# Patient Record
Sex: FEMALE | URBAN - METROPOLITAN AREA
[De-identification: names, ages, dates, MRNs, and addresses within clinical notes are randomized per-mention and may not be internally consistent; named-entity substitution may affect disease eponyms.]

---

## 2024-01-01 ENCOUNTER — APPOINTMENT (OUTPATIENT)
Dept: RADIOLOGY | Facility: HOSPITAL | Age: 64
DRG: 004 | End: 2024-01-01
Payer: MEDICARE

## 2024-01-01 ENCOUNTER — APPOINTMENT (OUTPATIENT)
Dept: CARDIOLOGY | Facility: HOSPITAL | Age: 64
DRG: 004 | End: 2024-01-01
Payer: MEDICARE

## 2024-01-01 ENCOUNTER — APPOINTMENT (OUTPATIENT)
Dept: GASTROENTEROLOGY | Facility: HOSPITAL | Age: 64
DRG: 004 | End: 2024-01-01
Payer: MEDICARE

## 2024-01-01 ENCOUNTER — APPOINTMENT (OUTPATIENT)
Dept: DIALYSIS | Facility: HOSPITAL | Age: 64
End: 2024-01-01
Payer: MEDICARE

## 2024-01-01 PROCEDURE — 71045 X-RAY EXAM CHEST 1 VIEW: CPT

## 2024-01-01 PROCEDURE — 74018 RADEX ABDOMEN 1 VIEW: CPT

## 2024-01-01 PROCEDURE — 93005 ELECTROCARDIOGRAM TRACING: CPT

## 2024-01-01 PROCEDURE — 2720000007 HC OR 272 NO HCPCS

## 2024-01-01 PROCEDURE — 2780000003 HC OR 278 NO HCPCS: Performed by: STUDENT IN AN ORGANIZED HEALTH CARE EDUCATION/TRAINING PROGRAM

## 2024-01-01 PROCEDURE — 2720000007 HC OR 272 NO HCPCS: Performed by: STUDENT IN AN ORGANIZED HEALTH CARE EDUCATION/TRAINING PROGRAM

## 2024-01-01 PROCEDURE — C1750 CATH, HEMODIALYSIS,LONG-TERM: HCPCS

## 2024-01-01 PROCEDURE — C1769 GUIDE WIRE: HCPCS

## 2024-01-01 PROCEDURE — 43246 EGD PLACE GASTROSTOMY TUBE: CPT | Performed by: STUDENT IN AN ORGANIZED HEALTH CARE EDUCATION/TRAINING PROGRAM

## 2024-01-01 PROCEDURE — C1713 ANCHOR/SCREW BN/BN,TIS/BN: HCPCS | Performed by: STUDENT IN AN ORGANIZED HEALTH CARE EDUCATION/TRAINING PROGRAM

## 2024-01-01 PROCEDURE — 2780000003 HC OR 278 NO HCPCS

## 2024-11-18 ENCOUNTER — HOSPITAL ENCOUNTER (INPATIENT)
Facility: HOSPITAL | Age: 64
End: 2024-11-18
Attending: INTERNAL MEDICINE | Admitting: INTERNAL MEDICINE
Payer: MEDICARE

## 2024-11-18 DIAGNOSIS — R60.1 GENERALIZED EDEMA: ICD-10-CM

## 2024-11-18 DIAGNOSIS — I82.C19: ICD-10-CM

## 2024-11-18 DIAGNOSIS — N17.9 ACUTE RENAL FAILURE, UNSPECIFIED ACUTE RENAL FAILURE TYPE (CMS-HCC): ICD-10-CM

## 2024-11-18 DIAGNOSIS — M79.89 OTHER SPECIFIED SOFT TISSUE DISORDERS: ICD-10-CM

## 2024-11-18 DIAGNOSIS — R13.10 DYSPHAGIA, UNSPECIFIED TYPE: ICD-10-CM

## 2024-11-18 DIAGNOSIS — I46.9 CARDIAC ARREST: ICD-10-CM

## 2024-11-18 DIAGNOSIS — I71.00 DISSECTION OF AORTA: Primary | ICD-10-CM

## 2024-11-18 DIAGNOSIS — R57.9 SHOCK (MULTI): ICD-10-CM

## 2024-11-18 LAB
ALBUMIN SERPL BCP-MCNC: 3.8 G/DL (ref 3.4–5)
ALP SERPL-CCNC: 82 U/L (ref 33–136)
ALT SERPL W P-5'-P-CCNC: 10 U/L (ref 7–45)
ANION GAP BLDV CALCULATED.4IONS-SCNC: 15 MMOL/L (ref 10–25)
ANION GAP SERPL CALC-SCNC: 16 MMOL/L (ref 10–20)
APTT PPP: 29 SECONDS (ref 27–38)
AST SERPL W P-5'-P-CCNC: 15 U/L (ref 9–39)
BASE EXCESS BLDV CALC-SCNC: -4.9 MMOL/L (ref -2–3)
BILIRUB SERPL-MCNC: 0.6 MG/DL (ref 0–1.2)
BODY TEMPERATURE: 37 DEGREES CELSIUS
BUN SERPL-MCNC: 21 MG/DL (ref 6–23)
CA-I BLDV-SCNC: 1.13 MMOL/L (ref 1.1–1.33)
CALCIUM SERPL-MCNC: 8.8 MG/DL (ref 8.6–10.6)
CHLORIDE BLDV-SCNC: 104 MMOL/L (ref 98–107)
CHLORIDE SERPL-SCNC: 105 MMOL/L (ref 98–107)
CO2 SERPL-SCNC: 25 MMOL/L (ref 21–32)
CREAT SERPL-MCNC: 1.49 MG/DL (ref 0.5–1.05)
EGFRCR SERPLBLD CKD-EPI 2021: 39 ML/MIN/1.73M*2
GLUCOSE BLD MANUAL STRIP-MCNC: 312 MG/DL (ref 74–99)
GLUCOSE BLDV-MCNC: 340 MG/DL (ref 74–99)
GLUCOSE SERPL-MCNC: 324 MG/DL (ref 74–99)
HCO3 BLDV-SCNC: 24.1 MMOL/L (ref 22–26)
HCT VFR BLD EST: 36 % (ref 36–46)
HGB BLDV-MCNC: 12 G/DL (ref 12–16)
INHALED O2 CONCENTRATION: 32 %
INR PPP: 1.1 (ref 0.9–1.1)
LACTATE BLDV-SCNC: 3.1 MMOL/L (ref 0.4–2)
MAGNESIUM SERPL-MCNC: 1.85 MG/DL (ref 1.6–2.4)
OXYHGB MFR BLDV: 75.7 % (ref 45–75)
PCO2 BLDV: 63 MM HG (ref 41–51)
PH BLDV: 7.19 PH (ref 7.33–7.43)
PO2 BLDV: 52 MM HG (ref 35–45)
POTASSIUM BLDV-SCNC: 4.1 MMOL/L (ref 3.5–5.3)
POTASSIUM SERPL-SCNC: 4.3 MMOL/L (ref 3.5–5.3)
PROT SERPL-MCNC: 6.7 G/DL (ref 6.4–8.2)
PROTHROMBIN TIME: 11.9 SECONDS (ref 9.8–12.8)
SAO2 % BLDV: 78 % (ref 45–75)
SODIUM BLDV-SCNC: 139 MMOL/L (ref 136–145)
SODIUM SERPL-SCNC: 142 MMOL/L (ref 136–145)

## 2024-11-18 PROCEDURE — 86923 COMPATIBILITY TEST ELECTRIC: CPT

## 2024-11-18 PROCEDURE — 85025 COMPLETE CBC W/AUTO DIFF WBC: CPT

## 2024-11-18 PROCEDURE — 99221 1ST HOSP IP/OBS SF/LOW 40: CPT | Performed by: SURGERY

## 2024-11-18 PROCEDURE — 86901 BLOOD TYPING SEROLOGIC RH(D): CPT

## 2024-11-18 PROCEDURE — 84484 ASSAY OF TROPONIN QUANT: CPT

## 2024-11-18 PROCEDURE — 83735 ASSAY OF MAGNESIUM: CPT

## 2024-11-18 PROCEDURE — 1100000001 HC PRIVATE ROOM DAILY

## 2024-11-18 PROCEDURE — 84132 ASSAY OF SERUM POTASSIUM: CPT

## 2024-11-18 PROCEDURE — 36415 COLL VENOUS BLD VENIPUNCTURE: CPT

## 2024-11-18 PROCEDURE — 83036 HEMOGLOBIN GLYCOSYLATED A1C: CPT

## 2024-11-18 PROCEDURE — 5A1D70Z PERFORMANCE OF URINARY FILTRATION, INTERMITTENT, LESS THAN 6 HOURS PER DAY: ICD-10-PCS | Performed by: INTERNAL MEDICINE

## 2024-11-18 PROCEDURE — 93010 ELECTROCARDIOGRAM REPORT: CPT | Performed by: INTERNAL MEDICINE

## 2024-11-18 PROCEDURE — 84075 ASSAY ALKALINE PHOSPHATASE: CPT

## 2024-11-18 PROCEDURE — 83605 ASSAY OF LACTIC ACID: CPT

## 2024-11-18 PROCEDURE — 82947 ASSAY GLUCOSE BLOOD QUANT: CPT

## 2024-11-18 PROCEDURE — 36620 INSERTION CATHETER ARTERY: CPT | Performed by: STUDENT IN AN ORGANIZED HEALTH CARE EDUCATION/TRAINING PROGRAM

## 2024-11-18 PROCEDURE — 85610 PROTHROMBIN TIME: CPT

## 2024-11-18 RX ORDER — ESMOLOL HYDROCHLORIDE 10 MG/ML
INJECTION, SOLUTION INTRAVENOUS
Status: DISPENSED
Start: 2024-11-18 | End: 2024-11-19

## 2024-11-18 RX ORDER — HEPARIN SODIUM 10000 [USP'U]/100ML
0-4000 INJECTION, SOLUTION INTRAVENOUS CONTINUOUS
Status: DISCONTINUED | OUTPATIENT
Start: 2024-11-19 | End: 2024-11-22

## 2024-11-18 RX ORDER — SODIUM NITROPRUSSIDE IN 0.9% SODIUM CHLORIDE 0.5 MG/ML
INJECTION INTRAVENOUS
Status: DISPENSED
Start: 2024-11-18 | End: 2024-11-19

## 2024-11-18 RX ORDER — DILTIAZEM HCL/D5W 125 MG/125
PLASTIC BAG, INJECTION (ML) INTRAVENOUS
Status: DISCONTINUED
Start: 2024-11-18 | End: 2024-11-19

## 2024-11-18 ASSESSMENT — PAIN - FUNCTIONAL ASSESSMENT: PAIN_FUNCTIONAL_ASSESSMENT: 0-10

## 2024-11-18 ASSESSMENT — PAIN SCALES - GENERAL: PAINLEVEL_OUTOF10: 10 - WORST POSSIBLE PAIN

## 2024-11-18 ASSESSMENT — PAIN DESCRIPTION - DESCRIPTORS: DESCRIPTORS: ACHING

## 2024-11-18 NOTE — Clinical Note
Inner canula of trach with inflated bulb came out of stoma while awaiting to prep site for line placement. Trach collar had been loosened to clear area. Pt coughed and was moving her neck and cannula slipped out. Spo2 noted to be be 90%, this RN immediately reinserted cannula and RT was called. Spo2 recovered to baseline. RT arrived and assessed airway and listened to bilateral breath sounds. Pt is returning tidal volumes and bilateral  breath sounds ausciltated per RT. Site prep continued. Trach collar was re secured and left fastened.

## 2024-11-19 ENCOUNTER — APPOINTMENT (OUTPATIENT)
Dept: CARDIOLOGY | Facility: HOSPITAL | Age: 64
DRG: 004 | End: 2024-11-19
Payer: MEDICARE

## 2024-11-19 LAB
ABO GROUP (TYPE) IN BLOOD: NORMAL
ABO GROUP (TYPE) IN BLOOD: NORMAL
ALBUMIN SERPL BCP-MCNC: 3.5 G/DL (ref 3.4–5)
ANION GAP BLDA CALCULATED.4IONS-SCNC: 12 MMO/L (ref 10–25)
ANION GAP BLDA CALCULATED.4IONS-SCNC: 12 MMO/L (ref 10–25)
ANION GAP BLDA CALCULATED.4IONS-SCNC: 13 MMO/L (ref 10–25)
ANION GAP BLDA CALCULATED.4IONS-SCNC: 13 MMO/L (ref 10–25)
ANION GAP SERPL CALC-SCNC: 16 MMOL/L (ref 10–20)
ANTIBODY SCREEN: NORMAL
AORTIC VALVE PEAK VELOCITY: 0.72 M/S
ATRIAL RATE: 71 BPM
ATRIAL RATE: 76 BPM
ATRIAL RATE: 81 BPM
ATRIAL RATE: 83 BPM
ATRIAL RATE: 91 BPM
AV PEAK GRADIENT: 2 MMHG
AVA (PEAK VEL): 1.5 CM2
BASE EXCESS BLDA CALC-SCNC: -1.2 MMOL/L (ref -2–3)
BASE EXCESS BLDA CALC-SCNC: -3.9 MMOL/L (ref -2–3)
BASE EXCESS BLDA CALC-SCNC: -4.2 MMOL/L (ref -2–3)
BASE EXCESS BLDA CALC-SCNC: -4.4 MMOL/L (ref -2–3)
BASOPHILS # BLD AUTO: 0.05 X10*3/UL (ref 0–0.1)
BASOPHILS NFR BLD AUTO: 0.6 %
BODY TEMPERATURE: 37 DEGREES CELSIUS
BUN SERPL-MCNC: 26 MG/DL (ref 6–23)
CA-I BLDA-SCNC: 1.07 MMOL/L (ref 1.1–1.33)
CA-I BLDA-SCNC: 1.11 MMOL/L (ref 1.1–1.33)
CA-I BLDA-SCNC: 1.12 MMOL/L (ref 1.1–1.33)
CA-I BLDA-SCNC: 1.12 MMOL/L (ref 1.1–1.33)
CALCIUM SERPL-MCNC: 8.3 MG/DL (ref 8.6–10.6)
CARDIAC TROPONIN I PNL SERPL HS: 1068 NG/L (ref 0–34)
CARDIAC TROPONIN I PNL SERPL HS: 2861 NG/L (ref 0–34)
CARDIAC TROPONIN I PNL SERPL HS: 343 NG/L (ref 0–34)
CARDIAC TROPONIN I PNL SERPL HS: 3910 NG/L (ref 0–34)
CARDIAC TROPONIN I PNL SERPL HS: 5681 NG/L (ref 0–34)
CARDIAC TROPONIN I PNL SERPL HS: 5701 NG/L (ref 0–34)
CHLORIDE BLDA-SCNC: 105 MMOL/L (ref 98–107)
CHLORIDE SERPL-SCNC: 105 MMOL/L (ref 98–107)
CHOLEST SERPL-MCNC: 157 MG/DL (ref 0–199)
CHOLESTEROL/HDL RATIO: 3.8
CO2 SERPL-SCNC: 26 MMOL/L (ref 21–32)
CREAT SERPL-MCNC: 1.78 MG/DL (ref 0.5–1.05)
EGFRCR SERPLBLD CKD-EPI 2021: 31 ML/MIN/1.73M*2
EJECTION FRACTION APICAL 4 CHAMBER: 42.2
EJECTION FRACTION: 48 %
EOSINOPHIL # BLD AUTO: 0 X10*3/UL (ref 0–0.7)
EOSINOPHIL NFR BLD AUTO: 0 %
ERYTHROCYTE [DISTWIDTH] IN BLOOD BY AUTOMATED COUNT: 12.8 % (ref 11.5–14.5)
ERYTHROCYTE [DISTWIDTH] IN BLOOD BY AUTOMATED COUNT: 13.1 % (ref 11.5–14.5)
ERYTHROCYTE [DISTWIDTH] IN BLOOD BY AUTOMATED COUNT: 13.2 % (ref 11.5–14.5)
EST. AVERAGE GLUCOSE BLD GHB EST-MCNC: 186 MG/DL
GLUCOSE BLD MANUAL STRIP-MCNC: 136 MG/DL (ref 74–99)
GLUCOSE BLD MANUAL STRIP-MCNC: 147 MG/DL (ref 74–99)
GLUCOSE BLD MANUAL STRIP-MCNC: 176 MG/DL (ref 74–99)
GLUCOSE BLD MANUAL STRIP-MCNC: 215 MG/DL (ref 74–99)
GLUCOSE BLD MANUAL STRIP-MCNC: 250 MG/DL (ref 74–99)
GLUCOSE BLD MANUAL STRIP-MCNC: 352 MG/DL (ref 74–99)
GLUCOSE BLDA-MCNC: 161 MG/DL (ref 74–99)
GLUCOSE BLDA-MCNC: 267 MG/DL (ref 74–99)
GLUCOSE BLDA-MCNC: 283 MG/DL (ref 74–99)
GLUCOSE BLDA-MCNC: 364 MG/DL (ref 74–99)
GLUCOSE SERPL-MCNC: 273 MG/DL (ref 74–99)
HBA1C MFR BLD: 8.1 %
HCO3 BLDA-SCNC: 23.2 MMOL/L (ref 22–26)
HCO3 BLDA-SCNC: 24.6 MMOL/L (ref 22–26)
HCO3 BLDA-SCNC: 24.6 MMOL/L (ref 22–26)
HCO3 BLDA-SCNC: 26.3 MMOL/L (ref 22–26)
HCT VFR BLD AUTO: 32.3 % (ref 36–46)
HCT VFR BLD AUTO: 36 % (ref 36–46)
HCT VFR BLD AUTO: 36 % (ref 36–46)
HCT VFR BLD EST: 34 % (ref 36–46)
HCT VFR BLD EST: 34 % (ref 36–46)
HCT VFR BLD EST: 36 % (ref 36–46)
HCT VFR BLD EST: 36 % (ref 36–46)
HDLC SERPL-MCNC: 41.5 MG/DL
HGB BLD-MCNC: 10.2 G/DL (ref 12–16)
HGB BLD-MCNC: 11.1 G/DL (ref 12–16)
HGB BLD-MCNC: 11.6 G/DL (ref 12–16)
HGB BLDA-MCNC: 11.3 G/DL (ref 12–16)
HGB BLDA-MCNC: 11.4 G/DL (ref 12–16)
HGB BLDA-MCNC: 11.9 G/DL (ref 12–16)
HGB BLDA-MCNC: 12.1 G/DL (ref 12–16)
IMM GRANULOCYTES # BLD AUTO: 0.02 X10*3/UL (ref 0–0.7)
IMM GRANULOCYTES NFR BLD AUTO: 0.2 % (ref 0–0.9)
INHALED O2 CONCENTRATION: 32 %
INHALED O2 CONCENTRATION: 34 %
INHALED O2 CONCENTRATION: 34 %
INHALED O2 CONCENTRATION: 36 %
LACTATE BLDA-SCNC: 1 MMOL/L (ref 0.4–2)
LACTATE BLDA-SCNC: 2.2 MMOL/L (ref 0.4–2)
LACTATE BLDA-SCNC: 2.3 MMOL/L (ref 0.4–2)
LACTATE BLDA-SCNC: 2.7 MMOL/L (ref 0.4–2)
LACTATE SERPL-SCNC: 1.4 MMOL/L (ref 0.4–2)
LACTATE SERPL-SCNC: 2.8 MMOL/L (ref 0.4–2)
LACTATE SERPL-SCNC: 3.6 MMOL/L (ref 0.4–2)
LDLC SERPL CALC-MCNC: 101 MG/DL
LEFT ATRIUM VOLUME AREA LENGTH INDEX BSA: 71.4 ML/M2
LEFT VENTRICLE INTERNAL DIMENSION DIASTOLE: 4.7 CM (ref 3.5–6)
LEFT VENTRICULAR OUTFLOW TRACT DIAMETER: 1.7 CM
LYMPHOCYTES # BLD AUTO: 1.15 X10*3/UL (ref 1.2–4.8)
LYMPHOCYTES NFR BLD AUTO: 13.1 %
MAGNESIUM SERPL-MCNC: 1.76 MG/DL (ref 1.6–2.4)
MCH RBC QN AUTO: 29.5 PG (ref 26–34)
MCH RBC QN AUTO: 29.9 PG (ref 26–34)
MCH RBC QN AUTO: 31 PG (ref 26–34)
MCHC RBC AUTO-ENTMCNC: 30.8 G/DL (ref 32–36)
MCHC RBC AUTO-ENTMCNC: 31.6 G/DL (ref 32–36)
MCHC RBC AUTO-ENTMCNC: 32.2 G/DL (ref 32–36)
MCV RBC AUTO: 92 FL (ref 80–100)
MCV RBC AUTO: 97 FL (ref 80–100)
MCV RBC AUTO: 98 FL (ref 80–100)
MITRAL VALVE E/A RATIO: 3.51
MONOCYTES # BLD AUTO: 0.29 X10*3/UL (ref 0.1–1)
MONOCYTES NFR BLD AUTO: 3.3 %
NEUTROPHILS # BLD AUTO: 7.3 X10*3/UL (ref 1.2–7.7)
NEUTROPHILS NFR BLD AUTO: 82.8 %
NON HDL CHOLESTEROL: 116 MG/DL (ref 0–149)
NRBC BLD-RTO: 0 /100 WBCS (ref 0–0)
OXYHGB MFR BLDA: 85.2 % (ref 94–98)
OXYHGB MFR BLDA: 93.5 % (ref 94–98)
OXYHGB MFR BLDA: 94.4 % (ref 94–98)
OXYHGB MFR BLDA: 97.3 % (ref 94–98)
P AXIS: 63 DEGREES
P AXIS: 68 DEGREES
P AXIS: 69 DEGREES
P AXIS: 70 DEGREES
P AXIS: 81 DEGREES
P OFFSET: 209 MS
P OFFSET: 212 MS
P OFFSET: 215 MS
P OFFSET: 215 MS
P OFFSET: 218 MS
P ONSET: 149 MS
P ONSET: 155 MS
P ONSET: 157 MS
P ONSET: 159 MS
P ONSET: 161 MS
PCO2 BLDA: 53 MM HG (ref 38–42)
PCO2 BLDA: 56 MM HG (ref 38–42)
PCO2 BLDA: 60 MM HG (ref 38–42)
PCO2 BLDA: 63 MM HG (ref 38–42)
PH BLDA: 7.2 PH (ref 7.38–7.42)
PH BLDA: 7.22 PH (ref 7.38–7.42)
PH BLDA: 7.25 PH (ref 7.38–7.42)
PH BLDA: 7.28 PH (ref 7.38–7.42)
PHOSPHATE SERPL-MCNC: 6 MG/DL (ref 2.5–4.9)
PLATELET # BLD AUTO: 247 X10*3/UL (ref 150–450)
PLATELET # BLD AUTO: 276 X10*3/UL (ref 150–450)
PLATELET # BLD AUTO: 308 X10*3/UL (ref 150–450)
PO2 BLDA: 109 MM HG (ref 85–95)
PO2 BLDA: 59 MM HG (ref 85–95)
PO2 BLDA: 80 MM HG (ref 85–95)
PO2 BLDA: 86 MM HG (ref 85–95)
POTASSIUM BLDA-SCNC: 4.9 MMOL/L (ref 3.5–5.3)
POTASSIUM BLDA-SCNC: 5.2 MMOL/L (ref 3.5–5.3)
POTASSIUM BLDA-SCNC: 5.2 MMOL/L (ref 3.5–5.3)
POTASSIUM BLDA-SCNC: 5.3 MMOL/L (ref 3.5–5.3)
POTASSIUM SERPL-SCNC: 5.1 MMOL/L (ref 3.5–5.3)
PR INTERVAL: 116 MS
PR INTERVAL: 118 MS
PR INTERVAL: 118 MS
PR INTERVAL: 120 MS
PR INTERVAL: 136 MS
Q ONSET: 214 MS
Q ONSET: 217 MS
Q ONSET: 220 MS
QRS COUNT: 12 BEATS
QRS COUNT: 13 BEATS
QRS COUNT: 13 BEATS
QRS COUNT: 14 BEATS
QRS COUNT: 15 BEATS
QRS DURATION: 74 MS
QRS DURATION: 80 MS
QRS DURATION: 82 MS
QRS DURATION: 90 MS
QRS DURATION: 90 MS
QT INTERVAL: 400 MS
QT INTERVAL: 426 MS
QT INTERVAL: 432 MS
QT INTERVAL: 440 MS
QT INTERVAL: 462 MS
QTC CALCULATION(BAZETT): 492 MS
QTC CALCULATION(BAZETT): 494 MS
QTC CALCULATION(BAZETT): 495 MS
QTC CALCULATION(BAZETT): 502 MS
QTC CALCULATION(BAZETT): 507 MS
QTC FREDERICIA: 460 MS
QTC FREDERICIA: 470 MS
QTC FREDERICIA: 475 MS
QTC FREDERICIA: 481 MS
QTC FREDERICIA: 488 MS
R AXIS: 17 DEGREES
R AXIS: 18 DEGREES
R AXIS: 26 DEGREES
R AXIS: 34 DEGREES
R AXIS: 35 DEGREES
RBC # BLD AUTO: 3.29 X10*6/UL (ref 4–5.2)
RBC # BLD AUTO: 3.71 X10*6/UL (ref 4–5.2)
RBC # BLD AUTO: 3.93 X10*6/UL (ref 4–5.2)
RH FACTOR (ANTIGEN D): NORMAL
RH FACTOR (ANTIGEN D): NORMAL
RIGHT VENTRICLE FREE WALL PEAK S': 5.93 CM/S
RIGHT VENTRICLE PEAK SYSTOLIC PRESSURE: 61.5 MMHG
SAO2 % BLDA: 87 % (ref 94–100)
SAO2 % BLDA: 97 % (ref 94–100)
SAO2 % BLDA: 97 % (ref 94–100)
SAO2 % BLDA: 99 % (ref 94–100)
SODIUM BLDA-SCNC: 135 MMOL/L (ref 136–145)
SODIUM BLDA-SCNC: 137 MMOL/L (ref 136–145)
SODIUM BLDA-SCNC: 137 MMOL/L (ref 136–145)
SODIUM BLDA-SCNC: 139 MMOL/L (ref 136–145)
SODIUM SERPL-SCNC: 142 MMOL/L (ref 136–145)
T AXIS: -66 DEGREES
T AXIS: 104 DEGREES
T AXIS: 118 DEGREES
T AXIS: 159 DEGREES
T AXIS: 191 DEGREES
T OFFSET: 417 MS
T OFFSET: 427 MS
T OFFSET: 433 MS
T OFFSET: 437 MS
T OFFSET: 451 MS
TRICUSPID ANNULAR PLANE SYSTOLIC EXCURSION: 1.4 CM
TRIGL SERPL-MCNC: 75 MG/DL (ref 0–149)
UFH PPP CHRO-ACNC: 0.2 IU/ML
UFH PPP CHRO-ACNC: 0.2 IU/ML
UFH PPP CHRO-ACNC: 0.3 IU/ML
UFH PPP CHRO-ACNC: 0.6 IU/ML
UFH PPP CHRO-ACNC: 0.9 IU/ML
VENTRICULAR RATE: 71 BPM
VENTRICULAR RATE: 76 BPM
VENTRICULAR RATE: 81 BPM
VENTRICULAR RATE: 83 BPM
VENTRICULAR RATE: 91 BPM
VLDL: 15 MG/DL (ref 0–40)
WBC # BLD AUTO: 10.7 X10*3/UL (ref 4.4–11.3)
WBC # BLD AUTO: 8.8 X10*3/UL (ref 4.4–11.3)
WBC # BLD AUTO: 9.9 X10*3/UL (ref 4.4–11.3)

## 2024-11-19 PROCEDURE — 2500000004 HC RX 250 GENERAL PHARMACY W/ HCPCS (ALT 636 FOR OP/ED)

## 2024-11-19 PROCEDURE — 71045 X-RAY EXAM CHEST 1 VIEW: CPT | Performed by: STUDENT IN AN ORGANIZED HEALTH CARE EDUCATION/TRAINING PROGRAM

## 2024-11-19 PROCEDURE — 83718 ASSAY OF LIPOPROTEIN: CPT

## 2024-11-19 PROCEDURE — 2500000004 HC RX 250 GENERAL PHARMACY W/ HCPCS (ALT 636 FOR OP/ED): Performed by: STUDENT IN AN ORGANIZED HEALTH CARE EDUCATION/TRAINING PROGRAM

## 2024-11-19 PROCEDURE — 83605 ASSAY OF LACTIC ACID: CPT

## 2024-11-19 PROCEDURE — 99232 SBSQ HOSP IP/OBS MODERATE 35: CPT | Performed by: PHYSICIAN ASSISTANT

## 2024-11-19 PROCEDURE — 84484 ASSAY OF TROPONIN QUANT: CPT

## 2024-11-19 PROCEDURE — 85027 COMPLETE CBC AUTOMATED: CPT

## 2024-11-19 PROCEDURE — 37799 UNLISTED PX VASCULAR SURGERY: CPT

## 2024-11-19 PROCEDURE — 82947 ASSAY GLUCOSE BLOOD QUANT: CPT

## 2024-11-19 PROCEDURE — 93010 ELECTROCARDIOGRAM REPORT: CPT | Performed by: INTERNAL MEDICINE

## 2024-11-19 PROCEDURE — 2500000001 HC RX 250 WO HCPCS SELF ADMINISTERED DRUGS (ALT 637 FOR MEDICARE OP)

## 2024-11-19 PROCEDURE — 99291 CRITICAL CARE FIRST HOUR: CPT

## 2024-11-19 PROCEDURE — 84132 ASSAY OF SERUM POTASSIUM: CPT

## 2024-11-19 PROCEDURE — C8929 TTE W OR WO FOL WCON,DOPPLER: HCPCS

## 2024-11-19 PROCEDURE — 1100000001 HC PRIVATE ROOM DAILY

## 2024-11-19 PROCEDURE — 2500000002 HC RX 250 W HCPCS SELF ADMINISTERED DRUGS (ALT 637 FOR MEDICARE OP, ALT 636 FOR OP/ED)

## 2024-11-19 PROCEDURE — 93306 TTE W/DOPPLER COMPLETE: CPT | Performed by: INTERNAL MEDICINE

## 2024-11-19 PROCEDURE — 84132 ASSAY OF SERUM POTASSIUM: CPT | Performed by: INTERNAL MEDICINE

## 2024-11-19 PROCEDURE — 99232 SBSQ HOSP IP/OBS MODERATE 35: CPT | Performed by: SURGERY

## 2024-11-19 PROCEDURE — 85520 HEPARIN ASSAY: CPT

## 2024-11-19 PROCEDURE — 83735 ASSAY OF MAGNESIUM: CPT

## 2024-11-19 PROCEDURE — 84132 ASSAY OF SERUM POTASSIUM: CPT | Performed by: STUDENT IN AN ORGANIZED HEALTH CARE EDUCATION/TRAINING PROGRAM

## 2024-11-19 PROCEDURE — 84484 ASSAY OF TROPONIN QUANT: CPT | Performed by: STUDENT IN AN ORGANIZED HEALTH CARE EDUCATION/TRAINING PROGRAM

## 2024-11-19 RX ORDER — ACETAMINOPHEN 10 MG/ML
15 INJECTION, SOLUTION INTRAVENOUS 3 TIMES DAILY PRN
Status: DISCONTINUED | OUTPATIENT
Start: 2024-11-19 | End: 2024-11-21

## 2024-11-19 RX ORDER — MAGNESIUM SULFATE HEPTAHYDRATE 40 MG/ML
2 INJECTION, SOLUTION INTRAVENOUS ONCE
Status: COMPLETED | OUTPATIENT
Start: 2024-11-19 | End: 2024-11-19

## 2024-11-19 RX ORDER — AMOXICILLIN 250 MG
1 CAPSULE ORAL NIGHTLY
Status: DISCONTINUED | OUTPATIENT
Start: 2024-11-19 | End: 2024-11-28

## 2024-11-19 RX ORDER — HYDROXYZINE HYDROCHLORIDE 10 MG/1
10 TABLET, FILM COATED ORAL EVERY 6 HOURS PRN
Status: DISCONTINUED | OUTPATIENT
Start: 2024-11-19 | End: 2024-12-10

## 2024-11-19 RX ORDER — HYDROMORPHONE HYDROCHLORIDE 0.2 MG/ML
0.2 INJECTION INTRAMUSCULAR; INTRAVENOUS; SUBCUTANEOUS ONCE
Status: COMPLETED | OUTPATIENT
Start: 2024-11-19 | End: 2024-11-19

## 2024-11-19 RX ORDER — INSULIN LISPRO 100 [IU]/ML
0-5 INJECTION, SOLUTION INTRAVENOUS; SUBCUTANEOUS EVERY 4 HOURS
Status: DISCONTINUED | OUTPATIENT
Start: 2024-11-19 | End: 2024-12-08

## 2024-11-19 RX ORDER — HYDROXYZINE HYDROCHLORIDE 25 MG/1
25 TABLET, FILM COATED ORAL EVERY 6 HOURS PRN
Status: DISCONTINUED | OUTPATIENT
Start: 2024-11-19 | End: 2024-11-19

## 2024-11-19 RX ORDER — HYDROMORPHONE HYDROCHLORIDE 0.2 MG/ML
INJECTION INTRAMUSCULAR; INTRAVENOUS; SUBCUTANEOUS
Status: COMPLETED
Start: 2024-11-19 | End: 2024-11-19

## 2024-11-19 RX ORDER — PANTOPRAZOLE SODIUM 40 MG/1
40 TABLET, DELAYED RELEASE ORAL
Status: DISCONTINUED | OUTPATIENT
Start: 2024-11-19 | End: 2024-11-20

## 2024-11-19 RX ORDER — METOPROLOL TARTRATE 25 MG/1
TABLET, FILM COATED ORAL
Status: COMPLETED
Start: 2024-11-19 | End: 2024-11-19

## 2024-11-19 RX ORDER — OXYCODONE HYDROCHLORIDE 5 MG/1
2.5 TABLET ORAL ONCE
Status: DISCONTINUED | OUTPATIENT
Start: 2024-11-19 | End: 2024-11-19

## 2024-11-19 RX ORDER — NAPROXEN SODIUM 220 MG/1
81 TABLET, FILM COATED ORAL DAILY
Status: DISCONTINUED | OUTPATIENT
Start: 2024-11-19 | End: 2024-12-10

## 2024-11-19 RX ORDER — NITROGLYCERIN 0.4 MG/1
TABLET SUBLINGUAL
Status: COMPLETED
Start: 2024-11-19 | End: 2024-11-19

## 2024-11-19 RX ORDER — LISINOPRIL 2.5 MG/1
2.5 TABLET ORAL DAILY
COMMUNITY

## 2024-11-19 RX ORDER — CLOPIDOGREL BISULFATE 75 MG/1
75 TABLET ORAL DAILY
COMMUNITY

## 2024-11-19 RX ORDER — AMLODIPINE BESYLATE 10 MG/1
10 TABLET ORAL DAILY
COMMUNITY

## 2024-11-19 RX ORDER — METOPROLOL TARTRATE 25 MG/1
12.5 TABLET, FILM COATED ORAL EVERY 6 HOURS
Status: DISCONTINUED | OUTPATIENT
Start: 2024-11-19 | End: 2024-11-25

## 2024-11-19 RX ORDER — ATORVASTATIN CALCIUM 80 MG/1
80 TABLET, FILM COATED ORAL DAILY
COMMUNITY

## 2024-11-19 RX ORDER — ESMOLOL HYDROCHLORIDE 10 MG/ML
50-300 INJECTION, SOLUTION INTRAVENOUS CONTINUOUS
Status: DISCONTINUED | OUTPATIENT
Start: 2024-11-19 | End: 2024-11-20

## 2024-11-19 RX ORDER — METOPROLOL TARTRATE 25 MG/1
12.5 TABLET, FILM COATED ORAL 2 TIMES DAILY
Status: DISCONTINUED | OUTPATIENT
Start: 2024-11-19 | End: 2024-11-19

## 2024-11-19 RX ORDER — ATORVASTATIN CALCIUM 80 MG/1
80 TABLET, FILM COATED ORAL NIGHTLY
Status: DISCONTINUED | OUTPATIENT
Start: 2024-11-19 | End: 2024-12-10

## 2024-11-19 RX ORDER — METFORMIN HYDROCHLORIDE 1000 MG/1
1000 TABLET ORAL 2 TIMES DAILY
COMMUNITY

## 2024-11-19 RX ORDER — ASPIRIN 81 MG/1
81 TABLET ORAL DAILY
COMMUNITY

## 2024-11-19 RX ORDER — NITROGLYCERIN 0.4 MG/1
0.4 TABLET SUBLINGUAL ONCE
Status: COMPLETED | OUTPATIENT
Start: 2024-11-19 | End: 2024-11-19

## 2024-11-19 RX ORDER — DEXTROSE 50 % IN WATER (D50W) INTRAVENOUS SYRINGE
25
Status: DISCONTINUED | OUTPATIENT
Start: 2024-11-19 | End: 2024-11-28 | Stop reason: SDUPTHER

## 2024-11-19 RX ORDER — PANTOPRAZOLE SODIUM 40 MG/1
40 TABLET, DELAYED RELEASE ORAL DAILY
COMMUNITY

## 2024-11-19 RX ORDER — HYDROXYZINE HYDROCHLORIDE 25 MG/1
25 TABLET, FILM COATED ORAL ONCE
Status: COMPLETED | OUTPATIENT
Start: 2024-11-19 | End: 2024-11-19

## 2024-11-19 RX ORDER — OXYCODONE HYDROCHLORIDE 5 MG/1
2.5 TABLET ORAL EVERY 4 HOURS PRN
Status: DISCONTINUED | OUTPATIENT
Start: 2024-11-19 | End: 2024-12-10

## 2024-11-19 RX ORDER — ISOSORBIDE MONONITRATE 30 MG/1
30 TABLET, EXTENDED RELEASE ORAL DAILY
COMMUNITY

## 2024-11-19 RX ORDER — NOREPINEPHRINE BITARTRATE/D5W 8 MG/250ML
PLASTIC BAG, INJECTION (ML) INTRAVENOUS
Status: COMPLETED
Start: 2024-11-19 | End: 2024-11-20

## 2024-11-19 RX ORDER — DEXTROSE 50 % IN WATER (D50W) INTRAVENOUS SYRINGE
12.5
Status: DISCONTINUED | OUTPATIENT
Start: 2024-11-19 | End: 2024-11-28 | Stop reason: SDUPTHER

## 2024-11-19 RX ORDER — ACETAMINOPHEN 325 MG/1
650 TABLET ORAL EVERY 6 HOURS PRN
COMMUNITY

## 2024-11-19 RX ORDER — METOPROLOL TARTRATE 50 MG/1
50 TABLET ORAL 2 TIMES DAILY
COMMUNITY

## 2024-11-19 RX ORDER — POLYETHYLENE GLYCOL 3350 17 G/17G
17 POWDER, FOR SOLUTION ORAL DAILY
Status: DISCONTINUED | OUTPATIENT
Start: 2024-11-19 | End: 2024-11-20 | Stop reason: SDUPTHER

## 2024-11-19 RX ADMIN — ASPIRIN 81 MG CHEWABLE TABLET 81 MG: 81 TABLET CHEWABLE at 10:27

## 2024-11-19 RX ADMIN — ATORVASTATIN CALCIUM 80 MG: 80 TABLET, FILM COATED ORAL at 21:00

## 2024-11-19 RX ADMIN — ESMOLOL HYDROCHLORIDE 50 MCG/KG/MIN: 10 INJECTION INTRAVENOUS at 23:54

## 2024-11-19 RX ADMIN — INSULIN LISPRO 2 UNITS: 100 INJECTION, SOLUTION INTRAVENOUS; SUBCUTANEOUS at 13:05

## 2024-11-19 RX ADMIN — HYDROXYZINE HYDROCHLORIDE 25 MG: 25 TABLET, FILM COATED ORAL at 14:03

## 2024-11-19 RX ADMIN — HYDROMORPHONE HYDROCHLORIDE 0.2 MG: 0.2 INJECTION INTRAMUSCULAR; INTRAVENOUS; SUBCUTANEOUS at 13:06

## 2024-11-19 RX ADMIN — ACETAMINOPHEN 600 MG: 1000 INJECTION, SOLUTION INTRAVENOUS at 04:46

## 2024-11-19 RX ADMIN — PANTOPRAZOLE SODIUM 40 MG: 40 TABLET, DELAYED RELEASE ORAL at 06:44

## 2024-11-19 RX ADMIN — ACETAMINOPHEN 600 MG: 1000 INJECTION, SOLUTION INTRAVENOUS at 19:23

## 2024-11-19 RX ADMIN — NITROGLYCERIN 0.4 MG: 0.4 TABLET SUBLINGUAL at 07:48

## 2024-11-19 RX ADMIN — METOPROLOL TARTRATE 12.5 MG: 25 TABLET, FILM COATED ORAL at 05:10

## 2024-11-19 RX ADMIN — HYDROMORPHONE HYDROCHLORIDE 0.2 MG: 0.2 INJECTION INTRAMUSCULAR; INTRAVENOUS; SUBCUTANEOUS at 21:51

## 2024-11-19 RX ADMIN — HYDROXYZINE HYDROCHLORIDE 10 MG: 10 TABLET ORAL at 19:56

## 2024-11-19 RX ADMIN — SODIUM CHLORIDE, POTASSIUM CHLORIDE, SODIUM LACTATE AND CALCIUM CHLORIDE 500 ML: 600; 310; 30; 20 INJECTION, SOLUTION INTRAVENOUS at 00:23

## 2024-11-19 RX ADMIN — SODIUM CHLORIDE, POTASSIUM CHLORIDE, SODIUM LACTATE AND CALCIUM CHLORIDE 500 ML: 600; 310; 30; 20 INJECTION, SOLUTION INTRAVENOUS at 09:10

## 2024-11-19 RX ADMIN — HYDROMORPHONE HYDROCHLORIDE 0.2 MG: 0.2 INJECTION, SOLUTION INTRAMUSCULAR; INTRAVENOUS; SUBCUTANEOUS at 21:51

## 2024-11-19 RX ADMIN — ACETAMINOPHEN 600 MG: 1000 INJECTION, SOLUTION INTRAVENOUS at 07:30

## 2024-11-19 RX ADMIN — HYDROMORPHONE HYDROCHLORIDE 0.2 MG: 0.2 INJECTION, SOLUTION INTRAMUSCULAR; INTRAVENOUS; SUBCUTANEOUS at 13:06

## 2024-11-19 RX ADMIN — SODIUM CHLORIDE, SODIUM LACTATE, POTASSIUM CHLORIDE, AND CALCIUM CHLORIDE 500 ML: 600; 310; 30; 20 INJECTION, SOLUTION INTRAVENOUS at 02:12

## 2024-11-19 RX ADMIN — PERFLUTREN 2 ML OF DILUTION: 6.52 INJECTION, SUSPENSION INTRAVENOUS at 10:07

## 2024-11-19 RX ADMIN — OXYCODONE HYDROCHLORIDE 2.5 MG: 5 TABLET ORAL at 12:47

## 2024-11-19 RX ADMIN — HYDROMORPHONE HYDROCHLORIDE 0.2 MG: 0.2 INJECTION, SOLUTION INTRAMUSCULAR; INTRAVENOUS; SUBCUTANEOUS at 01:21

## 2024-11-19 RX ADMIN — INSULIN LISPRO 2 UNITS: 100 INJECTION, SOLUTION INTRAVENOUS; SUBCUTANEOUS at 10:32

## 2024-11-19 RX ADMIN — HYDROXYZINE HYDROCHLORIDE 25 MG: 25 TABLET ORAL at 08:05

## 2024-11-19 RX ADMIN — MAGNESIUM SULFATE HEPTAHYDRATE 2 G: 40 INJECTION, SOLUTION INTRAVENOUS at 06:38

## 2024-11-19 RX ADMIN — HEPARIN SODIUM 800 UNITS/HR: 10000 INJECTION, SOLUTION INTRAVENOUS at 18:28

## 2024-11-19 RX ADMIN — INSULIN LISPRO 5 UNITS: 100 INJECTION, SOLUTION INTRAVENOUS; SUBCUTANEOUS at 01:03

## 2024-11-19 RX ADMIN — SENNOSIDES AND DOCUSATE SODIUM 1 TABLET: 50; 8.6 TABLET ORAL at 21:00

## 2024-11-19 RX ADMIN — DEXTROSE MONOHYDRATE 0.5 MG/MIN: 50 INJECTION, SOLUTION INTRAVENOUS at 01:29

## 2024-11-19 RX ADMIN — HEPARIN SODIUM 590 UNITS/HR: 10000 INJECTION, SOLUTION INTRAVENOUS at 01:10

## 2024-11-19 RX ADMIN — OXYCODONE HYDROCHLORIDE 2.5 MG: 5 TABLET ORAL at 19:40

## 2024-11-19 RX ADMIN — HEPARIN SODIUM 700 UNITS/HR: 10000 INJECTION, SOLUTION INTRAVENOUS at 11:07

## 2024-11-19 RX ADMIN — INSULIN LISPRO 2 UNITS: 100 INJECTION, SOLUTION INTRAVENOUS; SUBCUTANEOUS at 04:34

## 2024-11-19 ASSESSMENT — PAIN SCALES - GENERAL
PAINLEVEL_OUTOF10: 0 - NO PAIN
PAINLEVEL_OUTOF10: 8
PAINLEVEL_OUTOF10: 10 - WORST POSSIBLE PAIN
PAINLEVEL_OUTOF10: 10 - WORST POSSIBLE PAIN
PAINLEVEL_OUTOF10: 7
PAINLEVEL_OUTOF10: 5 - MODERATE PAIN
PAINLEVEL_OUTOF10: 0 - NO PAIN
PAINLEVEL_OUTOF10: 10 - WORST POSSIBLE PAIN
PAINLEVEL_OUTOF10: 0 - NO PAIN
PAINLEVEL_OUTOF10: 10 - WORST POSSIBLE PAIN
PAINLEVEL_OUTOF10: 0 - NO PAIN
PAINLEVEL_OUTOF10: 0 - NO PAIN
PAINLEVEL_OUTOF10: 5 - MODERATE PAIN
PAINLEVEL_OUTOF10: 0 - NO PAIN
PAINLEVEL_OUTOF10: 0 - NO PAIN
PAINLEVEL_OUTOF10: 10 - WORST POSSIBLE PAIN
PAINLEVEL_OUTOF10: 10 - WORST POSSIBLE PAIN

## 2024-11-19 ASSESSMENT — PAIN - FUNCTIONAL ASSESSMENT
PAIN_FUNCTIONAL_ASSESSMENT: 0-10
PAIN_FUNCTIONAL_ASSESSMENT: CPOT (CRITICAL CARE PAIN OBSERVATION TOOL)
PAIN_FUNCTIONAL_ASSESSMENT: 0-10

## 2024-11-19 ASSESSMENT — ENCOUNTER SYMPTOMS
EYES NEGATIVE: 1
HEMATOLOGIC/LYMPHATIC NEGATIVE: 1
SHORTNESS OF BREATH: 1
ABDOMINAL PAIN: 1
FATIGUE: 1
MUSCULOSKELETAL NEGATIVE: 1
PSYCHIATRIC NEGATIVE: 1
HEADACHES: 1
ENDOCRINE NEGATIVE: 1
ALLERGIC/IMMUNOLOGIC NEGATIVE: 1

## 2024-11-19 ASSESSMENT — PAIN DESCRIPTION - DESCRIPTORS
DESCRIPTORS: ACHING

## 2024-11-19 ASSESSMENT — PAIN DESCRIPTION - LOCATION
LOCATION: CHEST
LOCATION: ABDOMEN

## 2024-11-19 NOTE — PROGRESS NOTES
Pharmacy Medication History Review    Humaira Huang is a 64 y.o. female admitted for Dissection of aorta. Pharmacy reviewed the patient's [id number] medications and allergies for accuracy.    Medications ADDED:  All medications on PTA list  Medications CHANGED:  None  Medications REMOVED:   None     The list below reflects the updated PTA list.   Prior to Admission Medications   Prescriptions Last Dose Informant   acetaminophen (Tylenol) 325 mg tablet  Other   Sig: Take 2 tablets (650 mg) by mouth every 6 hours if needed (pain).   amLODIPine (Norvasc) 10 mg tablet  Other   Sig: Take 1 tablet (10 mg) by mouth once daily.   aspirin 81 mg EC tablet  Other   Sig: Take 1 tablet (81 mg) by mouth once daily.   atorvastatin (Lipitor) 80 mg tablet  Other   Sig: Take 1 tablet (80 mg) by mouth once daily.   clopidogrel (Plavix) 75 mg tablet  Other   Sig: Take 1 tablet (75 mg) by mouth once daily.   isosorbide mononitrate ER (Imdur) 30 mg 24 hr tablet  Other   Sig: Take 1 tablet (30 mg) by mouth once daily. Do not crush or chew.   lisinopril 2.5 mg tablet  Other   Sig: Take 1 tablet (2.5 mg) by mouth once daily.   metFORMIN (Glucophage) 1,000 mg tablet  Other   Sig: Take 1 tablet (1,000 mg) by mouth 2 times a day.   metoprolol tartrate (Lopressor) 50 mg tablet  Other   Sig: Take 1 tablet by mouth 2 times a day.   pantoprazole (ProtoNix) 40 mg EC tablet  Other   Sig: Take 1 tablet (40 mg) by mouth once daily. Do not crush, chew, or split.      Facility-Administered Medications: None        The list below reflects the updated allergy list. Please review each documented allergy for additional clarification and justification.  Allergies  Reviewed by Lane Jj PharmD on 11/19/2024   No Known Allergies (per [facility] transfer document)         Patient was unable to be assessed for M2B at discharge.     Sources:   [facility]  Pharmacy dispense history  Patient interview Unable to provide any details,  "patient was sedated/asleep when trying to complete med rec  Chart Review  Care Everywhere  [facility] ED Patient Transfer Continuity of Care document dated 11/18/24 @10:20PM    Additional Comments:  Patient transferred from [facility] on 11/18/24  No dispense history per [facility] report (patient's address found in [facility] ED transfer documents in chart- [address] [place] [address])  No pharmacy dispense history  No Care Everywhere/chart notes  No family/contacts listed in chart to confirm PTA medication list  PTA med list updated per [facility] transfer document. Please reach out to Med Rec team to request second attempt to confirm PTA med list      Lane Jj, PharmD  Transitions of Care Pharmacist  11/19/24     Secure Chat preferred   If no response call [phone] or Vocera \"Med Rec\"    "

## 2024-11-19 NOTE — PROGRESS NOTES
Pharmacy Admission Order Reconciliation Review    Humaira Huang is a 64 y.o. female admitted for Dissection of aorta. Pharmacy reviewed the patient's unreconciled admission medications.    Prior to admission medications that were reviewed and acted on by the pharmacist include:  tylenol   aspirin  lipitor  protonix  metoprolol tartrate  amlodipine  plavix  lisinopril  isosorbide mononitrate ER  metformin  These medications have been reconciled.     Any other unreconcilied medications have been addressed and will be ordered or held by the patient's medical team. Medications addressed by the pharmacist may be added or changed by the patient's medical team at any time.    Lane Jj, Mauricio  Transitions of Care Pharmacist  [facility]  Please reach out via Secure Chat for questions

## 2024-11-19 NOTE — CARE PLAN
Problem: Pain - Adult  Goal: Verbalizes/displays adequate comfort level or baseline comfort level  Outcome: Progressing     Problem: Safety - Adult  Goal: Free from fall injury  Outcome: Progressing     Problem: Skin  Goal: Participates in plan/prevention/treatment measures  Outcome: Progressing  Goal: Prevent/manage excess moisture  Outcome: Progressing  Goal: Prevent/minimize sheer/friction injuries  Outcome: Progressing

## 2024-11-19 NOTE — H&P
History Of Present Illness  Humaira Huang is a 64 y.o. female presenting with chest, abdominal and abdominal pain.    64 y.o. female with history of pancreatitis, DVT/PE, HLD, HTN, CABGx4 1997, T2DM, GERD, PAD, chronic mesenteric ischemia, tobacco use who presented to [facility] on 11/18/2024 as a transfer from [facility] with chest, back, and abdominal pain.     Pt presented to OSH with excruciating epigastric abdominal pain that radiated to her back and chest. States sx started 2 days ago. She rated it 10/10 and states she's never had sx like this before. Describes sx as burning but occasionally ripping/tearing. Stated sx were different than her chronic mesenteric ischemia. Denies nausea or vomiting. Denies LE pain.   Upon arrival to the CICU, patient is groaning in pain. She states she is still having abdominal pain as well as chest pain. Feels like something is pulling at her chest. Denies shortness of breath. Otherwise denies headaches, nausea, vomiting, LE pain. Feels dehydrated.     ED Course:  Vitals: T 36.6, , RR 16, /95, 100% 4L NC    Labs:  -CBC: 7.5/12.9/40/284  -RFP: 143/4/104/27/18/1.3//234  -HFP: AST 14, ALT 9, Alkphos 97, Tbili 0.4, Protein 7.1  -Troponin: 36  -Lipase: 56  -D dimer: 1172  -Lactate: 1.6    Imaging:  EKG at OSH: Afib RVR, rate 163, no STEMI  EKG at [facility]: NSR, rate 71, TWI aVL, V1-V6, 1mm ORQUIDEA in aVR  CTA CAP:  -A 2.6 cm saccular aneurysm arising from the L lateral ascpect of the distal aortic arch.  -Shaggy mural thrombus in the aortic arch and descending abdominal aorta. Two separate areas of focal dissection in the descending thoracic aorta.  -High grade stenosis in the infrarenal abdominal aorta and at the origin of the L renal artery.  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 High grade stenosis of the L common iliac artery.  -Increased stool in the rectum.     Interventions: Fentanyl, IVF, 20mg diltiazem, diltiazem gtt, dilauded,  "zofran, hydral 20mg       Past Medical History  pancreatitis, DVT, PE, HLD, HTN, CABGx4 1997, T2DM, GERD, PAD    Surgical History  CABGx4 1997     Social History  She has no history on file for tobacco use, alcohol use, and drug use.    Family History  No family history on file.     Allergies  Patient has no allergy information on record.    Meds  Amlodipine 10mg, ASA 81, Atorvastatin 80mg, Plavix 75mg, Isosorbide mononitrate 30mg, Lisinopril 2.5mg daily, Metformin 1g BID, Metop tartrate 50mg bid, Pantoprazole 40mg daily,     Physical Exam  General: NAD, appears comfortable  HEENT: NCAT, no scleral icterus, EOMI  Heart: RRR, no m/r/g  Lungs: CTAB, no rales/ronchi/wheezing  Abdomen: soft, NT, ND, +BS  Ext: no bl LE edema, cool, dopplarable DP/PT pulses in LLE, unable to dopplar RLE DP   Neuro: Aox4, NFND  Psych: mood and affect appropriate    Last Recorded Vitals  Blood pressure 108/67, pulse 73, temperature 35.9 °C (96.6 °F), temperature source Temporal, resp. rate 12, height 1.575 m (5' 2\"), weight 49.2 kg (108 lb 7.5 oz), SpO2 100%.    Relevant Results  Results for orders placed or performed during the hospital encounter of 11/18/24 (from the past 24 hours)   POCT GLUCOSE   Result Value Ref Range    POCT Glucose 312 (H) 74 - 99 mg/dL   CBC and Auto Differential   Result Value Ref Range    WBC 8.8 4.4 - 11.3 x10*3/uL    nRBC 0.0 0.0 - 0.0 /100 WBCs    RBC 3.93 (L) 4.00 - 5.20 x10*6/uL    Hemoglobin 11.6 (L) 12.0 - 16.0 g/dL    Hematocrit 36.0 36.0 - 46.0 %    MCV 92 80 - 100 fL    MCH 29.5 26.0 - 34.0 pg    MCHC 32.2 32.0 - 36.0 g/dL    RDW 12.8 11.5 - 14.5 %    Platelets 308 150 - 450 x10*3/uL    Neutrophils % 82.8 40.0 - 80.0 %    Immature Granulocytes %, Automated 0.2 0.0 - 0.9 %    Lymphocytes % 13.1 13.0 - 44.0 %    Monocytes % 3.3 2.0 - 10.0 %    Eosinophils % 0.0 0.0 - 6.0 %    Basophils % 0.6 0.0 - 2.0 %    Neutrophils Absolute 7.30 1.20 - 7.70 x10*3/uL    Immature Granulocytes Absolute, Automated 0.02 0.00 " - 0.70 x10*3/uL    Lymphocytes Absolute 1.15 (L) 1.20 - 4.80 x10*3/uL    Monocytes Absolute 0.29 0.10 - 1.00 x10*3/uL    Eosinophils Absolute 0.00 0.00 - 0.70 x10*3/uL    Basophils Absolute 0.05 0.00 - 0.10 x10*3/uL   Comprehensive Metabolic Panel   Result Value Ref Range    Glucose 324 (H) 74 - 99 mg/dL    Sodium 142 136 - 145 mmol/L    Potassium 4.3 3.5 - 5.3 mmol/L    Chloride 105 98 - 107 mmol/L    Bicarbonate 25 21 - 32 mmol/L    Anion Gap 16 10 - 20 mmol/L    Urea Nitrogen 21 6 - 23 mg/dL    Creatinine 1.49 (H) 0.50 - 1.05 mg/dL    eGFR 39 (L) >60 mL/min/1.73m*2    Calcium 8.8 8.6 - 10.6 mg/dL    Albumin 3.8 3.4 - 5.0 g/dL    Alkaline Phosphatase 82 33 - 136 U/L    Total Protein 6.7 6.4 - 8.2 g/dL    AST 15 9 - 39 U/L    Bilirubin, Total 0.6 0.0 - 1.2 mg/dL    ALT 10 7 - 45 U/L   Magnesium   Result Value Ref Range    Magnesium 1.85 1.60 - 2.40 mg/dL   BLOOD GAS VENOUS FULL PANEL   Result Value Ref Range    POCT pH, Venous 7.19 (LL) 7.33 - 7.43 pH    POCT pCO2, Venous 63 (H) 41 - 51 mm Hg    POCT pO2, Venous 52 (H) 35 - 45 mm Hg    POCT SO2, Venous 78 (H) 45 - 75 %    POCT Oxy Hemoglobin, Venous 75.7 (H) 45.0 - 75.0 %    POCT Hematocrit Calculated, Venous 36.0 36.0 - 46.0 %    POCT Sodium, Venous 139 136 - 145 mmol/L    POCT Potassium, Venous 4.1 3.5 - 5.3 mmol/L    POCT Chloride, Venous 104 98 - 107 mmol/L    POCT Ionized Calicum, Venous 1.13 1.10 - 1.33 mmol/L    POCT Glucose, Venous 340 (H) 74 - 99 mg/dL    POCT Lactate, Venous 3.1 (H) 0.4 - 2.0 mmol/L    POCT Base Excess, Venous -4.9 (L) -2.0 - 3.0 mmol/L    POCT HCO3 Calculated, Venous 24.1 22.0 - 26.0 mmol/L    POCT Hemoglobin, Venous 12.0 12.0 - 16.0 g/dL    POCT Anion Gap, Venous 15.0 10.0 - 25.0 mmol/L    Patient Temperature 37.0 degrees Celsius    FiO2 32 %   Troponin I, High Sensitivity   Result Value Ref Range    Troponin I, High Sensitivity (CMC) 343 (HH) 0 - 34 ng/L   Type and Screen   Result Value Ref Range    ABO TYPE AB     Rh TYPE POS      ANTIBODY SCREEN NEG    Coagulation Screen   Result Value Ref Range    Protime 11.9 9.8 - 12.8 seconds    INR 1.1 0.9 - 1.1    aPTT 29 27 - 38 seconds   Lactate   Result Value Ref Range    Lactate 3.6 (H) 0.4 - 2.0 mmol/L   POCT GLUCOSE   Result Value Ref Range    POCT Glucose 352 (H) 74 - 99 mg/dL   BLOOD GAS ARTERIAL FULL PANEL   Result Value Ref Range    POCT pH, Arterial 7.25 (LL) 7.38 - 7.42 pH    POCT pCO2, Arterial 53 (H) 38 - 42 mm Hg    POCT pO2, Arterial 86 85 - 95 mm Hg    POCT SO2, Arterial 97 94 - 100 %    POCT Oxy Hemoglobin, Arterial 93.5 (L) 94.0 - 98.0 %    POCT Hematocrit Calculated, Arterial 36.0 36.0 - 46.0 %    POCT Sodium, Arterial 135 (L) 136 - 145 mmol/L    POCT Potassium, Arterial 5.2 3.5 - 5.3 mmol/L    POCT Chloride, Arterial 105 98 - 107 mmol/L    POCT Ionized Calcium, Arterial 1.12 1.10 - 1.33 mmol/L    POCT Glucose, Arterial 364 (H) 74 - 99 mg/dL    POCT Lactate, Arterial 2.3 (H) 0.4 - 2.0 mmol/L    POCT Base Excess, Arterial -4.4 (L) -2.0 - 3.0 mmol/L    POCT HCO3 Calculated, Arterial 23.2 22.0 - 26.0 mmol/L    POCT Hemoglobin, Arterial 11.9 (L) 12.0 - 16.0 g/dL    POCT Anion Gap, Arterial 12 10 - 25 mmo/L    Patient Temperature 37.0 degrees Celsius    FiO2 36 %   Lactate   Result Value Ref Range    Lactate 2.8 (H) 0.4 - 2.0 mmol/L        Assessment/Plan   Assessment & Plan  Dissection of aorta    64 y.o. female with history of pancreatitis, DVT/PE, HLD, HTN, CABGx4 1997, T2DM, GERD, PAD, chronic mesenteric ischemia, tobacco use who presented to [facility]18[facility] as a transfer from [facility] with chest, back, and abdominal pain. 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 Vascular and Cardiac surgery following.     Neuro:  -NTD    Cardiac:  #Aortic dissection  #Distal aortic arch saccular aneurysm   ::CTA CAP 11/18- 2.6 cm  saccular aneurysm of distal aortic arch, mural thrombus in aortic arch and desc abd aorta, 2 areas of focal dissection in desc thoracic aorta.   -Vascular surgery consulted: Impulse control with goal -120, low intensity heparin gtt  -Cardiac surgery consulted: No emergent cardiac surgery at this time  -TTE ordered   -Impulse control with labetolol gtt (HR 60-70; -120)-->HELD iso hypotension  -Start metop 12.5mg q6 for HR>70  -Low intensity heparin gtt   -S/p 1L LR at [facility] iso soft Bps   -Trend lactate (3.6-->2.8-->*)    #NSTEMI  #CAD s/p CABGx4 1997  #PAD  #HTN #DLD  ::EKG- NSR, rate 71, TWI aVL, V1-V6, 1mm ORQUIDEA in aVR  ::Trop- 343<1,068  -Trend troponin  -Hold home amlodipine 10mg, imdur 30mg, lisinopril 2.5mg, metop tartrate 50mg bid iso hypotension   -Continue ASA and atorvastatin (f/up lipid panel)  -Hold pavix 75mg incase of surgical intervention  -Low intensity heparin gtt     Pulm:  -NTD    GI:  #GERD:  -Continue home PPI    Renal:  #TRACY vs TRACY on CKD?  -Cr at OSH 1.3, Cr on admission 1.49  -F/up UA and urine electrolytes  -Avoid hypotension, avoid nephrotoxins and renally dose meds     Endo:   #T2DM  -F/up A1c  -Hold home metformin   -Mild SSI + hypoglycemia protocol      F: s/p 1L LR  E: K>4, Mag>2  N: NPO incase of procedure  G: pantoprazole  A: PIV  DVT: hep gtt  CODE: FULL (confirmed with patient on admission 11/19)  NOK: Daughter Josy [phone]    Kristi Nunes MD  Internal Medicine, PGY3

## 2024-11-19 NOTE — CONSULTS
VASCULAR SURGERY CONSULT NOTE    Assessment/Plan   Assessment/Plan   Humaira Huang is 64 y.o. female with history of pancreatitis, DVT, PE, HLD, HTN, ACS who presented with epigastric abdominal pain, chest pain, and back pain to OSH. Imaging at OSH revealed PAUs, distal aortic arch saccular aneurysm, and aberrant RSCA.      Plan:  Impulse control with goal -120  Low intensity heparin gtt without bolus  Vascular surgery to follow    D/w Fellow Dr. Page        Subjective   HPI:  Humaira Huang is 64 y.o. female with history of pancreatitis, DVT, PE, HLD, HTN, ACS who presented with epigastric abdominal pain, chest pain, and back pain to OSH. She notes persistent epigastric abdominal pain, intermittent chest pain, and intermittent HA. She endorses a hx of BLLE pain on ambulation which goes away with rest. She states she lives at home with her son who is her caretaker.    Vascular History:  Prior EIA stent seen on CT    PMH: Acute pancreatitis, DVT, PE, HLD, ACS, presumable PAD    PSH:     Home Meds:  No current facility-administered medications on file prior to encounter.     No current outpatient medications on file prior to encounter.   From outside records:   Tylenol  Amlodipine  ASA  Atorvastatin  Plavix  Isosorbide mononitrate  Lisinopril  Metformin  Metoprolol  Protonix    Allergies:  Not on File    SH/FH:   Social History     Socioeconomic History    Marital status: Not on file     Spouse name: Not on file    Number of children: Not on file    Years of education: Not on file    Highest education level: Not on file   Occupational History    Not on file   Tobacco Use    Smoking status: Not on file    Smokeless tobacco: Not on file   Substance and Sexual Activity    Alcohol use: Not on file    Drug use: Not on file    Sexual activity: Not on file   Other Topics Concern    Not on file   Social History Narrative    Not on file     Social Drivers of Health     Financial Resource Strain: Not on file   Food  Insecurity: Not on file   Transportation Needs: Not on file   Physical Activity: Not on file   Stress: Not on file   Social Connections: Not on file   Intimate Partner Violence: Not on file   Housing Stability: Not on file     No family history on file.      ROS: 12 system negative except HPI    Objective   Vitals:   /65   Pulse 77   Temp 35.7 °C (96.3 °F)   Resp 19   Wt 49.2 kg (108 lb 7.5 oz)   SpO2 96%       Exam:  Constitutional: In acute distress due to pain  Neuro:  AOx3, grossly intact  ENMT: Dry mucous membranes  Head/neck: atraumatic  CV: no tachycardia  Pulm: non-labored on room air  GI: soft, non-tender to palpation, non-distended  Skin: warm and dry  Musculoskeletal: moving all extremities. RLE surgical scar consistent with prior vein harvesting  Neuro-Vascular: Intact sensation in the BL UE and LE. 5/5 strength in the BL UE and LE. Monophasic LT DP, PT, Fem. Triphasic L femoral. No dp or pt signals in the RLE.     Labs:       Results from last 7 days   Lab Units 11/18/24  2305   SODIUM mmol/L 142   POTASSIUM mmol/L 4.3   CHLORIDE mmol/L 105   CO2 mmol/L 25   BUN mg/dL 21   CREATININE mg/dL 1.49*   GLUCOSE mg/dL 324*   MAGNESIUM mg/dL 1.85      Results from last 7 days   Lab Units 11/18/24  2305   INR  1.1   PROTIME seconds 11.9   APTT seconds 29           Imaging:  Reviewed independently by vascular team

## 2024-11-19 NOTE — PROCEDURES
Arterial Line Insertion    Date/Time: 11/18/2024 11:41 PM    Performed by: Yani Lee MD  Authorized by: Yani Lee MD    Consent:     Consent obtained:  Verbal    Risks, benefits, and alternatives were discussed: yes    Universal protocol:     Procedure explained and questions answered to patient or proxy's satisfaction: yes      Relevant documents present and verified: yes      Test results available: yes      Imaging studies available: yes      Required blood products, implants, devices, and special equipment available: yes      Site/side marked: yes      Immediately prior to procedure, a time out was called: yes      Patient identity confirmed:  Arm band  Indications:     Indications: hemodynamic monitoring    Pre-procedure details:     Skin preparation:  Alcohol  Sedation:     Sedation type:  None  Anesthesia:     Anesthesia method:  None  Procedure details:     Location:  R radial    Rhett's test performed: no      Needle gauge:  18 G    Placement technique:  Seldinger  Post-procedure details:     Post-procedure:  Biopatch applied    Procedure completion:  Tolerated

## 2024-11-19 NOTE — PROGRESS NOTES
"CARDIAC ICU PROGRESS NOTE    Humaira Huang/[id number]    Admit Date: 11/18/2024  Hospital Length of Stay: 1   ICU Length of Stay: 10h     Subjective   This morning patient was writhing in pain similar to her presentation last night. She was yelling and saying that the pain was in her chest, neck, and abdomen. Very difficult to get an appropriate history from patient. She kept saying, \"I am dying\". Objectively her vitals were stable. We got an ABG (showed evidence of chronic acidosis), CXR (patient denied), restarted on labetalol gtt briefly but turned off, gave a dose of sublingual nitroglycerin, and attempted to give her oxycodone 2.5 (but patient also denied this). Patient was saturating well on 5L. Denied any HA, LH, nausea, vomiting. Patient does have an a-line in.    Imaging:  EKG at OSH: Afib RVR, rate 163, no STEMI  [place] [place] [facility]: NSR, rate 71, TWI aVL, V1-V6, 1mm ORQUIDEA in aVR  CTA CAP:  -A 2.6 cm saccular aneurysm arising from the L lateral ascpect of the distal aortic arch.  -Shaggy mural thrombus in the aortic arch and descending abdominal aorta. Two separate areas of focal dissection in the descending thoracic aorta.  -High grade stenosis in the infrarenal abdominal aorta and at the origin of the L renal artery.  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 High grade stenosis of the L common iliac artery.  -Increased stool in the rectum.     Objective    VITALS:   Visit Vitals  /68   Pulse 79   Temp 36.6 °C (97.9 °F)   Resp (!) 29   Ht 1.575 m (5' 2\")   Wt 49.2 kg (108 lb 7.5 oz)   SpO2 95%   BMI 19.84 kg/m²   BSA 1.47 m²       Invasive Hemodynamics:    Most Recent Range Past 24hrs   BP (Art) 87/55 Arterial Line BP 1  Min: 87/55  Max: 120/58   MAP(Art) 66 mmHg Arterial Line MAP 1 (mmHg)   Min: 63 mmHg  Max: 83 mmHg   RA/CVP   No data recorded   PA   No data recorded   PA(mean)   No data recorded   PCWP   No data recorded   CO   No data recorded   CI   No data recorded   Mixed Venous   No data recorded " "  SVR    No data recorded   PVR   No data recorded     Infusions:  heparin, Last Rate: 590 Units/hr (11/19/24 0110)  labetalol, Last Rate: Stopped (11/19/24 0800)        INTAKE/OUTPUT:  I/O last 3 completed shifts:  In: 1028.5 (20.9 mL/kg) [I.V.:28.5 (0.6 mL/kg); IV Piggyback:1000]  Out: - (0 mL/kg)   Weight: 49.2 kg      Wt Readings from Last 5 Encounters:   11/19/24 49.2 kg (108 lb 7.5 oz)       LABS:  CBC:  Recent Labs     11/19/24 0428 11/18/24  2305   WBC 10.7 8.8   HGB 11.1* 11.6*   HCT 36.0 36.0    308   MCV 97 92     CMP:  Recent Labs     11/19/24 0428 11/18/24  2305    142   K 5.1 4.3    105   CO2 26 25   ANIONGAP 16 16   BUN 26* 21   CREATININE 1.78* 1.49*   EGFR 31* 39*   MG 1.76 1.85     Recent Labs     11/19/24  0428 11/18/24  2305   ALBUMIN 3.5 3.8   ALT  --  10   AST  --  15   BILITOT  --  0.6     COAG:   Recent Labs     11/18/24  2305   INR 1.1     ABO:   Recent Labs     11/18/24  2305   ABO AB     HEME/ENDO:  Recent Labs     11/18/24  2305   HGBA1C 8.1*      CARDIAC:   Recent Labs     11/19/24  0742 11/19/24 0428 11/18/24  2305   TROPHS 2,861* 1,068* 343*     Recent Labs     11/19/24  0741 11/19/24 0428 11/19/24  0047   LACTATEART 2.7* 2.2* 2.3*     No results for input(s): \"TACROLIMUS\", \"SIROLIMUS\", \"CYCLOSPORINE\" in the last 14080 hours.    Results from last 7 days   Lab Units 11/19/24 0428 11/18/24  2305   WBC AUTO x10*3/uL 10.7 8.8   HEMOGLOBIN g/dL 11.1* 11.6*   HEMATOCRIT % 36.0 36.0   PLATELETS AUTO x10*3/uL 276 308     Results from last 7 days   Lab Units 11/19/24  0428 11/18/24  2305   SODIUM mmol/L 142 142   POTASSIUM mmol/L 5.1 4.3   CO2 mmol/L 26 25   ANION GAP mmol/L 16 16   BUN mg/dL 26* 21   CREATININE mg/dL 1.78* 1.49*   GLUCOSE mg/dL 273* 324*   EGFR mL/min/1.73m*2 31* 39*   MAGNESIUM mg/dL 1.76 1.85   PHOSPHORUS mg/dL 6.0*  --       Results from last 7 days   Lab Units 11/18/24  2305   ALT U/L 10   AST U/L 15   ALK PHOS U/L 82      Results from last 7 days " "  Lab Units 11/18/24  2305   INR  1.1     Results from last 7 days   Lab Units 11/19/24  0741 11/19/24  0428 11/19/24  0047   POCT PH, ARTERIAL pH 7.22* 7.28* 7.25*   POCT PO2, ARTERIAL mm Hg 59* 80* 86   POCT PCO2, ARTERIAL mm Hg 60* 56* 53*   FIO2 % 32 34 36     Results from last 7 days   Lab Units 11/18/24  2305   POCT PH, VENOUS pH 7.19*   POCT PCO2, VENOUS mm Hg 63*     Results from last 7 days   Lab Units 11/19/24  0048 11/18/24  2311   LACTATE mmol/L 2.8* 3.6*           Results from last 7 days   Lab Units 11/19/24  0742 11/19/24  0428 11/18/24  2305   TROPHSCMC ng/L 2,861* 1,068* 343*       No results found for: \"CKTOTAL\", \"CKMB\", \"CKMBINDEX\", \"TROPONINI\"   Lab Results   Component Value Date    HGBA1C 8.1 (H) 11/18/2024      Lab Results   Component Value Date    CHOL 157 11/19/2024     Lab Results   Component Value Date    HDL 41.5 11/19/2024     Lab Results   Component Value Date    LDLCALC 101 (H) 11/19/2024     Lab Results   Component Value Date    TRIG 75 11/19/2024     No components found for: \"CHOLHDL\"     IMAGING:   ECG 12 lead    Result Date: 11/19/2024  Normal sinus rhythm Possible Anterior infarct , age undetermined ST & T wave abnormality, consider lateral ischemia Abnormal ECG When compared with ECG of 19-NOV-2024 06:31, Borderline criteria for Anterior infarct are now Present T wave inversion now evident in Lateral leads    Electrocardiogram, 12-lead PRN ACS symptoms    Result Date: 11/19/2024  Normal sinus rhythm Nonspecific ST and T wave abnormality Abnormal ECG When compared with ECG of 19-NOV-2024 06:30, No significant change was found    Electrocardiogram, 12-lead PRN ACS symptoms    Result Date: 11/19/2024  Normal sinus rhythm Low voltage QRS Marked ST abnormality, possible inferior subendocardial injury Prolonged QT Abnormal ECG When compared with ECG of 18-NOV-2024 22:57, T wave inversion no longer evident in Inferior leads    Electrocardiogram, 12-lead PRN ACS symptoms    Result Date: " 11/19/2024  Normal sinus rhythm Possible Left atrial enlargement Low voltage QRS Septal infarct , age undetermined ST & T wave abnormality, consider inferior ischemia ST & T wave abnormality, consider anterolateral ischemia Prolonged QT Abnormal ECG No previous ECGs available      PHYSICAL EXAM:  Constitutional: Writhing in diffuse pain, specifically neck, chest, and abdomen  HEENT: No conjunctival icterus, EOMI grossly intact   Cardiovascular: RRR, no murmurs noted  Pulmonary: CTAB, no wheezes/crackles/rhonchi, on 5L O2  GI: Soft, non-tender, non-distended, normoactive bowel sounds  Lower extremities: no b/l LE edema, cool to touch, dopplarable DP/PT pulses in LLE, unable to doppler RLE DP  Neuro: A&O x3, able to move all 4 extremities spontaneously  Psych: Mood tearful with pain       MEDICATIONS:   Scheduled medications  aspirin, 81 mg, oral, Daily  atorvastatin, 80 mg, oral, Nightly  esmolol, , ,   insulin lispro, 0-5 Units, subcutaneous, q4h  [Held by provider] metoprolol tartrate, 12.5 mg, oral, q6h  nitroprusside in 0.9 % NaCl, , ,   oxyCODONE, 2.5 mg, oral, Once  pantoprazole, 40 mg, oral, Daily before breakfast  perflutren lipid microspheres, 0.5-10 mL of dilution, intravenous, Once in imaging  polyethylene glycol, 17 g, oral, Daily  sennosides-docusate sodium, 1 tablet, oral, Nightly        Continuous medications  heparin, 0-4,000 Units/hr, Last Rate: 590 Units/hr (11/19/24 0110)  labetalol, 0-8 mg/min, Last Rate: Stopped (11/19/24 0800)        PRN medications  PRN medications: acetaminophen, dextrose, dextrose, esmolol, glucagon, glucagon, nitroprusside in 0.9 % NaCl, oxyCODONE          Assessment/Plan   64 y.o. female with history of pancreatitis, DVT/PE, HLD, HTN, CABGx4 1997, T2DM, GERD, PAD, chronic mesenteric ischemia, tobacco use who presented to [facility] on [facility]/18/[facility] as a transfer from [facility] with chest, back, and abdominal pain. 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 Vascular and Cardiac surgery following.     Updates from overnight team:  - CTS saw patient in AM, not a candidate for open surgical repair  - Trending troponin - morning Trop of 2861 <--- 1068 <---343.2  - Trending lactic - blood was 3.6 to 2.8 and on ABG went from 2.3 --> 2.2 --> 2.7  - touching base with vascular surgery this am, will consult with them for endovascular options  - Low dose po opioids given intolerance of BP with dilaudid but in the setting of profound pain  - Tylenol prn  - Talking and updating family, thinking of updated GOC conversation  - AM was in a lot of pain - CXR (denied), ABG (chronic acidosis), sublingual nitroglycerin given - no improv of pain, labetalol gtt was started briefly --> became hypotensive - giving an additional 500cc LR.    Neuro:  -NTD     Cardiac:  #Aortic dissection  #Distal aortic arch saccular aneurysm   ::CTA CAP 11/18- 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   -Vascular surgery consulted: Impulse control with goal -120, low intensity heparin gtt   - To see patient again, appreciate recommendations  -Cardiac surgery consulted: No emergent cardiac surgery at this time, said same thing 11/19 AM  -TTE ordered, pending  -Impulse control with labetolol gtt (HR 60-70; -120)-->HELD iso hypotension  -Stopped metop 12.5mg q6 for HR>70  -Low intensity heparin gtt   -S/p 1L LR at [facility] iso soft Bps   -Trend lactate (3.6-->2.8-->*), repeat ordered     #NSTEMI  #CAD s/p CABGx4 1997  #PAD  #HTN #DLD  ::EKG- NSR, rate 71, TWI aVL, V1-V6, 1mm ORQUIDEA in aVR  ::Trop- 343<1,068 <2861  ::Lipid HDL 41.5, , TG 75, Chol 157  -Trending troponin  -Holding home amlodipine 10mg, imdur 30mg, lisinopril 2.5mg, metop tartrate 50mg bid iso  hypotension   -Continue ASA and atorvastatin  -Holding pavix 75mg incase of surgical intervention  -Low intensity heparin gtt      Pulm:  -NTD     GI:  #GERD:  -Continue home PPI     Renal:  #TRACY vs TRACY on CKD?  -Cr at OSH 1.3, Cr on admission 1.49, up titrated to 1.78 11/19 am labs  -F/up UA and urine electrolytes, pending as patient has not urinated  -Avoid hypotension, avoid nephrotoxins and renally dose meds      Endo:   #T2DM  -F/up A1c  -Hold home metformin   -Mild SSI + hypoglycemia protocol       F: s/p 1L LR  E: K>4, Mag>2  N: NPO incase of procedure  G: pantoprazole  A: PIV  DVT: hep gtt  CODE: FULL (confirmed with patient on admission 11/19)  NOK: Daughter Josy [phone]       Preoperative Risk Assessment:  Patient requires  risk surgery for open aortic repair     Patient was evaluated at the bedside.  Patient has prior history of pancreatitis, DVT/PE, HLD, HTN, CABGx4 1997, T2DM, GERD, PAD, chronic mesenteric ischemia, tobacco use   Most recent Echo:   Transthoracic Echo (TTE) Complete    Result Date: 11/19/2024   [facility], [address] [place] [address]                Tel [phone] and Fax [phone] TRANSTHORACIC ECHOCARDIOGRAM REPORT  Patient Name:       MARGARET Cowan Physician:    [id number] Thalia Velásquez MD Study Date:         11/19/2024          Ordering Provider:    [id number] DAVID BROCK MRN/PID:            [id number]            Fellow: Accession#:         [id number]        Nurse: Date of Birth/Age:  1960 / 64 years Sonographer:          Markel Ledezma                                                               [facility] Gender assigned at  F                   Additional Staff: Birth: Height:             157.48 cm           Admit Date: Weight:             48.99 kg            Admission Status:     Inpatient -                                                                Routine BSA / BMI:          1.47 m2 / 19.75     Encounter#:           [id number]                     kg/m2 Blood Pressure:     95/52 mmHg          Department Location:  [facility] Study Type:    TRANSTHORACIC ECHO (TTE) COMPLETE Diagnosis/ICD: Dissection of unspecified site of aorta-I71.00 Indication:    aortic aneurysm CPT Code:      Echo Complete w Full [id number] Patient History: Pertinent History: DVT/PE, HLD, HTN, CABG x4 1997, T2DM. Study Detail: The following Echo studies were performed: 2D, Doppler, M-Mode and               color flow. Technically challenging study due to patient lying in               supine position, body habitus, the patient's lack of cooperation,               poor acoustic windows, prominent lung artifact and laying on right               side. Definity used as a contrast agent for endocardial border               definition. Total contrast used for this procedure was 2 mL via IV               push. Unable to obtain suprasternal notch view. The patient was               sedated.  Critical Event Critical Event: Test was completed as per department protocol. Critical Finding: Mitral regurgitation. Time Test was Completed: 10:11:00 AM Notified: Dr. Velásquez. Attending notification time: 10:15:00 AM  PHYSICIAN INTERPRETATION: Left Ventricle: Left ventricular ejection fraction is mildly decreased, by visual estimate at 45-50%. There is mild concentric left ventricular hypertrophy. Left venticular wall motion is abnormal. The left ventricular cavity size is normal. There is normal septal and mildly increased posterior left ventricular wall thickness. Left ventricular diastolic filling cannot be determined, due to severe mitral regurgitation. LV Wall Scoring: The basal and mid anterolateral wall, basal inferoseptal segment, and basal inferior segment are hypokinetic. Left Atrium: The left atrium is severely dilated. Right Ventricle: The right  ventricle was not well visualized. There is reduced right ventricular systolic function. Right Atrium: The right atrium is normal in size. Aortic Valve: The aortic valve is trileaflet. There is no evidence of aortic valve regurgitation. The peak instantaneous gradient of the aortic valve is 2 mmHg. Mitral Valve: The mitral valve is mildly thickened. There is mild mitral annular calcification. There is moderate to severe mitral valve regurgitation which is posteriorly directed. The mitral regurgitant orifice area is 31 mm2. The mitral regurgitant volume is 40.13 ml. 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 There is at least systolic blunting of the PV flow if not flow reversal. Mild doming of the AML but no mitral stenosis. Tricuspid Valve: The tricuspid valve is structurally normal. There is moderate tricuspid regurgitation. The Doppler estimated RVSP is moderately elevated at 61.5 mmHg. Pulmonic Valve: The pulmonic valve is structurally normal. There is physiologic pulmonic valve regurgitation. Pericardium: Trivial to small pericardial effusion. Aorta: The aortic root is normal. The aortic root appears normal in size and measures 3.00 cm. Systemic Veins: The inferior vena cava appears dilated, with IVC inspiratory collapse less than 50%. In comparison to the previous echocardiogram(s): There are no prior studies on this patient for comparison purposes. No prior echocardiogram available for comparison.  CONCLUSIONS:  1. Basal and mid anterolateral wall, basal inferoseptal segment, and basal inferior segment are abnormal.  2. Left ventricular ejection fraction is mildly decreased, by visual estimate at 45-50%.  3. Abnormal left venticular wall motion.  4. Left ventricular diastolic filling cannot be determined, due to severe  mitral regurgitation.  5. There is reduced right ventricular systolic function.  6. The left atrium is severely dilated.  7. 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 There is at least systolic blunting of the PV flow if not flow reversal. Mild doming of the AML but no mitral stenosis.  8. Moderate to severe mitral valve regurgitation.  9. Moderate tricuspid regurgitation visualized. 10. Moderately elevated right ventricular systolic pressure. 11. Normal aortic root. 12. Primary service notified of findings at the time of reporting. 13. No prior echocardiogram available for comparison. QUANTITATIVE DATA SUMMARY:  2D MEASUREMENTS:          Normal Ranges: Ao Root d:       3.00 cm  (2.0-3.7cm) IVSd:            0.90 cm  (0.6-1.1cm) LVPWd:           1.00 cm  (0.6-1.1cm) LVIDd:           4.70 cm  (3.9-5.9cm) LVIDs:           3.60 cm LV Mass Index:   104 g/m2 LVEDV Index:     58 ml/m2 LV % FS          23.4 %  LA VOLUME:                    Normal Ranges: LA Vol A4C:        115.8 ml   (22+/-6mL/m2) LA Vol A2C:        94.9 ml LA Vol BP:         105.1 ml LA Vol Index A4C:  78.7ml/m2 LA Vol Index A2C:  64.5 ml/m2 LA Vol Index BP:   71.4 ml/m2 LA Area A4C:       28.9 cm2 LA Area A2C:       26.1 cm2 LA Major Axis A4C: 6.1 cm LA Major Axis A2C: 6.1 cm LA Volume Index:   71.5 ml/m2  RA VOLUME BY A/L METHOD:          Normal Ranges: RA Area A4C:             13.2 cm2  AORTA MEASUREMENTS:         Normal Ranges: Asc Ao, d:          3.30 cm (2.1-3.4cm)  LV SYSTOLIC FUNCTION BY 2D PLANIMETRY (MOD):                      Normal Ranges: EF-A4C View:    42 % (>=55%) EF-A2C View:    44 % EF-Biplane:     42 % EF-Visual:      48 % LV EF Reported: 48 %  LV DIASTOLIC FUNCTION:            Normal Ranges: MV Peak E:             1.26 m/s   (0.7-1.2 m/s) MV Peak A:              0.36 m/s   (0.42-0.7 m/s) E/A Ratio:             3.51       (1.0-2.2) MV A Dur:              85.00 msec MV DT:                 195 msec   (150-240 msec)  MITRAL VALVE:          Normal Ranges: MV mean P.0 mmHg (<2mmHg) MV DT:        195 msec (150-240msec)  MITRAL INSUFFICIENCY:             Normal Ranges: PISA Radius:          0.7 cm MR VTI:               128.33 cm MR Vmax:              416.00 cm/s MR Alias Neto:         38.5 cm/s MR Volume:            40.13 ml MR Flow Rt:           130.09 ml/s MR EROA:              31 mm2  AORTIC VALVE:           Normal Ranges: AoV Vmax:      0.72 m/s (<=1.7m/s) AoV Peak P.1 mmHg (<20mmHg) LVOT Max Neto:  0.48 m/s (<=1.1m/s) LVOT VTI:      11.60 cm LVOT Diameter: 1.70 cm  (1.8-2.4cm) AoV Area,Vmax: 1.50 cm2 (2.5-4.5cm2)  RIGHT VENTRICLE: RV Basal 4.40 cm TAPSE:   13.9 mm RV s'    0.06 m/s  TRICUSPID VALVE/RVSP:          Normal Ranges: Peak TR Velocity:     3.41 m/s RV Syst Pressure:     62 mmHg  (< 30mmHg) IVC Diam:             2.70 cm  PULMONIC VALVE:          Normal Ranges: PV Max Neto:     0.6 m/s  (0.6-0.9m/s) PV Max P.2 mmHg  [id number] Thalia Velásquez MD Electronically signed on 2024 at 10:42:59 AM  Wall Scoring  ** Final **       RCRI score of 2 which has a 30-day risk of death, MI, or cardiac arrest of 10.1.   METS:     Patient is at High risk for cardiac events for this High risk surgery.       ARISCAT score for postoperative pulmonary complication 51 points, which indicates high risk (42.1% risk of in-hospital post-op pulmonary complications including respiratory failure, infection, pleural effusion, atelectasis, pneumothorax, bronchospasm, and aspiration pneumonitis).    Would recommend bronchopulmonary hygiene, early use of Incentive spirometer and PRN Duoneb post-op.      *Risk assessment and recommendations not final until signed by attending*4      Esvin Wilson MD  [facility]/ Internal Medicine PGY-2

## 2024-11-19 NOTE — PROGRESS NOTES
"  VASCULAR SURGERY PROGRESS NOTE  Assessment/Plan   Humaira Huang is 64 y.o. female with past medical history significant for DVT, PE, CABG X4 (1997), pancreatitis, hypertension, who presented to [facility] as a transfer from [facility] found to have an aneurysm of the distal aortic arch.     Patient seen at bedside this morning, with moderate pain, however alert and conversational with eating, with pressures controlled.  At this time, we will continue with impulse control and heparin drip.    Plan:  Impulse control with SBP between 100-120.  Continue heparin drip  Patient to be discussed at Wednesday Aortic Conference with CTS  Additional recommendations to follow after this meeting.    D/w attending, Dr. Stephon Porter MD  PGY-1 General Surgery  Vascular surgery [id number]    Bright Negrete MD  Vascular Surgery, PGY4  Team Pager: [phone]    Subjective   Patient seen at bedside this a.m.  Patient conversational, however noting moderate abdominal pain on exam.  Otherwise patient denies previous pain radiating to back, appears in no apparent distress.    Objective   Vitals:  Heart Rate:  [65-85]   Temp:  [35.7 °C (96.3 °F)-36.7 °C (98.1 °F)]   Resp:  [12-29]   BP: ()/(58-70)   Height:  [157.5 cm (5' 2\")]   Weight:  [49.2 kg (108 lb 7.5 oz)]   SpO2:  [94 %-100 %]     Exam:  Constitutional: No acute distress, resting, complaining of moderate pain.  Neuro:  AOx3, grossly intact  ENMT: moist mucous membranes  CV: no tachycardia  Pulm: non-labored on 2L nasal cannula  GI: soft, non-tender, non-distended  Skin: warm and dry  Musculoskeletal: moving all extremities  Extremities: Neuromuscular intact bilateral lower extremities, bilateral upper extremities.    Labs:  Results from last 7 days   Lab Units 11/19/24  1013 11/19/24  0428 11/18/24  2305   WBC AUTO x10*3/uL 9.9 10.7 8.8   HEMOGLOBIN g/dL 10.2* 11.1* 11.6*   PLATELETS AUTO x10*3/uL 247 276 308      Results from last 7 days   Lab Units " 11/19/24  0428 11/18/24  2305   SODIUM mmol/L 142 142   POTASSIUM mmol/L 5.1 4.3   CHLORIDE mmol/L 105 105   CO2 mmol/L 26 25   BUN mg/dL 26* 21   CREATININE mg/dL 1.78* 1.49*   GLUCOSE mg/dL 273* 324*   MAGNESIUM mg/dL 1.76 1.85   PHOSPHORUS mg/dL 6.0*  --       Results from last 7 days   Lab Units 11/18/24  2305   INR  1.1   PROTIME seconds 11.9   APTT seconds 29      Results from last 7 days   Lab Units 11/19/24  1013 11/19/24  0618   ANTI XA UNFRACTIONATED IU/mL 0.2 0.3

## 2024-11-19 NOTE — CONSULTS
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 female, with a PMH of pancreatitis, DVT, PE, HLD, HTN, and s/p CABG x4 in 1997, who presented to [facility] on 11/18/2024 as a transfer from [facility]. She initially presented with chest pain, back pain, and abdominal pain. 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     Cardiac/Pertinent Imaging  Images from [facility] in PACS    Subjective   No past medical history on file.  No past surgical history on file.     No family history on file.    Patient has no allergy information on record.    Prior to Admission medications    Not on File       Review of Systems  Review of Systems   Constitutional:  Positive for fatigue.   HENT: Negative.     Eyes: Negative.    Respiratory:  Positive for shortness of breath.    Cardiovascular:  Positive for chest pain.   Gastrointestinal:  Positive for abdominal pain.   Endocrine: Negative.    Genitourinary: Negative.    Musculoskeletal: Negative.    Allergic/Immunologic: Negative.    Neurological:  Positive for headaches.   Hematological: Negative.    Psychiatric/Behavioral: Negative.             Objective   /70   Pulse 77   Temp 35.9 °C (96.6 °F) (Temporal)   Resp 13   Ht 1.575 m (5' 2\")   Wt 49.2 kg (108 lb 7.5 oz)   SpO2 100%   BMI 19.84 kg/m²   0-10 (Numeric) Pain Score: 10 - Worst possible pain   Vitals:    11/19/24 0000   Weight: 49.2 kg (108 lb 7.5 oz)          Intake/Output Summary (Last 24 hours) at 11/19/2024 0223  Last data filed at 11/19/2024 0212  Gross per 24 hour   Intake 1000 ml   Output --   Net 1000 ml       Physical Exam  Physical Exam  Constitutional:       General: She is not in acute distress.     Appearance: Normal appearance.   HENT:      Head: Normocephalic and atraumatic.      " Mouth/Throat:      Mouth: Mucous membranes are moist.      Pharynx: Oropharynx is clear.   Eyes:      Extraocular Movements: Extraocular movements intact.      Pupils: Pupils are equal, round, and reactive to light.   Cardiovascular:      Rate and Rhythm: Normal rate and regular rhythm.      Pulses: Normal pulses.      Heart sounds: Normal heart sounds.   Pulmonary:      Effort: Pulmonary effort is normal.      Breath sounds: Normal breath sounds.   Abdominal:      General: Abdomen is flat. There is no distension.      Palpations: Abdomen is soft.      Tenderness: There is abdominal tenderness.   Musculoskeletal:         General: Normal range of motion.      Cervical back: Normal range of motion.   Skin:     General: Skin is warm and dry.      Capillary Refill: Capillary refill takes less than 2 seconds.   Neurological:      General: No focal deficit present.      Mental Status: She is alert and oriented to person, place, and time. Mental status is at baseline.   Psychiatric:         Mood and Affect: Mood normal.         Behavior: Behavior normal.         Medications  Scheduled medications  esmolol, , ,   insulin lispro, 0-5 Units, subcutaneous, q4h  lactated Ringer's, 500 mL, intravenous, Once  nitroprusside in 0.9 % NaCl, , ,     Continuous medications  heparin, 0-4,000 Units/hr, Last Rate: 590 Units/hr (11/19/24 0110)  labetalol, 0-8 mg/min, Last Rate: Stopped (11/19/24 0143)    PRN medications  PRN medications: dextrose, dextrose, esmolol, glucagon, glucagon, nitroprusside in 0.9 % NaCl    Labs  Results for orders placed or performed during the hospital encounter of 11/18/24 (from the past 24 hours)   POCT GLUCOSE   Result Value Ref Range    POCT Glucose 312 (H) 74 - 99 mg/dL   CBC and Auto Differential   Result Value Ref Range    WBC 8.8 4.4 - 11.3 x10*3/uL    nRBC 0.0 0.0 - 0.0 /100 WBCs    RBC 3.93 (L) 4.00 - 5.20 x10*6/uL    Hemoglobin 11.6 (L) 12.0 - 16.0 g/dL    Hematocrit 36.0 36.0 - 46.0 %    MCV 92 80 -  100 fL    MCH 29.5 26.0 - 34.0 pg    MCHC 32.2 32.0 - 36.0 g/dL    RDW 12.8 11.5 - 14.5 %    Platelets 308 150 - 450 x10*3/uL    Neutrophils % 82.8 40.0 - 80.0 %    Immature Granulocytes %, Automated 0.2 0.0 - 0.9 %    Lymphocytes % 13.1 13.0 - 44.0 %    Monocytes % 3.3 2.0 - 10.0 %    Eosinophils % 0.0 0.0 - 6.0 %    Basophils % 0.6 0.0 - 2.0 %    Neutrophils Absolute 7.30 1.20 - 7.70 x10*3/uL    Immature Granulocytes Absolute, Automated 0.02 0.00 - 0.70 x10*3/uL    Lymphocytes Absolute 1.15 (L) 1.20 - 4.80 x10*3/uL    Monocytes Absolute 0.29 0.10 - 1.00 x10*3/uL    Eosinophils Absolute 0.00 0.00 - 0.70 x10*3/uL    Basophils Absolute 0.05 0.00 - 0.10 x10*3/uL   Comprehensive Metabolic Panel   Result Value Ref Range    Glucose 324 (H) 74 - 99 mg/dL    Sodium 142 136 - 145 mmol/L    Potassium 4.3 3.5 - 5.3 mmol/L    Chloride 105 98 - 107 mmol/L    Bicarbonate 25 21 - 32 mmol/L    Anion Gap 16 10 - 20 mmol/L    Urea Nitrogen 21 6 - 23 mg/dL    Creatinine 1.49 (H) 0.50 - 1.05 mg/dL    eGFR 39 (L) >60 mL/min/1.73m*2    Calcium 8.8 8.6 - 10.6 mg/dL    Albumin 3.8 3.4 - 5.0 g/dL    Alkaline Phosphatase 82 33 - 136 U/L    Total Protein 6.7 6.4 - 8.2 g/dL    AST 15 9 - 39 U/L    Bilirubin, Total 0.6 0.0 - 1.2 mg/dL    ALT 10 7 - 45 U/L   Magnesium   Result Value Ref Range    Magnesium 1.85 1.60 - 2.40 mg/dL   BLOOD GAS VENOUS FULL PANEL   Result Value Ref Range    POCT pH, Venous 7.19 (LL) 7.33 - 7.43 pH    POCT pCO2, Venous 63 (H) 41 - 51 mm Hg    POCT pO2, Venous 52 (H) 35 - 45 mm Hg    POCT SO2, Venous 78 (H) 45 - 75 %    POCT Oxy Hemoglobin, Venous 75.7 (H) 45.0 - 75.0 %    POCT Hematocrit Calculated, Venous 36.0 36.0 - 46.0 %    POCT Sodium, Venous 139 136 - 145 mmol/L    POCT Potassium, Venous 4.1 3.5 - 5.3 mmol/L    POCT Chloride, Venous 104 98 - 107 mmol/L    POCT Ionized Calicum, Venous 1.13 1.10 - 1.33 mmol/L    POCT Glucose, Venous 340 (H) 74 - 99 mg/dL    POCT Lactate, Venous 3.1 (H) 0.4 - 2.0 mmol/L    POCT  Base Excess, Venous -4.9 (L) -2.0 - 3.0 mmol/L    POCT HCO3 Calculated, Venous 24.1 22.0 - 26.0 mmol/L    POCT Hemoglobin, Venous 12.0 12.0 - 16.0 g/dL    POCT Anion Gap, Venous 15.0 10.0 - 25.0 mmol/L    Patient Temperature 37.0 degrees Celsius    FiO2 32 %   Troponin I, High Sensitivity   Result Value Ref Range    Troponin I, High Sensitivity (CMC) 343 (HH) 0 - 34 ng/L   Type and Screen   Result Value Ref Range    ABO TYPE AB     Rh TYPE POS     ANTIBODY SCREEN NEG    Coagulation Screen   Result Value Ref Range    Protime 11.9 9.8 - 12.8 seconds    INR 1.1 0.9 - 1.1    aPTT 29 27 - 38 seconds   Lactate   Result Value Ref Range    Lactate 3.6 (H) 0.4 - 2.0 mmol/L   POCT GLUCOSE   Result Value Ref Range    POCT Glucose 352 (H) 74 - 99 mg/dL   BLOOD GAS ARTERIAL FULL PANEL   Result Value Ref Range    POCT pH, Arterial 7.25 (LL) 7.38 - 7.42 pH    POCT pCO2, Arterial 53 (H) 38 - 42 mm Hg    POCT pO2, Arterial 86 85 - 95 mm Hg    POCT SO2, Arterial 97 94 - 100 %    POCT Oxy Hemoglobin, Arterial 93.5 (L) 94.0 - 98.0 %    POCT Hematocrit Calculated, Arterial 36.0 36.0 - 46.0 %    POCT Sodium, Arterial 135 (L) 136 - 145 mmol/L    POCT Potassium, Arterial 5.2 3.5 - 5.3 mmol/L    POCT Chloride, Arterial 105 98 - 107 mmol/L    POCT Ionized Calcium, Arterial 1.12 1.10 - 1.33 mmol/L    POCT Glucose, Arterial 364 (H) 74 - 99 mg/dL    POCT Lactate, Arterial 2.3 (H) 0.4 - 2.0 mmol/L    POCT Base Excess, Arterial -4.4 (L) -2.0 - 3.0 mmol/L    POCT HCO3 Calculated, Arterial 23.2 22.0 - 26.0 mmol/L    POCT Hemoglobin, Arterial 11.9 (L) 12.0 - 16.0 g/dL    POCT Anion Gap, Arterial 12 10 - 25 mmo/L    Patient Temperature 37.0 degrees Celsius    FiO2 36 %   Lactate   Result Value Ref Range    Lactate 2.8 (H) 0.4 - 2.0 mmol/L               ASSESSMENT & PLAN  Humaira Huang is a 64 y.o. female, with a PMH of pancreatitis, DVT, PE, HLD, HTN, and s/p CABG x4 in 1997, who presented to [facility] on 11/18/2024 as a transfer  from [facility]. She initially presented with chest pain, back pain, and abdominal pain. 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       RECOMMENDATIONS/PLAN  Dr. Thapa aware of patient, currently reviewing case and available imaging.   Emergent cardiac surgery not indicated at this time.   No OR date established, will need further preoperative testing.   - Please obtain and upload OSH imaging to [employer]/PACS if not already done  - Please order formal echocardiogram, if not already done  - Medical optimization per primary team      Preop risk stratification studies/labs ordered in EMR by our team  --- US Carotids/Vein Mapping/ LE US ESTRELLA  --- PFTs (spirometry and room air ABG)  --- 2 view CXR  --- MRSA, UA/Culture, LFTs, HgbA1c, TSH/T4, Lipid panel, CBC, RFP, Coag  --- CT chest without contrast   --- Will need evaluation for dental carries/infection with Panorex/CT facial bones +/- dental consult    When/if goes to surgery:  - Continue ASA, high-intensity statin, and BB (or document contraindications for use) for preop CABG patients   - No ACEi/ARBs in the pre-op period (at least 48 hours)  - Hold any SGLT2 inhibitors (Farxiga, Jardiance, etc.) for at least 3 days prior to cardiac surgery to prevent euglycemic DKA  - Hold any daily GLP-1 agonist drugs on the day of surgery. Hold any weekly GLP-1 drugs for 7 days prior to surgery. (Dulaglutide, Exenatide, Liraglutide, Lixisenatide, & Semaglutide)  - No antiplatelets other than ASA, no anticoagulants other than Heparin  - NPO after midnight, blood on hold/T&S, and preop scrubs only to be ordered once OR date is established    Will continue to follow along.  Thank you for the consultation.   Patient educated and all questions answered.  Please page the cardiac surgery consult pager [phone] with any questions or changes in patient condition.    *TO BE FORMALLY STAFFED WITH ATTENDING IN THE MORNING*    Hubert Mccallum  MD  [facility]  Cardiac Surgery Consult Pager [phone]     11/19/2024  2:23 AM

## 2024-11-19 NOTE — PROGRESS NOTES
"CARDIAC SURGERY CONSULT PROGRESS NOTE    SUBJECTIVE  Humaira Huang is a 64 y.o. female, with a PMH of pancreatitis, DVT, PE, HLD, HTN, and s/p CABG x4 in 1997, who presented to [facility] on 11/18/2024 as a transfer from [facility]. She initially presented with chest pain, back pain, and abdominal pain. 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      Cardiac/Pertinent Imaging  Images from [facility] in PACS       Objective   /68   Pulse 77   Temp 36.1 °C (97 °F)   Resp 24   Ht 1.575 m (5' 2\")   Wt 49.2 kg (108 lb 7.5 oz)   SpO2 96%   BMI 19.84 kg/m²   0-10 (Numeric) Pain Score: 10 - Worst possible pain   Vitals:    11/19/24 0000   Weight: 49.2 kg (108 lb 7.5 oz)          Intake/Output Summary (Last 24 hours) at 11/19/2024 1444  Last data filed at 11/19/2024 1400  Gross per 24 hour   Intake 1658.06 ml   Output --   Net 1658.06 ml       Physical Exam  Physical Exam  Constitutional:       General: She is not in acute distress.     Appearance: She is not toxic-appearing.      Comments: Frail elderly female laying in bed   HENT:      Head: Normocephalic.      Mouth/Throat:      Mouth: Mucous membranes are moist.   Cardiovascular:      Rate and Rhythm: Normal rate and regular rhythm.   Pulmonary:      Effort: Pulmonary effort is normal.      Breath sounds: Normal breath sounds.      Comments: On supplemental oxygen  Musculoskeletal:         General: Normal range of motion.      Cervical back: Neck supple.      Right lower leg: No edema.      Left lower leg: No edema.   Skin:     General: Skin is warm and dry.   Neurological:      General: No focal deficit present.      Mental Status: She is alert and oriented to person, place, and time.   Psychiatric:         Mood and Affect: Mood normal.         Behavior: Behavior normal.         Medications  Scheduled medications  aspirin, 81 mg, oral, Daily  atorvastatin, 80 mg, oral, " Nightly  insulin lispro, 0-5 Units, subcutaneous, q4h  [Held by provider] metoprolol tartrate, 12.5 mg, oral, q6h  oxyCODONE, 2.5 mg, oral, Once  pantoprazole, 40 mg, oral, Daily before breakfast  polyethylene glycol, 17 g, oral, Daily  sennosides-docusate sodium, 1 tablet, oral, Nightly    Continuous medications  heparin, 0-4,000 Units/hr, Last Rate: 700 Units/hr (11/19/24 1400)  labetalol, 0-8 mg/min, Last Rate: Stopped (11/19/24 1416)    PRN medications  PRN medications: acetaminophen, dextrose, dextrose, glucagon, glucagon, hydrOXYzine HCL, oxyCODONE    Labs  Results for orders placed or performed during the hospital encounter of 11/18/24 (from the past 24 hours)   POCT GLUCOSE   Result Value Ref Range    POCT Glucose 312 (H) 74 - 99 mg/dL   CBC and Auto Differential   Result Value Ref Range    WBC 8.8 4.4 - 11.3 x10*3/uL    nRBC 0.0 0.0 - 0.0 /100 WBCs    RBC 3.93 (L) 4.00 - 5.20 x10*6/uL    Hemoglobin 11.6 (L) 12.0 - 16.0 g/dL    Hematocrit 36.0 36.0 - 46.0 %    MCV 92 80 - 100 fL    MCH 29.5 26.0 - 34.0 pg    MCHC 32.2 32.0 - 36.0 g/dL    RDW 12.8 11.5 - 14.5 %    Platelets 308 150 - 450 x10*3/uL    Neutrophils % 82.8 40.0 - 80.0 %    Immature Granulocytes %, Automated 0.2 0.0 - 0.9 %    Lymphocytes % 13.1 13.0 - 44.0 %    Monocytes % 3.3 2.0 - 10.0 %    Eosinophils % 0.0 0.0 - 6.0 %    Basophils % 0.6 0.0 - 2.0 %    Neutrophils Absolute 7.30 1.20 - 7.70 x10*3/uL    Immature Granulocytes Absolute, Automated 0.02 0.00 - 0.70 x10*3/uL    Lymphocytes Absolute 1.15 (L) 1.20 - 4.80 x10*3/uL    Monocytes Absolute 0.29 0.10 - 1.00 x10*3/uL    Eosinophils Absolute 0.00 0.00 - 0.70 x10*3/uL    Basophils Absolute 0.05 0.00 - 0.10 x10*3/uL   Comprehensive Metabolic Panel   Result Value Ref Range    Glucose 324 (H) 74 - 99 mg/dL    Sodium 142 136 - 145 mmol/L    Potassium 4.3 3.5 - 5.3 mmol/L    Chloride 105 98 - 107 mmol/L    Bicarbonate 25 21 - 32 mmol/L    Anion Gap 16 10 - 20 mmol/L    Urea Nitrogen 21 6 - 23 mg/dL     Creatinine 1.49 (H) 0.50 - 1.05 mg/dL    eGFR 39 (L) >60 mL/min/1.73m*2    Calcium 8.8 8.6 - 10.6 mg/dL    Albumin 3.8 3.4 - 5.0 g/dL    Alkaline Phosphatase 82 33 - 136 U/L    Total Protein 6.7 6.4 - 8.2 g/dL    AST 15 9 - 39 U/L    Bilirubin, Total 0.6 0.0 - 1.2 mg/dL    ALT 10 7 - 45 U/L   Magnesium   Result Value Ref Range    Magnesium 1.85 1.60 - 2.40 mg/dL   BLOOD GAS VENOUS FULL PANEL   Result Value Ref Range    POCT pH, Venous 7.19 (LL) 7.33 - 7.43 pH    POCT pCO2, Venous 63 (H) 41 - 51 mm Hg    POCT pO2, Venous 52 (H) 35 - 45 mm Hg    POCT SO2, Venous 78 (H) 45 - 75 %    POCT Oxy Hemoglobin, Venous 75.7 (H) 45.0 - 75.0 %    POCT Hematocrit Calculated, Venous 36.0 36.0 - 46.0 %    POCT Sodium, Venous 139 136 - 145 mmol/L    POCT Potassium, Venous 4.1 3.5 - 5.3 mmol/L    POCT Chloride, Venous 104 98 - 107 mmol/L    POCT Ionized Calicum, Venous 1.13 1.10 - 1.33 mmol/L    POCT Glucose, Venous 340 (H) 74 - 99 mg/dL    POCT Lactate, Venous 3.1 (H) 0.4 - 2.0 mmol/L    POCT Base Excess, Venous -4.9 (L) -2.0 - 3.0 mmol/L    POCT HCO3 Calculated, Venous 24.1 22.0 - 26.0 mmol/L    POCT Hemoglobin, Venous 12.0 12.0 - 16.0 g/dL    POCT Anion Gap, Venous 15.0 10.0 - 25.0 mmol/L    Patient Temperature 37.0 degrees Celsius    FiO2 32 %   Troponin I, High Sensitivity   Result Value Ref Range    Troponin I, High Sensitivity (CMC) 343 (HH) 0 - 34 ng/L   Type and Screen   Result Value Ref Range    ABO TYPE AB     Rh TYPE POS     ANTIBODY SCREEN NEG    Coagulation Screen   Result Value Ref Range    Protime 11.9 9.8 - 12.8 seconds    INR 1.1 0.9 - 1.1    aPTT 29 27 - 38 seconds   Hemoglobin A1c   Result Value Ref Range    Hemoglobin A1C 8.1 (H) See comment %    Estimated Average Glucose 186 Not Established mg/dL   Prepare RBC: 2 Units   Result Value Ref Range    PRODUCT CODE I0420P01     Unit Number [id number]     Unit ABO AB     Unit RH POS     XM INTEP COMP     Dispense Status XM     Blood Expiration Date 12/3/2024  11:59:00 PM EST     PRODUCT BLOOD TYPE 8400     UNIT VOLUME 350     PRODUCT CODE H6610D10     Unit Number [id number]     Unit ABO AB     Unit RH POS     XM INTEP COMP     Dispense Status XM     Blood Expiration Date 12/3/2024 11:59:00 PM EST     PRODUCT BLOOD TYPE 8400     UNIT VOLUME 350    Lactate   Result Value Ref Range    Lactate 3.6 (H) 0.4 - 2.0 mmol/L   POCT GLUCOSE   Result Value Ref Range    POCT Glucose 352 (H) 74 - 99 mg/dL   BLOOD GAS ARTERIAL FULL PANEL   Result Value Ref Range    POCT pH, Arterial 7.25 (LL) 7.38 - 7.42 pH    POCT pCO2, Arterial 53 (H) 38 - 42 mm Hg    POCT pO2, Arterial 86 85 - 95 mm Hg    POCT SO2, Arterial 97 94 - 100 %    POCT Oxy Hemoglobin, Arterial 93.5 (L) 94.0 - 98.0 %    POCT Hematocrit Calculated, Arterial 36.0 36.0 - 46.0 %    POCT Sodium, Arterial 135 (L) 136 - 145 mmol/L    POCT Potassium, Arterial 5.2 3.5 - 5.3 mmol/L    POCT Chloride, Arterial 105 98 - 107 mmol/L    POCT Ionized Calcium, Arterial 1.12 1.10 - 1.33 mmol/L    POCT Glucose, Arterial 364 (H) 74 - 99 mg/dL    POCT Lactate, Arterial 2.3 (H) 0.4 - 2.0 mmol/L    POCT Base Excess, Arterial -4.4 (L) -2.0 - 3.0 mmol/L    POCT HCO3 Calculated, Arterial 23.2 22.0 - 26.0 mmol/L    POCT Hemoglobin, Arterial 11.9 (L) 12.0 - 16.0 g/dL    POCT Anion Gap, Arterial 12 10 - 25 mmo/L    Patient Temperature 37.0 degrees Celsius    FiO2 36 %   Lactate   Result Value Ref Range    Lactate 2.8 (H) 0.4 - 2.0 mmol/L   BLOOD GAS ARTERIAL FULL PANEL   Result Value Ref Range    POCT pH, Arterial 7.28 (L) 7.38 - 7.42 pH    POCT pCO2, Arterial 56 (H) 38 - 42 mm Hg    POCT pO2, Arterial 80 (L) 85 - 95 mm Hg    POCT SO2, Arterial 97 94 - 100 %    POCT Oxy Hemoglobin, Arterial 94.4 94.0 - 98.0 %    POCT Hematocrit Calculated, Arterial 34.0 (L) 36.0 - 46.0 %    POCT Sodium, Arterial 139 136 - 145 mmol/L    POCT Potassium, Arterial 5.2 3.5 - 5.3 mmol/L    POCT Chloride, Arterial 105 98 - 107 mmol/L    POCT Ionized Calcium, Arterial 1.12  1.10 - 1.33 mmol/L    POCT Glucose, Arterial 283 (H) 74 - 99 mg/dL    POCT Lactate, Arterial 2.2 (H) 0.4 - 2.0 mmol/L    POCT Base Excess, Arterial -1.2 -2.0 - 3.0 mmol/L    POCT HCO3 Calculated, Arterial 26.3 (H) 22.0 - 26.0 mmol/L    POCT Hemoglobin, Arterial 11.4 (L) 12.0 - 16.0 g/dL    POCT Anion Gap, Arterial 13 10 - 25 mmo/L    Patient Temperature 37.0 degrees Celsius    FiO2 34 %   Troponin I, High Sensitivity   Result Value Ref Range    Troponin I, High Sensitivity (CMC) 1,068 (HH) 0 - 34 ng/L   CBC   Result Value Ref Range    WBC 10.7 4.4 - 11.3 x10*3/uL    nRBC 0.0 0.0 - 0.0 /100 WBCs    RBC 3.71 (L) 4.00 - 5.20 x10*6/uL    Hemoglobin 11.1 (L) 12.0 - 16.0 g/dL    Hematocrit 36.0 36.0 - 46.0 %    MCV 97 80 - 100 fL    MCH 29.9 26.0 - 34.0 pg    MCHC 30.8 (L) 32.0 - 36.0 g/dL    RDW 13.1 11.5 - 14.5 %    Platelets 276 150 - 450 x10*3/uL   Renal Function Panel   Result Value Ref Range    Glucose 273 (H) 74 - 99 mg/dL    Sodium 142 136 - 145 mmol/L    Potassium 5.1 3.5 - 5.3 mmol/L    Chloride 105 98 - 107 mmol/L    Bicarbonate 26 21 - 32 mmol/L    Anion Gap 16 10 - 20 mmol/L    Urea Nitrogen 26 (H) 6 - 23 mg/dL    Creatinine 1.78 (H) 0.50 - 1.05 mg/dL    eGFR 31 (L) >60 mL/min/1.73m*2    Calcium 8.3 (L) 8.6 - 10.6 mg/dL    Phosphorus 6.0 (H) 2.5 - 4.9 mg/dL    Albumin 3.5 3.4 - 5.0 g/dL   Magnesium   Result Value Ref Range    Magnesium 1.76 1.60 - 2.40 mg/dL   Lipid panel   Result Value Ref Range    Cholesterol 157 0 - 199 mg/dL    HDL-Cholesterol 41.5 mg/dL    Cholesterol/HDL Ratio 3.8     LDL Calculated 101 (H) <=99 mg/dL    VLDL 15 0 - 40 mg/dL    Triglycerides 75 0 - 149 mg/dL    Non HDL Cholesterol 116 0 - 149 mg/dL   POCT GLUCOSE   Result Value Ref Range    POCT Glucose 250 (H) 74 - 99 mg/dL   Heparin Assay, UFH   Result Value Ref Range    Heparin Unfractionated 0.3 See Comment Below for Therapeutic Ranges IU/mL   ECG 12 lead   Result Value Ref Range    Ventricular Rate 91 BPM    Atrial Rate 91 BPM     WI Interval 136 ms    QRS Duration 74 ms    QT Interval 400 ms    QTC Calculation(Bazett) 492 ms    P Axis 63 degrees    R Axis 34 degrees    T Axis -66 degrees    QRS Count 15 beats    Q Onset 217 ms    P Onset 149 ms    P Offset 209 ms    T Offset 417 ms    QTC Fredericia 460 ms   BLOOD GAS ARTERIAL FULL PANEL   Result Value Ref Range    POCT pH, Arterial 7.22 (LL) 7.38 - 7.42 pH    POCT pCO2, Arterial 60 (H) 38 - 42 mm Hg    POCT pO2, Arterial 59 (L) 85 - 95 mm Hg    POCT SO2, Arterial 87 (L) 94 - 100 %    POCT Oxy Hemoglobin, Arterial 85.2 (L) 94.0 - 98.0 %    POCT Hematocrit Calculated, Arterial 36.0 36.0 - 46.0 %    POCT Sodium, Arterial 137 136 - 145 mmol/L    POCT Potassium, Arterial 5.3 3.5 - 5.3 mmol/L    POCT Chloride, Arterial 105 98 - 107 mmol/L    POCT Ionized Calcium, Arterial 1.11 1.10 - 1.33 mmol/L    POCT Glucose, Arterial 267 (H) 74 - 99 mg/dL    POCT Lactate, Arterial 2.7 (H) 0.4 - 2.0 mmol/L    POCT Base Excess, Arterial -3.9 (L) -2.0 - 3.0 mmol/L    POCT HCO3 Calculated, Arterial 24.6 22.0 - 26.0 mmol/L    POCT Hemoglobin, Arterial 12.1 12.0 - 16.0 g/dL    POCT Anion Gap, Arterial 13 10 - 25 mmo/L    Patient Temperature 37.0 degrees Celsius    FiO2 32 %   Troponin I, High Sensitivity   Result Value Ref Range    Troponin I, High Sensitivity (CMC) 2,861 (HH) 0 - 34 ng/L   Electrocardiogram, 12-lead PRN ACS symptoms   Result Value Ref Range    Ventricular Rate 81 BPM    Atrial Rate 81 BPM    WI Interval 118 ms    QRS Duration 80 ms    QT Interval 426 ms    QTC Calculation(Bazett) 494 ms    P Axis 68 degrees    R Axis 18 degrees    T Axis 104 degrees    QRS Count 13 beats    Q Onset 214 ms    P Onset 155 ms    P Offset 212 ms    T Offset 427 ms    QTC Fredericia 470 ms   VERIFY ABO/Rh Group Test   Result Value Ref Range    ABO TYPE AB     Rh TYPE POS    Electrocardiogram, 12-lead PRN ACS symptoms   Result Value Ref Range    Ventricular Rate 76 BPM    Atrial Rate 76 BPM    WI Interval 120 ms    QRS  Duration 82 ms    QT Interval 440 ms    QTC Calculation(Bazett) 495 ms    P Axis 81 degrees    R Axis 26 degrees    T Axis 191 degrees    QRS Count 13 beats    Q Onset 217 ms    P Onset 157 ms    P Offset 215 ms    T Offset 437 ms    QTC Fredericia 475 ms   Electrocardiogram, 12-lead PRN ACS symptoms   Result Value Ref Range    Ventricular Rate 71 BPM    Atrial Rate 71 BPM    UT Interval 118 ms    QRS Duration 90 ms    QT Interval 462 ms    QTC Calculation(Bazett) 502 ms    P Axis 69 degrees    R Axis 17 degrees    T Axis 159 degrees    QRS Count 12 beats    Q Onset 220 ms    P Onset 161 ms    P Offset 218 ms    T Offset 451 ms    QTC Fredericia 488 ms   Troponin I, High Sensitivity   Result Value Ref Range    Troponin I, High Sensitivity (CMC) 3,910 (HH) 0 - 34 ng/L   Lactate   Result Value Ref Range    Lactate 1.4 0.4 - 2.0 mmol/L   Heparin Assay, UFH   Result Value Ref Range    Heparin Unfractionated 0.2 See Comment Below for Therapeutic Ranges IU/mL   CBC   Result Value Ref Range    WBC 9.9 4.4 - 11.3 x10*3/uL    nRBC 0.0 0.0 - 0.0 /100 WBCs    RBC 3.29 (L) 4.00 - 5.20 x10*6/uL    Hemoglobin 10.2 (L) 12.0 - 16.0 g/dL    Hematocrit 32.3 (L) 36.0 - 46.0 %    MCV 98 80 - 100 fL    MCH 31.0 26.0 - 34.0 pg    MCHC 31.6 (L) 32.0 - 36.0 g/dL    RDW 13.2 11.5 - 14.5 %    Platelets 247 150 - 450 x10*3/uL   POCT GLUCOSE   Result Value Ref Range    POCT Glucose 215 (H) 74 - 99 mg/dL   Transthoracic Echo (TTE) Complete   Result Value Ref Range    AV pk ritika 0.72 m/s    LVOT diam 1.70 cm    MV E/A ratio 3.51     Tricuspid annular plane systolic excursion 1.4 cm    LA vol index A/L 71.4 ml/m2    LV EF 48 %    RV free wall pk S' 5.93 cm/s    LVIDd 4.70 cm    RVSP 61.5 mmHg    Aortic Valve Area by Continuity of Peak Velocity 1.50 cm2    AV pk grad 2 mmHg    LV A4C EF 42.2    POCT GLUCOSE   Result Value Ref Range    POCT Glucose 176 (H) 74 - 99 mg/dL   Heparin Assay, UFH   Result Value Ref Range    Heparin Unfractionated 0.6 See  Comment Below for Therapeutic Ranges IU/mL   Troponin I, High Sensitivity   Result Value Ref Range    Troponin I, High Sensitivity (CMC) 5,701 (HH) 0 - 34 ng/L               ASSESSMENT & PLAN  Humaira Huang is a 64 y.o. female, with a PMH of pancreatitis, DVT, PE, HLD, HTN, and s/p CABG x4 in 1997, who presented to [facility] on 11/18/2024 as a transfer from [facility]. She initially presented with chest pain, back pain, and abdominal pain. 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     RECOMMENDATIONS/PLAN  Dr. Thapa aware of patient, currently reviewing case and available imaging.   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   - Formal TTE done 11/19 showed mod to likely severe MR 3-4+ and an estimated EF 45-50%  - Medical optimization per primary team  - Agree with vascular surgery for aortic conference discussion Wednesday. Further recs to follow.     If surgery is pursued - patient would likely need preop testing including LHC, panorex, PFTs, US carotids etc.     Will continue to follow along.  Thank you for the consultation.   Patient educated and all questions answered.  Please page the cardiac surgery consult pager [phone] with any questions or changes in patient condition.    Sheri Polanco PA-C  Cardiac Surgery Consult [facility]  [facility]  Cardiac Surgery Consult Pager [phone]     11/19/2024  2:44 PM

## 2024-11-20 LAB
ALBUMIN SERPL BCP-MCNC: 2.9 G/DL (ref 3.4–5)
ALBUMIN SERPL BCP-MCNC: 2.9 G/DL (ref 3.4–5)
ANION GAP BLDA CALCULATED.4IONS-SCNC: 14 MMO/L (ref 10–25)
ANION GAP BLDA CALCULATED.4IONS-SCNC: 18 MMO/L (ref 10–25)
ANION GAP BLDV CALCULATED.4IONS-SCNC: 16 MMOL/L (ref 10–25)
ANION GAP SERPL CALC-SCNC: 19 MMOL/L (ref 10–20)
ANION GAP SERPL CALC-SCNC: 24 MMOL/L (ref 10–20)
BASE EXCESS BLDA CALC-SCNC: -12.1 MMOL/L (ref -2–3)
BASE EXCESS BLDA CALC-SCNC: -4.6 MMOL/L (ref -2–3)
BASE EXCESS BLDV CALC-SCNC: -2.7 MMOL/L (ref -2–3)
BASOPHILS # BLD AUTO: 0.02 X10*3/UL (ref 0–0.1)
BASOPHILS NFR BLD AUTO: 0.2 %
BODY TEMPERATURE: 37 DEGREES CELSIUS
BUN SERPL-MCNC: 47 MG/DL (ref 6–23)
BUN SERPL-MCNC: 54 MG/DL (ref 6–23)
CA-I BLDA-SCNC: 1.03 MMOL/L (ref 1.1–1.33)
CA-I BLDA-SCNC: 1.07 MMOL/L (ref 1.1–1.33)
CA-I BLDV-SCNC: 0.98 MMOL/L (ref 1.1–1.33)
CALCIUM SERPL-MCNC: 7.3 MG/DL (ref 8.6–10.6)
CALCIUM SERPL-MCNC: 7.6 MG/DL (ref 8.6–10.6)
CARDIAC TROPONIN I PNL SERPL HS: 4993 NG/L (ref 0–34)
CHLORIDE BLDA-SCNC: 106 MMOL/L (ref 98–107)
CHLORIDE BLDA-SCNC: 106 MMOL/L (ref 98–107)
CHLORIDE BLDV-SCNC: 103 MMOL/L (ref 98–107)
CHLORIDE SERPL-SCNC: 101 MMOL/L (ref 98–107)
CHLORIDE SERPL-SCNC: 104 MMOL/L (ref 98–107)
CO2 SERPL-SCNC: 23 MMOL/L (ref 21–32)
CO2 SERPL-SCNC: 24 MMOL/L (ref 21–32)
CREAT SERPL-MCNC: 3.6 MG/DL (ref 0.5–1.05)
CREAT SERPL-MCNC: 4.13 MG/DL (ref 0.5–1.05)
EGFRCR SERPLBLD CKD-EPI 2021: 11 ML/MIN/1.73M*2
EGFRCR SERPLBLD CKD-EPI 2021: 13 ML/MIN/1.73M*2
EOSINOPHIL # BLD AUTO: 0 X10*3/UL (ref 0–0.7)
EOSINOPHIL NFR BLD AUTO: 0 %
ERYTHROCYTE [DISTWIDTH] IN BLOOD BY AUTOMATED COUNT: 13.4 % (ref 11.5–14.5)
GLUCOSE BLD MANUAL STRIP-MCNC: 129 MG/DL (ref 74–99)
GLUCOSE BLD MANUAL STRIP-MCNC: 155 MG/DL (ref 74–99)
GLUCOSE BLD MANUAL STRIP-MCNC: 159 MG/DL (ref 74–99)
GLUCOSE BLD MANUAL STRIP-MCNC: 170 MG/DL (ref 74–99)
GLUCOSE BLD MANUAL STRIP-MCNC: 187 MG/DL (ref 74–99)
GLUCOSE BLD MANUAL STRIP-MCNC: 213 MG/DL (ref 74–99)
GLUCOSE BLD MANUAL STRIP-MCNC: 244 MG/DL (ref 74–99)
GLUCOSE BLDA-MCNC: 175 MG/DL (ref 74–99)
GLUCOSE BLDA-MCNC: 200 MG/DL (ref 74–99)
GLUCOSE BLDV-MCNC: 242 MG/DL (ref 74–99)
GLUCOSE SERPL-MCNC: 138 MG/DL (ref 74–99)
GLUCOSE SERPL-MCNC: 225 MG/DL (ref 74–99)
HCO3 BLDA-SCNC: 19 MMOL/L (ref 22–26)
HCO3 BLDA-SCNC: 22.1 MMOL/L (ref 22–26)
HCO3 BLDV-SCNC: 23.2 MMOL/L (ref 22–26)
HCT VFR BLD AUTO: 28.4 % (ref 36–46)
HCT VFR BLD EST: 29 % (ref 36–46)
HCT VFR BLD EST: 29 % (ref 36–46)
HCT VFR BLD EST: 32 % (ref 36–46)
HGB BLD-MCNC: 9.4 G/DL (ref 12–16)
HGB BLDA-MCNC: 10.5 G/DL (ref 12–16)
HGB BLDA-MCNC: 9.6 G/DL (ref 12–16)
HGB BLDV-MCNC: 9.8 G/DL (ref 12–16)
IMM GRANULOCYTES # BLD AUTO: 0.11 X10*3/UL (ref 0–0.7)
IMM GRANULOCYTES NFR BLD AUTO: 0.8 % (ref 0–0.9)
INHALED O2 CONCENTRATION: 100 %
INHALED O2 CONCENTRATION: 21 %
INHALED O2 CONCENTRATION: 60 %
LACTATE BLDA-SCNC: 4.7 MMOL/L (ref 0.4–2)
LACTATE BLDA-SCNC: 6.3 MMOL/L (ref 0.4–2)
LACTATE BLDV-SCNC: 2.3 MMOL/L (ref 0.4–2)
LACTATE SERPL-SCNC: 1.7 MMOL/L (ref 0.4–2)
LACTATE SERPL-SCNC: 2.2 MMOL/L (ref 0.4–2)
LYMPHOCYTES # BLD AUTO: 0.84 X10*3/UL (ref 1.2–4.8)
LYMPHOCYTES NFR BLD AUTO: 6.4 %
MAGNESIUM SERPL-MCNC: 2.27 MG/DL (ref 1.6–2.4)
MCH RBC QN AUTO: 30.4 PG (ref 26–34)
MCHC RBC AUTO-ENTMCNC: 33.1 G/DL (ref 32–36)
MCV RBC AUTO: 92 FL (ref 80–100)
MONOCYTES # BLD AUTO: 0.96 X10*3/UL (ref 0.1–1)
MONOCYTES NFR BLD AUTO: 7.3 %
MRSA DNA SPEC QL NAA+PROBE: NOT DETECTED
NEUTROPHILS # BLD AUTO: 11.23 X10*3/UL (ref 1.2–7.7)
NEUTROPHILS NFR BLD AUTO: 85.3 %
NRBC BLD-RTO: 0 /100 WBCS (ref 0–0)
OXYHGB MFR BLDA: 98 % (ref 94–98)
OXYHGB MFR BLDA: 98.2 % (ref 94–98)
OXYHGB MFR BLDV: 62.4 % (ref 45–75)
PCO2 BLDA: 47 MM HG (ref 38–42)
PCO2 BLDA: 72 MM HG (ref 38–42)
PCO2 BLDV: 44 MM HG (ref 41–51)
PH BLDA: 7.03 PH (ref 7.38–7.42)
PH BLDA: 7.28 PH (ref 7.38–7.42)
PH BLDV: 7.33 PH (ref 7.33–7.43)
PHOSPHATE SERPL-MCNC: 3.8 MG/DL (ref 2.5–4.9)
PHOSPHATE SERPL-MCNC: 6.4 MG/DL (ref 2.5–4.9)
PLATELET # BLD AUTO: 234 X10*3/UL (ref 150–450)
PO2 BLDA: 195 MM HG (ref 85–95)
PO2 BLDA: 225 MM HG (ref 85–95)
PO2 BLDV: 37 MM HG (ref 35–45)
POTASSIUM BLDA-SCNC: 5.5 MMOL/L (ref 3.5–5.3)
POTASSIUM BLDA-SCNC: 5.7 MMOL/L (ref 3.5–5.3)
POTASSIUM BLDV-SCNC: 6.1 MMOL/L (ref 3.5–5.3)
POTASSIUM SERPL-SCNC: 4 MMOL/L (ref 3.5–5.3)
POTASSIUM SERPL-SCNC: 5.2 MMOL/L (ref 3.5–5.3)
RBC # BLD AUTO: 3.09 X10*6/UL (ref 4–5.2)
SAO2 % BLDA: 100 % (ref 94–100)
SAO2 % BLDA: 100 % (ref 94–100)
SAO2 % BLDV: 63 % (ref 45–75)
SODIUM BLDA-SCNC: 137 MMOL/L (ref 136–145)
SODIUM BLDA-SCNC: 137 MMOL/L (ref 136–145)
SODIUM BLDV-SCNC: 136 MMOL/L (ref 136–145)
SODIUM SERPL-SCNC: 143 MMOL/L (ref 136–145)
SODIUM SERPL-SCNC: 143 MMOL/L (ref 136–145)
UFH PPP CHRO-ACNC: 0.3 IU/ML
UFH PPP CHRO-ACNC: 0.5 IU/ML
WBC # BLD AUTO: 13.2 X10*3/UL (ref 4.4–11.3)

## 2024-11-20 PROCEDURE — 31500 INSERT EMERGENCY AIRWAY: CPT | Mod: GC | Performed by: STUDENT IN AN ORGANIZED HEALTH CARE EDUCATION/TRAINING PROGRAM

## 2024-11-20 PROCEDURE — 2500000002 HC RX 250 W HCPCS SELF ADMINISTERED DRUGS (ALT 637 FOR MEDICARE OP, ALT 636 FOR OP/ED): Performed by: INTERNAL MEDICINE

## 2024-11-20 PROCEDURE — 94002 VENT MGMT INPAT INIT DAY: CPT

## 2024-11-20 PROCEDURE — 31500 INSERT EMERGENCY AIRWAY: CPT | Performed by: STUDENT IN AN ORGANIZED HEALTH CARE EDUCATION/TRAINING PROGRAM

## 2024-11-20 PROCEDURE — 93010 ELECTROCARDIOGRAM REPORT: CPT | Performed by: INTERNAL MEDICINE

## 2024-11-20 PROCEDURE — 2500000004 HC RX 250 GENERAL PHARMACY W/ HCPCS (ALT 636 FOR OP/ED)

## 2024-11-20 PROCEDURE — 82947 ASSAY GLUCOSE BLOOD QUANT: CPT

## 2024-11-20 PROCEDURE — 71045 X-RAY EXAM CHEST 1 VIEW: CPT

## 2024-11-20 PROCEDURE — 87040 BLOOD CULTURE FOR BACTERIA: CPT

## 2024-11-20 PROCEDURE — 0BH17EZ INSERTION OF ENDOTRACHEAL AIRWAY INTO TRACHEA, VIA NATURAL OR ARTIFICIAL OPENING: ICD-10-PCS | Performed by: INTERNAL MEDICINE

## 2024-11-20 PROCEDURE — 36415 COLL VENOUS BLD VENIPUNCTURE: CPT

## 2024-11-20 PROCEDURE — 84132 ASSAY OF SERUM POTASSIUM: CPT

## 2024-11-20 PROCEDURE — 37799 UNLISTED PX VASCULAR SURGERY: CPT

## 2024-11-20 PROCEDURE — 94762 N-INVAS EAR/PLS OXIMTRY CONT: CPT

## 2024-11-20 PROCEDURE — 83735 ASSAY OF MAGNESIUM: CPT

## 2024-11-20 PROCEDURE — 99291 CRITICAL CARE FIRST HOUR: CPT | Performed by: INTERNAL MEDICINE

## 2024-11-20 PROCEDURE — 2500000001 HC RX 250 WO HCPCS SELF ADMINISTERED DRUGS (ALT 637 FOR MEDICARE OP)

## 2024-11-20 PROCEDURE — 85025 COMPLETE CBC W/AUTO DIFF WBC: CPT

## 2024-11-20 PROCEDURE — 84484 ASSAY OF TROPONIN QUANT: CPT

## 2024-11-20 PROCEDURE — 94681 O2 UPTK CO2 OUTP % O2 XTRC: CPT

## 2024-11-20 PROCEDURE — 2500000005 HC RX 250 GENERAL PHARMACY W/O HCPCS: Performed by: STUDENT IN AN ORGANIZED HEALTH CARE EDUCATION/TRAINING PROGRAM

## 2024-11-20 PROCEDURE — 94640 AIRWAY INHALATION TREATMENT: CPT

## 2024-11-20 PROCEDURE — 2500000004 HC RX 250 GENERAL PHARMACY W/ HCPCS (ALT 636 FOR OP/ED): Performed by: STUDENT IN AN ORGANIZED HEALTH CARE EDUCATION/TRAINING PROGRAM

## 2024-11-20 PROCEDURE — 5A12012 PERFORMANCE OF CARDIAC OUTPUT, SINGLE, MANUAL: ICD-10-PCS | Performed by: INTERNAL MEDICINE

## 2024-11-20 PROCEDURE — 37799 UNLISTED PX VASCULAR SURGERY: CPT | Performed by: INTERNAL MEDICINE

## 2024-11-20 PROCEDURE — 2500000002 HC RX 250 W HCPCS SELF ADMINISTERED DRUGS (ALT 637 FOR MEDICARE OP, ALT 636 FOR OP/ED)

## 2024-11-20 PROCEDURE — 2500000005 HC RX 250 GENERAL PHARMACY W/O HCPCS

## 2024-11-20 PROCEDURE — 99232 SBSQ HOSP IP/OBS MODERATE 35: CPT | Performed by: PHYSICIAN ASSISTANT

## 2024-11-20 PROCEDURE — 2500000004 HC RX 250 GENERAL PHARMACY W/ HCPCS (ALT 636 FOR OP/ED): Mod: JZ

## 2024-11-20 PROCEDURE — 74018 RADEX ABDOMEN 1 VIEW: CPT

## 2024-11-20 PROCEDURE — 1100000001 HC PRIVATE ROOM DAILY

## 2024-11-20 PROCEDURE — 83605 ASSAY OF LACTIC ACID: CPT | Performed by: INTERNAL MEDICINE

## 2024-11-20 PROCEDURE — 87641 MR-STAPH DNA AMP PROBE: CPT

## 2024-11-20 PROCEDURE — 85520 HEPARIN ASSAY: CPT

## 2024-11-20 PROCEDURE — 31500 INSERT EMERGENCY AIRWAY: CPT

## 2024-11-20 PROCEDURE — 84132 ASSAY OF SERUM POTASSIUM: CPT | Performed by: INTERNAL MEDICINE

## 2024-11-20 PROCEDURE — 5A1955Z RESPIRATORY VENTILATION, GREATER THAN 96 CONSECUTIVE HOURS: ICD-10-PCS | Performed by: INTERNAL MEDICINE

## 2024-11-20 PROCEDURE — 36620 INSERTION CATHETER ARTERY: CPT | Mod: GC | Performed by: STUDENT IN AN ORGANIZED HEALTH CARE EDUCATION/TRAINING PROGRAM

## 2024-11-20 PROCEDURE — 92950 HEART/LUNG RESUSCITATION CPR: CPT

## 2024-11-20 RX ORDER — NOREPINEPHRINE BITARTRATE/D5W 8 MG/250ML
0-.2 PLASTIC BAG, INJECTION (ML) INTRAVENOUS CONTINUOUS
Status: DISCONTINUED | OUTPATIENT
Start: 2024-11-20 | End: 2024-11-25

## 2024-11-20 RX ORDER — PANTOPRAZOLE SODIUM 40 MG/1
40 TABLET, DELAYED RELEASE ORAL
Status: DISCONTINUED | OUTPATIENT
Start: 2024-11-21 | End: 2024-11-22

## 2024-11-20 RX ORDER — HEPARIN SODIUM 1000 [USP'U]/ML
INJECTION, SOLUTION INTRAVENOUS; SUBCUTANEOUS
Status: DISPENSED
Start: 2024-11-20 | End: 2024-11-20

## 2024-11-20 RX ORDER — IPRATROPIUM BROMIDE AND ALBUTEROL SULFATE 2.5; .5 MG/3ML; MG/3ML
3 SOLUTION RESPIRATORY (INHALATION) EVERY 6 HOURS PRN
Status: DISCONTINUED | OUTPATIENT
Start: 2024-11-20 | End: 2024-12-18 | Stop reason: HOSPADM

## 2024-11-20 RX ORDER — MIDAZOLAM HYDROCHLORIDE 1 MG/ML
INJECTION, SOLUTION INTRAVENOUS
Status: COMPLETED
Start: 2024-11-20 | End: 2024-11-20

## 2024-11-20 RX ORDER — MIDAZOLAM HYDROCHLORIDE 1 MG/ML
0-20 INJECTION, SOLUTION INTRAVENOUS CONTINUOUS
Status: DISCONTINUED | OUTPATIENT
Start: 2024-11-20 | End: 2024-11-22

## 2024-11-20 RX ORDER — PANTOPRAZOLE SODIUM 40 MG/10ML
40 INJECTION, POWDER, LYOPHILIZED, FOR SOLUTION INTRAVENOUS DAILY
Status: CANCELLED | OUTPATIENT
Start: 2024-11-20

## 2024-11-20 RX ORDER — PANTOPRAZOLE SODIUM 40 MG/10ML
40 INJECTION, POWDER, LYOPHILIZED, FOR SOLUTION INTRAVENOUS
Status: DISCONTINUED | OUTPATIENT
Start: 2024-11-21 | End: 2024-11-22

## 2024-11-20 RX ORDER — EPINEPHRINE 0.1 MG/ML
INJECTION INTRACARDIAC; INTRAVENOUS CODE/TRAUMA/SEDATION MEDICATION
Status: COMPLETED | OUTPATIENT
Start: 2024-11-20 | End: 2024-11-20

## 2024-11-20 RX ORDER — FENTANYL CITRATE-0.9 % NACL/PF 10 MCG/ML
PLASTIC BAG, INJECTION (ML) INTRAVENOUS
Status: DISPENSED
Start: 2024-11-20 | End: 2024-11-20

## 2024-11-20 RX ORDER — VANCOMYCIN HYDROCHLORIDE 1 G/20ML
INJECTION, POWDER, LYOPHILIZED, FOR SOLUTION INTRAVENOUS DAILY PRN
Status: DISCONTINUED | OUTPATIENT
Start: 2024-11-20 | End: 2024-11-22

## 2024-11-20 RX ORDER — POLYETHYLENE GLYCOL 3350 17 G/17G
17 POWDER, FOR SOLUTION ORAL DAILY
Status: DISCONTINUED | OUTPATIENT
Start: 2024-11-20 | End: 2024-11-25

## 2024-11-20 RX ORDER — ESMOLOL HYDROCHLORIDE 10 MG/ML
50-300 INJECTION, SOLUTION INTRAVENOUS CONTINUOUS
Status: DISCONTINUED | OUTPATIENT
Start: 2024-11-20 | End: 2024-11-25

## 2024-11-20 RX ORDER — FENTANYL CITRATE-0.9 % NACL/PF 10 MCG/ML
25-200 PLASTIC BAG, INJECTION (ML) INTRAVENOUS CONTINUOUS
Status: DISCONTINUED | OUTPATIENT
Start: 2024-11-20 | End: 2024-11-26

## 2024-11-20 RX ORDER — VANCOMYCIN HYDROCHLORIDE
1250 ONCE
Status: COMPLETED | OUTPATIENT
Start: 2024-11-20 | End: 2024-11-20

## 2024-11-20 RX ADMIN — ASPIRIN 81 MG CHEWABLE TABLET 81 MG: 81 TABLET CHEWABLE at 13:05

## 2024-11-20 RX ADMIN — POLYETHYLENE GLYCOL 3350 17 G: 17 POWDER, FOR SOLUTION ORAL at 13:05

## 2024-11-20 RX ADMIN — INSULIN LISPRO 2 UNITS: 100 INJECTION, SOLUTION INTRAVENOUS; SUBCUTANEOUS at 06:40

## 2024-11-20 RX ADMIN — INSULIN LISPRO 2 UNITS: 100 INJECTION, SOLUTION INTRAVENOUS; SUBCUTANEOUS at 08:21

## 2024-11-20 RX ADMIN — SENNOSIDES AND DOCUSATE SODIUM 1 TABLET: 50; 8.6 TABLET ORAL at 20:32

## 2024-11-20 RX ADMIN — INSULIN LISPRO 1 UNITS: 100 INJECTION, SOLUTION INTRAVENOUS; SUBCUTANEOUS at 20:33

## 2024-11-20 RX ADMIN — ESMOLOL HYDROCHLORIDE 50 MCG/KG/MIN: 10 INJECTION, SOLUTION INTRAVENOUS at 17:14

## 2024-11-20 RX ADMIN — Medication 0.1 MCG/KG/MIN: at 01:15

## 2024-11-20 RX ADMIN — Medication 0.2 MCG/KG/MIN: at 02:30

## 2024-11-20 RX ADMIN — Medication 25 MCG/HR: at 01:15

## 2024-11-20 RX ADMIN — IPRATROPIUM BROMIDE AND ALBUTEROL SULFATE 3 ML: .5; 3 SOLUTION RESPIRATORY (INHALATION) at 19:05

## 2024-11-20 RX ADMIN — Medication 50 MCG/HR: at 13:46

## 2024-11-20 RX ADMIN — Medication 40 PERCENT: at 08:20

## 2024-11-20 RX ADMIN — PIPERACILLIN SODIUM AND TAZOBACTAM SODIUM 2.25 G: 2; .25 INJECTION, SOLUTION INTRAVENOUS at 23:42

## 2024-11-20 RX ADMIN — PIPERACILLIN SODIUM AND TAZOBACTAM SODIUM 2.25 G: 2; .25 INJECTION, SOLUTION INTRAVENOUS at 16:23

## 2024-11-20 RX ADMIN — Medication 40 PERCENT: at 15:21

## 2024-11-20 RX ADMIN — INSULIN LISPRO 1 UNITS: 100 INJECTION, SOLUTION INTRAVENOUS; SUBCUTANEOUS at 12:50

## 2024-11-20 RX ADMIN — IPRATROPIUM BROMIDE AND ALBUTEROL SULFATE 3 ML: .5; 3 SOLUTION RESPIRATORY (INHALATION) at 12:11

## 2024-11-20 RX ADMIN — PIPERACILLIN SODIUM AND TAZOBACTAM SODIUM 2.25 G: 2; .25 INJECTION, SOLUTION INTRAVENOUS at 08:19

## 2024-11-20 RX ADMIN — ATORVASTATIN CALCIUM 80 MG: 80 TABLET, FILM COATED ORAL at 20:32

## 2024-11-20 RX ADMIN — EPINEPHRINE 1 MG: 0.1 INJECTION INTRAVENOUS at 00:57

## 2024-11-20 RX ADMIN — MIDAZOLAM IN SODIUM CHLORIDE 1 MG/HR: 1 INJECTION INTRAVENOUS at 01:15

## 2024-11-20 RX ADMIN — Medication 1250 MG: at 10:32

## 2024-11-20 RX ADMIN — EPINEPHRINE 1 MG: 0.1 INJECTION INTRAVENOUS at 01:00

## 2024-11-20 RX ADMIN — MIDAZOLAM HYDROCHLORIDE 1 MG/HR: 1 INJECTION, SOLUTION INTRAVENOUS at 01:15

## 2024-11-20 RX ADMIN — ESMOLOL HYDROCHLORIDE 250 MCG/KG/MIN: 10 INJECTION, SOLUTION INTRAVENOUS at 22:15

## 2024-11-20 SDOH — ECONOMIC STABILITY: HOUSING INSECURITY: IN THE LAST 12 MONTHS, WAS THERE A TIME WHEN YOU WERE NOT ABLE TO PAY THE MORTGAGE OR RENT ON TIME?: NO

## 2024-11-20 SDOH — ECONOMIC STABILITY: FOOD INSECURITY: WITHIN THE PAST 12 MONTHS, THE FOOD YOU BOUGHT JUST DIDN'T LAST AND YOU DIDN'T HAVE MONEY TO GET MORE.: NEVER TRUE

## 2024-11-20 SDOH — ECONOMIC STABILITY: FOOD INSECURITY: HOW HARD IS IT FOR YOU TO PAY FOR THE VERY BASICS LIKE FOOD, HOUSING, MEDICAL CARE, AND HEATING?: NOT VERY HARD

## 2024-11-20 SDOH — ECONOMIC STABILITY: HOUSING INSECURITY: AT ANY TIME IN THE PAST 12 MONTHS, WERE YOU HOMELESS OR LIVING IN A SHELTER (INCLUDING NOW)?: NO

## 2024-11-20 SDOH — ECONOMIC STABILITY: INCOME INSECURITY: IN THE PAST 12 MONTHS HAS THE ELECTRIC, GAS, OIL, OR WATER COMPANY THREATENED TO SHUT OFF SERVICES IN YOUR HOME?: NO

## 2024-11-20 SDOH — ECONOMIC STABILITY: HOUSING INSECURITY: IN THE PAST 12 MONTHS, HOW MANY TIMES HAVE YOU MOVED WHERE YOU WERE LIVING?: 0

## 2024-11-20 SDOH — ECONOMIC STABILITY: FOOD INSECURITY: WITHIN THE PAST 12 MONTHS, YOU WORRIED THAT YOUR FOOD WOULD RUN OUT BEFORE YOU GOT THE MONEY TO BUY MORE.: NEVER TRUE

## 2024-11-20 SDOH — ECONOMIC STABILITY: TRANSPORTATION INSECURITY: IN THE PAST 12 MONTHS, HAS LACK OF TRANSPORTATION KEPT YOU FROM MEDICAL APPOINTMENTS OR FROM GETTING MEDICATIONS?: NO

## 2024-11-20 ASSESSMENT — COGNITIVE AND FUNCTIONAL STATUS - GENERAL
WALKING IN HOSPITAL ROOM: TOTAL
PERSONAL GROOMING: TOTAL
DRESSING REGULAR UPPER BODY CLOTHING: TOTAL
EATING MEALS: TOTAL
CLIMB 3 TO 5 STEPS WITH RAILING: TOTAL
MOBILITY SCORE: 6
MOVING FROM LYING ON BACK TO SITTING ON SIDE OF FLAT BED WITH BEDRAILS: TOTAL
TOILETING: TOTAL
TURNING FROM BACK TO SIDE WHILE IN FLAT BAD: TOTAL
DAILY ACTIVITIY SCORE: 6
DRESSING REGULAR LOWER BODY CLOTHING: TOTAL
HELP NEEDED FOR BATHING: TOTAL
MOVING TO AND FROM BED TO CHAIR: TOTAL
STANDING UP FROM CHAIR USING ARMS: TOTAL

## 2024-11-20 ASSESSMENT — PAIN - FUNCTIONAL ASSESSMENT
PAIN_FUNCTIONAL_ASSESSMENT: CPOT (CRITICAL CARE PAIN OBSERVATION TOOL)
PAIN_FUNCTIONAL_ASSESSMENT: CPOT (CRITICAL CARE PAIN OBSERVATION TOOL)
PAIN_FUNCTIONAL_ASSESSMENT: 0-10
PAIN_FUNCTIONAL_ASSESSMENT: CPOT (CRITICAL CARE PAIN OBSERVATION TOOL)

## 2024-11-20 ASSESSMENT — PAIN SCALES - GENERAL
PAINLEVEL_OUTOF10: 0 - NO PAIN
PAINLEVEL_OUTOF10: 0 - NO PAIN

## 2024-11-20 ASSESSMENT — ACTIVITIES OF DAILY LIVING (ADL): LACK_OF_TRANSPORTATION: NO

## 2024-11-20 NOTE — PROGRESS NOTES
Critical Care Daily Progress      Subjective   Patient is a 64 y.o. female admitted on 11/18/2024 10:54 PM to the CICU for Aortic Dissection.         Objective     PEA Arrest Overnight (see significant note for full details):  Hypercapnic early in night -> not tolerating BiPAP  Worsening pain  Esmolol gtt held  PEA arrest, 3 rounds, epi x2  Intubated, levo  EKG w new RBBB  ABG with pH 7.03  Vanc/zosyn/cultures  Ongoing GOC      Sedated this morning, family at bedside      Vent Settings/Oxygen Drips   Vent Mode: Volume control/assist control  FiO2 (%):  [60 %-80 %] 60 %  S RR:  [25] 25  S VT:  [350 mL-400 mL] 400 mL  PEEP/CPAP (cm H2O):  [5 cm H20-8 cm H20] 8 cm H20  MAP (cm H2O):  [15-16] 16  esmolol,  mcg/kg/min, Last Rate: Stopped (11/20/24 0053)  fentaNYL,  mcg/hr, Last Rate: 50 mcg/hr (11/20/24 0229)  heparin, 0-4,000 Units/hr, Last Rate: 700 Units/hr (11/20/24 0006)  midazolam, 0-20 mg/hr, Last Rate: 1 mg/hr (11/20/24 0115)  norepinephrine, 0-0.2 mcg/kg/min, Last Rate: 0.05 mcg/kg/min (11/20/24 0643)         Vitals: I/O:   Vitals:    11/20/24 0700   BP: 130/74   Pulse: 91   Resp: 25   Temp:    SpO2: 100%        24hr Min/Max:  Temp  Min: 36 °C (96.8 °F)  Max: 36.3 °C (97.3 °F)  Pulse  Min: 49  Max: 136  BP  Min: 77/48  Max: 183/116  Resp  Min: 12  Max: 61  SpO2  Min: 62 %  Max: 100 %   Intake/Output Summary (Last 24 hours) at 11/20/2024 0820  Last data filed at 11/19/2024 1800  Gross per 24 hour   Intake 598.07 ml   Output --   Net 598.07 ml        Net IO Since Admission: 1,686.06 mL [11/20/24 0820]      Scheduled Medications:  PRN Medications:    aspirin, 81 mg, oral, Daily  atorvastatin, 80 mg, oral, Nightly  esmolol, 500 mcg/kg, intravenous, Once  heparin, , ,   insulin lispro, 0-5 Units, subcutaneous, q4h  lactated Ringer's, 1,000 mL, intravenous, Once  [Held by provider] metoprolol tartrate, 12.5 mg, oral, q6h  pantoprazole, 40 mg, oral, Daily before breakfast  piperacillin-tazobactam, 2.25 g,  intravenous, q6h  polyethylene glycol, 17 g, oral, Daily  sennosides-docusate sodium, 1 tablet, oral, Nightly  vancomycin, 1,250 mg, intravenous, Once     PRN medications: acetaminophen, dextrose, dextrose, glucagon, glucagon, heparin, hydrOXYzine HCL, midazolam, oxyCODONE, oxygen, vancomycin       Physical Exam:  Physical Exam  Constitutional:       Appearance: She is toxic-appearing.      Comments: Intubated, sedated, not following commands while sedated   Eyes:      General: No scleral icterus.  Cardiovascular:      Rate and Rhythm: Normal rate and regular rhythm.   Pulmonary:      Effort: No respiratory distress.      Breath sounds: Normal breath sounds. No wheezing or rhonchi.      Comments: ET tube in place; mild wheeze b/l  Abdominal:      General: There is no distension.      Palpations: Abdomen is soft.      Tenderness: There is no abdominal tenderness. There is no guarding.   Musculoskeletal:         General: No swelling or tenderness.   Skin:     General: Skin is warm and dry.      Findings: No bruising or rash.   Neurological:      Comments: sedated                LABS:    CBC RFP   Lab Results   Component Value Date    WBC 13.2 (H) 11/20/2024    WBC 9.9 11/19/2024    WBC 10.7 11/19/2024    HGB 9.4 (L) 11/20/2024    HGB 10.2 (L) 11/19/2024    HGB 11.1 (L) 11/19/2024    HCT 28.4 (L) 11/20/2024    MCV 92 11/20/2024     11/20/2024     11/19/2024     11/19/2024    NEUTROABS 11.23 (H) 11/20/2024    Lab Results   Component Value Date     11/20/2024    K 5.2 11/20/2024     11/20/2024    CO2 23 11/20/2024    BUN 47 (H) 11/20/2024    CREATININE 3.60 (H) 11/20/2024    CREATININE 1.78 (H) 11/19/2024     Lab Results   Component Value Date    MG 2.27 11/20/2024    PHOS 6.4 (H) 11/20/2024    CALCIUM 7.6 (L) 11/20/2024         Hepatic Function ABG/VBG   Lab Results   Component Value Date    ALT 10 11/18/2024    AST 15 11/18/2024    ALKPHOS 82 11/18/2024     No results found for:  "\"TBILIHCVFS\", \"BILIDIR\"   Lab Results   Component Value Date    PROTIME 11.9 11/18/2024    APTT 29 11/18/2024    INR 1.1 11/18/2024      Lab Results   Component Value Date    LACTATE 2.2 (H) 11/20/2024          No results found for the last 90 days.         IMAGING:    New CXR post intubation - hazy opacification of LLL        Assessment/Plan    64 y.o. female with history of pancreatitis, DVT/PE, HLD, HTN, CABGx4 1997, T2DM, GERD, PAD, chronic mesenteric ischemia, tobacco use who presented to [facility] on 11/18/2024 as a transfer from [facility] with chest, back, and abdominal pain. 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 Vascular and Cardiac surgery following. CICU course c/b PEA arrest 11/20, now intubated and sedated     11/20 Updates:  -PEA arrest likely hypoxia driven given worsening hypercapnia early in night and inability to tolerate BiPAP; low suspicion for PE or cardiac etiology given patient has been on heparin and troponin still downtrending  -CTS planning to discuss patient at conference today, likely not a surgical candidate  -Wean pressors  -IV PPI    Neuro:  #Sedated  -versed/fentanyl  -not following commands     Cardiac:  #PEA arrest  #Undifferentiated Shock  ::Ddx: hypoxia, electrolytes appear wnl, low suspicion for aortic dissection as cause given stable Hgb, low suspicion for PE b/c patient has been on heparin gtt  ::Trop peak 5700 -> 4900  ::Lactate 6.3 -> 2.3  ::pH 7.03 -> 7.28 s/p amp bicarb  -Holding off on CT-PE due to severe TRACY following arrest  -c/w levo, MAP > 65, wean as able    #Aortic dissection  #Distal aortic arch saccular aneurysm   ::CTA CAP 11/18- 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   -Vascular surgery consulted: " Impulse control with goal -120, low intensity heparin gtt              - Discussing at conference today  -Cardiac surgery consulted: No emergent cardiac surgery at this time, said same thing 11/19 AM       #NSTEMI  #CAD s/p CABGx4 1997  #PAD  #HTN #DLD  ::EKG- NSR, rate 71, TWI aVL, V1-V6, 1mm ORQUIDEA in aVR  ::Trop peak 5700  ::Lipid HDL 41.5, , TG 75, Chol 157  -Holding home amlodipine 10mg, imdur 30mg, lisinopril 2.5mg, metop tartrate 50mg bid iso hypotension   -Continue ASA and atorvastatin  -Holding pavix 75mg incase of surgical intervention  -Low intensity heparin gtt      Pulm:  #Intubated  #c/f developing PNA  ::CXR with opacification of LLL  -PNA potential cause of hypoxia  -started vanc/zosyn  -blood cultures x2  -sputum culture     GI:  #GERD:  -Continue home PPI     Renal:  #TRACY on CKD likely 2/2 arrest  -Cr at OSH 1.3  -Cr 3.6 post arrest  -monitor for UO - 55ml in 12 hours since arrest     Endo:   #T2DM  -Hold home metformin   -Mild SSI + hypoglycemia protocol      Infectious  See pulm for PNA concerns      F: s/p 1L LR  E: K>4, Mag>2  N: NPO  G: pantoprazole  A: PIV, OG  DVT: hep gtt  CODE: FULL (confirmed with patient on admission 11/19)  NOK: Anais Ferris [phone]                Stephen Clayton MD  PGY-2

## 2024-11-20 NOTE — SIGNIFICANT EVENT
Vascular Surgery Plan of Care Update    Ms. Huang was discussed at weekly Aortic Conference today (11/20/2024).    Given the shagginess of her aorta, nearly occluded distal aorta, and poor femoral access, she is a poor candidate for endovascular repair. At baseline, she would be a poor open candidate as per cardiac surgery given her frail state. Compounded upon this is the fact that she coded this morning (11/20/2024) - the etiology thought to be respiratory and not related to her distal aortic arch aneurysm.    Consequently, we will not be offering any intervention at this time.    This was communicated to the primary CICU team. They were on board with this decision.    This author will contact the patient's child, Seth Richter ([phone]).    Patient seen and discussed with Dr. Szymanski as well as Aortic Conference team.    Vascular surgery will sign off at this time.    Bright Negrete MD  Vascular Surgery, PGY4  Team Pager: [phone]

## 2024-11-20 NOTE — SIGNIFICANT EVENT
Vascular Surgery Note    Attempted to call patient's child, Seth Richter ([phone]) twice to update about mother's plan.    No answer twice.    Will try and catch them at bedside.    Bright Negrete MD  Vascular Surgery, PGY4  Team Pager: [phone]

## 2024-11-20 NOTE — PROGRESS NOTES
11/20/24 1442   Discharge Planning   Living Arrangements Family members;Children   Support Systems Children;Family members   Assistance Needed TBD   Type of Residence Private residence   Do you have animals or pets at home? No   Who is requesting discharge planning? Provider   Home or Post Acute Services   (TBD)   Does the patient need discharge transport arranged?   (TBD)   Financial Resource Strain   How hard is it for you to pay for the very basics like food, housing, medical care, and heating? Not very   Housing Stability   In the last 12 months, was there a time when you were not able to pay the mortgage or rent on time? N   In the past 12 months, how many times have you moved where you were living? 0   At any time in the past 12 months, were you homeless or living in a shelter (including now)? N   Transportation Needs   In the past 12 months, has lack of transportation kept you from medical appointments or from getting medications? no   In the past 12 months, has lack of transportation kept you from meetings, work, or from getting things needed for daily living? No     - ICU TREATMENT PLAN: Patient was transferred to [facility] from [facility] with chest, back, and abdominal pain. 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  - Payer: No insurance on file. An email was sent to [facility].   -Support System: Son   - Planned Disposition: Pending medical outcome and rehab recommendations.  - Additional Information: SDOH and Social Work Discharge Planning assessments were completed with patient's son Seth. There were no SDOH issues identified.  - Barriers to discharge: None at this time. SW will continue to follow.

## 2024-11-20 NOTE — PROCEDURES
Intubation    Date/Time: 11/20/2024 1:23 AM    Performed by: Ramesh Artis DO  Authorized by: Ramesh Artis DO    Consent:     Consent obtained:  Emergent situation  Pre-procedure details:     Indications: cardio/pulmonary arrest      Patient status:  Unresponsive    Pharmacologic strategy: none      Induction agents:  None    Paralytics:  None  Procedure details:     Preoxygenation: BVM during arrest and post-rosc.    Resuscitation: Patient acheived ROSC within 2 minutes of intubation.      Number of attempts:  1  Successful intubation attempt details:     Intubation method:  Oral    Intubation technique: video assisted      Laryngoscope blade:  Hypercurved    Bougie used: no      Grade view: I      Tube size (mm):  7.0    Tube type:  Cuffed    Tube visualized through cords: yes    Placement assessment:     ETT at teeth/gumline (cm):  22    Tube secured with:  ETT viramontes    Placement verification: chest rise and colorimetric ETCO2    Post-procedure details:     Procedure completion:  Tolerated

## 2024-11-20 NOTE — PROGRESS NOTES
"CARDIAC SURGERY CONSULT PROGRESS NOTE    SUBJECTIVE  Humaira Huang is a 64 y.o. female, with a PMH of pancreatitis, DVT, PE, HLD, HTN, and s/p CABG x4 in 1997, who presented to [facility] on 11/18/2024 as a transfer from [facility]. She initially presented with chest pain, back pain, and abdominal pain. 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      Cardiac/Pertinent Imaging  Images from [facility] in PACS       Objective   /69   Pulse 93   Temp 36.8 °C (98.2 °F) (Temporal)   Resp 25   Ht 1.575 m (5' 2\")   Wt 51.6 kg (113 lb 11.2 oz)   SpO2 100%   BMI 20.80 kg/m²   0-10 (Numeric) Pain Score: 0 - No pain  Critical-Care Pain Observation Score:  [0]    Vitals:    11/20/24 0600   Weight: 51.6 kg (113 lb 11.2 oz)          Intake/Output Summary (Last 24 hours) at 11/20/2024 1334  Last data filed at 11/20/2024 1301  Gross per 24 hour   Intake 373.71 ml   Output 57 ml   Net 316.71 ml       Physical Exam  Physical Exam  Constitutional:       Appearance: She is ill-appearing.      Comments: Frail elderly female laying in bed, intubated and sedated   HENT:      Head: Normocephalic.      Mouth/Throat:      Comments: ETT in place  Cardiovascular:      Rate and Rhythm: Normal rate and regular rhythm.   Pulmonary:      Comments: Mechanical breath sounds  Musculoskeletal:      Cervical back: Neck supple.      Right lower leg: No edema.      Left lower leg: No edema.   Skin:     General: Skin is warm and dry.   Neurological:      Comments: Intubated and sedated         Medications  Scheduled medications  aspirin, 81 mg, oral, Daily  atorvastatin, 80 mg, oral, Nightly  esmolol, 500 mcg/kg, intravenous, Once  insulin lispro, 0-5 Units, subcutaneous, q4h  lactated Ringer's, 1,000 mL, intravenous, Once  [Held by provider] metoprolol tartrate, 12.5 mg, oral, q6h  [START ON 11/21/2024] pantoprazole, 40 mg, oral, Daily before breakfast   Or  [START " ON 11/21/2024] pantoprazole, 40 mg, intravenous, Daily before breakfast  piperacillin-tazobactam, 2.25 g, intravenous, q8h  polyethylene glycol, 17 g, oral, Daily  sennosides-docusate sodium, 1 tablet, oral, Nightly    Continuous medications  esmolol,  mcg/kg/min, Last Rate: Stopped (11/20/24 0053)  fentaNYL,  mcg/hr, Last Rate: 50 mcg/hr (11/20/24 1300)  heparin, 0-4,000 Units/hr, Last Rate: 600 Units/hr (11/20/24 1200)  midazolam, 0-20 mg/hr, Last Rate: 1 mg/hr (11/20/24 1300)  norepinephrine, 0-0.2 mcg/kg/min, Last Rate: 0.01 mcg/kg/min (11/20/24 1318)    PRN medications  PRN medications: acetaminophen, dextrose, dextrose, glucagon, glucagon, hydrOXYzine HCL, ipratropium-albuteroL, midazolam, oxyCODONE, oxygen, vancomycin    Labs  Results for orders placed or performed during the hospital encounter of 11/18/24 (from the past 24 hours)   Heparin Assay, UFH   Result Value Ref Range    Heparin Unfractionated 0.6 See Comment Below for Therapeutic Ranges IU/mL   Troponin I, High Sensitivity   Result Value Ref Range    Troponin I, High Sensitivity (CMC) 5,701 (HH) 0 - 34 ng/L   POCT GLUCOSE   Result Value Ref Range    POCT Glucose 147 (H) 74 - 99 mg/dL   ECG 12 lead   Result Value Ref Range    Ventricular Rate 83 BPM    Atrial Rate 83 BPM    NY Interval 116 ms    QRS Duration 90 ms    QT Interval 432 ms    QTC Calculation(Bazett) 507 ms    P Axis 70 degrees    R Axis 35 degrees    T Axis 118 degrees    QRS Count 14 beats    Q Onset 217 ms    P Onset 159 ms    P Offset 215 ms    T Offset 433 ms    QTC Fredericia 481 ms   Troponin I, High Sensitivity   Result Value Ref Range    Troponin I, High Sensitivity (CMC) 5,681 (HH) 0 - 34 ng/L   Heparin Assay, UFH   Result Value Ref Range    Heparin Unfractionated 0.2 See Comment Below for Therapeutic Ranges IU/mL   POCT GLUCOSE   Result Value Ref Range    POCT Glucose 136 (H) 74 - 99 mg/dL   Blood Gas Arterial Full Panel   Result Value Ref Range    POCT pH, Arterial  7.20 (LL) 7.38 - 7.42 pH    POCT pCO2, Arterial 63 (H) 38 - 42 mm Hg    POCT pO2, Arterial 109 (H) 85 - 95 mm Hg    POCT SO2, Arterial 99 94 - 100 %    POCT Oxy Hemoglobin, Arterial 97.3 94.0 - 98.0 %    POCT Hematocrit Calculated, Arterial 34.0 (L) 36.0 - 46.0 %    POCT Sodium, Arterial 137 136 - 145 mmol/L    POCT Potassium, Arterial 4.9 3.5 - 5.3 mmol/L    POCT Chloride, Arterial 105 98 - 107 mmol/L    POCT Ionized Calcium, Arterial 1.07 (L) 1.10 - 1.33 mmol/L    POCT Glucose, Arterial 161 (H) 74 - 99 mg/dL    POCT Lactate, Arterial 1.0 0.4 - 2.0 mmol/L    POCT Base Excess, Arterial -4.2 (L) -2.0 - 3.0 mmol/L    POCT HCO3 Calculated, Arterial 24.6 22.0 - 26.0 mmol/L    POCT Hemoglobin, Arterial 11.3 (L) 12.0 - 16.0 g/dL    POCT Anion Gap, Arterial 12 10 - 25 mmo/L    Patient Temperature 37.0 degrees Celsius    FiO2 34 %   Heparin Assay, UFH   Result Value Ref Range    Heparin Unfractionated 0.9 See Comment Below for Therapeutic Ranges IU/mL   BLOOD GAS ARTERIAL FULL PANEL   Result Value Ref Range    POCT pH, Arterial 7.03 (LL) 7.38 - 7.42 pH    POCT pCO2, Arterial 72 (HH) 38 - 42 mm Hg    POCT pO2, Arterial 225 (H) 85 - 95 mm Hg    POCT SO2, Arterial 100 94 - 100 %    POCT Oxy Hemoglobin, Arterial 98.2 (H) 94.0 - 98.0 %    POCT Hematocrit Calculated, Arterial 32.0 (L) 36.0 - 46.0 %    POCT Sodium, Arterial 137 136 - 145 mmol/L    POCT Potassium, Arterial 5.7 (H) 3.5 - 5.3 mmol/L    POCT Chloride, Arterial 106 98 - 107 mmol/L    POCT Ionized Calcium, Arterial 1.07 (L) 1.10 - 1.33 mmol/L    POCT Glucose, Arterial 200 (H) 74 - 99 mg/dL    POCT Lactate, Arterial 6.3 (HH) 0.4 - 2.0 mmol/L    POCT Base Excess, Arterial -12.1 (L) -2.0 - 3.0 mmol/L    POCT HCO3 Calculated, Arterial 19.0 (L) 22.0 - 26.0 mmol/L    POCT Hemoglobin, Arterial 10.5 (L) 12.0 - 16.0 g/dL    POCT Anion Gap, Arterial 18 10 - 25 mmo/L    Patient Temperature 37.0 degrees Celsius    FiO2 21 %   Blood Gas Arterial Full Panel   Result Value Ref Range     POCT pH, Arterial 7.28 (L) 7.38 - 7.42 pH    POCT pCO2, Arterial 47 (H) 38 - 42 mm Hg    POCT pO2, Arterial 195 (H) 85 - 95 mm Hg    POCT SO2, Arterial 100 94 - 100 %    POCT Oxy Hemoglobin, Arterial 98.0 94.0 - 98.0 %    POCT Hematocrit Calculated, Arterial 29.0 (L) 36.0 - 46.0 %    POCT Sodium, Arterial 137 136 - 145 mmol/L    POCT Potassium, Arterial 5.5 (H) 3.5 - 5.3 mmol/L    POCT Chloride, Arterial 106 98 - 107 mmol/L    POCT Ionized Calcium, Arterial 1.03 (L) 1.10 - 1.33 mmol/L    POCT Glucose, Arterial 175 (H) 74 - 99 mg/dL    POCT Lactate, Arterial 4.7 (HH) 0.4 - 2.0 mmol/L    POCT Base Excess, Arterial -4.6 (L) -2.0 - 3.0 mmol/L    POCT HCO3 Calculated, Arterial 22.1 22.0 - 26.0 mmol/L    POCT Hemoglobin, Arterial 9.6 (L) 12.0 - 16.0 g/dL    POCT Anion Gap, Arterial 14 10 - 25 mmo/L    Patient Temperature 37.0 degrees Celsius    FiO2 100 %   Blood Gas Venous Full Panel   Result Value Ref Range    POCT pH, Venous 7.33 7.33 - 7.43 pH    POCT pCO2, Venous 44 41 - 51 mm Hg    POCT pO2, Venous 37 35 - 45 mm Hg    POCT SO2, Venous 63 45 - 75 %    POCT Oxy Hemoglobin, Venous 62.4 45.0 - 75.0 %    POCT Hematocrit Calculated, Venous 29.0 (L) 36.0 - 46.0 %    POCT Sodium, Venous 136 136 - 145 mmol/L    POCT Potassium, Venous 6.1 (HH) 3.5 - 5.3 mmol/L    POCT Chloride, Venous 103 98 - 107 mmol/L    POCT Ionized Calicum, Venous 0.98 (L) 1.10 - 1.33 mmol/L    POCT Glucose, Venous 242 (H) 74 - 99 mg/dL    POCT Lactate, Venous 2.3 (H) 0.4 - 2.0 mmol/L    POCT Base Excess, Venous -2.7 (L) -2.0 - 3.0 mmol/L    POCT HCO3 Calculated, Venous 23.2 22.0 - 26.0 mmol/L    POCT Hemoglobin, Venous 9.8 (L) 12.0 - 16.0 g/dL    POCT Anion Gap, Venous 16.0 10.0 - 25.0 mmol/L    Patient Temperature 37.0 degrees Celsius    FiO2 60 %   Troponin I, High Sensitivity   Result Value Ref Range    Troponin I, High Sensitivity (CMC) 4,993 (HH) 0 - 34 ng/L   Lactate   Result Value Ref Range    Lactate 2.2 (H) 0.4 - 2.0 mmol/L   Renal function  panel   Result Value Ref Range    Glucose 225 (H) 74 - 99 mg/dL    Sodium 143 136 - 145 mmol/L    Potassium 5.2 3.5 - 5.3 mmol/L    Chloride 101 98 - 107 mmol/L    Bicarbonate 23 21 - 32 mmol/L    Anion Gap 24 (H) 10 - 20 mmol/L    Urea Nitrogen 47 (H) 6 - 23 mg/dL    Creatinine 3.60 (H) 0.50 - 1.05 mg/dL    eGFR 13 (L) >60 mL/min/1.73m*2    Calcium 7.6 (L) 8.6 - 10.6 mg/dL    Phosphorus 6.4 (H) 2.5 - 4.9 mg/dL    Albumin 2.9 (L) 3.4 - 5.0 g/dL   Magnesium   Result Value Ref Range    Magnesium 2.27 1.60 - 2.40 mg/dL   CBC and Auto Differential   Result Value Ref Range    WBC 13.2 (H) 4.4 - 11.3 x10*3/uL    nRBC 0.0 0.0 - 0.0 /100 WBCs    RBC 3.09 (L) 4.00 - 5.20 x10*6/uL    Hemoglobin 9.4 (L) 12.0 - 16.0 g/dL    Hematocrit 28.4 (L) 36.0 - 46.0 %    MCV 92 80 - 100 fL    MCH 30.4 26.0 - 34.0 pg    MCHC 33.1 32.0 - 36.0 g/dL    RDW 13.4 11.5 - 14.5 %    Platelets 234 150 - 450 x10*3/uL    Neutrophils % 85.3 40.0 - 80.0 %    Immature Granulocytes %, Automated 0.8 0.0 - 0.9 %    Lymphocytes % 6.4 13.0 - 44.0 %    Monocytes % 7.3 2.0 - 10.0 %    Eosinophils % 0.0 0.0 - 6.0 %    Basophils % 0.2 0.0 - 2.0 %    Neutrophils Absolute 11.23 (H) 1.20 - 7.70 x10*3/uL    Immature Granulocytes Absolute, Automated 0.11 0.00 - 0.70 x10*3/uL    Lymphocytes Absolute 0.84 (L) 1.20 - 4.80 x10*3/uL    Monocytes Absolute 0.96 0.10 - 1.00 x10*3/uL    Eosinophils Absolute 0.00 0.00 - 0.70 x10*3/uL    Basophils Absolute 0.02 0.00 - 0.10 x10*3/uL   POCT GLUCOSE   Result Value Ref Range    POCT Glucose 213 (H) 74 - 99 mg/dL   MRSA Surveillance for Vancomycin De-escalation, PCR    Specimen: Anterior Nares; Swab   Result Value Ref Range    MRSA PCR Not Detected Not Detected   POCT GLUCOSE   Result Value Ref Range    POCT Glucose 244 (H) 74 - 99 mg/dL   Blood Culture    Specimen: Peripheral Venipuncture; Blood culture   Result Value Ref Range    Blood Culture Loaded on Instrument - Culture in progress    Blood Culture    Specimen: Peripheral  Venipuncture; Blood culture   Result Value Ref Range    Blood Culture Loaded on Instrument - Culture in progress    Lactate   Result Value Ref Range    Lactate 1.7 0.4 - 2.0 mmol/L   POCT GLUCOSE   Result Value Ref Range    POCT Glucose 187 (H) 74 - 99 mg/dL   POCT GLUCOSE   Result Value Ref Range    POCT Glucose 170 (H) 74 - 99 mg/dL   Heparin Assay, UFH   Result Value Ref Range    Heparin Unfractionated 0.3 See Comment Below for Therapeutic Ranges IU/mL               ASSESSMENT & PLAN  Humaira Huang is a 64 y.o. female, with a PMH of pancreatitis, DVT, PE, HLD, HTN, and s/p CABG x4 in 1997, who presented to [facility] on 11/18/2024 as a transfer from [facility]. She initially presented with chest pain, back pain, and abdominal pain. 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     Overnight 11/19-11/20 patient had become bradycardiac and hypotensive - PEA arrest and achieved ROSC. Now intubated and sedated.     RECOMMENDATIONS/PLAN  Dr. Thapa met with patient, currently reviewing case and available imaging.   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   - Formal TTE done 11/19 showed mod to likely severe MR 3-4+ and an estimated EF 45-50%  - Currently patient is not a candidate for surgery given recent arrest.   - Had planned for aortic conference discussion today along with vascular surgery.       Will continue to follow along.  Thank you for the consultation.   Patient educated and all questions answered.  Please page the cardiac surgery consult pager [phone] with any questions or changes in patient condition.    Sheri Polanco PA-C  Cardiac Surgery Consult [facility]  [facility]  Cardiac Surgery Consult Pager [phone]     11/20/2024  1:34 PM

## 2024-11-20 NOTE — SIGNIFICANT EVENT
"Significant Event:   Called to bedside as patient was more agitated and screaming in pain. Of note, patient has been intermittently agitated all day. Patient noted to be hypotensive and bradycardic. Unable to get a good reading from the arterial line so cuff pressure was checked which confirmed hypotension. Esmolol gtt was already held by the time I arrived. 1L LR started. Patient soon became unresponsive with loss of pulse at 00:55. Rhythm PEA. Underwent 3 rounds of CPR for and received epi x2. ROSC achieved at 1:01 AM. Anesthesia at bedside- patient intubated at 1:04 AM. Patient started on levo at 1:02 AM. 1 amp of bicarb given d/t pH 7.03 on ABG. Multiple attempts made to establish IV/IO access during the code. We were able to get PIVs after the code and will get central line access as well as a new a-line. Stat labs obtained. EKG showing sinus tachycardia with new RBBB. Family called and updated.     Patient's son arrived to the ICU. Goals of care discussion initiated with the patient's son as well as daughter and  (via phone). Daughter felt the patient should be DNR as the patient \"wouldn't want to live like this.\" Patient's  disagreed and felt patient should be FULL CODE for now and wanted \"everything done to keep her alive\" including chest compressions if her heart were to stop beating. Explained the patient's current critical status but family would still like the patient to remain FULL CODE at this time. They will discuss this further with the remaining family members.      Kristi Nunes MD   Internal Medicine, PGY3  "

## 2024-11-20 NOTE — HOSPITAL COURSE
"Humaira Huang is a 64 y.o. female with a PMHx of pancreatitis, DVT/PE, HLD, HTN, CABGx4 1997, T2DM, GERD, PAD, chronic mesenteric ischemia, tobacco use who presented to [facility] on 11/18/2024 as a transfer from [facility] with chest, back, and abdominal pain. Pt presented to OSH with excruciating epigastric abdominal pain that radiated to her back and chest. States sx started 2 days ago. She rated it 10/10 and states she's never had sx like this before. Describes sx as burning but occasionally ripping/tearing. Stated sx were different than her chronic mesenteric ischemia. 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 Patient was given dilaudid 0.2 but became hypotenive on this and labetalol gtt for impulse control. On 11/19 morning patient was writhing in pain similar to her presentation last night. She was yelling and saying that the pain was in her chest, neck, and abdomen. Very difficult to get an appropriate history from patient. She kept saying, \"I am dying\". Vitals were stable and ABG showed chronic acidosis. Trialed on low dose labetalol gtt and was given pain meds with no improvement. On evening of 11/19, patient had worsening hypercapnia and hypoxia with inability to tolerate BiPAP. PEA arrest s/p 3 rounds of CPR. ROSC achieved and pt intubated on levophed. Vascular surgery determined that patient is not a surgical candidate for aortic dissection and due to poor anatomy not a candidate for endovascular therapy. Episode of aflutter 11/20, converted to sinus with amio gtt. Heparin contiued but needed to be stopped due to bloody OG tube output. TF eventually started once OG output subsided. Following arrest, renal function worsened and pt was oliguric with ~400 ml urine per day with Cr > 4. Nephro started SLED 11/27. Patient had some " neuro function (opening eyes and squeezing hand) and was doing well on SBTs. Attempted to extubate 11/28 and pt did very poorly on BiPAP. Was lethargic and unable to support respiratory drive. Hypoxia caused elevated lactate to 10 and pt was re intubated after 3 hours. Lactate returned to normal with re intubation. [facility] meeting with family on 11/30 discussed potential options going forward which included tracheostomy vs comfort care. Family decided to go with tracheostomy and PEG on evening of 12/5. Both tracheostomy and PEG were placed on 12/6.  She developed hypotension during dialysis, and was started on pressors. On 12/7, due to concern for bactermia and sepsis, she was started on empiric antibiotics and pressors due to hypotension.  Her dialysis line was removed as a potential source of infection. Pressor requirements downtrended down to Levo 0.06 on 12/8. She was transfused with several units of blood and later found to have an acute hemolytic transfusion reaction with antibodies against Zarate B antigen.  Blood cultures that were collected ended up being negative. On 12/9, was started on folate and thiamine for anemia, and was weaned completely off pressors. On 12/10, a temporary line was placed and she returned to dialysis. Due to ileus noted on abdominal imaging, she was started on senna 17.2 mg BID. She was initially placed on pressure support on 12/9 which she poorly tolerated due to anxiety. However, from 12/10-12/15, she tolerated her pressure support trials well, and she was eventually weaned to trach mask on 12/16. Empiric antibiotics stopped on 12/11 after a 5 day course due to an improving pressor requirements. Had abdominal pain on 12/11, g-tube assessed by ACS with normal function. KUB showing no acute changes. On 12/12, TDC line placed. However, insurance denied LTAC. Her Plavix was restarted on 12/14 as all procedures were done. On 12/17, was changed to cuffless trach and PEG tube replaced. After  48 hrs on trach mask, she was deemed appropriate for transfer to the floor.

## 2024-11-20 NOTE — CONSULTS
Vancomycin Dosing by Pharmacy- INITIAL    Humaira Huang is a 64 y.o. year old female who Pharmacy has been consulted for vancomycin dosing for pneumonia. Based on the patient's indication and renal status this patient will be dosed based on a goal trough/random level of 15-20.     Renal function is currently declining.    Visit Vitals  BP 84/57   Pulse 72   Temp 36.3 °C (97.3 °F) (Temporal)   Resp 25        Lab Results   Component Value Date    CREATININE 1.78 (H) 2024    CREATININE 1.49 (H) 2024        Patient weight is as follows:   Vitals:    24 0600   Weight: 51.6 kg (113 lb 11.2 oz)       Cultures:  No results found for the encounter in last 14 days.        I/O last 3 completed shifts:  In: 1686.1 (32.7 mL/kg) [I.V.:186.1 (3.6 mL/kg); IV Piggyback:1500]  Out: - (0 mL/kg)   Weight: 51.6 kg   I/O during current shift:  No intake/output data recorded.    Temp (24hrs), Av.3 °C (97.3 °F), Min:36 °C (96.8 °F), Max:36.6 °C (97.9 °F)         Assessment/Plan     Patient will be given a loading dose of 1250 mg.  Will not initiate vancomycin maintenance. Will dose by levels at this time due to patient's increasing serum creatinine.   Follow-up level will be ordered on 24 at AM labs, unless clinically indicated sooner.  Will continue to monitor renal function daily while on vancomycin and order serum creatinine at least every 48 hours if not already ordered.  Follow for continued vancomycin needs, clinical response, and signs/symptoms of toxicity.       Yin Dhillon, PharmD

## 2024-11-20 NOTE — SIGNIFICANT EVENT
Came at bedside, found patient complaining of pain without nasal cannula, appear to desat and become hypotensive. pt refusing nasal cannula due to  increased agitation, then became unresponsive, BLS AND ACLS ACTIVATED with resident at bedside, SEE CODE BLUE documentation.

## 2024-11-20 NOTE — PROGRESS NOTES
Communication Note    Patient Name: Humaira Huang  MRN: [id number]  Today's Date: 11/20/2024   Room: 14/14A    Discipline: Occupational Therapy      Missed Visit: Yes  Missed Visit Reason:  (0833: Patient is not medically appropriate for OT services at this time. Defer OT eval.)      11/20/24 at 3:41 PM   Arabella Whaley OT   Rehab Office: [phone]

## 2024-11-20 NOTE — CARE PLAN
Problem: Pain - Adult  Goal: Verbalizes/displays adequate comfort level or baseline comfort level  Outcome: Progressing     Problem: Safety - Adult  Goal: Free from fall injury  Outcome: Progressing     Problem: Skin  Goal: Participates in plan/prevention/treatment measures  Outcome: Progressing  Goal: Prevent/manage excess moisture  Outcome: Progressing  Goal: Prevent/minimize sheer/friction injuries  Outcome: Progressing     Problem: Safety - Medical Restraint  Goal: Remains free of injury from restraints (Restraint for Interference with Medical Device)  Outcome: Progressing  Goal: Free from restraint(s) (Restraint for Interference with Medical Device)  Outcome: Progressing

## 2024-11-20 NOTE — PROCEDURES
Central Line    Date/Time: 11/20/2024 5:23 AM    Performed by: Jennifer Marie MD  Authorized by: Thaddeus Chandler DO    Consent:     Consent obtained:  Written    Consent given by: Son.    Risks, benefits, and alternatives were discussed: yes      Risks discussed:  Arterial puncture, bleeding, nerve damage, pneumothorax, infection and incorrect placement  Universal protocol:     Procedure explained and questions answered to patient or proxy's satisfaction: yes      Relevant documents present and verified: yes      Test results available: yes      Imaging studies available: yes      Required blood products, implants, devices, and special equipment available: yes      Site/side marked: yes      Immediately prior to procedure, a time out was called: yes      Patient identity confirmed:  Hospital-assigned identification number  Pre-procedure details:     Indication(s): central venous access, hemodynamic monitoring and insufficient peripheral access      Hand hygiene: Hand hygiene performed prior to insertion      Sterile barrier technique: All elements of maximal sterile technique followed      Skin preparation:  Chlorhexidine    Skin preparation agent: Skin preparation agent completely dried prior to procedure    Sedation:     Sedation type:  Moderate sedation  Anesthesia:     Anesthesia method:  Local infiltration  Procedure details:     Location:  R internal jugular    Patient position:  Supine    Procedural supplies:  Triple lumen    Catheter size:  8.5 Fr    Catheter length:  16    Landmarks identified: yes      Ultrasound guidance: yes      Ultrasound guidance timing: real time      Sterile ultrasound techniques: Sterile gel and sterile probe covers were used      Number of attempts:  1    Successful placement: yes    Post-procedure details:     Post-procedure:  Dressing applied and line sutured    Assessment:  Blood return through all ports and free fluid flow    Procedure completion:  Tolerated     Complications:  None  Comments:      CXR to confirm placement pending

## 2024-11-20 NOTE — CARE PLAN
T  Problem: Skin  Goal: Participates in plan/prevention/treatment measures  Outcome: Progressing  Flowsheets (Taken 11/20/2024 0355)  Participates in plan/prevention/treatment measures: Elevate heels  Goal: Prevent/manage excess moisture  Outcome: Progressing  Flowsheets (Taken 11/20/2024 0355)  Prevent/manage excess moisture:   Moisturize dry skin   Cleanse incontinence/protect with barrier cream  Goal: Prevent/minimize sheer/friction injuries  Outcome: Progressing     Problem: Safety - Medical Restraint  Goal: Remains free of injury from restraints (Restraint for Interference with Medical Device)  11/20/2024 0355 by Jeremy Velazquez RN  Outcome: Progressing  11/20/2024 0354 by Jeremy Velazquez RN  Flowsheets (Taken 11/20/2024 0354)  Remains free of injury from restraints (restraint for interference with medical device): Every 2 hours: Monitor safety, psychosocial status, comfort, nutrition and hydration  Goal: Free from restraint(s) (Restraint for Interference with Medical Device)  Outcome: Progressing  Flowsheets (Taken 11/20/2024 0355)  Free from restraint(s) (restraint for interference with medical device): Every 24 hours: Continued use of restraint requires Licensed Independent Practitioner to perform face to face examination and written order

## 2024-11-21 ENCOUNTER — APPOINTMENT (OUTPATIENT)
Dept: CARDIOLOGY | Facility: HOSPITAL | Age: 64
End: 2024-11-21

## 2024-11-21 LAB
ABO GROUP (TYPE) IN BLOOD: NORMAL
ALBUMIN SERPL BCP-MCNC: 2.7 G/DL (ref 3.4–5)
ANION GAP BLDMV CALCULATED.4IONS-SCNC: 10 MMO/L (ref 10–25)
ANION GAP SERPL CALC-SCNC: 17 MMOL/L (ref 10–20)
ANTIBODY SCREEN: NORMAL
ATRIAL RATE: 123 BPM
ATRIAL RATE: 294 BPM
ATRIAL RATE: 73 BPM
BASE EXCESS BLDMV CALC-SCNC: 0.4 MMOL/L (ref -2–3)
BASOPHILS # BLD AUTO: 0.02 X10*3/UL (ref 0–0.1)
BASOPHILS # BLD AUTO: 0.02 X10*3/UL (ref 0–0.1)
BASOPHILS NFR BLD AUTO: 0.1 %
BASOPHILS NFR BLD AUTO: 0.1 %
BODY TEMPERATURE: 37 DEGREES CELSIUS
BUN SERPL-MCNC: 56 MG/DL (ref 6–23)
CA-I BLDMV-SCNC: 1.01 MMOL/L (ref 1.1–1.33)
CALCIUM SERPL-MCNC: 7.1 MG/DL (ref 8.6–10.6)
CHLORIDE BLD-SCNC: 107 MMOL/L (ref 98–107)
CHLORIDE SERPL-SCNC: 104 MMOL/L (ref 98–107)
CO2 SERPL-SCNC: 25 MMOL/L (ref 21–32)
CREAT SERPL-MCNC: 4.53 MG/DL (ref 0.5–1.05)
EGFRCR SERPLBLD CKD-EPI 2021: 10 ML/MIN/1.73M*2
EOSINOPHIL # BLD AUTO: 0 X10*3/UL (ref 0–0.7)
EOSINOPHIL # BLD AUTO: 0.01 X10*3/UL (ref 0–0.7)
EOSINOPHIL NFR BLD AUTO: 0 %
EOSINOPHIL NFR BLD AUTO: 0.1 %
ERYTHROCYTE [DISTWIDTH] IN BLOOD BY AUTOMATED COUNT: 13.5 % (ref 11.5–14.5)
ERYTHROCYTE [DISTWIDTH] IN BLOOD BY AUTOMATED COUNT: 14 % (ref 11.5–14.5)
GLUCOSE BLD MANUAL STRIP-MCNC: 132 MG/DL (ref 74–99)
GLUCOSE BLD MANUAL STRIP-MCNC: 184 MG/DL (ref 74–99)
GLUCOSE BLD MANUAL STRIP-MCNC: 189 MG/DL (ref 74–99)
GLUCOSE BLD MANUAL STRIP-MCNC: 198 MG/DL (ref 74–99)
GLUCOSE BLD MANUAL STRIP-MCNC: 219 MG/DL (ref 74–99)
GLUCOSE BLD-MCNC: 149 MG/DL (ref 74–99)
GLUCOSE SERPL-MCNC: 136 MG/DL (ref 74–99)
HCO3 BLDMV-SCNC: 24.6 MMOL/L (ref 22–26)
HCT VFR BLD AUTO: 23.3 % (ref 36–46)
HCT VFR BLD AUTO: 24 % (ref 36–46)
HCT VFR BLD EST: 26 % (ref 36–46)
HGB BLD-MCNC: 7.5 G/DL (ref 12–16)
HGB BLD-MCNC: 8 G/DL (ref 12–16)
HGB BLDMV-MCNC: 8.6 G/DL (ref 12–16)
IMM GRANULOCYTES # BLD AUTO: 0.07 X10*3/UL (ref 0–0.7)
IMM GRANULOCYTES # BLD AUTO: 0.1 X10*3/UL (ref 0–0.7)
IMM GRANULOCYTES NFR BLD AUTO: 0.5 % (ref 0–0.9)
IMM GRANULOCYTES NFR BLD AUTO: 0.6 % (ref 0–0.9)
INHALED O2 CONCENTRATION: 30 %
LACTATE BLDMV-SCNC: 1.2 MMOL/L (ref 0.4–2)
LYMPHOCYTES # BLD AUTO: 1.2 X10*3/UL (ref 1.2–4.8)
LYMPHOCYTES # BLD AUTO: 1.55 X10*3/UL (ref 1.2–4.8)
LYMPHOCYTES NFR BLD AUTO: 8.1 %
LYMPHOCYTES NFR BLD AUTO: 9.8 %
MAGNESIUM SERPL-MCNC: 2.13 MG/DL (ref 1.6–2.4)
MCH RBC QN AUTO: 30 PG (ref 26–34)
MCH RBC QN AUTO: 30.5 PG (ref 26–34)
MCHC RBC AUTO-ENTMCNC: 32.2 G/DL (ref 32–36)
MCHC RBC AUTO-ENTMCNC: 33.3 G/DL (ref 32–36)
MCV RBC AUTO: 92 FL (ref 80–100)
MCV RBC AUTO: 93 FL (ref 80–100)
MONOCYTES # BLD AUTO: 1.18 X10*3/UL (ref 0.1–1)
MONOCYTES # BLD AUTO: 1.21 X10*3/UL (ref 0.1–1)
MONOCYTES NFR BLD AUTO: 7.6 %
MONOCYTES NFR BLD AUTO: 8 %
NEUTROPHILS # BLD AUTO: 12.32 X10*3/UL (ref 1.2–7.7)
NEUTROPHILS # BLD AUTO: 12.94 X10*3/UL (ref 1.2–7.7)
NEUTROPHILS NFR BLD AUTO: 81.8 %
NEUTROPHILS NFR BLD AUTO: 83.3 %
NRBC BLD-RTO: 0.1 /100 WBCS (ref 0–0)
NRBC BLD-RTO: 0.2 /100 WBCS (ref 0–0)
OXYHGB MFR BLDMV: 65.4 % (ref 45–75)
P AXIS: 80 DEGREES
P AXIS: 80 DEGREES
P OFFSET: 187 MS
P OFFSET: 195 MS
P ONSET: 132 MS
P ONSET: 132 MS
PCO2 BLDMV: 37 MM HG (ref 41–51)
PH BLDMV: 7.43 PH (ref 7.33–7.43)
PHOSPHATE SERPL-MCNC: 3.9 MG/DL (ref 2.5–4.9)
PLATELET # BLD AUTO: 190 X10*3/UL (ref 150–450)
PLATELET # BLD AUTO: 202 X10*3/UL (ref 150–450)
PO2 BLDMV: 42 MM HG (ref 35–45)
POTASSIUM BLDMV-SCNC: 4.1 MMOL/L (ref 3.5–5.3)
POTASSIUM SERPL-SCNC: 4 MMOL/L (ref 3.5–5.3)
PR INTERVAL: 120 MS
Q ONSET: 192 MS
Q ONSET: 218 MS
Q ONSET: 222 MS
QRS COUNT: 21 BEATS
QRS COUNT: 24 BEATS
QRS COUNT: 25 BEATS
QRS DURATION: 132 MS
QRS DURATION: 80 MS
QRS DURATION: 80 MS
QT INTERVAL: 240 MS
QT INTERVAL: 352 MS
QT INTERVAL: 372 MS
QTC CALCULATION(BAZETT): 375 MS
QTC CALCULATION(BAZETT): 532 MS
QTC CALCULATION(BAZETT): 537 MS
QTC FREDERICIA: 323 MS
QTC FREDERICIA: 466 MS
QTC FREDERICIA: 472 MS
R AXIS: -75 DEGREES
R AXIS: 37 DEGREES
R AXIS: 49 DEGREES
RBC # BLD AUTO: 2.5 X10*6/UL (ref 4–5.2)
RBC # BLD AUTO: 2.62 X10*6/UL (ref 4–5.2)
RBC MORPH BLD: NORMAL
RH FACTOR (ANTIGEN D): NORMAL
SAO2 % BLDMV: 67 % (ref 45–75)
SODIUM BLDMV-SCNC: 137 MMOL/L (ref 136–145)
SODIUM SERPL-SCNC: 142 MMOL/L (ref 136–145)
T AXIS: 152 DEGREES
T AXIS: 233 DEGREES
T AXIS: 56 DEGREES
T OFFSET: 338 MS
T OFFSET: 378 MS
T OFFSET: 398 MS
UFH PPP CHRO-ACNC: 0.4 IU/ML
VANCOMYCIN SERPL-MCNC: 19.8 UG/ML (ref 5–20)
VENTRICULAR RATE: 123 BPM
VENTRICULAR RATE: 140 BPM
VENTRICULAR RATE: 147 BPM
WBC # BLD AUTO: 14.8 X10*3/UL (ref 4.4–11.3)
WBC # BLD AUTO: 15.8 X10*3/UL (ref 4.4–11.3)

## 2024-11-21 PROCEDURE — 99291 CRITICAL CARE FIRST HOUR: CPT | Performed by: INTERNAL MEDICINE

## 2024-11-21 PROCEDURE — 93005 ELECTROCARDIOGRAM TRACING: CPT

## 2024-11-21 PROCEDURE — 2500000004 HC RX 250 GENERAL PHARMACY W/ HCPCS (ALT 636 FOR OP/ED)

## 2024-11-21 PROCEDURE — 93010 ELECTROCARDIOGRAM REPORT: CPT | Performed by: INTERNAL MEDICINE

## 2024-11-21 PROCEDURE — 86901 BLOOD TYPING SEROLOGIC RH(D): CPT

## 2024-11-21 PROCEDURE — 83735 ASSAY OF MAGNESIUM: CPT

## 2024-11-21 PROCEDURE — 84132 ASSAY OF SERUM POTASSIUM: CPT | Performed by: INTERNAL MEDICINE

## 2024-11-21 PROCEDURE — 99232 SBSQ HOSP IP/OBS MODERATE 35: CPT | Performed by: PHYSICIAN ASSISTANT

## 2024-11-21 PROCEDURE — 2500000001 HC RX 250 WO HCPCS SELF ADMINISTERED DRUGS (ALT 637 FOR MEDICARE OP)

## 2024-11-21 PROCEDURE — 37799 UNLISTED PX VASCULAR SURGERY: CPT

## 2024-11-21 PROCEDURE — 85025 COMPLETE CBC W/AUTO DIFF WBC: CPT

## 2024-11-21 PROCEDURE — 94003 VENT MGMT INPAT SUBQ DAY: CPT

## 2024-11-21 PROCEDURE — 2500000004 HC RX 250 GENERAL PHARMACY W/ HCPCS (ALT 636 FOR OP/ED): Mod: JZ

## 2024-11-21 PROCEDURE — 37799 UNLISTED PX VASCULAR SURGERY: CPT | Performed by: INTERNAL MEDICINE

## 2024-11-21 PROCEDURE — 80202 ASSAY OF VANCOMYCIN: CPT

## 2024-11-21 PROCEDURE — 84132 ASSAY OF SERUM POTASSIUM: CPT

## 2024-11-21 PROCEDURE — 2500000004 HC RX 250 GENERAL PHARMACY W/ HCPCS (ALT 636 FOR OP/ED): Performed by: STUDENT IN AN ORGANIZED HEALTH CARE EDUCATION/TRAINING PROGRAM

## 2024-11-21 PROCEDURE — 2500000002 HC RX 250 W HCPCS SELF ADMINISTERED DRUGS (ALT 637 FOR MEDICARE OP, ALT 636 FOR OP/ED)

## 2024-11-21 PROCEDURE — 85520 HEPARIN ASSAY: CPT

## 2024-11-21 PROCEDURE — 82947 ASSAY GLUCOSE BLOOD QUANT: CPT

## 2024-11-21 PROCEDURE — 1100000001 HC PRIVATE ROOM DAILY

## 2024-11-21 RX ORDER — ACETAMINOPHEN 325 MG/1
650 TABLET ORAL EVERY 4 HOURS PRN
Status: DISCONTINUED | OUTPATIENT
Start: 2024-11-21 | End: 2024-12-09

## 2024-11-21 RX ORDER — VANCOMYCIN HYDROCHLORIDE 750 MG/150ML
750 INJECTION, SOLUTION INTRAVENOUS ONCE
Status: COMPLETED | OUTPATIENT
Start: 2024-11-21 | End: 2024-11-21

## 2024-11-21 RX ORDER — ACETAMINOPHEN 650 MG/1
650 SUPPOSITORY RECTAL EVERY 4 HOURS PRN
Status: DISCONTINUED | OUTPATIENT
Start: 2024-11-21 | End: 2024-12-09

## 2024-11-21 RX ORDER — ACETAMINOPHEN 160 MG/5ML
650 SOLUTION ORAL EVERY 4 HOURS PRN
Status: DISCONTINUED | OUTPATIENT
Start: 2024-11-21 | End: 2024-12-09

## 2024-11-21 RX ADMIN — AMIODARONE HYDROCHLORIDE 1 MG/MIN: 1.8 INJECTION, SOLUTION INTRAVENOUS at 06:11

## 2024-11-21 RX ADMIN — ASPIRIN 81 MG CHEWABLE TABLET 81 MG: 81 TABLET CHEWABLE at 08:22

## 2024-11-21 RX ADMIN — ATORVASTATIN CALCIUM 80 MG: 80 TABLET, FILM COATED ORAL at 20:02

## 2024-11-21 RX ADMIN — SENNOSIDES AND DOCUSATE SODIUM 1 TABLET: 50; 8.6 TABLET ORAL at 20:03

## 2024-11-21 RX ADMIN — ESMOLOL HYDROCHLORIDE 250 MCG/KG/MIN: 10 INJECTION, SOLUTION INTRAVENOUS at 00:55

## 2024-11-21 RX ADMIN — ESMOLOL HYDROCHLORIDE 250 MCG/KG/MIN: 10 INJECTION, SOLUTION INTRAVENOUS at 04:08

## 2024-11-21 RX ADMIN — PIPERACILLIN SODIUM AND TAZOBACTAM SODIUM 2.25 G: 2; .25 INJECTION, SOLUTION INTRAVENOUS at 09:41

## 2024-11-21 RX ADMIN — VANCOMYCIN HYDROCHLORIDE 750 MG: 750 INJECTION, SOLUTION INTRAVENOUS at 08:21

## 2024-11-21 RX ADMIN — INSULIN LISPRO 1 UNITS: 100 INJECTION, SOLUTION INTRAVENOUS; SUBCUTANEOUS at 00:47

## 2024-11-21 RX ADMIN — AMIODARONE HYDROCHLORIDE 1 MG/MIN: 1.8 INJECTION, SOLUTION INTRAVENOUS at 08:36

## 2024-11-21 RX ADMIN — PANTOPRAZOLE SODIUM 40 MG: 40 INJECTION, POWDER, FOR SOLUTION INTRAVENOUS at 08:46

## 2024-11-21 RX ADMIN — AMIODARONE HYDROCHLORIDE 0.5 MG/MIN: 1.8 INJECTION, SOLUTION INTRAVENOUS at 16:28

## 2024-11-21 RX ADMIN — AMIODARONE HYDROCHLORIDE 0.5 MG/MIN: 1.8 INJECTION, SOLUTION INTRAVENOUS at 13:22

## 2024-11-21 RX ADMIN — PIPERACILLIN SODIUM AND TAZOBACTAM SODIUM 2.25 G: 2; .25 INJECTION, SOLUTION INTRAVENOUS at 16:18

## 2024-11-21 RX ADMIN — ESMOLOL HYDROCHLORIDE 50 MCG/KG/MIN: 10 INJECTION, SOLUTION INTRAVENOUS at 08:22

## 2024-11-21 RX ADMIN — POLYETHYLENE GLYCOL 3350 17 G: 17 POWDER, FOR SOLUTION ORAL at 08:22

## 2024-11-21 RX ADMIN — AMIODARONE HYDROCHLORIDE 150 MG: 1.5 INJECTION, SOLUTION INTRAVENOUS at 06:25

## 2024-11-21 ASSESSMENT — PAIN - FUNCTIONAL ASSESSMENT
PAIN_FUNCTIONAL_ASSESSMENT: CPOT (CRITICAL CARE PAIN OBSERVATION TOOL)

## 2024-11-21 NOTE — CARE PLAN
Problem: Safety - Adult  Goal: Free from fall injury  Outcome: Progressing     Problem: Skin  Goal: Participates in plan/prevention/treatment measures  Outcome: Progressing  Flowsheets (Taken 11/21/2024 1457)  Participates in plan/prevention/treatment measures: Elevate heels  Goal: Prevent/manage excess moisture  Outcome: Progressing  Flowsheets (Taken 11/21/2024 1457)  Prevent/manage excess moisture:   Monitor for/manage infection if present   Cleanse incontinence/protect with barrier cream  Goal: Prevent/minimize sheer/friction injuries  Flowsheets (Taken 11/21/2024 1457)  Prevent/minimize sheer/friction injuries:   Turn/reposition every 2 hours/use positioning/transfer devices   Utilize specialty bed per algorithm   HOB 30 degrees or less     Problem: Safety - Medical Restraint  Goal: Remains free of injury from restraints (Restraint for Interference with Medical Device)  Outcome: Progressing  Flowsheets (Taken 11/20/2024 0354 by Jeremy Velazquez RN)  Remains free of injury from restraints (restraint for interference with medical device): Every 2 hours: Monitor safety, psychosocial status, comfort, nutrition and hydration  Goal: Free from restraint(s) (Restraint for Interference with Medical Device)  Outcome: Progressing  Flowsheets (Taken 11/21/2024 1457)  Free from restraint(s) (restraint for interference with medical device):   Every 24 hours: Continued use of restraint requires Licensed Independent Practitioner to perform face to face examination and written order   Identify and implement measures to help patient regain control   ONCE/SHIFT or MINIMUM Every 12 hours: Assess and document the continuing need for restraints

## 2024-11-21 NOTE — PROGRESS NOTES
Communication Note    Patient Name: Humaira Huang  MRN: [id number]  Today's Date: 11/21/2024   Room: 14/14-A    Discipline: Physical Therapy      Missed Visit: Yes  Missed Visit Reason:  (PT evaluation reviewed, pending further medical workup and stability prior to evaluation per team.  Will monitor and follow up as appropriate.)      11/21/24 at 9:03 AM   Cecil Powell, PT   Rehab Office: [phone]

## 2024-11-21 NOTE — PROCEDURES
Arterial Line Insertion    Date/Time: 11/20/2024 9:45 PM    Performed by: Vito Salgado MD  Authorized by: Vito Salgado MD    Consent:     Consent obtained:  Written    Consent given by:  Healthcare agent    Risks, benefits, and alternatives were discussed: yes    Universal protocol:     Procedure explained and questions answered to patient or proxy's satisfaction: yes      Relevant documents present and verified: yes      Test results available: yes      Imaging studies available: yes      Required blood products, implants, devices, and special equipment available: yes      Site/side marked: yes      Immediately prior to procedure, a time out was called: yes      Patient identity confirmed:  Provided demographic data and arm band  Indications:     Indications: hemodynamic monitoring    Pre-procedure details:     Skin preparation:  Chlorhexidine    Preparation: Patient was prepped and draped in sterile fashion    Sedation:     Sedation type:  Deep  Anesthesia:     Anesthesia method:  Local infiltration    Local anesthetic:  Lidocaine 2% w/o epi  Procedure details:     Location:  L radial    Placement technique:  Ultrasound guided    Number of attempts:  3  Post-procedure details:     Procedure completion:  Procedure terminated electively by provider  Comments:      Unsuccessful attempt. Dressing applied after hemostasis by manual pressure.

## 2024-11-21 NOTE — SIGNIFICANT EVENT
Attempt made to place R radial arterial line, artery is likely occluded/clotted given heavy calcification seen on US and no blood flow despite needle tip being in true lumen.

## 2024-11-21 NOTE — SIGNIFICANT EVENT
11/21/24 0600   Provider Notification   Reason for Communication Abnormal vitals   Provider Name Dr. Pradeep LARIOS   Provider Role Resident   Method of Communication Face to face   Details of Communication Patient tachycardic on the monitor HR in the 140's.  Team immediately came to the bedside. Patient's native heart converted back to sinus tachy low 100's. Patient immediately went back into aflutter 'S, hypotensive with 70's systolic, but MAP still remaining above 65.   Response At bedside   Notification Time 0600     After patient went back into A flutter, the decided to pause the esmolol and start amiodarone. Amiodarone bolus given and gtt started as ordered.   0613 Dr. Hall is at bedside attempting to place right radial arterial line. Patient's rhythm is back in Sinus Tachycardia 104bpm, currently normotensive with blood pressure cuff cycling every 1 minute at this time. Esmolol is still paused as ordered by Dr. Hall.     0625 Sedation restarted. Dr. Hall was unable to place arterial line at this time.

## 2024-11-21 NOTE — PROGRESS NOTES
Critical Care Daily Progress      Subjective   Patient is a 64 y.o. female admitted on 11/18/2024 10:54 PM to the CICU for Aortic Dissection.     Overnight:  Esmolol started o/n and pt doing well until she went into aflutter    BP dropped with aflutter and amio gtt started    Altagracia not working and unable to place new on due to calcification    AM:  Open eyes spontaneously but not following commands    Objective     Vent Settings/Oxygen Drips   Vent Mode: Volume control/assist control  FiO2 (%):  [30 %-40 %] 30 %  S RR:  [25] 25  S VT:  [400 mL] 400 mL  PEEP/CPAP (cm H2O):  [5 cm H20] 5 cm H20  MAP (cm H2O):  [11-14] 14  amiodarone, 1 mg/min, Last Rate: 1 mg/min (11/21/24 0611)   Followed by  amiodarone, 0.5 mg/min  esmolol,  mcg/kg/min, Last Rate: Stopped (11/21/24 0600)  fentaNYL,  mcg/hr, Last Rate: 25 mcg/hr (11/21/24 0625)  heparin, 0-4,000 Units/hr, Last Rate: 600 Units/hr (11/21/24 0600)  midazolam, 0-20 mg/hr, Last Rate: 1 mg/hr (11/21/24 0625)  norepinephrine, 0-0.2 mcg/kg/min, Last Rate: Stopped (11/20/24 1339)         Vitals: I/O:   Vitals:    11/21/24 0630   BP: 92/60   Pulse: 102   Resp: 25   Temp:    SpO2: 100%    Cuff: SBP 90s - 110s    24hr Min/Max:  Temp  Min: 35.8 °C (96.4 °F)  Max: 36.8 °C (98.2 °F)  Pulse  Min: 87  Max: 136  BP  Min: 91/56  Max: 161/81  Resp  Min: 0  Max: 25  SpO2  Min: 96 %  Max: 100 %   Intake/Output Summary (Last 24 hours) at 11/21/2024 0758  Last data filed at 11/21/2024 0600  Gross per 24 hour   Intake 1232.78 ml   Output 274 ml   Net 958.78 ml    UO 230ml    Net IO Since Admission: 2,803.06 mL [11/21/24 0758]      Scheduled Medications:  PRN Medications:    aspirin, 81 mg, oral, Daily  atorvastatin, 80 mg, oral, Nightly  esmolol, 500 mcg/kg, intravenous, Once  insulin lispro, 0-5 Units, subcutaneous, q4h  lactated Ringer's, 1,000 mL, intravenous, Once  [Held by provider] metoprolol tartrate, 12.5 mg, oral, q6h  pantoprazole, 40 mg, oral, Daily before breakfast    Or  pantoprazole, 40 mg, intravenous, Daily before breakfast  piperacillin-tazobactam, 2.25 g, intravenous, q8h  polyethylene glycol, 17 g, oral, Daily  sennosides-docusate sodium, 1 tablet, oral, Nightly  vancomycin, 750 mg, intravenous, Once     PRN medications: acetaminophen, dextrose, dextrose, glucagon, glucagon, hydrOXYzine HCL, ipratropium-albuteroL, midazolam, oxyCODONE, oxygen, vancomycin       Physical Exam:  Physical Exam  Constitutional:       Appearance: She is toxic-appearing.      Comments: Intubated, sedated, not following commands but opening eyes   Eyes:      General: No scleral icterus.  Cardiovascular:      Rate and Rhythm: Normal rate and regular rhythm.   Pulmonary:      Effort: No respiratory distress.      Breath sounds: Normal breath sounds. No wheezing or rhonchi.      Comments: ET tube in place; mild wheeze b/l  Abdominal:      General: There is no distension.      Palpations: Abdomen is soft.      Tenderness: There is no abdominal tenderness. There is no guarding.   Musculoskeletal:         General: No swelling or tenderness.   Skin:     General: Skin is warm and dry.      Findings: No bruising or rash.   Neurological:      Comments: sedated                LABS:    CBC RFP   Lab Results   Component Value Date    WBC 14.8 (H) 11/21/2024    WBC 13.2 (H) 11/20/2024    WBC 9.9 11/19/2024    HGB 8.0 (L) 11/21/2024    HGB 9.4 (L) 11/20/2024    HGB 10.2 (L) 11/19/2024    HCT 24.0 (L) 11/21/2024    MCV 92 11/21/2024     11/21/2024     11/20/2024     11/19/2024    NEUTROABS 12.32 (H) 11/21/2024    Lab Results   Component Value Date     11/21/2024    K 4.0 11/21/2024     11/21/2024    CO2 25 11/21/2024    BUN 56 (H) 11/21/2024    CREATININE 4.53 (H) 11/21/2024    CREATININE 4.13 (H) 11/20/2024     Lab Results   Component Value Date    MG 2.13 11/21/2024    PHOS 3.9 11/21/2024    CALCIUM 7.1 (L) 11/21/2024         Hepatic Function ABG/VBG   Lab Results   Component  "Value Date    ALT 10 11/18/2024    AST 15 11/18/2024    ALKPHOS 82 11/18/2024     No results found for: \"TBILIHCVFS\", \"BILIDIR\"   Lab Results   Component Value Date    PROTIME 11.9 11/18/2024    APTT 29 11/18/2024    INR 1.1 11/18/2024      Lab Results   Component Value Date    LACTATE 1.7 11/20/2024          No results found for the last 90 days.         IMAGING:    CXR overnight with continued left sided opacity, pleural effusion vs consolidation        Assessment/Plan    64 y.o. female with history of pancreatitis, DVT/PE, HLD, HTN, CABGx4 1997, T2DM, GERD, PAD, chronic mesenteric ischemia, tobacco use who presented to [facility] on 11/18/2024 as a transfer from [facility] with chest, back, and abdominal pain. 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 Vascular and Cardiac surgery following. CICU course c/b PEA arrest 11/20, now intubated and sedated     11/21 Updates:  - c/w amio gtt for Afib  - restarting esmolol gtt to control HR for impulse control  - vascular surgery not offering any operations, recommend palliative measures  - GOC meeting with family today to discuss poor prognosis    Neuro:  #Sedated  -versed/fentanyl  -not following commands     Cardiac:  #PEA arrest  ::Ddx: hypoxia, electrolytes appear wnl, low suspicion for aortic dissection as cause given stable Hgb, low suspicion for PE b/c patient has been on heparin gtt  ::Trop peak 5700 -> 4900  ::Lactate 6.3 -> 2.3  ::pH 7.03 -> 7.28 s/p amp bicarb  -levo off    #Aflutter  ::Now NSR  ::episode evening 11/20 causing hypotension  -amio gtt    #Aortic dissection  #Distal aortic arch saccular aneurysm   ::CTA CAP 11/18- 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   -Vascular surgery discussed " case at aortic conference and there are no plans for surgery, palliative measures recommended  -Resuming impulse control this AM       #NSTEMI  #CAD s/p CABGx4 1997  #PAD  #HTN #DLD  ::EKG- NSR, rate 71, TWI aVL, V1-V6, 1mm ORQUIDEA in aVR  ::Trop peak 5700  ::Lipid HDL 41.5, , TG 75, Chol 157  -Holding home amlodipine 10mg, imdur 30mg, lisinopril 2.5mg, metop tartrate 50mg bid iso hypotension   -Continue ASA and atorvastatin  -Holding pavix 75mg  -Low intensity heparin gtt      Pulm:  #Intubated  #c/f developing PNA  ::CXR with opacification of LLL  -PNA potential cause of hypoxia  -vanc/zosyn  -blood cultures x2  -sputum culture     GI:  #GERD:  -Continue home PPI     Renal:  #TRACY on CKD likely 2/2 arrest  -Cr at OSH 1.3  -Cr 3.6 post arrest  -minimal UO since arrest     Endo:   #T2DM  -Hold home metformin   -Mild SSI + hypoglycemia protocol      Infectious  See pulm section for PNA concerns      F: s/p 1L LR  E: K>4, Mag>2  N: NPO  G: pantoprazole  A: PIV, OG  DVT: hep gtt  CODE: FULL (confirmed with patient on admission 11/19)  NOK: Daughter Josy [phone]      Stephen Clayton MD  PGY-2

## 2024-11-21 NOTE — PROGRESS NOTES
"Vancomycin Dosing by Pharmacy    Humaira uHang is a 64 y.o. year old female who Pharmacy has been consulted for vancomycin dosing for pneumonia. Based on the patient's indication and renal status this patient is being dosed based on a goal trough/random level of 15-20.     Renal function is currently declining.      Current vancomycin dose: 1250 mg x1 dose given yesterday    Most recent trough level: 19.8 mcg/mL    Visit Vitals  BP 92/60   Pulse 102   Temp 35.8 °C (96.4 °F) (Temporal)   Resp 25   Ht 1.575 m (5' 2\")   Wt 51.6 kg (113 lb 11.2 oz)   SpO2 100%   BMI 20.80 kg/m²   BSA 1.5 m²        Lab Results   Component Value Date    CREATININE 4.53 (H) 11/21/2024    CREATININE 4.13 (H) 11/20/2024    CREATININE 3.60 (H) 11/20/2024    CREATININE 1.78 (H) 11/19/2024        Lab Results   Component Value Date    VANCORANDOM 19.8 11/21/2024        I/O last 3 completed shifts:  In: 1033.3 (20 mL/kg) [I.V.:533.3 (10.3 mL/kg); IV Piggyback:500]  Out: 99 (1.9 mL/kg) [Urine:99 (0.1 mL/kg/hr)]  Weight: 51.6 kg       Blood Culture   Date/Time Value Ref Range Status   11/20/2024 09:40 AM Loaded on Instrument - Culture in progress  Preliminary   11/20/2024 09:40 AM Loaded on Instrument - Culture in progress  Preliminary          Assessment/Plan    Will continue to dose based off of levels, 750 mg x1 dose today        The next level will be obtained on 11/22/24 at 1st AM labs. May be obtained sooner if clinically indicated.   Will continue to monitor renal function daily while on vancomycin and order serum creatinine at least every 48 hours if not already ordered.  Follow for continued vancomycin needs, clinical response, and signs/symptoms of toxicity.     [place] Mcpherson, PharmD              "

## 2024-11-21 NOTE — PROGRESS NOTES
"CARDIAC SURGERY CONSULT PROGRESS NOTE    SUBJECTIVE  Humaira Huang is a 64 y.o. female, with a PMH of pancreatitis, DVT, PE, HLD, HTN, and s/p CABG x4 in 1997, who presented to [facility] on 11/18/2024 as a transfer from [facility]. She initially presented with chest pain, back pain, and abdominal pain. 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      Cardiac/Pertinent Imaging  Images from [facility] in PACS       Objective   /57   Pulse 99   Temp 37.4 °C (99.3 °F)   Resp 24   Ht 1.575 m (5' 2\")   Wt 51.6 kg (113 lb 11.2 oz)   SpO2 100%   BMI 20.80 kg/m²   Critical-Care Pain Observation Score:  [0]    Vitals:    11/20/24 1419   Weight: 51.6 kg (113 lb 11.2 oz)          Intake/Output Summary (Last 24 hours) at 11/21/2024 0940  Last data filed at 11/21/2024 0800  Gross per 24 hour   Intake 1211.56 ml   Output 299 ml   Net 912.56 ml         Medications  Scheduled medications  aspirin, 81 mg, oral, Daily  atorvastatin, 80 mg, oral, Nightly  esmolol, 500 mcg/kg, intravenous, Once  insulin lispro, 0-5 Units, subcutaneous, q4h  lactated Ringer's, 1,000 mL, intravenous, Once  [Held by provider] metoprolol tartrate, 12.5 mg, oral, q6h  pantoprazole, 40 mg, oral, Daily before breakfast   Or  pantoprazole, 40 mg, intravenous, Daily before breakfast  piperacillin-tazobactam, 2.25 g, intravenous, q8h  polyethylene glycol, 17 g, oral, Daily  sennosides-docusate sodium, 1 tablet, oral, Nightly    Continuous medications  amiodarone, 1 mg/min, Last Rate: 1 mg/min (11/21/24 0836)   Followed by  amiodarone, 0.5 mg/min  esmolol,  mcg/kg/min, Last Rate: 150 mcg/kg/min (11/21/24 0852)  fentaNYL,  mcg/hr, Last Rate: 25 mcg/hr (11/21/24 0625)  heparin, 0-4,000 Units/hr, Last Rate: 600 Units/hr (11/21/24 0600)  midazolam, 0-20 mg/hr, Last Rate: Stopped (11/21/24 0830)  norepinephrine, 0-0.2 mcg/kg/min, Last Rate: Stopped (11/20/24 " 1339)    PRN medications  PRN medications: acetaminophen, dextrose, dextrose, glucagon, glucagon, hydrOXYzine HCL, ipratropium-albuteroL, midazolam, oxyCODONE, oxygen, vancomycin    Labs  Results for orders placed or performed during the hospital encounter of 11/18/24 (from the past 24 hours)   POCT GLUCOSE   Result Value Ref Range    POCT Glucose 187 (H) 74 - 99 mg/dL   POCT GLUCOSE   Result Value Ref Range    POCT Glucose 170 (H) 74 - 99 mg/dL   Heparin Assay, UFH   Result Value Ref Range    Heparin Unfractionated 0.3 See Comment Below for Therapeutic Ranges IU/mL   POCT GLUCOSE   Result Value Ref Range    POCT Glucose 129 (H) 74 - 99 mg/dL   Renal function panel   Result Value Ref Range    Glucose 138 (H) 74 - 99 mg/dL    Sodium 143 136 - 145 mmol/L    Potassium 4.0 3.5 - 5.3 mmol/L    Chloride 104 98 - 107 mmol/L    Bicarbonate 24 21 - 32 mmol/L    Anion Gap 19 10 - 20 mmol/L    Urea Nitrogen 54 (H) 6 - 23 mg/dL    Creatinine 4.13 (H) 0.50 - 1.05 mg/dL    eGFR 11 (L) >60 mL/min/1.73m*2    Calcium 7.3 (L) 8.6 - 10.6 mg/dL    Phosphorus 3.8 2.5 - 4.9 mg/dL    Albumin 2.9 (L) 3.4 - 5.0 g/dL   Heparin Assay, UFH   Result Value Ref Range    Heparin Unfractionated 0.5 See Comment Below for Therapeutic Ranges IU/mL   POCT GLUCOSE   Result Value Ref Range    POCT Glucose 159 (H) 74 - 99 mg/dL   POCT GLUCOSE   Result Value Ref Range    POCT Glucose 155 (H) 74 - 99 mg/dL   CBC and Auto Differential   Result Value Ref Range    WBC 14.8 (H) 4.4 - 11.3 x10*3/uL    nRBC 0.1 (H) 0.0 - 0.0 /100 WBCs    RBC 2.62 (L) 4.00 - 5.20 x10*6/uL    Hemoglobin 8.0 (L) 12.0 - 16.0 g/dL    Hematocrit 24.0 (L) 36.0 - 46.0 %    MCV 92 80 - 100 fL    MCH 30.5 26.0 - 34.0 pg    MCHC 33.3 32.0 - 36.0 g/dL    RDW 13.5 11.5 - 14.5 %    Platelets 202 150 - 450 x10*3/uL    Neutrophils % 83.3 40.0 - 80.0 %    Immature Granulocytes %, Automated 0.5 0.0 - 0.9 %    Lymphocytes % 8.1 13.0 - 44.0 %    Monocytes % 8.0 2.0 - 10.0 %    Eosinophils % 0.0 0.0  - 6.0 %    Basophils % 0.1 0.0 - 2.0 %    Neutrophils Absolute 12.32 (H) 1.20 - 7.70 x10*3/uL    Immature Granulocytes Absolute, Automated 0.07 0.00 - 0.70 x10*3/uL    Lymphocytes Absolute 1.20 1.20 - 4.80 x10*3/uL    Monocytes Absolute 1.18 (H) 0.10 - 1.00 x10*3/uL    Eosinophils Absolute 0.00 0.00 - 0.70 x10*3/uL    Basophils Absolute 0.02 0.00 - 0.10 x10*3/uL   Renal function panel   Result Value Ref Range    Glucose 136 (H) 74 - 99 mg/dL    Sodium 142 136 - 145 mmol/L    Potassium 4.0 3.5 - 5.3 mmol/L    Chloride 104 98 - 107 mmol/L    Bicarbonate 25 21 - 32 mmol/L    Anion Gap 17 10 - 20 mmol/L    Urea Nitrogen 56 (H) 6 - 23 mg/dL    Creatinine 4.53 (H) 0.50 - 1.05 mg/dL    eGFR 10 (L) >60 mL/min/1.73m*2    Calcium 7.1 (L) 8.6 - 10.6 mg/dL    Phosphorus 3.9 2.5 - 4.9 mg/dL    Albumin 2.7 (L) 3.4 - 5.0 g/dL   Magnesium   Result Value Ref Range    Magnesium 2.13 1.60 - 2.40 mg/dL   Vancomycin   Result Value Ref Range    Vancomycin 19.8 5.0 - 20.0 ug/mL   Morphology   Result Value Ref Range    RBC Morphology No significant RBC morphology present    BLOOD GAS MIXED VENOUS FULL PANEL   Result Value Ref Range    POCT pH, Mixed 7.43 7.33 - 7.43 pH    POCT pCO2, Mixed 37 (L) 41 - 51 mm Hg    POCT pO2, Mixed 42 35 - 45 mm Hg    POCT SO2, Mixed 67 45 - 75 %    POCT Oxy Hemoglobin, Mixed 65.4 45.0 - 75.0 %    POCT Hematocrit Calculated, Mixed 26.0 (L) 36.0 - 46.0 %    POCT Sodium, Mixed 137 136 - 145 mmol/L    POCT Potassium, Mixed 4.1 3.5 - 5.3 mmol/L    POCT Chloride, Mixed 107 98 - 107 mmol/L    POCT Ionized Calcium, Mixed 1.01 (L) 1.10 - 1.33 mmol/L    POCT Glucose, Mixed 149 (H) 74 - 99 mg/dL    POCT Lactate, Mixed 1.2 0.4 - 2.0 mmol/L    POCT Base Excess, Mixed 0.4 -2.0 - 3.0 mmol/L    POCT HCO3 Calculated, Mixed 24.6 22.0 - 26.0 mmol/L    POCT Hemoglobin, Mixed 8.6 (L) 12.0 - 16.0 g/dL    POCT Anion Gap, Mixed 10 10 - 25 mmo/L    Patient Temperature 37.0 degrees Celsius    FiO2 30 %   Heparin Assay, UFH   Result  Value Ref Range    Heparin Unfractionated 0.4 See Comment Below for Therapeutic Ranges IU/mL   POCT GLUCOSE   Result Value Ref Range    POCT Glucose 132 (H) 74 - 99 mg/dL   Electrocardiogram, 12-lead PRN ACS symptoms   Result Value Ref Range    Ventricular Rate 147 BPM    Atrial Rate 294 BPM    QRS Duration 80 ms    QT Interval 240 ms    QTC Calculation(Bazett) 375 ms    P Axis 80 degrees    R Axis 49 degrees    T Axis 233 degrees    QRS Count 25 beats    Q Onset 218 ms    P Onset 132 ms    P Offset 195 ms    T Offset 338 ms    QTC Fredericia 323 ms   Electrocardiogram, 12-lead PRN ACS symptoms   Result Value Ref Range    Ventricular Rate 140 BPM    Atrial Rate 73 BPM    QRS Duration 80 ms    QT Interval 352 ms    QTC Calculation(Bazett) 537 ms    R Axis 37 degrees    T Axis 152 degrees    QRS Count 24 beats    Q Onset 222 ms    T Offset 398 ms    QTC Fredericia 466 ms   POCT GLUCOSE   Result Value Ref Range    POCT Glucose 189 (H) 74 - 99 mg/dL               ASSESSMENT & PLAN  Humaira Huang is a 64 y.o. female, with a PMH of pancreatitis, DVT, PE, HLD, HTN, and s/p CABG x4 in 1997, who presented to [facility] on 11/18/2024 as a transfer from [facility]. She initially presented with chest pain, back pain, and abdominal pain. 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     Overnight 11/19-11/20 patient had become bradycardiac and hypotensive - PEA arrest and achieved ROSC. Now intubated and sedated.     RECOMMENDATIONS/PLAN  Dr. Thapa met with patient and reviewed case and available imaging.   - [facility]A images in P[facility] from [facility]: 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   - Formal TTE done 11/19 showed mod to likely severe MR 3-4+ and an estimated EF 45-50%    Given the shagginess of her aorta, nearly occluded distal aorta, and  poor femoral access, she is a poor candidate for endovascular repair. She is a poor open surgical candidate per cardiac surgery given her frail state. The patient also coded on 11/20 the etiology thought to be respiratory and not related to her distal aortic arch aneurysm. For these reasons, we will not be offering any intervention at this time.      Will sign off.   Please page the cardiac surgery consult pager [phone] with any questions or changes in patient condition.    Sheri Polanco PA-C  Cardiac Surgery Consult [facility]  [facility]  Cardiac Surgery Consult Pager [phone]     11/21/2024  9:40 AM

## 2024-11-22 LAB
ALBUMIN SERPL BCP-MCNC: 2.7 G/DL (ref 3.4–5)
ANION GAP SERPL CALC-SCNC: 19 MMOL/L (ref 10–20)
BASOPHILS # BLD AUTO: 0.03 X10*3/UL (ref 0–0.1)
BASOPHILS NFR BLD AUTO: 0.2 %
BLOOD EXPIRATION DATE: NORMAL
BLOOD EXPIRATION DATE: NORMAL
BUN SERPL-MCNC: 64 MG/DL (ref 6–23)
CALCIUM SERPL-MCNC: 7 MG/DL (ref 8.6–10.6)
CHLORIDE SERPL-SCNC: 101 MMOL/L (ref 98–107)
CO2 SERPL-SCNC: 24 MMOL/L (ref 21–32)
CREAT SERPL-MCNC: 4.67 MG/DL (ref 0.5–1.05)
DISPENSE STATUS: NORMAL
DISPENSE STATUS: NORMAL
EGFRCR SERPLBLD CKD-EPI 2021: 10 ML/MIN/1.73M*2
EOSINOPHIL # BLD AUTO: 0.1 X10*3/UL (ref 0–0.7)
EOSINOPHIL NFR BLD AUTO: 0.6 %
ERYTHROCYTE [DISTWIDTH] IN BLOOD BY AUTOMATED COUNT: 13.8 % (ref 11.5–14.5)
ERYTHROCYTE [DISTWIDTH] IN BLOOD BY AUTOMATED COUNT: 14.1 % (ref 11.5–14.5)
GLUCOSE BLD MANUAL STRIP-MCNC: 117 MG/DL (ref 74–99)
GLUCOSE BLD MANUAL STRIP-MCNC: 122 MG/DL (ref 74–99)
GLUCOSE BLD MANUAL STRIP-MCNC: 152 MG/DL (ref 74–99)
GLUCOSE BLD MANUAL STRIP-MCNC: 166 MG/DL (ref 74–99)
GLUCOSE BLD MANUAL STRIP-MCNC: 171 MG/DL (ref 74–99)
GLUCOSE BLD MANUAL STRIP-MCNC: 91 MG/DL (ref 74–99)
GLUCOSE BLD MANUAL STRIP-MCNC: 93 MG/DL (ref 74–99)
GLUCOSE SERPL-MCNC: 162 MG/DL (ref 74–99)
HCT VFR BLD AUTO: 21.8 % (ref 36–46)
HCT VFR BLD AUTO: 23 % (ref 36–46)
HGB BLD-MCNC: 7.3 G/DL (ref 12–16)
HGB BLD-MCNC: 7.5 G/DL (ref 12–16)
IMM GRANULOCYTES # BLD AUTO: 0.11 X10*3/UL (ref 0–0.7)
IMM GRANULOCYTES NFR BLD AUTO: 0.7 % (ref 0–0.9)
LYMPHOCYTES # BLD AUTO: 1.91 X10*3/UL (ref 1.2–4.8)
LYMPHOCYTES NFR BLD AUTO: 12.4 %
MAGNESIUM SERPL-MCNC: 2.19 MG/DL (ref 1.6–2.4)
MCH RBC QN AUTO: 29.4 PG (ref 26–34)
MCH RBC QN AUTO: 30.4 PG (ref 26–34)
MCHC RBC AUTO-ENTMCNC: 32.6 G/DL (ref 32–36)
MCHC RBC AUTO-ENTMCNC: 33.5 G/DL (ref 32–36)
MCV RBC AUTO: 88 FL (ref 80–100)
MCV RBC AUTO: 93 FL (ref 80–100)
MONOCYTES # BLD AUTO: 1.18 X10*3/UL (ref 0.1–1)
MONOCYTES NFR BLD AUTO: 7.7 %
NEUTROPHILS # BLD AUTO: 12.07 X10*3/UL (ref 1.2–7.7)
NEUTROPHILS NFR BLD AUTO: 78.4 %
NRBC BLD-RTO: 0.1 /100 WBCS (ref 0–0)
NRBC BLD-RTO: 0.1 /100 WBCS (ref 0–0)
PHOSPHATE SERPL-MCNC: 4.7 MG/DL (ref 2.5–4.9)
PLATELET # BLD AUTO: 185 X10*3/UL (ref 150–450)
PLATELET # BLD AUTO: 189 X10*3/UL (ref 150–450)
POTASSIUM SERPL-SCNC: 3.8 MMOL/L (ref 3.5–5.3)
PRODUCT BLOOD TYPE: 8400
PRODUCT BLOOD TYPE: 8400
PRODUCT CODE: NORMAL
PRODUCT CODE: NORMAL
RBC # BLD AUTO: 2.47 X10*6/UL (ref 4–5.2)
RBC # BLD AUTO: 2.48 X10*6/UL (ref 4–5.2)
SODIUM SERPL-SCNC: 140 MMOL/L (ref 136–145)
UFH PPP CHRO-ACNC: 0.4 IU/ML
UNIT ABO: NORMAL
UNIT ABO: NORMAL
UNIT NUMBER: NORMAL
UNIT NUMBER: NORMAL
UNIT RH: NORMAL
UNIT RH: NORMAL
UNIT VOLUME: 350
UNIT VOLUME: 350
VANCOMYCIN SERPL-MCNC: 18.2 UG/ML (ref 5–20)
VANCOMYCIN SERPL-MCNC: 24.5 UG/ML (ref 5–20)
WBC # BLD AUTO: 15.4 X10*3/UL (ref 4.4–11.3)
WBC # BLD AUTO: 15.6 X10*3/UL (ref 4.4–11.3)
XM INTEP: NORMAL
XM INTEP: NORMAL

## 2024-11-22 PROCEDURE — 2500000002 HC RX 250 W HCPCS SELF ADMINISTERED DRUGS (ALT 637 FOR MEDICARE OP, ALT 636 FOR OP/ED)

## 2024-11-22 PROCEDURE — 99291 CRITICAL CARE FIRST HOUR: CPT

## 2024-11-22 PROCEDURE — 94003 VENT MGMT INPAT SUBQ DAY: CPT

## 2024-11-22 PROCEDURE — 2500000004 HC RX 250 GENERAL PHARMACY W/ HCPCS (ALT 636 FOR OP/ED)

## 2024-11-22 PROCEDURE — 82947 ASSAY GLUCOSE BLOOD QUANT: CPT

## 2024-11-22 PROCEDURE — 2500000001 HC RX 250 WO HCPCS SELF ADMINISTERED DRUGS (ALT 637 FOR MEDICARE OP)

## 2024-11-22 PROCEDURE — 80202 ASSAY OF VANCOMYCIN: CPT

## 2024-11-22 PROCEDURE — 37799 UNLISTED PX VASCULAR SURGERY: CPT

## 2024-11-22 PROCEDURE — 85025 COMPLETE CBC W/AUTO DIFF WBC: CPT

## 2024-11-22 PROCEDURE — 84100 ASSAY OF PHOSPHORUS: CPT

## 2024-11-22 PROCEDURE — 85027 COMPLETE CBC AUTOMATED: CPT

## 2024-11-22 PROCEDURE — 83735 ASSAY OF MAGNESIUM: CPT

## 2024-11-22 PROCEDURE — 85520 HEPARIN ASSAY: CPT

## 2024-11-22 PROCEDURE — 1100000001 HC PRIVATE ROOM DAILY

## 2024-11-22 PROCEDURE — 2500000004 HC RX 250 GENERAL PHARMACY W/ HCPCS (ALT 636 FOR OP/ED): Performed by: STUDENT IN AN ORGANIZED HEALTH CARE EDUCATION/TRAINING PROGRAM

## 2024-11-22 PROCEDURE — 80202 ASSAY OF VANCOMYCIN: CPT | Performed by: INTERNAL MEDICINE

## 2024-11-22 RX ORDER — PANTOPRAZOLE SODIUM 40 MG/10ML
40 INJECTION, POWDER, LYOPHILIZED, FOR SOLUTION INTRAVENOUS 2 TIMES DAILY
Status: DISCONTINUED | OUTPATIENT
Start: 2024-11-22 | End: 2024-12-14

## 2024-11-22 RX ORDER — VANCOMYCIN HYDROCHLORIDE 750 MG/150ML
750 INJECTION, SOLUTION INTRAVENOUS ONCE
Status: DISCONTINUED | OUTPATIENT
Start: 2024-11-22 | End: 2024-11-22

## 2024-11-22 RX ADMIN — INSULIN LISPRO 1 UNITS: 100 INJECTION, SOLUTION INTRAVENOUS; SUBCUTANEOUS at 04:49

## 2024-11-22 RX ADMIN — PANTOPRAZOLE SODIUM 40 MG: 40 INJECTION, POWDER, FOR SOLUTION INTRAVENOUS at 20:49

## 2024-11-22 RX ADMIN — PIPERACILLIN SODIUM AND TAZOBACTAM SODIUM 2.25 G: 2; .25 INJECTION, SOLUTION INTRAVENOUS at 08:20

## 2024-11-22 RX ADMIN — SENNOSIDES AND DOCUSATE SODIUM 1 TABLET: 50; 8.6 TABLET ORAL at 20:49

## 2024-11-22 RX ADMIN — ATORVASTATIN CALCIUM 80 MG: 80 TABLET, FILM COATED ORAL at 20:49

## 2024-11-22 RX ADMIN — ASPIRIN 81 MG CHEWABLE TABLET 81 MG: 81 TABLET CHEWABLE at 08:20

## 2024-11-22 RX ADMIN — Medication 25 MCG/HR: at 03:25

## 2024-11-22 RX ADMIN — AMIODARONE HYDROCHLORIDE 0.5 MG/MIN: 1.8 INJECTION, SOLUTION INTRAVENOUS at 04:06

## 2024-11-22 RX ADMIN — POLYETHYLENE GLYCOL 3350 17 G: 17 POWDER, FOR SOLUTION ORAL at 08:20

## 2024-11-22 RX ADMIN — HEPARIN SODIUM 600 UNITS/HR: 10000 INJECTION, SOLUTION INTRAVENOUS at 03:25

## 2024-11-22 RX ADMIN — PANTOPRAZOLE SODIUM 40 MG: 40 INJECTION, POWDER, FOR SOLUTION INTRAVENOUS at 06:12

## 2024-11-22 RX ADMIN — PIPERACILLIN SODIUM AND TAZOBACTAM SODIUM 2.25 G: 2; .25 INJECTION, SOLUTION INTRAVENOUS at 16:36

## 2024-11-22 RX ADMIN — PIPERACILLIN SODIUM AND TAZOBACTAM SODIUM 2.25 G: 2; .25 INJECTION, SOLUTION INTRAVENOUS at 00:09

## 2024-11-22 RX ADMIN — AMIODARONE HYDROCHLORIDE 0.5 MG/MIN: 1.8 INJECTION, SOLUTION INTRAVENOUS at 16:36

## 2024-11-22 RX ADMIN — INSULIN LISPRO 1 UNITS: 100 INJECTION, SOLUTION INTRAVENOUS; SUBCUTANEOUS at 13:01

## 2024-11-22 ASSESSMENT — COGNITIVE AND FUNCTIONAL STATUS - GENERAL
EATING MEALS: TOTAL
DAILY ACTIVITIY SCORE: 6
PERSONAL GROOMING: TOTAL
TOILETING: TOTAL
TURNING FROM BACK TO SIDE WHILE IN FLAT BAD: TOTAL
CLIMB 3 TO 5 STEPS WITH RAILING: TOTAL
STANDING UP FROM CHAIR USING ARMS: TOTAL
HELP NEEDED FOR BATHING: TOTAL
MOVING FROM LYING ON BACK TO SITTING ON SIDE OF FLAT BED WITH BEDRAILS: TOTAL
MOVING TO AND FROM BED TO CHAIR: TOTAL
DRESSING REGULAR UPPER BODY CLOTHING: TOTAL
WALKING IN HOSPITAL ROOM: TOTAL
MOBILITY SCORE: 6
DRESSING REGULAR LOWER BODY CLOTHING: TOTAL

## 2024-11-22 ASSESSMENT — PAIN - FUNCTIONAL ASSESSMENT
PAIN_FUNCTIONAL_ASSESSMENT: CPOT (CRITICAL CARE PAIN OBSERVATION TOOL)

## 2024-11-22 NOTE — PROGRESS NOTES
Vancomycin Dosing by Pharmacy- Cessation of Therapy    Consult to pharmacy for vancomycin dosing has been discontinued by the prescriber, pharmacy will sign off at this time.    Please call pharmacy if there are further questions or re-enter a consult if vancomycin is resumed.     Miranda Gonzalez, [facility]

## 2024-11-22 NOTE — CARE PLAN
The patient's goals for the shift include to remain free from injury and falls     The clinical goals for the shift include pt being free of injury and falls

## 2024-11-22 NOTE — PROGRESS NOTES
"Vancomycin Dosing by Pharmacy    Humaira Huang is a 64 y.o. year old female who Pharmacy has been consulted for vancomycin dosing for pneumonia. Based on the patient's indication and renal status this patient is being dosed based on a goal trough/random level of 15-20.     Renal function is currently declining.      Current vancomycin dose: 750 mg x 1 dose yesterday    Most recent random level: 24.5 mcg/mL (19 hrs post dose)    Visit Vitals  /68   Pulse 79   Temp 36.6 °C (97.9 °F)   Resp 19   Ht 1.575 m (5' 2\")   Wt 53.6 kg (118 lb 2.7 oz)   SpO2 98%   BMI 21.61 kg/m²   BSA 1.53 m²        Lab Results   Component Value Date    CREATININE 4.67 (H) 11/22/2024    CREATININE 4.53 (H) 11/21/2024    CREATININE 4.13 (H) 11/20/2024    CREATININE 3.60 (H) 11/20/2024        Lab Results   Component Value Date    VANCORANDOM 24.5 (H) 11/22/2024    VANCORANDOM 19.8 11/21/2024        I/O last 3 completed shifts:  In: 2197.6 (42.6 mL/kg) [I.V.:1887.6 (36.6 mL/kg); NG/GT:60; IV Piggyback:250]  Out: 441 (8.6 mL/kg) [Urine:391 (0.2 mL/kg/hr); Emesis/NG output:50]  Weight: 51.6 kg       Blood Culture   Date/Time Value Ref Range Status   11/20/2024 09:40 AM No growth at 1 day  Preliminary   11/20/2024 09:40 AM No growth at 1 day  Preliminary          Assessment/Plan    Will continue to dose based off of levels.  give another 750 mg x1 dose today at 1000      The next level will be obtained on 11/23/24 at 1000. May be obtained sooner if clinically indicated.   Will continue to monitor renal function daily while on vancomycin and order serum creatinine at least every 48 hours if not already ordered.  Follow for continued vancomycin needs, clinical response, and signs/symptoms of toxicity.     Montana Mcpherson, PharmD              "

## 2024-11-22 NOTE — PROGRESS NOTES
Critical Care Daily Progress      Subjective   Patient is a 64 y.o. female admitted on 11/18/2024 10:54 PM to the CICU for Aortic Dissection.     NAEO    Objective     Vent Settings/Oxygen Drips   Vent Mode: Volume control/assist control  FiO2 (%):  [30 %] 30 %  S RR:  [20] 20  S VT:  [400 mL] 400 mL  PEEP/CPAP (cm H2O):  [5 cm H20] 5 cm H20  MAP (cm H2O):  [11-12] 11  amiodarone, 0.5 mg/min, Last Rate: 0.5 mg/min (11/22/24 0406)  esmolol,  mcg/kg/min, Last Rate: Stopped (11/21/24 1145)  fentaNYL,  mcg/hr, Last Rate: 25 mcg/hr (11/22/24 0325)  heparin, 0-4,000 Units/hr, Last Rate: 600 Units/hr (11/22/24 0325)  midazolam, 0-20 mg/hr, Last Rate: Stopped (11/21/24 0830)  norepinephrine, 0-0.2 mcg/kg/min, Last Rate: Stopped (11/20/24 1339)         Vitals: I/O:   Vitals:    11/22/24 0600   BP: 126/68   Pulse: 79   Resp: 19   Temp: 36.6 °C (97.9 °F)   SpO2: 98%    Cuff: SBP 90s - 110s    24hr Min/Max:  Temp  Min: 36.6 °C (97.9 °F)  Max: 37.9 °C (100.2 °F)  Pulse  Min: 77  Max: 99  BP  Min: 84/56  Max: 126/68  Resp  Min: 18  Max: 25  SpO2  Min: 94 %  Max: 100 %   Intake/Output Summary (Last 24 hours) at 11/22/2024 0710  Last data filed at 11/22/2024 0600  Gross per 24 hour   Intake 1253.91 ml   Output 442 ml   Net 811.91 ml     ml yesterday    Net IO Since Admission: 3,673.21 mL [11/22/24 0710]      Scheduled Medications:  PRN Medications:    aspirin, 81 mg, oral, Daily  atorvastatin, 80 mg, oral, Nightly  esmolol, 500 mcg/kg, intravenous, Once  insulin lispro, 0-5 Units, subcutaneous, q4h  lactated Ringer's, 1,000 mL, intravenous, Once  [Held by provider] metoprolol tartrate, 12.5 mg, oral, q6h  pantoprazole, 40 mg, oral, Daily before breakfast   Or  pantoprazole, 40 mg, intravenous, Daily before breakfast  piperacillin-tazobactam, 2.25 g, intravenous, q8h  polyethylene glycol, 17 g, oral, Daily  sennosides-docusate sodium, 1 tablet, oral, Nightly  vancomycin, 750 mg, intravenous, Once     PRN  "medications: acetaminophen **OR** acetaminophen **OR** acetaminophen, dextrose, dextrose, glucagon, glucagon, hydrOXYzine HCL, ipratropium-albuteroL, midazolam, oxyCODONE, oxygen, vancomycin       Physical Exam:  Physical Exam  Constitutional:       Appearance: She is toxic-appearing.      Comments: Intubated, sedated, not following commands but opening eyes   Eyes:      General: No scleral icterus.  Cardiovascular:      Rate and Rhythm: Normal rate and regular rhythm.   Pulmonary:      Effort: No respiratory distress.      Breath sounds: Normal breath sounds. No wheezing or rhonchi.      Comments: ET tube in place; mild wheeze b/l  Abdominal:      General: There is no distension.      Palpations: Abdomen is soft.      Tenderness: There is no abdominal tenderness. There is no guarding.   Musculoskeletal:         General: No swelling or tenderness.   Skin:     General: Skin is warm and dry.      Findings: No bruising or rash.   Neurological:      Comments: sedated                LABS:    CBC RFP   Lab Results   Component Value Date    WBC 15.4 (H) 11/22/2024    WBC 15.8 (H) 11/21/2024    WBC 14.8 (H) 11/21/2024    HGB 7.5 (L) 11/22/2024    HGB 7.5 (L) 11/21/2024    HGB 8.0 (L) 11/21/2024    HCT 23.0 (L) 11/22/2024    MCV 93 11/22/2024     11/22/2024     11/21/2024     11/21/2024    NEUTROABS 12.07 (H) 11/22/2024       Blood cultures negative Lab Results   Component Value Date     11/22/2024    K 3.8 11/22/2024     11/22/2024    CO2 24 11/22/2024    BUN 64 (H) 11/22/2024    CREATININE 4.67 (H) 11/22/2024    CREATININE 4.53 (H) 11/21/2024     Lab Results   Component Value Date    MG 2.19 11/22/2024    PHOS 4.7 11/22/2024    CALCIUM 7.0 (L) 11/22/2024         Hepatic Function ABG/VBG   Lab Results   Component Value Date    ALT 10 11/18/2024    AST 15 11/18/2024    ALKPHOS 82 11/18/2024     No results found for: \"TBILIHCVFS\", \"BILIDIR\"   Lab Results   Component Value Date    PROTIME 11.9 " 11/18/2024    APTT 29 11/18/2024    INR 1.1 11/18/2024      Lab Results   Component Value Date    LACTATE 1.7 11/20/2024          No results found for the last 90 days.         IMAGING:    CXR overnight with continued left sided opacity, pleural effusion vs consolidation        Assessment/Plan    64 y.o. female with history of pancreatitis, DVT/PE, HLD, HTN, CABGx4 1997, T2DM, GERD, PAD, chronic mesenteric ischemia, tobacco use who presented to [facility] on 11/18/2024 as a transfer from [facility] with chest, back, and abdominal pain. 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 Vascular and Cardiac surgery following. CICU course c/b PEA arrest 11/20, now intubated and sedated     11/22 Updates:  - Code status changed to DNR, ongoing GOC  - Bloody output from OG tube  - IV PPI BID, CBC BID, active T&S  - Holding off on GI consult until GOC with family today  - Holding heparin gtt with possible GI bleed  - Stopping vanc    Neuro:  #Sedated  -fentanyl  -not following commands; opening eyes to voice     Cardiac:  #PEA arrest  ::Ddx: hypoxia, electrolytes appear wnl, low suspicion for aortic dissection as cause given stable Hgb, low suspicion for PE b/c patient has been on heparin gtt  ::Trop peak 5700 -> 4900  ::Lactate 6.3 -> 2.3  ::pH 7.03 -> 7.28 s/p amp bicarb  -levo off    #Aflutter  ::Now NSR  ::episode evening 11/20 causing hypotension  -amio gtt  -Holding heparin    #Aortic dissection  #Distal aortic arch saccular aneurysm   ::CTA CAP 11/18- 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   -Vascular surgery discussed case at aortic conference and there are no plans for surgery, palliative measures recommended  -Holding impulse control  -Holding heparin         #NSTEMI  #CAD s/p CABGx4 1997  #PAD  #HTN #DLD  ::EKG- NSR, rate 71, TWI aVL, V1-V6, 1mm ORQUIDEA in aVR  ::Trop peak 5700  ::Lipid HDL 41.5, , TG 75, Chol 157  -Holding home amlodipine 10mg, imdur 30mg, lisinopril 2.5mg, metop tartrate 50mg bid iso hypotension   -Continue ASA and atorvastatin  -Holding pavix 75mg     Pulm:  #Intubated  #c/f developing PNA  ::CXR with opacification of LLL  -PNA potential cause of hypoxia  -zosyn       GI:  #c/f UGIB  -PPI BID  -Holding AC  -BID CBC  -Active T&S, consented     Renal:  #TRACY on CKD likely 2/2 arrest  -Cr at OSH 1.3  -worsening Cr  -minimal UO since arrest     Endo:   #T2DM  -Hold home metformin   -Mild SSI + hypoglycemia protocol      Infectious  See pulm section for PNA concerns      F: s/p 1L LR  E: K>4, Mag>2  N: NPO  G: pantoprazole  A: PIV, OG  DVT: HELD  CODE: DNR (confirmed with family 11/21)  NOK: Daughter Josy [phone]      Stephen Clayton MD  PGY-2

## 2024-11-23 ENCOUNTER — APPOINTMENT (OUTPATIENT)
Dept: RADIOLOGY | Facility: HOSPITAL | Age: 64
End: 2024-11-23

## 2024-11-23 LAB
ABO GROUP (TYPE) IN BLOOD: NORMAL
ALBUMIN SERPL BCP-MCNC: 2.8 G/DL (ref 3.4–5)
ALP SERPL-CCNC: 95 U/L (ref 33–136)
ALT SERPL W P-5'-P-CCNC: 67 U/L (ref 7–45)
ANION GAP SERPL CALC-SCNC: 20 MMOL/L (ref 10–20)
ANTIBODY SCREEN: NORMAL
AST SERPL W P-5'-P-CCNC: 45 U/L (ref 9–39)
BASOPHILS # BLD AUTO: 0.02 X10*3/UL (ref 0–0.1)
BASOPHILS NFR BLD AUTO: 0.1 %
BILIRUB DIRECT SERPL-MCNC: 0.2 MG/DL (ref 0–0.3)
BILIRUB SERPL-MCNC: 0.7 MG/DL (ref 0–1.2)
BUN SERPL-MCNC: 65 MG/DL (ref 6–23)
CALCIUM SERPL-MCNC: 7.2 MG/DL (ref 8.6–10.6)
CHLORIDE SERPL-SCNC: 102 MMOL/L (ref 98–107)
CO2 SERPL-SCNC: 23 MMOL/L (ref 21–32)
CREAT SERPL-MCNC: 4.63 MG/DL (ref 0.5–1.05)
EGFRCR SERPLBLD CKD-EPI 2021: 10 ML/MIN/1.73M*2
EOSINOPHIL # BLD AUTO: 0.16 X10*3/UL (ref 0–0.7)
EOSINOPHIL NFR BLD AUTO: 1.2 %
ERYTHROCYTE [DISTWIDTH] IN BLOOD BY AUTOMATED COUNT: 13.7 % (ref 11.5–14.5)
ERYTHROCYTE [DISTWIDTH] IN BLOOD BY AUTOMATED COUNT: 13.7 % (ref 11.5–14.5)
GLUCOSE BLD MANUAL STRIP-MCNC: 108 MG/DL (ref 74–99)
GLUCOSE BLD MANUAL STRIP-MCNC: 117 MG/DL (ref 74–99)
GLUCOSE BLD MANUAL STRIP-MCNC: 122 MG/DL (ref 74–99)
GLUCOSE BLD MANUAL STRIP-MCNC: 129 MG/DL (ref 74–99)
GLUCOSE BLD MANUAL STRIP-MCNC: 130 MG/DL (ref 74–99)
GLUCOSE SERPL-MCNC: 110 MG/DL (ref 74–99)
HCT VFR BLD AUTO: 21.9 % (ref 36–46)
HCT VFR BLD AUTO: 22 % (ref 36–46)
HGB BLD-MCNC: 7.3 G/DL (ref 12–16)
HGB BLD-MCNC: 7.5 G/DL (ref 12–16)
IMM GRANULOCYTES # BLD AUTO: 0.08 X10*3/UL (ref 0–0.7)
IMM GRANULOCYTES NFR BLD AUTO: 0.6 % (ref 0–0.9)
LYMPHOCYTES # BLD AUTO: 1.17 X10*3/UL (ref 1.2–4.8)
LYMPHOCYTES NFR BLD AUTO: 8.5 %
MAGNESIUM SERPL-MCNC: 2.17 MG/DL (ref 1.6–2.4)
MCH RBC QN AUTO: 29.8 PG (ref 26–34)
MCH RBC QN AUTO: 30 PG (ref 26–34)
MCHC RBC AUTO-ENTMCNC: 33.3 G/DL (ref 32–36)
MCHC RBC AUTO-ENTMCNC: 34.1 G/DL (ref 32–36)
MCV RBC AUTO: 88 FL (ref 80–100)
MCV RBC AUTO: 89 FL (ref 80–100)
MONOCYTES # BLD AUTO: 1.04 X10*3/UL (ref 0.1–1)
MONOCYTES NFR BLD AUTO: 7.5 %
NEUTROPHILS # BLD AUTO: 11.33 X10*3/UL (ref 1.2–7.7)
NEUTROPHILS NFR BLD AUTO: 82.1 %
NRBC BLD-RTO: 0.2 /100 WBCS (ref 0–0)
NRBC BLD-RTO: 0.4 /100 WBCS (ref 0–0)
PHOSPHATE SERPL-MCNC: 4.7 MG/DL (ref 2.5–4.9)
PLATELET # BLD AUTO: 190 X10*3/UL (ref 150–450)
PLATELET # BLD AUTO: 210 X10*3/UL (ref 150–450)
POTASSIUM SERPL-SCNC: 3.7 MMOL/L (ref 3.5–5.3)
PROT SERPL-MCNC: 5.4 G/DL (ref 6.4–8.2)
RBC # BLD AUTO: 2.45 X10*6/UL (ref 4–5.2)
RBC # BLD AUTO: 2.5 X10*6/UL (ref 4–5.2)
RH FACTOR (ANTIGEN D): NORMAL
SODIUM SERPL-SCNC: 141 MMOL/L (ref 136–145)
WBC # BLD AUTO: 13 X10*3/UL (ref 4.4–11.3)
WBC # BLD AUTO: 13.8 X10*3/UL (ref 4.4–11.3)

## 2024-11-23 PROCEDURE — 85025 COMPLETE CBC W/AUTO DIFF WBC: CPT

## 2024-11-23 PROCEDURE — 2500000004 HC RX 250 GENERAL PHARMACY W/ HCPCS (ALT 636 FOR OP/ED): Performed by: STUDENT IN AN ORGANIZED HEALTH CARE EDUCATION/TRAINING PROGRAM

## 2024-11-23 PROCEDURE — 37799 UNLISTED PX VASCULAR SURGERY: CPT

## 2024-11-23 PROCEDURE — 2500000001 HC RX 250 WO HCPCS SELF ADMINISTERED DRUGS (ALT 637 FOR MEDICARE OP)

## 2024-11-23 PROCEDURE — 86901 BLOOD TYPING SEROLOGIC RH(D): CPT

## 2024-11-23 PROCEDURE — 2500000005 HC RX 250 GENERAL PHARMACY W/O HCPCS: Performed by: INTERNAL MEDICINE

## 2024-11-23 PROCEDURE — 2500000004 HC RX 250 GENERAL PHARMACY W/ HCPCS (ALT 636 FOR OP/ED)

## 2024-11-23 PROCEDURE — 83735 ASSAY OF MAGNESIUM: CPT

## 2024-11-23 PROCEDURE — 82947 ASSAY GLUCOSE BLOOD QUANT: CPT

## 2024-11-23 PROCEDURE — 84100 ASSAY OF PHOSPHORUS: CPT

## 2024-11-23 PROCEDURE — 1100000001 HC PRIVATE ROOM DAILY

## 2024-11-23 PROCEDURE — 85027 COMPLETE CBC AUTOMATED: CPT

## 2024-11-23 PROCEDURE — 74018 RADEX ABDOMEN 1 VIEW: CPT

## 2024-11-23 PROCEDURE — 99291 CRITICAL CARE FIRST HOUR: CPT

## 2024-11-23 PROCEDURE — 94003 VENT MGMT INPAT SUBQ DAY: CPT

## 2024-11-23 PROCEDURE — 74018 RADEX ABDOMEN 1 VIEW: CPT | Performed by: RADIOLOGY

## 2024-11-23 PROCEDURE — 2500000005 HC RX 250 GENERAL PHARMACY W/O HCPCS

## 2024-11-23 PROCEDURE — 86850 RBC ANTIBODY SCREEN: CPT

## 2024-11-23 PROCEDURE — 80048 BASIC METABOLIC PNL TOTAL CA: CPT

## 2024-11-23 PROCEDURE — 82248 BILIRUBIN DIRECT: CPT

## 2024-11-23 RX ORDER — POTASSIUM CHLORIDE 14.9 MG/ML
20 INJECTION INTRAVENOUS ONCE
Status: COMPLETED | OUTPATIENT
Start: 2024-11-23 | End: 2024-11-23

## 2024-11-23 RX ADMIN — Medication 40 PERCENT: at 13:00

## 2024-11-23 RX ADMIN — ATORVASTATIN CALCIUM 80 MG: 80 TABLET, FILM COATED ORAL at 21:11

## 2024-11-23 RX ADMIN — Medication 30 PERCENT: at 15:45

## 2024-11-23 RX ADMIN — PIPERACILLIN SODIUM AND TAZOBACTAM SODIUM 2.25 G: 2; .25 INJECTION, SOLUTION INTRAVENOUS at 15:40

## 2024-11-23 RX ADMIN — Medication 30 PERCENT: at 20:00

## 2024-11-23 RX ADMIN — ASPIRIN 81 MG CHEWABLE TABLET 81 MG: 81 TABLET CHEWABLE at 08:17

## 2024-11-23 RX ADMIN — PANTOPRAZOLE SODIUM 40 MG: 40 INJECTION, POWDER, FOR SOLUTION INTRAVENOUS at 21:11

## 2024-11-23 RX ADMIN — AMIODARONE HYDROCHLORIDE 0.5 MG/MIN: 1.8 INJECTION, SOLUTION INTRAVENOUS at 06:49

## 2024-11-23 RX ADMIN — Medication 25 MCG/HR: at 10:24

## 2024-11-23 RX ADMIN — POTASSIUM CHLORIDE 20 MEQ: 14.9 INJECTION, SOLUTION INTRAVENOUS at 08:17

## 2024-11-23 RX ADMIN — SODIUM CHLORIDE, POTASSIUM CHLORIDE, SODIUM LACTATE AND CALCIUM CHLORIDE 500 ML: 600; 310; 30; 20 INJECTION, SOLUTION INTRAVENOUS at 12:55

## 2024-11-23 RX ADMIN — PIPERACILLIN SODIUM AND TAZOBACTAM SODIUM 2.25 G: 2; .25 INJECTION, SOLUTION INTRAVENOUS at 00:22

## 2024-11-23 RX ADMIN — Medication 30 PERCENT: at 12:37

## 2024-11-23 RX ADMIN — PANTOPRAZOLE SODIUM 40 MG: 40 INJECTION, POWDER, FOR SOLUTION INTRAVENOUS at 08:17

## 2024-11-23 RX ADMIN — AMIODARONE HYDROCHLORIDE 0.5 MG/MIN: 1.8 INJECTION, SOLUTION INTRAVENOUS at 17:28

## 2024-11-23 RX ADMIN — SENNOSIDES AND DOCUSATE SODIUM 1 TABLET: 50; 8.6 TABLET ORAL at 21:11

## 2024-11-23 RX ADMIN — PIPERACILLIN SODIUM AND TAZOBACTAM SODIUM 2.25 G: 2; .25 INJECTION, SOLUTION INTRAVENOUS at 08:17

## 2024-11-23 ASSESSMENT — PAIN - FUNCTIONAL ASSESSMENT
PAIN_FUNCTIONAL_ASSESSMENT: CPOT (CRITICAL CARE PAIN OBSERVATION TOOL)

## 2024-11-23 ASSESSMENT — PAIN SCALES - GENERAL: PAINLEVEL_OUTOF10: 0 - NO PAIN

## 2024-11-23 NOTE — CARE PLAN
The patient's goals for the shift include      The clinical goals for the shift include Pt. will remain HDs    Over the shift, the patient did not make progress toward the following goals. Barriers to progression include dissection. Recommendations to address these barriers include continue current plan of care.

## 2024-11-23 NOTE — PROGRESS NOTES
Critical Care Daily Progress      Subjective   Patient is a 64 y.o. female admitted on 11/18/2024 10:54 PM to the CICU for Aortic Dissection.     NAEO    Objective     Vent Settings/Oxygen Drips   Vent Mode: Volume control/assist control  FiO2 (%):  [30 %-60 %] 30 %  S RR:  [20] 20  S VT:  [400 mL] 400 mL  PEEP/CPAP (cm H2O):  [5 cm H20] 5 cm H20  RI SUP:  [8 cm H20] 8 cm H20  MAP (cm H2O):  [8.1-14] 13  amiodarone, 0.5 mg/min, Last Rate: 0.5 mg/min (11/23/24 0649)  esmolol,  mcg/kg/min, Last Rate: Stopped (11/21/24 1145)  fentaNYL,  mcg/hr, Last Rate: 25 mcg/hr (11/22/24 1900)  norepinephrine, 0-0.2 mcg/kg/min, Last Rate: Stopped (11/20/24 1339)         Vitals: I/O:   Vitals:    11/23/24 0600   BP: 121/70   Pulse: 92   Resp: 20   Temp: 36.6 °C (97.9 °F)   SpO2: 98%    Cuff: SBP 90s - 110s    24hr Min/Max:  Temp  Min: 35.4 °C (95.7 °F)  Max: 36.9 °C (98.4 °F)  Pulse  Min: 71  Max: 92  BP  Min: 100/57  Max: 139/80  Resp  Min: 14  Max: 20  SpO2  Min: 89 %  Max: 100 %   Intake/Output Summary (Last 24 hours) at 11/23/2024 0808  Last data filed at 11/23/2024 0649  Gross per 24 hour   Intake 636.64 ml   Output 530 ml   Net 106.64 ml     ml yesterday    Net IO Since Admission: 3,885.19 mL [11/23/24 0808]      Scheduled Medications:  PRN Medications:    aspirin, 81 mg, oral, Daily  atorvastatin, 80 mg, oral, Nightly  esmolol, 500 mcg/kg, intravenous, Once  insulin lispro, 0-5 Units, subcutaneous, q4h  lactated Ringer's, 1,000 mL, intravenous, Once  [Held by provider] metoprolol tartrate, 12.5 mg, oral, q6h  pantoprazole, 40 mg, intravenous, BID  piperacillin-tazobactam, 2.25 g, intravenous, q8h  polyethylene glycol, 17 g, oral, Daily  potassium chloride, 20 mEq, intravenous, Once  sennosides-docusate sodium, 1 tablet, oral, Nightly     PRN medications: acetaminophen **OR** acetaminophen **OR** acetaminophen, dextrose, dextrose, glucagon, glucagon, hydrOXYzine HCL, ipratropium-albuteroL, oxyCODONE, oxygen        Physical Exam:  Physical Exam  Constitutional:       Appearance: She is toxic-appearing.      Comments: Intubated, sedated, following commands - opening eyes to voice, squeezing hands b/l  frail   Eyes:      General: No scleral icterus.  Cardiovascular:      Rate and Rhythm: Normal rate and regular rhythm.   Pulmonary:      Effort: No respiratory distress.      Breath sounds: Normal breath sounds. No wheezing or rhonchi.      Comments: ET tube in place; mild wheeze b/l  Abdominal:      General: There is no distension.      Palpations: Abdomen is soft.      Tenderness: There is no abdominal tenderness. There is no guarding.   Musculoskeletal:         General: No swelling or tenderness.   Skin:     General: Skin is warm and dry.      Findings: No bruising or rash.   Neurological:      Comments: sedated              LABS:    CBC RFP   Lab Results   Component Value Date    WBC 13.8 (H) 11/23/2024    WBC 15.6 (H) 11/22/2024    WBC 15.4 (H) 11/22/2024    HGB 7.5 (L) 11/23/2024    HGB 7.3 (L) 11/22/2024    HGB 7.5 (L) 11/22/2024    HCT 22.0 (L) 11/23/2024    MCV 88 11/23/2024     11/23/2024     11/22/2024     11/22/2024    NEUTROABS 11.33 (H) 11/23/2024       Blood cultures negative Lab Results   Component Value Date     11/23/2024    K 3.7 11/23/2024     11/23/2024    CO2 23 11/23/2024    BUN 65 (H) 11/23/2024    CREATININE 4.63 (H) 11/23/2024    CREATININE 4.67 (H) 11/22/2024     Lab Results   Component Value Date    MG 2.17 11/23/2024    PHOS 4.7 11/23/2024    CALCIUM 7.2 (L) 11/23/2024         Hepatic Function ABG/VBG   Lab Results   Component Value Date    ALT 67 (H) 11/23/2024    AST 45 (H) 11/23/2024    ALKPHOS 95 11/23/2024     Lab Results   Component Value Date    BILIDIR 0.2 11/23/2024      Lab Results   Component Value Date    PROTIME 11.9 11/18/2024    APTT 29 11/18/2024    INR 1.1 11/18/2024      Lab Results   Component Value Date    LACTATE 1.7 11/20/2024          No  results found for the last 90 days.         IMAGING:    No new imaging        Assessment/Plan    64 y.o. female with history of pancreatitis, DVT/PE, HLD, HTN, CABGx4 1997, T2DM, GERD, PAD, chronic mesenteric ischemia, tobacco use who presented to [facility] on 11/18/2024 as a transfer from [facility] with chest, back, and abdominal pain. 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 Vascular and Cardiac surgery following. CICU course c/b PEA arrest 11/20, now intubated and sedated, following commands.     11/23 Updates:  - Bloody output from OG tube  - IV PPI BID, CBC BID, active T&S  - Holding heparin gtt with possible GI bleed  - SBT today -> potential extubation later  - resume impulse control today  - KUB for ileus concerns  - scheduled miralax/senna  - 500 ml bolus    Neuro:  #Sedated  -fentanyl, held for SB  -following commands     Cardiac:  #PEA arrest  ::Ddx: hypoxia, electrolytes appear wnl, low suspicion for aortic dissection as cause given stable Hgb, low suspicion for PE b/c patient has been on heparin gtt  ::Trop peak 5700 -> 4900  ::Lactate 6.3 -> 2.3  ::pH 7.03 -> 7.28 s/p amp bicarb  -levo off    #Aflutter  ::Now NSR  ::episode evening 11/20 causing hypotension  -amio gtt  -Holding heparin    #Aortic dissection  #Distal aortic arch saccular aneurysm   ::CTA CAP 11/18- 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   -Vascular surgery discussed case at aortic conference and there are no plans for surgery, palliative measures recommended  -resume impulse control  -Holding heparin        #NSTEMI  #CAD s/p CABGx4 1997  #PAD  #HTN #DLD  ::EKG- NSR, rate 71, TWI aVL, V1-V6, 1mm ORQUIDEA in aVR  ::Trop peak 5700  ::Lipid HDL 41.5, , TG 75, Chol  157  -Holding home amlodipine 10mg, imdur 30mg, lisinopril 2.5mg, metop tartrate 50mg bid iso hypotension   -Continue ASA and atorvastatin  -Holding pavix 75mg     Pulm:  #Intubated  #c/f developing PNA  ::CXR with opacification of LLL  -PNA potential cause of hypoxia  -zosyn (Day 4)       GI:  #c/f UGIB  -PPI BID  -Holding AC  -BID CBC  -Active T&S, consented  -500 ml bolus     Renal:  #TRACY on CKD likely 2/2 arrest  -Cr at OSH 1.3  -worsening Cr  -minimal UO since arrest     Endo:   #T2DM  -Hold home metformin   -Mild SSI + hypoglycemia protocol      Infectious  See pulm section for PNA concerns      F: 500 ml  E: K>4, Mag>2  N: NPO  G: pantoprazole  A: PIV, OG  DVT: HELD  CODE: DNR/okay to intubate (confirmed with family 11/21)  NOK: Daughter Josy [phone]      Stephen Clayton MD  PGY-2

## 2024-11-24 ENCOUNTER — APPOINTMENT (OUTPATIENT)
Dept: RADIOLOGY | Facility: HOSPITAL | Age: 64
End: 2024-11-24

## 2024-11-24 VITALS
OXYGEN SATURATION: 100 % | BODY MASS INDEX: 22.35 KG/M2 | TEMPERATURE: 97.9 F | HEART RATE: 74 BPM | RESPIRATION RATE: 18 BRPM | HEIGHT: 62 IN | WEIGHT: 121.47 LBS | DIASTOLIC BLOOD PRESSURE: 68 MMHG | SYSTOLIC BLOOD PRESSURE: 128 MMHG

## 2024-11-24 LAB
ALBUMIN SERPL BCP-MCNC: 2.7 G/DL (ref 3.4–5)
ANION GAP BLDA CALCULATED.4IONS-SCNC: 21 MMO/L (ref 10–25)
ANION GAP BLDV CALCULATED.4IONS-SCNC: 18 MMOL/L (ref 10–25)
ANION GAP SERPL CALC-SCNC: 25 MMOL/L (ref 10–20)
APPEARANCE UR: ABNORMAL
BACTERIA BLD CULT: NORMAL
BACTERIA BLD CULT: NORMAL
BASE EXCESS BLDA CALC-SCNC: -9.9 MMOL/L (ref -2–3)
BASE EXCESS BLDV CALC-SCNC: -7.3 MMOL/L (ref -2–3)
BASOPHILS # BLD AUTO: 0.03 X10*3/UL (ref 0–0.1)
BASOPHILS NFR BLD AUTO: 0.2 %
BILIRUB UR STRIP.AUTO-MCNC: NEGATIVE MG/DL
BODY TEMPERATURE: 37 DEGREES CELSIUS
BODY TEMPERATURE: 37 DEGREES CELSIUS
BUN SERPL-MCNC: 75 MG/DL (ref 6–23)
CA-I BLDA-SCNC: 1.03 MMOL/L (ref 1.1–1.33)
CA-I BLDV-SCNC: 1 MMOL/L (ref 1.1–1.33)
CALCIUM SERPL-MCNC: 7.3 MG/DL (ref 8.6–10.6)
CHLORIDE BLDA-SCNC: 103 MMOL/L (ref 98–107)
CHLORIDE BLDV-SCNC: 104 MMOL/L (ref 98–107)
CHLORIDE SERPL-SCNC: 103 MMOL/L (ref 98–107)
CHLORIDE UR-SCNC: <15 MMOL/L
CHLORIDE/CREATININE (MMOL/G) IN URINE: NORMAL
CO2 SERPL-SCNC: 20 MMOL/L (ref 21–32)
COLOR UR: ABNORMAL
CREAT SERPL-MCNC: 4.84 MG/DL (ref 0.5–1.05)
CREAT UR-MCNC: 114.2 MG/DL (ref 20–320)
EGFRCR SERPLBLD CKD-EPI 2021: 9 ML/MIN/1.73M*2
EOSINOPHIL # BLD AUTO: 0.09 X10*3/UL (ref 0–0.7)
EOSINOPHIL NFR BLD AUTO: 0.7 %
ERYTHROCYTE [DISTWIDTH] IN BLOOD BY AUTOMATED COUNT: 13.5 % (ref 11.5–14.5)
ERYTHROCYTE [DISTWIDTH] IN BLOOD BY AUTOMATED COUNT: 14 % (ref 11.5–14.5)
GLUCOSE BLD MANUAL STRIP-MCNC: 116 MG/DL (ref 74–99)
GLUCOSE BLD MANUAL STRIP-MCNC: 120 MG/DL (ref 74–99)
GLUCOSE BLD MANUAL STRIP-MCNC: 129 MG/DL (ref 74–99)
GLUCOSE BLD MANUAL STRIP-MCNC: 130 MG/DL (ref 74–99)
GLUCOSE BLD MANUAL STRIP-MCNC: 142 MG/DL (ref 74–99)
GLUCOSE BLD MANUAL STRIP-MCNC: 159 MG/DL (ref 74–99)
GLUCOSE BLD MANUAL STRIP-MCNC: 93 MG/DL (ref 74–99)
GLUCOSE BLDA-MCNC: 127 MG/DL (ref 74–99)
GLUCOSE BLDV-MCNC: 131 MG/DL (ref 74–99)
GLUCOSE SERPL-MCNC: 125 MG/DL (ref 74–99)
GLUCOSE UR STRIP.AUTO-MCNC: NORMAL MG/DL
HCO3 BLDA-SCNC: 16 MMOL/L (ref 22–26)
HCO3 BLDV-SCNC: 19.3 MMOL/L (ref 22–26)
HCT VFR BLD AUTO: 23.4 % (ref 36–46)
HCT VFR BLD AUTO: 24.4 % (ref 36–46)
HCT VFR BLD EST: 24 % (ref 36–46)
HCT VFR BLD EST: 25 % (ref 36–46)
HGB BLD-MCNC: 7.8 G/DL (ref 12–16)
HGB BLD-MCNC: 8 G/DL (ref 12–16)
HGB BLDA-MCNC: 8.3 G/DL (ref 12–16)
HGB BLDV-MCNC: 8.1 G/DL (ref 12–16)
IMM GRANULOCYTES # BLD AUTO: 0.07 X10*3/UL (ref 0–0.7)
IMM GRANULOCYTES NFR BLD AUTO: 0.6 % (ref 0–0.9)
INHALED O2 CONCENTRATION: 40 %
KETONES UR STRIP.AUTO-MCNC: ABNORMAL MG/DL
LACTATE BLDA-SCNC: 4.1 MMOL/L (ref 0.4–2)
LACTATE BLDV-SCNC: 2 MMOL/L (ref 0.4–2)
LACTATE BLDV-SCNC: 2.8 MMOL/L (ref 0.4–2)
LEUKOCYTE ESTERASE UR QL STRIP.AUTO: ABNORMAL
LYMPHOCYTES # BLD AUTO: 1.7 X10*3/UL (ref 1.2–4.8)
LYMPHOCYTES NFR BLD AUTO: 13.5 %
MAGNESIUM SERPL-MCNC: 2.46 MG/DL (ref 1.6–2.4)
MCH RBC QN AUTO: 30.2 PG (ref 26–34)
MCH RBC QN AUTO: 30.5 PG (ref 26–34)
MCHC RBC AUTO-ENTMCNC: 32 G/DL (ref 32–36)
MCHC RBC AUTO-ENTMCNC: 34.2 G/DL (ref 32–36)
MCV RBC AUTO: 88 FL (ref 80–100)
MCV RBC AUTO: 95 FL (ref 80–100)
MONOCYTES # BLD AUTO: 0.96 X10*3/UL (ref 0.1–1)
MONOCYTES NFR BLD AUTO: 7.6 %
MUCOUS THREADS #/AREA URNS AUTO: ABNORMAL /LPF
NEUTROPHILS # BLD AUTO: 9.74 X10*3/UL (ref 1.2–7.7)
NEUTROPHILS NFR BLD AUTO: 77.4 %
NITRITE UR QL STRIP.AUTO: NEGATIVE
NRBC BLD-RTO: 0.6 /100 WBCS (ref 0–0)
NRBC BLD-RTO: 0.7 /100 WBCS (ref 0–0)
OXYHGB MFR BLDA: 91.6 % (ref 94–98)
OXYHGB MFR BLDV: 56 % (ref 45–75)
PCO2 BLDA: 34 MM HG (ref 38–42)
PCO2 BLDV: 43 MM HG (ref 41–51)
PH BLDA: 7.28 PH (ref 7.38–7.42)
PH BLDV: 7.26 PH (ref 7.33–7.43)
PH UR STRIP.AUTO: 6 [PH]
PHOSPHATE SERPL-MCNC: 6.1 MG/DL (ref 2.5–4.9)
PLATELET # BLD AUTO: 235 X10*3/UL (ref 150–450)
PLATELET # BLD AUTO: 242 X10*3/UL (ref 150–450)
PO2 BLDA: 71 MM HG (ref 85–95)
PO2 BLDV: 41 MM HG (ref 35–45)
POTASSIUM BLDA-SCNC: 4.8 MMOL/L (ref 3.5–5.3)
POTASSIUM BLDV-SCNC: 4.2 MMOL/L (ref 3.5–5.3)
POTASSIUM SERPL-SCNC: 4.5 MMOL/L (ref 3.5–5.3)
POTASSIUM UR-SCNC: 50 MMOL/L
POTASSIUM/CREAT UR-RTO: 44 MMOL/G CREAT
PROT UR STRIP.AUTO-MCNC: ABNORMAL MG/DL
RBC # BLD AUTO: 2.56 X10*6/UL (ref 4–5.2)
RBC # BLD AUTO: 2.65 X10*6/UL (ref 4–5.2)
RBC # UR STRIP.AUTO: ABNORMAL /UL
RBC #/AREA URNS AUTO: >20 /HPF
SAO2 % BLDA: 93 % (ref 94–100)
SAO2 % BLDV: 57 % (ref 45–75)
SODIUM BLDA-SCNC: 135 MMOL/L (ref 136–145)
SODIUM BLDV-SCNC: 137 MMOL/L (ref 136–145)
SODIUM SERPL-SCNC: 143 MMOL/L (ref 136–145)
SODIUM UR-SCNC: 72 MMOL/L
SODIUM/CREAT UR-RTO: 63 MMOL/G CREAT
SP GR UR STRIP.AUTO: 1.03
SQUAMOUS #/AREA URNS AUTO: ABNORMAL /HPF
URATE CRY #/AREA UR COMP ASSIST: ABNORMAL /HPF
UROBILINOGEN UR STRIP.AUTO-MCNC: NORMAL MG/DL
WBC # BLD AUTO: 12.6 X10*3/UL (ref 4.4–11.3)
WBC # BLD AUTO: 13.9 X10*3/UL (ref 4.4–11.3)
WBC #/AREA URNS AUTO: >50 /HPF

## 2024-11-24 PROCEDURE — 37799 UNLISTED PX VASCULAR SURGERY: CPT

## 2024-11-24 PROCEDURE — 82947 ASSAY GLUCOSE BLOOD QUANT: CPT

## 2024-11-24 PROCEDURE — 85025 COMPLETE CBC W/AUTO DIFF WBC: CPT

## 2024-11-24 PROCEDURE — 1100000001 HC PRIVATE ROOM DAILY

## 2024-11-24 PROCEDURE — 2500000004 HC RX 250 GENERAL PHARMACY W/ HCPCS (ALT 636 FOR OP/ED)

## 2024-11-24 PROCEDURE — 71045 X-RAY EXAM CHEST 1 VIEW: CPT

## 2024-11-24 PROCEDURE — 85027 COMPLETE CBC AUTOMATED: CPT

## 2024-11-24 PROCEDURE — 2500000004 HC RX 250 GENERAL PHARMACY W/ HCPCS (ALT 636 FOR OP/ED): Performed by: STUDENT IN AN ORGANIZED HEALTH CARE EDUCATION/TRAINING PROGRAM

## 2024-11-24 PROCEDURE — 71045 X-RAY EXAM CHEST 1 VIEW: CPT | Performed by: STUDENT IN AN ORGANIZED HEALTH CARE EDUCATION/TRAINING PROGRAM

## 2024-11-24 PROCEDURE — 83735 ASSAY OF MAGNESIUM: CPT

## 2024-11-24 PROCEDURE — 94003 VENT MGMT INPAT SUBQ DAY: CPT

## 2024-11-24 PROCEDURE — 81001 URINALYSIS AUTO W/SCOPE: CPT

## 2024-11-24 PROCEDURE — 74018 RADEX ABDOMEN 1 VIEW: CPT | Performed by: STUDENT IN AN ORGANIZED HEALTH CARE EDUCATION/TRAINING PROGRAM

## 2024-11-24 PROCEDURE — 2500000005 HC RX 250 GENERAL PHARMACY W/O HCPCS

## 2024-11-24 PROCEDURE — 2500000001 HC RX 250 WO HCPCS SELF ADMINISTERED DRUGS (ALT 637 FOR MEDICARE OP)

## 2024-11-24 PROCEDURE — 82436 ASSAY OF URINE CHLORIDE: CPT

## 2024-11-24 PROCEDURE — 84132 ASSAY OF SERUM POTASSIUM: CPT

## 2024-11-24 PROCEDURE — 2500000002 HC RX 250 W HCPCS SELF ADMINISTERED DRUGS (ALT 637 FOR MEDICARE OP, ALT 636 FOR OP/ED)

## 2024-11-24 PROCEDURE — 36600 WITHDRAWAL OF ARTERIAL BLOOD: CPT

## 2024-11-24 PROCEDURE — 2500000005 HC RX 250 GENERAL PHARMACY W/O HCPCS: Performed by: INTERNAL MEDICINE

## 2024-11-24 PROCEDURE — 83605 ASSAY OF LACTIC ACID: CPT

## 2024-11-24 PROCEDURE — 84100 ASSAY OF PHOSPHORUS: CPT

## 2024-11-24 PROCEDURE — 99291 CRITICAL CARE FIRST HOUR: CPT

## 2024-11-24 RX ADMIN — Medication 40 PERCENT: at 15:30

## 2024-11-24 RX ADMIN — PIPERACILLIN SODIUM AND TAZOBACTAM SODIUM 2.25 G: 2; .25 INJECTION, SOLUTION INTRAVENOUS at 08:09

## 2024-11-24 RX ADMIN — PANTOPRAZOLE SODIUM 40 MG: 40 INJECTION, POWDER, FOR SOLUTION INTRAVENOUS at 21:06

## 2024-11-24 RX ADMIN — INSULIN LISPRO 1 UNITS: 100 INJECTION, SOLUTION INTRAVENOUS; SUBCUTANEOUS at 21:00

## 2024-11-24 RX ADMIN — PANTOPRAZOLE SODIUM 40 MG: 40 INJECTION, POWDER, FOR SOLUTION INTRAVENOUS at 08:09

## 2024-11-24 RX ADMIN — ASPIRIN 81 MG CHEWABLE TABLET 81 MG: 81 TABLET CHEWABLE at 08:09

## 2024-11-24 RX ADMIN — PIPERACILLIN SODIUM AND TAZOBACTAM SODIUM 2.25 G: 2; .25 INJECTION, SOLUTION INTRAVENOUS at 00:28

## 2024-11-24 RX ADMIN — AMIODARONE HYDROCHLORIDE 0.5 MG/MIN: 1.8 INJECTION, SOLUTION INTRAVENOUS at 04:10

## 2024-11-24 RX ADMIN — Medication 50 PERCENT: at 20:00

## 2024-11-24 RX ADMIN — POLYETHYLENE GLYCOL 3350 17 G: 17 POWDER, FOR SOLUTION ORAL at 08:09

## 2024-11-24 RX ADMIN — ACETAMINOPHEN 650 MG: 160 SOLUTION ORAL at 16:54

## 2024-11-24 RX ADMIN — Medication 50 MCG/HR: at 04:10

## 2024-11-24 RX ADMIN — Medication 40 PERCENT: at 08:40

## 2024-11-24 RX ADMIN — AMIODARONE HYDROCHLORIDE 0.5 MG/MIN: 1.8 INJECTION, SOLUTION INTRAVENOUS at 18:26

## 2024-11-24 RX ADMIN — SENNOSIDES AND DOCUSATE SODIUM 1 TABLET: 50; 8.6 TABLET ORAL at 21:06

## 2024-11-24 RX ADMIN — SODIUM CHLORIDE, POTASSIUM CHLORIDE, SODIUM LACTATE AND CALCIUM CHLORIDE 500 ML: 600; 310; 30; 20 INJECTION, SOLUTION INTRAVENOUS at 09:10

## 2024-11-24 RX ADMIN — PIPERACILLIN SODIUM AND TAZOBACTAM SODIUM 2.25 G: 2; .25 INJECTION, SOLUTION INTRAVENOUS at 15:19

## 2024-11-24 RX ADMIN — ATORVASTATIN CALCIUM 80 MG: 80 TABLET, FILM COATED ORAL at 21:06

## 2024-11-24 RX ADMIN — Medication 75 MCG/HR: at 18:26

## 2024-11-24 ASSESSMENT — PAIN - FUNCTIONAL ASSESSMENT
PAIN_FUNCTIONAL_ASSESSMENT: CPOT (CRITICAL CARE PAIN OBSERVATION TOOL)

## 2024-11-24 ASSESSMENT — COGNITIVE AND FUNCTIONAL STATUS - GENERAL
TOILETING: TOTAL
MOVING TO AND FROM BED TO CHAIR: TOTAL
MOBILITY SCORE: 6
DRESSING REGULAR UPPER BODY CLOTHING: TOTAL
HELP NEEDED FOR BATHING: TOTAL
PERSONAL GROOMING: TOTAL
DAILY ACTIVITIY SCORE: 6
STANDING UP FROM CHAIR USING ARMS: TOTAL
MOVING FROM LYING ON BACK TO SITTING ON SIDE OF FLAT BED WITH BEDRAILS: TOTAL
TURNING FROM BACK TO SIDE WHILE IN FLAT BAD: TOTAL
DRESSING REGULAR LOWER BODY CLOTHING: TOTAL
WALKING IN HOSPITAL ROOM: TOTAL
EATING MEALS: TOTAL
CLIMB 3 TO 5 STEPS WITH RAILING: TOTAL

## 2024-11-24 ASSESSMENT — PAIN SCALES - GENERAL: PAINLEVEL_OUTOF10: 0 - NO PAIN

## 2024-11-24 NOTE — CONSULTS
Reason For Consult  TRACY     History Of Present Illness  64 y.o. female with history of pancreatitis, DVT/PE, HLD, HTN, CABGx4 1997, T2DM, GERD, PAD, chronic mesenteric ischemia, tobacco use who presented to [facility] on 11/18/2024 as a transfer from [facility] with chest, back, and abdominal pain. 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 Vascular and Cardiac surgery following. CICU course c/b PEA arrest 11/20, now intubated and sedated. Nephrology following for TRACY.    Baseline creatinine ~1.5. Has been oliguric but without significant fluid overload signs or worsening vent settings. Has arriola in place. No current HD access. Is on antibiotics for suspected infection.     Past Medical History  She has no past medical history on file.    Surgical History  She has no past surgical history on file.     Social History  She has no history on file for tobacco use, alcohol use, and drug use.    Family History  No family history on file.     Allergies  Patient has no known allergies.    Physical Exam  General appearance: No distress. Vent. sedation  Eyes: non-icteric  HEENT: Atraumatic  Skin: no apparent rash  Heart: NSR, S1, S2 normal, no murmur or gallop  Lungs: Symmetrical expansion,CTA bilat no wheezing/crackles  Abdomen: soft, nt/nd  Extremities: no edema bilat  Arriola: Minimal tea colored urine        I&O 24HR    Intake/Output Summary (Last 24 hours) at 11/24/2024 1524  Last data filed at 11/24/2024 1500  Gross per 24 hour   Intake 1411.53 ml   Output 527 ml   Net 884.53 ml       Vitals 24HR  Heart Rate:  [72-94]   Temp:  [35.9 °C (96.6 °F)-37.2 °C (99 °F)]   Resp:  [16-32]   BP: ()/(54-78)   Weight:  [55.1 kg (121 lb 7.6 oz)]   SpO2:  [87 %-100 %]       Relevant Results  Lab Results   Component Value Date    GLUCOSE 125 (H) 11/24/2024    CALCIUM 7.3 (L)  11/24/2024     11/24/2024    K 4.5 11/24/2024    CO2 20 (L) 11/24/2024     11/24/2024    BUN 75 (H) 11/24/2024    CREATININE 4.84 (H) 11/24/2024     Assessment/Plan   64 y.o. female with history of pancreatitis, DVT/PE, HLD, HTN, CABGx4 1997, T2DM, GERD, PAD, chronic mesenteric ischemia, tobacco use who presented to [facility] on 11/18/2024 as a transfer from [facility] with chest, back, and abdominal pain. 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 Vascular and Cardiac surgery following. CICU course c/b PEA arrest 11/20, now intubated and sedated. Nephrology following for TRACY from baseline 1.5     #Oliguric TRACY Baseline creatinine 1.5  Hemodynamically mediated  Marked hypotension/shock requiring vasopressors.  UOP x past 24 is 400cc  No IV contrast, NSAIDs, or other known nephrotoxic meds. Vancomycin level accetpable.  UA 1+ protein, 3+ blood, 1+ ketones, 75 LE, >50 WBC, >20 RBC  Urine sodium 72, Fena 2.1%, suspected ATN  Urine chloride <15    Recommendations:  -No indications for RRT at present. Would continue with conservative measures.  -Got consent from family while at bedside in case develops indication for RRT  -suspect fluid overload would be primary indication if vent settings worsen and oliguria / anuria occurs  -Avoid contrast if possible  -Will Follow    -Discussed with Dr. Crowell. Dr. Matthews and rounding team to follow tomorrow.    Trev Arellano MD PGY-4 Nephrology Fellow.

## 2024-11-24 NOTE — PROGRESS NOTES
Critical Care Daily Progress      Subjective   Patient is a 64 y.o. female admitted on 11/18/2024 10:54 PM to the CICU for Aortic Dissection.     NAEO.    Objective     Vent Settings/Oxygen Drips   Vent Mode: Volume control/assist control  FiO2 (%):  [30 %] 30 %  S RR:  [18-20] 18  S VT:  [400 mL] 400 mL  PEEP/CPAP (cm H2O):  [8 cm H20] 8 cm H20  GA SUP:  [10 cm H20] 10 cm H20  MAP (cm H2O):  [13-15] 15  amiodarone, 0.5 mg/min, Last Rate: 0.5 mg/min (11/24/24 1300)  esmolol,  mcg/kg/min, Last Rate: Stopped (11/21/24 1145)  fentaNYL,  mcg/hr, Last Rate: 75 mcg/hr (11/24/24 1300)  norepinephrine, 0-0.2 mcg/kg/min, Last Rate: Stopped (11/20/24 1339)         Vitals: I/O:   Vitals:    11/24/24 1300   BP: 107/66   Pulse: 86   Resp: 20   Temp: 36 °C (96.8 °F)   SpO2:     Cuff: SBP 90s - 110s    24hr Min/Max:  Temp  Min: 35.9 °C (96.6 °F)  Max: 37.2 °C (99 °F)  Pulse  Min: 72  Max: 94  BP  Min: 87/54  Max: 135/78  Resp  Min: 16  Max: 32  SpO2  Min: 87 %  Max: 100 %   Intake/Output Summary (Last 24 hours) at 11/24/2024 1429  Last data filed at 11/24/2024 1300  Gross per 24 hour   Intake 1433.2 ml   Output 487 ml   Net 946.2 ml     ml yesterday    Net IO Since Admission: 5,684.75 mL [11/24/24 1429]      Scheduled Medications:  PRN Medications:    aspirin, 81 mg, oral, Daily  atorvastatin, 80 mg, oral, Nightly  esmolol, 500 mcg/kg, intravenous, Once  insulin lispro, 0-5 Units, subcutaneous, q4h  lactated Ringer's, 1,000 mL, intravenous, Once  [Held by provider] metoprolol tartrate, 12.5 mg, oral, q6h  oxygen, , inhalation, Continuous - Inhalation  pantoprazole, 40 mg, intravenous, BID  piperacillin-tazobactam, 2.25 g, intravenous, q8h  polyethylene glycol, 17 g, oral, Daily  sennosides-docusate sodium, 1 tablet, oral, Nightly     PRN medications: acetaminophen **OR** acetaminophen **OR** acetaminophen, dextrose, dextrose, glucagon, glucagon, hydrOXYzine HCL, ipratropium-albuteroL, oxyCODONE, oxygen        Physical Exam:  Physical Exam  Constitutional:       Appearance: She is toxic-appearing.      Comments: Intubated, sedated, following commands - opening eyes to voice, squeezing hands b/l  frail   Eyes:      General: No scleral icterus.  Cardiovascular:      Rate and Rhythm: Normal rate and regular rhythm.   Pulmonary:      Effort: No respiratory distress.      Breath sounds: Normal breath sounds. No wheezing or rhonchi.      Comments: ET tube in place; mild wheeze b/l  Abdominal:      General: There is no distension.      Palpations: Abdomen is soft.      Tenderness: There is no abdominal tenderness. There is no guarding.   Musculoskeletal:         General: No swelling or tenderness.   Skin:     General: Skin is warm and dry.      Findings: No bruising or rash.   Neurological:      Comments: sedated                LABS:    CBC RFP   Lab Results   Component Value Date    WBC 12.6 (H) 11/24/2024    WBC 13.0 (H) 11/23/2024    WBC 13.8 (H) 11/23/2024    HGB 8.0 (L) 11/24/2024    HGB 7.3 (L) 11/23/2024    HGB 7.5 (L) 11/23/2024    HCT 23.4 (L) 11/24/2024    MCV 88 11/24/2024     11/24/2024     11/23/2024     11/23/2024    NEUTROABS 9.74 (H) 11/24/2024       Blood cultures negative Lab Results   Component Value Date     11/24/2024    K 4.5 11/24/2024     11/24/2024    CO2 20 (L) 11/24/2024    BUN 75 (H) 11/24/2024    CREATININE 4.84 (H) 11/24/2024    CREATININE 4.63 (H) 11/23/2024     Lab Results   Component Value Date    MG 2.46 (H) 11/24/2024    PHOS 6.1 (H) 11/24/2024    CALCIUM 7.3 (L) 11/24/2024         Hepatic Function ABG/VBG   Lab Results   Component Value Date    ALT 67 (H) 11/23/2024    AST 45 (H) 11/23/2024    ALKPHOS 95 11/23/2024     Lab Results   Component Value Date    BILIDIR 0.2 11/23/2024      Lab Results   Component Value Date    PROTIME 11.9 11/18/2024    APTT 29 11/18/2024    INR 1.1 11/18/2024      Lab Results   Component Value Date    LACTATE 1.7 11/20/2024           No results found for the last 90 days.         IMAGING:    No new imaging        Assessment/Plan    64 y.o. female with history of pancreatitis, DVT/PE, HLD, HTN, CABGx4 1997, T2DM, GERD, PAD, chronic mesenteric ischemia, tobacco use who presented to [facility] on 11/18/2024 as a transfer from [facility] with chest, back, and abdominal pain. 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 Vascular and Cardiac surgery following. CICU course c/b PEA arrest 11/20, now intubated and sedated, following commands.     11/24 Updates:  - Patient failed SBT. Hypoxic on ABG  - Worsening TRACY and acidosis, renal consulted  - No more bloody output from OG tube, Hb stable  - Holding heparin gtt with possible GI bleed    Neuro:  #Sedated  -fentanyl, held for SB  -following commands     Cardiac:  #PEA arrest  ::Ddx: hypoxia, electrolytes appear wnl, low suspicion for aortic dissection as cause given stable Hgb, low suspicion for PE b/c patient has been on heparin gtt  ::Trop peak 5700 -> 4900  ::Lactate 6.3 -> 2.3 -> 4.1  -pressors off    #Aflutter  ::Now NSR  ::episode evening 11/20 causing hypotension  -amio gtt switched to PO  -Holding heparin    #Aortic dissection  #Distal aortic arch saccular aneurysm   ::CTA CAP 11/18- 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   -Vascular surgery discussed case at aortic conference and there are no plans for surgery, palliative measures recommended  -resume impulse control  -Holding heparin     #NSTEMI  #CAD s/p CABGx4 1997  #PAD  #HTN #DLD  ::EKG- NSR, rate 71, TWI aVL, V1-V6, 1mm ORQUIDEA in aVR  ::Trop peak 5700  ::Lipid HDL 41.5, , TG 75, Chol 157  -Holding home amlodipine 10mg, imdur 30mg, lisinopril 2.5mg, metop tartrate  50mg bid iso hypotension   -Continue ASA and atorvastatin  -Holding pavix 75mg     Pulm:  #Intubated  #c/f developing PNA  ::CXR with opacification of LLL  -PNA potential cause of hypoxia  -wbc 12.6 (down)  -zosyn (Day 5)     GI:  #c/f UGIB  -PPI BID  -Holding AC  -BID CBC  -Active T&S, consented  -boluses PRN     Renal:  #TRACY on CKD likely 2/2 arrest  -Cr at OSH 1.3  -worsening Cr  -minimal UO since arrest  -- Worsening TRACY and acidosis, renal consulted     Endo:   #T2DM  -Hold home metformin   -Mild SSI + hypoglycemia protocol      Infectious  See pulm section for PNA concerns      F: 500 ml  E: K>4, Mag>2  N: NPO  G: pantoprazole  A: PIV, OG  DVT: HELD  CODE: DNR/okay to intubate (confirmed with family 11/21)  NOK: Daughter Josy [phone]      Macho Good MD  Internal Medicine PGY-1

## 2024-11-25 LAB
ALBUMIN SERPL BCP-MCNC: 2.6 G/DL (ref 3.4–5)
ALBUMIN SERPL BCP-MCNC: 2.8 G/DL (ref 3.4–5)
ANION GAP SERPL CALC-SCNC: 23 MMOL/L (ref 10–20)
ANION GAP SERPL CALC-SCNC: 24 MMOL/L (ref 10–20)
APPEARANCE UR: ABNORMAL
ATRIAL RATE: 71 BPM
ATRIAL RATE: 76 BPM
BASOPHILS # BLD AUTO: 0.03 X10*3/UL (ref 0–0.1)
BASOPHILS NFR BLD AUTO: 0.2 %
BILIRUB UR STRIP.AUTO-MCNC: NEGATIVE MG/DL
BUN SERPL-MCNC: 84 MG/DL (ref 6–23)
BUN SERPL-MCNC: 87 MG/DL (ref 6–23)
CALCIUM SERPL-MCNC: 7.1 MG/DL (ref 8.6–10.6)
CALCIUM SERPL-MCNC: 7.3 MG/DL (ref 8.6–10.6)
CHLORIDE SERPL-SCNC: 100 MMOL/L (ref 98–107)
CHLORIDE SERPL-SCNC: 100 MMOL/L (ref 98–107)
CHLORIDE UR-SCNC: <15 MMOL/L
CHLORIDE/CREATININE (MMOL/G) IN URINE: NORMAL
CO2 SERPL-SCNC: 22 MMOL/L (ref 21–32)
CO2 SERPL-SCNC: 22 MMOL/L (ref 21–32)
COLOR UR: ABNORMAL
CREAT SERPL-MCNC: 5.62 MG/DL (ref 0.5–1.05)
CREAT SERPL-MCNC: 6.28 MG/DL (ref 0.5–1.05)
CREAT UR-MCNC: 116.8 MG/DL (ref 20–320)
EGFRCR SERPLBLD CKD-EPI 2021: 7 ML/MIN/1.73M*2
EGFRCR SERPLBLD CKD-EPI 2021: 8 ML/MIN/1.73M*2
EOSINOPHIL # BLD AUTO: 0.11 X10*3/UL (ref 0–0.7)
EOSINOPHIL NFR BLD AUTO: 0.8 %
ERYTHROCYTE [DISTWIDTH] IN BLOOD BY AUTOMATED COUNT: 13.9 % (ref 11.5–14.5)
ERYTHROCYTE [DISTWIDTH] IN BLOOD BY AUTOMATED COUNT: 14.1 % (ref 11.5–14.5)
GLUCOSE BLD MANUAL STRIP-MCNC: 125 MG/DL (ref 74–99)
GLUCOSE BLD MANUAL STRIP-MCNC: 126 MG/DL (ref 74–99)
GLUCOSE BLD MANUAL STRIP-MCNC: 135 MG/DL (ref 74–99)
GLUCOSE BLD MANUAL STRIP-MCNC: 145 MG/DL (ref 74–99)
GLUCOSE BLD MANUAL STRIP-MCNC: 155 MG/DL (ref 74–99)
GLUCOSE SERPL-MCNC: 135 MG/DL (ref 74–99)
GLUCOSE SERPL-MCNC: 138 MG/DL (ref 74–99)
GLUCOSE UR STRIP.AUTO-MCNC: NORMAL MG/DL
HCT VFR BLD AUTO: 22.8 % (ref 36–46)
HCT VFR BLD AUTO: 23.1 % (ref 36–46)
HGB BLD-MCNC: 7.4 G/DL (ref 12–16)
HGB BLD-MCNC: 7.4 G/DL (ref 12–16)
IMM GRANULOCYTES # BLD AUTO: 0.22 X10*3/UL (ref 0–0.7)
IMM GRANULOCYTES NFR BLD AUTO: 1.7 % (ref 0–0.9)
KETONES UR STRIP.AUTO-MCNC: ABNORMAL MG/DL
LEUKOCYTE ESTERASE UR QL STRIP.AUTO: ABNORMAL
LYMPHOCYTES # BLD AUTO: 1.56 X10*3/UL (ref 1.2–4.8)
LYMPHOCYTES NFR BLD AUTO: 11.7 %
MAGNESIUM SERPL-MCNC: 2.23 MG/DL (ref 1.6–2.4)
MAGNESIUM SERPL-MCNC: 2.44 MG/DL (ref 1.6–2.4)
MCH RBC QN AUTO: 30.1 PG (ref 26–34)
MCH RBC QN AUTO: 30.3 PG (ref 26–34)
MCHC RBC AUTO-ENTMCNC: 32 G/DL (ref 32–36)
MCHC RBC AUTO-ENTMCNC: 32.5 G/DL (ref 32–36)
MCV RBC AUTO: 93 FL (ref 80–100)
MCV RBC AUTO: 94 FL (ref 80–100)
MICROALBUMIN UR-MCNC: 426.2 MG/L
MICROALBUMIN/CREAT UR: 364.9 UG/MG CREAT
MONOCYTES # BLD AUTO: 1.12 X10*3/UL (ref 0.1–1)
MONOCYTES NFR BLD AUTO: 8.4 %
NEUTROPHILS # BLD AUTO: 10.27 X10*3/UL (ref 1.2–7.7)
NEUTROPHILS NFR BLD AUTO: 77.2 %
NITRITE UR QL STRIP.AUTO: NEGATIVE
NRBC BLD-RTO: 0.9 /100 WBCS (ref 0–0)
NRBC BLD-RTO: 0.9 /100 WBCS (ref 0–0)
OSMOLALITY UR: 344 MOSM/KG (ref 200–1200)
P AXIS: 69 DEGREES
P AXIS: 81 DEGREES
P OFFSET: 215 MS
P OFFSET: 218 MS
P ONSET: 157 MS
P ONSET: 161 MS
PH UR STRIP.AUTO: 6 [PH]
PHOSPHATE SERPL-MCNC: 7.2 MG/DL (ref 2.5–4.9)
PHOSPHATE SERPL-MCNC: 7.3 MG/DL (ref 2.5–4.9)
PLATELET # BLD AUTO: 251 X10*3/UL (ref 150–450)
PLATELET # BLD AUTO: 254 X10*3/UL (ref 150–450)
POTASSIUM SERPL-SCNC: 4.3 MMOL/L (ref 3.5–5.3)
POTASSIUM SERPL-SCNC: 4.3 MMOL/L (ref 3.5–5.3)
POTASSIUM UR-SCNC: 76 MMOL/L
POTASSIUM/CREAT UR-RTO: 65 MMOL/G CREAT
PR INTERVAL: 118 MS
PR INTERVAL: 120 MS
PROT UR STRIP.AUTO-MCNC: ABNORMAL MG/DL
PROT UR-ACNC: 176 MG/DL (ref 5–24)
PROT/CREAT UR: 1.51 MG/MG CREAT (ref 0–0.17)
Q ONSET: 217 MS
Q ONSET: 220 MS
QRS COUNT: 12 BEATS
QRS COUNT: 13 BEATS
QRS DURATION: 82 MS
QRS DURATION: 90 MS
QT INTERVAL: 440 MS
QT INTERVAL: 462 MS
QTC CALCULATION(BAZETT): 495 MS
QTC CALCULATION(BAZETT): 502 MS
QTC FREDERICIA: 475 MS
QTC FREDERICIA: 488 MS
R AXIS: 17 DEGREES
R AXIS: 26 DEGREES
RBC # BLD AUTO: 2.44 X10*6/UL (ref 4–5.2)
RBC # BLD AUTO: 2.46 X10*6/UL (ref 4–5.2)
RBC # UR STRIP.AUTO: ABNORMAL /UL
RBC #/AREA URNS AUTO: >20 /HPF
SODIUM SERPL-SCNC: 141 MMOL/L (ref 136–145)
SODIUM SERPL-SCNC: 142 MMOL/L (ref 136–145)
SODIUM UR-SCNC: 40 MMOL/L
SODIUM/CREAT UR-RTO: 34 MMOL/G CREAT
SP GR UR STRIP.AUTO: 1.03
SQUAMOUS #/AREA URNS AUTO: ABNORMAL /HPF
T AXIS: 159 DEGREES
T AXIS: 191 DEGREES
T OFFSET: 437 MS
T OFFSET: 451 MS
UROBILINOGEN UR STRIP.AUTO-MCNC: NORMAL MG/DL
VENTRICULAR RATE: 71 BPM
VENTRICULAR RATE: 76 BPM
WBC # BLD AUTO: 12.4 X10*3/UL (ref 4.4–11.3)
WBC # BLD AUTO: 13.3 X10*3/UL (ref 4.4–11.3)
WBC #/AREA URNS AUTO: ABNORMAL /HPF
YEAST BUDDING #/AREA UR COMP ASSIST: PRESENT /HPF

## 2024-11-25 PROCEDURE — 84300 ASSAY OF URINE SODIUM: CPT

## 2024-11-25 PROCEDURE — 85025 COMPLETE CBC W/AUTO DIFF WBC: CPT

## 2024-11-25 PROCEDURE — 80069 RENAL FUNCTION PANEL: CPT

## 2024-11-25 PROCEDURE — 2500000004 HC RX 250 GENERAL PHARMACY W/ HCPCS (ALT 636 FOR OP/ED)

## 2024-11-25 PROCEDURE — 2500000001 HC RX 250 WO HCPCS SELF ADMINISTERED DRUGS (ALT 637 FOR MEDICARE OP)

## 2024-11-25 PROCEDURE — 2500000005 HC RX 250 GENERAL PHARMACY W/O HCPCS

## 2024-11-25 PROCEDURE — 84100 ASSAY OF PHOSPHORUS: CPT

## 2024-11-25 PROCEDURE — 82947 ASSAY GLUCOSE BLOOD QUANT: CPT

## 2024-11-25 PROCEDURE — 83735 ASSAY OF MAGNESIUM: CPT

## 2024-11-25 PROCEDURE — 94003 VENT MGMT INPAT SUBQ DAY: CPT

## 2024-11-25 PROCEDURE — 99291 CRITICAL CARE FIRST HOUR: CPT | Performed by: INTERNAL MEDICINE

## 2024-11-25 PROCEDURE — 2500000002 HC RX 250 W HCPCS SELF ADMINISTERED DRUGS (ALT 637 FOR MEDICARE OP, ALT 636 FOR OP/ED)

## 2024-11-25 PROCEDURE — 1100000001 HC PRIVATE ROOM DAILY

## 2024-11-25 PROCEDURE — 3E0G76Z INTRODUCTION OF NUTRITIONAL SUBSTANCE INTO UPPER GI, VIA NATURAL OR ARTIFICIAL OPENING: ICD-10-PCS | Performed by: INTERNAL MEDICINE

## 2024-11-25 PROCEDURE — 85027 COMPLETE CBC AUTOMATED: CPT

## 2024-11-25 PROCEDURE — 37799 UNLISTED PX VASCULAR SURGERY: CPT

## 2024-11-25 PROCEDURE — 99233 SBSQ HOSP IP/OBS HIGH 50: CPT | Performed by: INTERNAL MEDICINE

## 2024-11-25 PROCEDURE — 2500000004 HC RX 250 GENERAL PHARMACY W/ HCPCS (ALT 636 FOR OP/ED): Performed by: STUDENT IN AN ORGANIZED HEALTH CARE EDUCATION/TRAINING PROGRAM

## 2024-11-25 PROCEDURE — 2500000005 HC RX 250 GENERAL PHARMACY W/O HCPCS: Performed by: INTERNAL MEDICINE

## 2024-11-25 PROCEDURE — 81001 URINALYSIS AUTO W/SCOPE: CPT

## 2024-11-25 PROCEDURE — 71045 X-RAY EXAM CHEST 1 VIEW: CPT | Performed by: RADIOLOGY

## 2024-11-25 PROCEDURE — 87086 URINE CULTURE/COLONY COUNT: CPT

## 2024-11-25 PROCEDURE — 82043 UR ALBUMIN QUANTITATIVE: CPT

## 2024-11-25 PROCEDURE — 84156 ASSAY OF PROTEIN URINE: CPT

## 2024-11-25 PROCEDURE — 83935 ASSAY OF URINE OSMOLALITY: CPT

## 2024-11-25 RX ORDER — BUMETANIDE 0.25 MG/ML
4 INJECTION, SOLUTION INTRAMUSCULAR; INTRAVENOUS ONCE
Status: COMPLETED | OUTPATIENT
Start: 2024-11-25 | End: 2024-11-25

## 2024-11-25 RX ORDER — POLYETHYLENE GLYCOL 3350 17 G/17G
17 POWDER, FOR SOLUTION ORAL 2 TIMES DAILY
Status: DISCONTINUED | OUTPATIENT
Start: 2024-11-25 | End: 2024-11-28

## 2024-11-25 RX ORDER — LANOLIN ALCOHOL/MO/W.PET/CERES
100 CREAM (GRAM) TOPICAL DAILY
Status: DISCONTINUED | OUTPATIENT
Start: 2024-11-25 | End: 2024-12-10

## 2024-11-25 RX ORDER — FUROSEMIDE 10 MG/ML
80 INJECTION INTRAMUSCULAR; INTRAVENOUS ONCE
Status: COMPLETED | OUTPATIENT
Start: 2024-11-25 | End: 2024-11-25

## 2024-11-25 RX ADMIN — PANTOPRAZOLE SODIUM 40 MG: 40 INJECTION, POWDER, FOR SOLUTION INTRAVENOUS at 20:06

## 2024-11-25 RX ADMIN — Medication 50 PERCENT: at 11:00

## 2024-11-25 RX ADMIN — Medication 50 PERCENT: at 15:46

## 2024-11-25 RX ADMIN — SENNOSIDES AND DOCUSATE SODIUM 1 TABLET: 50; 8.6 TABLET ORAL at 20:06

## 2024-11-25 RX ADMIN — THIAMINE HCL TAB 100 MG 100 MG: 100 TAB at 14:15

## 2024-11-25 RX ADMIN — Medication 75 MCG/HR: at 04:57

## 2024-11-25 RX ADMIN — ACETAMINOPHEN 650 MG: 160 SOLUTION ORAL at 23:15

## 2024-11-25 RX ADMIN — PIPERACILLIN SODIUM AND TAZOBACTAM SODIUM 2.25 G: 2; .25 INJECTION, SOLUTION INTRAVENOUS at 00:09

## 2024-11-25 RX ADMIN — PIPERACILLIN SODIUM AND TAZOBACTAM SODIUM 2.25 G: 2; .25 INJECTION, SOLUTION INTRAVENOUS at 16:46

## 2024-11-25 RX ADMIN — PANTOPRAZOLE SODIUM 40 MG: 40 INJECTION, POWDER, FOR SOLUTION INTRAVENOUS at 08:04

## 2024-11-25 RX ADMIN — INSULIN LISPRO 1 UNITS: 100 INJECTION, SOLUTION INTRAVENOUS; SUBCUTANEOUS at 08:04

## 2024-11-25 RX ADMIN — AMIODARONE HYDROCHLORIDE 0.5 MG/MIN: 1.8 INJECTION, SOLUTION INTRAVENOUS at 06:36

## 2024-11-25 RX ADMIN — ATORVASTATIN CALCIUM 80 MG: 80 TABLET, FILM COATED ORAL at 20:06

## 2024-11-25 RX ADMIN — AMIODARONE HYDROCHLORIDE 0.5 MG/MIN: 1.8 INJECTION, SOLUTION INTRAVENOUS at 16:14

## 2024-11-25 RX ADMIN — Medication 50 PERCENT: at 20:07

## 2024-11-25 RX ADMIN — PIPERACILLIN SODIUM AND TAZOBACTAM SODIUM 2.25 G: 2; .25 INJECTION, SOLUTION INTRAVENOUS at 23:19

## 2024-11-25 RX ADMIN — OXYCODONE HYDROCHLORIDE 2.5 MG: 5 TABLET ORAL at 23:16

## 2024-11-25 RX ADMIN — Medication 75 MCG/HR: at 14:26

## 2024-11-25 RX ADMIN — FUROSEMIDE 80 MG: 10 INJECTION, SOLUTION INTRAVENOUS at 17:21

## 2024-11-25 RX ADMIN — BUMETANIDE 4 MG: 0.25 INJECTION INTRAMUSCULAR; INTRAVENOUS at 21:45

## 2024-11-25 RX ADMIN — ASPIRIN 81 MG CHEWABLE TABLET 81 MG: 81 TABLET CHEWABLE at 08:04

## 2024-11-25 RX ADMIN — PIPERACILLIN SODIUM AND TAZOBACTAM SODIUM 2.25 G: 2; .25 INJECTION, SOLUTION INTRAVENOUS at 08:04

## 2024-11-25 RX ADMIN — POLYETHYLENE GLYCOL 3350 17 G: 17 POWDER, FOR SOLUTION ORAL at 08:04

## 2024-11-25 RX ADMIN — Medication 50 PERCENT: at 07:10

## 2024-11-25 RX ADMIN — BUMETANIDE 1 MG/HR: 0.25 INJECTION INTRAMUSCULAR; INTRAVENOUS at 22:49

## 2024-11-25 RX ADMIN — POLYETHYLENE GLYCOL 3350 17 G: 17 POWDER, FOR SOLUTION ORAL at 20:07

## 2024-11-25 ASSESSMENT — COGNITIVE AND FUNCTIONAL STATUS - GENERAL
MOVING TO AND FROM BED TO CHAIR: TOTAL
DRESSING REGULAR UPPER BODY CLOTHING: TOTAL
DRESSING REGULAR LOWER BODY CLOTHING: TOTAL
HELP NEEDED FOR BATHING: TOTAL
WALKING IN HOSPITAL ROOM: TOTAL
TOILETING: TOTAL
STANDING UP FROM CHAIR USING ARMS: TOTAL
CLIMB 3 TO 5 STEPS WITH RAILING: TOTAL
PERSONAL GROOMING: TOTAL
EATING MEALS: TOTAL
MOBILITY SCORE: 7
TURNING FROM BACK TO SIDE WHILE IN FLAT BAD: TOTAL
DAILY ACTIVITIY SCORE: 6
MOVING FROM LYING ON BACK TO SITTING ON SIDE OF FLAT BED WITH BEDRAILS: A LOT

## 2024-11-25 ASSESSMENT — PAIN - FUNCTIONAL ASSESSMENT

## 2024-11-25 ASSESSMENT — PAIN SCALES - GENERAL
PAINLEVEL_OUTOF10: 0 - NO PAIN
PAINLEVEL_OUTOF10: 0 - NO PAIN

## 2024-11-25 NOTE — CONSULTS
"Nutrition Initial Assessment:   Nutrition Assessment    Reason for Assessment: Dietitian discretion, Diet education (intubated/ NPO x 7 days)    Patient is a 64 y.o. female presenting to [facility] on 11/18/2024 as a transfer from [facility] with chest, back, and abdominal pain. 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 Vascular and Cardiac surgery following. CICU course c/b PEA arrest 11/20. Pt remains intubated- failed SBT. Is following commands. Has been on/off pressors. GOC conversation ongoing. Pt has worsening TRACY + acidosis- no indication for RRT at this time per nephrology. Had bloody output from OGT- has been NPO.       Nutrition History:  Food and Nutrient History: Unable to obtain nutrition information as pt is intubated. Pt has not been approrpiriate for feeding in the last week d/t pressors and or OGT output. Discussed with resident this morning. Okay to start trickle feeds. Pt with elevated phos- not receiving RRT. She is a refeeding risk d/t prolonged NPO status.      Anthropometrics:  Height: 157.5 cm (5' 2\")   Weight: 55.1 kg (121 lb 7.6 oz)   BMI (Calculated): 22.21  IBW/kg (Dietitian Calculated): 50 kg  Percent of IBW: 110 %       Weight History:   Date/Time Weight   11/25/24 0600 55.1 kg (121 lb 7.6 oz)   11/25/24 0220 55.1 kg (121 lb 7.6 oz)   11/24/24 0400 55.1 kg (121 lb 7.6 oz)   11/23/24 0423 53.9 kg (118 lb 13.3 oz)   11/22/24 0600 53.6 kg (118 lb 2.7 oz)   11/20/24 1419 51.6 kg (113 lb 11.2 oz)   11/20/24 0600 51.6 kg (113 lb 11.2 oz)   11/19/24 1455 49.2 kg (108 lb 7.5 oz)   11/19/24 0000 49.2 kg (108 lb 7.5 oz)   11/18/24 2316 49.2 kg (108 lb 7.5 oz)     Weight Change %:  Weight History / % Weight Change: weight has been trending upwards over admission- fluid related    Nutrition Focused Physical Exam Findings:  Subcutaneous Fat Loss: " "  Orbital Fat Pads: Mild-Moderate (slight dark circles and slight hollowing)  Buccal Fat Pads: Mild-Moderate (flat cheeks, minimal bounce)  Muscle Wasting:  Temporalis: Mild-Moderate (slight depression)  Pectoralis (Clavicular Region): Mild-Moderate (some protrusion of clavicle)  Deltoid/Trapezius: Well nourished (rounded appearance at arm, shoulder, neck)  Edema:  Edema: none  Physical Findings:  Skin: Negative    Nutrition Significant Labs:  CBC Trend:   Results from last 7 days   Lab Units 11/25/24  0354 11/24/24  1800 11/24/24  0408 11/23/24  1820   WBC AUTO x10*3/uL 13.3* 13.9* 12.6* 13.0*   RBC AUTO x10*6/uL 2.44* 2.56* 2.65* 2.45*   HEMOGLOBIN g/dL 7.4* 7.8* 8.0* 7.3*   HEMATOCRIT % 22.8* 24.4* 23.4* 21.9*   MCV fL 93 95 88 89   PLATELETS AUTO x10*3/uL 251 242 235 210    , BMP Trend:   Results from last 7 days   Lab Units 11/25/24  0354 11/24/24  0408 11/23/24  0139 11/22/24  0324   GLUCOSE mg/dL 138* 125* 110* 162*   CALCIUM mg/dL 7.1* 7.3* 7.2* 7.0*   SODIUM mmol/L 142 143 141 140   POTASSIUM mmol/L 4.3 4.5 3.7 3.8   CO2 mmol/L 22 20* 23 24   CHLORIDE mmol/L 100 103 102 101   BUN mg/dL 84* 75* 65* 64*   CREATININE mg/dL 5.62* 4.84* 4.63* 4.67*    , Renal Lab Trend:   Results from last 7 days   Lab Units 11/25/24  0354 11/24/24  0408 11/23/24  0139 11/22/24  0324   POTASSIUM mmol/L 4.3 4.5 3.7 3.8   PHOSPHORUS mg/dL 7.2* 6.1* 4.7 4.7   SODIUM mmol/L 142 143 141 140   MAGNESIUM mg/dL 2.44* 2.46* 2.17 2.19   EGFR mL/min/1.73m*2 8* 9* 10* 10*   BUN mg/dL 84* 75* 65* 64*   CREATININE mg/dL 5.62* 4.84* 4.63* 4.67*    , Vit D: No results found for: \"VITD25\" , Vit B12: No results found for: \"BADLACPT54\"     Nutrition Specific Medications:  Scheduled medications  aspirin, 81 mg, oral, Daily  atorvastatin, 80 mg, oral, Nightly  insulin lispro, 0-5 Units, subcutaneous, q4h  lactated Ringer's, 1,000 mL, intravenous, Once  oxygen, , inhalation, Continuous - Inhalation  pantoprazole, 40 mg, intravenous, " BID  piperacillin-tazobactam, 2.25 g, intravenous, q8h  polyethylene glycol, 17 g, oral, BID  sennosides-docusate sodium, 1 tablet, oral, Nightly  thiamine, 100 mg, oral, Daily      Continuous medications  amiodarone, 0.5 mg/min, Last Rate: 0.5 mg/min (11/25/24 1614)  fentaNYL,  mcg/hr, Last Rate: 75 mcg/hr (11/25/24 1426)      PRN medications  PRN medications: acetaminophen **OR** acetaminophen **OR** acetaminophen, dextrose, dextrose, glucagon, glucagon, hydrOXYzine HCL, ipratropium-albuteroL, oxyCODONE, oxygen      I/O:   Last BM Date: 11/25/24; Stool Appearance: Loose, Soft (11/25/24 1000)    Dietary Orders (From admission, onward)       Start     Ordered    11/25/24 1247  Enteral feeding with NPO Nepro; OG (orogastic tube); 10; 150; Water; Every 4 hours  Diet effective now        Question Answer Comment   Tube feeding formula: Nepro    Feeding route: OG (orogastic tube)    Tube feeding continuous rate (mL/hr): 10    Tube feeding flush (mL): 150    Flush type: Water    Flush frequency: Every 4 hours        11/25/24 1249                     Estimated Needs:   Total Energy Estimated Needs (kCal): 1051 kCal  Method for Estimating Needs: PSU REE  Total Protein Estimated Needs (g): 59 g  Method for Estimating Needs: 1.2g/kg + x admit wt  Total Fluid Estimated Needs (mL):  (per MD/team)  Method for Estimating Needs: per MD/team        Nutrition Diagnosis   Malnutrition Diagnosis  Patient has Malnutrition Diagnosis: Yes  Diagnosis Status: New  Malnutrition Diagnosis: Severe malnutrition related to acute disease or injury  As Evidenced by: < 50% estimated energy needs for >/= 5 days, mild- moderate subcutaneous fat loss + muscle wasting            Nutrition Interventions/Recommendations         Nutrition Prescription:  Individualized Nutrition Prescription Provided for : enteral nutrition        Nutrition Interventions:   Interventions: Enteral intake, Vitamin supplement therapy  100mg thiamine x 5-7 days    Monitor RFP+Mg Q12-24H  Replete PRN + throughout nutrition support advancement   Start Nepro @ 10ml/hr   Increase [id number] until goal of 20ml/hr is met   Add 1 pkt Prostat AWC BID   FWF per MD/team     Nepro @ 20ml/hr + 2pkt Prostat AWC = 1064kcal, 73gm protein, and 350ml free water     Collaboration and Referral of Nutrition Care: Collaboration by nutrition professional with other providers    Nutrition Education:    N/A    Nutrition Monitoring and Evaluation   Food/Nutrient Related History Monitoring  Monitoring and Evaluation Plan: Enteral and parenteral nutrition intake  Enteral and Parenteral Nutrition Intake: Enteral nutrition intake  Criteria: Tolerate TF @ goal    Body Composition/Growth/Weight History  Monitoring and Evaluation Plan: Weight, Weight change  Criteria: minimize loss    Biochemical Data, Medical Tests and Procedures  Monitoring and Evaluation Plan: Electrolyte/renal panel, Glucose/endocrine profile  Electrolyte and Renal Panel: Phosphorus, Potassium, Magnesium  Criteria: lytes WNL  Glucose/Endocrine Profile: Glucose, casual  Criteria: 140-180mg/dL              Time Spent (min): 60 minutes

## 2024-11-25 NOTE — PROGRESS NOTES
Critical Care Daily Progress      Subjective   Patient is a 64 y.o. female admitted on 11/18/2024 10:54 PM to the CICU for Aortic Dissection.     NAEO.    Following commands this AM    Objective     Vent Settings/Oxygen Drips   Vent Mode: Volume control/assist control  FiO2 (%):  [50 %] 50 %  S RR:  [18-20] 18  S VT:  [400 mL] 400 mL  PEEP/CPAP (cm H2O):  [8 cm H20] 8 cm H20  MAP (cm H2O):  [12-15] 12  amiodarone, 0.5 mg/min, Last Rate: 0.5 mg/min (11/25/24 0636)  esmolol,  mcg/kg/min, Last Rate: Stopped (11/21/24 1145)  fentaNYL,  mcg/hr, Last Rate: 75 mcg/hr (11/25/24 0542)  norepinephrine, 0-0.2 mcg/kg/min, Last Rate: Stopped (11/20/24 1339)         Vitals: I/O:   Vitals:    11/25/24 0710   BP:    Pulse:    Resp: 18   Temp:    SpO2: 100%    Cuff: SBP 90s - 110s    24hr Min/Max:  Temp  Min: 35.9 °C (96.6 °F)  Max: 37 °C (98.6 °F)  Pulse  Min: 60  Max: 91  BP  Min: 84/57  Max: 128/68  Resp  Min: 15  Max: 21  SpO2  Min: 90 %  Max: 100 %   Intake/Output Summary (Last 24 hours) at 11/25/2024 0840  Last data filed at 11/25/2024 0542  Gross per 24 hour   Intake 1268.46 ml   Output 225 ml   Net 1043.46 ml    Worsening UO w 245 ml 11/24    Net IO Since Admission: 6,028.4 mL [11/25/24 0840]      Scheduled Medications:  PRN Medications:    aspirin, 81 mg, oral, Daily  atorvastatin, 80 mg, oral, Nightly  esmolol, 500 mcg/kg, intravenous, Once  insulin lispro, 0-5 Units, subcutaneous, q4h  lactated Ringer's, 1,000 mL, intravenous, Once  [Held by provider] metoprolol tartrate, 12.5 mg, oral, q6h  oxygen, , inhalation, Continuous - Inhalation  pantoprazole, 40 mg, intravenous, BID  piperacillin-tazobactam, 2.25 g, intravenous, q8h  polyethylene glycol, 17 g, oral, Daily  sennosides-docusate sodium, 1 tablet, oral, Nightly     PRN medications: acetaminophen **OR** acetaminophen **OR** acetaminophen, dextrose, dextrose, glucagon, glucagon, hydrOXYzine HCL, ipratropium-albuteroL, oxyCODONE, oxygen       Physical  Exam:  Physical Exam  Constitutional:       Appearance: She is toxic-appearing.      Comments: Intubated, sedated, following commands - opening eyes to voice, squeezing hands b/l  frail   Eyes:      General: No scleral icterus.  Cardiovascular:      Rate and Rhythm: Normal rate and regular rhythm.   Pulmonary:      Effort: No respiratory distress.      Breath sounds: Normal breath sounds. No wheezing or rhonchi.      Comments: ET tube in place; mild wheeze b/l  Abdominal:      General: There is no distension.      Palpations: Abdomen is soft.      Tenderness: There is no abdominal tenderness. There is no guarding.   Musculoskeletal:         General: No swelling or tenderness.   Skin:     General: Skin is warm and dry.      Findings: No bruising or rash.   Neurological:      Comments: sedated                LABS:    CBC RFP   Lab Results   Component Value Date    WBC 13.3 (H) 11/25/2024    WBC 13.9 (H) 11/24/2024    WBC 12.6 (H) 11/24/2024    HGB 7.4 (L) 11/25/2024    HGB 7.8 (L) 11/24/2024    HGB 8.0 (L) 11/24/2024    HCT 22.8 (L) 11/25/2024    MCV 93 11/25/2024     11/25/2024     11/24/2024     11/24/2024    NEUTROABS 10.27 (H) 11/25/2024       Blood cultures negative Lab Results   Component Value Date     11/25/2024    K 4.3 11/25/2024     11/25/2024    CO2 22 11/25/2024    BUN 84 (H) 11/25/2024    CREATININE 5.62 (H) 11/25/2024    CREATININE 4.84 (H) 11/24/2024     Lab Results   Component Value Date    MG 2.44 (H) 11/25/2024    PHOS 7.2 (H) 11/25/2024    CALCIUM 7.1 (L) 11/25/2024         Hepatic Function ABG/VBG   Lab Results   Component Value Date    ALT 67 (H) 11/23/2024    AST 45 (H) 11/23/2024    ALKPHOS 95 11/23/2024     Lab Results   Component Value Date    BILIDIR 0.2 11/23/2024      Lab Results   Component Value Date    PROTIME 11.9 11/18/2024    APTT 29 11/18/2024    INR 1.1 11/18/2024      Lab Results   Component Value Date    LACTATE 1.7 11/20/2024          No results  found for the last 90 days.         IMAGING:    CXR ordered this AM      Assessment/Plan    64 y.o. female with history of pancreatitis, DVT/PE, HLD, HTN, CABGx4 1997, T2DM, GERD, PAD, chronic mesenteric ischemia, tobacco use who presented to [facility] on 11/18/2024 as a transfer from [facility] with chest, back, and abdominal pain. 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 Vascular and Cardiac surgery following. CICU course c/b PEA arrest 11/20, now intubated and sedated, following commands.     11/25 Updates:  - Patient failed SBT again -> continue GOC if trach would be considered in future  - Worsening TRACY and acidosis, renal consulted, holding off on HD for now but family consented  - No more bloody output from OG tube, Hb stable -> will consider starting TF today  - F/up nutrition recs for feeds  - Holding heparin gtt with possible GI bleed  - Persistently elevated lactate, trend with VBG (no A line)    Neuro:  #Sedated  -fentanyl, held for SB  -following commands     Cardiac:  #PEA arrest  ::likely hypoxia 2/2 aspiration vs PNA  ::Trop peak 5700 -> 4900  ::Lactate 6.3 -> 2.3 -> 4.1  -pressors off    #Aflutter  ::Now NSR  ::episode evening 11/20 causing hypotension  -amio gtt switched to PO  -Holding heparin    #Aortic dissection  #Distal aortic arch saccular aneurysm   ::CTA CAP 11/18- 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   -Vascular surgery discussed case at aortic conference and there are no plans for surgery, palliative measures recommended  -resume impulse control  -Holding heparin     #NSTEMI  #CAD s/p CABGx4 1997  #PAD  #HTN #DLD  ::EKG- NSR, rate 71, TWI aVL, V1-V6, 1mm ORQUIDEA in aVR  ::Trop peak 5700  ::Lipid HDL 41.5, , TG 75, Chol  157  -Holding home amlodipine 10mg, imdur 30mg, lisinopril 2.5mg, metop tartrate 50mg bid iso hypotension   -Continue ASA and atorvastatin  -Holding pavix 75mg     Pulm:  #Intubated  #c/f developing PNA  ::CXR with opacification of LLL  -PNA potential cause of hypoxia  -wbc 12.6 (down)  -zosyn (Day 6)     GI:  #c/f UGIB  -PPI BID  -Holding AC  -BID CBC  -Active T&S, consented  -boluses PRN     Renal:  #TRACY on CKD likely 2/2 arrest  -Cr at OSH 1.3  -worsening Cr  -minimal UO since arrest  -- Worsening TRACY and acidosis, renal consulted     Endo:   #T2DM  -Hold home metformin   -Mild SSI + hypoglycemia protocol      Infectious  See pulm section for PNA concerns      F: 500 ml  E: K>4, Mag>2  N: NPO  G: pantoprazole  A: PIV, OG  DVT: HELD  CODE: DNR/okay to intubate (confirmed with family 11/21)  NOK: Daughter Josy [phone]      Stephen Clayton MD  Internal Medicine PGY-2

## 2024-11-25 NOTE — PROGRESS NOTES
Humaira Huang   64 [id number]    @WT@  N/Room: [id number]/14/14-A    Subjective: No clinical event over night. Follows command.    Objective:   UOP 245ml in last 24 hours.    Meds:   aspirin, 81 mg, Daily  atorvastatin, 80 mg, Nightly  insulin lispro, 0-5 Units, q4h  lactated Ringer's, 1,000 mL, Once  oxygen, , Continuous - Inhalation  pantoprazole, 40 mg, BID  piperacillin-tazobactam, 2.25 g, q8h  polyethylene glycol, 17 g, BID  sennosides-docusate sodium, 1 tablet, Nightly  thiamine, 100 mg, Daily      amiodarone, Last Rate: 0.5 mg/min (11/25/24 1200)  fentaNYL, Last Rate: 75 mcg/hr (11/25/24 1426)      acetaminophen, 650 mg, q4h PRN   Or  acetaminophen, 650 mg, q4h PRN   Or  acetaminophen, 650 mg, q4h PRN  dextrose, 12.5 g, q15 min PRN  dextrose, 25 g, q15 min PRN  glucagon, 1 mg, q15 min PRN  glucagon, 1 mg, q15 min PRN  hydrOXYzine HCL, 10 mg, q6h PRN  ipratropium-albuteroL, 3 mL, q6h PRN  oxyCODONE, 2.5 mg, q4h PRN  oxygen, , Continuous PRN - O2/gases        Vitals:    11/25/24 1200   BP: 114/61   Pulse: 64   Resp: 18   Temp: 36.8 °C (98.2 °F)   SpO2: 100%          Intake/Output Summary (Last 24 hours) at 11/25/2024 1525  Last data filed at 11/25/2024 1200  Gross per 24 hour   Intake 747.58 ml   Output 180 ml   Net 567.58 ml       General appearance: no distress  Eyes: non-icteric  Skin: no apparent rash  Heart: [id number]  Lungs: CTA bilat no wheezing/crackles  Abdomen: soft, nt/nd  Extremities: 1+ edema bilat  Orozco  Neuro: No FND,no asterixis      Blood Labs:  Results for orders placed or performed during the hospital encounter of 11/18/24 (from the past 24 hours)   CBC   Result Value Ref Range    WBC 13.9 (H) 4.4 - 11.3 x10*3/uL    nRBC 0.7 (H) 0.0 - 0.0 /100 WBCs    RBC 2.56 (L) 4.00 - 5.20 x10*6/uL    Hemoglobin 7.8 (L) 12.0 - 16.0 g/dL    Hematocrit 24.4 (L) 36.0 - 46.0 %    MCV 95 80 - 100 fL    MCH 30.5 26.0 - 34.0 pg    MCHC 32.0 32.0 - 36.0 g/dL    RDW 14.0 11.5 - 14.5 %    Platelets 242 150 - 450  x10*3/uL   Blood Gas Lactic Acid, Venous   Result Value Ref Range    POCT Lactate, Venous 2.0 0.4 - 2.0 mmol/L   POCT GLUCOSE   Result Value Ref Range    POCT Glucose 159 (H) 74 - 99 mg/dL   POCT GLUCOSE   Result Value Ref Range    POCT Glucose 120 (H) 74 - 99 mg/dL   CBC and Auto Differential   Result Value Ref Range    WBC 13.3 (H) 4.4 - 11.3 x10*3/uL    nRBC 0.9 (H) 0.0 - 0.0 /100 WBCs    RBC 2.44 (L) 4.00 - 5.20 x10*6/uL    Hemoglobin 7.4 (L) 12.0 - 16.0 g/dL    Hematocrit 22.8 (L) 36.0 - 46.0 %    MCV 93 80 - 100 fL    MCH 30.3 26.0 - 34.0 pg    MCHC 32.5 32.0 - 36.0 g/dL    RDW 13.9 11.5 - 14.5 %    Platelets 251 150 - 450 x10*3/uL    Neutrophils % 77.2 40.0 - 80.0 %    Immature Granulocytes %, Automated 1.7 (H) 0.0 - 0.9 %    Lymphocytes % 11.7 13.0 - 44.0 %    Monocytes % 8.4 2.0 - 10.0 %    Eosinophils % 0.8 0.0 - 6.0 %    Basophils % 0.2 0.0 - 2.0 %    Neutrophils Absolute 10.27 (H) 1.20 - 7.70 x10*3/uL    Immature Granulocytes Absolute, Automated 0.22 0.00 - 0.70 x10*3/uL    Lymphocytes Absolute 1.56 1.20 - 4.80 x10*3/uL    Monocytes Absolute 1.12 (H) 0.10 - 1.00 x10*3/uL    Eosinophils Absolute 0.11 0.00 - 0.70 x10*3/uL    Basophils Absolute 0.03 0.00 - 0.10 x10*3/uL   Renal function panel   Result Value Ref Range    Glucose 138 (H) 74 - 99 mg/dL    Sodium 142 136 - 145 mmol/L    Potassium 4.3 3.5 - 5.3 mmol/L    Chloride 100 98 - 107 mmol/L    Bicarbonate 22 21 - 32 mmol/L    Anion Gap 24 (H) 10 - 20 mmol/L    Urea Nitrogen 84 (H) 6 - 23 mg/dL    Creatinine 5.62 (H) 0.50 - 1.05 mg/dL    eGFR 8 (L) >60 mL/min/1.73m*2    Calcium 7.1 (L) 8.6 - 10.6 mg/dL    Phosphorus 7.2 (H) 2.5 - 4.9 mg/dL    Albumin 2.8 (L) 3.4 - 5.0 g/dL   Magnesium   Result Value Ref Range    Magnesium 2.44 (H) 1.60 - 2.40 mg/dL   POCT GLUCOSE   Result Value Ref Range    POCT Glucose 155 (H) 74 - 99 mg/dL   POCT GLUCOSE   Result Value Ref Range    POCT Glucose 125 (H) 74 - 99 mg/dL              ASSESSMENT:  Humaira Huang is a  64 y.o.   Year old , with PMHx of  pancreatitis, DVT/PE, HLD, HTN, CABGx4 1997, T2DM, GERD, PAD, chronic mesenteric ischemia, tobacco use who presented to [facility] on 11/18/2024 as a transfer from [facility] with chest, back, and abdominal pain. 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 Vascular and Cardiac surgery following. CICU course c/b PEA arrest 11/20, now intubated. Nephrology following for TRACY.     #Oliguric TRACY Baseline creatinine 1.5   - in past 24 hours.  -UA 1+ protein, 3+ blood, 1+ ketones, 75 LE, >50 WBC, >20 RBC  Urine sodium 72, Fena 2.1%, suspected ATN  Urine chloride <15  -Acidbase are acceptable.  -Hemodynamics-not on any pressor support  -Etio :TRACY likely in setting recent hemodynamic insult with PEA.    RECOMMENDATIONS:  -No RRT indication at present.  -Recommend US KUB.  -Would recommend diuretic trial as per primary.  -Follow daily BMP trend.  -Strict I/Os.  -No contrast or nephrotoxic drugs if possible.  -Would follow.      Martha James MD  Nephrology Fellow   Daytime / Weekend Renal Pager [phone]  After 7 pm Emergencies [phone] Pager [phone]

## 2024-11-26 ENCOUNTER — APPOINTMENT (OUTPATIENT)
Dept: RADIOLOGY | Facility: HOSPITAL | Age: 64
End: 2024-11-26
Payer: MEDICARE

## 2024-11-26 LAB
ABO GROUP (TYPE) IN BLOOD: NORMAL
ALBUMIN SERPL BCP-MCNC: 2.5 G/DL (ref 3.4–5)
ANION GAP BLDV CALCULATED.4IONS-SCNC: 17 MMOL/L (ref 10–25)
ANION GAP SERPL CALC-SCNC: 21 MMOL/L (ref 10–20)
ANTIBODY SCREEN: NORMAL
BACTERIA UR CULT: NO GROWTH
BASE EXCESS BLDV CALC-SCNC: -5.8 MMOL/L (ref -2–3)
BASOPHILS # BLD AUTO: 0.04 X10*3/UL (ref 0–0.1)
BASOPHILS NFR BLD AUTO: 0.3 %
BODY TEMPERATURE: 37 DEGREES CELSIUS
BUN SERPL-MCNC: 91 MG/DL (ref 6–23)
CA-I BLDV-SCNC: 0.99 MMOL/L (ref 1.1–1.33)
CALCIUM SERPL-MCNC: 7.2 MG/DL (ref 8.6–10.6)
CHLORIDE BLDV-SCNC: 100 MMOL/L (ref 98–107)
CHLORIDE SERPL-SCNC: 101 MMOL/L (ref 98–107)
CO2 SERPL-SCNC: 21 MMOL/L (ref 21–32)
CREAT SERPL-MCNC: 6.75 MG/DL (ref 0.5–1.05)
EGFRCR SERPLBLD CKD-EPI 2021: 6 ML/MIN/1.73M*2
EOSINOPHIL # BLD AUTO: 0.2 X10*3/UL (ref 0–0.7)
EOSINOPHIL NFR BLD AUTO: 1.5 %
ERYTHROCYTE [DISTWIDTH] IN BLOOD BY AUTOMATED COUNT: 13.8 % (ref 11.5–14.5)
ERYTHROCYTE [DISTWIDTH] IN BLOOD BY AUTOMATED COUNT: 14.1 % (ref 11.5–14.5)
GLUCOSE BLD MANUAL STRIP-MCNC: 145 MG/DL (ref 74–99)
GLUCOSE BLD MANUAL STRIP-MCNC: 170 MG/DL (ref 74–99)
GLUCOSE BLD MANUAL STRIP-MCNC: 188 MG/DL (ref 74–99)
GLUCOSE BLD MANUAL STRIP-MCNC: 194 MG/DL (ref 74–99)
GLUCOSE BLD MANUAL STRIP-MCNC: 201 MG/DL (ref 74–99)
GLUCOSE BLDV-MCNC: 152 MG/DL (ref 74–99)
GLUCOSE SERPL-MCNC: 149 MG/DL (ref 74–99)
HCO3 BLDV-SCNC: 20.7 MMOL/L (ref 22–26)
HCT VFR BLD AUTO: 20.9 % (ref 36–46)
HCT VFR BLD AUTO: 21.9 % (ref 36–46)
HCT VFR BLD EST: 22 % (ref 36–46)
HGB BLD-MCNC: 7 G/DL (ref 12–16)
HGB BLD-MCNC: 7 G/DL (ref 12–16)
HGB BLDV-MCNC: 7.4 G/DL (ref 12–16)
HOLD SPECIMEN: NORMAL
IMM GRANULOCYTES # BLD AUTO: 0.25 X10*3/UL (ref 0–0.7)
IMM GRANULOCYTES NFR BLD AUTO: 1.9 % (ref 0–0.9)
INHALED O2 CONCENTRATION: 50 %
LACTATE BLDV-SCNC: 0.5 MMOL/L (ref 0.4–2)
LYMPHOCYTES # BLD AUTO: 1.61 X10*3/UL (ref 1.2–4.8)
LYMPHOCYTES NFR BLD AUTO: 12.2 %
MAGNESIUM SERPL-MCNC: 2.33 MG/DL (ref 1.6–2.4)
MCH RBC QN AUTO: 29.5 PG (ref 26–34)
MCH RBC QN AUTO: 30.3 PG (ref 26–34)
MCHC RBC AUTO-ENTMCNC: 32 G/DL (ref 32–36)
MCHC RBC AUTO-ENTMCNC: 33.5 G/DL (ref 32–36)
MCV RBC AUTO: 91 FL (ref 80–100)
MCV RBC AUTO: 92 FL (ref 80–100)
MONOCYTES # BLD AUTO: 1.2 X10*3/UL (ref 0.1–1)
MONOCYTES NFR BLD AUTO: 9.1 %
NEUTROPHILS # BLD AUTO: 9.92 X10*3/UL (ref 1.2–7.7)
NEUTROPHILS NFR BLD AUTO: 75 %
NRBC BLD-RTO: 0.7 /100 WBCS (ref 0–0)
NRBC BLD-RTO: 1 /100 WBCS (ref 0–0)
OXYHGB MFR BLDV: 62.9 % (ref 45–75)
PCO2 BLDV: 45 MM HG (ref 41–51)
PH BLDV: 7.27 PH (ref 7.33–7.43)
PHOSPHATE SERPL-MCNC: 7.7 MG/DL (ref 2.5–4.9)
PLATELET # BLD AUTO: 246 X10*3/UL (ref 150–450)
PLATELET # BLD AUTO: 270 X10*3/UL (ref 150–450)
PO2 BLDV: 42 MM HG (ref 35–45)
POTASSIUM BLDV-SCNC: 4 MMOL/L (ref 3.5–5.3)
POTASSIUM SERPL-SCNC: 4.1 MMOL/L (ref 3.5–5.3)
RBC # BLD AUTO: 2.31 X10*6/UL (ref 4–5.2)
RBC # BLD AUTO: 2.37 X10*6/UL (ref 4–5.2)
RH FACTOR (ANTIGEN D): NORMAL
SAO2 % BLDV: 64 % (ref 45–75)
SODIUM BLDV-SCNC: 134 MMOL/L (ref 136–145)
SODIUM SERPL-SCNC: 139 MMOL/L (ref 136–145)
WBC # BLD AUTO: 12.6 X10*3/UL (ref 4.4–11.3)
WBC # BLD AUTO: 13.2 X10*3/UL (ref 4.4–11.3)

## 2024-11-26 PROCEDURE — 37799 UNLISTED PX VASCULAR SURGERY: CPT

## 2024-11-26 PROCEDURE — 84132 ASSAY OF SERUM POTASSIUM: CPT

## 2024-11-26 PROCEDURE — 99291 CRITICAL CARE FIRST HOUR: CPT

## 2024-11-26 PROCEDURE — 85025 COMPLETE CBC W/AUTO DIFF WBC: CPT

## 2024-11-26 PROCEDURE — 2500000005 HC RX 250 GENERAL PHARMACY W/O HCPCS

## 2024-11-26 PROCEDURE — 97162 PT EVAL MOD COMPLEX 30 MIN: CPT | Mod: GP

## 2024-11-26 PROCEDURE — 2500000004 HC RX 250 GENERAL PHARMACY W/ HCPCS (ALT 636 FOR OP/ED)

## 2024-11-26 PROCEDURE — 2500000004 HC RX 250 GENERAL PHARMACY W/ HCPCS (ALT 636 FOR OP/ED): Performed by: STUDENT IN AN ORGANIZED HEALTH CARE EDUCATION/TRAINING PROGRAM

## 2024-11-26 PROCEDURE — 99233 SBSQ HOSP IP/OBS HIGH 50: CPT | Performed by: INTERNAL MEDICINE

## 2024-11-26 PROCEDURE — 94003 VENT MGMT INPAT SUBQ DAY: CPT

## 2024-11-26 PROCEDURE — 2500000002 HC RX 250 W HCPCS SELF ADMINISTERED DRUGS (ALT 637 FOR MEDICARE OP, ALT 636 FOR OP/ED)

## 2024-11-26 PROCEDURE — 97530 THERAPEUTIC ACTIVITIES: CPT | Mod: GO

## 2024-11-26 PROCEDURE — 85027 COMPLETE CBC AUTOMATED: CPT

## 2024-11-26 PROCEDURE — 2500000001 HC RX 250 WO HCPCS SELF ADMINISTERED DRUGS (ALT 637 FOR MEDICARE OP)

## 2024-11-26 PROCEDURE — 86850 RBC ANTIBODY SCREEN: CPT

## 2024-11-26 PROCEDURE — 86923 COMPATIBILITY TEST ELECTRIC: CPT

## 2024-11-26 PROCEDURE — 2500000005 HC RX 250 GENERAL PHARMACY W/O HCPCS: Performed by: INTERNAL MEDICINE

## 2024-11-26 PROCEDURE — 1100000001 HC PRIVATE ROOM DAILY

## 2024-11-26 PROCEDURE — 83735 ASSAY OF MAGNESIUM: CPT

## 2024-11-26 PROCEDURE — 76770 US EXAM ABDO BACK WALL COMP: CPT | Performed by: STUDENT IN AN ORGANIZED HEALTH CARE EDUCATION/TRAINING PROGRAM

## 2024-11-26 PROCEDURE — 97166 OT EVAL MOD COMPLEX 45 MIN: CPT | Mod: GO

## 2024-11-26 PROCEDURE — 82947 ASSAY GLUCOSE BLOOD QUANT: CPT

## 2024-11-26 PROCEDURE — 97530 THERAPEUTIC ACTIVITIES: CPT | Mod: GP

## 2024-11-26 PROCEDURE — 86900 BLOOD TYPING SEROLOGIC ABO: CPT

## 2024-11-26 PROCEDURE — 76770 US EXAM ABDO BACK WALL COMP: CPT

## 2024-11-26 RX ORDER — FENTANYL CITRATE-0.9 % NACL/PF 10 MCG/ML
25-200 PLASTIC BAG, INJECTION (ML) INTRAVENOUS CONTINUOUS
Status: DISPENSED | OUTPATIENT
Start: 2024-11-26 | End: 2024-11-27

## 2024-11-26 RX ORDER — AMIODARONE HYDROCHLORIDE 200 MG/1
400 TABLET ORAL 2 TIMES DAILY
Status: DISCONTINUED | OUTPATIENT
Start: 2024-11-26 | End: 2024-11-28

## 2024-11-26 RX ORDER — DEXMEDETOMIDINE HYDROCHLORIDE 4 UG/ML
0-1.5 INJECTION, SOLUTION INTRAVENOUS CONTINUOUS
Status: DISCONTINUED | OUTPATIENT
Start: 2024-11-26 | End: 2024-12-01

## 2024-11-26 RX ORDER — DEXMEDETOMIDINE HYDROCHLORIDE 4 UG/ML
.1-1.5 INJECTION, SOLUTION INTRAVENOUS CONTINUOUS
Status: DISCONTINUED | OUTPATIENT
Start: 2024-11-26 | End: 2024-11-26

## 2024-11-26 RX ADMIN — Medication 25 MCG/HR: at 18:00

## 2024-11-26 RX ADMIN — Medication 40 PERCENT: at 08:31

## 2024-11-26 RX ADMIN — Medication 40 PERCENT: at 09:13

## 2024-11-26 RX ADMIN — Medication 40 PERCENT: at 21:21

## 2024-11-26 RX ADMIN — Medication 40 PERCENT: at 14:10

## 2024-11-26 RX ADMIN — POLYETHYLENE GLYCOL 3350 17 G: 17 POWDER, FOR SOLUTION ORAL at 20:10

## 2024-11-26 RX ADMIN — INSULIN LISPRO 1 UNITS: 100 INJECTION, SOLUTION INTRAVENOUS; SUBCUTANEOUS at 16:33

## 2024-11-26 RX ADMIN — AMIODARONE HYDROCHLORIDE 400 MG: 200 TABLET ORAL at 20:10

## 2024-11-26 RX ADMIN — Medication 100 MCG/HR: at 01:00

## 2024-11-26 RX ADMIN — OXYCODONE HYDROCHLORIDE 2.5 MG: 5 TABLET ORAL at 08:58

## 2024-11-26 RX ADMIN — AMIODARONE HYDROCHLORIDE 0.5 MG/MIN: 1.8 INJECTION, SOLUTION INTRAVENOUS at 04:11

## 2024-11-26 RX ADMIN — PANTOPRAZOLE SODIUM 40 MG: 40 INJECTION, POWDER, FOR SOLUTION INTRAVENOUS at 20:10

## 2024-11-26 RX ADMIN — Medication 75 MCG/HR: at 19:23

## 2024-11-26 RX ADMIN — PANTOPRAZOLE SODIUM 40 MG: 40 INJECTION, POWDER, FOR SOLUTION INTRAVENOUS at 08:47

## 2024-11-26 RX ADMIN — SENNOSIDES AND DOCUSATE SODIUM 1 TABLET: 50; 8.6 TABLET ORAL at 20:10

## 2024-11-26 RX ADMIN — INSULIN LISPRO 1 UNITS: 100 INJECTION, SOLUTION INTRAVENOUS; SUBCUTANEOUS at 09:07

## 2024-11-26 RX ADMIN — AMIODARONE HYDROCHLORIDE 400 MG: 200 TABLET ORAL at 08:47

## 2024-11-26 RX ADMIN — PIPERACILLIN SODIUM AND TAZOBACTAM SODIUM 2.25 G: 2; .25 INJECTION, SOLUTION INTRAVENOUS at 16:23

## 2024-11-26 RX ADMIN — SODIUM CHLORIDE 500 ML: 9 INJECTION, SOLUTION INTRAVENOUS at 15:11

## 2024-11-26 RX ADMIN — INSULIN LISPRO 1 UNITS: 100 INJECTION, SOLUTION INTRAVENOUS; SUBCUTANEOUS at 13:05

## 2024-11-26 RX ADMIN — THIAMINE HCL TAB 100 MG 100 MG: 100 TAB at 08:47

## 2024-11-26 RX ADMIN — INSULIN LISPRO 2 UNITS: 100 INJECTION, SOLUTION INTRAVENOUS; SUBCUTANEOUS at 20:51

## 2024-11-26 RX ADMIN — ATORVASTATIN CALCIUM 80 MG: 80 TABLET, FILM COATED ORAL at 20:10

## 2024-11-26 RX ADMIN — PIPERACILLIN SODIUM AND TAZOBACTAM SODIUM 2.25 G: 2; .25 INJECTION, SOLUTION INTRAVENOUS at 08:47

## 2024-11-26 RX ADMIN — ASPIRIN 81 MG CHEWABLE TABLET 81 MG: 81 TABLET CHEWABLE at 08:47

## 2024-11-26 ASSESSMENT — COGNITIVE AND FUNCTIONAL STATUS - GENERAL
EATING MEALS: TOTAL
PERSONAL GROOMING: A LOT
DAILY ACTIVITIY SCORE: 9
TURNING FROM BACK TO SIDE WHILE IN FLAT BAD: TOTAL
MOBILITY SCORE: 6
HELP NEEDED FOR BATHING: A LOT
CLIMB 3 TO 5 STEPS WITH RAILING: TOTAL
TOILETING: TOTAL
MOVING TO AND FROM BED TO CHAIR: TOTAL
WALKING IN HOSPITAL ROOM: TOTAL
STANDING UP FROM CHAIR USING ARMS: TOTAL
DRESSING REGULAR UPPER BODY CLOTHING: A LOT
DRESSING REGULAR LOWER BODY CLOTHING: TOTAL
MOVING FROM LYING ON BACK TO SITTING ON SIDE OF FLAT BED WITH BEDRAILS: TOTAL

## 2024-11-26 ASSESSMENT — PAIN - FUNCTIONAL ASSESSMENT
PAIN_FUNCTIONAL_ASSESSMENT: CPOT (CRITICAL CARE PAIN OBSERVATION TOOL)
PAIN_FUNCTIONAL_ASSESSMENT: 0-10

## 2024-11-26 ASSESSMENT — PAIN SCALES - GENERAL
PAINLEVEL_OUTOF10: 0 - NO PAIN
PAINLEVEL_OUTOF10: 0 - NO PAIN

## 2024-11-26 ASSESSMENT — ACTIVITIES OF DAILY LIVING (ADL): BATHING_ASSISTANCE: MAXIMAL

## 2024-11-26 NOTE — PROGRESS NOTES
Humaira Huang   64 y.o.    @@  N/Room: [id number]/14/14-A    Subjective: No clinical event over night. No recovery from renal standpoint.    Objective:   UOP 160ml in last 24 hour after diuretic trial with bumex 4mg and lasix 80mg.  Cr trending up  Meds:   amiodarone, 400 mg, BID  aspirin, 81 mg, Daily  atorvastatin, 80 mg, Nightly  insulin lispro, 0-5 Units, q4h  lactated Ringer's, 1,000 mL, Once  oxygen, , Continuous - Inhalation  pantoprazole, 40 mg, BID  piperacillin-tazobactam, 2.25 g, q8h  polyethylene glycol, 17 g, BID  sennosides-docusate sodium, 1 tablet, Nightly  thiamine, 100 mg, Daily      bumetanide, Last Rate: Stopped (11/26/24 0200)  fentaNYL, Last Rate: Stopped (11/26/24 0900)      acetaminophen, 650 mg, q4h PRN   Or  acetaminophen, 650 mg, q4h PRN   Or  acetaminophen, 650 mg, q4h PRN  dextrose, 12.5 g, q15 min PRN  dextrose, 25 g, q15 min PRN  glucagon, 1 mg, q15 min PRN  glucagon, 1 mg, q15 min PRN  hydrOXYzine HCL, 10 mg, q6h PRN  ipratropium-albuteroL, 3 mL, q6h PRN  oxyCODONE, 2.5 mg, q4h PRN  oxygen, , Continuous PRN - O2/gases        Vitals:    11/26/24 1200   BP: 106/58   Pulse: 67   Resp: 18   Temp: 36.7 °C (98.1 °F)   SpO2: 100%          Intake/Output Summary (Last 24 hours) at 11/26/2024 1303  Last data filed at 11/26/2024 1200  Gross per 24 hour   Intake 1192.79 ml   Output 125 ml   Net 1067.79 ml       General appearance: no distress  Eyes: non-icteric  Skin: no apparent rash  Heart: [id number]  Lungs: CTA bilat no wheezing/crackles  Abdomen: soft, nt/nd  Extremities: 1+ edema bilat  Orozco  Neuro: No FND,no asterixis      Blood Labs:  Results for orders placed or performed during the hospital encounter of 11/18/24 (from the past 24 hours)   Osmolality, Urine   Result Value Ref Range    Osmolality, Urine Random 344 200 - 1,200 mOsm/kg   Albumin-Creatinine Ratio, Urine Random   Result Value Ref Range    Albumin, Urine Random 426.2 Not established mg/L    Creatinine, Urine Random 116.8  20.0 - 320.0 mg/dL    Albumin/Creatinine Ratio 364.9 (H) <30.0 ug/mg Creat   Protein, Urine Random   Result Value Ref Range    Total Protein, Urine Random 176 (H) 5 - 24 mg/dL    Creatinine, Urine Random 116.8 20.0 - 320.0 mg/dL    T. Protein/Creatinine Ratio 1.51 (H) 0.00 - 0.17 mg/mg Creat   Electrolyte Panel, Urine   Result Value Ref Range    Sodium, Urine Random 40 mmol/L    Sodium/Creatinine Ratio 34 Not established. mmol/g Creat    Potassium, Urine Random 76 mmol/L    Potassium/Creatinine Ratio 65 Not established mmol/g Creat    Chloride, Urine Random <15 mmol/L    Chloride/Creatinine Ratio      Creatinine, Urine Random 116.8 20.0 - 320.0 mg/dL   POCT GLUCOSE   Result Value Ref Range    POCT Glucose 126 (H) 74 - 99 mg/dL   CBC   Result Value Ref Range    WBC 12.4 (H) 4.4 - 11.3 x10*3/uL    nRBC 0.9 (H) 0.0 - 0.0 /100 WBCs    RBC 2.46 (L) 4.00 - 5.20 x10*6/uL    Hemoglobin 7.4 (L) 12.0 - 16.0 g/dL    Hematocrit 23.1 (L) 36.0 - 46.0 %    MCV 94 80 - 100 fL    MCH 30.1 26.0 - 34.0 pg    MCHC 32.0 32.0 - 36.0 g/dL    RDW 14.1 11.5 - 14.5 %    Platelets 254 150 - 450 x10*3/uL   Magnesium   Result Value Ref Range    Magnesium 2.23 1.60 - 2.40 mg/dL   Renal Function Panel   Result Value Ref Range    Glucose 135 (H) 74 - 99 mg/dL    Sodium 141 136 - 145 mmol/L    Potassium 4.3 3.5 - 5.3 mmol/L    Chloride 100 98 - 107 mmol/L    Bicarbonate 22 21 - 32 mmol/L    Anion Gap 23 (H) 10 - 20 mmol/L    Urea Nitrogen 87 (H) 6 - 23 mg/dL    Creatinine 6.28 (H) 0.50 - 1.05 mg/dL    eGFR 7 (L) >60 mL/min/1.73m*2    Calcium 7.3 (L) 8.6 - 10.6 mg/dL    Phosphorus 7.3 (H) 2.5 - 4.9 mg/dL    Albumin 2.6 (L) 3.4 - 5.0 g/dL   POCT GLUCOSE   Result Value Ref Range    POCT Glucose 145 (H) 74 - 99 mg/dL   POCT GLUCOSE   Result Value Ref Range    POCT Glucose 135 (H) 74 - 99 mg/dL   POCT GLUCOSE   Result Value Ref Range    POCT Glucose 145 (H) 74 - 99 mg/dL   CBC and Auto Differential   Result Value Ref Range    WBC 13.2 (H) 4.4 - 11.3  x10*3/uL    nRBC 1.0 (H) 0.0 - 0.0 /100 WBCs    RBC 2.31 (L) 4.00 - 5.20 x10*6/uL    Hemoglobin 7.0 (L) 12.0 - 16.0 g/dL    Hematocrit 20.9 (L) 36.0 - 46.0 %    MCV 91 80 - 100 fL    MCH 30.3 26.0 - 34.0 pg    MCHC 33.5 32.0 - 36.0 g/dL    RDW 13.8 11.5 - 14.5 %    Platelets 246 150 - 450 x10*3/uL    Neutrophils % 75.0 40.0 - 80.0 %    Immature Granulocytes %, Automated 1.9 (H) 0.0 - 0.9 %    Lymphocytes % 12.2 13.0 - 44.0 %    Monocytes % 9.1 2.0 - 10.0 %    Eosinophils % 1.5 0.0 - 6.0 %    Basophils % 0.3 0.0 - 2.0 %    Neutrophils Absolute 9.92 (H) 1.20 - 7.70 x10*3/uL    Immature Granulocytes Absolute, Automated 0.25 0.00 - 0.70 x10*3/uL    Lymphocytes Absolute 1.61 1.20 - 4.80 x10*3/uL    Monocytes Absolute 1.20 (H) 0.10 - 1.00 x10*3/uL    Eosinophils Absolute 0.20 0.00 - 0.70 x10*3/uL    Basophils Absolute 0.04 0.00 - 0.10 x10*3/uL   Renal function panel   Result Value Ref Range    Glucose 149 (H) 74 - 99 mg/dL    Sodium 139 136 - 145 mmol/L    Potassium 4.1 3.5 - 5.3 mmol/L    Chloride 101 98 - 107 mmol/L    Bicarbonate 21 21 - 32 mmol/L    Anion Gap 21 (H) 10 - 20 mmol/L    Urea Nitrogen 91 (HH) 6 - 23 mg/dL    Creatinine 6.75 (H) 0.50 - 1.05 mg/dL    eGFR 6 (L) >60 mL/min/1.73m*2    Calcium 7.2 (L) 8.6 - 10.6 mg/dL    Phosphorus 7.7 (H) 2.5 - 4.9 mg/dL    Albumin 2.5 (L) 3.4 - 5.0 g/dL   Magnesium   Result Value Ref Range    Magnesium 2.33 1.60 - 2.40 mg/dL   Type and screen   Result Value Ref Range    ABO TYPE AB     Rh TYPE POS     ANTIBODY SCREEN NEG    Blood Gas Venous Full Panel   Result Value Ref Range    POCT pH, Venous 7.27 (L) 7.33 - 7.43 pH    POCT pCO2, Venous 45 41 - 51 mm Hg    POCT pO2, Venous 42 35 - 45 mm Hg    POCT SO2, Venous 64 45 - 75 %    POCT Oxy Hemoglobin, Venous 62.9 45.0 - 75.0 %    POCT Hematocrit Calculated, Venous 22.0 (L) 36.0 - 46.0 %    POCT Sodium, Venous 134 (L) 136 - 145 mmol/L    POCT Potassium, Venous 4.0 3.5 - 5.3 mmol/L    POCT Chloride, Venous 100 98 - 107 mmol/L     POCT Ionized Calicum, Venous 0.99 (L) 1.10 - 1.33 mmol/L    POCT Glucose, Venous 152 (H) 74 - 99 mg/dL    POCT Lactate, Venous 0.5 0.4 - 2.0 mmol/L    POCT Base Excess, Venous -5.8 (L) -2.0 - 3.0 mmol/L    POCT HCO3 Calculated, Venous 20.7 (L) 22.0 - 26.0 mmol/L    POCT Hemoglobin, Venous 7.4 (L) 12.0 - 16.0 g/dL    POCT Anion Gap, Venous 17.0 10.0 - 25.0 mmol/L    Patient Temperature 37.0 degrees Celsius    FiO2 50 %   POCT GLUCOSE   Result Value Ref Range    POCT Glucose 188 (H) 74 - 99 mg/dL   POCT GLUCOSE   Result Value Ref Range    POCT Glucose 170 (H) 74 - 99 mg/dL              ASSESSMENT:  Humaira Huang is a  64 y.o.  Year old , with PMHx of  pancreatitis, DVT/PE, HLD, HTN, CABGx4 1997, T2DM, GERD, PAD, chronic mesenteric ischemia, tobacco use who presented to [facility] on 11/18/[facility]24 as a transfer from [facility] with chest, back, and abdominal pain. 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 Vascular and Cardiac surgery following. CICU course c/b PEA arrest 11/20, now intubated. Nephrology following for TRACY.     #Oliguric TRACY Baseline creatinine 1.5   -UOP 160ml in past 24 hours  after diuretic trial.  -UA 1+ protein, 3+ blood, 1+ ketones, 75 LE, >50 WBC, >20 RBC  Urine sodium 72, Fena 2.1%, suspected ATN  Urine chloride <15  -Acidbase are acceptable.  -Hemodynamics-not on any pressor support  -Etio :TRACY likely in setting recent hemodynamic insult with PEA.    RECOMMENDATIONS:  -No RRT indication at present.Will eval daily for RRT needs.  -Conservative management for now.  -Follow daily BMP trend.  -Strict I/Os.  -No contrast or nephrotoxic drugs if possible.  -Would follow.      Martha James MD  Nephrology Fellow   Daytime / Weekend Renal Pager [phone]  After 7 pm Emergencies [phone] Pager [phone]

## 2024-11-26 NOTE — PROGRESS NOTES
Physical Therapy    Physical Therapy Evaluation & Treatment    Patient Name: Humaira Huang  MRN: [id number]  Department: [facility]  Room: 14/14-A  Today's Date: 11/26/2024   Time Calculation  Start Time: 1034  Stop Time: 1101  Time Calculation (min): 27 min    Assessment/Plan   PT Assessment  PT Assessment Results: Decreased range of motion, Decreased strength, Decreased endurance, Impaired balance, Decreased mobility, Decreased cognition  Rehab Prognosis: Good  Barriers to Discharge: medical acuity  End of Session Communication: Bedside nurse  End of Session Patient Position: Bed, 3 rail up, Alarm off, not on at start of session (B soft wrist restraints donned)   IP OR SWING BED PT PLAN  Inpatient or Swing Bed: Inpatient  PT Plan  Treatment/Interventions: Bed mobility, Transfer training, Gait training, Balance training, Strengthening, Range of motion, Endurance training, Therapeutic exercise, Therapeutic activity, Positioning, Postural re-education  PT Plan: Ongoing PT  PT Frequency: 3 times per week  PT Discharge Recommendations: Moderate intensity level of continued care  PT Recommended Transfer Status: Assist x2  PT - OK to Discharge: Yes    Subjective     General Visit Information:  General  Reason for Referral: Presented from OSH 11/18 with chest, back and abdominal pain. 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 Course c/b PEA arrest 11/20 requiring 3 rounds of CPR and intubation. Unable to extubate d/t multiple failed SBT trials, c/f developing PNA  Past Medical History Relevant to Rehab: history of pancreatitis, DVT/PE, HLD, HTN, CABGx4 1997, T2DM, GERD, PAD, chronic mesenteric ischemia, tobacco use  Family/Caregiver Present: No  Co-Treatment: OT  Co-Treatment Reason: pt intubated with intent to mobilize to EOB  Prior to Session Communication: Bedside nurse  Patient Position Received: Bed, 3 rail up,  Alarm off, not on at start of session (B soft wrist restraints donned)  General Comment: Pt cooperative with therapy. Frequently point at abdomen but intermittent ability to answer questions 2/2 level of lethargy. Pt not oriented except to name and year. CAM-ICU (+). Unable to obtain PLOF/home set-up from pt at this time.    Home Living:  Home Living  Home Living Comments: Unable to obtain 2/2 pt level of lethargy and confusion    Prior Level of Function:  Prior Function Per Pt/Caregiver Report  Prior Function Comments: Unable to obtain 2/2 pt level of lethargy and confusion    Precautions:  Precautions  Hearing/Visual Limitations: Pt able to track in B directions to end range  Medical Precautions: Cardiac precautions, Fall precautions, Oxygen therapy device and L/min  Precautions Comment: Impulse control: SBP<120    Lines/Tubes:   Telemetry   ET tube (VC-AC, 40% FiO2, 18 RR, 8 PEEP)   OG tube   Fentanyl had been paused   Orozco     Objective     Pain:  Pain Assessment  Pain Assessment: 0-10  0-10 (Numeric) Pain Score:  (Pt nodded head yes to being in pain, pointed at abdomen. Unable to rate. Pt frequently pointing at abdomen throughout session. RN notified.)    Cognition:  Cognition  Overall Cognitive Status: Impaired  Arousal/Alertness: Inconsistent responses to stimuli (delayed at times related to level of lethargy for pt)  Orientation Level:  (oriented to name and year. Not oriented to month or place. Reorientation provided multiple times throughout.)  Following Commands:  (pt followed ~90% one step commands wtih frequent repetition and tactile cues)    General Assessments:   Activity Tolerance  Early Mobility/Exercise Safety Screen: Proceed with mobilization - No exclusion criteria met    Sensation  Light Touch:  (unable to assess with 100% accuracy 2/2 impaired cognition, but pt reported she can feel light touch in B LEs)    Strength  Strength Comments: B ankle DF/PF 3/5, B LE EXT 3-/5.  Strength  Strength  Comments: B ankle DF/PF 3/5, B LE EXT 3-/5.    Static Sitting Balance  Static Sitting-Balance Support: No upper extremity supported, Feet supported  Static Sitting-Level of Assistance: Dependent (x1 person for posterior support)  Dynamic Sitting Balance  Dynamic Sitting-Balance Support: No upper extremity supported, Feet supported  Dynamic Sitting-Level of Assistance: Dependent (x1 person)    Static Standing Balance  Static Standing-Balance Support:  (N/A)  Dynamic Standing Balance  Dynamic Standing-Balance Support:  (N/A)    Functional Assessments:  Bed Mobility  Bed Mobility: Yes  Bed Mobility 1  Bed Mobility 1: Supine to sitting, Sitting to supine  Level of Assistance 1: Dependent (x2 person; more assistance provided as pt reporting abominal pain; during transfer from supine->sit - pt retropulsive with B UEs pushing onto bed. When asked about if pt was afraid she nodded yes.)  Bed Mobility 2  Bed Mobility  2: Forward lean  Level of Assistance 2: Moderate assistance (x2 person with B HHA from chair position with pt able to actively assist in pulling self forward)    Extremity/Trunk Assessments:  RLE   RLE :  (WFL, however pt having pain with end range of ankle DF and B knee EXT)  LLE   LLE :  (WFL, however pt having pain with end range of ankle DF and B knee EXT)    Treatments:  Therapeutic Exercise  Therapeutic Exercise Performed: Yes  Therapeutic Exercise Activity 1: Therapist assisted with AAROM/PROM of B ankle DF/PF and B knee EXT/FLEX x 6 reps.    Therapeutic Activity  Therapeutic Activity Performed: Yes  Therapeutic Activity 1: Pt sat in chair position in bed at partial chair posiiton for 5 minutes and full chair position for 5 minutes. Hemodynamic monitoring during progression with tactile cues given to assist in stimulating increased arousal.  Therapeutic Activity 2: Pt sat EOB total of 3 minutes with depAx1 for posterior trunk support. Purpose for increased upright tolerance, lung/airway clearance, and  pressure relief.    Outcome Measures:  [facility] Basic Mobility  Turning from your back to your side while in a flat bed without using bedrails: Total  Moving from lying on your back to sitting on the side of a flat bed without using bedrails: Total  Moving to and from bed to chair (including a wheelchair): Total  Standing up from a chair using your arms (e.g. wheelchair or bedside chair): Total  To walk in hospital room: Total  Climbing 3-5 steps with railing: Total  Basic Mobility - Total Score: 6    Confusion Assessment Method-ICU (CAM-ICU)  Feature 1: Acute Onset or Fluctuating Course: Positive  Feature 2: Inattention: Positive  Feature 3: Altered Level of Consciousness: Positive  Feature 4: Disorganized Thinking: Positive  Overall CAM-ICU: Positive    FSS-ICU  Ambulation: Unable to attempt due to weakness  Rolling: Total assistance (performs 25% or requires another person)  Sitting: Total assistance (performs 25% or requires another person)  Transfer Sit-to-Stand: Unable to perform  Transfer Supine-to-Sit: Total assistance (performs 25% or requires another person)  Total Score: 3    Early Mobility/Exercise Safety Screen: Proceed with mobilization - No exclusion criteria met  ICU Mobility Scale: Sitting over edge of bed [3]  E = Exercise and Early Mobility  Early Mobility/Exercise Safety Screen: Proceed with mobilization - No exclusion criteria met  ICU Mobility Scale: Sitting over edge of bed    Encounter Problems       Encounter Problems (Active)       Balance       Pt will be able to sit statically/dynamically EOB >12 minutes with CGA with UE support as needed for improved postural control (Progressing)       Start:  11/26/24    Expected End:  12/10/24               Mobility          PT Transfers       Transfer from bed to chair with modAx2 (Progressing)       Start:  11/26/24    Expected End:  12/10/24            Patient to transfer to and from sit to supine with modAx1 (Progressing)       Start:  11/26/24     Expected End:  12/10/24            Patient will transfer sit to and from stand with modAx2 (Progressing)       Start:  11/26/24    Expected End:  12/10/24               Pain - Adult            Education Documentation  Mobility Training, taught by Litzy Pérez PT at 11/26/2024  1:26 PM.  Learner: Patient  Readiness: Acceptance  Method: Explanation  Response: Needs Reinforcement  Comment: mobility precautions    Education Comments  No comments found.    Signed by Litzy Pérez DPT

## 2024-11-26 NOTE — PROGRESS NOTES
Critical Care Daily Progress      Subjective   Patient is a 64 y.o. female admitted on 11/18/2024 10:54 PM to the CICU for Aortic Dissection.     NAEO.    Following commands this AM    Objective     Vent Settings/Oxygen Drips   Vent Mode: Volume control/assist control  FiO2 (%):  [50 %] 50 %  S RR:  [18] 18  S VT:  [400 mL] 400 mL  PEEP/CPAP (cm H2O):  [8 cm H20] 8 cm H20  MAP (cm H2O):  [12-14] 13  amiodarone, 0.5 mg/min, Last Rate: 0.5 mg/min (11/26/24 0411)  bumetanide, 1 mg/hr, Last Rate: Stopped (11/26/24 0200)  fentaNYL,  mcg/hr, Last Rate: 100 mcg/hr (11/26/24 0100)         Vitals: I/O:   Vitals:    11/26/24 0600   BP: 116/72   Pulse: 60   Resp: 18   Temp: 36.1 °C (97 °F)   SpO2: 100%    Cuff: SBP 90s - 110s    24hr Min/Max:  Temp  Min: 36.1 °C (97 °F)  Max: 36.9 °C (98.4 °F)  Pulse  Min: 59  Max: 89  BP  Min: 95/54  Max: 162/99  Resp  Min: 13  Max: 19  SpO2  Min: 54 %  Max: 100 %   Intake/Output Summary (Last 24 hours) at 11/26/2024 0708  Last data filed at 11/26/2024 0600  Gross per 24 hour   Intake 1145.06 ml   Output 160 ml   Net 985.06 ml    Worsening UO w 160 ml 11/25    Net IO Since Admission: 7,013.46 mL [11/26/24 0708]      Scheduled Medications:  PRN Medications:    aspirin, 81 mg, oral, Daily  atorvastatin, 80 mg, oral, Nightly  insulin lispro, 0-5 Units, subcutaneous, q4h  lactated Ringer's, 1,000 mL, intravenous, Once  oxygen, , inhalation, Continuous - Inhalation  pantoprazole, 40 mg, intravenous, BID  piperacillin-tazobactam, 2.25 g, intravenous, q8h  polyethylene glycol, 17 g, oral, BID  sennosides-docusate sodium, 1 tablet, oral, Nightly  thiamine, 100 mg, oral, Daily     PRN medications: acetaminophen **OR** acetaminophen **OR** acetaminophen, dextrose, dextrose, glucagon, glucagon, hydrOXYzine HCL, ipratropium-albuteroL, oxyCODONE, oxygen       Physical Exam:  Physical Exam  Constitutional:       Appearance: She is toxic-appearing.      Comments: Intubated, sedated, following commands  - opening eyes to voice, squeezing hands b/l  frail   Eyes:      General: No scleral icterus.  Cardiovascular:      Rate and Rhythm: Normal rate and regular rhythm.   Pulmonary:      Effort: No respiratory distress.      Breath sounds: Normal breath sounds. No wheezing or rhonchi.      Comments: ET tube in place; mild wheeze b/l  Abdominal:      General: There is no distension.      Palpations: Abdomen is soft.      Tenderness: There is no abdominal tenderness. There is no guarding.   Musculoskeletal:         General: No swelling or tenderness.   Skin:     General: Skin is warm and dry.      Findings: No bruising or rash.   Neurological:      Comments: sedated                LABS:    CBC RFP   Lab Results   Component Value Date    WBC 13.2 (H) 11/26/2024    WBC 12.4 (H) 11/25/2024    WBC 13.3 (H) 11/25/2024    HGB 7.0 (L) 11/26/2024    HGB 7.4 (L) 11/25/2024    HGB 7.4 (L) 11/25/2024    HCT 20.9 (L) 11/26/2024    MCV 91 11/26/2024     11/26/2024     11/25/2024     11/25/2024    NEUTROABS 9.92 (H) 11/26/2024       Blood cultures negative Lab Results   Component Value Date     11/26/2024    K 4.1 11/26/2024     11/26/2024    CO2 21 11/26/2024    BUN 91 (HH) 11/26/2024    CREATININE 6.75 (H) 11/26/2024    CREATININE 6.28 (H) 11/25/2024     Lab Results   Component Value Date    MG 2.33 11/26/2024    PHOS 7.7 (H) 11/26/2024    CALCIUM 7.2 (L) 11/26/2024         Hepatic Function ABG/VBG   Lab Results   Component Value Date    ALT 67 (H) 11/23/2024    AST 45 (H) 11/23/2024    ALKPHOS 95 11/23/2024     Lab Results   Component Value Date    BILIDIR 0.2 11/23/2024      Lab Results   Component Value Date    PROTIME 11.9 11/18/2024    APTT 29 11/18/2024    INR 1.1 11/18/2024      Lab Results   Component Value Date    LACTATE 1.7 11/20/2024          No results found for the last 90 days.         IMAGING:    CXR 11/26 with interstitial edema    Renal US pending      Assessment/Plan    64 y.o.  female with history of pancreatitis, DVT/PE, HLD, HTN, CABGx4 1997, T2DM, GERD, PAD, chronic mesenteric ischemia, tobacco use who presented to [facility] on 11/18/2024 as a transfer from [facility] with chest, back, and abdominal pain. 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 Vascular and Cardiac surgery following. CICU course c/b PEA arrest 11/20, now intubated and sedated, following commands.     11/26 Updates:  -fentanyl off early today  -worsening renal function -> nephro following  - ml s/p lasix 80 mg and Bumex 4 (CVP 26, repeat 2 and 6; first read likely error)  -Continues to fail SBT, likely needs improvement of pulm edema and more time off sedation  -TF at trickle  -BID CBC for downtrending Hgb  -Last day of zosyn today (for PNA)  -IV amio transitioned to PO  -Holding heparin    Neuro:  #Sedated  -fentanyl, held for SBTs  -following commands     Cardiac:  #PEA arrest  ::likely hypoxia 2/2 aspiration vs PNA  ::Trop peak 5700 -> 4900  -pressors off    #Aflutter  ::Now NSR  ::episode evening 11/20 causing hypotension  -amio  -Holding heparin    #Aortic dissection  #Distal aortic arch saccular aneurysm   ::CTA CAP 11/18- 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   -Vascular surgery discussed case at aortic conference and there are no plans for surgery, palliative measures recommended  -Holding heparin     #NSTEMI  #CAD s/p CABGx4 1997  #PAD  #HTN #DLD  ::EKG- NSR, rate 71, TWI aVL, V1-V6, 1mm ORQUIDEA in aVR  ::Trop peak 5700  ::Lipid HDL 41.5, , TG 75, Chol 157  -Holding home amlodipine 10mg, imdur 30mg, lisinopril 2.5mg, metop tartrate 50mg bid iso hypotension   -Continue ASA and atorvastatin  -Holding pavix 75mg     Pulm:  #Intubated  #c/f  developing PNA  ::CXR with opacification of LLL  -PNA potential cause of hypoxia  -wbc 12.6 (down)  -zosyn (Day 7)     GI:  #c/f UGIB  -PPI BID  -Holding AC  -BID CBC  -Active T&S, consented  -boluses PRN     Renal:  #TRACY on CKD likely 2/2 arrest  #Oliguria  -Cr at OSH 1.3  -worsening Cr and UO  -Worsening TRACY and acidosis, renal consulted     Endo:   #T2DM  -Hold home metformin   -Mild SSI + hypoglycemia protocol      Infectious  See pulm section for PNA concerns      F: 500 ml  E: K>4, Mag>2  N: NPO  G: pantoprazole  A: PIV, OG  DVT: HELD  CODE: DNR/okay to intubate (confirmed with family 11/21)  NOK: Daughter Josy [phone]      Stephen Clayton MD  Internal Medicine PGY-2

## 2024-11-26 NOTE — PROGRESS NOTES
Occupational Therapy    Evaluation and Treatment    Patient Name: Humaira Huang  MRN: [id number]  Today's Date: 11/26/2024  Room: 14/14A  Time Calculation  Start Time: 1034  Stop Time: 1102  Time Calculation (min): 28 min    Assessment  IP OT Assessment  OT Assessment: Pt is a 64 year old female who demonstrates decreased strength, balance, activity tolerance, and cognition, which impedes occupational performance.  Prognosis: Good  Barriers to Discharge:  (CLOF)  Evaluation/Treatment Tolerance: Patient tolerated treatment well  Medical Staff Made Aware: Yes  End of Session Communication: Bedside nurse  End of Session Patient Position: Bed, 3 rail up, Alarm off, not on at start of session (restraints secure)    Plan:  Inpatient Plan  Treatment Interventions: ADL retraining, Functional transfer training, UE strengthening/ROM, Endurance training, Cognitive reorientation, Patient/family training, Equipment evaluation/education, Fine motor coordination activities, Neuromuscular reeducation, Compensatory technique education  OT Frequency: 3 times per week  OT Discharge Recommendations: Moderate intensity level of continued care  Equipment Recommended upon Discharge:  (TBD)  OT Recommended Transfer Status: Assist of 2  OT - OK to Discharge: Yes  OT Assessment  OT Assessment Results: Decreased ADL status, Decreased cognition, Decreased safe judgment during ADL, Decreased endurance, Decreased fine motor control, Decreased functional mobility, Decreased gross motor control, Decreased IADLs, Decreased trunk control for functional activities  Prognosis: Good  Barriers to Discharge:  (CLOF)  Evaluation/Treatment Tolerance: Patient tolerated treatment well  Medical Staff Made Aware: Yes    Subjective   Current Problem:  1. Dissection of aorta  Insert arterial line    Arterial Line Insertion    Transthoracic Echo (TTE) Complete    Transthoracic Echo (TTE) Complete    Intubation    Intubation    Central Line    Central Line     Insert arterial line    Arterial Line Insertion      2. Cardiac arrest  Central Line    Central Line      3. Shock (Multi)  Central Line    Central Line        General:  Reason for Referral: Presented from OSH 11/18 with chest, back and abdominal pain. 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 Course c/b PEA arrest 11/20 requiring 3 rounds of CPR and intubation. Unable to extubate d/t multiple failed SBT trials, c/f developing PNA  Past Medical History Relevant to Rehab: history of pancreatitis, DVT/PE, HLD, HTN, CABGx4 1997, T2DM, GERD, PAD, chronic mesenteric ischemia, tobacco use  Co-Treatment: PT  Co-Treatment Reason: pt intubated with intent to mobilize  Prior to Session Communication: Bedside nurse  Patient Position Received: Bed, 3 rail up, Alarm off, not on at start of session (bilat wrist restraints)  Family/Caregiver Present: No  General Comment: Pt supine in bed on arrival, agreeable to participate. Off all sedation. ETT VC AC 40% FiO2, PEEP 8, RR 18.     Precautions:  Medical Precautions: Fall precautions, Cardiac precautions  Precautions Comment: Impulse control: SBP<120    Vital Signs:   11/26/24 1034 11/26/24 1100 11/26/24 1102   Vital Signs   Vitals Session Pre OT  --  Post OT   Heart Rate 65 80 76   Resp 18 18 18   SpO2 100 % 100 % 100 %   /66 123/68 122/69   MAP (mmHg) 80 84 81   Vital Signs Comment  --   --  RR 18 for entirety of session.       Pain:  Pain Assessment  Pain Assessment:  (frequent touching abdomen with reports of pain but inconsistent. RN aware and SBP </=120s throughout.)    Lines/Tubes/Drains:  CVC 11/20/24 Triple lumen Non-tunneled Right Internal jugular (Active)   Number of days: 6       ETT  7.5 mm (Active)   Number of days: 6       Urethral Catheter 16 Fr. (Active)   Number of days: 1       NG/OG/Feeding Tube OG - [place] sump Left mouth (Active)   Number of days:  "6       Objective   Cognition:  Overall Cognitive Status: Impaired  Arousal/Alertness: Inconsistent responses to stimuli  Orientation Level:  (With choices, oriented to name and year. Otherwise disoriented.)  Following Commands:  (simple 1 step commands with ~90% accuracy with repetition)  Processing Speed: Delayed     Confusion Assessment Method (CAM)  Acute Onset and Fluctuating Course (1A): Yes  Acute Onset and Fluctuating Course (1B): Yes  Inattention (2): Yes  Disorganized Thinking (3): Yes  Rate Patient's Level of Consciousness (4): Lethargic (Drowsy, easily aroused), Yes  Delirium Present: Yes     Home Living:  Home Living Comments: Pt unable to provide home set up details and support person unavailable.     Prior Function:  Prior Function Comments: Pt unable to provide PLOF information and support person unavailable. Nodded head \"yes\" to being ambulatory with a walker at baseline but requires confirmation.     ADL:  Eating Assistance: Total  Eating Deficit: Feeding tube  Grooming Assistance: Maximal  Bathing Assistance: Maximal  UE Dressing Assistance: Maximal  LE Dressing Assistance: Total  Toileting Assistance with Device: Total    Activity Tolerance:  Endurance: Tolerates 10 - 20 min exercise with multiple rests  Early Mobility/Exercise Safety Screen: Proceed with mobilization - No exclusion criteria met     Bed Mobility/Transfers: Bed Mobility  Bed Mobility: Yes  Bed Mobility 1  Bed Mobility 1: Supine to sitting, Sitting to supine  Level of Assistance 1: Dependent, +2  Bed Mobility Comments 1: HOB elevated, draw sheet     Vision: Vision - Basic Assessment  Current Vision:  (unknown)      Sensation:  Light Touch: No apparent deficits    Strength:  Strength Comments: BUE >/=3+/5    Hand Function:  Hand Function  Gross Grasp: Functional    Extremities:   RUE   RUE : Within Functional Limits, LUE   LUE: Within Functional Limits,        Treatment Completed on Evaluation    Therapy/Activity:     Therapeutic " Activity  Therapeutic Activity Performed: Yes  Therapeutic Activity 1: Pt placed in bed in chair position with use of bed controls in order to assess activity tolerance and hemodynamic stability. Tolerated assessment in chair position for 10 minutes (5 minutes in partial chair position, 5 minutes in full chair position)   Therapeutic Activity 2: Pt tolerated anterior lean with BUE HHA and mod A x2 and ~30 seconds long sitting with mod A x2  Therapeutic Activity 3: Pt tolerated 2 minutes seated EOB with dep A with posterior lean. Increased tension noted at EOB, able to partially correct with cues.    Outcome Measures: [facility] Daily Activity  Putting on and taking off regular lower body clothing: Total  Bathing (including washing, rinsing, drying): A lot  Putting on and taking off regular upper body clothing: A lot  Toileting, which includes using toilet, bedpan or urinal: Total  Taking care of personal grooming such as brushing teeth: A lot  Eating Meals: Total  Daily Activity - Total Score: 9        ICU Mobility Screen  Early Mobility/Exercise Safety Screen: Proceed with mobilization - No exclusion criteria met  ICU Mobility Scale: Sitting over edge of bed,          Education Documentation  Body Mechanics, taught by Michelle Marshall OT at 11/26/2024  1:15 PM.  Learner: Patient  Readiness: Acceptance  Method: Explanation, Demonstration  Response: Needs Reinforcement  Comment: OT goals/POC    Precautions, taught by Michelle Marshall OT at 11/26/2024  1:15 PM.  Learner: Patient  Readiness: Acceptance  Method: Explanation, Demonstration  Response: Needs Reinforcement  Comment: OT goals/POC    ADL Training, taught by Michelle Marshall OT at 11/26/2024  1:15 PM.  Learner: Patient  Readiness: Acceptance  Method: Explanation, Demonstration  Response: Needs Reinforcement  Comment: OT goals/POC    Education Comments  No comments found.        Goals:   Encounter Problems       Encounter Problems (Active)        ADLs       Patient with complete upper body dressing with minimal assist  level of assistance donning and doffing all UE clothes       Start:  11/26/24    Expected End:  12/10/24            Patient with complete lower body dressing with moderate assist level of assistance donning and doffing all LE clothes  with PRN adaptive equipment       Start:  11/26/24    Expected End:  12/10/24            Patient will complete daily grooming tasks with minimal assist  level of assistance and PRN adaptive equipment.       Start:  11/26/24    Expected End:  12/10/24               BALANCE       Pt will tolerate sitting EOB >10 min with mod A during functional tasks and ADLs without LOB and while maintaining trunk and head in upright, midline position.        Start:  11/26/24    Expected End:  12/10/24               COGNITION/SAFETY       Pt will be alert and oriented x 3 and follow >90% simple 2-step commands with stimulation.         Start:  11/26/24    Expected End:  12/10/24               TRANSFERS       Patient will perform bed mobility moderate assist level of assistance in order to improve safety and independence with mobility       Start:  11/26/24    Expected End:  12/10/24            Patient will complete sit to stand transfer with moderate assist level of assistance and least restrictive device in order to improve safety and prepare for out of bed mobility.       Start:  11/26/24    Expected End:  12/10/24                  11/26/24 at 1:16 PM   Michelle Marshall, OT   Rehab Office: [phone]

## 2024-11-27 ENCOUNTER — APPOINTMENT (OUTPATIENT)
Dept: RADIOLOGY | Facility: HOSPITAL | Age: 64
End: 2024-11-27
Payer: MEDICARE

## 2024-11-27 LAB
ALBUMIN SERPL BCP-MCNC: 2.5 G/DL (ref 3.4–5)
ANION GAP BLDV CALCULATED.4IONS-SCNC: 16 MMOL/L (ref 10–25)
ANION GAP BLDV CALCULATED.4IONS-SCNC: 19 MMOL/L (ref 10–25)
ANION GAP SERPL CALC-SCNC: 22 MMOL/L (ref 10–20)
BASE EXCESS BLDV CALC-SCNC: -4.5 MMOL/L (ref -2–3)
BASE EXCESS BLDV CALC-SCNC: -7.3 MMOL/L (ref -2–3)
BASOPHILS # BLD AUTO: 0.02 X10*3/UL (ref 0–0.1)
BASOPHILS NFR BLD AUTO: 0.2 %
BODY TEMPERATURE: 37 DEGREES CELSIUS
BODY TEMPERATURE: 37 DEGREES CELSIUS
BUN SERPL-MCNC: 96 MG/DL (ref 6–23)
CA-I BLDV-SCNC: 0.98 MMOL/L (ref 1.1–1.33)
CA-I BLDV-SCNC: 0.98 MMOL/L (ref 1.1–1.33)
CALCIUM SERPL-MCNC: 7.1 MG/DL (ref 8.6–10.6)
CHLORIDE BLDV-SCNC: 102 MMOL/L (ref 98–107)
CHLORIDE BLDV-SCNC: 103 MMOL/L (ref 98–107)
CHLORIDE SERPL-SCNC: 100 MMOL/L (ref 98–107)
CO2 SERPL-SCNC: 22 MMOL/L (ref 21–32)
CREAT SERPL-MCNC: 8.13 MG/DL (ref 0.5–1.05)
EGFRCR SERPLBLD CKD-EPI 2021: 5 ML/MIN/1.73M*2
EOSINOPHIL # BLD AUTO: 0.11 X10*3/UL (ref 0–0.7)
EOSINOPHIL NFR BLD AUTO: 1.1 %
ERYTHROCYTE [DISTWIDTH] IN BLOOD BY AUTOMATED COUNT: 13.9 % (ref 11.5–14.5)
GLUCOSE BLD MANUAL STRIP-MCNC: 116 MG/DL (ref 74–99)
GLUCOSE BLD MANUAL STRIP-MCNC: 127 MG/DL (ref 74–99)
GLUCOSE BLD MANUAL STRIP-MCNC: 143 MG/DL (ref 74–99)
GLUCOSE BLD MANUAL STRIP-MCNC: 151 MG/DL (ref 74–99)
GLUCOSE BLD MANUAL STRIP-MCNC: 165 MG/DL (ref 74–99)
GLUCOSE BLD MANUAL STRIP-MCNC: 185 MG/DL (ref 74–99)
GLUCOSE BLDV-MCNC: 133 MG/DL (ref 74–99)
GLUCOSE BLDV-MCNC: 183 MG/DL (ref 74–99)
GLUCOSE SERPL-MCNC: 164 MG/DL (ref 74–99)
HCO3 BLDV-SCNC: 19.3 MMOL/L (ref 22–26)
HCO3 BLDV-SCNC: 21.1 MMOL/L (ref 22–26)
HCT VFR BLD AUTO: 26.8 % (ref 36–46)
HCT VFR BLD EST: 20 % (ref 36–46)
HCT VFR BLD EST: ABNORMAL %
HGB BLD-MCNC: 9.1 G/DL (ref 12–16)
HGB BLDV-MCNC: 6.8 G/DL (ref 12–16)
HGB BLDV-MCNC: <3 G/DL (ref 12–16)
IMM GRANULOCYTES # BLD AUTO: 0.1 X10*3/UL (ref 0–0.7)
IMM GRANULOCYTES NFR BLD AUTO: 1 % (ref 0–0.9)
INHALED O2 CONCENTRATION: 100 %
INHALED O2 CONCENTRATION: 21 %
LACTATE BLDV-SCNC: 0.5 MMOL/L (ref 0.4–2)
LACTATE BLDV-SCNC: 0.8 MMOL/L (ref 0.4–2)
LYMPHOCYTES # BLD AUTO: 0.98 X10*3/UL (ref 1.2–4.8)
LYMPHOCYTES NFR BLD AUTO: 10.1 %
MAGNESIUM SERPL-MCNC: 2.41 MG/DL (ref 1.6–2.4)
MCH RBC QN AUTO: 29.7 PG (ref 26–34)
MCHC RBC AUTO-ENTMCNC: 34 G/DL (ref 32–36)
MCV RBC AUTO: 88 FL (ref 80–100)
MONOCYTES # BLD AUTO: 0.77 X10*3/UL (ref 0.1–1)
MONOCYTES NFR BLD AUTO: 7.9 %
NEUTROPHILS # BLD AUTO: 7.76 X10*3/UL (ref 1.2–7.7)
NEUTROPHILS NFR BLD AUTO: 79.7 %
NRBC BLD-RTO: 1.3 /100 WBCS (ref 0–0)
OXYHGB MFR BLDV: 62.8 % (ref 45–75)
OXYHGB MFR BLDV: ABNORMAL %
PCO2 BLDV: 40 MM HG (ref 41–51)
PCO2 BLDV: 44 MM HG (ref 41–51)
PH BLDV: 7.25 PH (ref 7.33–7.43)
PH BLDV: 7.33 PH (ref 7.33–7.43)
PHOSPHATE SERPL-MCNC: 7 MG/DL (ref 2.5–4.9)
PLATELET # BLD AUTO: 229 X10*3/UL (ref 150–450)
PO2 BLDV: 37 MM HG (ref 35–45)
PO2 BLDV: 39 MM HG (ref 35–45)
POTASSIUM BLDV-SCNC: 3.8 MMOL/L (ref 3.5–5.3)
POTASSIUM BLDV-SCNC: 3.9 MMOL/L (ref 3.5–5.3)
POTASSIUM SERPL-SCNC: 4 MMOL/L (ref 3.5–5.3)
RBC # BLD AUTO: 3.06 X10*6/UL (ref 4–5.2)
SAO2 % BLDV: 64 % (ref 45–75)
SAO2 % BLDV: ABNORMAL %
SODIUM BLDV-SCNC: 136 MMOL/L (ref 136–145)
SODIUM BLDV-SCNC: 136 MMOL/L (ref 136–145)
SODIUM SERPL-SCNC: 140 MMOL/L (ref 136–145)
WBC # BLD AUTO: 9.7 X10*3/UL (ref 4.4–11.3)

## 2024-11-27 PROCEDURE — 2500000002 HC RX 250 W HCPCS SELF ADMINISTERED DRUGS (ALT 637 FOR MEDICARE OP, ALT 636 FOR OP/ED)

## 2024-11-27 PROCEDURE — 83735 ASSAY OF MAGNESIUM: CPT

## 2024-11-27 PROCEDURE — 1100000001 HC PRIVATE ROOM DAILY

## 2024-11-27 PROCEDURE — 2500000001 HC RX 250 WO HCPCS SELF ADMINISTERED DRUGS (ALT 637 FOR MEDICARE OP)

## 2024-11-27 PROCEDURE — 99291 CRITICAL CARE FIRST HOUR: CPT

## 2024-11-27 PROCEDURE — 84132 ASSAY OF SERUM POTASSIUM: CPT | Performed by: INTERNAL MEDICINE

## 2024-11-27 PROCEDURE — 37799 UNLISTED PX VASCULAR SURGERY: CPT

## 2024-11-27 PROCEDURE — 2500000004 HC RX 250 GENERAL PHARMACY W/ HCPCS (ALT 636 FOR OP/ED)

## 2024-11-27 PROCEDURE — 37799 UNLISTED PX VASCULAR SURGERY: CPT | Performed by: INTERNAL MEDICINE

## 2024-11-27 PROCEDURE — 36556 INSERT NON-TUNNEL CV CATH: CPT

## 2024-11-27 PROCEDURE — 84132 ASSAY OF SERUM POTASSIUM: CPT

## 2024-11-27 PROCEDURE — 93010 ELECTROCARDIOGRAM REPORT: CPT | Performed by: INTERNAL MEDICINE

## 2024-11-27 PROCEDURE — 2500000005 HC RX 250 GENERAL PHARMACY W/O HCPCS

## 2024-11-27 PROCEDURE — 8010000001 HC DIALYSIS - HEMODIALYSIS PER DAY

## 2024-11-27 PROCEDURE — 71045 X-RAY EXAM CHEST 1 VIEW: CPT

## 2024-11-27 PROCEDURE — 99233 SBSQ HOSP IP/OBS HIGH 50: CPT | Performed by: INTERNAL MEDICINE

## 2024-11-27 PROCEDURE — 82947 ASSAY GLUCOSE BLOOD QUANT: CPT

## 2024-11-27 PROCEDURE — 94003 VENT MGMT INPAT SUBQ DAY: CPT

## 2024-11-27 PROCEDURE — 85025 COMPLETE CBC W/AUTO DIFF WBC: CPT

## 2024-11-27 RX ORDER — FENTANYL CITRATE 50 UG/ML
25 INJECTION, SOLUTION INTRAMUSCULAR; INTRAVENOUS ONCE
Status: COMPLETED | OUTPATIENT
Start: 2024-11-27 | End: 2024-11-27

## 2024-11-27 RX ORDER — FENTANYL CITRATE-0.9 % NACL/PF 10 MCG/ML
25-200 PLASTIC BAG, INJECTION (ML) INTRAVENOUS CONTINUOUS
Status: DISPENSED | OUTPATIENT
Start: 2024-11-27 | End: 2024-11-28

## 2024-11-27 RX ORDER — HEPARIN SODIUM 5000 [USP'U]/ML
5000 INJECTION, SOLUTION INTRAVENOUS; SUBCUTANEOUS EVERY 8 HOURS
Status: DISCONTINUED | OUTPATIENT
Start: 2024-11-27 | End: 2024-12-05

## 2024-11-27 RX ORDER — MIDAZOLAM HYDROCHLORIDE 1 MG/ML
INJECTION INTRAMUSCULAR; INTRAVENOUS
Status: COMPLETED
Start: 2024-11-27 | End: 2024-11-27

## 2024-11-27 RX ORDER — MIDAZOLAM HYDROCHLORIDE 1 MG/ML
2 INJECTION INTRAMUSCULAR; INTRAVENOUS ONCE
Status: COMPLETED | OUTPATIENT
Start: 2024-11-28 | End: 2024-11-27

## 2024-11-27 RX ADMIN — ATORVASTATIN CALCIUM 80 MG: 80 TABLET, FILM COATED ORAL at 21:08

## 2024-11-27 RX ADMIN — INSULIN LISPRO 1 UNITS: 100 INJECTION, SOLUTION INTRAVENOUS; SUBCUTANEOUS at 00:19

## 2024-11-27 RX ADMIN — Medication 25 MCG/HR: at 14:29

## 2024-11-27 RX ADMIN — PANTOPRAZOLE SODIUM 40 MG: 40 INJECTION, POWDER, FOR SOLUTION INTRAVENOUS at 08:24

## 2024-11-27 RX ADMIN — AMIODARONE HYDROCHLORIDE 400 MG: 200 TABLET ORAL at 08:24

## 2024-11-27 RX ADMIN — SODIUM CHLORIDE 500 ML: 9 INJECTION, SOLUTION INTRAVENOUS at 11:25

## 2024-11-27 RX ADMIN — Medication 40 PERCENT: at 08:00

## 2024-11-27 RX ADMIN — ASPIRIN 81 MG CHEWABLE TABLET 81 MG: 81 TABLET CHEWABLE at 08:24

## 2024-11-27 RX ADMIN — INSULIN LISPRO 1 UNITS: 100 INJECTION, SOLUTION INTRAVENOUS; SUBCUTANEOUS at 08:58

## 2024-11-27 RX ADMIN — MIDAZOLAM HYDROCHLORIDE 2 MG: 1 INJECTION, SOLUTION INTRAMUSCULAR; INTRAVENOUS at 23:44

## 2024-11-27 RX ADMIN — FENTANYL CITRATE 25 MCG: 50 INJECTION INTRAMUSCULAR; INTRAVENOUS at 10:50

## 2024-11-27 RX ADMIN — THIAMINE HCL TAB 100 MG 100 MG: 100 TAB at 08:24

## 2024-11-27 RX ADMIN — OXYCODONE HYDROCHLORIDE 2.5 MG: 5 TABLET ORAL at 08:25

## 2024-11-27 RX ADMIN — INSULIN LISPRO 1 UNITS: 100 INJECTION, SOLUTION INTRAVENOUS; SUBCUTANEOUS at 05:11

## 2024-11-27 RX ADMIN — AMIODARONE HYDROCHLORIDE 400 MG: 200 TABLET ORAL at 21:08

## 2024-11-27 RX ADMIN — PANTOPRAZOLE SODIUM 40 MG: 40 INJECTION, POWDER, FOR SOLUTION INTRAVENOUS at 21:08

## 2024-11-27 RX ADMIN — MIDAZOLAM HYDROCHLORIDE 2 MG: 1 INJECTION INTRAMUSCULAR; INTRAVENOUS at 23:44

## 2024-11-27 RX ADMIN — Medication 40 PERCENT: at 21:07

## 2024-11-27 RX ADMIN — HEPARIN SODIUM 5000 UNITS: 5000 INJECTION, SOLUTION INTRAVENOUS; SUBCUTANEOUS at 16:53

## 2024-11-27 RX ADMIN — HEPARIN SODIUM 5000 UNITS: 5000 INJECTION, SOLUTION INTRAVENOUS; SUBCUTANEOUS at 08:24

## 2024-11-27 RX ADMIN — DEXMEDETOMIDINE HYDROCHLORIDE 0.2 MCG/KG/HR: 400 INJECTION INTRAVENOUS at 23:36

## 2024-11-27 ASSESSMENT — PAIN - FUNCTIONAL ASSESSMENT

## 2024-11-27 ASSESSMENT — PAIN SCALES - GENERAL
PAINLEVEL_OUTOF10: 0 - NO PAIN
PAINLEVEL_OUTOF10: 0 - NO PAIN

## 2024-11-27 NOTE — PROCEDURES
Central Line    Date/Time: 11/27/2024 3:29 PM    Performed by: Vito Salgado MD  Authorized by: Yousif Strange MD    Consent:     Consent obtained:  Written    Risks, benefits, and alternatives were discussed: yes    Universal protocol:     Relevant documents present and verified: yes      Test results available: yes      Imaging studies available: yes      Required blood products, implants, devices, and special equipment available: yes      Site/side marked: yes      Immediately prior to procedure, a time out was called: yes      Patient identity confirmed:  Provided demographic data and hospital-assigned identification number  Pre-procedure details:     Indication(s) comment:  Acute dialysis access    Hand hygiene: Hand hygiene performed prior to insertion      Sterile barrier technique: All elements of maximal sterile technique followed      Skin preparation:  Chlorhexidine    Skin preparation agent: Skin preparation agent completely dried prior to procedure    Sedation:     Sedation type:  Moderate sedation  Anesthesia:     Anesthesia method:  Local infiltration    Local anesthetic:  Lidocaine 1% w/o epi  Procedure details:     Location:  L internal jugular    Catheter size:  13 Fr    Catheter length:  20    Landmarks identified: yes      Ultrasound guidance: yes      Ultrasound guidance timing: real time      Sterile ultrasound techniques: Sterile gel and sterile probe covers were used      Number of attempts:  1    Successful placement: yes    Post-procedure details:     Post-procedure:  Dressing applied and line sutured    Assessment:  Blood return through all ports and free fluid flow    Procedure completion:  Tolerated well, no immediate complications  Comments:      Trialysis line placed. Blood gas confirmed venous after initial micropuncture. CXR ordered to confirm.

## 2024-11-27 NOTE — PROGRESS NOTES
Social Work Progress Note  - ICU TREATMENT PLAN: Patient was transferred to [facility] from [facility] with chest, back, and abdominal pain. CICU course c/b PEA arrest 11/20, now intubated, following commands. Worsening renal function, nephro is following.  - Payer: No insurance on file. An email was sent to [facility].   -Support System: Son   - Planned Disposition: Pending medical outcome. Rehab recommends Mod intensity.  - Barriers to discharge: None at this time. SW will continue to follow.  MIGUEL ROCK

## 2024-11-27 NOTE — CARE PLAN
Problem: Pain - Adult  Goal: Verbalizes/displays adequate comfort level or baseline comfort level  Outcome: Progressing     Problem: Safety - Adult  Goal: Free from fall injury  Outcome: Progressing     Problem: Discharge Planning  Goal: Discharge to home or other facility with appropriate resources  Outcome: Progressing     Problem: Chronic Conditions and Co-morbidities  Goal: Patient's chronic conditions and co-morbidity symptoms are monitored and maintained or improved  Outcome: Progressing     Problem: Skin  Goal: Participates in plan/prevention/treatment measures  Outcome: Progressing  Goal: Prevent/manage excess moisture  Outcome: Progressing  Goal: Prevent/minimize sheer/friction injuries  Outcome: Progressing  Flowsheets (Taken 11/26/2024 2121)  Prevent/minimize sheer/friction injuries:   Complete micro-shifts as needed if patient unable. Adjust patient position to relieve pressure points, not a full turn   HOB 30 degrees or less   Increase activity/out of bed for meals   Use pull sheet   Turn/reposition every 2 hours/use positioning/transfer devices   Utilize specialty bed per algorithm     Problem: Safety - Medical Restraint  Goal: Remains free of injury from restraints (Restraint for Interference with Medical Device)  Outcome: Progressing  Goal: Free from restraint(s) (Restraint for Interference with Medical Device)  Outcome: Progressing

## 2024-11-27 NOTE — NURSING NOTE
Pre-Procedure Checklist     Emergent Line Insertion No   Type of Line to be Placed Hemodialysis   Consent Obtained Yes   Emergency Medication Necessary No   Patient Identified with 2 Independent Identifiers Yes   Review of Allergies, Anticoagulation, Relevant Labs, ECG/Telemetry Yes   Risks/Benefits/Alternatives Discussed with Patient/POA/Legal Representative Yes   Stop Sign on Door Yes   Time Out Performed Yes   Catheter Exchange No   Positioning and Preparation Checklist     All People, Including Patient, in the Room with Cap and Mask Yes   Fluoroscopy Used to Identify Vessel and Guide Insertion; Sterile Cover Used No   Ultrasound Used to Identify Vessel and Guide Insertion; Sterile Cover Used Yes   Full Barrier Precautions Followed (Mask, Cap, Gown, Gloves) Yes   Hands Washed Yes   Monitors Attached with Sound Alarms On Yes   Full Body Sterile Drape (Head-to-Toe) Used to Cover Patient Yes   [place] Position (For IJ and Subclavian) Yes   CHG Skin Prep Used and Allowed to Air Dry Prior to Skin Procedure Yes   Procedure Checklist     Blood Aspirated From All Lumens, All Ports Subsequently Flushed Yes   Catheter Caps Placed On All Lumens; Lumens Clamped Yes   Maintain Guidewire Control Throughout, Ensuring Guidewire Removal Yes   Maintain Sterile Field Throughout Insertion Yes   Catheter Secured Yes   Confirmatory Test of Venous Placement Blood gas   Post-Procedure Checklist     Date and Time Written on Dressing Yes   Sharp and Wire Count and Safe Disposal of all Sharps/Wires Yes   Sterile Dressing Applied Per Protocol Yes   X-ray Ordered or ECG Image Yes

## 2024-11-27 NOTE — CONSULTS
Wound Care Consult     Visit Date: 11/27/2024      Patient Name: Humaira Huang         MRN: [id number]           YOB: 1960     Reason for Consult: Left buttock wound       Wound Assessment:  Wound 11/27/24 Buttock (Active)   Wound Image   11/27/24 1716   Wound Length (cm) 2 cm 11/27/24 1716   Wound Width (cm) 2 cm 11/27/24 1716   Wound Surface Area (cm^2) 4 cm^2 11/27/24 1716   Wound Depth (cm) 0.2 cm 11/27/24 1716   Wound Volume (cm^3) 0.8 cm^3 11/27/24 1716   State of Healing Early/partial granulation 11/27/24 1716   Margins Well-defined edges 11/27/24 0533   Drainage Description Serosanguineous 11/27/24 1716   Drainage Amount Scant 11/27/24 1716   Dressing Other (Comment);Open to air 11/27/24 1716   Dressing Changed New 11/27/24 1716   Dressing Status Clean 11/27/24 1716     Wound location: Left gluteal crease        Undermining: no  Tracking: no  Wound type: MASD c/b IAD  Wound bed: moist red tissue with fibrinous tissue at base   Periwound skin: blanchable erythema     Recommendations: TWICE DAILY and as needed with clean-ups      Keep clean and dry.       Apply Triad wound dressing cream to damaged tissue       TO APPLY TRIAD: Triad wound dressing cream can be applied directly from the tube or by using a gloved finger. Gently spread Triad evenly over the area of application to the thickness of a dime.     Reapply Triad wound dressing cream with each clean up and as needed. In the perineal area, reapply Triad after each episode of incontinence. With higher exudate levels requiring more frequent re-applications. ( order number [id number]).      When soiled, wash top layer of soiled Triad wound dressing cream off with warm wipes as needed pat dry, then reapply Triad.        **EVERY THREE DAYS WASH DOWN TO SKIN.             TO REMOVE TRIAD: Use pH-balanced wound cleanser to soften Triad. Gently wipe to remove without scrubbing.             Then reapply if needed.     Apply a sacral Mepilex border foam  to sacrum for protection.  Check under every shift and as needed.  Change as needed and as soiled.      While in bed patient should only be on one EHOB air mattress overlay, a fitted sheet, and one EHOB repositioning sheet with appropriate white chux. Please do not use brief while patient is resting in bed. Turn and reposition patient at least every 2 hours.  Elevate heels off the bed surface at all times.       Wound Team Plan:  Primary provider please review the wound care consult note and pending wound care order. If you agree with orders please file in EMR.       While inpatient, Secure chat with questions or reconsult wound care if condition worsens or changes. For urgent communications please message the group through [employer] messaging at: [facility] Wound Care Team, Thank you.     Izabella Tam RN, CWON  11/27/2024  5:35 PM

## 2024-11-27 NOTE — PROGRESS NOTES
Humaira Huang   64 y.o.    @@  N/Room: 03503552/14/14-A admitted to CICU for Aortic dissection.    Subjective: No clinical event over night. Off sedation, remain intubated.     Objective:   Oliguric, no renal recovery.  She was obtunded when seen during round  Cr trending up on conservative management.     Meds:   amiodarone, 400 mg, BID  aspirin, 81 mg, Daily  atorvastatin, 80 mg, Nightly  heparin, 5,000 Units, q8h  insulin lispro, 0-5 Units, q4h  lactated Ringer's, 1,000 mL, Once  oxygen, , Continuous - Inhalation  pantoprazole, 40 mg, BID  polyethylene glycol, 17 g, BID  sennosides-docusate sodium, 1 tablet, Nightly  sodium chloride, 500 mL, Once  thiamine, 100 mg, Daily      dexmedeTOMIDine, Last Rate: 0.1 mcg/kg/hr (11/27/24 0845)      acetaminophen, 650 mg, q4h PRN   Or  acetaminophen, 650 mg, q4h PRN   Or  acetaminophen, 650 mg, q4h PRN  dextrose, 12.5 g, q15 min PRN  dextrose, 25 g, q15 min PRN  glucagon, 1 mg, q15 min PRN  glucagon, 1 mg, q15 min PRN  hydrOXYzine HCL, 10 mg, q6h PRN  ipratropium-albuteroL, 3 mL, q6h PRN  oxyCODONE, 2.5 mg, q4h PRN  oxygen, , Continuous PRN - O2/gases        Vitals:    11/27/24 1100   BP: 125/68   Pulse: 68   Resp: 18   Temp: 37 °C (98.6 °F)   SpO2: 96%          Intake/Output Summary (Last 24 hours) at 11/27/2024 1226  Last data filed at 11/27/2024 1100  Gross per 24 hour   Intake 1237.53 ml   Output 80 ml   Net 1157.53 ml       General appearance: no distress  Eyes: non-icteric  Skin: no apparent rash  Heart: [id number]  Lungs: CTA bilat no wheezing/crackles  Abdomen: soft, nt/nd  Extremities: 1+ edema bilat  [place]rozco  Neuro: No FND,no asterixis      Blood Labs:  Results for orders placed or performed during the hospital encounter of 11/18/24 (from the past 24 hours)   POCT GLUCOSE   Result Value Ref Range    POCT Glucose 194 (H) 74 - 99 mg/dL   CBC   Result Value Ref Range    WBC 12.6 (H) 4.4 - 11.3 x10*3/uL    nRBC 0.7 (H) 0.0 - 0.0 /100 WBCs    RBC 2.37 (L) 4.00 - 5.20  x10*6/uL    Hemoglobin 7.0 (L) 12.0 - 16.0 g/dL    Hematocrit 21.9 (L) 36.0 - 46.0 %    MCV 92 80 - 100 fL    MCH 29.5 26.0 - 34.0 pg    MCHC 32.0 32.0 - 36.0 g/dL    RDW 14.1 11.5 - 14.5 %    Platelets 270 150 - 450 x10*3/uL   POCT GLUCOSE   Result Value Ref Range    POCT Glucose 201 (H) 74 - 99 mg/dL   POCT GLUCOSE   Result Value Ref Range    POCT Glucose 185 (H) 74 - 99 mg/dL   POCT GLUCOSE   Result Value Ref Range    POCT Glucose 165 (H) 74 - 99 mg/dL   CBC and Auto Differential   Result Value Ref Range    WBC 9.7 4.4 - 11.3 x10*3/uL    nRBC 1.3 (H) 0.0 - 0.0 /100 WBCs    RBC 3.06 (L) 4.00 - 5.20 x10*6/uL    Hemoglobin 9.1 (L) 12.0 - 16.0 g/dL    Hematocrit 26.8 (L) 36.0 - 46.0 %    MCV 88 80 - 100 fL    MCH 29.7 26.0 - 34.0 pg    MCHC 34.0 32.0 - 36.0 g/dL    RDW 13.9 11.5 - 14.5 %    Platelets 229 150 - 450 x10*3/uL    Neutrophils % 79.7 40.0 - 80.0 %    Immature Granulocytes %, Automated 1.0 (H) 0.0 - 0.9 %    Lymphocytes % 10.1 13.0 - 44.0 %    Monocytes % 7.9 2.0 - 10.0 %    Eosinophils % 1.1 0.0 - 6.0 %    Basophils % 0.2 0.0 - 2.0 %    Neutrophils Absolute 7.76 (H) 1.20 - 7.70 x10*3/uL    Immature Granulocytes Absolute, Automated 0.10 0.00 - 0.70 x10*3/uL    Lymphocytes Absolute 0.98 (L) 1.20 - 4.80 x10*3/uL    Monocytes Absolute 0.77 0.10 - 1.00 x10*3/uL    Eosinophils Absolute 0.11 0.00 - 0.70 x10*3/uL    Basophils Absolute 0.02 0.00 - 0.10 x10*3/uL   Renal function panel   Result Value Ref Range    Glucose 164 (H) 74 - 99 mg/dL    Sodium 140 136 - 145 mmol/L    Potassium 4.0 3.5 - 5.3 mmol/L    Chloride 100 98 - 107 mmol/L    Bicarbonate 22 21 - 32 mmol/L    Anion Gap 22 (H) 10 - 20 mmol/L    Urea Nitrogen 96 (HH) 6 - 23 mg/dL    Creatinine 8.13 (H) 0.50 - 1.05 mg/dL    eGFR 5 (L) >60 mL/min/1.73m*2    Calcium 7.1 (L) 8.6 - 10.6 mg/dL    Phosphorus 7.0 (H) 2.5 - 4.9 mg/dL    Albumin 2.5 (L) 3.4 - 5.0 g/dL   Magnesium   Result Value Ref Range    Magnesium 2.41 (H) 1.60 - 2.40 mg/dL   Blood Gas Venous  Full Panel   Result Value Ref Range    POCT pH, Venous 7.33 7.33 - 7.43 pH    POCT pCO2, Venous 40 (L) 41 - 51 mm Hg    POCT pO2, Venous 39 35 - 45 mm Hg    POCT SO2, Venous      POCT Oxy Hemoglobin, Venous      POCT Hematocrit Calculated, Venous      POCT Sodium, Venous 136 136 - 145 mmol/L    POCT Potassium, Venous 3.9 3.5 - 5.3 mmol/L    POCT Chloride, Venous 103 98 - 107 mmol/L    POCT Ionized Calicum, Venous 0.98 (L) 1.10 - 1.33 mmol/L    POCT Glucose, Venous 183 (H) 74 - 99 mg/dL    POCT Lactate, Venous 0.8 0.4 - 2.0 mmol/L    POCT Base Excess, Venous -4.5 (L) -2.0 - 3.0 mmol/L    POCT HCO3 Calculated, Venous 21.1 (L) 22.0 - 26.0 mmol/L    POCT Hemoglobin, Venous <3.0 (LL) 12.0 - 16.0 g/dL    POCT Anion Gap, Venous 16.0 10.0 - 25.0 mmol/L    Patient Temperature 37.0 degrees Celsius    FiO2 21 %   POCT GLUCOSE   Result Value Ref Range    POCT Glucose 151 (H) 74 - 99 mg/dL   POCT GLUCOSE   Result Value Ref Range    POCT Glucose 143 (H) 74 - 99 mg/dL              ASSESSMENT:  Humaira Huang is a  64 y.o.  Year old , with PMHx of  pancreatitis, DVT/PE, HLD, HTN, CABGx4 1997, T2DM, GERD, PAD, chronic mesenteric ischemia, tobacco use who presented to [facility] on [facility]18[facility] as a transfer from [facility] with chest, back, and abdominal pain. 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 Vascular and Cardiac surgery following. CICU course c/b PEA arrest 11/20, now intubated. Nephrology following for TRACY.     #Oliguric TRACY Baseline creatinine 1.5   -UOP 100ml in past 24 hours off diuretics.  -UA 1+ protein, 3+ blood, 1+ ketones, 75 LE, >50 WBC, >20 RBC  Urine sodium 72, Fena 2.1%, suspected ATN  Urine chloride <15  -Acidbase are acceptable.  -Hemodynamics-not on any pressor support  -Etio :TRACY likely in setting recent hemodynamic insult with  PEA.    RECOMMENDATIONS:  -Recommend SLED as per submitted orders.  -Will eval daily for any RRT needs.  -Follow daily BMP trend.  -Strict I/Os.  -No contrast or nephrotoxic drugs if possible.  -Would follow.      Martha James MD  Nephrology Fellow   Daytime / Weekend Renal Pager [phone]  After 7 pm Emergencies [phone] Pager [phone]

## 2024-11-27 NOTE — PROGRESS NOTES
Critical Care Daily Progress      Subjective   Patient is a 64 y.o. female admitted on 11/18/2024 10:54 PM to the CICU for Aortic Dissection.     NAEO.    Following commands this AM. Sedation off    Objective     Vent Settings/Oxygen Drips   Vent Mode: Volume control/assist control  FiO2 (%):  [40 %] 40 %  S RR:  [18] 18  S VT:  [400 mL] 400 mL  PEEP/CPAP (cm H2O):  [5 cm H20-8 cm H20] 8 cm H20  WI SUP:  [10 cm H20] 10 cm H20  MAP (cm H2O):  [11-13] 13  dexmedeTOMIDine, 0-1.5 mcg/kg/hr, Last Rate: Stopped (11/27/24 0725)         Vitals: I/O:   Vitals:    11/27/24 0715   BP: 91/53   Pulse: 59   Resp: 18   Temp: 37 °C (98.6 °F)   SpO2: 95%    Cuff: SBP 90s - 110s    24hr Min/Max:  Temp  Min: 36.2 °C (97.2 °F)  Max: 37.3 °C (99.1 °F)  Pulse  Min: 59  Max: 106  BP  Min: 91/53  Max: 138/121  Resp  Min: 17  Max: 26  SpO2  Min: 71 %  Max: 100 %   Intake/Output Summary (Last 24 hours) at 11/27/2024 0741  Last data filed at 11/27/2024 0700  Gross per 24 hour   Intake 1778.56 ml   Output 94 ml   Net 1684.56 ml    Worsening UO w 100 ml 11/26    Net IO Since Admission: 8,702.02 mL [11/27/24 0741]      Scheduled Medications:  PRN Medications:    amiodarone, 400 mg, oral, BID  aspirin, 81 mg, oral, Daily  atorvastatin, 80 mg, oral, Nightly  insulin lispro, 0-5 Units, subcutaneous, q4h  lactated Ringer's, 1,000 mL, intravenous, Once  oxygen, , inhalation, Continuous - Inhalation  pantoprazole, 40 mg, intravenous, BID  polyethylene glycol, 17 g, oral, BID  sennosides-docusate sodium, 1 tablet, oral, Nightly  thiamine, 100 mg, oral, Daily     PRN medications: acetaminophen **OR** acetaminophen **OR** acetaminophen, dextrose, dextrose, glucagon, glucagon, hydrOXYzine HCL, ipratropium-albuteroL, oxyCODONE, oxygen       Physical Exam:  Physical Exam  Constitutional:       Appearance: She is toxic-appearing.      Comments: Intubated, following commands - opening eyes to voice, squeezing hands b/l  frail   Eyes:      General: No scleral  icterus.  Cardiovascular:      Rate and Rhythm: Normal rate and regular rhythm.      Comments: Chest tender to palpation  Pulmonary:      Effort: No respiratory distress.      Breath sounds: Normal breath sounds. No wheezing or rhonchi.      Comments: ET tube in place; mild wheeze b/l  Abdominal:      General: There is no distension.      Palpations: Abdomen is soft.      Tenderness: There is no abdominal tenderness. There is no guarding.   Musculoskeletal:         General: No swelling or tenderness.   Skin:     General: Skin is warm and dry.      Findings: No bruising or rash.                LABS:    CBC RFP   Lab Results   Component Value Date    WBC 9.7 11/27/2024    WBC 12.6 (H) 11/26/2024    WBC 13.2 (H) 11/26/2024    HGB 9.1 (L) 11/27/2024    HGB 7.0 (L) 11/26/2024    HGB 7.0 (L) 11/26/2024    HCT 26.8 (L) 11/27/2024    MCV 88 11/27/2024     11/27/2024     11/26/2024     11/26/2024    NEUTROABS 7.76 (H) 11/27/2024       Blood cultures negative Lab Results   Component Value Date     11/27/2024    K 4.0 11/27/2024     11/27/2024    CO2 22 11/27/2024    BUN 96 (HH) 11/27/2024    CREATININE 8.13 (H) 11/27/2024    CREATININE 6.75 (H) 11/26/2024     Lab Results   Component Value Date    MG 2.41 (H) 11/27/2024    PHOS 7.0 (H) 11/27/2024    CALCIUM 7.1 (L) 11/27/2024         Hepatic Function ABG/VBG   Lab Results   Component Value Date    ALT 67 (H) 11/23/2024    AST 45 (H) 11/23/2024    ALKPHOS 95 11/23/2024     Lab Results   Component Value Date    BILIDIR 0.2 11/23/2024      Lab Results   Component Value Date    PROTIME 11.9 11/18/2024    APTT 29 11/18/2024    INR 1.1 11/18/2024      Lab Results   Component Value Date    LACTATE 1.7 11/20/2024          No results found for the last 90 days.         IMAGING:    Renal US complete, read pending      Assessment/Plan    64 y.o. female with history of pancreatitis, DVT/PE, HLD, HTN, CABGx4 1997, T2DM, GERD, PAD, chronic mesenteric  ischemia, tobacco use who presented to [facility] on 11/18/2024 as a transfer from [facility] with chest, back, and abdominal pain. 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 Vascular and Cardiac surgery following. CICU course c/b PEA arrest 11/20, now intubated and sedated, following commands.     11/27 Updates:  -fentanyl off at 6am  -worsening renal function -> nephro following  -SBT today  -TF at 20 ml/hr  -Holding therapeutic heparin due to bloody OG output, on DVT ppx    Neuro:  #Sedated  -fentanyl, held for SBTs  -precedex for SBT  -following commands     Cardiac:  #PEA arrest  ::likely hypoxia 2/2 aspiration vs PNA  ::Trop peak 5700 -> 4900  -pressors off    #Aflutter  ::Now NSR  ::episode evening 11/20 causing hypotension  -amio  -Holding heparin    #Aortic dissection  #Distal aortic arch saccular aneurysm   ::CTA CAP 11/18- 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   -Vascular surgery discussed case at aortic conference and there are no plans for surgery, palliative measures recommended  -Holding heparin     #NSTEMI  #CAD s/p CABGx4 1997  #PAD  #HTN #DLD  ::EKG- NSR, rate 71, TWI aVL, V1-V6, 1mm ORQUIDEA in aVR  ::Trop peak 5700  ::Lipid HDL 41.5, , TG 75, Chol 157  -Holding home amlodipine 10mg, imdur 30mg, lisinopril 2.5mg, metop tartrate 50mg bid iso hypotension   -Continue ASA and atorvastatin  -Holding plavix 75mg     Pulm:  #Intubated  #c/f developing PNA  ::CXR with opacification of LLL  ::s/p Zosyn 11/20 - 11/26     GI:  #c/f UGIB  -PPI BID  -Holding therapeutic AC  -BID CBC  -Active T&S, consented  -boluses PRN     Renal:  #TRACY on CKD likely 2/2 arrest  #Oliguria  -Cr at OSH 1.3  -worsening Cr and UO -> nephro following     Endo:    #T2DM  -Hold home metformin   -Mild SSI + hypoglycemia protocol      Infectious  See pulm section for PNA concerns      F: 500 ml  E: K>4, Mag>2  N: NPO  G: pantoprazole  A: PIV, OG  DVT: subcutaneous heparin, SCDs  CODE: DNR/okay to intubate (confirmed with family 11/21)  NOK: Daughter Josy [phone]      Stephen Clayton MD  Internal Medicine PGY-2

## 2024-11-28 ENCOUNTER — APPOINTMENT (OUTPATIENT)
Dept: RADIOLOGY | Facility: HOSPITAL | Age: 64
End: 2024-11-28
Payer: MEDICARE

## 2024-11-28 LAB
ALBUMIN SERPL BCP-MCNC: 2.7 G/DL (ref 3.4–5)
ALBUMIN SERPL BCP-MCNC: 2.8 G/DL (ref 3.4–5)
ALP SERPL-CCNC: 384 U/L (ref 33–136)
ALT SERPL W P-5'-P-CCNC: 79 U/L (ref 7–45)
ANION GAP BLDA CALCULATED.4IONS-SCNC: 22 MMO/L (ref 10–25)
ANION GAP BLDA CALCULATED.4IONS-SCNC: 23 MMO/L (ref 10–25)
ANION GAP BLDA CALCULATED.4IONS-SCNC: 25 MMO/L (ref 10–25)
ANION GAP BLDMV CALCULATED.4IONS-SCNC: 25 MMO/L (ref 10–25)
ANION GAP BLDV CALCULATED.4IONS-SCNC: 11 MMOL/L (ref 10–25)
ANION GAP BLDV CALCULATED.4IONS-SCNC: 21 MMOL/L (ref 10–25)
ANION GAP BLDV CALCULATED.4IONS-SCNC: 22 MMOL/L (ref 10–25)
ANION GAP SERPL CALC-SCNC: 13 MMOL/L (ref 10–20)
ANION GAP SERPL CALC-SCNC: 29 MMOL/L (ref 10–20)
APTT PPP: 47 SECONDS (ref 27–38)
AST SERPL W P-5'-P-CCNC: 137 U/L (ref 9–39)
BASE EXCESS BLDA CALC-SCNC: -12 MMOL/L (ref -2–3)
BASE EXCESS BLDA CALC-SCNC: -12.3 MMOL/L (ref -2–3)
BASE EXCESS BLDA CALC-SCNC: -13.8 MMOL/L (ref -2–3)
BASE EXCESS BLDA CALC-SCNC: -9.5 MMOL/L (ref -2–3)
BASE EXCESS BLDMV CALC-SCNC: -10.5 MMOL/L (ref -2–3)
BASE EXCESS BLDV CALC-SCNC: -8.9 MMOL/L (ref -2–3)
BASE EXCESS BLDV CALC-SCNC: -9.9 MMOL/L (ref -2–3)
BASE EXCESS BLDV CALC-SCNC: 0.6 MMOL/L (ref -2–3)
BASOPHILS # BLD AUTO: 0.01 X10*3/UL (ref 0–0.1)
BASOPHILS # BLD AUTO: 0.02 X10*3/UL (ref 0–0.1)
BASOPHILS NFR BLD AUTO: 0.1 %
BASOPHILS NFR BLD AUTO: 0.2 %
BILIRUB SERPL-MCNC: 1.6 MG/DL (ref 0–1.2)
BODY TEMPERATURE: 37 DEGREES CELSIUS
BUN SERPL-MCNC: 16 MG/DL (ref 6–23)
BUN SERPL-MCNC: 33 MG/DL (ref 6–23)
CA-I BLDA-SCNC: 0.9 MMOL/L (ref 1.1–1.33)
CA-I BLDA-SCNC: 0.95 MMOL/L (ref 1.1–1.33)
CA-I BLDA-SCNC: 0.97 MMOL/L (ref 1.1–1.33)
CA-I BLDMV-SCNC: 0.98 MMOL/L (ref 1.1–1.33)
CA-I BLDV-SCNC: 1.05 MMOL/L (ref 1.1–1.33)
CA-I BLDV-SCNC: 1.09 MMOL/L (ref 1.1–1.33)
CA-I BLDV-SCNC: 1.11 MMOL/L (ref 1.1–1.33)
CALCIUM SERPL-MCNC: 7.6 MG/DL (ref 8.6–10.6)
CALCIUM SERPL-MCNC: 7.8 MG/DL (ref 8.6–10.6)
CHLORIDE BLD-SCNC: 94 MMOL/L (ref 98–107)
CHLORIDE BLDA-SCNC: 103 MMOL/L (ref 98–107)
CHLORIDE BLDA-SCNC: 96 MMOL/L (ref 98–107)
CHLORIDE BLDA-SCNC: 97 MMOL/L (ref 98–107)
CHLORIDE BLDV-SCNC: 96 MMOL/L (ref 98–107)
CHLORIDE BLDV-SCNC: 96 MMOL/L (ref 98–107)
CHLORIDE BLDV-SCNC: 99 MMOL/L (ref 98–107)
CHLORIDE SERPL-SCNC: 95 MMOL/L (ref 98–107)
CHLORIDE SERPL-SCNC: 99 MMOL/L (ref 98–107)
CO2 SERPL-SCNC: 14 MMOL/L (ref 21–32)
CO2 SERPL-SCNC: 26 MMOL/L (ref 21–32)
CREAT SERPL-MCNC: 1.79 MG/DL (ref 0.5–1.05)
CREAT SERPL-MCNC: 2.84 MG/DL (ref 0.5–1.05)
EGFRCR SERPLBLD CKD-EPI 2021: 18 ML/MIN/1.73M*2
EGFRCR SERPLBLD CKD-EPI 2021: 31 ML/MIN/1.73M*2
EOSINOPHIL # BLD AUTO: 0.01 X10*3/UL (ref 0–0.7)
EOSINOPHIL # BLD AUTO: 0.04 X10*3/UL (ref 0–0.7)
EOSINOPHIL NFR BLD AUTO: 0.1 %
EOSINOPHIL NFR BLD AUTO: 0.3 %
ERYTHROCYTE [DISTWIDTH] IN BLOOD BY AUTOMATED COUNT: 14.2 % (ref 11.5–14.5)
ERYTHROCYTE [DISTWIDTH] IN BLOOD BY AUTOMATED COUNT: 14.5 % (ref 11.5–14.5)
ERYTHROCYTE [DISTWIDTH] IN BLOOD BY AUTOMATED COUNT: 14.6 % (ref 11.5–14.5)
GLUCOSE BLD MANUAL STRIP-MCNC: 103 MG/DL (ref 74–99)
GLUCOSE BLD MANUAL STRIP-MCNC: 133 MG/DL (ref 74–99)
GLUCOSE BLD MANUAL STRIP-MCNC: 36 MG/DL (ref 74–99)
GLUCOSE BLD MANUAL STRIP-MCNC: 37 MG/DL (ref 74–99)
GLUCOSE BLD MANUAL STRIP-MCNC: 81 MG/DL (ref 74–99)
GLUCOSE BLD MANUAL STRIP-MCNC: 88 MG/DL (ref 74–99)
GLUCOSE BLD MANUAL STRIP-MCNC: 92 MG/DL (ref 74–99)
GLUCOSE BLD MANUAL STRIP-MCNC: 99 MG/DL (ref 74–99)
GLUCOSE BLD MANUAL STRIP-MCNC: <10 MG/DL (ref 74–99)
GLUCOSE BLD-MCNC: 125 MG/DL (ref 74–99)
GLUCOSE BLDA-MCNC: 114 MG/DL (ref 74–99)
GLUCOSE BLDA-MCNC: 119 MG/DL (ref 74–99)
GLUCOSE BLDA-MCNC: 126 MG/DL (ref 74–99)
GLUCOSE BLDV-MCNC: 101 MG/DL (ref 74–99)
GLUCOSE BLDV-MCNC: 31 MG/DL (ref 74–99)
GLUCOSE BLDV-MCNC: 36 MG/DL (ref 74–99)
GLUCOSE SERPL-MCNC: 118 MG/DL (ref 74–99)
GLUCOSE SERPL-MCNC: 134 MG/DL (ref 74–99)
HCO3 BLDA-SCNC: 11.3 MMOL/L (ref 22–26)
HCO3 BLDA-SCNC: 12.8 MMOL/L (ref 22–26)
HCO3 BLDA-SCNC: 12.9 MMOL/L (ref 22–26)
HCO3 BLDA-SCNC: 14.4 MMOL/L (ref 22–26)
HCO3 BLDMV-SCNC: 15.7 MMOL/L (ref 22–26)
HCO3 BLDV-SCNC: 18 MMOL/L (ref 22–26)
HCO3 BLDV-SCNC: 18.5 MMOL/L (ref 22–26)
HCO3 BLDV-SCNC: 26 MMOL/L (ref 22–26)
HCT VFR BLD AUTO: 20.4 % (ref 36–46)
HCT VFR BLD AUTO: 21.8 % (ref 36–46)
HCT VFR BLD AUTO: 22.4 % (ref 36–46)
HCT VFR BLD EST: 21 % (ref 36–46)
HCT VFR BLD EST: 21 % (ref 36–46)
HCT VFR BLD EST: 22 % (ref 36–46)
HCT VFR BLD EST: 23 % (ref 36–46)
HCT VFR BLD EST: 25 % (ref 36–46)
HCT VFR BLD EST: 26 % (ref 36–46)
HCT VFR BLD EST: 29 % (ref 36–46)
HGB BLD-MCNC: 7 G/DL (ref 12–16)
HGB BLD-MCNC: 7 G/DL (ref 12–16)
HGB BLD-MCNC: 7.2 G/DL (ref 12–16)
HGB BLDA-MCNC: 6.9 G/DL (ref 12–16)
HGB BLDA-MCNC: 7.1 G/DL (ref 12–16)
HGB BLDA-MCNC: 8.7 G/DL (ref 12–16)
HGB BLDMV-MCNC: 9.7 G/DL (ref 12–16)
HGB BLDV-MCNC: 7.3 G/DL (ref 12–16)
HGB BLDV-MCNC: 7.5 G/DL (ref 12–16)
HGB BLDV-MCNC: 8.3 G/DL (ref 12–16)
IMM GRANULOCYTES # BLD AUTO: 0.12 X10*3/UL (ref 0–0.7)
IMM GRANULOCYTES # BLD AUTO: 0.28 X10*3/UL (ref 0–0.7)
IMM GRANULOCYTES NFR BLD AUTO: 1 % (ref 0–0.9)
IMM GRANULOCYTES NFR BLD AUTO: 2.2 % (ref 0–0.9)
INHALED O2 CONCENTRATION: 28 %
INHALED O2 CONCENTRATION: 28 %
INHALED O2 CONCENTRATION: 40 %
INR PPP: 1.4 (ref 0.9–1.1)
LACTATE BLDA-SCNC: 10.4 MMOL/L (ref 0.4–2)
LACTATE BLDA-SCNC: 10.8 MMOL/L (ref 0.4–2)
LACTATE BLDA-SCNC: 11 MMOL/L (ref 0.4–2)
LACTATE BLDA-SCNC: 8.6 MMOL/L (ref 0.4–2)
LACTATE BLDMV-SCNC: 11.7 MMOL/L (ref 0.4–2)
LACTATE BLDV-SCNC: 0.5 MMOL/L (ref 0.4–2)
LACTATE BLDV-SCNC: 8.4 MMOL/L (ref 0.4–2)
LACTATE BLDV-SCNC: 8.9 MMOL/L (ref 0.4–2)
LACTATE SERPL-SCNC: 13.2 MMOL/L (ref 0.4–2)
LACTATE SERPL-SCNC: 9.7 MMOL/L (ref 0.4–2)
LYMPHOCYTES # BLD AUTO: 0.49 X10*3/UL (ref 1.2–4.8)
LYMPHOCYTES # BLD AUTO: 0.87 X10*3/UL (ref 1.2–4.8)
LYMPHOCYTES NFR BLD AUTO: 4.1 %
LYMPHOCYTES NFR BLD AUTO: 6.8 %
MAGNESIUM SERPL-MCNC: 1.89 MG/DL (ref 1.6–2.4)
MAGNESIUM SERPL-MCNC: 2.03 MG/DL (ref 1.6–2.4)
MCH RBC QN AUTO: 29.9 PG (ref 26–34)
MCH RBC QN AUTO: 30.4 PG (ref 26–34)
MCH RBC QN AUTO: 30.4 PG (ref 26–34)
MCHC RBC AUTO-ENTMCNC: 32.1 G/DL (ref 32–36)
MCHC RBC AUTO-ENTMCNC: 32.1 G/DL (ref 32–36)
MCHC RBC AUTO-ENTMCNC: 34.3 G/DL (ref 32–36)
MCV RBC AUTO: 89 FL (ref 80–100)
MCV RBC AUTO: 93 FL (ref 80–100)
MCV RBC AUTO: 95 FL (ref 80–100)
MONOCYTES # BLD AUTO: 0.66 X10*3/UL (ref 0.1–1)
MONOCYTES # BLD AUTO: 0.84 X10*3/UL (ref 0.1–1)
MONOCYTES NFR BLD AUTO: 5.1 %
MONOCYTES NFR BLD AUTO: 7 %
NEUTROPHILS # BLD AUTO: 10.56 X10*3/UL (ref 1.2–7.7)
NEUTROPHILS # BLD AUTO: 11.05 X10*3/UL (ref 1.2–7.7)
NEUTROPHILS NFR BLD AUTO: 85.7 %
NEUTROPHILS NFR BLD AUTO: 87.4 %
NRBC BLD-RTO: 0.3 /100 WBCS (ref 0–0)
NRBC BLD-RTO: 2.3 /100 WBCS (ref 0–0)
NRBC BLD-RTO: 2.3 /100 WBCS (ref 0–0)
OXYHGB MFR BLDA: 96.7 % (ref 94–98)
OXYHGB MFR BLDA: 96.8 % (ref 94–98)
OXYHGB MFR BLDA: 96.9 % (ref 94–98)
OXYHGB MFR BLDA: 97.1 % (ref 94–98)
OXYHGB MFR BLDMV: 40.8 % (ref 45–75)
OXYHGB MFR BLDV: 29.2 % (ref 45–75)
OXYHGB MFR BLDV: 36.2 % (ref 45–75)
OXYHGB MFR BLDV: 48.7 % (ref 45–75)
PCO2 BLDA: 23 MM HG (ref 38–42)
PCO2 BLDA: 25 MM HG (ref 38–42)
PCO2 BLDA: 26 MM HG (ref 38–42)
PCO2 BLDA: 26 MM HG (ref 38–42)
PCO2 BLDMV: 35 MM HG (ref 41–51)
PCO2 BLDV: 43 MM HG (ref 41–51)
PCO2 BLDV: 45 MM HG (ref 41–51)
PCO2 BLDV: 53 MM HG (ref 41–51)
PH BLDA: 7.3 PH (ref 7.38–7.42)
PH BLDA: 7.3 PH (ref 7.38–7.42)
PH BLDA: 7.32 PH (ref 7.38–7.42)
PH BLDA: 7.35 PH (ref 7.38–7.42)
PH BLDMV: 7.26 PH (ref 7.33–7.43)
PH BLDV: 7.15 PH (ref 7.33–7.43)
PH BLDV: 7.23 PH (ref 7.33–7.43)
PH BLDV: 7.37 PH (ref 7.33–7.43)
PHOSPHATE SERPL-MCNC: 2.4 MG/DL (ref 2.5–4.9)
PHOSPHATE SERPL-MCNC: 5.2 MG/DL (ref 2.5–4.9)
PLATELET # BLD AUTO: 294 X10*3/UL (ref 150–450)
PLATELET # BLD AUTO: 320 X10*3/UL (ref 150–450)
PLATELET # BLD AUTO: 352 X10*3/UL (ref 150–450)
PO2 BLDA: 118 MM HG (ref 85–95)
PO2 BLDA: 121 MM HG (ref 85–95)
PO2 BLDA: 122 MM HG (ref 85–95)
PO2 BLDA: 98 MM HG (ref 85–95)
PO2 BLDMV: 31 MM HG (ref 35–45)
PO2 BLDV: 27 MM HG (ref 35–45)
PO2 BLDV: 29 MM HG (ref 35–45)
PO2 BLDV: 32 MM HG (ref 35–45)
POTASSIUM BLDA-SCNC: 3.9 MMOL/L (ref 3.5–5.3)
POTASSIUM BLDA-SCNC: 4 MMOL/L (ref 3.5–5.3)
POTASSIUM BLDA-SCNC: 4.2 MMOL/L (ref 3.5–5.3)
POTASSIUM BLDMV-SCNC: 4.3 MMOL/L (ref 3.5–5.3)
POTASSIUM BLDV-SCNC: 4.1 MMOL/L (ref 3.5–5.3)
POTASSIUM BLDV-SCNC: 4.1 MMOL/L (ref 3.5–5.3)
POTASSIUM BLDV-SCNC: 5.2 MMOL/L (ref 3.5–5.3)
POTASSIUM SERPL-SCNC: 3.3 MMOL/L (ref 3.5–5.3)
POTASSIUM SERPL-SCNC: 4.1 MMOL/L (ref 3.5–5.3)
PROT SERPL-MCNC: 5.5 G/DL (ref 6.4–8.2)
PROTHROMBIN TIME: 15.8 SECONDS (ref 9.8–12.8)
RBC # BLD AUTO: 2.3 X10*6/UL (ref 4–5.2)
RBC # BLD AUTO: 2.34 X10*6/UL (ref 4–5.2)
RBC # BLD AUTO: 2.37 X10*6/UL (ref 4–5.2)
SAO2 % BLDA: 100 % (ref 94–100)
SAO2 % BLDA: 100 % (ref 94–100)
SAO2 % BLDA: 99 % (ref 94–100)
SAO2 % BLDA: 99 % (ref 94–100)
SAO2 % BLDMV: 41 % (ref 45–75)
SAO2 % BLDV: 29 % (ref 45–75)
SAO2 % BLDV: 37 % (ref 45–75)
SAO2 % BLDV: 50 % (ref 45–75)
SODIUM BLDA-SCNC: 129 MMOL/L (ref 136–145)
SODIUM BLDA-SCNC: 130 MMOL/L (ref 136–145)
SODIUM BLDA-SCNC: 132 MMOL/L (ref 136–145)
SODIUM BLDMV-SCNC: 130 MMOL/L (ref 136–145)
SODIUM BLDV-SCNC: 130 MMOL/L (ref 136–145)
SODIUM BLDV-SCNC: 132 MMOL/L (ref 136–145)
SODIUM BLDV-SCNC: 132 MMOL/L (ref 136–145)
SODIUM SERPL-SCNC: 134 MMOL/L (ref 136–145)
SODIUM SERPL-SCNC: 135 MMOL/L (ref 136–145)
WBC # BLD AUTO: 11.5 X10*3/UL (ref 4.4–11.3)
WBC # BLD AUTO: 12.1 X10*3/UL (ref 4.4–11.3)
WBC # BLD AUTO: 12.9 X10*3/UL (ref 4.4–11.3)

## 2024-11-28 PROCEDURE — 2500000004 HC RX 250 GENERAL PHARMACY W/ HCPCS (ALT 636 FOR OP/ED)

## 2024-11-28 PROCEDURE — 84132 ASSAY OF SERUM POTASSIUM: CPT | Performed by: STUDENT IN AN ORGANIZED HEALTH CARE EDUCATION/TRAINING PROGRAM

## 2024-11-28 PROCEDURE — 84132 ASSAY OF SERUM POTASSIUM: CPT

## 2024-11-28 PROCEDURE — 74018 RADEX ABDOMEN 1 VIEW: CPT

## 2024-11-28 PROCEDURE — 85027 COMPLETE CBC AUTOMATED: CPT

## 2024-11-28 PROCEDURE — 2500000005 HC RX 250 GENERAL PHARMACY W/O HCPCS

## 2024-11-28 PROCEDURE — 2500000002 HC RX 250 W HCPCS SELF ADMINISTERED DRUGS (ALT 637 FOR MEDICARE OP, ALT 636 FOR OP/ED)

## 2024-11-28 PROCEDURE — 84100 ASSAY OF PHOSPHORUS: CPT

## 2024-11-28 PROCEDURE — 82947 ASSAY GLUCOSE BLOOD QUANT: CPT

## 2024-11-28 PROCEDURE — 83735 ASSAY OF MAGNESIUM: CPT

## 2024-11-28 PROCEDURE — 83735 ASSAY OF MAGNESIUM: CPT | Performed by: STUDENT IN AN ORGANIZED HEALTH CARE EDUCATION/TRAINING PROGRAM

## 2024-11-28 PROCEDURE — 2500000005 HC RX 250 GENERAL PHARMACY W/O HCPCS: Performed by: INTERNAL MEDICINE

## 2024-11-28 PROCEDURE — 83605 ASSAY OF LACTIC ACID: CPT

## 2024-11-28 PROCEDURE — 1100000001 HC PRIVATE ROOM DAILY

## 2024-11-28 PROCEDURE — 94003 VENT MGMT INPAT SUBQ DAY: CPT

## 2024-11-28 PROCEDURE — 8010000001 HC DIALYSIS - HEMODIALYSIS PER DAY

## 2024-11-28 PROCEDURE — 2500000001 HC RX 250 WO HCPCS SELF ADMINISTERED DRUGS (ALT 637 FOR MEDICARE OP)

## 2024-11-28 PROCEDURE — 85025 COMPLETE CBC W/AUTO DIFF WBC: CPT

## 2024-11-28 PROCEDURE — 36415 COLL VENOUS BLD VENIPUNCTURE: CPT

## 2024-11-28 PROCEDURE — 76937 US GUIDE VASCULAR ACCESS: CPT

## 2024-11-28 PROCEDURE — 80069 RENAL FUNCTION PANEL: CPT

## 2024-11-28 PROCEDURE — 94660 CPAP INITIATION&MGMT: CPT

## 2024-11-28 PROCEDURE — 82805 BLOOD GASES W/O2 SATURATION: CPT

## 2024-11-28 PROCEDURE — 85610 PROTHROMBIN TIME: CPT | Performed by: STUDENT IN AN ORGANIZED HEALTH CARE EDUCATION/TRAINING PROGRAM

## 2024-11-28 PROCEDURE — 37799 UNLISTED PX VASCULAR SURGERY: CPT

## 2024-11-28 PROCEDURE — 71045 X-RAY EXAM CHEST 1 VIEW: CPT

## 2024-11-28 PROCEDURE — 99233 SBSQ HOSP IP/OBS HIGH 50: CPT | Performed by: INTERNAL MEDICINE

## 2024-11-28 PROCEDURE — 87040 BLOOD CULTURE FOR BACTERIA: CPT

## 2024-11-28 PROCEDURE — 99291 CRITICAL CARE FIRST HOUR: CPT

## 2024-11-28 PROCEDURE — 31500 INSERT EMERGENCY AIRWAY: CPT | Mod: GC

## 2024-11-28 RX ORDER — NOREPINEPHRINE BITARTRATE/D5W 8 MG/250ML
.01-1 PLASTIC BAG, INJECTION (ML) INTRAVENOUS CONTINUOUS
Status: DISCONTINUED | OUTPATIENT
Start: 2024-11-28 | End: 2024-12-05

## 2024-11-28 RX ORDER — POLYETHYLENE GLYCOL 3350 17 G/17G
17 POWDER, FOR SOLUTION ORAL DAILY PRN
Status: DISCONTINUED | OUTPATIENT
Start: 2024-11-28 | End: 2024-12-18 | Stop reason: HOSPADM

## 2024-11-28 RX ORDER — FENTANYL CITRATE-0.9 % NACL/PF 10 MCG/ML
25-200 PLASTIC BAG, INJECTION (ML) INTRAVENOUS CONTINUOUS
Status: DISCONTINUED | OUTPATIENT
Start: 2024-11-28 | End: 2024-12-09

## 2024-11-28 RX ORDER — METOPROLOL TARTRATE 25 MG/1
6.25 TABLET, FILM COATED ORAL ONCE
Status: COMPLETED | OUTPATIENT
Start: 2024-11-28 | End: 2024-11-28

## 2024-11-28 RX ORDER — MIDAZOLAM HYDROCHLORIDE 1 MG/ML
.5-2 INJECTION, SOLUTION INTRAVENOUS CONTINUOUS
Status: DISCONTINUED | OUTPATIENT
Start: 2024-11-28 | End: 2024-12-07

## 2024-11-28 RX ORDER — NOREPINEPHRINE BITARTRATE/D5W 8 MG/250ML
PLASTIC BAG, INJECTION (ML) INTRAVENOUS
Status: COMPLETED
Start: 2024-11-28 | End: 2024-11-28

## 2024-11-28 RX ORDER — NOREPINEPHRINE BITARTRATE/D5W 8 MG/250ML
.01-1 PLASTIC BAG, INJECTION (ML) INTRAVENOUS CONTINUOUS
Status: DISCONTINUED | OUTPATIENT
Start: 2024-11-28 | End: 2024-11-28

## 2024-11-28 RX ORDER — VANCOMYCIN HYDROCHLORIDE 1 G/20ML
INJECTION, POWDER, LYOPHILIZED, FOR SOLUTION INTRAVENOUS DAILY PRN
Status: DISCONTINUED | OUTPATIENT
Start: 2024-11-28 | End: 2024-12-02

## 2024-11-28 RX ORDER — SODIUM BICARBONATE 1 MEQ/ML
50 SYRINGE (ML) INTRAVENOUS ONCE
Status: COMPLETED | OUTPATIENT
Start: 2024-11-28 | End: 2024-11-28

## 2024-11-28 RX ORDER — AMOXICILLIN 250 MG
2 CAPSULE ORAL 2 TIMES DAILY
Status: DISCONTINUED | OUTPATIENT
Start: 2024-11-28 | End: 2024-11-28

## 2024-11-28 RX ORDER — POTASSIUM CHLORIDE 1.5 G/1.58G
60 POWDER, FOR SOLUTION ORAL ONCE
Status: COMPLETED | OUTPATIENT
Start: 2024-11-28 | End: 2024-11-28

## 2024-11-28 RX ORDER — CALCIUM GLUCONATE 20 MG/ML
2 INJECTION, SOLUTION INTRAVENOUS ONCE
Status: COMPLETED | OUTPATIENT
Start: 2024-11-28 | End: 2024-11-29

## 2024-11-28 RX ORDER — METOPROLOL TARTRATE 25 MG/1
12.5 TABLET, FILM COATED ORAL EVERY 6 HOURS
Status: DISCONTINUED | OUTPATIENT
Start: 2024-11-28 | End: 2024-11-29

## 2024-11-28 RX ORDER — DEXTROSE 50 % IN WATER (D50W) INTRAVENOUS SYRINGE
12.5
Status: DISCONTINUED | OUTPATIENT
Start: 2024-11-28 | End: 2024-12-08

## 2024-11-28 RX ORDER — HEPARIN SODIUM 1000 [USP'U]/ML
INJECTION, SOLUTION INTRAVENOUS; SUBCUTANEOUS
Status: COMPLETED
Start: 2024-11-28 | End: 2024-11-28

## 2024-11-28 RX ORDER — SODIUM BICARBONATE 1 MEQ/ML
SYRINGE (ML) INTRAVENOUS
Status: COMPLETED
Start: 2024-11-28 | End: 2024-11-28

## 2024-11-28 RX ORDER — VANCOMYCIN HYDROCHLORIDE 750 MG/150ML
750 INJECTION, SOLUTION INTRAVENOUS ONCE
Status: COMPLETED | OUTPATIENT
Start: 2024-11-28 | End: 2024-11-29

## 2024-11-28 RX ORDER — MIDAZOLAM HYDROCHLORIDE 1 MG/ML
0-2 INJECTION, SOLUTION INTRAVENOUS CONTINUOUS
Status: DISPENSED | OUTPATIENT
Start: 2024-11-28 | End: 2024-11-28

## 2024-11-28 RX ORDER — AMOXICILLIN 250 MG
1 CAPSULE ORAL NIGHTLY PRN
Status: DISCONTINUED | OUTPATIENT
Start: 2024-11-28 | End: 2024-12-10

## 2024-11-28 RX ORDER — DEXTROSE 50 % IN WATER (D50W) INTRAVENOUS SYRINGE
25
Status: DISCONTINUED | OUTPATIENT
Start: 2024-11-28 | End: 2024-12-08

## 2024-11-28 RX ADMIN — VANCOMYCIN HYDROCHLORIDE 750 MG: 750 INJECTION, SOLUTION INTRAVENOUS at 23:56

## 2024-11-28 RX ADMIN — HEPARIN SODIUM 5000 UNITS: 5000 INJECTION, SOLUTION INTRAVENOUS; SUBCUTANEOUS at 09:03

## 2024-11-28 RX ADMIN — HEPARIN SODIUM 5000 UNITS: 5000 INJECTION, SOLUTION INTRAVENOUS; SUBCUTANEOUS at 17:53

## 2024-11-28 RX ADMIN — MIDAZOLAM IN SODIUM CHLORIDE 2 MG/HR: 1 INJECTION INTRAVENOUS at 00:40

## 2024-11-28 RX ADMIN — PANTOPRAZOLE SODIUM 40 MG: 40 INJECTION, POWDER, FOR SOLUTION INTRAVENOUS at 09:03

## 2024-11-28 RX ADMIN — Medication 50 MEQ: at 23:28

## 2024-11-28 RX ADMIN — Medication 40 PERCENT: at 15:15

## 2024-11-28 RX ADMIN — Medication 40 PERCENT: at 20:00

## 2024-11-28 RX ADMIN — Medication 75 MCG/HR: at 02:37

## 2024-11-28 RX ADMIN — HEPARIN SODIUM 10000 UNITS: 1000 INJECTION INTRAVENOUS; SUBCUTANEOUS at 11:27

## 2024-11-28 RX ADMIN — NOREPINEPHRINE BITARTRATE 8000 MCG: 8 INJECTION, SOLUTION INTRAVENOUS at 16:57

## 2024-11-28 RX ADMIN — SODIUM BICARBONATE 50 MEQ: 84 INJECTION INTRAVENOUS at 23:28

## 2024-11-28 RX ADMIN — METOPROLOL TARTRATE 12.5 MG: 25 TABLET, FILM COATED ORAL at 10:09

## 2024-11-28 RX ADMIN — MIDAZOLAM IN SODIUM CHLORIDE 1 MG/HR: 1 INJECTION INTRAVENOUS at 16:51

## 2024-11-28 RX ADMIN — PIPERACILLIN SODIUM AND TAZOBACTAM SODIUM 2.25 G: 2; .25 INJECTION, SOLUTION INTRAVENOUS at 22:43

## 2024-11-28 RX ADMIN — DEXTROSE MONOHYDRATE 25 G: 25 INJECTION, SOLUTION INTRAVENOUS at 17:58

## 2024-11-28 RX ADMIN — Medication 40 PERCENT: at 20:30

## 2024-11-28 RX ADMIN — Medication 40 PERCENT: at 08:25

## 2024-11-28 RX ADMIN — POTASSIUM CHLORIDE 60 MEQ: 1.5 POWDER, FOR SOLUTION ORAL at 02:37

## 2024-11-28 RX ADMIN — ASPIRIN 81 MG CHEWABLE TABLET 81 MG: 81 TABLET CHEWABLE at 09:03

## 2024-11-28 RX ADMIN — POLYETHYLENE GLYCOL 3350 17 G: 17 POWDER, FOR SOLUTION ORAL at 09:03

## 2024-11-28 RX ADMIN — OXYCODONE HYDROCHLORIDE 2.5 MG: 5 TABLET ORAL at 09:03

## 2024-11-28 RX ADMIN — HEPARIN SODIUM 5000 UNITS: 5000 INJECTION, SOLUTION INTRAVENOUS; SUBCUTANEOUS at 00:39

## 2024-11-28 RX ADMIN — AMIODARONE HYDROCHLORIDE 400 MG: 200 TABLET ORAL at 09:03

## 2024-11-28 RX ADMIN — METOPROLOL TARTRATE 6.25 MG: 25 TABLET, FILM COATED ORAL at 02:35

## 2024-11-28 RX ADMIN — DEXMEDETOMIDINE HYDROCHLORIDE 0.2 MCG/KG/HR: 400 INJECTION INTRAVENOUS at 12:11

## 2024-11-28 RX ADMIN — Medication 8000 MCG: at 16:57

## 2024-11-28 RX ADMIN — Medication 25 MCG/HR: at 16:51

## 2024-11-28 RX ADMIN — SODIUM BICARBONATE 50 MEQ: 84 INJECTION INTRAVENOUS at 16:42

## 2024-11-28 RX ADMIN — THIAMINE HCL TAB 100 MG 100 MG: 100 TAB at 09:03

## 2024-11-28 RX ADMIN — Medication 0.14 MCG/KG/MIN: at 22:45

## 2024-11-28 RX ADMIN — CALCIUM GLUCONATE 2 G: 20 INJECTION, SOLUTION INTRAVENOUS at 23:45

## 2024-11-28 ASSESSMENT — PAIN - FUNCTIONAL ASSESSMENT
PAIN_FUNCTIONAL_ASSESSMENT: CPOT (CRITICAL CARE PAIN OBSERVATION TOOL)
PAIN_FUNCTIONAL_ASSESSMENT: CPOT (CRITICAL CARE PAIN OBSERVATION TOOL)

## 2024-11-28 NOTE — PROGRESS NOTES
Critical Care Daily Progress      Subjective   Patient is a 64 y.o. female admitted on 11/18/2024 10:54 PM to the CICU for Aortic Dissection.     Went back to AF w rates ~100-110 s/p metop 6.25 mg    Objective     Vent Settings/Oxygen Drips   Vent Mode: Volume control/assist control  FiO2 (%):  [40 %-100 %] 40 %  S RR:  [8-18] 18  S VT:  [400 mL] 400 mL  PEEP/CPAP (cm H2O):  [5 cm H20-8 cm H20] 8 cm H20  WI SUP:  [10 cm H20] 10 cm H20  MAP (cm H2O):  [12-15] 15  dexmedeTOMIDine, 0-1.5 mcg/kg/hr, Last Rate: Stopped (11/28/24 0024)         Vitals: I/O:   Vitals:    11/28/24 0700   BP: 111/81   Pulse: 108   Resp: 18   Temp:    SpO2: 98%    Cuff: SBP 90s - 110s    24hr Min/Max:  Temp  Min: 35.8 °C (96.4 °F)  Max: 37 °C (98.6 °F)  Pulse  Min: 57  Max: 122  BP  Min: 79/50  Max: 134/79  Resp  Min: 13  Max: 19  SpO2  Min: 91 %  Max: 100 %   Intake/Output Summary (Last 24 hours) at 11/28/2024 0758  Last data filed at 11/28/2024 0614  Gross per 24 hour   Intake 1425.91 ml   Output 68 ml   Net 1357.91 ml    Worsening UO w 68 ml 11/27  SLED o/n  Net IO Since Admission: 10,059.93 mL [11/28/24 0758]      Scheduled Medications:  PRN Medications:    amiodarone, 400 mg, oral, BID  aspirin, 81 mg, oral, Daily  atorvastatin, 80 mg, oral, Nightly  heparin, 5,000 Units, subcutaneous, q8h  insulin lispro, 0-5 Units, subcutaneous, q4h  lactated Ringer's, 1,000 mL, intravenous, Once  oxygen, , inhalation, Continuous - Inhalation  pantoprazole, 40 mg, intravenous, BID  polyethylene glycol, 17 g, oral, BID  sennosides-docusate sodium, 1 tablet, oral, Nightly  thiamine, 100 mg, oral, Daily     PRN medications: acetaminophen **OR** acetaminophen **OR** acetaminophen, dextrose, dextrose, glucagon, glucagon, hydrOXYzine HCL, ipratropium-albuteroL, oxyCODONE, oxygen       Physical Exam:  Physical Exam  Constitutional:       Appearance: She is toxic-appearing.      Comments: Intubated, following commands - opening eyes to voice, squeezing hands  b/l  frail   Eyes:      General: No scleral icterus.  Cardiovascular:      Rate and Rhythm: Normal rate and regular rhythm.      Comments: Chest tender to palpation  Pulmonary:      Effort: No respiratory distress.      Breath sounds: Normal breath sounds. No wheezing or rhonchi.      Comments: ET tube in place; mild wheeze b/l  Abdominal:      General: There is no distension.      Palpations: Abdomen is soft.      Tenderness: There is no abdominal tenderness. There is no guarding.   Musculoskeletal:         General: No swelling or tenderness.   Skin:     General: Skin is warm and dry.      Findings: No bruising or rash.                LABS:    CBC RFP   Lab Results   Component Value Date    WBC 12.1 (H) 11/28/2024    WBC 9.7 11/27/2024    WBC 12.6 (H) 11/26/2024    HGB 7.0 (L) 11/28/2024    HGB 9.1 (L) 11/27/2024    HGB 7.0 (L) 11/26/2024    HCT 21.8 (L) 11/28/2024    MCV 93 11/28/2024     11/28/2024     11/27/2024     11/26/2024    NEUTROABS 10.56 (H) 11/28/2024       Blood cultures negative Lab Results   Component Value Date     (L) 11/28/2024    K 3.3 (L) 11/28/2024    CL 99 11/28/2024    CO2 26 11/28/2024    BUN 33 (H) 11/28/2024    CREATININE 2.84 (H) 11/28/2024    CREATININE 8.13 (H) 11/27/2024     Lab Results   Component Value Date    MG 1.89 11/28/2024    PHOS 2.4 (L) 11/28/2024    CALCIUM 7.6 (L) 11/28/2024         Hepatic Function ABG/VBG   Lab Results   Component Value Date    ALT 67 (H) 11/23/2024    AST 45 (H) 11/23/2024    ALKPHOS 95 11/23/2024     Lab Results   Component Value Date    BILIDIR 0.2 11/23/2024      Lab Results   Component Value Date    PROTIME 11.9 11/18/2024    APTT 29 11/18/2024    INR 1.1 11/18/2024      Lab Results   Component Value Date    LACTATE 1.7 11/20/2024          No results found for the last 90 days.         IMAGING:    CXR 11/27  IMPRESSION:  1.  Left internal jugular approach central venous catheter tip  projecting over the expected location of  superior cavoatrial junction  2. Prominence and coarsening of interstitial lung markings  bilaterally, which correlate with pulmonary emphysema on CT dated  11/18/2020 with superimposed mild pulmonary edema. .  3. Medical devices as explained above. Of note, esophagus temperature  probe is seen looped in the region of pharynx, positioning  recommended.    US Renal 11/26  IMPRESSION:  1. Increased renal cortical echogenicity and lobular appearance of  the kidneys bilaterally, likely medical renal disease, with  relatively atrophic left kidney. No hydronephrosis.  2. Partially visualized bilateral pleural effusions and trace  abdominal ascites.      Assessment/Plan    64 y.o. female with history of pancreatitis, DVT/PE, HLD, HTN, CABGx4 1997, T2DM, GERD, PAD, chronic mesenteric ischemia, tobacco use who presented to [facility] on 11/18/2024 as a transfer from [facility] with chest, back, and abdominal pain. 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 Vascular and Cardiac surgery following. CICU course c/b PEA arrest 11/20, now intubated and sedated, following commands.     11/28 Updates:  -SBT  -SLED early this AM  -sustained AF o/n  -Trial heparin and monitor for Hgb drop/melena    Neuro:  #Sedated  -fentanyl, held for SBTs  -precedex for SBT  -following commands     Cardiac:  #Aflutter/AFib  ::now more sustained AF, rates ~110  -amio 400 BID  -Holding heparin    #PEA arrest  ::likely hypoxia 2/2 aspiration vs PNA  ::Trop peak 5700 -> 4900  -pressors off    #Aortic dissection  #Mural Thrombus  #Distal aortic arch saccular aneurysm   ::CTA CAP 11/18- 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   -Vascular surgery discussed case at aortic conference and there are  no plans for surgery, palliative measures recommended  -Holding heparin     #NSTEMI  #CAD s/p CABGx4 1997  #PAD  #HTN #DLD  ::EKG- NSR, rate 71, TWI aVL, V1-V6, 1mm ORQUIDEA in aVR  ::Trop peak 5700  ::Lipid HDL 41.5, , TG 75, Chol 157  -Holding home amlodipine 10mg, imdur 30mg, lisinopril 2.5mg, metop tartrate 50mg bid iso hypotension   -Continue ASA and atorvastatin  -Holding plavix 75mg    #HFrEF (45-50%, 11/19/24)     Pulm:  #Intubated  #c/f developing PNA  ::CXR with opacification of LLL  ::s/p Zosyn 11/20 - 11/26  -Daily SBT     GI:  #c/f UGIB, resolved  -PPI BID  -Holding therapeutic AC  -BID CBC  -Active T&S, consented  -monitor for melena    #constipation  -Miralax BID  -Senna BID     Renal:  #TRACY on CKD likely 2/2 arrest  #Oliguria  -Cr at OSH 1.3  -worsening Cr and UO -> nephro following     Endo:   #T2DM  -Hold home metformin   -Mild SSI + hypoglycemia protocol     #Tube Feeds  ::at goal, 20 ml/hr  -f/up nutrition    Infectious  See pulm section for PNA concerns      F: 500 ml  E: K>4, Mag>2  N: NPO  G: pantoprazole  A: PIV, OG  DVT: subcutaneous heparin, SCDs  CODE: DNR/okay to intubate (confirmed with family 11/21)  NOK: Daughter Josy [phone]      Stephen Clayton MD  Internal Medicine PGY-2

## 2024-11-28 NOTE — CARE PLAN
Problem: Skin  Goal: Participates in plan/prevention/treatment measures  Outcome: Progressing  Flowsheets (Taken 11/28/2024 0144)  Participates in plan/prevention/treatment measures: Elevate heels  Goal: Prevent/manage excess moisture  Outcome: Progressing  Flowsheets (Taken 11/28/2024 0144)  Prevent/manage excess moisture:   Cleanse incontinence/protect with barrier cream   Monitor for/manage infection if present   Follow provider orders for dressing changes  Goal: Prevent/minimize sheer/friction injuries  Outcome: Progressing  Flowsheets (Taken 11/28/2024 0144)  Prevent/minimize sheer/friction injuries:   Complete micro-shifts as needed if patient unable. Adjust patient position to relieve pressure points, not a full turn   HOB 30 degrees or less   Turn/reposition every 2 hours/use positioning/transfer devices   The patient's goals for the shift include      The clinical goals for the shift include pt will finish SLEDD without hemodynamic changes    Over the shift, the patient did not make progress toward the following goals. Barriers to progression include agitation.

## 2024-11-28 NOTE — PROGRESS NOTES
Humaira Huang   64 y.oNga    @@  N/Room: 67763903/14/14-A admitted to CICU for Aortic dissection.    Subjective:Seen while on SLED . Tolerating well so far.No clinical improvement.    Objective:   Oliguric, no renal recovery.  Obtunded    Meds:   amiodarone, 400 mg, BID  aspirin, 81 mg, Daily  atorvastatin, 80 mg, Nightly  heparin, 5,000 Units, q8h  insulin lispro, 0-5 Units, q4h  lactated Ringer's, 1,000 mL, Once  oxygen, , Continuous - Inhalation  pantoprazole, 40 mg, BID  thiamine, 100 mg, Daily      dexmedeTOMIDine, Last Rate: Stopped (11/28/24 0024)      acetaminophen, 650 mg, q4h PRN   Or  acetaminophen, 650 mg, q4h PRN   Or  acetaminophen, 650 mg, q4h PRN  dextrose, 12.5 g, q15 min PRN  dextrose, 25 g, q15 min PRN  glucagon, 1 mg, q15 min PRN  glucagon, 1 mg, q15 min PRN  hydrOXYzine HCL, 10 mg, q6h PRN  ipratropium-albuteroL, 3 mL, q6h PRN  oxyCODONE, 2.5 mg, q4h PRN  oxygen, , Continuous PRN - O2/gases  polyethylene glycol, 17 g, Daily PRN  sennosides-docusate sodium, 1 tablet, Nightly PRN        Vitals:    11/28/24 0800   BP:    Pulse:    Resp:    Temp: 35.6 °C (96.1 °F)   SpO2:           Intake/Output Summary (Last 24 hours) at 11/28/2024 0926  Last data filed at 11/28/2024 0614  Gross per 24 hour   Intake 1244.97 ml   Output 58 ml   Net 1186.97 ml       General appearance: no distress  Eyes: non-icteric  Skin: no apparent rash  Heart: [id number]  Lungs: CTA bilat no wheezing/crackles  Abdomen: soft, nt/nd  Extremities: 1+ edema bilat  Orozco  Neuro: obtunded,off sedation      Blood Labs:  Results for orders placed or performed during the hospital encounter of 11/18/24 (from the past 24 hours)   POCT GLUCOSE   Result Value Ref Range    POCT Glucose 143 (H) 74 - 99 mg/dL   Blood Gas Venous Full Panel   Result Value Ref Range    POCT pH, Venous 7.25 (LL) 7.33 - 7.43 pH    POCT pCO2, Venous 44 41 - 51 mm Hg    POCT pO2, Venous 37 35 - 45 mm Hg    POCT SO2, Venous 64 45 - 75 %    POCT Oxy Hemoglobin, Venous  62.8 45.0 - 75.0 %    POCT Hematocrit Calculated, Venous 20.0 (L) 36.0 - 46.0 %    POCT Sodium, Venous 136 136 - 145 mmol/L    POCT Potassium, Venous 3.8 3.5 - 5.3 mmol/L    POCT Chloride, Venous 102 98 - 107 mmol/L    POCT Ionized Calicum, Venous 0.98 (L) 1.10 - 1.33 mmol/L    POCT Glucose, Venous 133 (H) 74 - 99 mg/dL    POCT Lactate, Venous 0.5 0.4 - 2.0 mmol/L    POCT Base Excess, Venous -7.3 (L) -2.0 - 3.0 mmol/L    POCT HCO3 Calculated, Venous 19.3 (L) 22.0 - 26.0 mmol/L    POCT Hemoglobin, Venous 6.8 (L) 12.0 - 16.0 g/dL    POCT Anion Gap, Venous 19.0 10.0 - 25.0 mmol/L    Patient Temperature 37.0 degrees Celsius    FiO2 100 %   POCT GLUCOSE   Result Value Ref Range    POCT Glucose 116 (H) 74 - 99 mg/dL   POCT GLUCOSE   Result Value Ref Range    POCT Glucose 127 (H) 74 - 99 mg/dL   POCT GLUCOSE   Result Value Ref Range    POCT Glucose 133 (H) 74 - 99 mg/dL   CBC and Auto Differential   Result Value Ref Range    WBC 12.1 (H) 4.4 - 11.3 x10*3/uL    nRBC 0.3 (H) 0.0 - 0.0 /100 WBCs    RBC 2.34 (L) 4.00 - 5.20 x10*6/uL    Hemoglobin 7.0 (L) 12.0 - 16.0 g/dL    Hematocrit 21.8 (L) 36.0 - 46.0 %    MCV 93 80 - 100 fL    MCH 29.9 26.0 - 34.0 pg    MCHC 32.1 32.0 - 36.0 g/dL    RDW 14.5 11.5 - 14.5 %    Platelets 294 150 - 450 x10*3/uL    Neutrophils % 87.4 40.0 - 80.0 %    Immature Granulocytes %, Automated 1.0 (H) 0.0 - 0.9 %    Lymphocytes % 4.1 13.0 - 44.0 %    Monocytes % 7.0 2.0 - 10.0 %    Eosinophils % 0.3 0.0 - 6.0 %    Basophils % 0.2 0.0 - 2.0 %    Neutrophils Absolute 10.56 (H) 1.20 - 7.70 x10*3/uL    Immature Granulocytes Absolute, Automated 0.12 0.00 - 0.70 x10*3/uL    Lymphocytes Absolute 0.49 (L) 1.20 - 4.80 x10*3/uL    Monocytes Absolute 0.84 0.10 - 1.00 x10*3/uL    Eosinophils Absolute 0.04 0.00 - 0.70 x10*3/uL    Basophils Absolute 0.02 0.00 - 0.10 x10*3/uL   Renal function panel   Result Value Ref Range    Glucose 118 (H) 74 - 99 mg/dL    Sodium 135 (L) 136 - 145 mmol/L    Potassium 3.3 (L) 3.5 -  5.3 mmol/L    Chloride 99 98 - 107 mmol/L    Bicarbonate 26 21 - 32 mmol/L    Anion Gap 13 10 - 20 mmol/L    Urea Nitrogen 33 (H) 6 - 23 mg/dL    Creatinine 2.84 (H) 0.50 - 1.05 mg/dL    eGFR 18 (L) >60 mL/min/1.73m*2    Calcium 7.6 (L) 8.6 - 10.6 mg/dL    Phosphorus 2.4 (L) 2.5 - 4.9 mg/dL    Albumin 2.8 (L) 3.4 - 5.0 g/dL   Magnesium   Result Value Ref Range    Magnesium 1.89 1.60 - 2.40 mg/dL   POCT GLUCOSE   Result Value Ref Range    POCT Glucose 103 (H) 74 - 99 mg/dL   Blood Gas Venous Full Panel   Result Value Ref Range    POCT pH, Venous 7.37 7.33 - 7.43 pH    POCT pCO2, Venous 45 41 - 51 mm Hg    POCT pO2, Venous 32 (L) 35 - 45 mm Hg    POCT SO2, Venous 50 45 - 75 %    POCT Oxy Hemoglobin, Venous 48.7 45.0 - 75.0 %    POCT Hematocrit Calculated, Venous 22.0 (L) 36.0 - 46.0 %    POCT Sodium, Venous 132 (L) 136 - 145 mmol/L    POCT Potassium, Venous 4.1 3.5 - 5.3 mmol/L    POCT Chloride, Venous 99 98 - 107 mmol/L    POCT Ionized Calicum, Venous 1.11 1.10 - 1.33 mmol/L    POCT Glucose, Venous 101 (H) 74 - 99 mg/dL    POCT Lactate, Venous 0.5 0.4 - 2.0 mmol/L    POCT Base Excess, Venous 0.6 -2.0 - 3.0 mmol/L    POCT HCO3 Calculated, Venous 26.0 22.0 - 26.0 mmol/L    POCT Hemoglobin, Venous 7.3 (L) 12.0 - 16.0 g/dL    POCT Anion Gap, Venous 11.0 10.0 - 25.0 mmol/L    Patient Temperature 37.0 degrees Celsius    FiO2 40 %   POCT GLUCOSE   Result Value Ref Range    POCT Glucose 88 74 - 99 mg/dL      US KUB 11/26  IMPRESSION:  1. Increased renal cortical echogenicity and lobular appearance of the kidneys bilaterally, likely medical renal disease, with relatively atrophic left kidney. No hydronephrosis.  2. Partially visualized bilateral pleural effusions and trace  abdominal ascites.        ASSESSMENT:  Humaira Huang is a  64 y.o.  Year old , with PMHx of  pancreatitis, DVT/PE, HLD, HTN, CABGx4 1997, T2DM, GERD, PAD, chronic mesenteric ischemia, tobacco use who presented to [facility] on 11/18/2024  as a transfer from [facility] with chest, back, and abdominal pain. 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 Vascular and Cardiac surgery following. CICU course c/b PEA arrest 11/20, now intubated. Nephrology following for TRACY.     #Oliguric TRACY Baseline creatinine 1.5   -UOP 100ml in past 24 hours off diuretics.  -UA 1+ protein, 3+ blood, 1+ ketones, 75 LE, >50 WBC, >20 RBC  Urine sodium 72, Fena 2.1%, suspected ATN  Urine chloride <15  -Acidbase are acceptable.  -Hemodynamics-not on any pressor support  -Etio :TRACY likely in setting recent hemodynamic insult with PEA.    RECOMMENDATIONS:  -No HD/SLED today.  -Will eval daily for any RRT needs.  -Follow daily BMP trend.  -Strict I/Os.  -No contrast or nephrotoxic drugs if possible.  -Would follow.      Martha James MD  Nephrology Fellow   Daytime / Weekend Renal Pager [phone]  After 7 pm Emergencies [phone] Pager [phone]

## 2024-11-28 NOTE — PROCEDURES
Airway  Date/Time: 11/28/2024 5:08 PM  Urgency: emergent    Airway not difficult    Staffing  Performed: resident   Authorized by: Chelsey Steele MD    Performed by: Chelsey Steele MD  Patient location during procedure: ICU    Consent for Airway (if performed for an anesthetic, see related documentation for consents)  Patient identity confirmed: arm band and hospital-assigned identification number  Consent: The procedure was performed in an emergent situation.      Indications and Patient Condition  Indications for airway management: airway protection and respiratory failure  Spontaneous Ventilation: absent  Sedation level: deep  Preoxygenated: yes  Patient position: sniffing  Mask difficulty assessment: 1 - vent by mask  Planned trial extubation    Final Airway Details  Final airway type: endotracheal airway      Successful airway: ETT  Cuffed: yes   Successful intubation technique: video laryngoscopy  Facilitating devices/methods: intubating stylet  Endotracheal tube insertion site: oral  Blade: Damien  Blade size: #3  ETT size (mm): 7.0  Cormack-Lehane Classification: grade I - full view of glottis  Placement verified by: chest auscultation and capnometry   Measured from: lips  ETT to lips (cm): 21  Number of attempts at approach: 1    Additional Comments  Used 10mg of etomidate and 90 mg of Rocuronium to induce

## 2024-11-29 ENCOUNTER — APPOINTMENT (OUTPATIENT)
Dept: RADIOLOGY | Facility: HOSPITAL | Age: 64
End: 2024-11-29
Payer: MEDICARE

## 2024-11-29 LAB
ABO GROUP (TYPE) IN BLOOD: NORMAL
ALBUMIN SERPL BCP-MCNC: 2.4 G/DL (ref 3.4–5)
ALBUMIN SERPL BCP-MCNC: 2.6 G/DL (ref 3.4–5)
ALP SERPL-CCNC: 331 U/L (ref 33–136)
ALT SERPL W P-5'-P-CCNC: 92 U/L (ref 7–45)
ANION GAP BLDA CALCULATED.4IONS-SCNC: 11 MMO/L (ref 10–25)
ANION GAP BLDA CALCULATED.4IONS-SCNC: 12 MMO/L (ref 10–25)
ANION GAP BLDA CALCULATED.4IONS-SCNC: 13 MMO/L (ref 10–25)
ANION GAP BLDA CALCULATED.4IONS-SCNC: 13 MMO/L (ref 10–25)
ANION GAP BLDA CALCULATED.4IONS-SCNC: 22 MMO/L (ref 10–25)
ANION GAP BLDA CALCULATED.4IONS-SCNC: 23 MMO/L (ref 10–25)
ANION GAP BLDA CALCULATED.4IONS-SCNC: 26 MMO/L (ref 10–25)
ANION GAP SERPL CALC-SCNC: 20 MMOL/L (ref 10–20)
ANION GAP SERPL CALC-SCNC: 26 MMOL/L (ref 10–20)
ANTIBODY SCREEN: NORMAL
APTT PPP: 61 SECONDS (ref 27–38)
AST SERPL W P-5'-P-CCNC: 171 U/L (ref 9–39)
BASE EXCESS BLDA CALC-SCNC: -0.2 MMOL/L (ref -2–3)
BASE EXCESS BLDA CALC-SCNC: -0.7 MMOL/L (ref -2–3)
BASE EXCESS BLDA CALC-SCNC: -6.8 MMOL/L (ref -2–3)
BASE EXCESS BLDA CALC-SCNC: -9.4 MMOL/L (ref -2–3)
BASE EXCESS BLDA CALC-SCNC: -9.8 MMOL/L (ref -2–3)
BASE EXCESS BLDA CALC-SCNC: 0.3 MMOL/L (ref -2–3)
BASE EXCESS BLDA CALC-SCNC: 2.4 MMOL/L (ref -2–3)
BASOPHILS # BLD AUTO: 0.02 X10*3/UL (ref 0–0.1)
BASOPHILS NFR BLD AUTO: 0.2 %
BILIRUB DIRECT SERPL-MCNC: 0.6 MG/DL (ref 0–0.3)
BILIRUB SERPL-MCNC: 1.3 MG/DL (ref 0–1.2)
BLOOD EXPIRATION DATE: NORMAL
BODY TEMPERATURE: 37 DEGREES CELSIUS
BUN SERPL-MCNC: 23 MG/DL (ref 6–23)
BUN SERPL-MCNC: 9 MG/DL (ref 6–23)
CA-I BLDA-SCNC: 1.01 MMOL/L (ref 1.1–1.33)
CA-I BLDA-SCNC: 1.06 MMOL/L (ref 1.1–1.33)
CA-I BLDA-SCNC: 1.07 MMOL/L (ref 1.1–1.33)
CA-I BLDA-SCNC: 1.08 MMOL/L (ref 1.1–1.33)
CA-I BLDA-SCNC: 1.08 MMOL/L (ref 1.1–1.33)
CA-I BLDA-SCNC: 1.12 MMOL/L (ref 1.1–1.33)
CA-I BLDA-SCNC: 1.13 MMOL/L (ref 1.1–1.33)
CALCIUM SERPL-MCNC: 7.2 MG/DL (ref 8.6–10.6)
CALCIUM SERPL-MCNC: 8.5 MG/DL (ref 8.6–10.6)
CHLORIDE BLDA-SCNC: 95 MMOL/L (ref 98–107)
CHLORIDE BLDA-SCNC: 96 MMOL/L (ref 98–107)
CHLORIDE BLDA-SCNC: 97 MMOL/L (ref 98–107)
CHLORIDE BLDA-SCNC: 98 MMOL/L (ref 98–107)
CHLORIDE BLDA-SCNC: 99 MMOL/L (ref 98–107)
CHLORIDE SERPL-SCNC: 93 MMOL/L (ref 98–107)
CHLORIDE SERPL-SCNC: 95 MMOL/L (ref 98–107)
CO2 SERPL-SCNC: 18 MMOL/L (ref 21–32)
CO2 SERPL-SCNC: 22 MMOL/L (ref 21–32)
CREAT SERPL-MCNC: 1.25 MG/DL (ref 0.5–1.05)
CREAT SERPL-MCNC: 2.29 MG/DL (ref 0.5–1.05)
DISPENSE STATUS: NORMAL
EGFRCR SERPLBLD CKD-EPI 2021: 23 ML/MIN/1.73M*2
EGFRCR SERPLBLD CKD-EPI 2021: 48 ML/MIN/1.73M*2
EOSINOPHIL # BLD AUTO: 0.01 X10*3/UL (ref 0–0.7)
EOSINOPHIL NFR BLD AUTO: 0.1 %
ERYTHROCYTE [DISTWIDTH] IN BLOOD BY AUTOMATED COUNT: 13.7 % (ref 11.5–14.5)
ERYTHROCYTE [DISTWIDTH] IN BLOOD BY AUTOMATED COUNT: 14.8 % (ref 11.5–14.5)
GLUCOSE BLD MANUAL STRIP-MCNC: 100 MG/DL (ref 74–99)
GLUCOSE BLD MANUAL STRIP-MCNC: 102 MG/DL (ref 74–99)
GLUCOSE BLD MANUAL STRIP-MCNC: 105 MG/DL (ref 74–99)
GLUCOSE BLD MANUAL STRIP-MCNC: 129 MG/DL (ref 74–99)
GLUCOSE BLD MANUAL STRIP-MCNC: 52 MG/DL (ref 74–99)
GLUCOSE BLD MANUAL STRIP-MCNC: 75 MG/DL (ref 74–99)
GLUCOSE BLD MANUAL STRIP-MCNC: 89 MG/DL (ref 74–99)
GLUCOSE BLD MANUAL STRIP-MCNC: 99 MG/DL (ref 74–99)
GLUCOSE BLDA-MCNC: 120 MG/DL (ref 74–99)
GLUCOSE BLDA-MCNC: 135 MG/DL (ref 74–99)
GLUCOSE BLDA-MCNC: 137 MG/DL (ref 74–99)
GLUCOSE BLDA-MCNC: 141 MG/DL (ref 74–99)
GLUCOSE BLDA-MCNC: 145 MG/DL (ref 74–99)
GLUCOSE BLDA-MCNC: 148 MG/DL (ref 74–99)
GLUCOSE BLDA-MCNC: 88 MG/DL (ref 74–99)
GLUCOSE SERPL-MCNC: 123 MG/DL (ref 74–99)
GLUCOSE SERPL-MCNC: 149 MG/DL (ref 74–99)
HCO3 BLDA-SCNC: 14.5 MMOL/L (ref 22–26)
HCO3 BLDA-SCNC: 14.5 MMOL/L (ref 22–26)
HCO3 BLDA-SCNC: 16.2 MMOL/L (ref 22–26)
HCO3 BLDA-SCNC: 21.4 MMOL/L (ref 22–26)
HCO3 BLDA-SCNC: 21.5 MMOL/L (ref 22–26)
HCO3 BLDA-SCNC: 22.6 MMOL/L (ref 22–26)
HCO3 BLDA-SCNC: 24.8 MMOL/L (ref 22–26)
HCT VFR BLD AUTO: 21 % (ref 36–46)
HCT VFR BLD AUTO: 21.8 % (ref 36–46)
HCT VFR BLD EST: 20 % (ref 36–46)
HCT VFR BLD EST: 23 % (ref 36–46)
HCT VFR BLD EST: 23 % (ref 36–46)
HCT VFR BLD EST: 24 % (ref 36–46)
HCT VFR BLD EST: 25 % (ref 36–46)
HCT VFR BLD EST: 25 % (ref 36–46)
HCT VFR BLD EST: 34 % (ref 36–46)
HGB BLD-MCNC: 6.6 G/DL (ref 12–16)
HGB BLD-MCNC: 7.8 G/DL (ref 12–16)
HGB BLDA-MCNC: 11.3 G/DL (ref 12–16)
HGB BLDA-MCNC: 6.8 G/DL (ref 12–16)
HGB BLDA-MCNC: 7.7 G/DL (ref 12–16)
HGB BLDA-MCNC: 7.8 G/DL (ref 12–16)
HGB BLDA-MCNC: 8 G/DL (ref 12–16)
HGB BLDA-MCNC: 8.2 G/DL (ref 12–16)
HGB BLDA-MCNC: 8.4 G/DL (ref 12–16)
IMM GRANULOCYTES # BLD AUTO: 0.17 X10*3/UL (ref 0–0.7)
IMM GRANULOCYTES NFR BLD AUTO: 1.5 % (ref 0–0.9)
INHALED O2 CONCENTRATION: 30 %
INHALED O2 CONCENTRATION: 40 %
INHALED O2 CONCENTRATION: 40 %
INR PPP: 1.6 (ref 0.9–1.1)
LACTATE BLDA-SCNC: 0.6 MMOL/L (ref 0.4–2)
LACTATE BLDA-SCNC: 1.2 MMOL/L (ref 0.4–2)
LACTATE BLDA-SCNC: 1.2 MMOL/L (ref 0.4–2)
LACTATE BLDA-SCNC: 10 MMOL/L (ref 0.4–2)
LACTATE BLDA-SCNC: 11 MMOL/L (ref 0.4–2)
LACTATE BLDA-SCNC: 2.6 MMOL/L (ref 0.4–2)
LACTATE BLDA-SCNC: 7.4 MMOL/L (ref 0.4–2)
LACTATE SERPL-SCNC: 1.1 MMOL/L (ref 0.4–2)
LACTATE SERPL-SCNC: 7.9 MMOL/L (ref 0.4–2)
LYMPHOCYTES # BLD AUTO: 1.53 X10*3/UL (ref 1.2–4.8)
LYMPHOCYTES NFR BLD AUTO: 13.1 %
MAGNESIUM SERPL-MCNC: 1.78 MG/DL (ref 1.6–2.4)
MAGNESIUM SERPL-MCNC: 1.96 MG/DL (ref 1.6–2.4)
MCH RBC QN AUTO: 29.7 PG (ref 26–34)
MCH RBC QN AUTO: 29.7 PG (ref 26–34)
MCHC RBC AUTO-ENTMCNC: 31.4 G/DL (ref 32–36)
MCHC RBC AUTO-ENTMCNC: 35.8 G/DL (ref 32–36)
MCV RBC AUTO: 83 FL (ref 80–100)
MCV RBC AUTO: 95 FL (ref 80–100)
MONOCYTES # BLD AUTO: 1.06 X10*3/UL (ref 0.1–1)
MONOCYTES NFR BLD AUTO: 9.1 %
NEUTROPHILS # BLD AUTO: 8.92 X10*3/UL (ref 1.2–7.7)
NEUTROPHILS NFR BLD AUTO: 76 %
NRBC BLD-RTO: 1.9 /100 WBCS (ref 0–0)
NRBC BLD-RTO: 2.6 /100 WBCS (ref 0–0)
OXYHGB MFR BLDA: 96.8 % (ref 94–98)
OXYHGB MFR BLDA: 96.8 % (ref 94–98)
OXYHGB MFR BLDA: 97 % (ref 94–98)
OXYHGB MFR BLDA: 97.1 % (ref 94–98)
OXYHGB MFR BLDA: 97.2 % (ref 94–98)
OXYHGB MFR BLDA: 97.6 % (ref 94–98)
OXYHGB MFR BLDA: 97.7 % (ref 94–98)
PCO2 BLDA: 24 MM HG (ref 38–42)
PCO2 BLDA: 24 MM HG (ref 38–42)
PCO2 BLDA: 25 MM HG (ref 38–42)
PCO2 BLDA: 27 MM HG (ref 38–42)
PCO2 BLDA: 29 MM HG (ref 38–42)
PH BLDA: 7.37 PH (ref 7.38–7.42)
PH BLDA: 7.39 PH (ref 7.38–7.42)
PH BLDA: 7.42 PH (ref 7.38–7.42)
PH BLDA: 7.53 PH (ref 7.38–7.42)
PH BLDA: 7.54 PH (ref 7.38–7.42)
PH BLDA: 7.54 PH (ref 7.38–7.42)
PH BLDA: 7.56 PH (ref 7.38–7.42)
PHOSPHATE SERPL-MCNC: 2 MG/DL (ref 2.5–4.9)
PHOSPHATE SERPL-MCNC: 5.1 MG/DL (ref 2.5–4.9)
PLATELET # BLD AUTO: 302 X10*3/UL (ref 150–450)
PLATELET # BLD AUTO: 308 X10*3/UL (ref 150–450)
PO2 BLDA: 109 MM HG (ref 85–95)
PO2 BLDA: 110 MM HG (ref 85–95)
PO2 BLDA: 115 MM HG (ref 85–95)
PO2 BLDA: 143 MM HG (ref 85–95)
PO2 BLDA: 217 MM HG (ref 85–95)
PO2 BLDA: 99 MM HG (ref 85–95)
PO2 BLDA: 99 MM HG (ref 85–95)
POTASSIUM BLDA-SCNC: 3.1 MMOL/L (ref 3.5–5.3)
POTASSIUM BLDA-SCNC: 3.5 MMOL/L (ref 3.5–5.3)
POTASSIUM BLDA-SCNC: 3.5 MMOL/L (ref 3.5–5.3)
POTASSIUM BLDA-SCNC: 3.9 MMOL/L (ref 3.5–5.3)
POTASSIUM BLDA-SCNC: 4 MMOL/L (ref 3.5–5.3)
POTASSIUM BLDA-SCNC: 4.1 MMOL/L (ref 3.5–5.3)
POTASSIUM BLDA-SCNC: 4.5 MMOL/L (ref 3.5–5.3)
POTASSIUM SERPL-SCNC: 3.4 MMOL/L (ref 3.5–5.3)
POTASSIUM SERPL-SCNC: 4.1 MMOL/L (ref 3.5–5.3)
PRODUCT BLOOD TYPE: 8400
PRODUCT CODE: NORMAL
PROT SERPL-MCNC: 4.9 G/DL (ref 6.4–8.2)
PROTHROMBIN TIME: 17.9 SECONDS (ref 9.8–12.8)
RBC # BLD AUTO: 2.22 X10*6/UL (ref 4–5.2)
RBC # BLD AUTO: 2.63 X10*6/UL (ref 4–5.2)
RH FACTOR (ANTIGEN D): NORMAL
SAO2 % BLDA: 100 % (ref 94–100)
SAO2 % BLDA: 100 % (ref 94–100)
SAO2 % BLDA: 99 % (ref 94–100)
SODIUM BLDA-SCNC: 128 MMOL/L (ref 136–145)
SODIUM BLDA-SCNC: 128 MMOL/L (ref 136–145)
SODIUM BLDA-SCNC: 129 MMOL/L (ref 136–145)
SODIUM BLDA-SCNC: 130 MMOL/L (ref 136–145)
SODIUM BLDA-SCNC: 130 MMOL/L (ref 136–145)
SODIUM BLDA-SCNC: 131 MMOL/L (ref 136–145)
SODIUM BLDA-SCNC: 132 MMOL/L (ref 136–145)
SODIUM SERPL-SCNC: 133 MMOL/L (ref 136–145)
SODIUM SERPL-SCNC: 134 MMOL/L (ref 136–145)
UNIT ABO: NORMAL
UNIT NUMBER: NORMAL
UNIT RH: NORMAL
UNIT VOLUME: 350
WBC # BLD AUTO: 11.7 X10*3/UL (ref 4.4–11.3)
WBC # BLD AUTO: 12.4 X10*3/UL (ref 4.4–11.3)
XM INTEP: NORMAL

## 2024-11-29 PROCEDURE — 80202 ASSAY OF VANCOMYCIN: CPT

## 2024-11-29 PROCEDURE — 1100000001 HC PRIVATE ROOM DAILY

## 2024-11-29 PROCEDURE — 2500000004 HC RX 250 GENERAL PHARMACY W/ HCPCS (ALT 636 FOR OP/ED)

## 2024-11-29 PROCEDURE — 2500000005 HC RX 250 GENERAL PHARMACY W/O HCPCS

## 2024-11-29 PROCEDURE — 36430 TRANSFUSION BLD/BLD COMPNT: CPT

## 2024-11-29 PROCEDURE — 71045 X-RAY EXAM CHEST 1 VIEW: CPT | Performed by: RADIOLOGY

## 2024-11-29 PROCEDURE — 83735 ASSAY OF MAGNESIUM: CPT

## 2024-11-29 PROCEDURE — 84132 ASSAY OF SERUM POTASSIUM: CPT

## 2024-11-29 PROCEDURE — 86900 BLOOD TYPING SEROLOGIC ABO: CPT

## 2024-11-29 PROCEDURE — 2500000001 HC RX 250 WO HCPCS SELF ADMINISTERED DRUGS (ALT 637 FOR MEDICARE OP)

## 2024-11-29 PROCEDURE — 94003 VENT MGMT INPAT SUBQ DAY: CPT

## 2024-11-29 PROCEDURE — 85025 COMPLETE CBC W/AUTO DIFF WBC: CPT

## 2024-11-29 PROCEDURE — 82248 BILIRUBIN DIRECT: CPT

## 2024-11-29 PROCEDURE — 99291 CRITICAL CARE FIRST HOUR: CPT

## 2024-11-29 PROCEDURE — 99233 SBSQ HOSP IP/OBS HIGH 50: CPT | Performed by: INTERNAL MEDICINE

## 2024-11-29 PROCEDURE — P9016 RBC LEUKOCYTES REDUCED: HCPCS

## 2024-11-29 PROCEDURE — 83605 ASSAY OF LACTIC ACID: CPT

## 2024-11-29 PROCEDURE — 37799 UNLISTED PX VASCULAR SURGERY: CPT

## 2024-11-29 PROCEDURE — 85027 COMPLETE CBC AUTOMATED: CPT

## 2024-11-29 PROCEDURE — 71045 X-RAY EXAM CHEST 1 VIEW: CPT

## 2024-11-29 PROCEDURE — 84100 ASSAY OF PHOSPHORUS: CPT

## 2024-11-29 PROCEDURE — 8010000001 HC DIALYSIS - HEMODIALYSIS PER DAY

## 2024-11-29 PROCEDURE — 82947 ASSAY GLUCOSE BLOOD QUANT: CPT

## 2024-11-29 PROCEDURE — 85610 PROTHROMBIN TIME: CPT

## 2024-11-29 RX ORDER — POTASSIUM CHLORIDE 14.9 MG/ML
20 INJECTION INTRAVENOUS
Status: COMPLETED | OUTPATIENT
Start: 2024-11-29 | End: 2024-11-30

## 2024-11-29 RX ORDER — CALCIUM GLUCONATE 20 MG/ML
2 INJECTION, SOLUTION INTRAVENOUS ONCE
Status: COMPLETED | OUTPATIENT
Start: 2024-11-29 | End: 2024-11-29

## 2024-11-29 RX ORDER — MAGNESIUM SULFATE HEPTAHYDRATE 40 MG/ML
2 INJECTION, SOLUTION INTRAVENOUS ONCE
Status: COMPLETED | OUTPATIENT
Start: 2024-11-29 | End: 2024-11-29

## 2024-11-29 RX ORDER — POTASSIUM CHLORIDE 29.8 MG/ML
40 INJECTION INTRAVENOUS ONCE
Status: DISCONTINUED | OUTPATIENT
Start: 2024-11-29 | End: 2024-12-05

## 2024-11-29 RX ADMIN — POTASSIUM PHOSPHATE, MONOBASIC POTASSIUM PHOSPHATE, DIBASIC 21 MMOL: 224; 236 INJECTION, SOLUTION, CONCENTRATE INTRAVENOUS at 22:39

## 2024-11-29 RX ADMIN — HEPARIN SODIUM 5000 UNITS: 5000 INJECTION, SOLUTION INTRAVENOUS; SUBCUTANEOUS at 17:02

## 2024-11-29 RX ADMIN — POTASSIUM CHLORIDE 20 MEQ: 14.9 INJECTION, SOLUTION INTRAVENOUS at 20:46

## 2024-11-29 RX ADMIN — CALCIUM GLUCONATE 2 G: 20 INJECTION, SOLUTION INTRAVENOUS at 02:00

## 2024-11-29 RX ADMIN — Medication 0.02 MCG/KG/MIN: at 17:18

## 2024-11-29 RX ADMIN — Medication 30 PERCENT: at 21:02

## 2024-11-29 RX ADMIN — PANTOPRAZOLE SODIUM 40 MG: 40 INJECTION, POWDER, FOR SOLUTION INTRAVENOUS at 08:40

## 2024-11-29 RX ADMIN — Medication 30 PERCENT: at 08:41

## 2024-11-29 RX ADMIN — PIPERACILLIN SODIUM AND TAZOBACTAM SODIUM 2.25 G: 2; .25 INJECTION, SOLUTION INTRAVENOUS at 23:28

## 2024-11-29 RX ADMIN — PANTOPRAZOLE SODIUM 40 MG: 40 INJECTION, POWDER, FOR SOLUTION INTRAVENOUS at 20:46

## 2024-11-29 RX ADMIN — PANTOPRAZOLE SODIUM 40 MG: 40 INJECTION, POWDER, FOR SOLUTION INTRAVENOUS at 01:46

## 2024-11-29 RX ADMIN — PIPERACILLIN SODIUM AND TAZOBACTAM SODIUM 2.25 G: 2; .25 INJECTION, SOLUTION INTRAVENOUS at 04:47

## 2024-11-29 RX ADMIN — ATORVASTATIN CALCIUM 80 MG: 80 TABLET, FILM COATED ORAL at 20:46

## 2024-11-29 RX ADMIN — Medication 25 MCG/HR: at 15:47

## 2024-11-29 RX ADMIN — CALCIUM GLUCONATE 2 G: 20 INJECTION, SOLUTION INTRAVENOUS at 04:47

## 2024-11-29 RX ADMIN — PIPERACILLIN SODIUM AND TAZOBACTAM SODIUM 2.25 G: 2; .25 INJECTION, SOLUTION INTRAVENOUS at 17:01

## 2024-11-29 RX ADMIN — ASPIRIN 81 MG CHEWABLE TABLET 81 MG: 81 TABLET CHEWABLE at 08:40

## 2024-11-29 RX ADMIN — PIPERACILLIN SODIUM AND TAZOBACTAM SODIUM 2.25 G: 2; .25 INJECTION, SOLUTION INTRAVENOUS at 11:25

## 2024-11-29 RX ADMIN — Medication 0.06 MCG/KG/MIN: at 05:15

## 2024-11-29 RX ADMIN — ATORVASTATIN CALCIUM 80 MG: 80 TABLET, FILM COATED ORAL at 01:46

## 2024-11-29 RX ADMIN — POTASSIUM CHLORIDE 20 MEQ: 14.9 INJECTION, SOLUTION INTRAVENOUS at 22:49

## 2024-11-29 RX ADMIN — THIAMINE HCL TAB 100 MG 100 MG: 100 TAB at 08:40

## 2024-11-29 RX ADMIN — DEXTROSE MONOHYDRATE 12.5 G: 25 INJECTION, SOLUTION INTRAVENOUS at 15:42

## 2024-11-29 RX ADMIN — MAGNESIUM SULFATE HEPTAHYDRATE 2 G: 40 INJECTION, SOLUTION INTRAVENOUS at 20:46

## 2024-11-29 ASSESSMENT — PAIN - FUNCTIONAL ASSESSMENT

## 2024-11-29 NOTE — CARE PLAN
Problem: Pain - Adult  Goal: Verbalizes/displays adequate comfort level or baseline comfort level  Outcome: Progressing     Problem: Safety - Adult  Goal: Free from fall injury  Outcome: Progressing     Problem: Discharge Planning  Goal: Discharge to home or other facility with appropriate resources  Outcome: Progressing     Problem: Chronic Conditions and Co-morbidities  Goal: Patient's chronic conditions and co-morbidity symptoms are monitored and maintained or improved  Outcome: Progressing     Problem: Skin  Goal: Participates in plan/prevention/treatment measures  11/29/2024 1232 by Claudia Acosta RN  Outcome: Progressing  Flowsheets (Taken 11/29/2024 1232)  Participates in plan/prevention/treatment measures: Elevate heels  11/29/2024 1231 by Claudia Acosta RN  Flowsheets (Taken 11/29/2024 1231)  Participates in plan/prevention/treatment measures: Elevate heels  Goal: Prevent/manage excess moisture  11/29/2024 1232 by Claudia Acosta RN  Outcome: Progressing  Flowsheets (Taken 11/29/2024 1232)  Prevent/manage excess moisture: Cleanse incontinence/protect with barrier cream  11/29/2024 1231 by Claudia Acosta RN  Flowsheets (Taken 11/29/2024 1231)  Prevent/manage excess moisture: Cleanse incontinence/protect with barrier cream  Goal: Prevent/minimize sheer/friction injuries  11/29/2024 1232 by Claudia Acosta RN  Outcome: Progressing  Flowsheets (Taken 11/29/2024 1232)  Prevent/minimize sheer/friction injuries: Use pull sheet  11/29/2024 1231 by Claudia Acosta RN  Flowsheets (Taken 11/29/2024 1231)  Prevent/minimize sheer/friction injuries: Use pull sheet     Problem: Safety - Medical Restraint  Goal: Remains free of injury from restraints (Restraint for Interference with Medical Device)  11/29/2024 1232 by Claudia Acosta RN  Outcome: Progressing  Flowsheets (Taken 11/29/2024 1232)  Remains free of injury from restraints (restraint for interference with medical device): Every 2 hours:  Monitor safety, psychosocial status, comfort, nutrition and hydration  11/29/2024 1231 by Claudia Acosta RN  Flowsheets (Taken 11/29/2024 1231)  Remains free of injury from restraints (restraint for interference with medical device): Every 2 hours: Monitor safety, psychosocial status, comfort, nutrition and hydration  Goal: Free from restraint(s) (Restraint for Interference with Medical Device)  11/29/2024 1232 by Claudia Acosta RN  Outcome: Progressing  Flowsheets (Taken 11/29/2024 1232)  Free from restraint(s) (restraint for interference with medical device): Identify and implement measures to help patient regain control  11/29/2024 1231 by Claudia Acosta RN  Flowsheets (Taken 11/29/2024 1231)  Free from restraint(s) (restraint for interference with medical device): Every 24 hours: Continued use of restraint requires Licensed Independent Practitioner to perform face to face examination and written order

## 2024-11-29 NOTE — CARE PLAN
We were contacted regarding RRT  Patient deteriorated requiring intubation and presser support for new shock  This was associated with Lactic acidemia.   Metabolic parameters are acceptable otherwise  No gladys overload  Still anuric    Last RRT treatment SLED was finished 11/28 morning.  Last blood pH 7.30    Currently intubated on MV and on Levophed.     ** Recommendations:  - Proceed with SLED for 6 hours as per order for Metabolic acidosis.        Discussed with the attending nephrologist.

## 2024-11-29 NOTE — CARE PLAN
Problem: Skin  Goal: Participates in plan/prevention/treatment measures  Outcome: Progressing  Flowsheets (Taken 11/29/2024 0056)  Participates in plan/prevention/treatment measures: Elevate heels  Goal: Prevent/manage excess moisture  Outcome: Progressing  Flowsheets (Taken 11/29/2024 0056)  Prevent/manage excess moisture:   Cleanse incontinence/protect with barrier cream   Follow provider orders for dressing changes   Monitor for/manage infection if present  Goal: Prevent/minimize sheer/friction injuries  Outcome: Progressing  Flowsheets (Taken 11/29/2024 0056)  Prevent/minimize sheer/friction injuries:   Complete micro-shifts as needed if patient unable. Adjust patient position to relieve pressure points, not a full turn   HOB 30 degrees or less   Turn/reposition every 2 hours/use positioning/transfer devices   The patient's goals for the shift include      The clinical goals for the shift include decrease vasopressor support

## 2024-11-29 NOTE — PROGRESS NOTES
Vancomycin Dosing by Pharmacy- INITIAL    Humaira Huang is a 64 y.o. year old female who Pharmacy has been consulted for vancomycin dosing for Septic Shock. Based on the patient's indication and renal status this patient will be dosed based on a goal trough/random level of 15-20.     Renal function is currently improving but still poor. Patient received SLEDD last night () but as of right now, per nephrology no plans for HD/SLEDD tonight. We will dose by level at this time and monitors patients renal plan daily.    Visit Vitals  BP 75/51   Pulse 55   Temp 36 °C (96.8 °F)   Resp 25        Lab Results   Component Value Date    CREATININE 2.84 (H) 2024    CREATININE 8.13 (H) 2024    CREATININE 6.75 (H) 2024    CREATININE 6.28 (H) 2024        Patient weight is as follows:   Vitals:    24 0000   Weight: 56.2 kg (123 lb 14.4 oz)       Cultures:  No results found for the encounter in last 14 days.        I/O last 3 completed shifts:  In: 1429.1 (25.4 mL/kg) [I.V.:101.6 (1.8 mL/kg); NG/GT:790; IV Piggyback:537.5]  Out: 1068 (19 mL/kg) [Urine:68 (0 mL/kg/hr); Other:1000]  Weight: 56.2 kg   I/O during current shift:  No intake/output data recorded.    Temp (24hrs), Av °C (96.8 °F), Min:35.6 °C (96.1 °F), Max:36.5 °C (97.7 °F)         Assessment/Plan     Patient will not be given a loading dose.  Will initiate vancomycin maintenance, a one time dose of 750 mg.  Follow-up level will be ordered on 24 at PM, 24H after the dose is given unless clinically indicated sooner.  Will continue to monitor renal function daily while on vancomycin and order serum creatinine at least every 48 hours if not already ordered.  Follow for continued vancomycin needs, clinical response, and signs/symptoms of toxicity.       Ely Jean-Baptiste, PharmD

## 2024-11-29 NOTE — PROGRESS NOTES
Critical Care Daily Progress      Subjective   Patient is a 64 y.o. female admitted on 11/18/2024 10:54 PM to the CICU for Aortic Dissection.     Extubated yesterday to BiPAP  Patient was unable to tolerate BiPAP with worsening respiratory drive  Re-intubated emergently and started on pressors  Worsening lactate overnight initially thought to be gut ischemia vs worsening aortic dissection  Now downtrending close to normal so lactic acidosis likely hypoxia driven  SLED restarted o/n aroun 4 am  Now sedated with versed/fentanyl with levo 0.02  A line placed overnight    Objective     Vent Settings/Oxygen Drips   Vent Mode: Volume control/assist control  FiO2 (%):  [30 %-40 %] 30 %  S RR:  [22] 22  S VT:  [400 mL] 400 mL  PEEP/CPAP (cm H2O):  [5 cm H20-8 cm H20] 5 cm H20  CT SUP:  [5 cm H20-8 cm H20] 8 cm H20  MAP (cm H2O):  [8-12] 11  dexmedeTOMIDine, 0-1.5 mcg/kg/hr, Last Rate: 0.2 mcg/kg/hr (11/28/24 1303)  fentaNYL,  mcg/hr, Last Rate: 25 mcg/hr (11/29/24 0600)  midazolam, 0.5-20 mg/hr, Last Rate: 1 mg/hr (11/29/24 0600)  norepinephrine, 0.01-1 mcg/kg/min, Last Rate: 0.02 mcg/kg/min (11/29/24 0647)         Vitals: I/O:   Vitals:    11/29/24 0802   BP:    Pulse:    Resp:    Temp: 35.4 °C (95.7 °F)   SpO2:     Sinus Shmuel 56 wo fluctuations  MAPs 70-80 (110s/50s)    24hr Min/Max:  Temp  Min: 35.4 °C (95.7 °F)  Max: 36.3 °C (97.3 °F)  Pulse  Min: 52  Max: 124  BP  Min: 74/58  Max: 112/55  Resp  Min: 18  Max: 28  SpO2  Min: 91 %  Max: 100 %   Intake/Output Summary (Last 24 hours) at 11/29/2024 0803  Last data filed at 11/29/2024 0600  Gross per 24 hour   Intake 893.11 ml   Output 1100 ml   Net -206.89 ml    No UO yesterday  SLED started at 4 am  Net IO Since Admission: 9,853.04 mL [11/29/24 0803]      Scheduled Medications:  PRN Medications:    aspirin, 81 mg, oral, Daily  atorvastatin, 80 mg, oral, Nightly  heparin, 5,000 Units, subcutaneous, q8h  insulin lispro, 0-5 Units, subcutaneous, q4h  lactated Ringer's,  1,000 mL, intravenous, Once  metoprolol tartrate, 12.5 mg, oral, q6h  oxygen, , inhalation, Continuous - Inhalation  pantoprazole, 40 mg, intravenous, BID  piperacillin-tazobactam, 2.25 g, intravenous, q6h  potassium chloride, 40 mEq, intravenous, Once  thiamine, 100 mg, oral, Daily     PRN medications: acetaminophen **OR** acetaminophen **OR** acetaminophen, dextrose, dextrose, glucagon, glucagon, hydrOXYzine HCL, ipratropium-albuteroL, oxyCODONE, oxygen, polyethylene glycol, sennosides-docusate sodium, vancomycin       Physical Exam:  Physical Exam  Constitutional:       Appearance: She is toxic-appearing.      Comments: Intubated, sedated; opens eyes to voice   Eyes:      General: No scleral icterus.  Cardiovascular:      Rate and Rhythm: Normal rate and regular rhythm.      Comments: Chest tender to palpation  Pulmonary:      Effort: No respiratory distress.      Breath sounds: Normal breath sounds. No wheezing or rhonchi.      Comments: ET tube in place; mild wheeze b/l  Abdominal:      General: There is no distension.      Palpations: Abdomen is soft.      Tenderness: There is no abdominal tenderness. There is no guarding.   Musculoskeletal:         General: No swelling or tenderness.   Skin:     General: Skin is warm and dry.      Findings: No bruising or rash.                LABS:    CBC RFP   Lab Results   Component Value Date    WBC 11.7 (H) 11/29/2024    WBC 12.9 (H) 11/28/2024    WBC 11.5 (H) 11/28/2024    HGB 6.6 (L) 11/29/2024    HGB 7.0 (L) 11/28/2024    HGB 7.2 (L) 11/28/2024    HCT 21.0 (L) 11/29/2024    MCV 95 11/29/2024     11/29/2024     11/28/2024     11/28/2024    NEUTROABS 8.92 (H) 11/29/2024     1 unit pRBC   Blood cultures pending Lab Results   Component Value Date     (L) 11/29/2024    K 4.1 11/29/2024    CL 93 (L) 11/29/2024    CO2 18 (L) 11/29/2024    BUN 23 11/29/2024    CREATININE 2.29 (H) 11/29/2024    CREATININE 1.79 (H) 11/28/2024     Lab Results    Component Value Date    MG 1.96 11/29/2024    PHOS 5.1 (H) 11/29/2024    CALCIUM 8.5 (L) 11/29/2024         Hepatic Function ABG/VBG   Lab Results   Component Value Date    ALT 92 (H) 11/29/2024     (H) 11/29/2024    ALKPHOS 331 (H) 11/29/2024     Lab Results   Component Value Date    BILIDIR 0.6 (H) 11/29/2024      Lab Results   Component Value Date    PROTIME 17.9 (H) 11/29/2024    APTT 61 (H) 11/29/2024    INR 1.6 (H) 11/29/2024      Lab Results   Component Value Date    LACTATE 7.9 (HH) 11/29/2024     ABG 7.53/27/110 L 2.6     No results found for the last 90 days.         IMAGING:    CXR 11/28  IMPRESSION:  1. Interval retraction of endotracheal tube with satisfactory  position.  2. Previously questioned concave density of the right lateral chest  wall is no longer evident and was external to the patient.  3. Similar findings suggestive of mild pulmonary edema.    CXR 11/29 pending      Assessment/Plan    64 y.o. female with history of pancreatitis, DVT/PE, HLD, HTN, CABGx4 1997, T2DM, GERD, PAD, chronic mesenteric ischemia, tobacco use who presented to [facility] on [facility]18[facility] as a transfer from [facility] with chest, back, and abdominal pain. 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 Vascular and Cardiac surgery following. CICU course c/b PEA arrest 11/20, now intubated and sedated, following commands.     11/29 Updates:  -Re intubated, sedated, on levo 0.02  -blood cultures drawn  -improving lactate  -Nephro following for SLED    Neuro:  #Sedated  -fentanyl, versed  -opening eyes     Cardiac:  #Aflutter/Afib  ::now sinus sandy  -amio stopped  -started metop 12.5 q6h for rate control  -Holding heparin    #PEA arrest 11/20    #Aortic dissection  #Mural Thrombus  #Distal aortic arch saccular aneurysm   ::CTA CAP 11/18- 2.6 cm saccular aneurysm of  distal aortic arch, mural thrombus in aortic arch and desc abd aorta, 2 areas of focal dissection in desc thoracic aorta.   -Vascular surgery discussed case at aortic conference and there are no plans for surgery, palliative measures recommended  -Holding heparin     #NSTEMI  #CAD s/p CABGx4 1997  #PAD  #HTN #DLD  ::EKG- NSR, rate 71, TWI aVL, V1-V6, 1mm ORQUDIEA in aVR  ::Trop peak 5700  ::Lipid HDL 41.5, , TG 75, Chol 157  -Holding home amlodipine 10mg, imdur 30mg, lisinopril 2.5mg, metop tartrate 50mg bid iso hypotension   -Continue ASA and atorvastatin  -Holding plavix 75mg    #HFrEF (45-50%, 11/19/24)     Pulm:  #Intubated  #PNA  ::CXR with opacification of LLL  ::s/p Zosyn 11/20 - 11/26  -Daily SBT     GI:  #c/f UGIB, resolved  -PPI BID  -Holding therapeutic AC  -BID CBC  -Active T&S, consented  -monitor for melena    #Elevated LFTs  ::likely 2/2 hypoxia and hypotension  -trend HFP     Renal:  #TRACY on CKD likely 2/2 arrest  #Oliguria  -SLED nightly per nephro     Endo:   #T2DM  -Hold home metformin   -Mild SSI + hypoglycemia protocol     #Tube Feeds  ::at goal, 20 ml/hr  -f/up nutrition    Infectious  #sepsis concerns  -on empiric vanc/zosyn due to rising lactate  -f/up blood cultures      F: 500 ml  E: K>4, Mag>2  N: NPO  G: pantoprazole  A: PIV, OG  DVT: subcutaneous heparin, SCDs  CODE: DNR/okay to intubate (confirmed with family 11/21)  NOK: Daughter Josy [phone]      Stephen Clayton MD  Internal Medicine PGY-2

## 2024-11-29 NOTE — PROGRESS NOTES
Humaira Huang   64 y.o.    @@  N/Room: [id number]/14/14-A admitted to CICU for Aortic dissection.    Subjective:Seen while on SLED . No clinical improvement. On levo 0.02.    Objective:   Oliguric, no renal recovery.  Obtunded    Meds:   aspirin, 81 mg, Daily  atorvastatin, 80 mg, Nightly  heparin, 5,000 Units, q8h  insulin lispro, 0-5 Units, q4h  lactated Ringer's, 1,000 mL, Once  oxygen, , Continuous - Inhalation  pantoprazole, 40 mg, BID  piperacillin-tazobactam, 2.25 g, q6h  potassium chloride, 40 mEq, Once  thiamine, 100 mg, Daily      dexmedeTOMIDine, Last Rate: 0.2 mcg/kg/hr (11/28/24 1303)  fentaNYL, Last Rate: 25 mcg/hr (11/29/24 0800)  midazolam, Last Rate: 1 mg/hr (11/29/24 0800)  norepinephrine, Last Rate: Stopped (11/29/24 0830)      acetaminophen, 650 mg, q4h PRN   Or  acetaminophen, 650 mg, q4h PRN   Or  acetaminophen, 650 mg, q4h PRN  dextrose, 12.5 g, q15 min PRN  dextrose, 25 g, q15 min PRN  glucagon, 1 mg, q15 min PRN  glucagon, 1 mg, q15 min PRN  hydrOXYzine HCL, 10 mg, q6h PRN  ipratropium-albuteroL, 3 mL, q6h PRN  oxyCODONE, 2.5 mg, q4h PRN  oxygen, , Continuous PRN - O2/gases  polyethylene glycol, 17 g, Daily PRN  sennosides-docusate sodium, 1 tablet, Nightly PRN  vancomycin, , Daily PRN        Vitals:    11/29/24 0900   BP:    Pulse: 58   Resp: 22   Temp:    SpO2: 96%          Intake/Output Summary (Last 24 hours) at 11/29/2024 1147  Last data filed at 11/29/2024 0800  Gross per 24 hour   Intake 1435.56 ml   Output 100 ml   Net 1335.56 ml       General appearance: no distress  Eyes: non-icteric  Skin: no apparent rash  Heart: [id number]  Lungs: CTA bilat no wheezing/crackles  Abdomen: soft, nt/nd  Extremities: 1+ edema bilat  Orozco  Neuro: obtunded,off sedation      Blood Labs:  Results for orders placed or performed during the hospital encounter of 11/18/24 (from the past 24 hours)   Blood Gas Venous Full Panel   Result Value Ref Range    POCT pH, Venous 7.15 (LL) 7.33 - 7.43 pH    POCT  pCO2, Venous 53 (H) 41 - 51 mm Hg    POCT pO2, Venous 27 (L) 35 - 45 mm Hg    POCT SO2, Venous 29 (L) 45 - 75 %    POCT Oxy Hemoglobin, Venous 29.2 (L) 45.0 - 75.0 %    POCT Hematocrit Calculated, Venous 25.0 (L) 36.0 - 46.0 %    POCT Sodium, Venous 130 (L) 136 - 145 mmol/L    POCT Potassium, Venous 5.2 3.5 - 5.3 mmol/L    POCT Chloride, Venous 96 (L) 98 - 107 mmol/L    POCT Ionized Calicum, Venous 1.09 (L) 1.10 - 1.33 mmol/L    POCT Glucose, Venous 31 (LL) 74 - 99 mg/dL    POCT Lactate, Venous 8.4 (HH) 0.4 - 2.0 mmol/L    POCT Base Excess, Venous -9.9 (L) -2.0 - 3.0 mmol/L    POCT HCO3 Calculated, Venous 18.5 (L) 22.0 - 26.0 mmol/L    POCT Hemoglobin, Venous 8.3 (L) 12.0 - 16.0 g/dL    POCT Anion Gap, Venous 21.0 10.0 - 25.0 mmol/L    Patient Temperature 37.0 degrees Celsius    FiO2 40 %   Blood Gas Venous Full Panel   Result Value Ref Range    POCT pH, Venous 7.23 (LL) 7.33 - 7.43 pH    POCT pCO2, Venous 43 41 - 51 mm Hg    POCT pO2, Venous 29 (L) 35 - 45 mm Hg    POCT SO2, Venous 37 (L) 45 - 75 %    POCT Oxy Hemoglobin, Venous 36.2 (L) 45.0 - 75.0 %    POCT Hematocrit Calculated, Venous 23.0 (L) 36.0 - 46.0 %    POCT Sodium, Venous 132 (L) 136 - 145 mmol/L    POCT Potassium, Venous 4.1 3.5 - 5.3 mmol/L    POCT Chloride, Venous 96 (L) 98 - 107 mmol/L    POCT Ionized Calicum, Venous 1.05 (L) 1.10 - 1.33 mmol/L    POCT Glucose, Venous 36 (LL) 74 - 99 mg/dL    POCT Lactate, Venous 8.9 (HH) 0.4 - 2.0 mmol/L    POCT Base Excess, Venous -8.9 (L) -2.0 - 3.0 mmol/L    POCT HCO3 Calculated, Venous 18.0 (L) 22.0 - 26.0 mmol/L    POCT Hemoglobin, Venous 7.5 (L) 12.0 - 16.0 g/dL    POCT Anion Gap, Venous 22.0 10.0 - 25.0 mmol/L    Patient Temperature 37.0 degrees Celsius    FiO2 28 %   Blood Gas Arterial   Result Value Ref Range    POCT pH, Arterial 7.35 (L) 7.38 - 7.42 pH    POCT pCO2, Arterial 26 (L) 38 - 42 mm Hg    POCT pO2, Arterial 98 (H) 85 - 95 mm Hg    POCT SO2, Arterial 99 94 - 100 %    POCT Oxy Hemoglobin, Arterial  96.7 94.0 - 98.0 %    POCT Base Excess, Arterial -9.5 (L) -2.0 - 3.0 mmol/L    POCT HCO3 Calculated, Arterial 14.4 (L) 22.0 - 26.0 mmol/L    Patient Temperature 37.0 degrees Celsius    FiO2 28 %   BLOOD GAS LACTIC ACID, ARTERIAL   Result Value Ref Range    POCT Lactate, Arterial 8.6 (HH) 0.4 - 2.0 mmol/L   POCT GLUCOSE   Result Value Ref Range    POCT Glucose 36 (L) 74 - 99 mg/dL   POCT GLUCOSE   Result Value Ref Range    POCT Glucose 37 (L) 74 - 99 mg/dL   POCT GLUCOSE   Result Value Ref Range    POCT Glucose <10 (L) 74 - 99 mg/dL   CBC   Result Value Ref Range    WBC 11.5 (H) 4.4 - 11.3 x10*3/uL    nRBC 2.3 (H) 0.0 - 0.0 /100 WBCs    RBC 2.37 (L) 4.00 - 5.20 x10*6/uL    Hemoglobin 7.2 (L) 12.0 - 16.0 g/dL    Hematocrit 22.4 (L) 36.0 - 46.0 %    MCV 95 80 - 100 fL    MCH 30.4 26.0 - 34.0 pg    MCHC 32.1 32.0 - 36.0 g/dL    RDW 14.6 (H) 11.5 - 14.5 %    Platelets 320 150 - 450 x10*3/uL   Lactate   Result Value Ref Range    Lactate 9.7 (HH) 0.4 - 2.0 mmol/L   POCT GLUCOSE   Result Value Ref Range    POCT Glucose 99 74 - 99 mg/dL   Blood Gas Arterial Full Panel   Result Value Ref Range    POCT pH, Arterial 7.32 (L) 7.38 - 7.42 pH    POCT pCO2, Arterial 25 (L) 38 - 42 mm Hg    POCT pO2, Arterial 122 (H) 85 - 95 mm Hg    POCT SO2, Arterial 100 94 - 100 %    POCT Oxy Hemoglobin, Arterial 96.9 94.0 - 98.0 %    POCT Hematocrit Calculated, Arterial 21.0 (L) 36.0 - 46.0 %    POCT Sodium, Arterial 130 (L) 136 - 145 mmol/L    POCT Potassium, Arterial 4.0 3.5 - 5.3 mmol/L    POCT Chloride, Arterial 96 (L) 98 - 107 mmol/L    POCT Ionized Calcium, Arterial 0.95 (L) 1.10 - 1.33 mmol/L    POCT Glucose, Arterial 126 (H) 74 - 99 mg/dL    POCT Lactate, Arterial 10.8 (HH) 0.4 - 2.0 mmol/L    POCT Base Excess, Arterial -12.0 (L) -2.0 - 3.0 mmol/L    POCT HCO3 Calculated, Arterial 12.9 (L) 22.0 - 26.0 mmol/L    POCT Hemoglobin, Arterial 7.1 (L) 12.0 - 16.0 g/dL    POCT Anion Gap, Arterial 25 10 - 25 mmo/L    Patient Temperature 37.0  degrees Celsius    FiO2 40 %   Lactate   Result Value Ref Range    Lactate 13.2 (HH) 0.4 - 2.0 mmol/L   Comprehensive metabolic panel   Result Value Ref Range    Glucose 134 (H) 74 - 99 mg/dL    Sodium 134 (L) 136 - 145 mmol/L    Potassium 4.1 3.5 - 5.3 mmol/L    Chloride 95 (L) 98 - 107 mmol/L    Bicarbonate 14 (L) 21 - 32 mmol/L    Anion Gap 29 (H) 10 - 20 mmol/L    Urea Nitrogen 16 6 - 23 mg/dL    Creatinine 1.79 (H) 0.50 - 1.05 mg/dL    eGFR 31 (L) >60 mL/min/1.73m*2    Calcium 7.8 (L) 8.6 - 10.6 mg/dL    Albumin 2.7 (L) 3.4 - 5.0 g/dL    Alkaline Phosphatase 384 (H) 33 - 136 U/L    Total Protein 5.5 (L) 6.4 - 8.2 g/dL     (H) 9 - 39 U/L    Bilirubin, Total 1.6 (H) 0.0 - 1.2 mg/dL    ALT 79 (H) 7 - 45 U/L   Magnesium   Result Value Ref Range    Magnesium 2.03 1.60 - 2.40 mg/dL   Coagulation Screen   Result Value Ref Range    Protime 15.8 (H) 9.8 - 12.8 seconds    INR 1.4 (H) 0.9 - 1.1    aPTT 47 (H) 27 - 38 seconds   Phosphorus   Result Value Ref Range    Phosphorus 5.2 (H) 2.5 - 4.9 mg/dL   POCT GLUCOSE   Result Value Ref Range    POCT Glucose 92 74 - 99 mg/dL   CBC and Auto Differential   Result Value Ref Range    WBC 12.9 (H) 4.4 - 11.3 x10*3/uL    nRBC 2.3 (H) 0.0 - 0.0 /100 WBCs    RBC 2.30 (L) 4.00 - 5.20 x10*6/uL    Hemoglobin 7.0 (L) 12.0 - 16.0 g/dL    Hematocrit 20.4 (L) 36.0 - 46.0 %    MCV 89 80 - 100 fL    MCH 30.4 26.0 - 34.0 pg    MCHC 34.3 32.0 - 36.0 g/dL    RDW 14.2 11.5 - 14.5 %    Platelets 352 150 - 450 x10*3/uL    Neutrophils % 85.7 40.0 - 80.0 %    Immature Granulocytes %, Automated 2.2 (H) 0.0 - 0.9 %    Lymphocytes % 6.8 13.0 - 44.0 %    Monocytes % 5.1 2.0 - 10.0 %    Eosinophils % 0.1 0.0 - 6.0 %    Basophils % 0.1 0.0 - 2.0 %    Neutrophils Absolute 11.05 (H) 1.20 - 7.70 x10*3/uL    Immature Granulocytes Absolute, Automated 0.28 0.00 - 0.70 x10*3/uL    Lymphocytes Absolute 0.87 (L) 1.20 - 4.80 x10*3/uL    Monocytes Absolute 0.66 0.10 - 1.00 x10*3/uL    Eosinophils Absolute 0.01  0.00 - 0.70 x10*3/uL    Basophils Absolute 0.01 0.00 - 0.10 x10*3/uL   Blood Gas Arterial Full Panel   Result Value Ref Range    POCT pH, Arterial 7.30 (L) 7.38 - 7.42 pH    POCT pCO2, Arterial 23 (L) 38 - 42 mm Hg    POCT pO2, Arterial 121 (H) 85 - 95 mm Hg    POCT SO2, Arterial 100 94 - 100 %    POCT Oxy Hemoglobin, Arterial 96.8 94.0 - 98.0 %    POCT Hematocrit Calculated, Arterial 21.0 (L) 36.0 - 46.0 %    POCT Sodium, Arterial 132 (L) 136 - 145 mmol/L    POCT Potassium, Arterial 3.9 3.5 - 5.3 mmol/L    POCT Chloride, Arterial 103 98 - 107 mmol/L    POCT Ionized Calcium, Arterial 0.90 (L) 1.10 - 1.33 mmol/L    POCT Glucose, Arterial 114 (H) 74 - 99 mg/dL    POCT Lactate, Arterial 10.4 (HH) 0.4 - 2.0 mmol/L    POCT Base Excess, Arterial -13.8 (L) -2.0 - 3.0 mmol/L    POCT HCO3 Calculated, Arterial 11.3 (L) 22.0 - 26.0 mmol/L    POCT Hemoglobin, Arterial 6.9 (L) 12.0 - 16.0 g/dL    POCT Anion Gap, Arterial 22 10 - 25 mmo/L    Patient Temperature 37.0 degrees Celsius    FiO2 40 %   Blood Gas Mixed Venous Full Panel   Result Value Ref Range    POCT pH, Mixed 7.26 (L) 7.33 - 7.43 pH    POCT pCO2, Mixed 35 (L) 41 - 51 mm Hg    POCT pO2, Mixed 31 (L) 35 - 45 mm Hg    POCT SO2, Mixed 41 (L) 45 - 75 %    POCT Oxy Hemoglobin, Mixed 40.8 (L) 45.0 - 75.0 %    POCT Hematocrit Calculated, Mixed 29.0 (L) 36.0 - 46.0 %    POCT Sodium, Mixed 130 (L) 136 - 145 mmol/L    POCT Potassium, Mixed 4.3 3.5 - 5.3 mmol/L    POCT Chloride, Mixed 94 (L) 98 - 107 mmol/L    POCT Ionized Calcium, Mixed 0.98 (L) 1.10 - 1.33 mmol/L    POCT Glucose, Mixed 125 (H) 74 - 99 mg/dL    POCT Lactate, Mixed 11.7 (HH) 0.4 - 2.0 mmol/L    POCT Base Excess, Mixed -10.5 (L) -2.0 - 3.0 mmol/L    POCT HCO3 Calculated, Mixed 15.7 (L) 22.0 - 26.0 mmol/L    POCT Hemoglobin, Mixed 9.7 (L) 12.0 - 16.0 g/dL    POCT Anion Gap, Mixed 25 10 - 25 mmo/L    Patient Temperature 37.0 degrees Celsius    FiO2 40 %   Blood Culture    Specimen: Peripheral Venipuncture; Blood  culture   Result Value Ref Range    Blood Culture Loaded on Instrument - Culture in progress    Blood Gas Arterial Full Panel   Result Value Ref Range    POCT pH, Arterial 7.30 (L) 7.38 - 7.42 pH    POCT pCO2, Arterial 26 (L) 38 - 42 mm Hg    POCT pO2, Arterial 118 (H) 85 - 95 mm Hg    POCT SO2, Arterial 99 94 - 100 %    POCT Oxy Hemoglobin, Arterial 97.1 94.0 - 98.0 %    POCT Hematocrit Calculated, Arterial 26.0 (L) 36.0 - 46.0 %    POCT Sodium, Arterial 129 (L) 136 - 145 mmol/L    POCT Potassium, Arterial 4.2 3.5 - 5.3 mmol/L    POCT Chloride, Arterial 97 (L) 98 - 107 mmol/L    POCT Ionized Calcium, Arterial 0.97 (L) 1.10 - 1.33 mmol/L    POCT Glucose, Arterial 119 (H) 74 - 99 mg/dL    POCT Lactate, Arterial 11.0 (HH) 0.4 - 2.0 mmol/L    POCT Base Excess, Arterial -12.3 (L) -2.0 - 3.0 mmol/L    POCT HCO3 Calculated, Arterial 12.8 (L) 22.0 - 26.0 mmol/L    POCT Hemoglobin, Arterial 8.7 (L) 12.0 - 16.0 g/dL    POCT Anion Gap, Arterial 23 10 - 25 mmo/L    Patient Temperature 37.0 degrees Celsius    FiO2 40 %   POCT GLUCOSE   Result Value Ref Range    POCT Glucose 100 (H) 74 - 99 mg/dL   Blood Gas Arterial Full Panel   Result Value Ref Range    POCT pH, Arterial 7.37 (L) 7.38 - 7.42 pH    POCT pCO2, Arterial 25 (L) 38 - 42 mm Hg    POCT pO2, Arterial 143 (H) 85 - 95 mm Hg    POCT SO2, Arterial 99 94 - 100 %    POCT Oxy Hemoglobin, Arterial 97.1 94.0 - 98.0 %    POCT Hematocrit Calculated, Arterial 20.0 (L) 36.0 - 46.0 %    POCT Sodium, Arterial 132 (L) 136 - 145 mmol/L    POCT Potassium, Arterial 4.0 3.5 - 5.3 mmol/L    POCT Chloride, Arterial 96 (L) 98 - 107 mmol/L    POCT Ionized Calcium, Arterial 1.12 1.10 - 1.33 mmol/L    POCT Glucose, Arterial 145 (H) 74 - 99 mg/dL    POCT Lactate, Arterial 11.0 (HH) 0.4 - 2.0 mmol/L    POCT Base Excess, Arterial -9.8 (L) -2.0 - 3.0 mmol/L    POCT HCO3 Calculated, Arterial 14.5 (L) 22.0 - 26.0 mmol/L    POCT Hemoglobin, Arterial 6.8 (L) 12.0 - 16.0 g/dL    POCT Anion Gap,  Arterial 26 (H) 10 - 25 mmo/L    Patient Temperature 37.0 degrees Celsius    FiO2 40 %   Blood Gas Arterial Full Panel   Result Value Ref Range    POCT pH, Arterial 7.39 7.38 - 7.42 pH    POCT pCO2, Arterial 24 (L) 38 - 42 mm Hg    POCT pO2, Arterial 217 (H) 85 - 95 mm Hg    POCT SO2, Arterial 100 94 - 100 %    POCT Oxy Hemoglobin, Arterial 97.7 94.0 - 98.0 %    POCT Hematocrit Calculated, Arterial 23.0 (L) 36.0 - 46.0 %    POCT Sodium, Arterial 131 (L) 136 - 145 mmol/L    POCT Potassium, Arterial 3.9 3.5 - 5.3 mmol/L    POCT Chloride, Arterial 97 (L) 98 - 107 mmol/L    POCT Ionized Calcium, Arterial 1.01 (L) 1.10 - 1.33 mmol/L    POCT Glucose, Arterial 148 (H) 74 - 99 mg/dL    POCT Lactate, Arterial 10.0 (HH) 0.4 - 2.0 mmol/L    POCT Base Excess, Arterial -9.4 (L) -2.0 - 3.0 mmol/L    POCT HCO3 Calculated, Arterial 14.5 (L) 22.0 - 26.0 mmol/L    POCT Hemoglobin, Arterial 7.7 (L) 12.0 - 16.0 g/dL    POCT Anion Gap, Arterial 23 10 - 25 mmo/L    Patient Temperature 37.0 degrees Celsius    FiO2 40 %   POCT GLUCOSE   Result Value Ref Range    POCT Glucose 129 (H) 74 - 99 mg/dL   Blood Gas Arterial Full Panel   Result Value Ref Range    POCT pH, Arterial 7.42 7.38 - 7.42 pH    POCT pCO2, Arterial 25 (L) 38 - 42 mm Hg    POCT pO2, Arterial 99 (H) 85 - 95 mm Hg    POCT SO2, Arterial 99 94 - 100 %    POCT Oxy Hemoglobin, Arterial 97.0 94.0 - 98.0 %    POCT Hematocrit Calculated, Arterial 34.0 (L) 36.0 - 46.0 %    POCT Sodium, Arterial 129 (L) 136 - 145 mmol/L    POCT Potassium, Arterial 4.1 3.5 - 5.3 mmol/L    POCT Chloride, Arterial 95 (L) 98 - 107 mmol/L    POCT Ionized Calcium, Arterial 1.06 (L) 1.10 - 1.33 mmol/L    POCT Glucose, Arterial 141 (H) 74 - 99 mg/dL    POCT Lactate, Arterial 7.4 (HH) 0.4 - 2.0 mmol/L    POCT Base Excess, Arterial -6.8 (L) -2.0 - 3.0 mmol/L    POCT HCO3 Calculated, Arterial 16.2 (L) 22.0 - 26.0 mmol/L    POCT Hemoglobin, Arterial 11.3 (L) 12.0 - 16.0 g/dL    POCT Anion Gap, Arterial 22 10 - 25  mmo/L    Patient Temperature 37.0 degrees Celsius    FiO2 30 %   CBC and Auto Differential   Result Value Ref Range    WBC 11.7 (H) 4.4 - 11.3 x10*3/uL    nRBC 2.6 (H) 0.0 - 0.0 /100 WBCs    RBC 2.22 (L) 4.00 - 5.20 x10*6/uL    Hemoglobin 6.6 (L) 12.0 - 16.0 g/dL    Hematocrit 21.0 (L) 36.0 - 46.0 %    MCV 95 80 - 100 fL    MCH 29.7 26.0 - 34.0 pg    MCHC 31.4 (L) 32.0 - 36.0 g/dL    RDW 14.8 (H) 11.5 - 14.5 %    Platelets 302 150 - 450 x10*3/uL    Neutrophils % 76.0 40.0 - 80.0 %    Immature Granulocytes %, Automated 1.5 (H) 0.0 - 0.9 %    Lymphocytes % 13.1 13.0 - 44.0 %    Monocytes % 9.1 2.0 - 10.0 %    Eosinophils % 0.1 0.0 - 6.0 %    Basophils % 0.2 0.0 - 2.0 %    Neutrophils Absolute 8.92 (H) 1.20 - 7.70 x10*3/uL    Immature Granulocytes Absolute, Automated 0.17 0.00 - 0.70 x10*3/uL    Lymphocytes Absolute 1.53 1.20 - 4.80 x10*3/uL    Monocytes Absolute 1.06 (H) 0.10 - 1.00 x10*3/uL    Eosinophils Absolute 0.01 0.00 - 0.70 x10*3/uL    Basophils Absolute 0.02 0.00 - 0.10 x10*3/uL   Magnesium   Result Value Ref Range    Magnesium 1.96 1.60 - 2.40 mg/dL   Type and screen   Result Value Ref Range    ABO TYPE AB     Rh TYPE POS     ANTIBODY SCREEN NEG    Hepatic function panel   Result Value Ref Range    Albumin 2.6 (L) 3.4 - 5.0 g/dL    Bilirubin, Total 1.3 (H) 0.0 - 1.2 mg/dL    Bilirubin, Direct 0.6 (H) 0.0 - 0.3 mg/dL    Alkaline Phosphatase 331 (H) 33 - 136 U/L    ALT 92 (H) 7 - 45 U/L     (H) 9 - 39 U/L    Total Protein 4.9 (L) 6.4 - 8.2 g/dL   Coagulation Screen   Result Value Ref Range    Protime 17.9 (H) 9.8 - 12.8 seconds    INR 1.6 (H) 0.9 - 1.1    aPTT 61 (H) 27 - 38 seconds   Lactate   Result Value Ref Range    Lactate 7.9 (HH) 0.4 - 2.0 mmol/L   Basic Metabolic Panel   Result Value Ref Range    Glucose 149 (H) 74 - 99 mg/dL    Sodium 133 (L) 136 - 145 mmol/L    Potassium 4.1 3.5 - 5.3 mmol/L    Chloride 93 (L) 98 - 107 mmol/L    Bicarbonate 18 (L) 21 - 32 mmol/L    Anion Gap 26 (H) 10 - 20  mmol/L    Urea Nitrogen 23 6 - 23 mg/dL    Creatinine 2.29 (H) 0.50 - 1.05 mg/dL    eGFR 23 (L) >60 mL/min/1.73m*2    Calcium 8.5 (L) 8.6 - 10.6 mg/dL   Phosphorus   Result Value Ref Range    Phosphorus 5.1 (H) 2.5 - 4.9 mg/dL   Prepare RBC: 1 Units   Result Value Ref Range    PRODUCT CODE [id number]     Unit Number [id number]     Unit ABO AB     Unit RH POS     XM INTEP COMP     Dispense Status IS     Blood Expiration Date 12/3/2024 11:59:00 PM EST     PRODUCT BLOOD TYPE 8400     UNIT VOLUME 350    Blood Gas Arterial Full Panel   Result Value Ref Range    POCT pH, Arterial 7.53 (H) 7.38 - 7.42 pH    POCT pCO2, Arterial 27 (L) 38 - 42 mm Hg    POCT pO2, Arterial 110 (H) 85 - 95 mm Hg    POCT SO2, Arterial 99 94 - 100 %    POCT Oxy Hemoglobin, Arterial 96.8 94.0 - 98.0 %    POCT Hematocrit Calculated, Arterial 24.0 (L) 36.0 - 46.0 %    POCT Sodium, Arterial 130 (L) 136 - 145 mmol/L    POCT Potassium, Arterial 3.5 3.5 - 5.3 mmol/L    POCT Chloride, Arterial 99 98 - 107 mmol/L    POCT Ionized Calcium, Arterial 1.13 1.10 - 1.33 mmol/L    POCT Glucose, Arterial 120 (H) 74 - 99 mg/dL    POCT Lactate, Arterial 2.6 (H) 0.4 - 2.0 mmol/L    POCT Base Excess, Arterial 0.3 -2.0 - 3.0 mmol/L    POCT HCO3 Calculated, Arterial 22.6 22.0 - 26.0 mmol/L    POCT Hemoglobin, Arterial 8.0 (L) 12.0 - 16.0 g/dL    POCT Anion Gap, Arterial 12 10 - 25 mmo/L    Patient Temperature 37.0 degrees Celsius    FiO2 30 %   POCT GLUCOSE   Result Value Ref Range    POCT Glucose 89 74 - 99 mg/dL   Lactate   Result Value Ref Range    Lactate 1.1 0.4 - 2.0 mmol/L   CBC   Result Value Ref Range    WBC 12.4 (H) 4.4 - 11.3 x10*3/uL    nRBC 1.9 (H) 0.0 - 0.0 /100 WBCs    RBC 2.63 (L) 4.00 - 5.20 x10*6/uL    Hemoglobin 7.8 (L) 12.0 - 16.0 g/dL    Hematocrit 21.8 (L) 36.0 - 46.0 %    MCV 83 80 - 100 fL    MCH 29.7 26.0 - 34.0 pg    MCHC 35.8 32.0 - 36.0 g/dL    RDW 13.7 11.5 - 14.5 %    Platelets 308 150 - 450 x10*3/uL   Blood Gas Arterial Full Panel    Result Value Ref Range    POCT pH, Arterial 7.54 (H) 7.38 - 7.42 pH    POCT pCO2, Arterial 29 (L) 38 - 42 mm Hg    POCT pO2, Arterial 115 (H) 85 - 95 mm Hg    POCT SO2, Arterial 99 94 - 100 %    POCT Oxy Hemoglobin, Arterial 96.8 94.0 - 98.0 %    POCT Hematocrit Calculated, Arterial 25.0 (L) 36.0 - 46.0 %    POCT Sodium, Arterial 130 (L) 136 - 145 mmol/L    POCT Potassium, Arterial 3.1 (L) 3.5 - 5.3 mmol/L    POCT Chloride, Arterial 97 (L) 98 - 107 mmol/L    POCT Ionized Calcium, Arterial 1.08 (L) 1.10 - 1.33 mmol/L    POCT Glucose, Arterial 88 74 - 99 mg/dL    POCT Lactate, Arterial 0.6 0.4 - 2.0 mmol/L    POCT Base Excess, Arterial 2.4 -2.0 - 3.0 mmol/L    POCT HCO3 Calculated, Arterial 24.8 22.0 - 26.0 mmol/L    POCT Hemoglobin, Arterial 8.4 (L) 12.0 - 16.0 g/dL    POCT Anion Gap, Arterial 11 10 - 25 mmo/L    Patient Temperature 37.0 degrees Celsius    FiO2 30 %   POCT GLUCOSE   Result Value Ref Range    POCT Glucose 75 74 - 99 mg/dL      US KUB 11/26  IMPRESSION:  1. Increased renal cortical echogenicity and lobular appearance of the kidneys bilaterally, likely medical renal disease, with relatively atrophic left kidney. No hydronephrosis.  2. Partially visualized bilateral pleural effusions and trace  abdominal ascites.        ASSESSMENT:  Humaira Huang is a  64 y.o.  Year old , with PMHx of  pancreatitis, DVT/PE, HLD, HTN, CABGx4 1997, T2DM, GERD, PAD, chronic mesenteric ischemia, tobacco use who presented to [facility] on 11/18/2024 as a transfer from [facility] with chest, back, and abdominal pain. 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 Vascular and Cardiac surgery following. CICU course c/b PEA arrest 11/20, now intubated. Nephrology following for TRACY.     #Oliguric TRACY Baseline creatinine 1.5   -UOP 100ml in past 24 hours off diuretics.  -UA 1+  protein, 3+ blood, 1+ ketones, 75 LE, >50 WBC, >20 RBC  Urine sodium 72, Fena 2.1%, suspected ATN  Urine chloride <15  -Acidbase are acceptable.  -Hemodynamics-not on any pressor support  -Etio :TRACY likely in setting recent hemodynamic insult with PEA.  -Had 2 sessions of SLED 11/27 and 11/29    RECOMMENDATIONS:  -No HD/SLED today unless indicated.  -Will eval daily for any RRT needs.  -Follow daily BMP trend.  -Strict I/Os.  -No contrast or nephrotoxic drugs if possible.  -Would follow.      Martha James MD  Nephrology Fellow   Daytime / Weekend Renal Pager [phone]  After 7 pm Emergencies [phone] Pager [phone]

## 2024-11-29 NOTE — PROCEDURES
Arterial Line Insertion    Date/Time: 11/28/2024 8:25 PM    Performed by: Jesus Johnson MD  Authorized by: Jesus Johnson MD    Consent:     Consent obtained:  Emergent situation and verbal    Consent given by:  Guardian    Risks, benefits, and alternatives were discussed: yes    Universal protocol:     Procedure explained and questions answered to patient or proxy's satisfaction: yes      Patient identity confirmed:  Arm band  Indications:     Indications: hemodynamic monitoring    Pre-procedure details:     Skin preparation:  Chlorhexidine  Anesthesia:     Anesthesia method:  Local infiltration    Local anesthetic:  Lidocaine 1% w/o epi  Procedure details:     Number of attempts:  1  Post-procedure details:     Post-procedure:  Sterile dressing applied    Procedure completion:  Tolerated      Site: Left brachial artery - ultrasound guided.

## 2024-11-30 LAB
ALBUMIN SERPL BCP-MCNC: 2.4 G/DL (ref 3.4–5)
ALP SERPL-CCNC: 296 U/L (ref 33–136)
ALT SERPL W P-5'-P-CCNC: 88 U/L (ref 7–45)
ANION GAP BLDA CALCULATED.4IONS-SCNC: 15 MMO/L (ref 10–25)
ANION GAP SERPL CALC-SCNC: 21 MMOL/L (ref 10–20)
AST SERPL W P-5'-P-CCNC: 147 U/L (ref 9–39)
BASE EXCESS BLDA CALC-SCNC: -2.2 MMOL/L (ref -2–3)
BASOPHILS # BLD AUTO: 0.01 X10*3/UL (ref 0–0.1)
BASOPHILS NFR BLD AUTO: 0.1 %
BILIRUB DIRECT SERPL-MCNC: 0.5 MG/DL (ref 0–0.3)
BILIRUB SERPL-MCNC: 1.2 MG/DL (ref 0–1.2)
BODY TEMPERATURE: 37 DEGREES CELSIUS
BUN SERPL-MCNC: 16 MG/DL (ref 6–23)
CA-I BLDA-SCNC: 1.07 MMOL/L (ref 1.1–1.33)
CALCIUM SERPL-MCNC: 7.1 MG/DL (ref 8.6–10.6)
CHLORIDE BLDA-SCNC: 97 MMOL/L (ref 98–107)
CHLORIDE SERPL-SCNC: 94 MMOL/L (ref 98–107)
CO2 SERPL-SCNC: 21 MMOL/L (ref 21–32)
CREAT SERPL-MCNC: 1.98 MG/DL (ref 0.5–1.05)
EGFRCR SERPLBLD CKD-EPI 2021: 27 ML/MIN/1.73M*2
EOSINOPHIL # BLD AUTO: 0.08 X10*3/UL (ref 0–0.7)
EOSINOPHIL NFR BLD AUTO: 0.7 %
ERYTHROCYTE [DISTWIDTH] IN BLOOD BY AUTOMATED COUNT: 16.1 % (ref 11.5–14.5)
GLUCOSE BLD MANUAL STRIP-MCNC: 150 MG/DL (ref 74–99)
GLUCOSE BLD MANUAL STRIP-MCNC: 158 MG/DL (ref 74–99)
GLUCOSE BLD MANUAL STRIP-MCNC: 160 MG/DL (ref 74–99)
GLUCOSE BLD MANUAL STRIP-MCNC: 162 MG/DL (ref 74–99)
GLUCOSE BLD MANUAL STRIP-MCNC: 179 MG/DL (ref 74–99)
GLUCOSE BLD MANUAL STRIP-MCNC: 187 MG/DL (ref 74–99)
GLUCOSE BLDA-MCNC: 161 MG/DL (ref 74–99)
GLUCOSE SERPL-MCNC: 193 MG/DL (ref 74–99)
HCO3 BLDA-SCNC: 20.6 MMOL/L (ref 22–26)
HCT VFR BLD AUTO: 20.9 % (ref 36–46)
HCT VFR BLD EST: 24 % (ref 36–46)
HGB BLD-MCNC: 7.1 G/DL (ref 12–16)
HGB BLDA-MCNC: 8.1 G/DL (ref 12–16)
IMM GRANULOCYTES # BLD AUTO: 0.08 X10*3/UL (ref 0–0.7)
IMM GRANULOCYTES NFR BLD AUTO: 0.7 % (ref 0–0.9)
INHALED O2 CONCENTRATION: 30 %
LACTATE BLDA-SCNC: 1.3 MMOL/L (ref 0.4–2)
LYMPHOCYTES # BLD AUTO: 1.5 X10*3/UL (ref 1.2–4.8)
LYMPHOCYTES NFR BLD AUTO: 13.7 %
MAGNESIUM SERPL-MCNC: 2.47 MG/DL (ref 1.6–2.4)
MCH RBC QN AUTO: 30 PG (ref 26–34)
MCHC RBC AUTO-ENTMCNC: 34 G/DL (ref 32–36)
MCV RBC AUTO: 88 FL (ref 80–100)
MONOCYTES # BLD AUTO: 1.13 X10*3/UL (ref 0.1–1)
MONOCYTES NFR BLD AUTO: 10.3 %
NEUTROPHILS # BLD AUTO: 8.14 X10*3/UL (ref 1.2–7.7)
NEUTROPHILS NFR BLD AUTO: 74.5 %
NRBC BLD-RTO: 2 /100 WBCS (ref 0–0)
OXYHGB MFR BLDA: 96.9 % (ref 94–98)
PCO2 BLDA: 27 MM HG (ref 38–42)
PH BLDA: 7.49 PH (ref 7.38–7.42)
PHOSPHATE SERPL-MCNC: 4 MG/DL (ref 2.5–4.9)
PLATELET # BLD AUTO: 284 X10*3/UL (ref 150–450)
PO2 BLDA: 97 MM HG (ref 85–95)
POTASSIUM BLDA-SCNC: 4.8 MMOL/L (ref 3.5–5.3)
POTASSIUM SERPL-SCNC: 4.5 MMOL/L (ref 3.5–5.3)
PROT SERPL-MCNC: 4.3 G/DL (ref 6.4–8.2)
RBC # BLD AUTO: 2.37 X10*6/UL (ref 4–5.2)
SAO2 % BLDA: 99 % (ref 94–100)
SODIUM BLDA-SCNC: 128 MMOL/L (ref 136–145)
SODIUM SERPL-SCNC: 131 MMOL/L (ref 136–145)
VANCOMYCIN SERPL-MCNC: 10.2 UG/ML (ref 5–20)
WBC # BLD AUTO: 10.9 X10*3/UL (ref 4.4–11.3)

## 2024-11-30 PROCEDURE — 2500000004 HC RX 250 GENERAL PHARMACY W/ HCPCS (ALT 636 FOR OP/ED)

## 2024-11-30 PROCEDURE — 85025 COMPLETE CBC W/AUTO DIFF WBC: CPT

## 2024-11-30 PROCEDURE — 99291 CRITICAL CARE FIRST HOUR: CPT

## 2024-11-30 PROCEDURE — 84520 ASSAY OF UREA NITROGEN: CPT

## 2024-11-30 PROCEDURE — 82947 ASSAY GLUCOSE BLOOD QUANT: CPT

## 2024-11-30 PROCEDURE — 84075 ASSAY ALKALINE PHOSPHATASE: CPT

## 2024-11-30 PROCEDURE — 2500000002 HC RX 250 W HCPCS SELF ADMINISTERED DRUGS (ALT 637 FOR MEDICARE OP, ALT 636 FOR OP/ED)

## 2024-11-30 PROCEDURE — 2500000005 HC RX 250 GENERAL PHARMACY W/O HCPCS

## 2024-11-30 PROCEDURE — 94003 VENT MGMT INPAT SUBQ DAY: CPT

## 2024-11-30 PROCEDURE — 2500000001 HC RX 250 WO HCPCS SELF ADMINISTERED DRUGS (ALT 637 FOR MEDICARE OP)

## 2024-11-30 PROCEDURE — 37799 UNLISTED PX VASCULAR SURGERY: CPT

## 2024-11-30 PROCEDURE — 84100 ASSAY OF PHOSPHORUS: CPT

## 2024-11-30 PROCEDURE — 84132 ASSAY OF SERUM POTASSIUM: CPT

## 2024-11-30 PROCEDURE — 83735 ASSAY OF MAGNESIUM: CPT

## 2024-11-30 PROCEDURE — 99233 SBSQ HOSP IP/OBS HIGH 50: CPT

## 2024-11-30 PROCEDURE — 1100000001 HC PRIVATE ROOM DAILY

## 2024-11-30 RX ORDER — VANCOMYCIN HYDROCHLORIDE 750 MG/150ML
750 INJECTION, SOLUTION INTRAVENOUS ONCE
Status: COMPLETED | OUTPATIENT
Start: 2024-11-30 | End: 2024-11-30

## 2024-11-30 RX ADMIN — PIPERACILLIN SODIUM AND TAZOBACTAM SODIUM 2.25 G: 2; .25 INJECTION, SOLUTION INTRAVENOUS at 09:12

## 2024-11-30 RX ADMIN — ATORVASTATIN CALCIUM 80 MG: 80 TABLET, FILM COATED ORAL at 21:00

## 2024-11-30 RX ADMIN — Medication 30 PERCENT: at 20:00

## 2024-11-30 RX ADMIN — PIPERACILLIN SODIUM AND TAZOBACTAM SODIUM 2.25 G: 2; .25 INJECTION, SOLUTION INTRAVENOUS at 04:45

## 2024-11-30 RX ADMIN — HEPARIN SODIUM 5000 UNITS: 5000 INJECTION, SOLUTION INTRAVENOUS; SUBCUTANEOUS at 01:03

## 2024-11-30 RX ADMIN — PANTOPRAZOLE SODIUM 40 MG: 40 INJECTION, POWDER, FOR SOLUTION INTRAVENOUS at 21:00

## 2024-11-30 RX ADMIN — INSULIN LISPRO 1 UNITS: 100 INJECTION, SOLUTION INTRAVENOUS; SUBCUTANEOUS at 09:08

## 2024-11-30 RX ADMIN — THIAMINE HCL TAB 100 MG 100 MG: 100 TAB at 08:47

## 2024-11-30 RX ADMIN — Medication 30 PERCENT: at 09:11

## 2024-11-30 RX ADMIN — VANCOMYCIN HYDROCHLORIDE 750 MG: 750 INJECTION, SOLUTION INTRAVENOUS at 03:35

## 2024-11-30 RX ADMIN — HEPARIN SODIUM 5000 UNITS: 5000 INJECTION, SOLUTION INTRAVENOUS; SUBCUTANEOUS at 08:47

## 2024-11-30 RX ADMIN — INSULIN LISPRO 1 UNITS: 100 INJECTION, SOLUTION INTRAVENOUS; SUBCUTANEOUS at 12:00

## 2024-11-30 RX ADMIN — ASPIRIN 81 MG CHEWABLE TABLET 81 MG: 81 TABLET CHEWABLE at 08:47

## 2024-11-30 RX ADMIN — INSULIN LISPRO 1 UNITS: 100 INJECTION, SOLUTION INTRAVENOUS; SUBCUTANEOUS at 15:19

## 2024-11-30 RX ADMIN — HEPARIN SODIUM 5000 UNITS: 5000 INJECTION, SOLUTION INTRAVENOUS; SUBCUTANEOUS at 15:21

## 2024-11-30 RX ADMIN — PANTOPRAZOLE SODIUM 40 MG: 40 INJECTION, POWDER, FOR SOLUTION INTRAVENOUS at 08:47

## 2024-11-30 ASSESSMENT — PAIN - FUNCTIONAL ASSESSMENT

## 2024-11-30 ASSESSMENT — ACTIVITIES OF DAILY LIVING (ADL)
WALKS IN HOME: INDEPENDENT
HEARING - LEFT EAR: FUNCTIONAL
BATHING: INDEPENDENT
JUDGMENT_ADEQUATE_SAFELY_COMPLETE_DAILY_ACTIVITIES: YES
HEARING - RIGHT EAR: FUNCTIONAL
GROOMING: INDEPENDENT
FEEDING YOURSELF: INDEPENDENT
PATIENT'S MEMORY ADEQUATE TO SAFELY COMPLETE DAILY ACTIVITIES?: YES
ADEQUATE_TO_COMPLETE_ADL: YES
TOILETING: INDEPENDENT

## 2024-11-30 ASSESSMENT — PAIN SCALES - GENERAL: PAINLEVEL_OUTOF10: 0 - NO PAIN

## 2024-11-30 NOTE — CARE PLAN
Problem: Safety - Adult  Goal: Free from fall injury  Outcome: Progressing     Problem: Skin  Goal: Participates in plan/prevention/treatment measures  Outcome: Progressing  Flowsheets (Taken 11/29/2024 1232 by Claudia Acosta RN)  Participates in plan/prevention/treatment measures: Elevate heels  Goal: Prevent/manage excess moisture  Outcome: Progressing  Flowsheets (Taken 11/29/2024 1232 by Claudia Acosta RN)  Prevent/manage excess moisture: Cleanse incontinence/protect with barrier cream  Goal: Prevent/minimize sheer/friction injuries  Outcome: Progressing  Flowsheets (Taken 11/29/2024 1232 by Claudia Acosta RN)  Prevent/minimize sheer/friction injuries: Use pull sheet     Problem: Safety - Medical Restraint  Goal: Remains free of injury from restraints (Restraint for Interference with Medical Device)  Outcome: Progressing  Flowsheets (Taken 11/29/2024 1232 by Claudia Acosta RN)  Remains free of injury from restraints (restraint for interference with medical device): Every 2 hours: Monitor safety, psychosocial status, comfort, nutrition and hydration  Goal: Free from restraint(s) (Restraint for Interference with Medical Device)  Outcome: Progressing  Flowsheets (Taken 11/29/2024 1232 by Claudia Acosta RN)  Free from restraint(s) (restraint for interference with medical device): Identify and implement measures to help patient regain control

## 2024-11-30 NOTE — PROGRESS NOTES
SUBJECTIVE:   Nia Schneider is a 44 year old female who presents to clinic today for the following   health issues:      ENT Symptoms             Symptoms: cc Present Absent Comment   Fever/Chills   x    Fatigue  x     Muscle Aches   x    Eye Irritation   x    Sneezing   x    Nasal Lars/Drg   x    Sinus Pressure/Pain   x    Loss of smell   x    Dental pain   x    Sore Throat x x  Sore throat worsening. Difficult to swallow. Strep negative 4/9/19   Swollen Glands   x    Ear Pain/Fullness  x  Right ear pain   Cough  x  Tickling cough   Wheeze   x    Chest Pain   x    Shortness of breath   x    Rash   x    Other   x      Symptom duration:  9-10 days   Symptom severity:  severe   Treatments tried:  Tylenol #3, Nyquil   Contacts:       Has not felt well since 4/6/19. Has felt really ill and is not getting better. Throat feels like it is going crazy. Was seen on 4/9/19 and strep was negative. Has needed antibiotics in the past. Cough that is non-productive. Not able to sleep at night, lungs feel like they are tickling at night. Ears started to hurt this AM, right is worse than left. Hurts every time that swollows. Sore throat seems worse than before. Has been drinking hot water.     Additional history: as documented    Reviewed  and updated as needed this visit by clinical staff  Tobacco  Allergies  Meds  Med Hx  Surg Hx  Fam Hx  Soc Hx        Reviewed and updated as needed this visit by Provider         Current Outpatient Medications   Medication Sig Dispense Refill     ALPRAZolam (XANAX) 0.5 MG tablet Take 0.5 mg by mouth daily as needed for anxiety       amoxicillin (AMOXIL) 500 MG capsule Take 1 capsule (500 mg) by mouth 2 times daily 30 capsule 0     diazepam (VALIUM) 5 MG tablet Place 5mg vaginally at bedtime as needed for interstitial cystitis 30 tablet 0     escitalopram (LEXAPRO) 10 MG tablet TAKE 1 TABLET BY MOUTH ONCE DAILY 90 tablet 1     methylfolate (DEPLIN) 15 MG TABS tablet Take 1  Critical Care Daily Progress      Subjective   Patient is a 64 y.o. female admitted on 11/18/2024 10:54 PM to the CICU for Aortic Dissection.     NAEO    Objective     Vent Settings/Oxygen Drips   Vent Mode: Volume control/assist control  FiO2 (%):  [30 %] 30 %  S RR:  [18-22] 18  S VT:  [400 mL] 400 mL  PEEP/CPAP (cm H2O):  [5 cm H20] 5 cm H20  MAP (cm H2O):  [11-13] 11  dexmedeTOMIDine, 0-1.5 mcg/kg/hr, Last Rate: 0.2 mcg/kg/hr (11/28/24 1303)  fentaNYL,  mcg/hr, Last Rate: 25 mcg/hr (11/30/24 0600)  midazolam, 0.5-20 mg/hr, Last Rate: Stopped (11/29/24 1930)  norepinephrine, 0.01-1 mcg/kg/min, Last Rate: Stopped (11/30/24 0338)         Vitals: I/O:   Vitals:    11/30/24 0700   BP:    Pulse: 63   Resp: 18   Temp:    SpO2: 96%    Sinus Shmuel 56 wo fluctuations  MAPs 70-80 (110s/50s) off levo    24hr Min/Max:  Temp  Min: 35.3 °C (95.5 °F)  Max: 36.3 °C (97.3 °F)  Pulse  Min: 56  Max: 68  Resp  Min: 18  Max: 22  SpO2  Min: 92 %  Max: 99 %   Intake/Output Summary (Last 24 hours) at 11/30/2024 0911  Last data filed at 11/30/2024 0600  Gross per 24 hour   Intake 494.98 ml   Output 100 ml   Net 394.98 ml    No UO yesterday    Net IO Since Admission: 10,795.03 mL [11/30/24 0911]      Scheduled Medications:  PRN Medications:    aspirin, 81 mg, oral, Daily  atorvastatin, 80 mg, oral, Nightly  heparin, 5,000 Units, subcutaneous, q8h  insulin lispro, 0-5 Units, subcutaneous, q4h  lactated Ringer's, 1,000 mL, intravenous, Once  oxygen, , inhalation, Continuous - Inhalation  pantoprazole, 40 mg, intravenous, BID  piperacillin-tazobactam, 2.25 g, intravenous, q6h  potassium chloride, 40 mEq, intravenous, Once  thiamine, 100 mg, oral, Daily     PRN medications: acetaminophen **OR** acetaminophen **OR** acetaminophen, dextrose, dextrose, glucagon, glucagon, hydrOXYzine HCL, ipratropium-albuteroL, oxyCODONE, oxygen, polyethylene glycol, sennosides-docusate sodium, vancomycin       Physical Exam:  Physical  Exam  Constitutional:       Appearance: She is toxic-appearing.      Comments: Intubated, sedated on fentanyl; opens eyes to voice and squeezes hand   Eyes:      General: No scleral icterus.  Cardiovascular:      Rate and Rhythm: Normal rate and regular rhythm.      Comments: Chest tender to palpation  Pulmonary:      Effort: No respiratory distress.      Breath sounds: Normal breath sounds. No wheezing or rhonchi.      Comments: ET tube in place; mild wheeze b/l  Abdominal:      General: There is no distension.      Palpations: Abdomen is soft.      Tenderness: There is no abdominal tenderness. There is no guarding.   Musculoskeletal:         General: No swelling or tenderness.   Skin:     General: Skin is warm and dry.      Findings: No bruising or rash.              LABS:    CBC RFP   Lab Results   Component Value Date    WBC 10.9 11/30/2024    WBC 12.4 (H) 11/29/2024    WBC 11.7 (H) 11/29/2024    HGB 7.1 (L) 11/30/2024    HGB 7.8 (L) 11/29/2024    HGB 6.6 (L) 11/29/2024    HCT 20.9 (L) 11/30/2024    MCV 88 11/30/2024     11/30/2024     11/29/2024     11/29/2024    NEUTROABS 8.14 (H) 11/30/2024     1 unit pRBC   Blood cultures pending Lab Results   Component Value Date     (L) 11/30/2024    K 4.5 11/30/2024    CL 94 (L) 11/30/2024    CO2 21 11/30/2024    BUN 16 11/30/2024    CREATININE 1.98 (H) 11/30/2024    CREATININE 1.25 (H) 11/29/2024     Lab Results   Component Value Date    MG 2.47 (H) 11/30/2024    PHOS 4.0 11/30/2024    CALCIUM 7.1 (L) 11/30/2024         Hepatic Function ABG/VBG   Lab Results   Component Value Date    ALT 88 (H) 11/30/2024     (H) 11/30/2024    ALKPHOS 296 (H) 11/30/2024     Lab Results   Component Value Date    BILIDIR 0.5 (H) 11/30/2024      Lab Results   Component Value Date    PROTIME 17.9 (H) 11/29/2024    APTT 61 (H) 11/29/2024    INR 1.6 (H) 11/29/2024      Lab Results   Component Value Date    LACTATE 1.1 11/29/2024     ABG 7.49/27/97 L 1.2     No  tablet (15 mg) by mouth daily 90 tablet 3     OXcarbazepine (TRILEPTAL) 300 MG tablet 300mg in the morning and 600 mg in the evening        ziprasidone (GEODON) 20 MG capsule Take 20 mg by mouth daily       No Known Allergies    ROS:  Review of Systems   Constitutional: Positive for fatigue. Negative for chills, diaphoresis and fever.   HENT: Positive for ear pain (right) and sore throat. Negative for ear discharge, hearing loss, rhinorrhea and sinus pressure.    Eyes: Negative for discharge and itching.   Respiratory: Positive for cough. Negative for shortness of breath and wheezing.    Gastrointestinal: Negative for constipation, diarrhea and nausea.   Skin: Negative for rash.   Neurological: Negative for dizziness, light-headedness and headaches.         OBJECTIVE:     /88 (BP Location: Right arm, Patient Position: Sitting, Cuff Size: Adult Regular)   Pulse 75   Temp 97.4  F (36.3  C) (Tympanic)   Resp 12   Wt 52.2 kg (115 lb)   SpO2 98%   BMI 18.42 kg/m    Body mass index is 18.42 kg/m .  Physical Exam   Constitutional: She appears well-developed.   HENT:   Right Ear: External ear normal. Tympanic membrane is not erythematous and not bulging. A middle ear effusion is present.   Left Ear: External ear normal. Tympanic membrane is bulging. Tympanic membrane is not erythematous.  No middle ear effusion.   Nose: No mucosal edema or rhinorrhea.   Mouth/Throat: Posterior oropharyngeal erythema present.   Cardiovascular: Normal rate, regular rhythm and normal heart sounds.   Pulmonary/Chest: Effort normal and breath sounds normal.   Abdominal: Soft. Bowel sounds are normal.   Neurological: She is alert.   Skin: Skin is warm and dry.   Psychiatric: She has a normal mood and affect.       ASSESSMENT/PLAN:   1. Pharyngitis, unspecified etiology  Reviewed treatment plan and roll of antibiotics  Discussed importance of hydration and role of antipyretics and lozenges for comfort  Instructed to call or return for    --Symptoms not improved   --Worsening fever or throat pain  --Unable to swallow or difficulty breathing  --New or unexplained symptoms   - amoxicillin (AMOXIL) 500 MG capsule; Take 1 capsule (500 mg) by mouth 2 times daily  Dispense: 30 capsule; Refill: 0        SHOSHANA Rodriguez [facility]         results found for the last 90 days.         IMAGING:    CXR 11/28  IMPRESSION:  1. Interval retraction of endotracheal tube with satisfactory  position.  2. Previously questioned concave density of the right lateral chest  wall is no longer evident and was external to the patient.  3. Similar findings suggestive of mild pulmonary edema.          Assessment/Plan    64 y.o. female with history of pancreatitis, DVT/PE, HLD, HTN, CABGx4 1997, T2DM, GERD, PAD, chronic mesenteric ischemia, tobacco use who presented to [facility] on 11/18/2024 as a transfer from [facility] with chest, back, and abdominal pain. 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 Vascular and Cardiac surgery following. CICU course c/b PEA arrest 11/20, now intubated and sedated, following commands.     11/30 Updates:  -Re intubated, sedated, off pressors  -family coming today for GOC meeting  -cultures negative, deescalate abx today  -Nephro following for SLED    Neuro:  #Sedated  -fentanyl, versed  -opening eyes, following commands     Cardiac:  #Aflutter/Afib  ::now sinus sandy  -amio stopped  -started metop 12.5 q6h for rate control  -Holding heparin    #PEA arrest 11/20    #Aortic dissection  #Mural Thrombus  #Distal aortic arch saccular aneurysm   ::CTA CAP 11/18- 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   -Vascular surgery discussed case at aortic conference and there are no plans for surgery, palliative measures recommended  -Holding heparin     #NSTEMI  #CAD s/p CABGx4 1997  #PAD  #HTN #DLD  ::EKG- NSR, rate 71, TWI aVL, V1-V6, 1mm ORQUIDEA in aVR  ::Trop peak 5700  ::Lipid HDL 41.5, , TG 75, Chol 157  -Holding home amlodipine 10mg, imdur 30mg, lisinopril 2.5mg, metop tartrate 50mg  bid iso hypotension   -Continue ASA and atorvastatin  -Holding plavix 75mg    #HFrEF (45-50%, 11/19/24)     Pulm:  #Intubated  #PNA  ::CXR with opacification of LLL  ::s/p Zosyn 11/20 - 11/26  -Daily SBT     GI:  #c/f UGIB, resolved  -PPI BID  -Holding therapeutic AC  -BID CBC  -Active T&S, consented  -monitor for melena    #Elevated LFTs  ::likely 2/2 hypoxia and hypotension  -trend HFP     Renal:  #TRACY on CKD likely 2/2 arrest  #Oliguria  -SLED nightly per nephro     Endo:   #T2DM  -Hold home metformin   -Mild SSI + hypoglycemia protocol     #Tube Feeds  ::at goal, 20 ml/hr  -f/up nutrition    Infectious  #sepsis concerns, low concern at this time  -deescalate abx  -f/up blood cultures      F: 500 ml  E: K>4, Mag>2  N: NPO  G: pantoprazole  A: PIV, OG, Aroldo  DVT: subcutaneous heparin, SCDs  CODE: DNR/okay to intubate (confirmed with family 11/21)  NOK: Daughter Josy [phone]      Stephen Clayton MD  Internal Medicine PGY-2

## 2024-11-30 NOTE — CARE PLAN
Problem: Pain - Adult  Goal: Verbalizes/displays adequate comfort level or baseline comfort level  Outcome: Progressing     Problem: Safety - Adult  Goal: Free from fall injury  Outcome: Progressing     Problem: Discharge Planning  Goal: Discharge to home or other facility with appropriate resources  Outcome: Progressing     Problem: Chronic Conditions and Co-morbidities  Goal: Patient's chronic conditions and co-morbidity symptoms are monitored and maintained or improved  Outcome: Progressing     Problem: Skin  Goal: Participates in plan/prevention/treatment measures  Outcome: Progressing  Flowsheets (Taken 11/30/2024 0945)  Participates in plan/prevention/treatment measures: Elevate heels  Goal: Prevent/manage excess moisture  Outcome: Progressing  Flowsheets (Taken 11/30/2024 0945)  Prevent/manage excess moisture: Cleanse incontinence/protect with barrier cream  Goal: Prevent/minimize sheer/friction injuries  Outcome: Progressing  Flowsheets (Taken 11/30/2024 0945)  Prevent/minimize sheer/friction injuries: Turn/reposition every 2 hours/use positioning/transfer devices     Problem: Safety - Medical Restraint  Goal: Remains free of injury from restraints (Restraint for Interference with Medical Device)  Outcome: Progressing  Flowsheets (Taken 11/30/2024 0945)  Remains free of injury from restraints (restraint for interference with medical device): Every 2 hours: Monitor safety, psychosocial status, comfort, nutrition and hydration  Goal: Free from restraint(s) (Restraint for Interference with Medical Device)  Outcome: Progressing  Flowsheets (Taken 11/30/2024 0945)  Free from restraint(s) (restraint for interference with medical device): ONCE/SHIFT or MINIMUM Every 12 hours: Assess and document the continuing need for restraints

## 2024-11-30 NOTE — PROGRESS NOTES
Humaira Huang   64 [id number]    @@  N/Room: 16973088/14/14-A admitted to CICU for Aortic dissection.    Subjective: off pressors, intubated at 30% Fio2, bladder scan had 14 ml in AM    Meds:   aspirin, 81 mg, Daily  atorvastatin, 80 mg, Nightly  heparin, 5,000 Units, q8h  insulin lispro, 0-5 Units, q4h  lactated Ringer's, 1,000 mL, Once  oxygen, , Continuous - Inhalation  pantoprazole, 40 mg, BID  potassium chloride, 40 mEq, Once  thiamine, 100 mg, Daily      dexmedeTOMIDine, Last Rate: 0.2 mcg/kg/hr (11/28/24 1303)  fentaNYL, Last Rate: 25 mcg/hr (11/30/24 1200)  midazolam, Last Rate: 1 mg/hr (11/30/24 1200)  norepinephrine, Last Rate: Stopped (11/30/24 0338)      acetaminophen, 650 mg, q4h PRN   Or  acetaminophen, 650 mg, q4h PRN   Or  acetaminophen, 650 mg, q4h PRN  dextrose, 12.5 g, q15 min PRN  dextrose, 25 g, q15 min PRN  glucagon, 1 mg, q15 min PRN  glucagon, 1 mg, q15 min PRN  hydrOXYzine HCL, 10 mg, q6h PRN  ipratropium-albuteroL, 3 mL, q6h PRN  oxyCODONE, 2.5 mg, q4h PRN  oxygen, , Continuous PRN - O2/gases  polyethylene glycol, 17 g, Daily PRN  sennosides-docusate sodium, 1 tablet, Nightly PRN  vancomycin, , Daily PRN      OBJECTIVE:    Vitals:    11/30/24 1300   BP:    Pulse: 59   Resp: 18   Temp:    SpO2: 99%          Intake/Output Summary (Last 24 hours) at 11/30/2024 1357  Last data filed at 11/30/2024 1200  Gross per 24 hour   Intake 607.49 ml   Output 100 ml   Net 507.49 ml       General appearance: intubated  Eyes: non-icteric  Skin: no apparent rash  Heart: [id number]  Lungs: CTA bilat no wheezing/crackles  Abdomen: soft, nt/nd  Extremities: trace edema      Blood Labs:  Results for orders placed or performed during the hospital encounter of 11/18/24 (from the past 24 hours)   POCT GLUCOSE   Result Value Ref Range    POCT Glucose 52 (L) 74 - 99 mg/dL   POCT GLUCOSE   Result Value Ref Range    POCT Glucose 102 (H) 74 - 99 mg/dL   Blood Gas Arterial Full Panel   Result Value Ref Range    POCT pH,  Arterial 7.54 (H) 7.38 - 7.42 pH    POCT pCO2, Arterial 25 (L) 38 - 42 mm Hg    POCT pO2, Arterial 99 (H) 85 - 95 mm Hg    POCT SO2, Arterial 99 94 - 100 %    POCT Oxy Hemoglobin, Arterial 97.2 94.0 - 98.0 %    POCT Hematocrit Calculated, Arterial 23.0 (L) 36.0 - 46.0 %    POCT Sodium, Arterial 128 (L) 136 - 145 mmol/L    POCT Potassium, Arterial 3.5 3.5 - 5.3 mmol/L    POCT Chloride, Arterial 97 (L) 98 - 107 mmol/L    POCT Ionized Calcium, Arterial 1.08 (L) 1.10 - 1.33 mmol/L    POCT Glucose, Arterial 137 (H) 74 - 99 mg/dL    POCT Lactate, Arterial 1.2 0.4 - 2.0 mmol/L    POCT Base Excess, Arterial -0.7 -2.0 - 3.0 mmol/L    POCT HCO3 Calculated, Arterial 21.4 (L) 22.0 - 26.0 mmol/L    POCT Hemoglobin, Arterial 7.8 (L) 12.0 - 16.0 g/dL    POCT Anion Gap, Arterial 13 10 - 25 mmo/L    Patient Temperature 37.0 degrees Celsius    FiO2 30 %   Renal function panel   Result Value Ref Range    Glucose 123 (H) 74 - 99 mg/dL    Sodium 134 (L) 136 - 145 mmol/L    Potassium 3.4 (L) 3.5 - 5.3 mmol/L    Chloride 95 (L) 98 - 107 mmol/L    Bicarbonate 22 21 - 32 mmol/L    Anion Gap 20 10 - 20 mmol/L    Urea Nitrogen 9 6 - 23 mg/dL    Creatinine 1.25 (H) 0.50 - 1.05 mg/dL    eGFR 48 (L) >60 mL/min/1.73m*2    Calcium 7.2 (L) 8.6 - 10.6 mg/dL    Phosphorus 2.0 (L) 2.5 - 4.9 mg/dL    Albumin 2.4 (L) 3.4 - 5.0 g/dL   Magnesium   Result Value Ref Range    Magnesium 1.78 1.60 - 2.40 mg/dL   POCT GLUCOSE   Result Value Ref Range    POCT Glucose 105 (H) 74 - 99 mg/dL   Vancomycin   Result Value Ref Range    Vancomycin 10.2 5.0 - 20.0 ug/mL   Blood Gas Arterial Full Panel   Result Value Ref Range    POCT pH, Arterial 7.56 (H) 7.38 - 7.42 pH    POCT pCO2, Arterial 24 (L) 38 - 42 mm Hg    POCT pO2, Arterial 109 (H) 85 - 95 mm Hg    POCT SO2, Arterial 100 94 - 100 %    POCT Oxy Hemoglobin, Arterial 97.6 94.0 - 98.0 %    POCT Hematocrit Calculated, Arterial 25.0 (L) 36.0 - 46.0 %    POCT Sodium, Arterial 128 (L) 136 - 145 mmol/L    POCT  Potassium, Arterial 4.5 3.5 - 5.3 mmol/L    POCT Chloride, Arterial 98 98 - 107 mmol/L    POCT Ionized Calcium, Arterial 1.07 (L) 1.10 - 1.33 mmol/L    POCT Glucose, Arterial 135 (H) 74 - 99 mg/dL    POCT Lactate, Arterial 1.2 0.4 - 2.0 mmol/L    POCT Base Excess, Arterial -0.2 -2.0 - 3.0 mmol/L    POCT HCO3 Calculated, Arterial 21.5 (L) 22.0 - 26.0 mmol/L    POCT Hemoglobin, Arterial 8.2 (L) 12.0 - 16.0 g/dL    POCT Anion Gap, Arterial 13 10 - 25 mmo/L    Patient Temperature 37.0 degrees Celsius    FiO2 30 %   POCT GLUCOSE   Result Value Ref Range    POCT Glucose 99 74 - 99 mg/dL   Blood Gas Arterial Full Panel   Result Value Ref Range    POCT pH, Arterial 7.49 (H) 7.38 - 7.42 pH    POCT pCO2, Arterial 27 (L) 38 - 42 mm Hg    POCT pO2, Arterial 97 (H) 85 - 95 mm Hg    POCT SO2, Arterial 99 94 - 100 %    POCT Oxy Hemoglobin, Arterial 96.9 94.0 - 98.0 %    POCT Hematocrit Calculated, Arterial 24.0 (L) 36.0 - 46.0 %    POCT Sodium, Arterial 128 (L) 136 - 145 mmol/L    POCT Potassium, Arterial 4.8 3.5 - 5.3 mmol/L    POCT Chloride, Arterial 97 (L) 98 - 107 mmol/L    POCT Ionized Calcium, Arterial 1.07 (L) 1.10 - 1.33 mmol/L    POCT Glucose, Arterial 161 (H) 74 - 99 mg/dL    POCT Lactate, Arterial 1.3 0.4 - 2.0 mmol/L    POCT Base Excess, Arterial -2.2 (L) -2.0 - 3.0 mmol/L    POCT HCO3 Calculated, Arterial 20.6 (L) 22.0 - 26.0 mmol/L    POCT Hemoglobin, Arterial 8.1 (L) 12.0 - 16.0 g/dL    POCT Anion Gap, Arterial 15 10 - 25 mmo/L    Patient Temperature 37.0 degrees Celsius    FiO2 30 %   POCT GLUCOSE   Result Value Ref Range    POCT Glucose 150 (H) 74 - 99 mg/dL   CBC and Auto Differential   Result Value Ref Range    WBC 10.9 4.4 - 11.3 x10*3/uL    nRBC 2.0 (H) 0.0 - 0.0 /100 WBCs    RBC 2.37 (L) 4.00 - 5.20 x10*6/uL    Hemoglobin 7.1 (L) 12.0 - 16.0 g/dL    Hematocrit 20.9 (L) 36.0 - 46.0 %    MCV 88 80 - 100 fL    MCH 30.0 26.0 - 34.0 pg    MCHC 34.0 32.0 - 36.0 g/dL    RDW 16.1 (H) 11.5 - 14.5 %    Platelets 284  150 - 450 x10*3/uL    Neutrophils % 74.5 40.0 - 80.0 %    Immature Granulocytes %, Automated 0.7 0.0 - 0.9 %    Lymphocytes % 13.7 13.0 - 44.0 %    Monocytes % 10.3 2.0 - 10.0 %    Eosinophils % 0.7 0.0 - 6.0 %    Basophils % 0.1 0.0 - 2.0 %    Neutrophils Absolute 8.14 (H) 1.20 - 7.70 x10*3/uL    Immature Granulocytes Absolute, Automated 0.08 0.00 - 0.70 x10*3/uL    Lymphocytes Absolute 1.50 1.20 - 4.80 x10*3/uL    Monocytes Absolute 1.13 (H) 0.10 - 1.00 x10*3/uL    Eosinophils Absolute 0.08 0.00 - 0.70 x10*3/uL    Basophils Absolute 0.01 0.00 - 0.10 x10*3/uL   Magnesium   Result Value Ref Range    Magnesium 2.47 (H) 1.60 - 2.40 mg/dL   Hepatic function panel   Result Value Ref Range    Albumin 2.4 (L) 3.4 - 5.0 g/dL    Bilirubin, Total 1.2 0.0 - 1.2 mg/dL    Bilirubin, Direct 0.5 (H) 0.0 - 0.3 mg/dL    Alkaline Phosphatase 296 (H) 33 - 136 U/L    ALT 88 (H) 7 - 45 U/L     (H) 9 - 39 U/L    Total Protein 4.3 (L) 6.4 - 8.2 g/dL   Phosphorus   Result Value Ref Range    Phosphorus 4.0 2.5 - 4.9 mg/dL   Basic Metabolic Panel   Result Value Ref Range    Glucose 193 (H) 74 - 99 mg/dL    Sodium 131 (L) 136 - 145 mmol/L    Potassium 4.5 3.5 - 5.3 mmol/L    Chloride 94 (L) 98 - 107 mmol/L    Bicarbonate 21 21 - 32 mmol/L    Anion Gap 21 (H) 10 - 20 mmol/L    Urea Nitrogen 16 6 - 23 mg/dL    Creatinine 1.98 (H) 0.50 - 1.05 mg/dL    eGFR 27 (L) >60 mL/min/1.73m*2    Calcium 7.1 (L) 8.6 - 10.6 mg/dL   POCT GLUCOSE   Result Value Ref Range    POCT Glucose 187 (H) 74 - 99 mg/dL   POCT GLUCOSE   Result Value Ref Range    POCT Glucose 179 (H) 74 - 99 mg/dL      US KUB 11/26  IMPRESSION:  1. Increased renal cortical echogenicity and lobular appearance of the kidneys bilaterally, likely medical renal disease, with relatively atrophic left kidney. No hydronephrosis.  2. Partially visualized bilateral pleural effusions and trace  abdominal ascites.        ASSESSMENT:  Humaira Huang is a  64 y.o.  Year old , with PMHx of   pancreatitis, DVT/PE, HLD, HTN, CABGx4 1997, T2DM, GERD, PAD, chronic mesenteric ischemia, tobacco use who presented to [facility] on 11/18/2024 as a transfer from [facility] with chest, back, and abdominal pain. 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 Vascular and Cardiac surgery following. CICU course c/b PEA arrest 11/20, now intubated. Nephrology following for TRACY.     #anuric TRACY-D Baseline creatinine 1.5   -Acidbase are acceptable  -Hemodynamics-not on any pressor support  -Etio :TRACY likely in setting recent hemodynamic insult with PEA.  -Had 2 sessions of SLED 11/27 and 11/29    RECOMMENDATIONS:  - will hold off on dialysis today and re-eval tomorrow  - bladder scan BID please  - Strict I/Os.  - No contrast or nephrotoxic drugs if possible.  - will follow      Elise Graham MD  Nephrology Fellow   Daytime / Weekend Renal Pager [phone]  After 7 pm Emergencies [phone] Pager [phone]

## 2024-11-30 NOTE — PROGRESS NOTES
Vancomycin Dosing by Pharmacy- FOLLOW UP    Humaira Huang is a 64 y.o. year old female who Pharmacy has been consulted for vancomycin dosing for osteomyelitis/septic arthritis. Based on the patient's indication and renal status this patient is being dosed based on a goal trough/random level of 15-20.     Renal function is currently poor.    Current vancomycin dose: by level    Most recent trough level: 10.2 mcg/mL    Visit Vitals  BP (!) 117/49   Pulse 63   Temp 36.3 °C (97.3 °F)   Resp 18        Lab Results   Component Value Date    CREATININE 1.25 (H) 2024    CREATININE 2.29 (H) 2024    CREATININE 1.79 (H) 2024    CREATININE 2.84 (H) 2024        Patient weight is as follows:   Vitals:    24 0559   Weight: 60.6 kg (133 lb 9.6 oz)       Cultures:  No results found for the encounter in last 14 days.       I/O last 3 completed shifts:  In: 1572.4 (25.9 mL/kg) [I.V.:292.8 (4.8 mL/kg); Blood:379.6; NG/GT:250; IV Piggyback:650]  Out: 1100 (18.2 mL/kg) [Emesis/NG output:100; Other:1000]  Weight: 60.6 kg   I/O during current shift:  I/O this shift:  In: 357.7 [I.V.:47.7; NG/GT:60; IV Piggyback:250]  Out: 0     Temp (24hrs), Av.9 °C (96.7 °F), Min:35.3 °C (95.5 °F), Max:36.3 °C (97.3 °F)      Assessment/Plan    Trough not within range. Will re-dose with 750 mg.  The next level tomorrow morning with AM labs.  Will continue to monitor renal function daily while on vancomycin and order serum creatinine at least every 48 hours if not already ordered.  Follow for continued vancomycin needs, clinical response, and signs/symptoms of toxicity.       Stephen Carrillo, PharmD

## 2024-12-01 ENCOUNTER — APPOINTMENT (OUTPATIENT)
Dept: RADIOLOGY | Facility: HOSPITAL | Age: 64
End: 2024-12-01
Payer: MEDICARE

## 2024-12-01 VITALS
HEIGHT: 62 IN | SYSTOLIC BLOOD PRESSURE: 117 MMHG | OXYGEN SATURATION: 97 % | TEMPERATURE: 96.3 F | WEIGHT: 131.5 LBS | RESPIRATION RATE: 16 BRPM | DIASTOLIC BLOOD PRESSURE: 49 MMHG | BODY MASS INDEX: 24.2 KG/M2 | HEART RATE: 59 BPM

## 2024-12-01 LAB
ALBUMIN SERPL BCP-MCNC: 2.4 G/DL (ref 3.4–5)
ALP SERPL-CCNC: 318 U/L (ref 33–136)
ALT SERPL W P-5'-P-CCNC: 77 U/L (ref 7–45)
ANION GAP BLDA CALCULATED.4IONS-SCNC: 9 MMO/L (ref 10–25)
ANION GAP SERPL CALC-SCNC: 16 MMOL/L (ref 10–20)
AST SERPL W P-5'-P-CCNC: 113 U/L (ref 9–39)
BACTERIA BLD CULT: NORMAL
BASE EXCESS BLDA CALC-SCNC: 2 MMOL/L (ref -2–3)
BASOPHILS # BLD AUTO: 0.02 X10*3/UL (ref 0–0.1)
BASOPHILS NFR BLD AUTO: 0.2 %
BILIRUB DIRECT SERPL-MCNC: 0.2 MG/DL (ref 0–0.3)
BILIRUB SERPL-MCNC: 1 MG/DL (ref 0–1.2)
BODY TEMPERATURE: 37 DEGREES CELSIUS
BUN SERPL-MCNC: 30 MG/DL (ref 6–23)
CA-I BLDA-SCNC: 1.01 MMOL/L (ref 1.1–1.33)
CALCIUM SERPL-MCNC: 6.9 MG/DL (ref 8.6–10.6)
CHLORIDE BLDA-SCNC: 97 MMOL/L (ref 98–107)
CHLORIDE SERPL-SCNC: 94 MMOL/L (ref 98–107)
CO2 SERPL-SCNC: 23 MMOL/L (ref 21–32)
CREAT SERPL-MCNC: 3.23 MG/DL (ref 0.5–1.05)
EGFRCR SERPLBLD CKD-EPI 2021: 15 ML/MIN/1.73M*2
EOSINOPHIL # BLD AUTO: 0.15 X10*3/UL (ref 0–0.7)
EOSINOPHIL NFR BLD AUTO: 1.3 %
ERYTHROCYTE [DISTWIDTH] IN BLOOD BY AUTOMATED COUNT: 16 % (ref 11.5–14.5)
GLUCOSE BLD MANUAL STRIP-MCNC: 193 MG/DL (ref 74–99)
GLUCOSE BLD MANUAL STRIP-MCNC: 207 MG/DL (ref 74–99)
GLUCOSE BLD MANUAL STRIP-MCNC: 220 MG/DL (ref 74–99)
GLUCOSE BLD MANUAL STRIP-MCNC: 224 MG/DL (ref 74–99)
GLUCOSE BLD MANUAL STRIP-MCNC: 239 MG/DL (ref 74–99)
GLUCOSE BLDA-MCNC: 185 MG/DL (ref 74–99)
GLUCOSE SERPL-MCNC: 166 MG/DL (ref 74–99)
HCO3 BLDA-SCNC: 25.3 MMOL/L (ref 22–26)
HCT VFR BLD AUTO: 21.4 % (ref 36–46)
HCT VFR BLD EST: 11 % (ref 36–46)
HGB BLD-MCNC: 7.4 G/DL (ref 12–16)
HGB BLDA-MCNC: 3.7 G/DL (ref 12–16)
IMM GRANULOCYTES # BLD AUTO: 0.09 X10*3/UL (ref 0–0.7)
IMM GRANULOCYTES NFR BLD AUTO: 0.8 % (ref 0–0.9)
INHALED O2 CONCENTRATION: 30 %
LACTATE BLDA-SCNC: 0.9 MMOL/L (ref 0.4–2)
LYMPHOCYTES # BLD AUTO: 1.53 X10*3/UL (ref 1.2–4.8)
LYMPHOCYTES NFR BLD AUTO: 13.5 %
MAGNESIUM SERPL-MCNC: 2.46 MG/DL (ref 1.6–2.4)
MCH RBC QN AUTO: 30.5 PG (ref 26–34)
MCHC RBC AUTO-ENTMCNC: 34.6 G/DL (ref 32–36)
MCV RBC AUTO: 88 FL (ref 80–100)
MONOCYTES # BLD AUTO: 0.93 X10*3/UL (ref 0.1–1)
MONOCYTES NFR BLD AUTO: 8.2 %
NEUTROPHILS # BLD AUTO: 8.58 X10*3/UL (ref 1.2–7.7)
NEUTROPHILS NFR BLD AUTO: 76 %
NRBC BLD-RTO: 1.4 /100 WBCS (ref 0–0)
OXYHGB MFR BLDA: 97.9 % (ref 94–98)
PCO2 BLDA: 31 MM HG (ref 38–42)
PH BLDA: 7.52 PH (ref 7.38–7.42)
PHOSPHATE SERPL-MCNC: 3.3 MG/DL (ref 2.5–4.9)
PLATELET # BLD AUTO: 315 X10*3/UL (ref 150–450)
PO2 BLDA: 103 MM HG (ref 85–95)
POTASSIUM BLDA-SCNC: 4.2 MMOL/L (ref 3.5–5.3)
POTASSIUM SERPL-SCNC: 4.2 MMOL/L (ref 3.5–5.3)
PROT SERPL-MCNC: 4.6 G/DL (ref 6.4–8.2)
RBC # BLD AUTO: 2.43 X10*6/UL (ref 4–5.2)
SAO2 % BLDA: 100 % (ref 94–100)
SODIUM BLDA-SCNC: 127 MMOL/L (ref 136–145)
SODIUM SERPL-SCNC: 129 MMOL/L (ref 136–145)
VANCOMYCIN SERPL-MCNC: 17 UG/ML (ref 5–20)
WBC # BLD AUTO: 11.3 X10*3/UL (ref 4.4–11.3)

## 2024-12-01 PROCEDURE — 80202 ASSAY OF VANCOMYCIN: CPT | Performed by: INTERNAL MEDICINE

## 2024-12-01 PROCEDURE — 71045 X-RAY EXAM CHEST 1 VIEW: CPT

## 2024-12-01 PROCEDURE — 99291 CRITICAL CARE FIRST HOUR: CPT | Performed by: INTERNAL MEDICINE

## 2024-12-01 PROCEDURE — 2500000001 HC RX 250 WO HCPCS SELF ADMINISTERED DRUGS (ALT 637 FOR MEDICARE OP)

## 2024-12-01 PROCEDURE — 84132 ASSAY OF SERUM POTASSIUM: CPT

## 2024-12-01 PROCEDURE — 83735 ASSAY OF MAGNESIUM: CPT

## 2024-12-01 PROCEDURE — 85025 COMPLETE CBC W/AUTO DIFF WBC: CPT

## 2024-12-01 PROCEDURE — 84075 ASSAY ALKALINE PHOSPHATASE: CPT

## 2024-12-01 PROCEDURE — 2500000004 HC RX 250 GENERAL PHARMACY W/ HCPCS (ALT 636 FOR OP/ED)

## 2024-12-01 PROCEDURE — 37799 UNLISTED PX VASCULAR SURGERY: CPT | Performed by: INTERNAL MEDICINE

## 2024-12-01 PROCEDURE — 8010000001 HC DIALYSIS - HEMODIALYSIS PER DAY

## 2024-12-01 PROCEDURE — 84100 ASSAY OF PHOSPHORUS: CPT

## 2024-12-01 PROCEDURE — 2500000002 HC RX 250 W HCPCS SELF ADMINISTERED DRUGS (ALT 637 FOR MEDICARE OP, ALT 636 FOR OP/ED)

## 2024-12-01 PROCEDURE — 94003 VENT MGMT INPAT SUBQ DAY: CPT

## 2024-12-01 PROCEDURE — 82947 ASSAY GLUCOSE BLOOD QUANT: CPT

## 2024-12-01 PROCEDURE — 2500000005 HC RX 250 GENERAL PHARMACY W/O HCPCS

## 2024-12-01 PROCEDURE — 37799 UNLISTED PX VASCULAR SURGERY: CPT

## 2024-12-01 PROCEDURE — 99233 SBSQ HOSP IP/OBS HIGH 50: CPT

## 2024-12-01 PROCEDURE — 1100000001 HC PRIVATE ROOM DAILY

## 2024-12-01 RX ORDER — VANCOMYCIN HYDROCHLORIDE 500 MG/100ML
500 INJECTION, SOLUTION INTRAVENOUS ONCE
Status: COMPLETED | OUTPATIENT
Start: 2024-12-01 | End: 2024-12-01

## 2024-12-01 RX ADMIN — HEPARIN SODIUM 5000 UNITS: 5000 INJECTION, SOLUTION INTRAVENOUS; SUBCUTANEOUS at 00:11

## 2024-12-01 RX ADMIN — INSULIN LISPRO 2 UNITS: 100 INJECTION, SOLUTION INTRAVENOUS; SUBCUTANEOUS at 11:30

## 2024-12-01 RX ADMIN — HEPARIN SODIUM 5000 UNITS: 5000 INJECTION, SOLUTION INTRAVENOUS; SUBCUTANEOUS at 08:31

## 2024-12-01 RX ADMIN — Medication 30 PERCENT: at 08:55

## 2024-12-01 RX ADMIN — Medication 25 MCG/HR: at 04:19

## 2024-12-01 RX ADMIN — Medication 30 PERCENT: at 20:01

## 2024-12-01 RX ADMIN — PANTOPRAZOLE SODIUM 40 MG: 40 INJECTION, POWDER, FOR SOLUTION INTRAVENOUS at 08:29

## 2024-12-01 RX ADMIN — ASPIRIN 81 MG CHEWABLE TABLET 81 MG: 81 TABLET CHEWABLE at 08:29

## 2024-12-01 RX ADMIN — CALCIUM CHLORIDE 1 G: 100 INJECTION INTRAVENOUS; INTRAVENTRICULAR at 10:06

## 2024-12-01 RX ADMIN — PANTOPRAZOLE SODIUM 40 MG: 40 INJECTION, POWDER, FOR SOLUTION INTRAVENOUS at 20:04

## 2024-12-01 RX ADMIN — INSULIN LISPRO 2 UNITS: 100 INJECTION, SOLUTION INTRAVENOUS; SUBCUTANEOUS at 20:04

## 2024-12-01 RX ADMIN — VANCOMYCIN HYDROCHLORIDE 500 MG: 500 INJECTION, SOLUTION INTRAVENOUS at 13:20

## 2024-12-01 RX ADMIN — THIAMINE HCL TAB 100 MG 100 MG: 100 TAB at 08:29

## 2024-12-01 RX ADMIN — ATORVASTATIN CALCIUM 80 MG: 80 TABLET, FILM COATED ORAL at 20:04

## 2024-12-01 RX ADMIN — INSULIN LISPRO 2 UNITS: 100 INJECTION, SOLUTION INTRAVENOUS; SUBCUTANEOUS at 08:39

## 2024-12-01 RX ADMIN — MIDAZOLAM IN SODIUM CHLORIDE 1 MG/HR: 1 INJECTION INTRAVENOUS at 19:11

## 2024-12-01 RX ADMIN — INSULIN LISPRO 2 UNITS: 100 INJECTION, SOLUTION INTRAVENOUS; SUBCUTANEOUS at 16:31

## 2024-12-01 RX ADMIN — HEPARIN SODIUM 5000 UNITS: 5000 INJECTION, SOLUTION INTRAVENOUS; SUBCUTANEOUS at 17:11

## 2024-12-01 ASSESSMENT — PAIN - FUNCTIONAL ASSESSMENT

## 2024-12-01 ASSESSMENT — PAIN SCALES - GENERAL
PAINLEVEL_OUTOF10: 0 - NO PAIN

## 2024-12-01 NOTE — CARE PLAN
Problem: Pain - Adult  Goal: Verbalizes/displays adequate comfort level or baseline comfort level  Outcome: Progressing  Flowsheets (Taken 12/1/2024 1728)  Verbalizes/displays adequate comfort level or baseline comfort level:   Encourage patient to monitor pain and request assistance   Assess pain using appropriate pain scale   Administer analgesics based on type and severity of pain and evaluate response   Implement non-pharmacological measures as appropriate and evaluate response   Consider cultural and social influences on pain and pain management   Notify Licensed Independent Practitioner if interventions unsuccessful or patient reports new pain     Problem: Safety - Adult  Goal: Free from fall injury  Outcome: Progressing     Problem: Discharge Planning  Goal: Discharge to home or other facility with appropriate resources  Outcome: Progressing  Flowsheets (Taken 12/1/2024 1728)  Discharge to home or other facility with appropriate resources: Identify barriers to discharge with patient and caregiver     Problem: Chronic Conditions and Co-morbidities  Goal: Patient's chronic conditions and co-morbidity symptoms are monitored and maintained or improved  Outcome: Progressing  Flowsheets (Taken 12/1/2024 1728)  Care Plan - Patient's Chronic Conditions and Co-Morbidity Symptoms are Monitored and Maintained or Improved:   Monitor and assess patient's chronic conditions and comorbid symptoms for stability, deterioration, or improvement   Collaborate with multidisciplinary team to address chronic and comorbid conditions and prevent exacerbation or deterioration   Update acute care plan with appropriate goals if chronic or comorbid symptoms are exacerbated and prevent overall improvement and discharge     Problem: Skin  Goal: Participates in plan/prevention/treatment measures  Outcome: Progressing  Flowsheets (Taken 12/1/2024 1728)  Participates in plan/prevention/treatment measures:   Discuss with provider PT/OT  consult   Elevate heels  Goal: Prevent/manage excess moisture  Outcome: Progressing  Flowsheets (Taken 12/1/2024 1728)  Prevent/manage excess moisture:   Cleanse incontinence/protect with barrier cream   Monitor for/manage infection if present   Follow provider orders for dressing changes   Moisturize dry skin  Goal: Prevent/minimize sheer/friction injuries  Outcome: Progressing     Problem: Safety - Medical Restraint  Goal: Remains free of injury from restraints (Restraint for Interference with Medical Device)  Outcome: Progressing  Flowsheets (Taken 12/1/2024 1728)  Remains free of injury from restraints (restraint for interference with medical device):   Determine that other, less restrictive measures have been tried or would not be effective before applying the restraint   Evaluate the patient's condition at the time of restraint application   Inform patient/family regarding the reason for restraint   Every 2 hours: Monitor safety, psychosocial status, comfort, nutrition and hydration  Goal: Free from restraint(s) (Restraint for Interference with Medical Device)  Outcome: Progressing

## 2024-12-01 NOTE — PROGRESS NOTES
Humaira Huang   64 joseoNga    @@  N/Room: 98250570/14/14-A admitted to CICU for Aortic dissection.    Subjective: off pressors, intubated at 30% Fio2, bladder scan had 147 ml in AM    Meds:   aspirin, 81 mg, Daily  atorvastatin, 80 mg, Nightly  heparin, 5,000 Units, q8h  insulin lispro, 0-5 Units, q4h  lactated Ringer's, 1,000 mL, Once  oxygen, , Continuous - Inhalation  pantoprazole, 40 mg, BID  potassium chloride, 40 mEq, Once  thiamine, 100 mg, Daily      fentaNYL, Last Rate: 25 mcg/hr (12/01/24 1300)  midazolam, Last Rate: 1 mg/hr (12/01/24 1300)  norepinephrine, Last Rate: Stopped (11/30/24 0338)      acetaminophen, 650 mg, q4h PRN   Or  acetaminophen, 650 mg, q4h PRN   Or  acetaminophen, 650 mg, q4h PRN  dextrose, 12.5 g, q15 min PRN  dextrose, 25 g, q15 min PRN  glucagon, 1 mg, q15 min PRN  glucagon, 1 mg, q15 min PRN  hydrOXYzine HCL, 10 mg, q6h PRN  ipratropium-albuteroL, 3 mL, q6h PRN  oxyCODONE, 2.5 mg, q4h PRN  oxygen, , Continuous PRN - O2/gases  polyethylene glycol, 17 g, Daily PRN  sennosides-docusate sodium, 1 tablet, Nightly PRN  vancomycin, , Daily PRN      OBJECTIVE:    Vitals:    12/01/24 1300   BP:    Pulse: 61   Resp: 14   Temp:    SpO2: 97%          Intake/Output Summary (Last 24 hours) at 12/1/2024 1417  Last data filed at 12/1/2024 1352  Gross per 24 hour   Intake 1327.5 ml   Output 0 ml   Net 1327.5 ml       General appearance: intubated  Eyes: non-icteric  Skin: no apparent rash  Heart: [id number]  Lungs: CTA bilat no wheezing/crackles  Abdomen: soft, nt/nd  Extremities: trace edema      Blood Labs:  Results for orders placed or performed during the hospital encounter of 11/18/24 (from the past 24 hours)   POCT GLUCOSE   Result Value Ref Range    POCT Glucose 160 (H) 74 - 99 mg/dL   POCT GLUCOSE   Result Value Ref Range    POCT Glucose 158 (H) 74 - 99 mg/dL   POCT GLUCOSE   Result Value Ref Range    POCT Glucose 162 (H) 74 - 99 mg/dL   CBC and Auto Differential   Result Value Ref Range     WBC 11.3 4.4 - 11.3 x10*3/uL    nRBC 1.4 (H) 0.0 - 0.0 /100 WBCs    RBC 2.43 (L) 4.00 - 5.20 x10*6/uL    Hemoglobin 7.4 (L) 12.0 - 16.0 g/dL    Hematocrit 21.4 (L) 36.0 - 46.0 %    MCV 88 80 - 100 fL    MCH 30.5 26.0 - 34.0 pg    MCHC 34.6 32.0 - 36.0 g/dL    RDW 16.0 (H) 11.5 - 14.5 %    Platelets 315 150 - 450 x10*3/uL    Neutrophils % 76.0 40.0 - 80.0 %    Immature Granulocytes %, Automated 0.8 0.0 - 0.9 %    Lymphocytes % 13.5 13.0 - 44.0 %    Monocytes % 8.2 2.0 - 10.0 %    Eosinophils % 1.3 0.0 - 6.0 %    Basophils % 0.2 0.0 - 2.0 %    Neutrophils Absolute 8.58 (H) 1.20 - 7.70 x10*3/uL    Immature Granulocytes Absolute, Automated 0.09 0.00 - 0.70 x10*3/uL    Lymphocytes Absolute 1.53 1.20 - 4.80 x10*3/uL    Monocytes Absolute 0.93 0.10 - 1.00 x10*3/uL    Eosinophils Absolute 0.15 0.00 - 0.70 x10*3/uL    Basophils Absolute 0.02 0.00 - 0.10 x10*3/uL   Magnesium   Result Value Ref Range    Magnesium 2.46 (H) 1.60 - 2.40 mg/dL   Hepatic function panel   Result Value Ref Range    Albumin 2.4 (L) 3.4 - 5.0 g/dL    Bilirubin, Total 1.0 0.0 - 1.2 mg/dL    Bilirubin, Direct 0.2 0.0 - 0.3 mg/dL    Alkaline Phosphatase 318 (H) 33 - 136 U/L    ALT 77 (H) 7 - 45 U/L     (H) 9 - 39 U/L    Total Protein 4.6 (L) 6.4 - 8.2 g/dL   Phosphorus   Result Value Ref Range    Phosphorus 3.3 2.5 - 4.9 mg/dL   Basic Metabolic Panel   Result Value Ref Range    Glucose 166 (H) 74 - 99 mg/dL    Sodium 129 (L) 136 - 145 mmol/L    Potassium 4.2 3.5 - 5.3 mmol/L    Chloride 94 (L) 98 - 107 mmol/L    Bicarbonate 23 21 - 32 mmol/L    Anion Gap 16 10 - 20 mmol/L    Urea Nitrogen 30 (H) 6 - 23 mg/dL    Creatinine 3.23 (H) 0.50 - 1.05 mg/dL    eGFR 15 (L) >60 mL/min/1.73m*2    Calcium 6.9 (L) 8.6 - 10.6 mg/dL   Blood Gas Arterial Full Panel   Result Value Ref Range    POCT pH, Arterial 7.52 (H) 7.38 - 7.42 pH    POCT pCO2, Arterial 31 (L) 38 - 42 mm Hg    POCT pO2, Arterial 103 (H) 85 - 95 mm Hg    POCT SO2, Arterial 100 94 - 100 %     POCT Oxy Hemoglobin, Arterial 97.9 94.0 - 98.0 %    POCT Hematocrit Calculated, Arterial 11.0 (L) 36.0 - 46.0 %    POCT Sodium, Arterial 127 (L) 136 - 145 mmol/L    POCT Potassium, Arterial 4.2 3.5 - 5.3 mmol/L    POCT Chloride, Arterial 97 (L) 98 - 107 mmol/L    POCT Ionized Calcium, Arterial 1.01 (L) 1.10 - 1.33 mmol/L    POCT Glucose, Arterial 185 (H) 74 - 99 mg/dL    POCT Lactate, Arterial 0.9 0.4 - 2.0 mmol/L    POCT Base Excess, Arterial 2.0 -2.0 - 3.0 mmol/L    POCT HCO3 Calculated, Arterial 25.3 22.0 - 26.0 mmol/L    POCT Hemoglobin, Arterial 3.7 (LL) 12.0 - 16.0 g/dL    POCT Anion Gap, Arterial 9 (L) 10 - 25 mmo/L    Patient Temperature 37.0 degrees Celsius    FiO2 30 %   POCT GLUCOSE   Result Value Ref Range    POCT Glucose 193 (H) 74 - 99 mg/dL   POCT GLUCOSE   Result Value Ref Range    POCT Glucose 207 (H) 74 - 99 mg/dL   Vancomycin   Result Value Ref Range    Vancomycin 17.0 5.0 - 20.0 ug/mL   POCT GLUCOSE   Result Value Ref Range    POCT Glucose 239 (H) 74 - 99 mg/dL      US KUB 11/26  IMPRESSION:  1. Increased renal cortical echogenicity and lobular appearance of the kidneys bilaterally, likely medical renal disease, with relatively atrophic left kidney. No hydronephrosis.  2. Partially visualized bilateral pleural effusions and trace  abdominal ascites.        ASSESSMENT:  Humaira Huang is a  64 y.o.  Year old , with PMHx of  pancreatitis, DVT/PE, HLD, HTN, CABGx4 1997, T2DM, GERD, PAD, chronic mesenteric ischemia, tobacco use who presented to [facility] on [facility]/18/20[facility] as a transfer from [facility] with chest, back, and abdominal pain. 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 Vascular and Cardiac surgery following. CICU course c/b PEA arrest 11/20, now intubated. Nephrology following for TRACY.     #anuric TRACY-D Baseline creatinine 1.5   -Acidbase  are acceptable  -Hemodynamics-not on any pressor support  -Etio :TRACY likely in setting recent hemodynamic insult with PEA.  -Had 2 sessions of SLED 11/27 and 11/29    RECOMMENDATIONS:  - SLED overnight, orders placed.   - informed about probable plan to comfort care by tomorrow afternoon.   - bladder scan BID please  - Strict I/Os.  - No contrast or nephrotoxic drugs if possible.  - will follow      Elise Graham MD  Nephrology Fellow   Daytime / Weekend Renal Pager [phone]  After 7 pm Emergencies [phone] Pager [phone]

## 2024-12-01 NOTE — CARE PLAN
Problem: Skin  Goal: Participates in plan/prevention/treatment measures  Outcome: Progressing  Flowsheets (Taken 12/1/2024 0221)  Participates in plan/prevention/treatment measures: Elevate heels  Goal: Prevent/manage excess moisture  Outcome: Progressing  Flowsheets (Taken 12/1/2024 0221)  Prevent/manage excess moisture:   Moisturize dry skin   Monitor for/manage infection if present   Cleanse incontinence/protect with barrier cream  Goal: Prevent/minimize sheer/friction injuries  Outcome: Progressing  Flowsheets (Taken 12/1/2024 0221)  Prevent/minimize sheer/friction injuries:   Increase activity/out of bed for meals   Use pull sheet   Turn/reposition every 2 hours/use positioning/transfer devices     Problem: Safety - Medical Restraint  Goal: Remains free of injury from restraints (Restraint for Interference with Medical Device)  Outcome: Progressing  Flowsheets (Taken 12/1/2024 0221)  Remains free of injury from restraints (restraint for interference with medical device):   Every 2 hours: Monitor safety, psychosocial status, comfort, nutrition and hydration   Evaluate the patient's condition at the time of restraint application  Goal: Free from restraint(s) (Restraint for Interference with Medical Device)  Outcome: Progressing  Flowsheets (Taken 12/1/2024 0221)  Free from restraint(s) (restraint for interference with medical device): ONCE/SHIFT or MINIMUM Every 12 hours: Assess and document the continuing need for restraints

## 2024-12-01 NOTE — PROGRESS NOTES
Critical Care Daily Progress      Subjective   Patient is a 64 y.o. female admitted on 11/18/2024 10:54 PM to the CICU for Aortic Dissection.     NAEO    TF at 20    Objective     Vent Settings/Oxygen Drips   Vent Mode: Volume control/assist control  FiO2 (%):  [30 %] 30 %  S RR:  [14-18] 14  S VT:  [400 mL] 400 mL  PEEP/CPAP (cm H2O):  [5 cm H20] 5 cm H20  MAP (cm H2O):  [9-11] 9  dexmedeTOMIDine, 0-1.5 mcg/kg/hr, Last Rate: 0.2 mcg/kg/hr (11/28/24 1303)  fentaNYL,  mcg/hr, Last Rate: 25 mcg/hr (12/01/24 0700)  midazolam, 0.5-20 mg/hr, Last Rate: 1 mg/hr (12/01/24 0700)  norepinephrine, 0.01-1 mcg/kg/min, Last Rate: Stopped (11/30/24 0338)         Vitals: I/O:   Vitals:    12/01/24 0700   BP:    Pulse: 67   Resp: 14   Temp:    SpO2: 95%    Sinus Shmuel in 60s  MAPs 70-80 (110s/50s) off levo    24hr Min/Max:  Temp  Min: 36.1 °C (97 °F)  Max: 36.3 °C (97.3 °F)  Pulse  Min: 59  Max: 68  Resp  Min: 14  Max: 18  SpO2  Min: 94 %  Max: 100 %   Intake/Output Summary (Last 24 hours) at 12/1/2024 0737  Last data filed at 12/1/2024 0700  Gross per 24 hour   Intake 1070.52 ml   Output 0 ml   Net 1070.52 ml    No UO yesterday    Net IO Since Admission: 11,918.05 mL [12/01/24 0737]      Scheduled Medications:  PRN Medications:    aspirin, 81 mg, oral, Daily  atorvastatin, 80 mg, oral, Nightly  heparin, 5,000 Units, subcutaneous, q8h  insulin lispro, 0-5 Units, subcutaneous, q4h  lactated Ringer's, 1,000 mL, intravenous, Once  oxygen, , inhalation, Continuous - Inhalation  pantoprazole, 40 mg, intravenous, BID  potassium chloride, 40 mEq, intravenous, Once  thiamine, 100 mg, oral, Daily     PRN medications: acetaminophen **OR** acetaminophen **OR** acetaminophen, dextrose, dextrose, glucagon, glucagon, hydrOXYzine HCL, ipratropium-albuteroL, oxyCODONE, oxygen, polyethylene glycol, sennosides-docusate sodium, vancomycin       Physical Exam:  Physical Exam  Constitutional:       Appearance: She is toxic-appearing.       Comments: Intubated, sedated on fentanyl; opens eyes to voice and squeezes hand   Eyes:      General: No scleral icterus.  Cardiovascular:      Rate and Rhythm: Normal rate and regular rhythm.      Comments: Chest tender to palpation  Pulmonary:      Effort: No respiratory distress.      Breath sounds: Normal breath sounds. No wheezing or rhonchi.      Comments: ET tube in place  Abdominal:      General: There is no distension.      Palpations: Abdomen is soft.      Tenderness: There is no abdominal tenderness. There is no guarding.   Musculoskeletal:         General: No swelling or tenderness.   Skin:     General: Skin is warm and dry.      Findings: No bruising or rash.                LABS:    CBC RFP   Lab Results   Component Value Date    WBC 11.3 12/01/2024    WBC 10.9 11/30/2024    WBC 12.4 (H) 11/29/2024    HGB 7.4 (L) 12/01/2024    HGB 7.1 (L) 11/30/2024    HGB 7.8 (L) 11/29/2024    HCT 21.4 (L) 12/01/2024    MCV 88 12/01/2024     12/01/2024     11/30/2024     11/29/2024    NEUTROABS 8.58 (H) 12/01/2024     1 unit pRBC   Blood cultures pending Lab Results   Component Value Date     (L) 12/01/2024    K 4.2 12/01/2024    CL 94 (L) 12/01/2024    CO2 23 12/01/2024    BUN 30 (H) 12/01/2024    CREATININE 3.23 (H) 12/01/2024    CREATININE 1.98 (H) 11/30/2024     Lab Results   Component Value Date    MG 2.46 (H) 12/01/2024    PHOS 3.3 12/01/2024    CALCIUM 6.9 (L) 12/01/2024         Hepatic Function ABG/VBG   Lab Results   Component Value Date    ALT 77 (H) 12/01/2024     (H) 12/01/2024    ALKPHOS 318 (H) 12/01/2024     Lab Results   Component Value Date    BILIDIR 0.2 12/01/2024      Lab Results   Component Value Date    PROTIME 17.9 (H) 11/29/2024    APTT 61 (H) 11/29/2024    INR 1.6 (H) 11/29/2024      Lab Results   Component Value Date    LACTATE 1.1 11/29/2024     ABG 7.49/27/97 L 1.2     No results found for the last 90 days.         IMAGING:    CXR 11/28  IMPRESSION:  1.  Interval retraction of endotracheal tube with satisfactory  position.  2. Previously questioned concave density of the right lateral chest  wall is no longer evident and was external to the patient.  3. Similar findings suggestive of mild pulmonary edema.          Assessment/Plan    64 y.o. female with history of pancreatitis, DVT/PE, HLD, HTN, CABGx4 1997, T2DM, GERD, PAD, chronic mesenteric ischemia, tobacco use who presented to [facility] on 11/18/2024 as a transfer from [facility] with chest, back, and abdominal pain. 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 Vascular and Cardiac surgery following. CICU course c/b PEA arrest 11/20, now intubated and sedated, following commands.     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   -Vascular surgery discussed case at aortic conference and there are no plans for surgery, palliative measures recommended  -Holding heparin     #NSTEMI  #CAD s/p CABGx4 1997  #PAD  #HTN #DLD  ::EKG- NSR, rate 71, TWI aVL, V1-V6, 1mm ORQUIDEA in aVR  ::Trop peak 5700  ::Lipid HDL 41.5, , TG 75, Chol 157  -Holding home amlodipine 10mg, imdur 30mg, lisinopril 2.5mg, metop tartrate 50mg bid iso hypotension   -Continue ASA and  atorvastatin  -Holding plavix 75mg    #HFrEF (45-50%, 11/19/24)     Pulm:  #Intubated  #PNA  ::CXR with opacification of LLL  ::s/p Zosyn 11/20 - 11/26 for PNA  -Daily SBT  -Daily CXR  -family considering trach vs comfort care     GI:  #c/f UGIB, resolved  -PPI BID  -Holding therapeutic AC  -BID CBC  -Active T&S, consented  -monitor for melena  -tube feeds started    #Elevated LFTs, downtrending  ::likely 2/2 hypoxia and hypotension  -trend HFP     Renal:  #TRACY on CKD likely 2/2 arrest  #Anuric  -SLED per nephro  -Bladder scans qshift     Endo:   #T2DM  -Hold home metformin   -Mild SSI + hypoglycemia protocol     #Tube Feeds  ::at goal, 20 ml/hr  -f/up nutrition  -monitor for refeeding    Infectious  #sepsis concerns, low concern at this time  -deescalate abx  -f/up blood cultures      F: 500 ml  E: K>4, Mag>2  N: TF  G: pantoprazole  A: PIV, OG, Aroldo  DVT: subcutaneous heparin, SCDs  CODE: DNR/okay to intubate (confirmed with family 11/21)  NOK: Daughter Josy [phone]      Stephen Clayton MD  Internal Medicine PGY-2

## 2024-12-01 NOTE — PROGRESS NOTES
Vancomycin Dosing by Pharmacy- FOLLOW UP    Humaira Huang is a 64 y.o. year old female who Pharmacy has been consulted for vancomycin dosing for other septic shock . Based on the patient's indication and renal status this patient is being dosed based on a goal trough/random level of 15-20.     Renal function is currently declining, scr increased 1.98-->3.23 on .  Continue to follow nephro note for updated renal plan.     Current vancomycin dose: 750 mg given x1 on  am.    Most recent random level: 17 mcg/mL    Visit Vitals  BP (!) 117/49   Pulse 60   Temp 35.7 °C (96.3 °F) (Temporal)   Resp 14        Lab Results   Component Value Date    CREATININE 3.23 (H) 2024    CREATININE 1.98 (H) 2024    CREATININE 1.25 (H) 2024    CREATININE 2.29 (H) 2024        Patient weight is as follows:   Vitals:    24 0000   Weight: 60.9 kg (134 lb 3.2 oz)       Cultures:  No results found for the encounter in last 14 days.       I/O last 3 completed shifts:  In: 1482.2 (24.3 mL/kg) [I.V.:132.2 (2.2 mL/kg); NG/GT:1000; IV Piggyback:350]  Out: 100 (1.6 mL/kg) [Emesis/NG output:100]  Weight: 60.9 kg   I/O during current shift:  I/O this shift:  In: 167 [I.V.:7; NG/GT:110; IV Piggyback:50]  Out: -     Temp (24hrs), Av.1 °C (97 °F), Min:35.7 °C (96.3 °F), Max:36.3 °C (97.3 °F)      Assessment/Plan    Within goal random/trough level.  Re-dose with 500mg x1 on  pm.   The next level will NEED to be ORDERED on  for a 24h level once the 500mg has been given, dose by level for now.  Continue to follow nephro note for updated renal plan. May be obtained sooner if clinically indicated.   Will continue to monitor renal function daily while on vancomycin and order serum creatinine at least every 48 hours if not already ordered.  Follow for continued vancomycin needs, clinical response, and signs/symptoms of toxicity.       Albina Castillo, PharmD

## 2024-12-01 NOTE — SIGNIFICANT EVENT
12/01/24 1430   Pre-Procedure Checklist   Emergent Line Insertion No   Type of Line to be Placed Introducer  (W/PA CATH)   Consent Obtained Yes   Emergency Medication Necessary No   Patient Identified with 2 Independent Identifiers Yes   Review of Allergies, Anticoagulation, Relevant Labs, ECG/Telemetry Yes   Risks/Benefits/Alternatives Discussed with Patient/POA/Legal Representative Yes   Stop Sign on Door Yes   Time Out Performed Yes   Catheter Exchange No   Positioning and Preparation Checklist   All People, Including Patient, in the Room with Cap and Mask Yes   Fluoroscopy Used to Identify Vessel and Guide Insertion; Sterile Cover Used No   Ultrasound Used to Identify Vessel and Guide Insertion; Sterile Cover Used Yes   Full Barrier Precautions Followed (Mask, Cap, Gown, Gloves) Yes   Hands Washed Yes   Monitors Attached with Sound Alarms On Yes   Full Body Sterile Drape (Head-to-Toe) Used to Cover Patient Yes   [place] Position (For IJ and Subclavian) No   CHG Skin Prep Used and Allowed to Air Dry Prior to Skin Procedure Yes   Procedure Checklist   Blood Aspirated From All Lumens, All Ports Subsequently Flushed Yes   Catheter Caps Placed On All Lumens; Lumens Clamped Yes   Maintain Guidewire Control Throughout, Ensuring Guidewire Removal Yes   Maintain Sterile Field Throughout Insertion Yes   Catheter Secured Yes   Confirmatory Test of Venous Placement Non-pulsatile blood   Post-Procedure Checklist   Date and Time Written on Dressing Yes   Sharp and Wire Count and Safe Disposal of all Sharps/Wires Yes   Sterile Dressing Applied Per Protocol Yes   X-ray Ordered or ECG Image Yes

## 2024-12-02 LAB
ABO GROUP (TYPE) IN BLOOD: NORMAL
ALBUMIN SERPL BCP-MCNC: 2.3 G/DL (ref 3.4–5)
ALBUMIN SERPL BCP-MCNC: 2.6 G/DL (ref 3.4–5)
ALP SERPL-CCNC: 384 U/L (ref 33–136)
ALT SERPL W P-5'-P-CCNC: 65 U/L (ref 7–45)
ANION GAP SERPL CALC-SCNC: 12 MMOL/L (ref 10–20)
ANION GAP SERPL CALC-SCNC: 13 MMOL/L (ref 10–20)
ANTIBODY SCREEN: NORMAL
AST SERPL W P-5'-P-CCNC: 82 U/L (ref 9–39)
ATRIAL RATE: 127 BPM
ATRIAL RATE: 75 BPM
BASOPHILS # BLD AUTO: 0.02 X10*3/UL (ref 0–0.1)
BASOPHILS NFR BLD AUTO: 0.2 %
BILIRUB DIRECT SERPL-MCNC: 0.3 MG/DL (ref 0–0.3)
BILIRUB SERPL-MCNC: 0.8 MG/DL (ref 0–1.2)
BUN SERPL-MCNC: 13 MG/DL (ref 6–23)
BUN SERPL-MCNC: 8 MG/DL (ref 6–23)
CALCIUM SERPL-MCNC: 7.2 MG/DL (ref 8.6–10.6)
CALCIUM SERPL-MCNC: 7.4 MG/DL (ref 8.6–10.6)
CHLORIDE SERPL-SCNC: 93 MMOL/L (ref 98–107)
CHLORIDE SERPL-SCNC: 94 MMOL/L (ref 98–107)
CO2 SERPL-SCNC: 28 MMOL/L (ref 21–32)
CO2 SERPL-SCNC: 28 MMOL/L (ref 21–32)
CREAT SERPL-MCNC: 0.94 MG/DL (ref 0.5–1.05)
CREAT SERPL-MCNC: 2.25 MG/DL (ref 0.5–1.05)
EGFRCR SERPLBLD CKD-EPI 2021: 24 ML/MIN/1.73M*2
EGFRCR SERPLBLD CKD-EPI 2021: 67 ML/MIN/1.73M*2
EOSINOPHIL # BLD AUTO: 0.18 X10*3/UL (ref 0–0.7)
EOSINOPHIL NFR BLD AUTO: 1.5 %
ERYTHROCYTE [DISTWIDTH] IN BLOOD BY AUTOMATED COUNT: 16.2 % (ref 11.5–14.5)
GLUCOSE BLD MANUAL STRIP-MCNC: 110 MG/DL (ref 74–99)
GLUCOSE BLD MANUAL STRIP-MCNC: 140 MG/DL (ref 74–99)
GLUCOSE BLD MANUAL STRIP-MCNC: 148 MG/DL (ref 74–99)
GLUCOSE BLD MANUAL STRIP-MCNC: 161 MG/DL (ref 74–99)
GLUCOSE BLD MANUAL STRIP-MCNC: 162 MG/DL (ref 74–99)
GLUCOSE BLD MANUAL STRIP-MCNC: 162 MG/DL (ref 74–99)
GLUCOSE BLD MANUAL STRIP-MCNC: 167 MG/DL (ref 74–99)
GLUCOSE SERPL-MCNC: 103 MG/DL (ref 74–99)
GLUCOSE SERPL-MCNC: 169 MG/DL (ref 74–99)
HCT VFR BLD AUTO: 24.1 % (ref 36–46)
HGB BLD-MCNC: 7.9 G/DL (ref 12–16)
IMM GRANULOCYTES # BLD AUTO: 0.09 X10*3/UL (ref 0–0.7)
IMM GRANULOCYTES NFR BLD AUTO: 0.7 % (ref 0–0.9)
LYMPHOCYTES # BLD AUTO: 1.08 X10*3/UL (ref 1.2–4.8)
LYMPHOCYTES NFR BLD AUTO: 8.8 %
MAGNESIUM SERPL-MCNC: 1.89 MG/DL (ref 1.6–2.4)
MCH RBC QN AUTO: 29.7 PG (ref 26–34)
MCHC RBC AUTO-ENTMCNC: 32.8 G/DL (ref 32–36)
MCV RBC AUTO: 91 FL (ref 80–100)
MONOCYTES # BLD AUTO: 0.65 X10*3/UL (ref 0.1–1)
MONOCYTES NFR BLD AUTO: 5.3 %
NEUTROPHILS # BLD AUTO: 10.24 X10*3/UL (ref 1.2–7.7)
NEUTROPHILS NFR BLD AUTO: 83.5 %
NRBC BLD-RTO: 0.7 /100 WBCS (ref 0–0)
P AXIS: 81 DEGREES
P OFFSET: 211 MS
P ONSET: 154 MS
PHOSPHATE SERPL-MCNC: 1.7 MG/DL (ref 2.5–4.9)
PHOSPHATE SERPL-MCNC: 2.6 MG/DL (ref 2.5–4.9)
PLATELET # BLD AUTO: 295 X10*3/UL (ref 150–450)
POTASSIUM SERPL-SCNC: 3.4 MMOL/L (ref 3.5–5.3)
POTASSIUM SERPL-SCNC: 3.7 MMOL/L (ref 3.5–5.3)
PR INTERVAL: 118 MS
PROT SERPL-MCNC: 5.3 G/DL (ref 6.4–8.2)
Q ONSET: 213 MS
Q ONSET: 221 MS
QRS COUNT: 13 BEATS
QRS COUNT: 16 BEATS
QRS DURATION: 114 MS
QRS DURATION: 94 MS
QT INTERVAL: 350 MS
QT INTERVAL: 432 MS
QTC CALCULATION(BAZETT): 453 MS
QTC CALCULATION(BAZETT): 482 MS
QTC FREDERICIA: 416 MS
QTC FREDERICIA: 465 MS
R AXIS: 7 DEGREES
R AXIS: 85 DEGREES
RBC # BLD AUTO: 2.66 X10*6/UL (ref 4–5.2)
RH FACTOR (ANTIGEN D): NORMAL
SODIUM SERPL-SCNC: 130 MMOL/L (ref 136–145)
SODIUM SERPL-SCNC: 131 MMOL/L (ref 136–145)
T AXIS: -34 DEGREES
T AXIS: 268 DEGREES
T OFFSET: 396 MS
T OFFSET: 429 MS
VANCOMYCIN SERPL-MCNC: 14 UG/ML (ref 5–20)
VENTRICULAR RATE: 101 BPM
VENTRICULAR RATE: 75 BPM
WBC # BLD AUTO: 12.3 X10*3/UL (ref 4.4–11.3)

## 2024-12-02 PROCEDURE — 99232 SBSQ HOSP IP/OBS MODERATE 35: CPT

## 2024-12-02 PROCEDURE — 83735 ASSAY OF MAGNESIUM: CPT

## 2024-12-02 PROCEDURE — 71045 X-RAY EXAM CHEST 1 VIEW: CPT | Performed by: RADIOLOGY

## 2024-12-02 PROCEDURE — 80069 RENAL FUNCTION PANEL: CPT | Mod: CCI

## 2024-12-02 PROCEDURE — 2500000004 HC RX 250 GENERAL PHARMACY W/ HCPCS (ALT 636 FOR OP/ED)

## 2024-12-02 PROCEDURE — 80202 ASSAY OF VANCOMYCIN: CPT

## 2024-12-02 PROCEDURE — 82248 BILIRUBIN DIRECT: CPT

## 2024-12-02 PROCEDURE — 82947 ASSAY GLUCOSE BLOOD QUANT: CPT

## 2024-12-02 PROCEDURE — 85025 COMPLETE CBC W/AUTO DIFF WBC: CPT

## 2024-12-02 PROCEDURE — 93010 ELECTROCARDIOGRAM REPORT: CPT | Performed by: INTERNAL MEDICINE

## 2024-12-02 PROCEDURE — 2500000001 HC RX 250 WO HCPCS SELF ADMINISTERED DRUGS (ALT 637 FOR MEDICARE OP)

## 2024-12-02 PROCEDURE — 37799 UNLISTED PX VASCULAR SURGERY: CPT

## 2024-12-02 PROCEDURE — 99291 CRITICAL CARE FIRST HOUR: CPT

## 2024-12-02 PROCEDURE — 84100 ASSAY OF PHOSPHORUS: CPT

## 2024-12-02 PROCEDURE — 94003 VENT MGMT INPAT SUBQ DAY: CPT

## 2024-12-02 PROCEDURE — 2500000002 HC RX 250 W HCPCS SELF ADMINISTERED DRUGS (ALT 637 FOR MEDICARE OP, ALT 636 FOR OP/ED)

## 2024-12-02 PROCEDURE — 86901 BLOOD TYPING SEROLOGIC RH(D): CPT

## 2024-12-02 PROCEDURE — 1100000001 HC PRIVATE ROOM DAILY

## 2024-12-02 PROCEDURE — 2500000005 HC RX 250 GENERAL PHARMACY W/O HCPCS

## 2024-12-02 RX ORDER — HEPARIN SODIUM 1000 [USP'U]/ML
INJECTION, SOLUTION INTRAVENOUS; SUBCUTANEOUS
Status: DISPENSED
Start: 2024-12-02 | End: 2024-12-02

## 2024-12-02 RX ORDER — FENTANYL CITRATE 50 UG/ML
25 INJECTION, SOLUTION INTRAMUSCULAR; INTRAVENOUS ONCE
Status: DISCONTINUED | OUTPATIENT
Start: 2024-12-02 | End: 2024-12-02

## 2024-12-02 RX ORDER — FENTANYL CITRATE 50 UG/ML
25 INJECTION, SOLUTION INTRAMUSCULAR; INTRAVENOUS EVERY 4 HOURS PRN
Status: DISCONTINUED | OUTPATIENT
Start: 2024-12-02 | End: 2024-12-18 | Stop reason: HOSPADM

## 2024-12-02 RX ADMIN — Medication 25 MCG/HR: at 07:11

## 2024-12-02 RX ADMIN — PANTOPRAZOLE SODIUM 40 MG: 40 INJECTION, POWDER, FOR SOLUTION INTRAVENOUS at 08:52

## 2024-12-02 RX ADMIN — INSULIN LISPRO 1 UNITS: 100 INJECTION, SOLUTION INTRAVENOUS; SUBCUTANEOUS at 00:14

## 2024-12-02 RX ADMIN — HEPARIN SODIUM 5000 UNITS: 5000 INJECTION, SOLUTION INTRAVENOUS; SUBCUTANEOUS at 00:01

## 2024-12-02 RX ADMIN — HEPARIN SODIUM 5000 UNITS: 5000 INJECTION, SOLUTION INTRAVENOUS; SUBCUTANEOUS at 16:22

## 2024-12-02 RX ADMIN — INSULIN LISPRO 1 UNITS: 100 INJECTION, SOLUTION INTRAVENOUS; SUBCUTANEOUS at 16:22

## 2024-12-02 RX ADMIN — HEPARIN SODIUM 5000 UNITS: 5000 INJECTION, SOLUTION INTRAVENOUS; SUBCUTANEOUS at 23:48

## 2024-12-02 RX ADMIN — PANTOPRAZOLE SODIUM 40 MG: 40 INJECTION, POWDER, FOR SOLUTION INTRAVENOUS at 20:30

## 2024-12-02 RX ADMIN — HEPARIN SODIUM 5000 UNITS: 5000 INJECTION, SOLUTION INTRAVENOUS; SUBCUTANEOUS at 08:52

## 2024-12-02 RX ADMIN — ATORVASTATIN CALCIUM 80 MG: 80 TABLET, FILM COATED ORAL at 20:30

## 2024-12-02 RX ADMIN — Medication 30 PERCENT: at 08:12

## 2024-12-02 RX ADMIN — ASPIRIN 81 MG CHEWABLE TABLET 81 MG: 81 TABLET CHEWABLE at 08:52

## 2024-12-02 RX ADMIN — THIAMINE HCL TAB 100 MG 100 MG: 100 TAB at 08:52

## 2024-12-02 RX ADMIN — INSULIN LISPRO 1 UNITS: 100 INJECTION, SOLUTION INTRAVENOUS; SUBCUTANEOUS at 20:30

## 2024-12-02 RX ADMIN — Medication 30 PERCENT: at 20:43

## 2024-12-02 RX ADMIN — INSULIN LISPRO 1 UNITS: 100 INJECTION, SOLUTION INTRAVENOUS; SUBCUTANEOUS at 23:49

## 2024-12-02 ASSESSMENT — PAIN - FUNCTIONAL ASSESSMENT
PAIN_FUNCTIONAL_ASSESSMENT: CPOT (CRITICAL CARE PAIN OBSERVATION TOOL)
PAIN_FUNCTIONAL_ASSESSMENT: 0-10
PAIN_FUNCTIONAL_ASSESSMENT: CPOT (CRITICAL CARE PAIN OBSERVATION TOOL)

## 2024-12-02 ASSESSMENT — PAIN SCALES - GENERAL
PAINLEVEL_OUTOF10: 0 - NO PAIN
PAINLEVEL_OUTOF10: 0 - NO PAIN

## 2024-12-02 NOTE — CARE PLAN
Problem: Skin  Goal: Participates in plan/prevention/treatment measures  Outcome: Progressing  Flowsheets (Taken 12/2/2024 0120)  Participates in plan/prevention/treatment measures: Elevate heels  Goal: Prevent/manage excess moisture  Outcome: Progressing  Flowsheets (Taken 12/2/2024 0120)  Prevent/manage excess moisture:   Cleanse incontinence/protect with barrier cream   Monitor for/manage infection if present   Follow provider orders for dressing changes   Moisturize dry skin  Goal: Prevent/minimize sheer/friction injuries  Outcome: Progressing  Flowsheets (Taken 12/2/2024 0120)  Prevent/minimize sheer/friction injuries:   Increase activity/out of bed for meals   Use pull sheet   Turn/reposition every 2 hours/use positioning/transfer devices     Problem: Safety - Medical Restraint  Goal: Remains free of injury from restraints (Restraint for Interference with Medical Device)  Outcome: Progressing  Flowsheets (Taken 12/2/2024 0120)  Remains free of injury from restraints (restraint for interference with medical device):   Determine that other, less restrictive measures have been tried or would not be effective before applying the restraint   Evaluate the patient's condition at the time of restraint application   Inform patient/family regarding the reason for restraint   Every 2 hours: Monitor safety, psychosocial status, comfort, nutrition and hydration  Goal: Free from restraint(s) (Restraint for Interference with Medical Device)  Outcome: Progressing  Flowsheets (Taken 12/2/2024 0120)  Free from restraint(s) (restraint for interference with medical device):   ONCE/SHIFT or MINIMUM Every 12 hours: Assess and document the continuing need for restraints   Every 24 hours: Continued use of restraint requires Licensed Independent Practitioner to perform face to face examination and written order   Identify and implement measures to help patient regain control

## 2024-12-02 NOTE — PROGRESS NOTES
Humaira Huang   64 joseoNga    @@  N/Room: 66796596/14/14-A admitted to CICU for Aortic dissection.    Subjective: off pressors, intubated at 30% Fio2    Meds:   aspirin, 81 mg, Daily  atorvastatin, 80 mg, Nightly  heparin, 5,000 Units, q8h  heparin, ,   insulin lispro, 0-5 Units, q4h  lactated Ringer's, 1,000 mL, Once  oxygen, , Continuous - Inhalation  pantoprazole, 40 mg, BID  potassium chloride, 40 mEq, Once  thiamine, 100 mg, Daily      fentaNYL, Last Rate: 50 mcg/hr (12/02/24 1300)  midazolam, Last Rate: 1 mg/hr (12/02/24 1300)  norepinephrine, Last Rate: Stopped (12/01/24 2345)      acetaminophen, 650 mg, q4h PRN   Or  acetaminophen, 650 mg, q4h PRN   Or  acetaminophen, 650 mg, q4h PRN  dextrose, 12.5 g, q15 min PRN  dextrose, 25 g, q15 min PRN  fentaNYL, 25 mcg, q4h PRN  glucagon, 1 mg, q15 min PRN  glucagon, 1 mg, q15 min PRN  heparin, ,   hydrOXYzine HCL, 10 mg, q6h PRN  ipratropium-albuteroL, 3 mL, q6h PRN  oxyCODONE, 2.5 mg, q4h PRN  oxygen, , Continuous PRN - O2/gases  polyethylene glycol, 17 g, Daily PRN  sennosides-docusate sodium, 1 tablet, Nightly PRN      OBJECTIVE:    Vitals:    12/02/24 1628   BP:    Pulse:    Resp:    Temp: 36.3 °C (97.3 °F)   SpO2:           Intake/Output Summary (Last 24 hours) at 12/2/2024 1659  Last data filed at 12/2/2024 1300  Gross per 24 hour   Intake 1054.32 ml   Output 1500 ml   Net -445.68 ml       General appearance: intubated  Eyes: non-icteric  Skin: no apparent rash  Heart: [id number]  Lungs: CTA bilat no wheezing/crackles  Abdomen: soft, nt/nd  Extremities: trace edema      Blood Labs:  Results for orders placed or performed during the hospital encounter of 11/18/24 (from the past 24 hours)   POCT GLUCOSE   Result Value Ref Range    POCT Glucose 220 (H) 74 - 99 mg/dL   POCT GLUCOSE   Result Value Ref Range    POCT Glucose 161 (H) 74 - 99 mg/dL   CBC and Auto Differential   Result Value Ref Range    WBC 12.3 (H) 4.4 - 11.3 x10*3/uL    nRBC 0.7 (H) 0.0 - 0.0 /100  WBCs    RBC 2.66 (L) 4.00 - 5.20 x10*6/uL    Hemoglobin 7.9 (L) 12.0 - 16.0 g/dL    Hematocrit 24.1 (L) 36.0 - 46.0 %    MCV 91 80 - 100 fL    MCH 29.7 26.0 - 34.0 pg    MCHC 32.8 32.0 - 36.0 g/dL    RDW 16.2 (H) 11.5 - 14.5 %    Platelets 295 150 - 450 x10*3/uL    Neutrophils % 83.5 40.0 - 80.0 %    Immature Granulocytes %, Automated 0.7 0.0 - 0.9 %    Lymphocytes % 8.8 13.0 - 44.0 %    Monocytes % 5.3 2.0 - 10.0 %    Eosinophils % 1.5 0.0 - 6.0 %    Basophils % 0.2 0.0 - 2.0 %    Neutrophils Absolute 10.24 (H) 1.20 - 7.70 x10*3/uL    Immature Granulocytes Absolute, Automated 0.09 0.00 - 0.70 x10*3/uL    Lymphocytes Absolute 1.08 (L) 1.20 - 4.80 x10*3/uL    Monocytes Absolute 0.65 0.10 - 1.00 x10*3/uL    Eosinophils Absolute 0.18 0.00 - 0.70 x10*3/uL    Basophils Absolute 0.02 0.00 - 0.10 x10*3/uL   Magnesium   Result Value Ref Range    Magnesium 1.89 1.60 - 2.40 mg/dL   Type and screen   Result Value Ref Range    ABO TYPE AB     Rh TYPE POS     ANTIBODY SCREEN NEG    Hepatic function panel   Result Value Ref Range    Albumin 2.6 (L) 3.4 - 5.0 g/dL    Bilirubin, Total 0.8 0.0 - 1.2 mg/dL    Bilirubin, Direct 0.3 0.0 - 0.3 mg/dL    Alkaline Phosphatase 384 (H) 33 - 136 U/L    ALT 65 (H) 7 - 45 U/L    AST 82 (H) 9 - 39 U/L    Total Protein 5.3 (L) 6.4 - 8.2 g/dL   Phosphorus   Result Value Ref Range    Phosphorus 1.7 (L) 2.5 - 4.9 mg/dL   Basic Metabolic Panel   Result Value Ref Range    Glucose 103 (H) 74 - 99 mg/dL    Sodium 131 (L) 136 - 145 mmol/L    Potassium 3.4 (L) 3.5 - 5.3 mmol/L    Chloride 93 (L) 98 - 107 mmol/L    Bicarbonate 28 21 - 32 mmol/L    Anion Gap 13 10 - 20 mmol/L    Urea Nitrogen 8 6 - 23 mg/dL    Creatinine 0.94 0.50 - 1.05 mg/dL    eGFR 67 >60 mL/min/1.73m*2    Calcium 7.4 (L) 8.6 - 10.6 mg/dL   POCT GLUCOSE   Result Value Ref Range    POCT Glucose 110 (H) 74 - 99 mg/dL   POCT GLUCOSE   Result Value Ref Range    POCT Glucose 140 (H) 74 - 99 mg/dL   POCT GLUCOSE   Result Value Ref Range     POCT Glucose 148 (H) 74 - 99 mg/dL   Electrocardiogram, 12-lead PRN ACS symptoms   Result Value Ref Range    Ventricular Rate 75 BPM    Atrial Rate 75 BPM    SC Interval 118 ms    QRS Duration 94 ms    QT Interval 432 ms    QTC Calculation(Bazett) 482 ms    P Axis 81 degrees    R Axis 7 degrees    T Axis -34 degrees    QRS Count 13 beats    Q Onset 213 ms    P Onset 154 ms    P Offset 211 ms    T Offset 429 ms    QTC Fredericia 465 ms   Vancomycin   Result Value Ref Range    Vancomycin 14.0 5.0 - 20.0 ug/mL   POCT GLUCOSE   Result Value Ref Range    POCT Glucose 162 (H) 74 - 99 mg/dL      US KUB 11/26  IMPRESSION:  1. Increased renal cortical echogenicity and lobular appearance of the kidneys bilaterally, likely medical renal disease, with relatively atrophic left kidney. No hydronephrosis.  2. Partially visualized bilateral pleural effusions and trace  abdominal ascites.        ASSESSMENT:  Humaira Huang is a  64 y.o.  Year old , with PMHx of  pancreatitis, DVT/PE, HLD, HTN, CABGx4 1997, T2DM, GERD, PAD, chronic mesenteric ischemia, tobacco use who presented to [facility] on 11/18/2024 as a transfer from [facility] with chest, back, and abdominal pain. 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 Vascular and Cardiac surgery following. CICU course c/b PEA arrest 11/20, now intubated. Nephrology following for TRACY.     #anuric TRACY-D Baseline creatinine 1.5   -Acidbase are acceptable  -Hemodynamics-not on any pressor support  -Etio :TRACY likely in setting recent hemodynamic insult with PEA.  - 300 ml in bladder scans 12/2    RECOMMENDATIONS:  - SLED overnight done  - will re-eval daily for HD   - informed about probable plan to comfort care  - bladder scan BID please  - Strict I/Os.  - No contrast or nephrotoxic drugs if possible.  - will follow      Elise Graham  MD  Nephrology Fellow   Daytime / Weekend Renal Pager [phone]  After 7 pm Emergencies [phone] Pager [phone]

## 2024-12-02 NOTE — PROGRESS NOTES
Occupational Therapy    Communication Note    Patient Name: Humaira Huang  MRN: [id number]  Today's Date: 12/2/2024   Room: 14/14-A    Discipline: Occupational Therapy      Missed Visit Reason:  (0848: per ID rounds, GOC discussion pending. OT tx deferred.)      12/02/24 at 8:49 AM   Michelle Marshall, OT   Rehab Office: [phone]

## 2024-12-02 NOTE — PROGRESS NOTES
Vancomycin Dosing by Pharmacy- Cessation of Therapy    Consult to pharmacy for vancomycin dosing has been discontinued by the prescriber, pharmacy will sign off at this time.    Please call pharmacy if there are further questions or re-enter a consult if vancomycin is resumed.     Chin Kapoor, [facility]

## 2024-12-02 NOTE — PROGRESS NOTES
Critical Care Daily Progress      Subjective   Briefly required Levophed overnight, now titrated back off. Patient remains intubated on sedation.     Objective     Vent Settings/Oxygen Drips   Vent Mode: Volume control/assist control  FiO2 (%):  [30 %] 30 %  S RR:  [14] 14  S VT:  [400 mL] 400 mL  PEEP/CPAP (cm H2O):  [5 cm H20] 5 cm H20  MAP (cm H2O):  [9-10] 10  fentaNYL,  mcg/hr, Last Rate: 50 mcg/hr (12/02/24 1100)  midazolam, 0.5-20 mg/hr, Last Rate: 1 mg/hr (12/02/24 1100)  norepinephrine, 0.01-1 mcg/kg/min, Last Rate: Stopped (12/01/24 2345)         Vitals: I/O:   Vitals:    12/02/24 1200   BP:    Pulse: 70   Resp: 14   Temp:    SpO2: 95%    Sinus Shmuel in 60s  MAPs 70-80 (110s/50s) off levo    24hr Min/Max:  Temp  Min: 36 °C (96.8 °F)  Max: 36.5 °C (97.7 °F)  Pulse  Min: 56  Max: 115  Resp  Min: 14  Max: 16  SpO2  Min: 93 %  Max: 100 %   Intake/Output Summary (Last 24 hours) at 12/2/2024 1313  Last data filed at 12/2/2024 1100  Gross per 24 hour   Intake 1312.82 ml   Output 1500 ml   Net -187.18 ml    No UO yesterday    Net IO Since Admission: 12,011.87 mL [12/02/24 1313]      Scheduled Medications:  PRN Medications:    aspirin, 81 mg, oral, Daily  atorvastatin, 80 mg, oral, Nightly  heparin, 5,000 Units, subcutaneous, q8h  heparin, , ,   insulin lispro, 0-5 Units, subcutaneous, q4h  lactated Ringer's, 1,000 mL, intravenous, Once  oxygen, , inhalation, Continuous - Inhalation  pantoprazole, 40 mg, intravenous, BID  potassium chloride, 40 mEq, intravenous, Once  thiamine, 100 mg, oral, Daily     PRN medications: acetaminophen **OR** acetaminophen **OR** acetaminophen, dextrose, dextrose, fentaNYL, glucagon, glucagon, heparin, hydrOXYzine HCL, ipratropium-albuteroL, oxyCODONE, oxygen, polyethylene glycol, sennosides-docusate sodium       Physical Exam:  Physical Exam  Constitutional:       Appearance: She is toxic-appearing.      Comments: Intubated, sedated on fentanyl; opens eyes to voice and squeezes  hand   Eyes:      General: No scleral icterus.  Cardiovascular:      Rate and Rhythm: Normal rate and regular rhythm.   Pulmonary:      Effort: No respiratory distress.      Breath sounds: Normal breath sounds. No wheezing or rhonchi.      Comments: ET tube in place  Abdominal:      General: There is no distension.      Palpations: Abdomen is soft.      Tenderness: There is no abdominal tenderness. There is no guarding.   Musculoskeletal:         General: No swelling or tenderness.   Skin:     General: Skin is warm and dry.      Findings: No bruising or rash.                LABS:    CBC RFP   Lab Results   Component Value Date    WBC 12.3 (H) 12/02/2024    WBC 11.3 12/01/2024    WBC 10.9 11/30/2024    HGB 7.9 (L) 12/02/2024    HGB 7.4 (L) 12/01/2024    HGB 7.1 (L) 11/30/2024    HCT 24.1 (L) 12/02/2024    MCV 91 12/02/2024     12/02/2024     12/01/2024     11/30/2024    NEUTROABS 10.24 (H) 12/02/2024     1 unit pRBC   Blood cultures pending Lab Results   Component Value Date     (L) 12/02/2024    K 3.4 (L) 12/02/2024    CL 93 (L) 12/02/2024    CO2 28 12/02/2024    BUN 8 12/02/2024    CREATININE 0.94 12/02/2024    CREATININE 3.23 (H) 12/01/2024     Lab Results   Component Value Date    MG 1.89 12/02/2024    PHOS 1.7 (L) 12/02/2024    CALCIUM 7.4 (L) 12/02/2024         Hepatic Function ABG/VBG   Lab Results   Component Value Date    ALT 65 (H) 12/02/2024    AST 82 (H) 12/02/2024    ALKPHOS 384 (H) 12/02/2024     Lab Results   Component Value Date    BILIDIR 0.3 12/02/2024      Lab Results   Component Value Date    PROTIME 17.9 (H) 11/29/2024    APTT 61 (H) 11/29/2024    INR 1.6 (H) 11/29/2024      Lab Results   Component Value Date    LACTATE 1.1 11/29/2024     ABG 7.49/27/97 L 1.2     No results found for the last 90 days.         IMAGING:  XR chest 1 view    Result Date: 12/1/2024  Interpreted By:  Gene Mendoza, STUDY: XR CHEST 1 VIEW;  12/1/2024 9:19 am   INDICATION:  Signs/Symptoms:intubation.     COMPARISON: Chest radiograph dated 11/28/2024 and chest abdomen pelvic CT dated 11/18/2024.   ACCESSION NUMBER(S): [id number]   ORDERING CLINICIAN: MARIKA CARO   FINDINGS: AP radiograph of the chest   Interval advancement of  endotracheal tube now terminating 5.2 cm above the nisha. Right IJ CVC tip overlies the atriocaval junction. The enteric tube extends below the level of the diaphragm and beyond the field of view. There is a left jugular triple-lumen hemodialysis catheter with its tip at the level of the atriocaval junction. There is a medium sternotomy and the cerclage wires appear intact. Surgical clips project at the level of the left cardiac border.   CARDIOMEDIASTINAL SILHOUETTE: Cardiomediastinal silhouette is stable in size and configuration.   LUNGS: Similar mildly prominent interstitial lung markings with Kerley B-lines peripherally in the mid perihilar vascular congestion. New left-greater-than-right hazy airspace opacities in the lungs. Mild blunting of bilateral costophrenic angles. No evidence of pneumothorax   ABDOMEN: No remarkable upper abdominal findings.   BONES: No acute osseous changes.       1.  Mildly prominent interstitial lung markings with Kerley B-lines peripherally in the mid perihilar vascular congestion consistent with pulmonary edema. 2. New left-greater-than-right hazy airspace opacities in the lungs, which may represent atelectasis/edema, superimposed infection can not be excluded. 3. Small bilateral pleural effusions. 4. Medical devices as detailed above.       MACRO: None   Signed by: Gene Mendoza 12/1/2024 10:01 AM Dictation workstation:   [id number]          Assessment/Plan    64 y.o. female with history of pancreatitis, DVT/PE, HLD, HTN, CABGx4 1997, T2DM, GERD, PAD, chronic mesenteric ischemia, tobacco use who presented to [facility] on 11/18/2024 as a transfer from [facility] [place] with chest, back, and abdominal pain.  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 Vascular and Cardiac surgery following. CICU course c/b PEA arrest 11/20, now intubated and sedated, following commands. GOC discussions with family ongoing.      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   -Vascular surgery discussed case at aortic conference and there are no plans for surgery, palliative measures recommended  -Holding heparin     #NSTEMI  #CAD s/p CABGx4 1997  #PAD  #HTN #DLD  ::EKG- NSR, rate 71, TWI aVL, V1-V6, 1mm ORQUIDEA in aVR  ::Trop peak 5700  ::Lipid HDL 41.5, , TG 75, Chol 157  -Holding home amlodipine 10mg, imdur 30mg, lisinopril 2.5mg, metop tartrate 50mg bid iso hypotension   -Continue ASA and atorvastatin  -Holding plavix 75mg    #HFrEF (45-50%, 11/19/24)     Pulm:  #Intubated  #PNA  ::CXR with opacification of LLL  ::s/p Zosyn 11/20 - 11/26 for PNA  :: s/p Vancomycin (discontinue today)   -Daily SBT  -Daily CXR  -family considering trach vs comfort care     GI:  #c/f UGIB, resolved  -PPI BID--> PO   -Holding therapeutic AC  -BID CBC  -Active T&S, consented  -monitor for melena  -tube feeds started--> on 20ml/hr    #Elevated LFTs, downtrending  ::likely 2/2 hypoxia and hypotension  -trend HFP     Renal:  #TRACY on CKD likely 2/2 arrest  #Anuric  -SLED per nephro  -Bladder scans qshift     Endo:    #T2DM  -Hold home metformin   -Mild SSI + hypoglycemia protocol     #Tube Feeds  ::at goal, 20 ml/hr  -f/up nutrition  -monitor for refeeding    Infectious  #sepsis concerns, low concern at this time  -f/up blood cultures  - No longer on antibiotics       F: 500 ml  E: K>4, Mag>2  N: TF  G: pantoprazole  A: PIV, OG, Aroldo  DVT: subcutaneous heparin, SCDs  CODE: DNR/okay to intubate (confirmed with family 11/21)  NOK: Daughter Josy [phone]      Andrzejalmamonty With   Internal Medicine- Pediatrics   PGY2

## 2024-12-03 ENCOUNTER — APPOINTMENT (OUTPATIENT)
Dept: RADIOLOGY | Facility: HOSPITAL | Age: 64
DRG: 004 | End: 2024-12-03
Payer: MEDICARE

## 2024-12-03 LAB
ALBUMIN SERPL BCP-MCNC: 2.5 G/DL (ref 3.4–5)
ANION GAP SERPL CALC-SCNC: 15 MMOL/L (ref 10–20)
ATRIAL RATE: 123 BPM
ATRIAL RATE: 294 BPM
ATRIAL RATE: 73 BPM
ATRIAL RATE: 81 BPM
ATRIAL RATE: 83 BPM
ATRIAL RATE: 91 BPM
BACTERIA BLD CULT: NORMAL
BASOPHILS # BLD AUTO: 0.02 X10*3/UL (ref 0–0.1)
BASOPHILS NFR BLD AUTO: 0.2 %
BUN SERPL-MCNC: 17 MG/DL (ref 6–23)
CALCIUM SERPL-MCNC: 7.2 MG/DL (ref 8.6–10.6)
CHLORIDE SERPL-SCNC: 93 MMOL/L (ref 98–107)
CO2 SERPL-SCNC: 26 MMOL/L (ref 21–32)
CREAT SERPL-MCNC: 2.6 MG/DL (ref 0.5–1.05)
EGFRCR SERPLBLD CKD-EPI 2021: 20 ML/MIN/1.73M*2
EOSINOPHIL # BLD AUTO: 0.14 X10*3/UL (ref 0–0.7)
EOSINOPHIL NFR BLD AUTO: 1.3 %
ERYTHROCYTE [DISTWIDTH] IN BLOOD BY AUTOMATED COUNT: 16.6 % (ref 11.5–14.5)
GLUCOSE BLD MANUAL STRIP-MCNC: 131 MG/DL (ref 74–99)
GLUCOSE BLD MANUAL STRIP-MCNC: 193 MG/DL (ref 74–99)
GLUCOSE BLD MANUAL STRIP-MCNC: 194 MG/DL (ref 74–99)
GLUCOSE BLD MANUAL STRIP-MCNC: 197 MG/DL (ref 74–99)
GLUCOSE BLD MANUAL STRIP-MCNC: 210 MG/DL (ref 74–99)
GLUCOSE BLD MANUAL STRIP-MCNC: 223 MG/DL (ref 74–99)
GLUCOSE SERPL-MCNC: 187 MG/DL (ref 74–99)
HCT VFR BLD AUTO: 21.9 % (ref 36–46)
HGB BLD-MCNC: 7.2 G/DL (ref 12–16)
IMM GRANULOCYTES # BLD AUTO: 0.08 X10*3/UL (ref 0–0.7)
IMM GRANULOCYTES NFR BLD AUTO: 0.8 % (ref 0–0.9)
LYMPHOCYTES # BLD AUTO: 0.9 X10*3/UL (ref 1.2–4.8)
LYMPHOCYTES NFR BLD AUTO: 8.5 %
MAGNESIUM SERPL-MCNC: 1.93 MG/DL (ref 1.6–2.4)
MCH RBC QN AUTO: 30.3 PG (ref 26–34)
MCHC RBC AUTO-ENTMCNC: 32.9 G/DL (ref 32–36)
MCV RBC AUTO: 92 FL (ref 80–100)
MONOCYTES # BLD AUTO: 1.01 X10*3/UL (ref 0.1–1)
MONOCYTES NFR BLD AUTO: 9.5 %
NEUTROPHILS # BLD AUTO: 8.44 X10*3/UL (ref 1.2–7.7)
NEUTROPHILS NFR BLD AUTO: 79.7 %
NRBC BLD-RTO: 0.6 /100 WBCS (ref 0–0)
P AXIS: 63 DEGREES
P AXIS: 68 DEGREES
P AXIS: 70 DEGREES
P AXIS: 80 DEGREES
P AXIS: 80 DEGREES
P OFFSET: 187 MS
P OFFSET: 195 MS
P OFFSET: 209 MS
P OFFSET: 212 MS
P OFFSET: 215 MS
P ONSET: 132 MS
P ONSET: 132 MS
P ONSET: 149 MS
P ONSET: 155 MS
P ONSET: 159 MS
PHOSPHATE SERPL-MCNC: 3 MG/DL (ref 2.5–4.9)
PLATELET # BLD AUTO: 274 X10*3/UL (ref 150–450)
POTASSIUM SERPL-SCNC: 3.7 MMOL/L (ref 3.5–5.3)
PR INTERVAL: 116 MS
PR INTERVAL: 118 MS
PR INTERVAL: 120 MS
PR INTERVAL: 136 MS
Q ONSET: 192 MS
Q ONSET: 214 MS
Q ONSET: 217 MS
Q ONSET: 217 MS
Q ONSET: 218 MS
Q ONSET: 222 MS
QRS COUNT: 13 BEATS
QRS COUNT: 14 BEATS
QRS COUNT: 15 BEATS
QRS COUNT: 21 BEATS
QRS COUNT: 24 BEATS
QRS COUNT: 25 BEATS
QRS DURATION: 132 MS
QRS DURATION: 74 MS
QRS DURATION: 80 MS
QRS DURATION: 90 MS
QT INTERVAL: 240 MS
QT INTERVAL: 352 MS
QT INTERVAL: 372 MS
QT INTERVAL: 400 MS
QT INTERVAL: 426 MS
QT INTERVAL: 432 MS
QTC CALCULATION(BAZETT): 375 MS
QTC CALCULATION(BAZETT): 492 MS
QTC CALCULATION(BAZETT): 494 MS
QTC CALCULATION(BAZETT): 507 MS
QTC CALCULATION(BAZETT): 532 MS
QTC CALCULATION(BAZETT): 537 MS
QTC FREDERICIA: 323 MS
QTC FREDERICIA: 460 MS
QTC FREDERICIA: 466 MS
QTC FREDERICIA: 470 MS
QTC FREDERICIA: 472 MS
QTC FREDERICIA: 481 MS
R AXIS: -75 DEGREES
R AXIS: 18 DEGREES
R AXIS: 34 DEGREES
R AXIS: 35 DEGREES
R AXIS: 37 DEGREES
R AXIS: 49 DEGREES
RBC # BLD AUTO: 2.38 X10*6/UL (ref 4–5.2)
SODIUM SERPL-SCNC: 130 MMOL/L (ref 136–145)
T AXIS: -66 DEGREES
T AXIS: 104 DEGREES
T AXIS: 118 DEGREES
T AXIS: 152 DEGREES
T AXIS: 233 DEGREES
T AXIS: 56 DEGREES
T OFFSET: 338 MS
T OFFSET: 378 MS
T OFFSET: 398 MS
T OFFSET: 417 MS
T OFFSET: 427 MS
T OFFSET: 433 MS
VENTRICULAR RATE: 123 BPM
VENTRICULAR RATE: 140 BPM
VENTRICULAR RATE: 147 BPM
VENTRICULAR RATE: 81 BPM
VENTRICULAR RATE: 83 BPM
VENTRICULAR RATE: 91 BPM
WBC # BLD AUTO: 10.6 X10*3/UL (ref 4.4–11.3)

## 2024-12-03 PROCEDURE — 99232 SBSQ HOSP IP/OBS MODERATE 35: CPT

## 2024-12-03 PROCEDURE — 82947 ASSAY GLUCOSE BLOOD QUANT: CPT

## 2024-12-03 PROCEDURE — 99291 CRITICAL CARE FIRST HOUR: CPT

## 2024-12-03 PROCEDURE — 2500000002 HC RX 250 W HCPCS SELF ADMINISTERED DRUGS (ALT 637 FOR MEDICARE OP, ALT 636 FOR OP/ED)

## 2024-12-03 PROCEDURE — 87081 CULTURE SCREEN ONLY: CPT

## 2024-12-03 PROCEDURE — 37799 UNLISTED PX VASCULAR SURGERY: CPT

## 2024-12-03 PROCEDURE — 85025 COMPLETE CBC W/AUTO DIFF WBC: CPT

## 2024-12-03 PROCEDURE — 1100000001 HC PRIVATE ROOM DAILY

## 2024-12-03 PROCEDURE — 2500000005 HC RX 250 GENERAL PHARMACY W/O HCPCS

## 2024-12-03 PROCEDURE — 94003 VENT MGMT INPAT SUBQ DAY: CPT

## 2024-12-03 PROCEDURE — 2500000004 HC RX 250 GENERAL PHARMACY W/ HCPCS (ALT 636 FOR OP/ED)

## 2024-12-03 PROCEDURE — 83735 ASSAY OF MAGNESIUM: CPT

## 2024-12-03 PROCEDURE — 71045 X-RAY EXAM CHEST 1 VIEW: CPT | Performed by: RADIOLOGY

## 2024-12-03 PROCEDURE — 71045 X-RAY EXAM CHEST 1 VIEW: CPT

## 2024-12-03 PROCEDURE — 84100 ASSAY OF PHOSPHORUS: CPT

## 2024-12-03 PROCEDURE — 2500000001 HC RX 250 WO HCPCS SELF ADMINISTERED DRUGS (ALT 637 FOR MEDICARE OP)

## 2024-12-03 RX ORDER — POTASSIUM CHLORIDE 1.5 G/1.58G
20 POWDER, FOR SOLUTION ORAL ONCE
Status: COMPLETED | OUTPATIENT
Start: 2024-12-03 | End: 2024-12-03

## 2024-12-03 RX ADMIN — POTASSIUM CHLORIDE 20 MEQ: 1.5 POWDER, FOR SOLUTION ORAL at 09:05

## 2024-12-03 RX ADMIN — PANTOPRAZOLE SODIUM 40 MG: 40 INJECTION, POWDER, FOR SOLUTION INTRAVENOUS at 20:14

## 2024-12-03 RX ADMIN — Medication 50 MCG/HR: at 03:07

## 2024-12-03 RX ADMIN — INSULIN LISPRO 1 UNITS: 100 INJECTION, SOLUTION INTRAVENOUS; SUBCUTANEOUS at 09:06

## 2024-12-03 RX ADMIN — PANTOPRAZOLE SODIUM 40 MG: 40 INJECTION, POWDER, FOR SOLUTION INTRAVENOUS at 09:05

## 2024-12-03 RX ADMIN — Medication 30 PERCENT: at 20:14

## 2024-12-03 RX ADMIN — HEPARIN SODIUM 5000 UNITS: 5000 INJECTION, SOLUTION INTRAVENOUS; SUBCUTANEOUS at 09:05

## 2024-12-03 RX ADMIN — Medication 30 PERCENT: at 09:00

## 2024-12-03 RX ADMIN — INSULIN LISPRO 1 UNITS: 100 INJECTION, SOLUTION INTRAVENOUS; SUBCUTANEOUS at 12:34

## 2024-12-03 RX ADMIN — Medication 50 MCG/HR: at 22:41

## 2024-12-03 RX ADMIN — INSULIN LISPRO 2 UNITS: 100 INJECTION, SOLUTION INTRAVENOUS; SUBCUTANEOUS at 20:16

## 2024-12-03 RX ADMIN — ATORVASTATIN CALCIUM 80 MG: 80 TABLET, FILM COATED ORAL at 20:14

## 2024-12-03 RX ADMIN — ASPIRIN 81 MG CHEWABLE TABLET 81 MG: 81 TABLET CHEWABLE at 09:05

## 2024-12-03 RX ADMIN — INSULIN LISPRO 1 UNITS: 100 INJECTION, SOLUTION INTRAVENOUS; SUBCUTANEOUS at 03:50

## 2024-12-03 RX ADMIN — HEPARIN SODIUM 5000 UNITS: 5000 INJECTION, SOLUTION INTRAVENOUS; SUBCUTANEOUS at 16:59

## 2024-12-03 RX ADMIN — THIAMINE HCL TAB 100 MG 100 MG: 100 TAB at 09:06

## 2024-12-03 ASSESSMENT — PAIN - FUNCTIONAL ASSESSMENT
PAIN_FUNCTIONAL_ASSESSMENT: CPOT (CRITICAL CARE PAIN OBSERVATION TOOL)

## 2024-12-03 NOTE — PROGRESS NOTES
Humaira Huang   64 kathleen    @@  N/Room: [id number]/14/14-A admitted to CICU for Aortic dissection.    Subjective: off pressors, intubated at 30% Fio2    Meds:   aspirin, 81 mg, Daily  atorvastatin, 80 mg, Nightly  heparin, 5,000 Units, q8h  insulin lispro, 0-5 Units, q4h  lactated Ringer's, 1,000 mL, Once  oxygen, , Continuous - Inhalation  pantoprazole, 40 mg, BID  potassium chloride, 40 mEq, Once  thiamine, 100 mg, Daily      fentaNYL, Last Rate: 25 mcg/hr (12/03/24 0900)  midazolam, Last Rate: 0.5 mg/hr (12/03/24 1039)  norepinephrine, Last Rate: Stopped (12/01/24 2345)      acetaminophen, 650 mg, q4h PRN   Or  acetaminophen, 650 mg, q4h PRN   Or  acetaminophen, 650 mg, q4h PRN  alteplase, 2 mg, PRN  dextrose, 12.5 g, q15 min PRN  dextrose, 25 g, q15 min PRN  fentaNYL, 25 mcg, q4h PRN  glucagon, 1 mg, q15 min PRN  glucagon, 1 mg, q15 min PRN  hydrOXYzine HCL, 10 mg, q6h PRN  ipratropium-albuteroL, 3 mL, q6h PRN  oxyCODONE, 2.5 mg, q4h PRN  oxygen, , Continuous PRN - O2/gases  polyethylene glycol, 17 g, Daily PRN  sennosides-docusate sodium, 1 tablet, Nightly PRN      OBJECTIVE:    Vitals:    12/03/24 1200   BP:    Pulse:    Resp:    Temp: 35.9 °C (96.6 °F)   SpO2:           Intake/Output Summary (Last 24 hours) at 12/3/2024 1211  Last data filed at 12/3/2024 0900  Gross per 24 hour   Intake 325.38 ml   Output --   Net 325.38 ml       General appearance: intubated  Eyes: non-icteric  Skin: no apparent rash  Heart: [id number]  Lungs: CTA bilat no wheezing/crackles  Abdomen: soft, nt/nd  Extremities: trace edema      Blood Labs:  Results for orders placed or performed during the hospital encounter of 11/18/24 (from the past 24 hours)   Electrocardiogram, 12-lead PRN ACS symptoms   Result Value Ref Range    Ventricular Rate 75 BPM    Atrial Rate 75 BPM    AL Interval 118 ms    QRS Duration 94 ms    QT Interval 432 ms    QTC Calculation(Bazett) 482 ms    P Axis 81 degrees    R Axis 7 degrees    T Axis -34 degrees     QRS Count 13 beats    Q Onset 213 ms    P Onset 154 ms    P Offset 211 ms    T Offset 429 ms    QTC Fredericia 465 ms   Vancomycin   Result Value Ref Range    Vancomycin 14.0 5.0 - 20.0 ug/mL   POCT GLUCOSE   Result Value Ref Range    POCT Glucose 162 (H) 74 - 99 mg/dL   POCT GLUCOSE   Result Value Ref Range    POCT Glucose 167 (H) 74 - 99 mg/dL   Renal function panel   Result Value Ref Range    Glucose 169 (H) 74 - 99 mg/dL    Sodium 130 (L) 136 - 145 mmol/L    Potassium 3.7 3.5 - 5.3 mmol/L    Chloride 94 (L) 98 - 107 mmol/L    Bicarbonate 28 21 - 32 mmol/L    Anion Gap 12 10 - 20 mmol/L    Urea Nitrogen 13 6 - 23 mg/dL    Creatinine 2.25 (H) 0.50 - 1.05 mg/dL    eGFR 24 (L) >60 mL/min/1.73m*2    Calcium 7.2 (L) 8.6 - 10.6 mg/dL    Phosphorus 2.6 2.5 - 4.9 mg/dL    Albumin 2.3 (L) 3.4 - 5.0 g/dL   POCT GLUCOSE   Result Value Ref Range    POCT Glucose 162 (H) 74 - 99 mg/dL   POCT GLUCOSE   Result Value Ref Range    POCT Glucose 194 (H) 74 - 99 mg/dL   CBC and Auto Differential   Result Value Ref Range    WBC 10.6 4.4 - 11.3 x10*3/uL    nRBC 0.6 (H) 0.0 - 0.0 /100 WBCs    RBC 2.38 (L) 4.00 - 5.20 x10*6/uL    Hemoglobin 7.2 (L) 12.0 - 16.0 g/dL    Hematocrit 21.9 (L) 36.0 - 46.0 %    MCV 92 80 - 100 fL    MCH 30.3 26.0 - 34.0 pg    MCHC 32.9 32.0 - 36.0 g/dL    RDW 16.6 (H) 11.5 - 14.5 %    Platelets 274 150 - 450 x10*3/uL    Neutrophils % 79.7 40.0 - 80.0 %    Immature Granulocytes %, Automated 0.8 0.0 - 0.9 %    Lymphocytes % 8.5 13.0 - 44.0 %    Monocytes % 9.5 2.0 - 10.0 %    Eosinophils % 1.3 0.0 - 6.0 %    Basophils % 0.2 0.0 - 2.0 %    Neutrophils Absolute 8.44 (H) 1.20 - 7.70 x10*3/uL    Immature Granulocytes Absolute, Automated 0.08 0.00 - 0.70 x10*3/uL    Lymphocytes Absolute 0.90 (L) 1.20 - 4.80 x10*3/uL    Monocytes Absolute 1.01 (H) 0.10 - 1.00 x10*3/uL    Eosinophils Absolute 0.14 0.00 - 0.70 x10*3/uL    Basophils Absolute 0.02 0.00 - 0.10 x10*3/uL   Renal function panel   Result Value Ref Range     Glucose 187 (H) 74 - 99 mg/dL    Sodium 130 (L) 136 - 145 mmol/L    Potassium 3.7 3.5 - 5.3 mmol/L    Chloride 93 (L) 98 - 107 mmol/L    Bicarbonate 26 21 - 32 mmol/L    Anion Gap 15 10 - 20 mmol/L    Urea Nitrogen 17 6 - 23 mg/dL    Creatinine 2.60 (H) 0.50 - 1.05 mg/dL    eGFR 20 (L) >60 mL/min/1.73m*2    Calcium 7.2 (L) 8.6 - 10.6 mg/dL    Phosphorus 3.0 2.5 - 4.9 mg/dL    Albumin 2.5 (L) 3.4 - 5.0 g/dL   Magnesium   Result Value Ref Range    Magnesium 1.93 1.60 - 2.40 mg/dL   POCT GLUCOSE   Result Value Ref Range    POCT Glucose 193 (H) 74 - 99 mg/dL   POCT GLUCOSE   Result Value Ref Range    POCT Glucose 197 (H) 74 - 99 mg/dL      US KUB 11/26  IMPRESSION:  1. Increased renal cortical echogenicity and lobular appearance of the kidneys bilaterally, likely medical renal disease, with relatively atrophic left kidney. No hydronephrosis.  2. Partially visualized bilateral pleural effusions and trace  abdominal ascites.        ASSESSMENT:  Humaira Huang is a  64 y.o.  Year old , with PMHx of  pancreatitis, DVT/PE, HLD, HTN, CABGx4 1997, T2DM, GERD, PAD, chronic mesenteric ischemia, tobacco use who presented to [facility] on 11/18/2024 as a transfer from [facility] with chest, back, and abdominal pain. 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 Vascular and Cardiac surgery following. CICU course c/b PEA arrest 11/20, now intubated. Nephrology following for TRACY.     #anuric TRACY-D Baseline creatinine 1.5   -Acidbase are acceptable  -Hemodynamics-not on any pressor support  -Etio :TRACY likely in setting recent hemodynamic insult with PEA.  - 300 ml in bladder scans 12/2  - last SLED 12/1 night    RECOMMENDATIONS:  - SLED overnight today  - will re-eval daily for HD   - probable plan to comfort care  - bladder scan BID please  - Strict I/Os.  - No contrast or nephrotoxic  drugs if possible.  - will follow      Elise Graham MD  Nephrology Fellow   Daytime / Weekend Renal Pager [phone]  After 7 pm Emergencies [phone] Pager [phone]

## 2024-12-03 NOTE — PROGRESS NOTES
Social Work Progress Note  - ICU TREATMENT PLAN: Patient was transferred to [facility] CICU from [facility] with chest, back, and abdominal pain. CICU course c/b PEA arrest 11/20. Patient remains intubated.  - Payer: [facility] Medicare   -Support System: Son   - Planned Disposition: Pending medical outcome. Rehab recommends Mod intensity.  - Barriers to discharge: None at this time.   MIGUEL ROCK

## 2024-12-03 NOTE — CONSULTS
Wound Care Consult     Visit Date: 12/3/2024      Patient Name: Humaira Huang         MRN: [id number]           YOB: 1960     Reason for Consult: coccyx DTI and Left heel DTPI        Wound History: first encounter with the patient      Pertinent Labs:   Albumin   Date Value Ref Range Status   12/03/2024 2.5 (L) 3.4 - 5.0 g/dL Final     Albumin, Urine Random   Date Value Ref Range Status   11/25/2024 426.2 Not established mg/L Final       Wound Assessment:  Wound 11/27/24 Pressure Injury Buttock (Active)   Wound Image   12/03/24 1008   Site Assessment Painful;Purple;Non-blanchable erythema 12/03/24 1008   Luda-Wound Assessment Fragile;Purple 12/03/24 1008   Non-staged Wound Description Not applicable 12/03/24 1008   Pressure Injury Stage DTPI 12/03/24 1008   Shape Round 12/03/24 1008   Wound Length (cm) 5.5 cm 12/03/24 1008   Wound Width (cm) 6 cm 12/03/24 1008   Wound Surface Area (cm^2) 33 cm^2 12/03/24 1008   Wound Depth (cm) 0 cm 12/03/24 1008   Wound Volume (cm^3) 0 cm^3 12/03/24 1008   Wound Healing % 100 12/03/24 1008   State of Healing Non-healing 12/03/24 1008   Margins Poorly defined 12/03/24 1008   Drainage Description None 12/03/24 1008   Drainage Amount None 12/03/24 1008   Dressing Non adherent;Silicone border dressing 12/03/24 1008   Dressing Changed New 12/03/24 1008   Dressing Status Clean;Dry 12/03/24 1008       Wound 12/03/24 Pressure Injury Heel Left (Active)   Wound Image   12/03/24 1001   Site Assessment Purple 12/03/24 1001   Luda-Wound Assessment Boggy;Non-blanchable erythema 12/03/24 1001   Non-staged Wound Description Not applicable 12/03/24 1001   Pressure Injury Stage DTPI 12/03/24 1001   Shape round 12/03/24 1001   Wound Length (cm) 7 cm 12/03/24 1001   Wound Width (cm) 4.5 cm 12/03/24 1001   Wound Surface Area (cm^2) 31.5 cm^2 12/03/24 1001   Wound Depth (cm) 0 cm 12/03/24 1001   Wound Volume (cm^3) 0 cm^3 12/03/24 1001   State of Healing Non-healing 12/03/24 1001   Margins  Attached edges 12/03/24 1001   Drainage Description None 12/03/24 1001   Drainage Amount None 12/03/24 1001   Dressing Silicone border dressing 12/03/24 1001   Dressing Changed New 12/03/24 1001   Dressing Status Clean;Dry 12/03/24 1001       Wound Team Summary Assessment: The wound care team came to bedside during monthly skin round to assess for pressure injuries.  The patient has a DTPI on her left heel that is deep purple and boggy.  The heel was dressed with a mepilex border dressing and placed in a heel boot.  The patient also has a DTPI on the coccyx that is deep purple and fragile.  The wound was cleansed with a bath wipe and dressed with a strip of adaptic and mepilex border dressing.     Wound Team Plan:   Recommendation: Daily     Coccyx: Cleanse the wound with vashe wound cleanser or normal saline and gently pat dry   Apply a strip of adaptic contact layer on the wound and cover with a mepilex border     Left heel: Cleanse the heel with a bath wipe and leave open to air   Place the patient foot in a daniella boot to offload the heel     Continue to turn every 2 hours  Utilize a repositioning sheet, wedges, and pillow to offload bony prominences  Add an air mattress to the bed     DAWOOD Torre  12/3/2024  12:33 PM

## 2024-12-03 NOTE — PROGRESS NOTES
Critical Care Daily Progress      Subjective   Patient remains intubated on sedation.     NAEO    Objective     Vent Settings/Oxygen Drips   Vent Mode: Volume control/assist control  FiO2 (%):  [30 %] 30 %  S RR:  [14] 14  S VT:  [400 mL] 400 mL  PEEP/CPAP (cm H2O):  [5 cm H20] 5 cm H20  MAP (cm H2O):  [9-12] 9  fentaNYL,  mcg/hr, Last Rate: 50 mcg/hr (12/03/24 0307)  midazolam, 0.5-20 mg/hr, Last Rate: 1 mg/hr (12/02/24 1900)  norepinephrine, 0.01-1 mcg/kg/min, Last Rate: Stopped (12/01/24 2345)         Vitals: I/O:   Vitals:    12/03/24 0435   BP:    Pulse:    Resp: 14   Temp:    SpO2:    Tachycardic 100-120, NSR  MAPs 70-80 (110s/50s) off levo    24hr Min/Max:  Temp  Min: 35.8 °C (96.4 °F)  Max: 36.4 °C (97.5 °F)  Pulse  Min: 68  Max: 122  Resp  Min: 11  Max: 19  SpO2  Min: 89 %  Max: 98 %   Intake/Output Summary (Last 24 hours) at 12/3/2024 0821  Last data filed at 12/3/2024 0307  Gross per 24 hour   Intake 286.58 ml   Output --   Net 286.58 ml    No UO yesterday    Net IO Since Admission: 12,260.45 mL [12/03/24 0821]      Scheduled Medications:  PRN Medications:    aspirin, 81 mg, oral, Daily  atorvastatin, 80 mg, oral, Nightly  heparin, 5,000 Units, subcutaneous, q8h  insulin lispro, 0-5 Units, subcutaneous, q4h  lactated Ringer's, 1,000 mL, intravenous, Once  oxygen, , inhalation, Continuous - Inhalation  pantoprazole, 40 mg, intravenous, BID  potassium chloride, 40 mEq, intravenous, Once  thiamine, 100 mg, oral, Daily     PRN medications: acetaminophen **OR** acetaminophen **OR** acetaminophen, alteplase, dextrose, dextrose, fentaNYL, glucagon, glucagon, hydrOXYzine HCL, ipratropium-albuteroL, oxyCODONE, oxygen, polyethylene glycol, sennosides-docusate sodium       Physical Exam:  Physical Exam  Constitutional:       Appearance: She is toxic-appearing.      Comments: Intubated, sedated on fentanyl; opens eyes to voice and squeezes hand   Eyes:      General: No scleral icterus.  Cardiovascular:       Rate and Rhythm: Normal rate and regular rhythm.   Pulmonary:      Effort: No respiratory distress.      Breath sounds: Normal breath sounds. No wheezing or rhonchi.      Comments: ET tube in place  Abdominal:      General: There is no distension.      Palpations: Abdomen is soft.      Tenderness: There is no abdominal tenderness. There is no guarding.   Musculoskeletal:         General: No swelling or tenderness.   Skin:     General: Skin is warm and dry.      Findings: No bruising or rash.              LABS:    CBC RFP   Lab Results   Component Value Date    WBC 10.6 12/03/2024    WBC 12.3 (H) 12/02/2024    WBC 11.3 12/01/2024    HGB 7.2 (L) 12/03/2024    HGB 7.9 (L) 12/02/2024    HGB 7.4 (L) 12/01/2024    HCT 21.9 (L) 12/03/2024    MCV 92 12/03/2024     12/03/2024     12/02/2024     12/01/2024    NEUTROABS 8.44 (H) 12/03/2024      Lab Results   Component Value Date     (L) 12/03/2024    K 3.7 12/03/2024    CL 93 (L) 12/03/2024    CO2 26 12/03/2024    BUN 17 12/03/2024    CREATININE 2.60 (H) 12/03/2024    CREATININE 2.25 (H) 12/02/2024     Lab Results   Component Value Date    MG 1.93 12/03/2024    PHOS 3.0 12/03/2024    CALCIUM 7.2 (L) 12/03/2024         Hepatic Function ABG/VBG   Lab Results   Component Value Date    ALT 65 (H) 12/02/2024    AST 82 (H) 12/02/2024    ALKPHOS 384 (H) 12/02/2024     Lab Results   Component Value Date    BILIDIR 0.3 12/02/2024      Lab Results   Component Value Date    PROTIME 17.9 (H) 11/29/2024    APTT 61 (H) 11/29/2024    INR 1.6 (H) 11/29/2024      Lab Results   Component Value Date    LACTATE 1.1 11/29/2024        No results found for the last 90 days.         IMAGING:  CXR 12/3 similar        Assessment/Plan    64 y.o. female with history of pancreatitis, DVT/PE, HLD, HTN, CABGx4 1997, T2DM, GERD, PAD, chronic mesenteric ischemia, tobacco use who presented to [facility] on [facility]/18/[facility] as a transfer from [facility] with  chest, back, and abdominal pain. 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 Vascular and Cardiac surgery following. CICU course c/b PEA arrest 11/20, now intubated and sedated, following commands. GOC discussions with family ongoing.      12/3 Updates:  - Nephro on board for SLED   - Pending GOC discussion with family today: family must make decision today as it is no longer safe to keep patient intubated    Neuro:  #Sedated  -fentanyl, held for daily SBTs  -opening eyes, following commands     Cardiac:  #Aflutter/Afib  ::now sinus tachycardia  -amio stopped  -Holding heparin    #PEA arrest 11/20  ::likely hypoxia driven: PNA vs aspiration    #Aortic dissection  #Mural Thrombus  #Distal aortic arch saccular aneurysm   ::CTA CAP 11/18- 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   -Vascular surgery discussed case at aortic conference and there are no plans for surgery, palliative measures recommended  -Holding heparin     #NSTEMI  #CAD s/p CABGx4 1997  #PAD  #HTN #DLD  ::EKG- NSR, rate 71, TWI aVL, V1-V6, 1mm ORQUIDEA in aVR  ::Trop peak 5700  ::Lipid HDL 41.5, , TG 75, Chol 157  -Holding home amlodipine 10mg, imdur 30mg, lisinopril 2.5mg, metop tartrate 50mg bid iso hypotension   -Continue ASA and atorvastatin  -Holding plavix 75mg    #HFrEF (45-50%, 11/19/24)     Pulm:  #Intubated  #PNA  ::CXR with opacification of LLL  ::s/p Zosyn 11/20 - 11/26 for PNA  :: s/p Vancomycin (discontinue today)   -Daily SBT  -Daily CXR  -family considering trach vs comfort care     GI:  #c/f UGIB, resolved  -PPI BID--> PO   -Holding therapeutic AC  -BID CBC  -Active T&S, consented  -monitor for melena  -tube feeds --> on 20ml/hr    #Elevated LFTs, downtrending  ::likely 2/2 hypoxia and hypotension  -trend  HFP     Renal:  #TRACY on CKD likely 2/2 arrest  #Anuric  -SLED per nephro  -Bladder scans qshift     Endo:   #T2DM  -Hold home metformin   -Mild SSI + hypoglycemia protocol     #Tube Feeds  ::at goal, 20 ml/hr  -f/up nutrition  -monitor for refeeding    Infectious  DEMOND      F: 500 ml  E: K>4, Mag>2  N: TF  G: pantoprazole  A: PIV, OG, Aroldo  DVT: subcutaneous heparin, SCDs  CODE: DNR/okay to intubate (confirmed with family 11/21)  NOK: Daughter Josy [phone]      Aster Gallegos   Internal Medicine- Pediatrics   PGY2

## 2024-12-03 NOTE — PROGRESS NOTES
"Nutrition Follow Up Assessment:   Nutrition Assessment       Pt has been receiving Nepro @ 20ml/hr. Ongoing family discussion pending [facility].     Anthropometrics:  Height: 157.5 cm (5' 2\")   Weight: 58.6 kg (129 lb 3 oz)   BMI (Calculated): 23.62  IBW/kg (Dietitian Calculated): 50 kg  Percent of IBW: 110 %       Weight History:   Date/Time Weight   12/03/24 1110 58.6 kg (129 lb 3 oz)   12/03/24 0600 58.6 kg (129 lb 3 oz)   12/03/24 0000 58.6 kg (129 lb 3.2 oz)   12/02/24 0110 62.3 kg (137 lb 5.6 oz)   12/01/24 1700 59.6 kg (131 lb 8 oz)   12/01/24 0000 60.9 kg (134 lb 3.2 oz)   11/30/24 0456 60.5 kg (133 lb 6.1 oz)   11/29/24 0559 60.6 kg (133 lb 9.6 oz)   11/28/24 2312 60.6 kg (133 lb 9.6 oz)   11/26/24 0000 56.2 kg (123 lb 14.4 oz)   11/25/24 0600 55.1 kg (121 lb 7.6 oz)   11/25/24 0220 55.1 kg (121 lb 7.6 oz)   11/24/24 0400 55.1 kg (121 lb 7.6 oz)   11/23/24 0423 53.9 kg (118 lb 13.3 oz)   11/22/24 0600 53.6 kg (118 lb 2.7 oz)   11/20/24 1419 51.6 kg (113 lb 11.2 oz)   11/20/24 0600 51.6 kg (113 lb 11.2 oz)   11/19/24 1455 49.2 kg (108 lb 7.5 oz)   11/19/24 0000 49.2 kg (108 lb 7.5 o     Weight increasing d/t fluid    Nutrition Significant Labs:  CBC Trend:   Results from last 7 days   Lab Units 12/03/24  0451 12/02/24  0314 12/01/24  0013 11/30/24  0444   WBC AUTO x10*3/uL 10.6 12.3* 11.3 10.9   RBC AUTO x10*6/uL 2.38* 2.66* 2.43* 2.37*   HEMOGLOBIN g/dL 7.2* 7.9* 7.4* 7.1*   HEMATOCRIT % 21.9* 24.1* 21.4* 20.9*   MCV fL 92 91 88 88   PLATELETS AUTO x10*3/uL 274 295 315 284    , BMP Trend:   Results from last 7 days   Lab Units 12/03/24  0451 12/02/24  2042 12/02/24  0314 12/01/24  0014   GLUCOSE mg/dL 187* 169* 103* 166*   CALCIUM mg/dL 7.2* 7.2* 7.4* 6.9*   SODIUM mmol/L 130* 130* 131* 129*   POTASSIUM mmol/L 3.7 3.7 3.4* 4.2   CO2 mmol/L 26 28 28 23   CHLORIDE mmol/L 93* 94* 93* 94*   BUN mg/dL 17 13 8 30*   CREATININE mg/dL 2.60* 2.25* 0.94 3.23*    , Renal Lab Trend:   Results from last 7 days   Lab Units " 12/03/24  0451 12/02/24  2042 12/02/24  0314 12/01/24  0014   POTASSIUM mmol/L 3.7 3.7 3.4* 4.2   PHOSPHORUS mg/dL 3.0 2.6 1.7* 3.3   SODIUM mmol/L 130* 130* 131* 129*   MAGNESIUM mg/dL 1.93  --  1.89 2.46*   EGFR mL/min/1.73m*2 20* 24* 67 15*   BUN mg/dL 17 13 8 30*   CREATININE mg/dL 2.60* 2.25* 0.94 3.23*        Nutrition Specific Medications:  Scheduled medications  aspirin, 81 mg, oral, Daily  atorvastatin, 80 mg, oral, Nightly  heparin, 5,000 Units, subcutaneous, q8h  insulin lispro, 0-5 Units, subcutaneous, q4h  lactated Ringer's, 1,000 mL, intravenous, Once  oxygen, , inhalation, Continuous - Inhalation  pantoprazole, 40 mg, intravenous, BID  potassium chloride, 40 mEq, intravenous, Once  thiamine, 100 mg, oral, Daily      Continuous medications  fentaNYL,  mcg/hr, Last Rate: 50 mcg/hr (12/03/24 1500)  midazolam, 0.5-20 mg/hr, Last Rate: 0.5 mg/hr (12/03/24 1500)  norepinephrine, 0.01-1 mcg/kg/min, Last Rate: 0.02 mcg/kg/min (12/03/24 1515)      PRN medications  PRN medications: acetaminophen **OR** acetaminophen **OR** acetaminophen, alteplase, dextrose, dextrose, fentaNYL, glucagon, glucagon, hydrOXYzine HCL, ipratropium-albuteroL, oxyCODONE, oxygen, polyethylene glycol, sennosides-docusate sodium      I/O:   Last BM Date: 12/02/24; Stool Appearance: Loose, Liquid (12/02/24 2000)    Dietary Orders (From admission, onward)       Start     Ordered    11/30/24 1656  Enteral feeding with NPO Nepro; OG (orogastic tube); 20; 150; Water; Every 4 hours  Diet effective now        Comments: Start at 10 ml/hr and increase to 20 ml/hr if tolerating   Question Answer Comment   Tube feeding formula: Nepro    Tube feeding additive: Pro-Stat AWC    Tube feeding additive frequency: Twice a day    Feeding route: OG (orogastic tube)    Tube feeding continuous rate (mL/hr): 20    Tube feeding flush (mL): 150    Flush type: Water    Flush frequency: Every 4 hours        11/30/24 2017                     Estimated Needs:    Total Energy Estimated Needs (kCal): 1051 kCal  Method for Estimating Needs: PSU REE  Total Protein Estimated Needs (g): 59 g  Method for Estimating Needs: 1.2g/kg + x admit wt  Total Fluid Estimated Needs (mL):  (per MD/team)  Method for Estimating Needs: per MD/team        Nutrition Diagnosis   Malnutrition Diagnosis  Patient has Malnutrition Diagnosis: Yes  Diagnosis Status: Ongoing  Malnutrition Diagnosis: Severe malnutrition related to acute disease or injury  As Evidenced by: < 50% estimated energy needs for >/= 5 days, mild- moderate subcutaneous fat loss + muscle wasting            Nutrition Interventions/Recommendations         Nutrition Prescription:  Individualized Nutrition Prescription Provided for : enteral nutrition        Nutrition Interventions:   Interventions: Enteral intake  Change to Two Kevin now that lytes are stable   Two Kevin HN @ 20ml/hr + 1pkt Prostat AWC   FWF per MD/team     Two Kevin HN @ 20 + 1pkt Prostat AWC = 1060kcal, 57gm protein, and 336ml free water     Nutrition Monitoring and Evaluation   Food/Nutrient Related History Monitoring  Monitoring and Evaluation Plan: Enteral and parenteral nutrition intake  Enteral and Parenteral Nutrition Intake: Enteral nutrition intake  Criteria: Tolerate TF @ goal    Body Composition/Growth/Weight History  Monitoring and Evaluation Plan: Weight, Weight change  Criteria: minimze loss    Biochemical Data, Medical Tests and Procedures  Monitoring and Evaluation Plan: Electrolyte/renal panel, Glucose/endocrine profile  Criteria: labs WNL              Time Spent (min): 60 minutes

## 2024-12-03 NOTE — CARE PLAN
Problem: Pain - Adult  Goal: Verbalizes/displays adequate comfort level or baseline comfort level  Outcome: Progressing     Problem: Safety - Adult  Goal: Free from fall injury  Outcome: Progressing     Problem: Discharge Planning  Goal: Discharge to home or other facility with appropriate resources  Outcome: Progressing     Problem: Chronic Conditions and Co-morbidities  Goal: Patient's chronic conditions and co-morbidity symptoms are monitored and maintained or improved  Outcome: Progressing     Problem: Skin  Goal: Participates in plan/prevention/treatment measures  Outcome: Progressing  Goal: Prevent/manage excess moisture  Outcome: Progressing  Goal: Prevent/minimize sheer/friction injuries  Outcome: Progressing     Problem: Safety - Medical Restraint  Goal: Remains free of injury from restraints (Restraint for Interference with Medical Device)  Outcome: Progressing  Goal: Free from restraint(s) (Restraint for Interference with Medical Device)  Outcome: Progressing     Problem: Knowledge Deficit  Goal: Patient/family/caregiver demonstrates understanding of disease process, treatment plan, medications, and discharge instructions  Outcome: Progressing     Problem: Mechanical Ventilation  Goal: Patient Will Maintain Patent Airway  Outcome: Progressing  Goal: Oral health is maintained or improved  Outcome: Progressing  Goal: Tracheostomy will be managed safely  Outcome: Progressing  Goal: ET tube will be managed safely  Outcome: Progressing  Goal: Ability to express needs and understand communication  Outcome: Progressing  Goal: Mobility/activity is maintained at optimum level for patient  Outcome: Progressing

## 2024-12-04 ENCOUNTER — APPOINTMENT (OUTPATIENT)
Dept: RADIOLOGY | Facility: HOSPITAL | Age: 64
DRG: 004 | End: 2024-12-04
Payer: MEDICARE

## 2024-12-04 LAB
ALBUMIN SERPL BCP-MCNC: 2.4 G/DL (ref 3.4–5)
ALP SERPL-CCNC: 282 U/L (ref 33–136)
ALT SERPL W P-5'-P-CCNC: 40 U/L (ref 7–45)
ANION GAP SERPL CALC-SCNC: 14 MMOL/L (ref 10–20)
AST SERPL W P-5'-P-CCNC: 42 U/L (ref 9–39)
BASOPHILS # BLD AUTO: 0.04 X10*3/UL (ref 0–0.1)
BASOPHILS NFR BLD AUTO: 0.5 %
BILIRUB DIRECT SERPL-MCNC: 0.2 MG/DL (ref 0–0.3)
BILIRUB SERPL-MCNC: 0.7 MG/DL (ref 0–1.2)
BUN SERPL-MCNC: 26 MG/DL (ref 6–23)
CALCIUM SERPL-MCNC: 7.1 MG/DL (ref 8.6–10.6)
CHLORIDE SERPL-SCNC: 95 MMOL/L (ref 98–107)
CO2 SERPL-SCNC: 25 MMOL/L (ref 21–32)
CREAT SERPL-MCNC: 4.07 MG/DL (ref 0.5–1.05)
EGFRCR SERPLBLD CKD-EPI 2021: 12 ML/MIN/1.73M*2
EOSINOPHIL # BLD AUTO: 0.16 X10*3/UL (ref 0–0.7)
EOSINOPHIL NFR BLD AUTO: 1.8 %
ERYTHROCYTE [DISTWIDTH] IN BLOOD BY AUTOMATED COUNT: 16.9 % (ref 11.5–14.5)
GLUCOSE BLD MANUAL STRIP-MCNC: 182 MG/DL (ref 74–99)
GLUCOSE BLD MANUAL STRIP-MCNC: 186 MG/DL (ref 74–99)
GLUCOSE BLD MANUAL STRIP-MCNC: 186 MG/DL (ref 74–99)
GLUCOSE BLD MANUAL STRIP-MCNC: 189 MG/DL (ref 74–99)
GLUCOSE BLD MANUAL STRIP-MCNC: 196 MG/DL (ref 74–99)
GLUCOSE BLD MANUAL STRIP-MCNC: 209 MG/DL (ref 74–99)
GLUCOSE SERPL-MCNC: 207 MG/DL (ref 74–99)
HCT VFR BLD AUTO: 21.9 % (ref 36–46)
HGB BLD-MCNC: 7.1 G/DL (ref 12–16)
IMM GRANULOCYTES # BLD AUTO: 0.04 X10*3/UL (ref 0–0.7)
IMM GRANULOCYTES NFR BLD AUTO: 0.5 % (ref 0–0.9)
LYMPHOCYTES # BLD AUTO: 0.86 X10*3/UL (ref 1.2–4.8)
LYMPHOCYTES NFR BLD AUTO: 9.8 %
MAGNESIUM SERPL-MCNC: 2.03 MG/DL (ref 1.6–2.4)
MCH RBC QN AUTO: 30.2 PG (ref 26–34)
MCHC RBC AUTO-ENTMCNC: 32.4 G/DL (ref 32–36)
MCV RBC AUTO: 93 FL (ref 80–100)
MONOCYTES # BLD AUTO: 1.03 X10*3/UL (ref 0.1–1)
MONOCYTES NFR BLD AUTO: 11.8 %
NEUTROPHILS # BLD AUTO: 6.62 X10*3/UL (ref 1.2–7.7)
NEUTROPHILS NFR BLD AUTO: 75.6 %
NRBC BLD-RTO: 0.5 /100 WBCS (ref 0–0)
PHOSPHATE SERPL-MCNC: 2.6 MG/DL (ref 2.5–4.9)
PLATELET # BLD AUTO: 278 X10*3/UL (ref 150–450)
POTASSIUM SERPL-SCNC: 4.2 MMOL/L (ref 3.5–5.3)
PROT SERPL-MCNC: 5 G/DL (ref 6.4–8.2)
RBC # BLD AUTO: 2.35 X10*6/UL (ref 4–5.2)
SODIUM SERPL-SCNC: 130 MMOL/L (ref 136–145)
STAPHYLOCOCCUS SPEC CULT: ABNORMAL
WBC # BLD AUTO: 8.8 X10*3/UL (ref 4.4–11.3)

## 2024-12-04 PROCEDURE — 2500000004 HC RX 250 GENERAL PHARMACY W/ HCPCS (ALT 636 FOR OP/ED): Mod: JZ

## 2024-12-04 PROCEDURE — 99291 CRITICAL CARE FIRST HOUR: CPT

## 2024-12-04 PROCEDURE — 2500000002 HC RX 250 W HCPCS SELF ADMINISTERED DRUGS (ALT 637 FOR MEDICARE OP, ALT 636 FOR OP/ED)

## 2024-12-04 PROCEDURE — 37799 UNLISTED PX VASCULAR SURGERY: CPT

## 2024-12-04 PROCEDURE — 2500000004 HC RX 250 GENERAL PHARMACY W/ HCPCS (ALT 636 FOR OP/ED)

## 2024-12-04 PROCEDURE — 80048 BASIC METABOLIC PNL TOTAL CA: CPT

## 2024-12-04 PROCEDURE — 84100 ASSAY OF PHOSPHORUS: CPT

## 2024-12-04 PROCEDURE — 99233 SBSQ HOSP IP/OBS HIGH 50: CPT

## 2024-12-04 PROCEDURE — 82947 ASSAY GLUCOSE BLOOD QUANT: CPT

## 2024-12-04 PROCEDURE — 82248 BILIRUBIN DIRECT: CPT

## 2024-12-04 PROCEDURE — 83735 ASSAY OF MAGNESIUM: CPT

## 2024-12-04 PROCEDURE — 2500000005 HC RX 250 GENERAL PHARMACY W/O HCPCS

## 2024-12-04 PROCEDURE — 94003 VENT MGMT INPAT SUBQ DAY: CPT

## 2024-12-04 PROCEDURE — 85025 COMPLETE CBC W/AUTO DIFF WBC: CPT

## 2024-12-04 PROCEDURE — 71045 X-RAY EXAM CHEST 1 VIEW: CPT

## 2024-12-04 PROCEDURE — 2500000001 HC RX 250 WO HCPCS SELF ADMINISTERED DRUGS (ALT 637 FOR MEDICARE OP)

## 2024-12-04 PROCEDURE — 71045 X-RAY EXAM CHEST 1 VIEW: CPT | Performed by: RADIOLOGY

## 2024-12-04 PROCEDURE — 1100000001 HC PRIVATE ROOM DAILY

## 2024-12-04 RX ADMIN — HEPARIN SODIUM 5000 UNITS: 5000 INJECTION, SOLUTION INTRAVENOUS; SUBCUTANEOUS at 00:14

## 2024-12-04 RX ADMIN — INSULIN LISPRO 2 UNITS: 100 INJECTION, SOLUTION INTRAVENOUS; SUBCUTANEOUS at 04:02

## 2024-12-04 RX ADMIN — ASPIRIN 81 MG CHEWABLE TABLET 81 MG: 81 TABLET CHEWABLE at 08:55

## 2024-12-04 RX ADMIN — Medication 30 PERCENT: at 08:00

## 2024-12-04 RX ADMIN — Medication 50 MCG/HR: at 15:10

## 2024-12-04 RX ADMIN — INSULIN LISPRO 1 UNITS: 100 INJECTION, SOLUTION INTRAVENOUS; SUBCUTANEOUS at 16:47

## 2024-12-04 RX ADMIN — ATORVASTATIN CALCIUM 80 MG: 80 TABLET, FILM COATED ORAL at 20:11

## 2024-12-04 RX ADMIN — PANTOPRAZOLE SODIUM 40 MG: 40 INJECTION, POWDER, FOR SOLUTION INTRAVENOUS at 20:11

## 2024-12-04 RX ADMIN — HEPARIN SODIUM 5000 UNITS: 5000 INJECTION, SOLUTION INTRAVENOUS; SUBCUTANEOUS at 08:55

## 2024-12-04 RX ADMIN — INSULIN LISPRO 1 UNITS: 100 INJECTION, SOLUTION INTRAVENOUS; SUBCUTANEOUS at 23:54

## 2024-12-04 RX ADMIN — ALTEPLASE 2 MG: 2.2 INJECTION, POWDER, LYOPHILIZED, FOR SOLUTION INTRAVENOUS at 10:11

## 2024-12-04 RX ADMIN — INSULIN LISPRO 1 UNITS: 100 INJECTION, SOLUTION INTRAVENOUS; SUBCUTANEOUS at 20:19

## 2024-12-04 RX ADMIN — PANTOPRAZOLE SODIUM 40 MG: 40 INJECTION, POWDER, FOR SOLUTION INTRAVENOUS at 08:55

## 2024-12-04 RX ADMIN — HEPARIN SODIUM 5000 UNITS: 5000 INJECTION, SOLUTION INTRAVENOUS; SUBCUTANEOUS at 16:39

## 2024-12-04 RX ADMIN — THIAMINE HCL TAB 100 MG 100 MG: 100 TAB at 08:55

## 2024-12-04 RX ADMIN — INSULIN LISPRO 1 UNITS: 100 INJECTION, SOLUTION INTRAVENOUS; SUBCUTANEOUS at 12:03

## 2024-12-04 RX ADMIN — INSULIN LISPRO 2 UNITS: 100 INJECTION, SOLUTION INTRAVENOUS; SUBCUTANEOUS at 00:14

## 2024-12-04 RX ADMIN — INSULIN LISPRO 1 UNITS: 100 INJECTION, SOLUTION INTRAVENOUS; SUBCUTANEOUS at 08:56

## 2024-12-04 ASSESSMENT — COGNITIVE AND FUNCTIONAL STATUS - GENERAL
DRESSING REGULAR UPPER BODY CLOTHING: TOTAL
MOVING TO AND FROM BED TO CHAIR: TOTAL
DRESSING REGULAR LOWER BODY CLOTHING: TOTAL
CLIMB 3 TO 5 STEPS WITH RAILING: TOTAL
TOILETING: TOTAL
MOVING FROM LYING ON BACK TO SITTING ON SIDE OF FLAT BED WITH BEDRAILS: TOTAL
WALKING IN HOSPITAL ROOM: TOTAL
STANDING UP FROM CHAIR USING ARMS: TOTAL
PERSONAL GROOMING: TOTAL
MOBILITY SCORE: 6
TURNING FROM BACK TO SIDE WHILE IN FLAT BAD: TOTAL
HELP NEEDED FOR BATHING: TOTAL

## 2024-12-04 ASSESSMENT — PAIN - FUNCTIONAL ASSESSMENT
PAIN_FUNCTIONAL_ASSESSMENT: CPOT (CRITICAL CARE PAIN OBSERVATION TOOL)

## 2024-12-04 NOTE — PROGRESS NOTES
Humaira Huang   64 [id number]    @@  N/Room: 57581603/14/14-A admitted to CICU for Aortic dissection.    Subjective: off pressors, intubated at 30% Fio2    Meds:   aspirin, 81 mg, Daily  atorvastatin, 80 mg, Nightly  heparin, 5,000 Units, q8h  insulin lispro, 0-5 Units, q4h  lactated Ringer's, 1,000 mL, Once  oxygen, , Continuous - Inhalation  pantoprazole, 40 mg, BID  potassium chloride, 40 mEq, Once  thiamine, 100 mg, Daily      fentaNYL, Last Rate: 50 mcg/hr (12/03/24 2241)  midazolam, Last Rate: 0.5 mg/hr (12/03/24 1900)  norepinephrine, Last Rate: Stopped (12/03/24 1745)      acetaminophen, 650 mg, q4h PRN   Or  acetaminophen, 650 mg, q4h PRN   Or  acetaminophen, 650 mg, q4h PRN  alteplase, 2 mg, PRN  dextrose, 12.5 g, q15 min PRN  dextrose, 25 g, q15 min PRN  fentaNYL, 25 mcg, q4h PRN  glucagon, 1 mg, q15 min PRN  glucagon, 1 mg, q15 min PRN  hydrOXYzine HCL, 10 mg, q6h PRN  ipratropium-albuteroL, 3 mL, q6h PRN  oxyCODONE, 2.5 mg, q4h PRN  oxygen, , Continuous PRN - O2/gases  polyethylene glycol, 17 g, Daily PRN  sennosides-docusate sodium, 1 tablet, Nightly PRN      OBJECTIVE:    Vitals:    12/04/24 0600   BP:    Pulse: 75   Resp: 12   Temp:    SpO2:           Intake/Output Summary (Last 24 hours) at 12/4/2024 0630  Last data filed at 12/4/2024 0600  Gross per 24 hour   Intake 1695.74 ml   Output --   Net 1695.74 ml       General appearance: intubated  Eyes: non-icteric  Skin: no apparent rash  Heart: [id number]  Lungs: CTA bilat no wheezing/crackles  Abdomen: soft, nt/nd  Extremities: trace edema      Blood Labs:  Results for orders placed or performed during the hospital encounter of 11/18/24 (from the past 24 hours)   POCT GLUCOSE   Result Value Ref Range    POCT Glucose 193 (H) 74 - 99 mg/dL   POCT GLUCOSE   Result Value Ref Range    POCT Glucose 197 (H) 74 - 99 mg/dL   POCT GLUCOSE   Result Value Ref Range    POCT Glucose 131 (H) 74 - 99 mg/dL   POCT GLUCOSE   Result Value Ref Range    POCT Glucose 223  (H) 74 - 99 mg/dL   POCT GLUCOSE   Result Value Ref Range    POCT Glucose 210 (H) 74 - 99 mg/dL   POCT GLUCOSE   Result Value Ref Range    POCT Glucose 209 (H) 74 - 99 mg/dL   CBC and Auto Differential   Result Value Ref Range    WBC 8.8 4.4 - 11.3 x10*3/uL    nRBC 0.5 (H) 0.0 - 0.0 /100 WBCs    RBC 2.35 (L) 4.00 - 5.20 x10*6/uL    Hemoglobin 7.1 (L) 12.0 - 16.0 g/dL    Hematocrit 21.9 (L) 36.0 - 46.0 %    MCV 93 80 - 100 fL    MCH 30.2 26.0 - 34.0 pg    MCHC 32.4 32.0 - 36.0 g/dL    RDW 16.9 (H) 11.5 - 14.5 %    Platelets 278 150 - 450 x10*3/uL    Neutrophils % 75.6 40.0 - 80.0 %    Immature Granulocytes %, Automated 0.5 0.0 - 0.9 %    Lymphocytes % 9.8 13.0 - 44.0 %    Monocytes % 11.8 2.0 - 10.0 %    Eosinophils % 1.8 0.0 - 6.0 %    Basophils % 0.5 0.0 - 2.0 %    Neutrophils Absolute 6.62 1.20 - 7.70 x10*3/uL    Immature Granulocytes Absolute, Automated 0.04 0.00 - 0.70 x10*3/uL    Lymphocytes Absolute 0.86 (L) 1.20 - 4.80 x10*3/uL    Monocytes Absolute 1.03 (H) 0.10 - 1.00 x10*3/uL    Eosinophils Absolute 0.16 0.00 - 0.70 x10*3/uL    Basophils Absolute 0.04 0.00 - 0.10 x10*3/uL   Magnesium   Result Value Ref Range    Magnesium 2.03 1.60 - 2.40 mg/dL   Hepatic Function Panel   Result Value Ref Range    Albumin 2.4 (L) 3.4 - 5.0 g/dL    Bilirubin, Total 0.7 0.0 - 1.2 mg/dL    Bilirubin, Direct 0.2 0.0 - 0.3 mg/dL    Alkaline Phosphatase 282 (H) 33 - 136 U/L    ALT 40 7 - 45 U/L    AST 42 (H) 9 - 39 U/L    Total Protein 5.0 (L) 6.4 - 8.2 g/dL   Phosphorus   Result Value Ref Range    Phosphorus 2.6 2.5 - 4.9 mg/dL   Basic Metabolic Panel   Result Value Ref Range    Glucose 207 (H) 74 - 99 mg/dL    Sodium 130 (L) 136 - 145 mmol/L    Potassium 4.2 3.5 - 5.3 mmol/L    Chloride 95 (L) 98 - 107 mmol/L    Bicarbonate 25 21 - 32 mmol/L    Anion Gap 14 10 - 20 mmol/L    Urea Nitrogen 26 (H) 6 - 23 mg/dL    Creatinine 4.07 (H) 0.50 - 1.05 mg/dL    eGFR 12 (L) >60 mL/min/1.73m*2    Calcium 7.1 (L) 8.6 - 10.6 mg/dL      US KUB  11/26  IMPRESSION:  1. Increased renal cortical echogenicity and lobular appearance of the kidneys bilaterally, likely medical renal disease, with relatively atrophic left kidney. No hydronephrosis.  2. Partially visualized bilateral pleural effusions and trace  abdominal ascites.        ASSESSMENT:  Humaira Huang is a  64 y.o.  Year old , with PMHx of  pancreatitis, DVT/PE, HLD, HTN, CABGx4 1997, T2DM, GERD, PAD, chronic mesenteric ischemia, tobacco use who presented to [facility] on 11/18/2024 as a transfer from [facility] with chest, back, and abdominal pain. 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 Vascular and Cardiac surgery following. CICU course c/b PEA arrest 11/20, now intubated. Nephrology following for TRACY.     #anuric TRACY-D Baseline creatinine 1.5   - Hemodynamics-not on any pressor support  - Etio :TRACY likely in setting recent hemodynamic insult with PEA.  - 300 ml in bladder scans 12/3  - last SLED 12/1 night    RECOMMENDATIONS:  - SLED not done last night as machines were not available and was down for cleaning as reported, so plan for SLED today.    - probable plan to comfort care  - bladder scan BID please  - Strict I/Os.  - No contrast or nephrotoxic drugs if possible.  - will follow      Elise Graham MD  Nephrology Fellow   Daytime / Weekend Renal Pager [phone]  After 7 pm Emergencies [phone] Pager [phone]

## 2024-12-04 NOTE — PROGRESS NOTES
Subjective   No events overnight. She was unable to get SLED overnight because her machine was being cleaned. Family to come by today.     Meds  Scheduled medications  aspirin, 81 mg, oral, Daily  atorvastatin, 80 mg, oral, Nightly  heparin, 5,000 Units, subcutaneous, q8h  insulin lispro, 0-5 Units, subcutaneous, q4h  lactated Ringer's, 1,000 mL, intravenous, Once  oxygen, , inhalation, Continuous - Inhalation  pantoprazole, 40 mg, intravenous, BID  potassium chloride, 40 mEq, intravenous, Once  thiamine, 100 mg, oral, Daily      Continuous medications  fentaNYL,  mcg/hr, Last Rate: 50 mcg/hr (12/03/24 2241)  midazolam, 0.5-20 mg/hr, Last Rate: 0.5 mg/hr (12/03/24 1900)  norepinephrine, 0.01-1 mcg/kg/min, Last Rate: Stopped (12/03/24 1745)      PRN medications  PRN medications: acetaminophen **OR** acetaminophen **OR** acetaminophen, alteplase, dextrose, dextrose, fentaNYL, glucagon, glucagon, hydrOXYzine HCL, ipratropium-albuteroL, oxyCODONE, oxygen, polyethylene glycol, sennosides-docusate sodium      Objective   Vital signs in last 24 hours:  Temp:  [35.8 °C (96.4 °F)-36.5 °C (97.7 °F)] 36.2 °C (97.2 °F)  Heart Rate:  [] 75  Resp:  [12-17] 12  Arterial Line BP 1: ()/() 112/56  FiO2 (%):  [30 %] 30 %    Intake/Output this shift:    Intake/Output Summary (Last 24 hours) at 12/4/2024 0630  Last data filed at 12/4/2024 0600  Gross per 24 hour   Intake 1695.74 ml   Output --   Net 1695.74 ml       Physical  General: Patient alerts to voice, normal body habitus  Pulm: Normal WOB at rest, no crackles or rhonchi with transmitted upper airway noises  Cardiac: Regular rate and rhythm, normal S1/S2  Abdomen: Non-tender to palpation, non-distended  Extremities: No peripheral edema,  Neuro: Patient alert  to voice, follows simple commands    Results  Results for orders placed or performed during the hospital encounter of 11/18/24 (from the past 24 hours)   POCT GLUCOSE   Result Value Ref Range    POCT  Glucose 193 (H) 74 - 99 mg/dL   POCT GLUCOSE   Result Value Ref Range    POCT Glucose 197 (H) 74 - 99 mg/dL   POCT GLUCOSE   Result Value Ref Range    POCT Glucose 131 (H) 74 - 99 mg/dL   POCT GLUCOSE   Result Value Ref Range    POCT Glucose 223 (H) 74 - 99 mg/dL   POCT GLUCOSE   Result Value Ref Range    POCT Glucose 210 (H) 74 - 99 mg/dL   POCT GLUCOSE   Result Value Ref Range    POCT Glucose 209 (H) 74 - 99 mg/dL   CBC and Auto Differential   Result Value Ref Range    WBC 8.8 4.4 - 11.3 x10*3/uL    nRBC 0.5 (H) 0.0 - 0.0 /100 WBCs    RBC 2.35 (L) 4.00 - 5.20 x10*6/uL    Hemoglobin 7.1 (L) 12.0 - 16.0 g/dL    Hematocrit 21.9 (L) 36.0 - 46.0 %    MCV 93 80 - 100 fL    MCH 30.2 26.0 - 34.0 pg    MCHC 32.4 32.0 - 36.0 g/dL    RDW 16.9 (H) 11.5 - 14.5 %    Platelets 278 150 - 450 x10*3/uL    Neutrophils % 75.6 40.0 - 80.0 %    Immature Granulocytes %, Automated 0.5 0.0 - 0.9 %    Lymphocytes % 9.8 13.0 - 44.0 %    Monocytes % 11.8 2.0 - 10.0 %    Eosinophils % 1.8 0.0 - 6.0 %    Basophils % 0.5 0.0 - 2.0 %    Neutrophils Absolute 6.62 1.20 - 7.70 x10*3/uL    Immature Granulocytes Absolute, Automated 0.04 0.00 - 0.70 x10*3/uL    Lymphocytes Absolute 0.86 (L) 1.20 - 4.80 x10*3/uL    Monocytes Absolute 1.03 (H) 0.10 - 1.00 x10*3/uL    Eosinophils Absolute 0.16 0.00 - 0.70 x10*3/uL    Basophils Absolute 0.04 0.00 - 0.10 x10*3/uL   Magnesium   Result Value Ref Range    Magnesium 2.03 1.60 - 2.40 mg/dL   Hepatic Function Panel   Result Value Ref Range    Albumin 2.4 (L) 3.4 - 5.0 g/dL    Bilirubin, Total 0.7 0.0 - 1.2 mg/dL    Bilirubin, Direct 0.2 0.0 - 0.3 mg/dL    Alkaline Phosphatase 282 (H) 33 - 136 U/L    ALT 40 7 - 45 U/L    AST 42 (H) 9 - 39 U/L    Total Protein 5.0 (L) 6.4 - 8.2 g/dL   Phosphorus   Result Value Ref Range    Phosphorus 2.6 2.5 - 4.9 mg/dL   Basic Metabolic Panel   Result Value Ref Range    Glucose 207 (H) 74 - 99 mg/dL    Sodium 130 (L) 136 - 145 mmol/L    Potassium 4.2 3.5 - 5.3 mmol/L    Chloride  95 (L) 98 - 107 mmol/L    Bicarbonate 25 21 - 32 mmol/L    Anion Gap 14 10 - 20 mmol/L    Urea Nitrogen 26 (H) 6 - 23 mg/dL    Creatinine 4.07 (H) 0.50 - 1.05 mg/dL    eGFR 12 (L) >60 mL/min/1.73m*2    Calcium 7.1 (L) 8.6 - 10.6 mg/dL       Imaging  XR chest 1 view    Result Date: 12/2/2024  Interpreted By:  Doug Langley, STUDY: XR CHEST 1 VIEW; 12/2/2024 6:39 am   INDICATION: Signs/Symptoms:intubated.   COMPARISON: 12/01/2024.   ACCESSION NUMBER(S): [id number]   ORDERING CLINICIAN: MARIKA CARO   FINDINGS: Endotracheal tube in satisfactory position.   CARDIOMEDIASTINAL SILHOUETTE: Patient is status post median sternotomy.   LUNGS: Slight interval increase in left perihilar edema/effusions. No pneumothorax.   ABDOMEN: NG tube overlies the body of stomach.   BONES: No acute osseous changes.       1.  Slight interval increase in left-sided edema/effusions. No pneumothorax.     Signed by: Doug Langley 12/2/2024 5:51 PM Dictation workstation:   [id number]    Electrocardiogram, 12-lead PRN ACS symptoms    Result Date: 12/2/2024  Normal sinus rhythm Low voltage QRS Nonspecific ST abnormality Prolonged QT Abnormal ECG When compared with ECG of 27-NOV-2024 21:42, Sinus rhythm has replaced Atrial fibrillation Questionable change in QRS duration Minimal criteria for Anterior infarct are no longer Present        Assessment/Plan   Principal Problem:    Dissection of aorta    64 y.o. female with history of pancreatitis, DVT/PE, HLD, HTN, CABGx4 1997, T2DM, GERD, PAD, chronic mesenteric ischemia, tobacco use who presented to [facility] on 11/18/[facility] as a transfer from [facility] with chest, back, and abdominal pain. 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 Vascular and Cardiac surgery following. CICU course c/b PEA arrest 11/20, now intubated and sedated, following  commands. GOC discussions with family ongoing.      12/4 Updates:  -Still pending SLED  -Palliative care consult today  -Discussion with family today about goals of care (trach/PEG vs comfort only)  -Can consider taking out art-line tomorrow if stays off pressors  -Hold off on restarting Plavix until GOC clarified     Neuro:  #Sedated  -fentanyl, held for daily SBTs  -opening eyes, following commands     Cardiac:  #Aflutter/Afib  ::now sinus tachycardia  -amio stopped  -Holding heparin  -Heart rates stable     #PEA arrest 11/20  ::likely hypoxia driven: PNA vs aspiration     #Aortic dissection  #Mural Thrombus  #Distal aortic arch saccular aneurysm   ::CTA CAP 11/18- 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   -Vascular surgery discussed case at aortic conference and there are no plans for surgery, palliative measures recommended  -Holding heparin      #NSTEMI  #CAD s/p CABGx4 1997  #PAD  #HTN #DLD  ::EKG- NSR, rate 71, TWI aVL, V1-V6, 1mm ORQUIDEA in aVR  ::Trop peak 5700  ::Lipid HDL 41.5, , TG 75, Chol 157  -Holding home amlodipine 10mg, imdur 30mg, lisinopril 2.5mg, metop tartrate 50mg bid iso hypotension   -Continue ASA and atorvastatin  -Holding plavix 75mg     #HFrEF (45-50%, 11/19/24)  -Not an GDMT at the moment     Pulm:  #Intubated  #PNA  ::CXR with opacification of LLL  ::s/p Zosyn 11/20 - 11/26 for PNA  :: s/p Vancomycin (discontinue today)   -Failed extubation on 11/28  -Daily SBT  -Daily CXR  -family considering trach vs comfort care     GI:  #Ischemic hepatitis secondary to PEA arrest, improving  ::likely 2/2 hypoxia and hypotension  -LFTs downtrending     Renal:  #TRACY on CKD likely 2/2 arrest  #Anuric  -SLED per nephro  -Bladder scans qshift     Endo:   #T2DM  -Hold home metformin   -Mild SSI + hypoglycemia protocol      #Tube Feeds  ::at goal, 20 ml/hr  -f/up nutrition  -No refeeding noted      Infectious  DEMOND     F: None  E: K>4, Mag>2  N: TF  G: pantoprazole  A: PIV, OG, Aroldo  DVT: subcutaneous heparin, SCDs  CODE: DNR/okay to intubate (confirmed with family 11/21)  NOK: Anais Ferris [phone]       LOS: 16 days     Jaime Mckeon MD/PhD   PGY-2

## 2024-12-05 ENCOUNTER — APPOINTMENT (OUTPATIENT)
Dept: RADIOLOGY | Facility: HOSPITAL | Age: 64
DRG: 004 | End: 2024-12-05
Payer: MEDICARE

## 2024-12-05 PROBLEM — N18.6 ESRD ON HEMODIALYSIS (MULTI): Status: ACTIVE | Noted: 2024-12-05

## 2024-12-05 PROBLEM — Z99.2 ESRD ON HEMODIALYSIS (MULTI): Status: ACTIVE | Noted: 2024-12-05

## 2024-12-05 LAB
ABO GROUP (TYPE) IN BLOOD: NORMAL
ALBUMIN SERPL BCP-MCNC: 2.2 G/DL (ref 3.4–5)
ALBUMIN SERPL BCP-MCNC: 2.5 G/DL (ref 3.4–5)
ALBUMIN SERPL BCP-MCNC: 3.3 G/DL (ref 3.4–5)
ANION GAP BLDA CALCULATED.4IONS-SCNC: 12 MMO/L (ref 10–25)
ANION GAP BLDA CALCULATED.4IONS-SCNC: 9 MMO/L (ref 10–25)
ANION GAP BLDMV CALCULATED.4IONS-SCNC: 10 MMO/L (ref 10–25)
ANION GAP SERPL CALC-SCNC: 10 MMOL/L (ref 10–20)
ANION GAP SERPL CALC-SCNC: 17 MMOL/L (ref 10–20)
ANION GAP SERPL CALC-SCNC: 18 MMOL/L (ref 10–20)
ANTIBODY SCREEN: NORMAL
ATRIAL RATE: 214 BPM
BASE EXCESS BLDA CALC-SCNC: -2.6 MMOL/L (ref -2–3)
BASE EXCESS BLDA CALC-SCNC: 1.2 MMOL/L (ref -2–3)
BASE EXCESS BLDMV CALC-SCNC: -2.2 MMOL/L (ref -2–3)
BASOPHILS # BLD AUTO: 0.04 X10*3/UL (ref 0–0.1)
BASOPHILS NFR BLD AUTO: 0.4 %
BODY TEMPERATURE: 37 DEGREES CELSIUS
BUN SERPL-MCNC: 10 MG/DL (ref 6–23)
BUN SERPL-MCNC: 36 MG/DL (ref 6–23)
BUN SERPL-MCNC: 36 MG/DL (ref 6–23)
CA-I BLDA-SCNC: 1.09 MMOL/L (ref 1.1–1.33)
CA-I BLDA-SCNC: 1.11 MMOL/L (ref 1.1–1.33)
CA-I BLDMV-SCNC: 1.16 MMOL/L (ref 1.1–1.33)
CALCIUM SERPL-MCNC: 7.1 MG/DL (ref 8.6–10.6)
CALCIUM SERPL-MCNC: 8 MG/DL (ref 8.6–10.6)
CALCIUM SERPL-MCNC: 8.4 MG/DL (ref 8.6–10.6)
CHLORIDE BLD-SCNC: 95 MMOL/L (ref 98–107)
CHLORIDE BLDA-SCNC: 96 MMOL/L (ref 98–107)
CHLORIDE BLDA-SCNC: 97 MMOL/L (ref 98–107)
CHLORIDE SERPL-SCNC: 94 MMOL/L (ref 98–107)
CHLORIDE SERPL-SCNC: 95 MMOL/L (ref 98–107)
CHLORIDE SERPL-SCNC: 98 MMOL/L (ref 98–107)
CO2 SERPL-SCNC: 22 MMOL/L (ref 21–32)
CO2 SERPL-SCNC: 25 MMOL/L (ref 21–32)
CO2 SERPL-SCNC: 29 MMOL/L (ref 21–32)
CREAT SERPL-MCNC: 1.69 MG/DL (ref 0.5–1.05)
CREAT SERPL-MCNC: 5.14 MG/DL (ref 0.5–1.05)
CREAT SERPL-MCNC: 5.57 MG/DL (ref 0.5–1.05)
EGFRCR SERPLBLD CKD-EPI 2021: 33 ML/MIN/1.73M*2
EGFRCR SERPLBLD CKD-EPI 2021: 8 ML/MIN/1.73M*2
EGFRCR SERPLBLD CKD-EPI 2021: 9 ML/MIN/1.73M*2
EOSINOPHIL # BLD AUTO: 0.13 X10*3/UL (ref 0–0.7)
EOSINOPHIL NFR BLD AUTO: 1.2 %
ERYTHROCYTE [DISTWIDTH] IN BLOOD BY AUTOMATED COUNT: 17.1 % (ref 11.5–14.5)
GLUCOSE BLD MANUAL STRIP-MCNC: 140 MG/DL (ref 74–99)
GLUCOSE BLD MANUAL STRIP-MCNC: 189 MG/DL (ref 74–99)
GLUCOSE BLD MANUAL STRIP-MCNC: 192 MG/DL (ref 74–99)
GLUCOSE BLD MANUAL STRIP-MCNC: 192 MG/DL (ref 74–99)
GLUCOSE BLD MANUAL STRIP-MCNC: 194 MG/DL (ref 74–99)
GLUCOSE BLD MANUAL STRIP-MCNC: 219 MG/DL (ref 74–99)
GLUCOSE BLD-MCNC: 217 MG/DL (ref 74–99)
GLUCOSE BLDA-MCNC: 202 MG/DL (ref 74–99)
GLUCOSE BLDA-MCNC: 212 MG/DL (ref 74–99)
GLUCOSE SERPL-MCNC: 132 MG/DL (ref 74–99)
GLUCOSE SERPL-MCNC: 189 MG/DL (ref 74–99)
GLUCOSE SERPL-MCNC: 225 MG/DL (ref 74–99)
HBV SURFACE AB SER-ACNC: <3.1 MIU/ML
HBV SURFACE AG SERPL QL IA: NONREACTIVE
HCO3 BLDA-SCNC: 22.6 MMOL/L (ref 22–26)
HCO3 BLDA-SCNC: 26 MMOL/L (ref 22–26)
HCO3 BLDMV-SCNC: 24.2 MMOL/L (ref 22–26)
HCT VFR BLD AUTO: 22.1 % (ref 36–46)
HCT VFR BLD EST: 23 % (ref 36–46)
HCT VFR BLD EST: 23 % (ref 36–46)
HCT VFR BLD EST: 27 % (ref 36–46)
HGB BLD-MCNC: 7 G/DL (ref 12–16)
HGB BLDA-MCNC: 7.6 G/DL (ref 12–16)
HGB BLDA-MCNC: 7.7 G/DL (ref 12–16)
HGB BLDMV-MCNC: 9.1 G/DL (ref 12–16)
IMM GRANULOCYTES # BLD AUTO: 0.05 X10*3/UL (ref 0–0.7)
IMM GRANULOCYTES NFR BLD AUTO: 0.5 % (ref 0–0.9)
INHALED O2 CONCENTRATION: 30 %
INR PPP: 1.1 (ref 0.9–1.1)
LACTATE BLDA-SCNC: 0.8 MMOL/L (ref 0.4–2)
LACTATE BLDA-SCNC: 1.2 MMOL/L (ref 0.4–2)
LACTATE BLDMV-SCNC: 1.2 MMOL/L (ref 0.4–2)
LYMPHOCYTES # BLD AUTO: 0.69 X10*3/UL (ref 1.2–4.8)
LYMPHOCYTES NFR BLD AUTO: 6.5 %
MAGNESIUM SERPL-MCNC: 1.88 MG/DL (ref 1.6–2.4)
MAGNESIUM SERPL-MCNC: 2.35 MG/DL (ref 1.6–2.4)
MCH RBC QN AUTO: 29.9 PG (ref 26–34)
MCHC RBC AUTO-ENTMCNC: 31.7 G/DL (ref 32–36)
MCV RBC AUTO: 94 FL (ref 80–100)
MONOCYTES # BLD AUTO: 1.08 X10*3/UL (ref 0.1–1)
MONOCYTES NFR BLD AUTO: 10.1 %
NEUTROPHILS # BLD AUTO: 8.7 X10*3/UL (ref 1.2–7.7)
NEUTROPHILS NFR BLD AUTO: 81.3 %
NRBC BLD-RTO: 0.2 /100 WBCS (ref 0–0)
OXYHGB MFR BLDA: 93.6 % (ref 94–98)
OXYHGB MFR BLDA: 94.1 % (ref 94–98)
OXYHGB MFR BLDMV: 48 % (ref 45–75)
PCO2 BLDA: 40 MM HG (ref 38–42)
PCO2 BLDA: 41 MM HG (ref 38–42)
PCO2 BLDMV: 48 MM HG (ref 41–51)
PH BLDA: 7.36 PH (ref 7.38–7.42)
PH BLDA: 7.41 PH (ref 7.38–7.42)
PH BLDMV: 7.31 PH (ref 7.33–7.43)
PHOSPHATE SERPL-MCNC: 1.9 MG/DL (ref 2.5–4.9)
PHOSPHATE SERPL-MCNC: 3.2 MG/DL (ref 2.5–4.9)
PHOSPHATE SERPL-MCNC: 3.6 MG/DL (ref 2.5–4.9)
PLATELET # BLD AUTO: 275 X10*3/UL (ref 150–450)
PO2 BLDA: 75 MM HG (ref 85–95)
PO2 BLDA: 77 MM HG (ref 85–95)
PO2 BLDMV: 34 MM HG (ref 35–45)
POTASSIUM BLDA-SCNC: 4.4 MMOL/L (ref 3.5–5.3)
POTASSIUM BLDA-SCNC: 4.9 MMOL/L (ref 3.5–5.3)
POTASSIUM BLDMV-SCNC: 4.7 MMOL/L (ref 3.5–5.3)
POTASSIUM SERPL-SCNC: 3.5 MMOL/L (ref 3.5–5.3)
POTASSIUM SERPL-SCNC: 4.2 MMOL/L (ref 3.5–5.3)
POTASSIUM SERPL-SCNC: 5.3 MMOL/L (ref 3.5–5.3)
PROTHROMBIN TIME: 11.9 SECONDS (ref 9.8–12.8)
Q ONSET: 211 MS
QRS COUNT: 25 BEATS
QRS DURATION: 110 MS
QT INTERVAL: 298 MS
QTC CALCULATION(BAZETT): 478 MS
QTC FREDERICIA: 408 MS
R AXIS: 63 DEGREES
RBC # BLD AUTO: 2.34 X10*6/UL (ref 4–5.2)
RH FACTOR (ANTIGEN D): NORMAL
SAO2 % BLDA: 96 % (ref 94–100)
SAO2 % BLDA: 97 % (ref 94–100)
SAO2 % BLDMV: 49 % (ref 45–75)
SODIUM BLDA-SCNC: 127 MMOL/L (ref 136–145)
SODIUM BLDA-SCNC: 127 MMOL/L (ref 136–145)
SODIUM BLDMV-SCNC: 124 MMOL/L (ref 136–145)
SODIUM SERPL-SCNC: 129 MMOL/L (ref 136–145)
SODIUM SERPL-SCNC: 130 MMOL/L (ref 136–145)
SODIUM SERPL-SCNC: 136 MMOL/L (ref 136–145)
T AXIS: -87 DEGREES
T OFFSET: 360 MS
VENTRICULAR RATE: 155 BPM
WBC # BLD AUTO: 10.7 X10*3/UL (ref 4.4–11.3)

## 2024-12-05 PROCEDURE — 2500000004 HC RX 250 GENERAL PHARMACY W/ HCPCS (ALT 636 FOR OP/ED): Performed by: INTERNAL MEDICINE

## 2024-12-05 PROCEDURE — 71045 X-RAY EXAM CHEST 1 VIEW: CPT

## 2024-12-05 PROCEDURE — 1100000001 HC PRIVATE ROOM DAILY

## 2024-12-05 PROCEDURE — 87340 HEPATITIS B SURFACE AG IA: CPT

## 2024-12-05 PROCEDURE — 84132 ASSAY OF SERUM POTASSIUM: CPT

## 2024-12-05 PROCEDURE — 85025 COMPLETE CBC W/AUTO DIFF WBC: CPT

## 2024-12-05 PROCEDURE — 94003 VENT MGMT INPAT SUBQ DAY: CPT

## 2024-12-05 PROCEDURE — 71045 X-RAY EXAM CHEST 1 VIEW: CPT | Performed by: RADIOLOGY

## 2024-12-05 PROCEDURE — 99291 CRITICAL CARE FIRST HOUR: CPT

## 2024-12-05 PROCEDURE — 8010000001 HC DIALYSIS - HEMODIALYSIS PER DAY

## 2024-12-05 PROCEDURE — 99222 1ST HOSP IP/OBS MODERATE 55: CPT | Performed by: STUDENT IN AN ORGANIZED HEALTH CARE EDUCATION/TRAINING PROGRAM

## 2024-12-05 PROCEDURE — 2500000004 HC RX 250 GENERAL PHARMACY W/ HCPCS (ALT 636 FOR OP/ED)

## 2024-12-05 PROCEDURE — 02HV33Z INSERTION OF INFUSION DEVICE INTO SUPERIOR VENA CAVA, PERCUTANEOUS APPROACH: ICD-10-PCS | Performed by: INTERNAL MEDICINE

## 2024-12-05 PROCEDURE — 86923 COMPATIBILITY TEST ELECTRIC: CPT

## 2024-12-05 PROCEDURE — 82947 ASSAY GLUCOSE BLOOD QUANT: CPT

## 2024-12-05 PROCEDURE — 2500000001 HC RX 250 WO HCPCS SELF ADMINISTERED DRUGS (ALT 637 FOR MEDICARE OP)

## 2024-12-05 PROCEDURE — 2500000002 HC RX 250 W HCPCS SELF ADMINISTERED DRUGS (ALT 637 FOR MEDICARE OP, ALT 636 FOR OP/ED): Performed by: INTERNAL MEDICINE

## 2024-12-05 PROCEDURE — 3E033XZ INTRODUCTION OF VASOPRESSOR INTO PERIPHERAL VEIN, PERCUTANEOUS APPROACH: ICD-10-PCS | Performed by: INTERNAL MEDICINE

## 2024-12-05 PROCEDURE — 83735 ASSAY OF MAGNESIUM: CPT

## 2024-12-05 PROCEDURE — 2500000002 HC RX 250 W HCPCS SELF ADMINISTERED DRUGS (ALT 637 FOR MEDICARE OP, ALT 636 FOR OP/ED)

## 2024-12-05 PROCEDURE — 93010 ELECTROCARDIOGRAM REPORT: CPT | Performed by: INTERNAL MEDICINE

## 2024-12-05 PROCEDURE — 0JH63XZ INSERTION OF TUNNELED VASCULAR ACCESS DEVICE INTO CHEST SUBCUTANEOUS TISSUE AND FASCIA, PERCUTANEOUS APPROACH: ICD-10-PCS | Performed by: INTERNAL MEDICINE

## 2024-12-05 PROCEDURE — 2500000005 HC RX 250 GENERAL PHARMACY W/O HCPCS: Performed by: INTERNAL MEDICINE

## 2024-12-05 PROCEDURE — 2500000005 HC RX 250 GENERAL PHARMACY W/O HCPCS

## 2024-12-05 PROCEDURE — 86706 HEP B SURFACE ANTIBODY: CPT

## 2024-12-05 PROCEDURE — 37799 UNLISTED PX VASCULAR SURGERY: CPT

## 2024-12-05 PROCEDURE — 86901 BLOOD TYPING SEROLOGIC RH(D): CPT

## 2024-12-05 PROCEDURE — 99232 SBSQ HOSP IP/OBS MODERATE 35: CPT

## 2024-12-05 PROCEDURE — 85610 PROTHROMBIN TIME: CPT

## 2024-12-05 RX ORDER — NOREPINEPHRINE BITARTRATE/D5W 8 MG/250ML
PLASTIC BAG, INJECTION (ML) INTRAVENOUS
Status: COMPLETED
Start: 2024-12-05 | End: 2024-12-05

## 2024-12-05 RX ORDER — DIGOXIN 0.25 MG/ML
250 INJECTION INTRAMUSCULAR; INTRAVENOUS ONCE
Status: DISCONTINUED | OUTPATIENT
Start: 2024-12-05 | End: 2024-12-05

## 2024-12-05 RX ORDER — SODIUM BICARBONATE 1 MEQ/ML
25 SYRINGE (ML) INTRAVENOUS ONCE
Status: DISCONTINUED | OUTPATIENT
Start: 2024-12-05 | End: 2024-12-05

## 2024-12-05 RX ORDER — DIGOXIN 0.25 MG/ML
250 INJECTION INTRAMUSCULAR; INTRAVENOUS ONCE
Status: COMPLETED | OUTPATIENT
Start: 2024-12-05 | End: 2024-12-05

## 2024-12-05 RX ORDER — HEPARIN SODIUM 10000 [USP'U]/100ML
0-4500 INJECTION, SOLUTION INTRAVENOUS CONTINUOUS
Status: DISCONTINUED | OUTPATIENT
Start: 2024-12-05 | End: 2024-12-05

## 2024-12-05 RX ORDER — HEPARIN SODIUM 10000 [USP'U]/100ML
0-4500 INJECTION, SOLUTION INTRAVENOUS CONTINUOUS
Status: DISPENSED | OUTPATIENT
Start: 2024-12-05 | End: 2024-12-06

## 2024-12-05 RX ORDER — AMIODARONE HYDROCHLORIDE 200 MG/1
400 TABLET ORAL 2 TIMES DAILY
Status: DISCONTINUED | OUTPATIENT
Start: 2024-12-05 | End: 2024-12-07

## 2024-12-05 RX ORDER — NOREPINEPHRINE BITARTRATE/D5W 8 MG/250ML
.01-1 PLASTIC BAG, INJECTION (ML) INTRAVENOUS CONTINUOUS
Status: DISCONTINUED | OUTPATIENT
Start: 2024-12-05 | End: 2024-12-09

## 2024-12-05 RX ADMIN — Medication 30 PERCENT: at 08:00

## 2024-12-05 RX ADMIN — Medication 0.01 MCG/KG/MIN: at 17:25

## 2024-12-05 RX ADMIN — ATORVASTATIN CALCIUM 80 MG: 80 TABLET, FILM COATED ORAL at 20:28

## 2024-12-05 RX ADMIN — INSULIN LISPRO 1 UNITS: 100 INJECTION, SOLUTION INTRAVENOUS; SUBCUTANEOUS at 23:47

## 2024-12-05 RX ADMIN — HEPARIN SODIUM 5000 UNITS: 5000 INJECTION, SOLUTION INTRAVENOUS; SUBCUTANEOUS at 00:29

## 2024-12-05 RX ADMIN — THIAMINE HCL TAB 100 MG 100 MG: 100 TAB at 09:03

## 2024-12-05 RX ADMIN — INSULIN LISPRO 1 UNITS: 100 INJECTION, SOLUTION INTRAVENOUS; SUBCUTANEOUS at 09:03

## 2024-12-05 RX ADMIN — SODIUM ZIRCONIUM CYCLOSILICATE 10 G: 10 POWDER, FOR SUSPENSION ORAL at 12:29

## 2024-12-05 RX ADMIN — INSULIN LISPRO 1 UNITS: 100 INJECTION, SOLUTION INTRAVENOUS; SUBCUTANEOUS at 16:25

## 2024-12-05 RX ADMIN — Medication 75 MCG/HR: at 04:45

## 2024-12-05 RX ADMIN — HEPARIN SODIUM 1100 UNITS/HR: 10000 INJECTION, SOLUTION INTRAVENOUS at 19:00

## 2024-12-05 RX ADMIN — Medication 30 PERCENT: at 14:58

## 2024-12-05 RX ADMIN — ASPIRIN 81 MG CHEWABLE TABLET 81 MG: 81 TABLET CHEWABLE at 09:03

## 2024-12-05 RX ADMIN — NOREPINEPHRINE BITARTRATE 0.01 MCG/KG/MIN: 8 INJECTION, SOLUTION INTRAVENOUS at 17:25

## 2024-12-05 RX ADMIN — INSULIN LISPRO 1 UNITS: 100 INJECTION, SOLUTION INTRAVENOUS; SUBCUTANEOUS at 03:21

## 2024-12-05 RX ADMIN — PANTOPRAZOLE SODIUM 40 MG: 40 INJECTION, POWDER, FOR SOLUTION INTRAVENOUS at 09:03

## 2024-12-05 RX ADMIN — PANTOPRAZOLE SODIUM 40 MG: 40 INJECTION, POWDER, FOR SOLUTION INTRAVENOUS at 20:28

## 2024-12-05 RX ADMIN — AMIODARONE HYDROCHLORIDE 400 MG: 200 TABLET ORAL at 20:28

## 2024-12-05 RX ADMIN — DIGOXIN 250 MCG: 0.25 INJECTION INTRAMUSCULAR; INTRAVENOUS at 10:52

## 2024-12-05 RX ADMIN — INSULIN LISPRO 2 UNITS: 100 INJECTION, SOLUTION INTRAVENOUS; SUBCUTANEOUS at 11:52

## 2024-12-05 RX ADMIN — Medication 75 MCG/HR: at 18:28

## 2024-12-05 RX ADMIN — AMIODARONE HYDROCHLORIDE 400 MG: 200 TABLET ORAL at 11:04

## 2024-12-05 RX ADMIN — HEPARIN SODIUM 5000 UNITS: 5000 INJECTION, SOLUTION INTRAVENOUS; SUBCUTANEOUS at 09:03

## 2024-12-05 RX ADMIN — HEPARIN SODIUM 5000 UNITS: 5000 INJECTION, SOLUTION INTRAVENOUS; SUBCUTANEOUS at 16:25

## 2024-12-05 ASSESSMENT — PAIN SCALES - GENERAL
PAINLEVEL_OUTOF10: 0 - NO PAIN

## 2024-12-05 NOTE — PROGRESS NOTES
Subjective   Seems more awake this morning.  Following simple commands. Had an episode of Afib with RVR up to 160-180s in the morning with normal blood pressure. Gave 250 mcg digoxin IV and restarted amiodarone 400 mg daily to treat it. Heart rates down to 110 afterward.     Meds  Scheduled medications  amiodarone, 400 mg, oral, BID  aspirin, 81 mg, oral, Daily  atorvastatin, 80 mg, oral, Nightly  heparin, 5,000 Units, subcutaneous, q8h  insulin lispro, 0-5 Units, subcutaneous, q4h  lactated Ringer's, 1,000 mL, intravenous, Once  oxygen, , inhalation, Continuous - Inhalation  pantoprazole, 40 mg, intravenous, BID  sodium zirconium cyclosilicate, 10 g, oral, TID  thiamine, 100 mg, oral, Daily      Continuous medications  fentaNYL,  mcg/hr, Last Rate: 75 mcg/hr (12/05/24 1200)  midazolam, 0.5-20 mg/hr, Last Rate: Stopped (12/05/24 0700)      PRN medications  PRN medications: acetaminophen **OR** acetaminophen **OR** acetaminophen, alteplase, dextrose, dextrose, fentaNYL, glucagon, glucagon, hydrOXYzine HCL, ipratropium-albuteroL, oxyCODONE, oxygen, polyethylene glycol, sennosides-docusate sodium      Objective   Vital signs in last 24 hours:  Temp:  [35.4 °C (95.7 °F)-36.3 °C (97.3 °F)] 35.4 °C (95.7 °F)  Heart Rate:  [] 113  Resp:  [10-19] 14  Arterial Line BP 1: ()/(47-65) 100/51  FiO2 (%):  [30 %] 30 %    Intake/Output this shift:    Intake/Output Summary (Last 24 hours) at 12/5/2024 1655  Last data filed at 12/5/2024 1400  Gross per 24 hour   Intake 1188 ml   Output --   Net 1188 ml       Physical  General: Patient awakens to voice, non-toxic appearing  Pulm: No crackles or rhonchi, no ventilator dyssynchrony  Cardiac: Tachycardia, irregular rhythm, normal S1/S2  Abdomen: Diffusely tender to palpation, non-distended  Extremities: No peripheral edema  Neuro: Patient alerts to voice, can follow complex commands    Results  Results for orders placed or performed during the hospital encounter of  11/18/24 (from the past 24 hours)   POCT GLUCOSE   Result Value Ref Range    POCT Glucose 186 (H) 74 - 99 mg/dL   POCT GLUCOSE   Result Value Ref Range    POCT Glucose 182 (H) 74 - 99 mg/dL   BLOOD GAS ARTERIAL FULL PANEL   Result Value Ref Range    POCT pH, Arterial 7.41 7.38 - 7.42 pH    POCT pCO2, Arterial 41 38 - 42 mm Hg    POCT pO2, Arterial 75 (L) 85 - 95 mm Hg    POCT SO2, Arterial 97 94 - 100 %    POCT Oxy Hemoglobin, Arterial 94.1 94.0 - 98.0 %    POCT Hematocrit Calculated, Arterial 23.0 (L) 36.0 - 46.0 %    POCT Sodium, Arterial 127 (L) 136 - 145 mmol/L    POCT Potassium, Arterial 4.4 3.5 - 5.3 mmol/L    POCT Chloride, Arterial 96 (L) 98 - 107 mmol/L    POCT Ionized Calcium, Arterial 1.11 1.10 - 1.33 mmol/L    POCT Glucose, Arterial 202 (H) 74 - 99 mg/dL    POCT Lactate, Arterial 0.8 0.4 - 2.0 mmol/L    POCT Base Excess, Arterial 1.2 -2.0 - 3.0 mmol/L    POCT HCO3 Calculated, Arterial 26.0 22.0 - 26.0 mmol/L    POCT Hemoglobin, Arterial 7.7 (L) 12.0 - 16.0 g/dL    POCT Anion Gap, Arterial 9 (L) 10 - 25 mmo/L    Patient Temperature 37.0 degrees Celsius    FiO2 30 %   CBC and Auto Differential   Result Value Ref Range    WBC 10.7 4.4 - 11.3 x10*3/uL    nRBC 0.2 (H) 0.0 - 0.0 /100 WBCs    RBC 2.34 (L) 4.00 - 5.20 x10*6/uL    Hemoglobin 7.0 (L) 12.0 - 16.0 g/dL    Hematocrit 22.1 (L) 36.0 - 46.0 %    MCV 94 80 - 100 fL    MCH 29.9 26.0 - 34.0 pg    MCHC 31.7 (L) 32.0 - 36.0 g/dL    RDW 17.1 (H) 11.5 - 14.5 %    Platelets 275 150 - 450 x10*3/uL    Neutrophils % 81.3 40.0 - 80.0 %    Immature Granulocytes %, Automated 0.5 0.0 - 0.9 %    Lymphocytes % 6.5 13.0 - 44.0 %    Monocytes % 10.1 2.0 - 10.0 %    Eosinophils % 1.2 0.0 - 6.0 %    Basophils % 0.4 0.0 - 2.0 %    Neutrophils Absolute 8.70 (H) 1.20 - 7.70 x10*3/uL    Immature Granulocytes Absolute, Automated 0.05 0.00 - 0.70 x10*3/uL    Lymphocytes Absolute 0.69 (L) 1.20 - 4.80 x10*3/uL    Monocytes Absolute 1.08 (H) 0.10 - 1.00 x10*3/uL    Eosinophils  Absolute 0.13 0.00 - 0.70 x10*3/uL    Basophils Absolute 0.04 0.00 - 0.10 x10*3/uL   Type and screen   Result Value Ref Range    ABO TYPE AB     Rh TYPE POS     ANTIBODY SCREEN NEG    POCT GLUCOSE   Result Value Ref Range    POCT Glucose 194 (H) 74 - 99 mg/dL   Renal function panel   Result Value Ref Range    Glucose 189 (H) 74 - 99 mg/dL    Sodium 129 (L) 136 - 145 mmol/L    Potassium 4.2 3.5 - 5.3 mmol/L    Chloride 98 98 - 107 mmol/L    Bicarbonate 25 21 - 32 mmol/L    Anion Gap 10 10 - 20 mmol/L    Urea Nitrogen 36 (H) 6 - 23 mg/dL    Creatinine 5.14 (H) 0.50 - 1.05 mg/dL    eGFR 9 (L) >60 mL/min/1.73m*2    Calcium 7.1 (L) 8.6 - 10.6 mg/dL    Phosphorus 3.2 2.5 - 4.9 mg/dL    Albumin 2.2 (L) 3.4 - 5.0 g/dL   Magnesium   Result Value Ref Range    Magnesium 1.88 1.60 - 2.40 mg/dL   POCT GLUCOSE   Result Value Ref Range    POCT Glucose 192 (H) 74 - 99 mg/dL   Renal function panel   Result Value Ref Range    Glucose 225 (H) 74 - 99 mg/dL    Sodium 130 (L) 136 - 145 mmol/L    Potassium 5.3 3.5 - 5.3 mmol/L    Chloride 95 (L) 98 - 107 mmol/L    Bicarbonate 22 21 - 32 mmol/L    Anion Gap 18 10 - 20 mmol/L    Urea Nitrogen 36 (H) 6 - 23 mg/dL    Creatinine 5.57 (H) 0.50 - 1.05 mg/dL    eGFR 8 (L) >60 mL/min/1.73m*2    Calcium 8.0 (L) 8.6 - 10.6 mg/dL    Phosphorus 3.6 2.5 - 4.9 mg/dL    Albumin 2.5 (L) 3.4 - 5.0 g/dL   Magnesium   Result Value Ref Range    Magnesium 2.35 1.60 - 2.40 mg/dL   ECG 12 Lead   Result Value Ref Range    Ventricular Rate 155 BPM    Atrial Rate 214 BPM    QRS Duration 110 ms    QT Interval 298 ms    QTC Calculation(Bazett) 478 ms    R Axis 63 degrees    T Axis -87 degrees    QRS Count 25 beats    Q Onset 211 ms    T Offset 360 ms    QTC Fredericia 408 ms   Protime-INR   Result Value Ref Range    Protime 11.9 9.8 - 12.8 seconds    INR 1.1 0.9 - 1.1   POCT GLUCOSE   Result Value Ref Range    POCT Glucose 219 (H) 74 - 99 mg/dL   POCT GLUCOSE   Result Value Ref Range    POCT Glucose 192 (H) 74 - 99  mg/dL       Imaging  ECG 12 Lead    Result Date: 12/5/2024  Atrial fibrillation with rapid ventricular response with premature ventricular or aberrantly conducted complexes Low voltage QRS Incomplete left bundle branch block Marked ST abnormality, possible inferolateral subendocardial injury Abnormal ECG When compared with ECG of 02-DEC-2024 13:18, Atrial fibrillation has replaced Sinus rhythm Vent. rate has increased BY  80 BPM Incomplete left bundle branch block is now Present    XR chest 1 view    Result Date: 12/4/2024  Interpreted By:  Doug Langley, STUDY: XR CHEST 1 VIEW; 12/4/2024 7:12 am   INDICATION: Signs/Symptoms:intubated.   COMPARISON: 12/03/2024.   ACCESSION NUMBER(S): [id number]   ORDERING CLINICIAN: MARIKA CARO   FINDINGS: Endotracheal tube in satisfactory position. Right IJ line overlies the SVC. Left dual-lumen catheter overlies the SVC.   CARDIOMEDIASTINAL SILHOUETTE: Patient is status post median sternotomy.   LUNGS: Slight interval worsening in perihilar interstitial edema/effusion. No pneumothorax.   ABDOMEN: No remarkable upper abdominal findings.   BONES: No acute osseous changes.       1.  There is minimal interval worsening perihilar interstitial edema/effusion. Correlate with cardiac and fluid status.     Signed by: Doug Langley 12/4/2024 9:21 PM Dictation workstation:   [id number]        Assessment/Plan   Principal Problem:    Dissection of aorta  64 y.o. female with history of pancreatitis, DVT/PE, HLD, HTN, CABGx4 1997, T2DM, GERD, PAD, chronic mesenteric ischemia, tobacco use who presented to [facility] on 11/18/2024 as a transfer from [facility] with chest, back, and abdominal pain. Now post PEA arrest, with intubation/sedation. Family agreed for trach/PEG and LTAC.      Updates:  -Plan for trach and PEG tomorrow morning. Hold tube feeds at midnight.  -Will get intermittent hemodialysis today, plan for placement of TDC line prior to  discharge  -LTAC referrals sent     Neuro:  #Sedation/anxiety  -fentanyl, held for daily SBTs  -opening eyes, following commands  -Can consider Precedex as needed for anxiety (patient gets worked up and calmed down with family)     Cardiac:  #Aflutter/Afib with RVR  ::now sinus tachycardia  -HR in 160-180s on 12/5 am  -s/p one dose of digoxin 250 mcg (digoxin is not dialyzable)  -Amiodarone 400 mg daily (also poorly dialyzed)  -HR 110s-120s post     #PEA arrest 11/20  ::likely hypoxia driven: PNA vs aspiration     #Aortic dissection  #Mural Thrombus  #Distal aortic arch saccular aneurysm   ::CTA CAP 11/18- 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   -Vascular surgery discussed case at aortic conference and there are no plans for surgery, palliative measures recommended  -Holding heparin      #NSTEMI  #CAD s/p CABGx4 1997  #PAD  #HTN #DLD  ::EKG- NSR, rate 71, TWI aVL, V1-V6, 1mm ORQUIDEA in aVR  ::Trop peak 5700  ::Lipid HDL 41.5, , TG 75, Chol 157  -Holding home amlodipine 10mg, imdur 30mg, lisinopril 2.5mg, metop tartrate 50mg bid iso hypotension   -Continue ASA and atorvastatin  -Holding plavix 75mg. Can resume after all procedures are done (TDC, trach/PEG)     #HFrEF (45-50%, 11/19/24)  -Not on GDMT at the moment      Pulm:  #Acute hypoxic respiratory failure, post-arrest  :s/p Zosyn 11/20 - 11/26 for PNA  :: s/p Vancomycin (discontinue today)   -Failed extubation on 11/28  -Trach tomorrow     GI:  #Ischemic hepatitis secondary to PEA arrest, improving  ::likely 2/2 hypoxia and hypotension  -LFTs downtrending     Renal:  #TRACY on CKD likely 2/2 arrest  #Anuric  -iHD today  -Bladder scans qshift     Endo:   #T2DM  -Hold home metformin   -Mild SSI + hypoglycemia protocol      #Tube Feeds  ::at goal, 20 ml/hr  -f/up nutrition  -Stop at midnight     Infectious  DEMOND     F: None  E: K>4, Mag>2  N: TF, Stop at  midnight  G: pantoprazole  A: PIV, OG, Aroldo  DVT: subcutaneous heparin, SCDs  CODE: DNR/okay to intubate (confirmed with family 11/21)  NOK: Daughter Josy [phone]          LOS: 17 days     Jaime Mckeon MD/PhD   PGY-2

## 2024-12-05 NOTE — CONSULTS
[facility]  ACUTE CARE SURGERY - HISTORY AND PHYSICAL / CONSULT    Patient Name: Humaira Huang  MRN: [id number]  Admit Date: 1118  : 1960  AGE: 64 y.o.   GENDER: female  ==============================================================================  TODAY'S ASSESSMENT AND PLAN OF CARE:  64 y.o. female with history of pancreatitis, DVT/PE, HLD, HTN, CABGx4 , T2DM, GERD, PAD, chronic mesenteric ischemia, tobacco use who presented to [facility] on 2024 as a transfer from [facility] with chest, back, and abdominal pain. 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 Vascular and Cardiac surgery following. CICU course c/b PEA arrest , now intubated and sedated, following commands. GOC discussions with family ongoing.     Plan:  -     ==============================================================================  CHIEF COMPLAINT/REASON FOR CONSULT:  ***    PAST MEDICAL HISTORY:   PMH: ***  No past medical history on file.  Last menstrual period: ***    PSH: ***  No past surgical history on file.  FH: ***  No family history on file.  SOCIAL HISTORY:    Smoking: ***   Social History     Tobacco Use   Smoking Status Not on file   Smokeless Tobacco Not on file       Alcohol: ***   Social History     Substance and Sexual Activity   Alcohol Use Not on file       Drug use: ***    MEDICATIONS: ***  Prior to Admission medications    Medication Sig Start Date End Date Taking? Authorizing Provider   acetaminophen (Tylenol) 325 mg tablet Take 2 tablets (650 mg) by mouth every 6 hours if needed (pain).    Historical Provider, MD   amLODIPine (Norvasc) 10 mg tablet Take 1 tablet (10 mg) by mouth once daily.    Historical Provider, MD   aspirin 81 mg EC tablet Take 1 tablet (81 mg) by mouth once daily.    Historical Provider, MD    atorvastatin (Lipitor) 80 mg tablet Take 1 tablet (80 mg) by mouth once daily.    Historical Provider, MD   clopidogrel (Plavix) 75 mg tablet Take 1 tablet (75 mg) by mouth once daily.    Historical Provider, MD   isosorbide mononitrate ER (Imdur) 30 mg 24 hr tablet Take 1 tablet (30 mg) by mouth once daily. Do not crush or chew.    Historical Provider, MD   lisinopril 2.5 mg tablet Take 1 tablet (2.5 mg) by mouth once daily.    Historical Provider, MD   metFORMIN (Glucophage) 1,000 mg tablet Take 1 tablet (1,000 mg) by mouth 2 times a day.    Historical Provider, MD   metoprolol tartrate (Lopressor) 50 mg tablet Take 1 tablet by mouth 2 times a day.    Historical Provider, MD   pantoprazole (ProtoNix) 40 mg EC tablet Take 1 tablet (40 mg) by mouth once daily. Do not crush, chew, or split.    Historical Provider, MD     ALLERGIES: ***  No Known Allergies    REVIEW OF SYSTEMS:  Review of Systems  PHYSICAL EXAM:  Physical Exam  IMAGING SUMMARY:  (summary of findings, not a copy of dictation)  ***    LABS:  Results from last 7 days   Lab Units 12/05/24  0307 12/04/24  0409 12/03/24  0451   WBC AUTO x10*3/uL 10.7 8.8 10.6   HEMOGLOBIN g/dL 7.0* 7.1* 7.2*   HEMATOCRIT % 22.1* 21.9* 21.9*   PLATELETS AUTO x10*3/uL 275 278 274   NEUTROS PCT AUTO % 81.3 75.6 79.7   LYMPHS PCT AUTO % 6.5 9.8 8.5   MONOS PCT AUTO % 10.1 11.8 9.5   EOS PCT AUTO % 1.2 1.8 1.3     Results from last 7 days   Lab Units 12/05/24  1056 11/29/24  0407 11/28/24  2033   APTT seconds  --  61* 47*   INR  1.1 1.6* 1.4*     Results from last 7 days   Lab Units 12/05/24  1030 12/05/24  0417 12/04/24  0409 12/02/24  2042 12/02/24  0314 12/01/24  0014   SODIUM mmol/L 130* 129* 130*   < > 131* 129*   POTASSIUM mmol/L 5.3 4.2 4.2   < > 3.4* 4.2   CHLORIDE mmol/L 95* 98 95*   < > 93* 94*   CO2 mmol/L 22 25 25   < > 28 23   BUN mg/dL 36* 36* 26*   < > 8 30*   CREATININE mg/dL 5.57* 5.14* 4.07*   < > 0.94 3.23*   CALCIUM mg/dL 8.0* 7.1* 7.1*   < > 7.4* 6.9*    PROTEIN TOTAL g/dL  --   --  5.0*  --  5.3* 4.6*   BILIRUBIN TOTAL mg/dL  --   --  0.7  --  0.8 1.0   ALK PHOS U/L  --   --  282*  --  384* 318*   ALT U/L  --   --  40  --  65* 77*   AST U/L  --   --  42*  --  82* 113*   GLUCOSE mg/dL 225* 189* 207*   < > 103* 166*    < > = values in this interval not displayed.     Results from last 7 days   Lab Units 12/04/24  0409 12/02/24  0314 12/01/24  0014   BILIRUBIN TOTAL mg/dL 0.7 0.8 1.0   BILIRUBIN DIRECT mg/dL 0.2 0.3 0.2     Results from last 7 days   Lab Units 12/05/24  0306 12/01/24  0024 11/30/24  0057   POCT PH, ARTERIAL pH 7.41 7.52* 7.49*   POCT PCO2, ARTERIAL mm Hg 41 31* 27*   POCT PO2, ARTERIAL mm Hg 75* 103* 97*   POCT HCO3 CALCULATED, ARTERIAL mmol/L 26.0 25.3 20.6*   POCT BASE EXCESS, ARTERIAL mmol/L 1.2 2.0 -2.2*         I have reviewed all laboratory and imaging results ordered/pertinent for this encounter.

## 2024-12-05 NOTE — CONSULTS
General Surgery Consult Note    Chief Complaint/Reason for Visit: PEG tube placement    HPI:  63 YO F with PMH pancreatitis, DVT/PE, HLD, HTN, CABGx4 1997, T2DM, GERD, PAD, chronic mesenteric ischemia, tobacco use who presented to [facility] on 11/18/2024 as a transfer from [facility] with chest, back, and abdominal pain. 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 Vascular and Cardiac surgery following not offering intervention due to patient frailty and high risk nature of the procedure. CICU course c/b PEA arrest 11/20. Given the prolonged hospital course ongoing GOC discussion with family to determine direction of care. Patient option trach/ PEG with ultimate goal of LTAC over comfort care at this time. [facility] Consulted for PEG tube placement.   Patient remains intubated and sedated at this time. Following command. Off pressors.     ROS unable to be performed due to patient status    PMH:  Pancreatitis  DVT/PE  HLD  HTN  T2DM  GERD  PAD  chronic mesenteric ischemia  tobacco use    PSH:  CABGx4 1997    Soc Hx:  Social History     Socioeconomic History    Marital status: Unknown     Spouse name: Not on file    Number of children: Not on file    Years of education: Not on file    Highest education level: Not on file   Occupational History    Not on file   Tobacco Use    Smoking status: Not on file    Smokeless tobacco: Not on file   Substance and Sexual Activity    Alcohol use: Not on file    Drug use: Not on file    Sexual activity: Not on file   Other Topics Concern    Not on file   Social History Narrative    Not on file     Social Drivers of Health     Financial Resource Strain: Low Risk  (11/20/2024)    Overall Financial Resource Strain (CARDIA)     Difficulty of Paying Living Expenses: Not very hard   Food Insecurity: No Food Insecurity (11/20/2024)     Hunger Vital Sign     Worried About Running Out of Food in the Last Year: Never true     Ran Out of Food in the Last Year: Never true   Transportation Needs: No Transportation Needs (11/20/2024)    PRAPARE - Transportation     Lack of Transportation (Medical): No     Lack of Transportation (Non-Medical): No   Physical Activity: Not on file   Stress: Not on file   Social Connections: Not on file   Intimate Partner Violence: Not on file   Housing Stability: Low Risk  (11/20/2024)    Housing Stability Vital Sign     Unable to Pay for Housing in the Last Year: No     Number of Times Moved in the Last Year: 0     Homeless in the Last Year: No     Fam Hx:  No family history on file.   Allergies:  No Known Allergies  Current Medications:  No current facility-administered medications on file prior to encounter.     Current Outpatient Medications on File Prior to Encounter   Medication Sig Dispense Refill    acetaminophen (Tylenol) 325 mg tablet Take 2 tablets (650 mg) by mouth every 6 hours if needed (pain).      amLODIPine (Norvasc) 10 mg tablet Take 1 tablet (10 mg) by mouth once daily.      aspirin 81 mg EC tablet Take 1 tablet (81 mg) by mouth once daily.      atorvastatin (Lipitor) 80 mg tablet Take 1 tablet (80 mg) by mouth once daily.      clopidogrel (Plavix) 75 mg tablet Take 1 tablet (75 mg) by mouth once daily.      isosorbide mononitrate ER (Imdur) 30 mg 24 hr tablet Take 1 tablet (30 mg) by mouth once daily. Do not crush or chew.      lisinopril 2.5 mg tablet Take 1 tablet (2.5 mg) by mouth once daily.      metFORMIN (Glucophage) 1,000 mg tablet Take 1 tablet (1,000 mg) by mouth 2 times a day.      metoprolol tartrate (Lopressor) 50 mg tablet Take 1 tablet by mouth 2 times a day.      pantoprazole (ProtoNix) 40 mg EC tablet Take 1 tablet (40 mg) by mouth once daily. Do not crush, chew, or split.           Objective   Vitals:  Temp:  [35.4 °C (95.7 °F)-36.3 °C (97.3 °F)] 35.4 °C (95.7 °F)  Heart Rate:   [] 113  Resp:  [10-19] 14  Arterial Line BP 1: ()/(47-65) 100/51  FiO2 (%):  [30 %] 30 %    Physical Exam:  GEN: No acute distress. Intubated and sedated  HEENT: Sclera anicteric. Moist mucous membranes.  RESP: Breathing non-labored, equal chest rise. intubated  CV: Regular rate, normotensive  GI: Abdomen soft, nondistended, nontender. OG tube in place  MSK: No gross deformities. Moves all extremities spontaneously.  NEURO: Alert and oriented x3. No focal deficits.  PSYCH: Appropriate mood and affect.  SKIN: No rashes or lesions.    Labs:  Results for orders placed or performed during the hospital encounter of 11/18/24 (from the past 24 hours)   POCT GLUCOSE   Result Value Ref Range    POCT Glucose 189 (H) 74 - 99 mg/dL   POCT GLUCOSE   Result Value Ref Range    POCT Glucose 186 (H) 74 - 99 mg/dL   POCT GLUCOSE   Result Value Ref Range    POCT Glucose 182 (H) 74 - 99 mg/dL   BLOOD GAS ARTERIAL FULL PANEL   Result Value Ref Range    POCT pH, Arterial 7.41 7.38 - 7.42 pH    POCT pCO2, Arterial 41 38 - 42 mm Hg    POCT pO2, Arterial 75 (L) 85 - 95 mm Hg    POCT SO2, Arterial 97 94 - 100 %    POCT Oxy Hemoglobin, Arterial 94.1 94.0 - 98.0 %    POCT Hematocrit Calculated, Arterial 23.0 (L) 36.0 - 46.0 %    POCT Sodium, Arterial 127 (L) 136 - 145 mmol/L    POCT Potassium, Arterial 4.4 3.5 - 5.3 mmol/L    POCT Chloride, Arterial 96 (L) 98 - 107 mmol/L    POCT Ionized Calcium, Arterial 1.11 1.10 - 1.33 mmol/L    POCT Glucose, Arterial 202 (H) 74 - 99 mg/dL    POCT Lactate, Arterial 0.8 0.4 - 2.0 mmol/L    POCT Base Excess, Arterial 1.2 -2.0 - 3.0 mmol/L    POCT HCO3 Calculated, Arterial 26.0 22.0 - 26.0 mmol/L    POCT Hemoglobin, Arterial 7.7 (L) 12.0 - 16.0 g/dL    POCT Anion Gap, Arterial 9 (L) 10 - 25 mmo/L    Patient Temperature 37.0 degrees Celsius    FiO2 30 %   CBC and Auto Differential   Result Value Ref Range    WBC 10.7 4.4 - 11.3 x10*3/uL    nRBC 0.2 (H) 0.0 - 0.0 /100 WBCs    RBC 2.34 (L) 4.00 - 5.20  x10*6/uL    Hemoglobin 7.0 (L) 12.0 - 16.0 g/dL    Hematocrit 22.1 (L) 36.0 - 46.0 %    MCV 94 80 - 100 fL    MCH 29.9 26.0 - 34.0 pg    MCHC 31.7 (L) 32.0 - 36.0 g/dL    RDW 17.1 (H) 11.5 - 14.5 %    Platelets 275 150 - 450 x10*3/uL    Neutrophils % 81.3 40.0 - 80.0 %    Immature Granulocytes %, Automated 0.5 0.0 - 0.9 %    Lymphocytes % 6.5 13.0 - 44.0 %    Monocytes % 10.1 2.0 - 10.0 %    Eosinophils % 1.2 0.0 - 6.0 %    Basophils % 0.4 0.0 - 2.0 %    Neutrophils Absolute 8.70 (H) 1.20 - 7.70 x10*3/uL    Immature Granulocytes Absolute, Automated 0.05 0.00 - 0.70 x10*3/uL    Lymphocytes Absolute 0.69 (L) 1.20 - 4.80 x10*3/uL    Monocytes Absolute 1.08 (H) 0.10 - 1.00 x10*3/uL    Eosinophils Absolute 0.13 0.00 - 0.70 x10*3/uL    Basophils Absolute 0.04 0.00 - 0.10 x10*3/uL   Type and screen   Result Value Ref Range    ABO TYPE AB     Rh TYPE POS     ANTIBODY SCREEN NEG    POCT GLUCOSE   Result Value Ref Range    POCT Glucose 194 (H) 74 - 99 mg/dL   Renal function panel   Result Value Ref Range    Glucose 189 (H) 74 - 99 mg/dL    Sodium 129 (L) 136 - 145 mmol/L    Potassium 4.2 3.5 - 5.3 mmol/L    Chloride 98 98 - 107 mmol/L    Bicarbonate 25 21 - 32 mmol/L    Anion Gap 10 10 - 20 mmol/L    Urea Nitrogen 36 (H) 6 - 23 mg/dL    Creatinine 5.14 (H) 0.50 - 1.05 mg/dL    eGFR 9 (L) >60 mL/min/1.73m*2    Calcium 7.1 (L) 8.6 - 10.6 mg/dL    Phosphorus 3.2 2.5 - 4.9 mg/dL    Albumin 2.2 (L) 3.4 - 5.0 g/dL   Magnesium   Result Value Ref Range    Magnesium 1.88 1.60 - 2.40 mg/dL   POCT GLUCOSE   Result Value Ref Range    POCT Glucose 192 (H) 74 - 99 mg/dL   Renal function panel   Result Value Ref Range    Glucose 225 (H) 74 - 99 mg/dL    Sodium 130 (L) 136 - 145 mmol/L    Potassium 5.3 3.5 - 5.3 mmol/L    Chloride 95 (L) 98 - 107 mmol/L    Bicarbonate 22 21 - 32 mmol/L    Anion Gap 18 10 - 20 mmol/L    Urea Nitrogen 36 (H) 6 - 23 mg/dL    Creatinine 5.57 (H) 0.50 - 1.05 mg/dL    eGFR 8 (L) >60 mL/min/1.73m*2    Calcium 8.0  (L) 8.6 - 10.6 mg/dL    Phosphorus 3.6 2.5 - 4.9 mg/dL    Albumin 2.5 (L) 3.4 - 5.0 g/dL   Magnesium   Result Value Ref Range    Magnesium 2.35 1.60 - 2.40 mg/dL   ECG 12 Lead   Result Value Ref Range    Ventricular Rate 155 BPM    Atrial Rate 214 BPM    QRS Duration 110 ms    QT Interval 298 ms    QTC Calculation(Bazett) 478 ms    R Axis 63 degrees    T Axis -87 degrees    QRS Count 25 beats    Q Onset 211 ms    T Offset 360 ms    QTC Fredericia 408 ms   Protime-INR   Result Value Ref Range    Protime 11.9 9.8 - 12.8 seconds    INR 1.1 0.9 - 1.1   POCT GLUCOSE   Result Value Ref Range    POCT Glucose 219 (H) 74 - 99 mg/dL       Recent imaging results:  ECG 12 Lead    Result Date: 12/5/2024  Atrial fibrillation with rapid ventricular response with premature ventricular or aberrantly conducted complexes Low voltage QRS Incomplete left bundle branch block Marked ST abnormality, possible inferolateral subendocardial injury Abnormal ECG When compared with ECG of 02-DEC-2024 13:18, Atrial fibrillation has replaced Sinus rhythm Vent. rate has increased BY  80 BPM Incomplete left bundle branch block is now Present    XR chest 1 view    Result Date: 12/4/2024  Interpreted By:  Doug Langley, STUDY: XR CHEST 1 VIEW; 12/4/2024 7:12 am   INDICATION: Signs/Symptoms:intubated.   COMPARISON: 12/03/2024.   ACCESSION NUMBER(S): [id number]   ORDERING CLINICIAN: MARIKA CARO   FINDINGS: Endotracheal tube in satisfactory position. Right IJ line overlies the SVC. Left dual-lumen catheter overlies the SVC.   CARDIOMEDIASTINAL SILHOUETTE: Patient is status post median sternotomy.   LUNGS: Slight interval worsening in perihilar interstitial edema/effusion. No pneumothorax.   ABDOMEN: No remarkable upper abdominal findings.   BONES: No acute osseous changes.       1.  There is minimal interval worsening perihilar interstitial edema/effusion. Correlate with cardiac and fluid status.     Signed by: Doug Langley 12/4/2024 9:21 PM Dictation  workstation:   [id number]    XR chest 1 view    Result Date: 12/4/2024  Interpreted By:  Doug Langley, STUDY: XR CHEST 1 VIEW; 12/3/2024 6:45 am   INDICATION: Signs/Symptoms:intubated.   COMPARISON: 12/02/2024.   ACCESSION NUMBER(S): [id number]   ORDERING CLINICIAN: MARIKA CARO   FINDINGS: Endotracheal tube in satisfactory position.   CARDIOMEDIASTINAL SILHOUETTE: Patient is status post median sternotomy.   LUNGS: Minimal interval worsening in perihilar edema/effusions. No pneumothorax.   ABDOMEN: NG tube overlies the body of stomach.   BONES: No acute osseous changes.       1.  Minimal interval worsening in perihilar edema/effusions. Correlate with fluid status. Life-support systems in satisfactory position.     Signed by: Doug Langley 12/4/2024 9:19 PM Dictation workstation:   [id number]    Electrocardiogram, 12-lead PRN ACS symptoms    Result Date: 12/3/2024  Atrial fibrillation with rapid ventricular response Low voltage QRS ST & T wave abnormality, consider inferolateral ischemia Abnormal ECG When compared with ECG of 21-NOV-2024 06:00, Atrial fibrillation has replaced Atrial flutter ST no longer elevated in Inferior leads T wave inversion less evident in Inferior leads T wave inversion less evident in Anterior leads Confirmed by Thal Naveed ([id number]) on 12/3/2024 1:47:13 PM    Electrocardiogram, 12-lead PRN ACS symptoms    Result Date: 12/3/2024  Atrial flutter with variable AV block Low voltage QRS Cannot rule out Anterior infarct (cited on or before 21-NOV-2024) Inferior injury pattern ** ** ACUTE MI / STEMI ** ** Consider right ventricular involvement in acute inferior infarct Abnormal ECG Confirmed by Thal Naveed ([id number]) on 12/3/2024 1:47:09 PM    Electrocardiogram, 12-lead PRN ACS symptoms    Result Date: 12/3/2024  Sinus tachycardia Possible Left atrial enlargement Left axis deviation Right bundle branch block Abnormal ECG When compared with ECG of 19-NOV-2024 17:48, Right bundle branch block is  now Present Confirmed by Naveed Link ([id number]) on 12/3/2024 10:46:30 AM    ECG 12 lead    Result Date: 12/3/2024  Normal sinus rhythm Possible Left atrial enlargement ST & T wave abnormality, consider anterolateral ischemia Prolonged QT Abnormal ECG When compared with ECG of 19-NOV-2024 07:38, T wave inversion now evident in Anterior leads Confirmed by Naveed Link ([id number]) on 12/3/2024 10:36:20 AM    ECG 12 lead    Result Date: 12/3/2024  Normal sinus rhythm Possible Anterior infarct , age undetermined ST & T wave abnormality, consider lateral ischemia Abnormal ECG When compared with ECG of 19-NOV-2024 06:31, Borderline criteria for Anterior infarct are now Present T wave inversion now evident in Lateral leads Confirmed by Naveed Link ([id number]) on 12/3/2024 10:31:15 AM    Electrocardiogram, 12-lead PRN ACS symptoms    Result Date: 12/3/2024  Normal sinus rhythm Nonspecific ST and T wave abnormality Abnormal ECG When compared with ECG of 19-NOV-2024 06:30, No significant change was found Confirmed by Naveed Link ([id number]) on 12/3/2024 10:30:35 AM    XR chest 1 view    Result Date: 12/2/2024  Interpreted By:  Doug Langley, STUDY: XR CHEST 1 VIEW; 12/2/2024 6:39 am   INDICATION: Signs/Symptoms:intubated.   COMPARISON: 12/01/2024.   ACCESSION NUMBER(S): [id number]   ORDERING CLINICIAN: MARIKA CARO   FINDINGS: Endotracheal tube in satisfactory position.   CARDIOMEDIASTINAL SILHOUETTE: Patient is status post median sternotomy.   LUNGS: Slight interval increase in left perihilar edema/effusions. No pneumothorax.   ABDOMEN: NG tube overlies the body of stomach.   BONES: No acute osseous changes.       1.  Slight interval increase in left-sided edema/effusions. No pneumothorax.     Signed by: Doug Langley 12/2/2024 5:51 PM Dictation workstation:   [id number]    Electrocardiogram, 12-lead PRN ACS symptoms    Result Date: 12/2/2024  Atrial fibrillation with rapid ventricular response Low voltage QRS Cannot rule out Anterior  infarct , age undetermined ST & T wave abnormality, consider inferolateral ischemia Abnormal ECG When compared with ECG of 21-NOV-2024 06:51, QRS duration has increased Minimal criteria for Anterior infarct are now Present T wave inversion more evident in Inferior leads T wave inversion less evident in Lateral leads Confirmed by Naveed Link ([id number]) on 12/2/2024 3:00:42 PM    Electrocardiogram, 12-lead PRN ACS symptoms    Result Date: 12/2/2024  Normal sinus rhythm Low voltage QRS Nonspecific ST abnormality Prolonged QT Abnormal ECG When compared with ECG of 27-NOV-2024 21:42, Sinus rhythm has replaced Atrial fibrillation Questionable change in QRS duration Minimal criteria for Anterior infarct are no longer Present    XR chest 1 view    Result Date: 12/1/2024  Interpreted By:  Gene Mendoza, STUDY: XR CHEST 1 VIEW;  12/1/2024 9:19 am   INDICATION: Signs/Symptoms:intubation.     COMPARISON: Chest radiograph dated 11/28/2024 and chest abdomen pelvic CT dated 11/18/2024.   ACCESSION NUMBER(S): [id number]   ORDERING CLINICIAN: MARIKA CARO   FINDINGS: AP radiograph of the chest   Interval advancement of  endotracheal tube now terminating 5.2 cm above the nisha. Right IJ CVC tip overlies the atriocaval junction. The enteric tube extends below the level of the diaphragm and beyond the field of view. There is a left jugular triple-lumen hemodialysis catheter with its tip at the level of the atriocaval junction. There is a medium sternotomy and the cerclage wires appear intact. Surgical clips project at the level of the left cardiac border.   CARDIOMEDIASTINAL SILHOUETTE: Cardiomediastinal silhouette is stable in size and configuration.   LUNGS: Similar mildly prominent interstitial lung markings with Kerley B-lines peripherally in the mid perihilar vascular congestion. New left-greater-than-right hazy airspace opacities in the lungs. Mild blunting of bilateral costophrenic angles. No evidence of pneumothorax   ABDOMEN: No  remarkable upper abdominal findings.   BONES: No acute osseous changes.       1.  Mildly prominent interstitial lung markings with Kerley B-lines peripherally in the mid perihilar vascular congestion consistent with pulmonary edema. 2. New left-greater-than-right hazy airspace opacities in the lungs, which may represent atelectasis/edema, superimposed infection can not be excluded. 3. Small bilateral pleural effusions. 4. Medical devices as detailed above.       MACRO: None   Signed by: Gene Mendoza 12/1/2024 10:01 AM Dictation workstation:   [id number]    XR chest 1 view    Result Date: 11/29/2024  Interpreted By:  Evin Riley, STUDY: XR CHEST 1 VIEW;  11/29/2024 12:20 am   INDICATION: Signs/Symptoms:Ett placement.     COMPARISON: November 28, 2024 at 9:41 p.m. portable AP chest   ACCESSION NUMBER(S): [id number]   ORDERING CLINICIAN: MARIKA CARO   FINDINGS: AP chest   The tip of the endotracheal tube is 6.2 cm above the nisha. Right IJ CVC tip overlies the atriocaval junction. The enteric tube extends below the level of the diaphragm and beyond the field of view. There is a left jugular triple-lumen hemodialysis catheter with its tip at the level of the atriocaval junction. There is a medium sternotomy and the cerclage wires appear intact. Surgical clips project at the level of the left cardiac border.   CARDIOMEDIASTINAL SILHOUETTE: Cardiomediastinal silhouette is stable in size and configuration.   LUNGS: Mildly prominent interstitial lung markings with Kerley B-lines peripherally in mild perihilar vascular congestion. There is mild blunting of the right costophrenic angle consistent with a small pleural effusion. No evidence of a pneumothorax. ABDOMEN: No remarkable upper abdominal findings.   BONES: No acute osseous changes.       1.  Mild changes of pulmonary edema persists. 2.  Medical devices as noted above.       MACRO: None   Signed by: Evin Riley 11/29/2024 8:16 AM Dictation workstation:    [id number]    XR abdomen 1 view    Result Date: 11/29/2024  Interpreted By:  Gene Mendoza and Ogievich Taessa STUDY: XR ABDOMEN 1 VIEW;  11/28/2024 11:51 pm   INDICATION: Signs/Symptoms:Partially upright. Look for dilated loops..     COMPARISON: Abdominal radiographs 11/24/2024   ACCESSION NUMBER(S): [id number]   ORDERING CLINICIAN: MARIKA CARO   FINDINGS: AP erect radiograph of the abdomen.   Enteric tube with distal tip and side port overlying the expected location of the gastric body.   Several gas-filled small and large bowel loops without evidence of distention. Nonobstructive bowel gas pattern. No gross evidence of free air is noted.   Postsurgical changes from median sternotomy. Partial visualization of right IJ central venous catheter with tip overlying the cavoatrial junction. Bibasilar atelectasis versus edema.   Osseous structures demonstrate no acute bony changes.       1. Nonobstructive bowel gas pattern. 2. Enteric tube with distal tip overlying the gastric body.   I personally reviewed the images/study and I agree with the findings as stated by Merlyn Tang DO, PGY-3. This study was interpreted at [place], [place].   MACRO: None   Signed by: Gene Mendoza 11/29/2024 6:29 AM Dictation workstation:   [id number]    XR chest 1 view    Result Date: 11/29/2024  Interpreted By:  Gene Mendoza and Ogievich Taessa STUDY: XR CHEST 1 VIEW;  11/28/2024 10:03 pm   INDICATION: Signs/Symptoms:Ett tibe pulled 2cm. Right chest foreign body likely external.     COMPARISON: Chest x-ray 11/28/2024   ACCESSION NUMBER(S): [id number]   ORDERING CLINICIAN: MARIKA CARO   FINDINGS: AP radiograph of the chest was provided.   LINES/TUBES/DEVICES: Interval retraction of endotracheal tube with tip now projecting 4.2 cm above the nisha. Right IJ CVC with tip overlying the cavoatrial junction. Enteric tube traverses below the diaphragm and out of the field of view. Postsurgical changes  from median sternotomy with intact cerclage wires. Surgical clips project over the cardiomediastinal silhouette.   CARDIOMEDIASTINAL SILHOUETTE: Cardiomediastinal silhouette is stable in size and configuration.   LUNGS: Previously questioned concave density of the right lateral chest wall is no longer evident and was external to the patient.   Mild perihilar vascular congestion with prominent interstitial lung markings. No sizable pleural effusion or pneumothorax.   ABDOMEN: No remarkable upper abdominal findings.   BONES: No acute osseous changes.       1. Interval retraction of endotracheal tube with satisfactory position. 2. Previously questioned concave density of the right lateral chest wall is no longer evident and was external to the patient. 3. Similar findings suggestive of mild pulmonary edema.   I personally reviewed the images/study and I agree with the findings as stated by Merlyn Tang DO, PGY-3. This study was interpreted at [place], [place].   MACRO: None   Signed by: Gene Mendoza 11/29/2024 6:22 AM Dictation workstation:   [id number]    XR chest 1 view    Result Date: 11/29/2024  Interpreted By:  Gene Mendoza,  and Kitty Zuleta STUDY: XR CHEST 1 VIEW;  11/28/2024 8:40 pm   INDICATION: Signs/Symptoms:post intubation.     COMPARISON: Chest radiograph dated 11/27/2024. CT chest dated 11/18/2024   ACCESSION NUMBER(S): [id number]   ORDERING CLINICIAN: MARIKA CARO   FINDINGS: AP radiograph of the chest was provided.   LINES/TUBES/DEVICES: Interval placement of endotracheal tube with distal tip projecting 2 cm above the nisha. Enteric tube traverses below the diaphragm and out of the field of view. Right IJ central venous catheter with distal tip projecting over the cavoatrial junction. Postsurgical changes median sternotomy with surgical clips overlying cardiomediastinal silhouette.   CARDIOMEDIASTINAL SILHOUETTE: Cardiomediastinal silhouette is normal in  size and configuration.   LUNGS: There is a new heterogenous hyperdense concave density overlying the right lateral chest wall which was not present on prior radiograph. Interval increase in right perihilar and infrahilar opacity. Similar to slightly increased hazy bibasilar opacities. No sizable pleural effusion or pneumothorax.   ABDOMEN: No remarkable upper abdominal findings.   BONES: No acute osseous changes.       1. Interval placement of endotracheal tube with distal tip projecting 2 cm above the nisha. Recommend slight retraction. 2. New heterogenous hyperdense concave density overlying the right lateral chest wall favored to be external to the patient. Recommend repeat chest radiograph with removal of clothing and devices for confirmation. 3. Slight interval increase in right perihilar and infrahilar as well as bibasilar opacities which may represent atelectasis and/or edema. Correlate with patient's volume status.   I personally reviewed the images/study and I agree with the findings as stated by Merlyn Tang DO, PGY-3. This study was interpreted at [place], [place].   MACRO: None   Signed by: Gene Mendoza 11/29/2024 6:09 AM Dictation workstation:   [id number]    XR chest 1 view    Result Date: 11/27/2024  Interpreted By:  Gene Mendoza, STUDY: XR CHEST 1 VIEW;  11/27/2024 5:52 pm   INDICATION: Signs/Symptoms:LIJ trialysis line placement.     COMPARISON: Chest radiograph dated 11/25/2024. And chest CT dated 11/18/2024.   ACCESSION NUMBER(S): [id number]   ORDERING CLINICIAN: MARIKA CARO   FINDINGS: AP radiograph of the chest was provided. Limited evaluation secondary to the bilateral costophrenic angles not within the field of view.   Left internal jugular approach central venous catheter tip projecting over the expected location of superior cavoatrial junction. The tip of endotracheal tube remains unchanged in satisfactory position 4.1 cm above the nisha. The  nasogastric tube extends below the level of diaphragm and tip extends beyond field of view. The side port projects below the level of the gastroesophageal junction. The esophageal temperature probe is looped at the level of pharynx noted previously. The patient is status median sternotomy with intact cerclage wires.   CARDIOMEDIASTINAL SILHOUETTE: Cardiomediastinal silhouette remains enlarged but unchanged..   LUNGS: Similar prominence and coarsening of interstitial lung marking bilaterally. No evidence of pneumothorax   ABDOMEN: No remarkable upper abdominal findings.   BONES: No acute osseous changes.       1.  Left internal jugular approach central venous catheter tip projecting over the expected location of superior cavoatrial junction 2. Prominence and coarsening of interstitial lung markings bilaterally, which correlate with pulmonary emphysema on CT dated 11/18/2020 with superimposed mild pulmonary edema. . 3. Medical devices as explained above. Of note, esophagus temperature probe is seen looped in the region of pharynx, positioning recommended.       MACRO: None   Signed by: Gene Mendoza 11/27/2024 6:37 PM Dictation workstation:   [id number]    US renal complete    Result Date: 11/27/2024  Interpreted By:  Abner Jimenez,  Hoda Sherman STUDY: US RENAL COMPLETE;  11/26/2024 2:56 pm   INDICATION: Signs/Symptoms:TRACY.     COMPARISON: Renal ultrasound 05/07/2024 and CT abdomen and pelvis 08/07/2020.   ACCESSION NUMBER(S): [id number]   ORDERING CLINICIAN: MARIKA CARO   TECHNIQUE: Multiple images of the kidneys were obtained  .   FINDINGS: Examination is limited by overlying bowel gas. RIGHT KIDNEY: The right kidney measures 9.19 cm in length. Lobular appearance of the right kidney. Increased renal cortical echogenicity. Thickness is within normal limits. No hydronephrosis is present; no evidence of nephrolithiasis. There is an anechoic well-circumscribed cystic structure within the superior pole  of the right kidney measuring 0.5 x 0.5 x 0.6 cm, compatible with simple renal cyst.   LEFT KIDNEY: The left kidney measures 7.8 cm in length. Lobular appearance of the left kidney. Increased renal cortical echogenicity. Thickness is within normal limits. No hydronephrosis is present; no evidence of nephrolithiasis.   BLADDER: Partially visualized Orozco catheter in the urinary bladder, limiting evaluation..   Incidental findings of bilateral pleural effusions and trace abdominal ascites.       1. Increased renal cortical echogenicity and lobular appearance of the kidneys bilaterally, likely medical renal disease, with relatively atrophic left kidney. No hydronephrosis. 2. Partially visualized bilateral pleural effusions and trace abdominal ascites.   I personally reviewed the images/study and I agree with the findings as stated by Lane Barton MD (Radiology Resident). This study was interpreted at [place], [place].   MACRO: None   Signed by: Abner Jimenez 11/27/2024 5:02 PM Dictation workstation:   [id number]    Electrocardiogram, 12-lead PRN ACS symptoms    Result Date: 11/25/2024  Normal sinus rhythm Low voltage QRS Marked ST abnormality, possible inferior subendocardial injury Prolonged QT Abnormal ECG When compared with ECG of 18-NOV-2024 22:57, T wave inversion no longer evident in Inferior leads Confirmed by Martinez Linko ([id number]) on 11/25/2024 3:24:12 PM    Electrocardiogram, 12-lead PRN ACS symptoms    Result Date: 11/25/2024  Normal sinus rhythm Possible Left atrial enlargement Low voltage QRS Septal infarct , age undetermined ST & T wave abnormality, consider inferior ischemia ST & T wave abnormality, consider anterolateral ischemia Prolonged QT Abnormal ECG No previous ECGs available Confirmed by Martinez Linko ([id number]) on 11/25/2024 3:24:09 PM    XR chest 1 view    Result Date: 11/25/2024  Interpreted By:  Evin Riley, STUDY: XR CHEST 1  VIEW;  11/25/2024 8:58 am   INDICATION: Signs/Symptoms:intubated with PNA concerns.     COMPARISON: Chest dated November 24, 2024   ACCESSION NUMBER(S): [id number]   ORDERING CLINICIAN: MARIKA CARO   FINDINGS: Semi-erect AP portable chest   The tip of the endotracheal tube remains unchanged in satisfactory position 4.1 cm above the nisha. The nasogastric tube extends below the level of the diaphragm and the tip extends beyond the field of view. The side port projects below the level of the gastroesophageal junction. The tip of the right IJ catheter remains at the level of the atriocaval junction. The esophageal temperature probe is looped at the level of the pharynx has noted previously. The patient is status median sternotomy.   CARDIOMEDIASTINAL SILHOUETTE: The cardiomediastinal silhouette remains enlarged but unchanged..   LUNGS: Interstitial pulmonary lung markings remain prominent there is mild blunting of the right costophrenic angle consistent with a small pleural effusion. No evidence of pneumothorax..   ABDOMEN: No remarkable upper abdominal findings.   BONES: No acute osseous changes.       1. Tubes and lines as noted above. Temperature probe looped at the level of the pharynx. 2. Prominent interstitial lung markings, cardiomegaly and a small right pleural effusion consistent with congestive cardiac failure.       MACRO: None   Signed by: Evin Riley 11/25/2024 9:55 AM Dictation workstation:   [id number]    XR abdomen 1 view    Result Date: 11/24/2024  Interpreted By:  Paulino Wilson, STUDY: XR ABDOMEN 1 VIEW;  11/24/2024 9:17 am   INDICATION: Signs/Symptoms:Ileus.   COMPARISON: Abdominal radiographs 11/23/2024; chest, and abdomen CT scan 11/18/2024   ACCESSION NUMBER(S): [id number]   ORDERING CLINICIAN: DAVID BROCK   FINDINGS: 2 AP radiographs of abdomen available for interpretation.   Enteric tube seen coursing below diaphragm with tip overlying expected location of gastric body, however, the  side hole projects at/just past GE junction region. Prominent calcified atherosclerosis of abdominal aorta and its visualized branches.   Nonobstructive bowel gas pattern. Interval improvement in previously noted gaseous prominence of multiple bowel loops. Limited evaluation of pneumoperitoneum on supine imaging, however no gross evidence of free air is noted.   Osseous structures demonstrate no acute bony changes.       1.  Enteric tube tip overlying expected location of gastric body, however, the side hole projects at/just past GE junction region and slight advancement is recommended. 2. Nonobstructive bowel gas pattern.   Signed by: Paulino Wilson 11/24/2024 11:04 AM Dictation workstation:   [id number]    XR chest 1 view    Result Date: 11/24/2024  Interpreted By:  Paulino Wilson, STUDY: XR CHEST 1 VIEW;  11/24/2024 9:17 am   INDICATION: Signs/Symptoms:intubated.   COMPARISON: Chest radiograph 11/20/2025 and chest and abdominal CT scan 11/18/2024   ACCESSION NUMBER(S): [id number]   ORDERING CLINICIAN: DAVID BROCK   FINDINGS: AP radiograph of the chest. Patient is again slightly rotated towards right.   Endotracheal tube tip now terminates 4.1 cm superior to nisha. Enteric tube is again seen coursing below diaphragm and tip not included in field of view. Right IJ approach central venous catheter with tip projecting over lower SVC, unchanged. Esophageal temperature probe is seen looping in the region of pharynx and repositioning recommended. Status post median sternotomy.   CARDIOMEDIASTINAL SILHOUETTE: The cardiomediastinal silhouette is persistently enlarged, grossly stable in size and configuration.   LUNGS: Similar prominence and coarsening of lung markings bilaterally, which correlate with pulmonary emphysema on comparative CT scan with or without superimposed mild pulmonary edema. Trace/small pleural effusions not excluded. There is no pneumothorax.   ABDOMEN: No remarkable upper abdominal findings.    BONES: No acute osseous abnormality.       1. Medical devices as above. Of note, esophageal temperature probe is seen looping in the region of pharynx and repositioning recommended. 2. Similar prominence and coarsening of lung markings bilaterally, which correlate with pulmonary emphysema on comparative CT scan with or without superimposed mild pulmonary edema. 3. Trace/small pleural effusions not excluded.   Signed by: Paulino Wilson 11/24/2024 11:01 AM Dictation workstation:   [id number]    XR abdomen 1 view    Result Date: 11/23/2024  Interpreted By:  Doug Langley, STUDY: XR ABDOMEN 1 VIEW; 11/23/2024 12:33 pm   INDICATION: Signs/Symptoms:c/f ileus.   COMPARISON: 11/20/2024   ACCESSION NUMBER(S): [id number]   ORDERING CLINICIAN: DAVID BROCK   FINDINGS: NG tube  overlies the body of stomach. Air-filled loops of nonobstructed loops of small large bowel. Limited evaluation of pneumoperitoneum on supine imaging, however no gross evidence of free air is noted. Extensive vascular calcifications of the iliac and renal vessels. Linear catheter overlies the pelvis and correlate with Orozco catheter placement or surgical drain..   There is mild basilar interstitial edema.   Osseous structures demonstrate no acute bony changes.       1.  NG tube overlies the body of stomach. Air-filled nondistended loops of bowel. 2. Extensive atherosclerotic calcification of the iliac vessels   Signed by: Doug Langley 11/23/2024 5:31 PM Dictation workstation:   [id number]    XR chest 1 view    Result Date: 11/21/2024  Interpreted By:  Gene Mendoza and Beyersdorf Conner STUDY: XR CHEST 1 VIEW;  11/20/2024 9:16 pm   INDICATION: Signs/Symptoms:rales.   COMPARISON: Single-view chest 11/20/2024   ACCESSION NUMBER(S): [id number]   ORDERING CLINICIAN: BLANCA BASS   FINDINGS: AP radiograph of the chest was provided.   Endotracheal tube terminates 3.3 cm superior to the nisha. Right internal jugular central venous catheter  tip projects over the expected location of the lower SVC. Enteric tube courses below the diaphragm and with its tip terminating below the limits of the exam. Postsurgical changes of the median sternotomy again noted. Sternal cerclage wires are intact.   CARDIOMEDIASTINAL SILHOUETTE: Cardiomediastinal silhouette is normal in size and configuration. Mild aortic knob calcifications.   LUNGS: Faint hazy opacity of the left lung base. No sizable pneumothorax.   ABDOMEN: No remarkable upper abdominal findings.   BONES: No acute osseous changes.       1.  Similar appearance of faint hazy opacity of the left lung field, possibly representing pleural effusion with adjacent consolidation/atelectasis. 2. Medical devices as above.   I personally reviewed the image(s)/study and resident interpretation. I agree with the findings as stated by resident Dylon Lyons. Data analyzed and images interpreted at [facility], [place], [place].   MACRO: None   Signed by: Gene Mendoza 11/21/2024 12:32 AM Dictation workstation:   [id number]    XR abdomen 1 view    Result Date: 11/20/2024  Interpreted By:  Gene Mendoza and Awan Komal STUDY: XR ABDOMEN 1 VIEW;  11/20/2024 11:24 am   INDICATION: Signs/Symptoms:OG placement.     COMPARISON: CT chest 11/18/2024   ACCESSION NUMBER(S): [id number]   ORDERING CLINICIAN: DAVID BROCK   FINDINGS: There is an enteric tube noted with the tip terminating in the gastric body.   Nonobstructive bowel gas pattern. Limited evaluation of pneumoperitoneum on supine imaging, however no gross evidence of free air is noted.   Mild basilar left lung atelectasis in the visualized lungs.   Osseous structures demonstrate no acute bony changes.       1.  An enteric tube noted, in place with the tip terminating in the gastric body.   I personally reviewed the images/study and I agree with the findings as stated by Resident Lakshmi Spain. This study was interpreted at [place]  [place], [place].   MACRO: None   Signed by: Gene Mendoza 11/20/2024 2:19 PM Dictation workstation:   [id number]    XR chest 1 view    Result Date: 11/20/2024  Interpreted By:  Gene Mendoza  and Serena Osborne STUDY: XR CHEST 1 VIEW;  11/20/2024 6:15 am   INDICATION: Signs/Symptoms:CVC placement.   COMPARISON: Single-view chest 11/20/2024 0319   ACCESSION NUMBER(S): [id number]   ORDERING CLINICIAN: NATHANIEL RIOS   FINDINGS: AP radiograph of the chest was provided.   Endotracheal tube terminates 3.8 cm superior to the nisha. Interval placement of the right IJ central venous catheter, with its tip terminating over the cavoatrial junction. Postsurgical changes of median sternotomy again noted. Sternal cerclage wires are intact.   CARDIOMEDIASTINAL SILHOUETTE: Cardiomediastinal silhouette is stable in size and configuration.   LUNGS: Interval improvement of hazy opacification of the left lower lung field with  obscuration of the left costophrenic angle. No evidence of pneumothorax bilaterally.   ABDOMEN: No remarkable upper abdominal findings.   BONES: No acute osseous changes.       1.  Interval placement of the right IJ central venous catheter with its tip terminating over the cavoatrial junction. 2. Interval improvement of hazy opacification of the left lower lung fields represents atelectasis/infiltrate.   I personally reviewed the image(s)/study and resident interpretation. I agree with the findings as stated by resident Dylon Lyons. Data analyzed and images interpreted at [facility], [place], [place].   MACRO: None   Signed by: Gene Mendoza 11/20/2024 9:08 AM Dictation workstation:   [id number]    XR chest 1 view    Result Date: 11/20/2024  Interpreted By:  Gene Mendoza and Afshari Mirak Sohrab STUDY: XR CHEST 1 VIEW;  11/20/2024 3:25 am   INDICATION: Signs/Symptoms:ET tube placement.     COMPARISON: CT chest 11/18/2024. Chest x-ray  11/19/2024.   ACCESSION NUMBER(S): [id number]   ORDERING CLINICIAN: DAVID BROCK   FINDINGS: AP radiograph of the chest was provided.   Postsurgical changes of median sternotomy again noted. The cerclage wires are intact. Endotracheal tube tip is approximately 4.4 cm above the nisha. Multiple monitoring devices project over the field of view.   CARDIOMEDIASTINAL SILHOUETTE: Cardiomediastinal silhouette is stable in size and configuration.   LUNGS: There is hazy opacification of the left lower lung fields with obscuration of the left costophrenic angle. No evidence of pneumothorax bilaterally.   ABDOMEN: No remarkable upper abdominal findings.   BONES: No acute osseous changes.       1.  Hazy opacification of the left lower lung fields may represent layering pleural effusion with atelectasis/infiltrate. Findings are new/worsened when compared to the prior exam. 2. Right lung is essentially clear. 3. Interval intubation with the probe. Positioning of the endotracheal tube.   I personally reviewed the images/study and I agree with the findings as stated by resident physician Dr. Larry Majano . This study was interpreted at [place], [place].   MACRO: None   Signed by: Gene Mendoza 11/20/2024 8:44 AM Dictation workstation:   [id number]    XR chest 1 view    Result Date: 11/19/2024  Interpreted By:  Paulino Wilson and Awan Komal STUDY: XR CHEST 1 VIEW;  11/19/2024 1:24 pm   INDICATION: Signs/Symptoms:shortness of breath.   COMPARISON: CT chest 11/18/2024, chest x-ray 08/07/2023   ACCESSION NUMBER(S): [id number]   ORDERING CLINICIAN: DAVID BROCK   FINDINGS: AP radiograph of the chest was provided. Status post prior median sternotomy with intact sternal cerclage wires.   CARDIOMEDIASTINAL SILHOUETTE: Cardiomediastinal silhouette is stable in size and configuration. Mild aortic knob calcification.   LUNGS: There is new diffuse mild interstitial prominence  identified. Mild blunting of bilateral costophrenic angles, suggestive of trace/small pleural effusions. Background upper lung predominant emphysema better seen on comparative CT scan. There is no pneumothorax.   ABDOMEN: No remarkable upper abdominal findings.   BONES: No acute osseous changes.       1.  Suggestion of mild diffuse interstitial pulmonary edema with trace/small bilateral pleural effusions, against background upper lung predominant emphysema. Correlate with patient's volume status.   I personally reviewed the images/study and I agree with the findings as stated by Resident Lakshmi Spain. This study was interpreted at [facility], [place], [place].   MACRO: None   Signed by: Paulino Wilson 11/19/2024 2:16 PM Dictation workstation:   [id number]    Transthoracic Echo (TTE) Complete    Result Date: 11/19/2024   [facility], [address] [place] [address]                Tel [phone] and Fax [phone] TRANSTHORACIC ECHOCARDIOGRAM REPORT  Patient Name:       MARGARET ADRIA       Reading Physician:    [id number] Thalia Velásquez MD Study Date:         11/19/2024          Ordering Provider:    [id number] DAVID BROCK MRN/PID:            [id number]            Fellow: Accession#:         [id number]        Nurse: Date of Birth/Age:  1960 / 64 years Sonographer:          Markel Ledezma RDCS Gender assigned at  F                   Additional Staff: Birth: Height:             157.48 cm           Admit Date: Weight:             48.99 kg            Admission Status:     Inpatient -                                                               Routine BSA / BMI:          1.47 m2 / 19.75     Encounter#:           [id number]                     kg/m2 Blood Pressure:     95/52 mmHg           Department Location:  [facility] Study Type:    TRANSTHORACIC ECHO (TTE) COMPLETE Diagnosis/ICD: Dissection of unspecified site of aorta-I71.00 Indication:    aortic aneurysm CPT Code:      Echo Complete w Full Doppler-91993 Patient History: Pertinent History: DVT/PE, HLD, HTN, CABG x4 1997, T2DM. Study Detail: The following Echo studies were performed: 2D, Doppler, M-Mode and               color flow. Technically challenging study due to patient lying in               supine position, body habitus, the patient's lack of cooperation,               poor acoustic windows, prominent lung artifact and laying on right               side. Definity used as a contrast agent for endocardial border               definition. Total contrast used for this procedure was 2 mL via IV               push. Unable to obtain suprasternal notch view. The patient was               sedated.  Critical Event Critical Event: Test was completed as per department protocol. Critical Finding: Mitral regurgitation. Time Test was Completed: 10:11:00 AM Notified: Dr. Velásquez. Attending notification time: 10:15:00 AM  PHYSICIAN INTERPRETATION: Left Ventricle: Left ventricular ejection fraction is mildly decreased, by visual estimate at 45-50%. There is mild concentric left ventricular hypertrophy. Left venticular wall motion is abnormal. The left ventricular cavity size is normal. There is normal septal and mildly increased posterior left ventricular wall thickness. Left ventricular diastolic filling cannot be determined, due to severe mitral regurgitation. LV Wall Scoring: The basal and mid anterolateral wall, basal inferoseptal segment, and basal inferior segment are hypokinetic. Left Atrium: The left atrium is severely dilated. Right Ventricle: The right ventricle was not well visualized. There is reduced right ventricular systolic function. Right Atrium: The right atrium is normal in size. Aortic Valve: The aortic valve is trileaflet.  There is no evidence of aortic valve regurgitation. The peak instantaneous gradient of the aortic valve is 2 mmHg. Mitral Valve: The mitral valve is mildly thickened. There is mild mitral annular calcification. There is moderate to severe mitral valve regurgitation which is posteriorly directed. The mitral regurgitant orifice area is 31 mm2. The mitral regurgitant volume is 40.13 ml. 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 There is at least systolic blunting of the PV flow if not flow reversal. Mild doming of the AML but no mitral stenosis. Tricuspid Valve: The tricuspid valve is structurally normal. There is moderate tricuspid regurgitation. The Doppler estimated RVSP is moderately elevated at 61.5 mmHg. Pulmonic Valve: The pulmonic valve is structurally normal. There is physiologic pulmonic valve regurgitation. Pericardium: Trivial to small pericardial effusion. Aorta: The aortic root is normal. The aortic root appears normal in size and measures 3.00 cm. Systemic Veins: The inferior vena cava appears dilated, with IVC inspiratory collapse less than 50%. In comparison to the previous echocardiogram(s): There are no prior studies on this patient for comparison purposes. No prior echocardiogram available for comparison.  CONCLUSIONS:  1. Basal and mid anterolateral wall, basal inferoseptal segment, and basal inferior segment are abnormal.  2. Left ventricular ejection fraction is mildly decreased, by visual estimate at 45-50%.  3. Abnormal left venticular wall motion.  4. Left ventricular diastolic filling cannot be determined, due to severe mitral regurgitation.  5. There is reduced right ventricular systolic function.  6. The left atrium is severely dilated.  7. 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 There is at least systolic blunting of the PV flow if not flow reversal. Mild doming of the AML but no mitral stenosis.  8. Moderate to severe mitral valve regurgitation.  9. Moderate tricuspid regurgitation visualized. 10. Moderately elevated right ventricular systolic pressure. 11. Normal aortic root. 12. Primary service notified of findings at the time of reporting. 13. No prior echocardiogram available for comparison. QUANTITATIVE DATA SUMMARY:  2D MEASUREMENTS:          Normal Ranges: Ao Root d:       3.00 cm  (2.0-3.7cm) IVSd:            0.90 cm  (0.6-1.1cm) LVPWd:           1.00 cm  (0.6-1.1cm) LVIDd:           4.70 cm  (3.9-5.9cm) LVIDs:           3.60 cm LV Mass Index:   104 g/m2 LVEDV Index:     58 ml/m2 LV % FS          23.4 %  LA VOLUME:                    Normal Ranges: LA Vol A4C:        115.8 ml   (22+/-6mL/m2) LA Vol A2C:        94.9 ml LA Vol BP:         105.1 ml LA Vol Index A4C:  78.7ml/m2 LA Vol Index A2C:  64.5 ml/m2 LA Vol Index BP:   71.4 ml/m2 LA Area A4C:       28.9 cm2 LA Area A2C:       26.1 cm2 LA Major Axis A4C: 6.1 cm LA Major Axis A2C: 6.1 cm LA Volume Index:   71.5 ml/m2  RA VOLUME BY A/L METHOD:          Normal Ranges: RA Area A4C:             13.2 cm2  AORTA MEASUREMENTS:         Normal Ranges: Asc Ao, d:          3.30 cm (2.1-3.4cm)  LV SYSTOLIC FUNCTION BY 2D PLANIMETRY (MOD):                      Normal Ranges: EF-A4C View:    42 % (>=55%) EF-A2C View:    44 % EF-Biplane:     42 % EF-Visual:      48 % LV EF Reported: 48 %  LV DIASTOLIC FUNCTION:            Normal Ranges: MV Peak E:             1.26 m/s   (0.7-1.2 m/s) MV Peak A:             0.36 m/s   (0.42-0.7 m/s) E/A Ratio:             3.51       (1.0-2.2) MV A Dur:              85.00 msec MV DT:                 195 msec   (150-240 msec)  MITRAL VALVE:           Normal Ranges: MV mean P.0 mmHg (<2mmHg) MV DT:        195 msec (150-240msec)  MITRAL INSUFFICIENCY:             Normal Ranges: PISA Radius:          0.7 cm MR VTI:               128.33 cm MR Vmax:              416.00 cm/s MR Alias Neto:         38.5 cm/s MR Volume:            40.13 ml MR Flow Rt:           130.09 ml/s MR EROA:              31 mm2  AORTIC VALVE:           Normal Ranges: AoV Vmax:      0.72 m/s (<=1.7m/s) AoV Peak P.1 mmHg (<20mmHg) LVOT Max Neto:  0.48 m/s (<=1.1m/s) LVOT VTI:      11.60 cm LVOT Diameter: 1.70 cm  (1.8-2.4cm) AoV Area,Vmax: 1.50 cm2 (2.5-4.5cm2)  RIGHT VENTRICLE: RV Basal 4.40 cm TAPSE:   13.9 mm RV s'    0.06 m/s  TRICUSPID VALVE/RVSP:          Normal Ranges: Peak TR Velocity:     3.41 m/s RV Syst Pressure:     62 mmHg  (< 30mmHg) IVC Diam:             2.70 cm  PULMONIC VALVE:          Normal Ranges: PV Max Neto:     0.6 m/s  (0.6-0.9m/s) PV Max P.2 mmHg  [id number] Thalia Velásquez MD Electronically signed on 2024 at 10:42:59 AM  Wall Scoring  ** Final **       I have reviewed the imaging above as it pertains to the patient's surgical concerns and agree with the radiologist's interpretation.         ASSESSMENT  63 YO F with PMH pancreatitis, DVT/PE, HLD, HTN, CABGx4 , T2DM, GERD, PAD, chronic mesenteric ischemia, tobacco use who presented to [facility] on [facility] as a transfer from [facility] with chest, back, and abdominal pain. 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 Vascular and Cardiac surgery following not offering intervention due to patient frailty and high risk nature of the procedure. CICU course c/b PEA arrest . Given the prolonged hospital course ongoing GOC discussion with family to determine direction of care. Patient option trach/ PEG with ultimate goal of LTAC over comfort care at  this time. [facility] Surgery Consulted for PEG tube placement.     PLAN:  - will be available for PEG placement in the OR with ENT   - Hold TF at midnight  - consent to be obtained   - rest of care per ICU  - Please call with any questions or concerns    Patient's exam, labs, and findings discussed with Dr. Sinha, who agrees with the plan as described above.    Monae Bettencourt MD  PGY-3 General Surgery

## 2024-12-05 NOTE — PROGRESS NOTES
Social Work Progress Note  NORBERTO was informed by the team resident that family wanted to pursue LTACH services. SW called son Seth Richter, mailbox was full. SW sent a text message asking for a call back. Bedside Nurse also asked patient's son to give SW a call. NORBERTO to follow up.   MIGUEL ROCK

## 2024-12-05 NOTE — H&P (VIEW-ONLY)
General Surgery Consult Note    Chief Complaint/Reason for Visit: PEG tube placement    HPI:  63 YO F with PMH pancreatitis, DVT/PE, HLD, HTN, CABGx4 1997, T2DM, GERD, PAD, chronic mesenteric ischemia, tobacco use who presented to [facility] on 11/18/2024 as a transfer from [facility] with chest, back, and abdominal pain. 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 Vascular and Cardiac surgery following not offering intervention due to patient frailty and high risk nature of the procedure. CICU course c/b PEA arrest 11/20. Given the prolonged hospital course ongoing GOC discussion with family to determine direction of care. Patient option trach/ PEG with ultimate goal of LTAC over comfort care at this time. [facility] Consulted for PEG tube placement.   Patient remains intubated and sedated at this time. Following command. Off pressors.     ROS unable to be performed due to patient status    PMH:  Pancreatitis  DVT/PE  HLD  HTN  T2DM  GERD  PAD  chronic mesenteric ischemia  tobacco use    PSH:  CABGx4 1997    Soc Hx:  Social History     Socioeconomic History    Marital status: Unknown     Spouse name: Not on file    Number of children: Not on file    Years of education: Not on file    Highest education level: Not on file   Occupational History    Not on file   Tobacco Use    Smoking status: Not on file    Smokeless tobacco: Not on file   Substance and Sexual Activity    Alcohol use: Not on file    Drug use: Not on file    Sexual activity: Not on file   Other Topics Concern    Not on file   Social History Narrative    Not on file     Social Drivers of Health     Financial Resource Strain: Low Risk  (11/20/2024)    Overall Financial Resource Strain (CARDIA)     Difficulty of Paying Living Expenses: Not very hard   Food Insecurity: No Food Insecurity (11/20/2024)     Hunger Vital Sign     Worried About Running Out of Food in the Last Year: Never true     Ran Out of Food in the Last Year: Never true   Transportation Needs: No Transportation Needs (11/20/2024)    PRAPARE - Transportation     Lack of Transportation (Medical): No     Lack of Transportation (Non-Medical): No   Physical Activity: Not on file   Stress: Not on file   Social Connections: Not on file   Intimate Partner Violence: Not on file   Housing Stability: Low Risk  (11/20/2024)    Housing Stability Vital Sign     Unable to Pay for Housing in the Last Year: No     Number of Times Moved in the Last Year: 0     Homeless in the Last Year: No     Fam Hx:  No family history on file.   Allergies:  No Known Allergies  Current Medications:  No current facility-administered medications on file prior to encounter.     Current Outpatient Medications on File Prior to Encounter   Medication Sig Dispense Refill    acetaminophen (Tylenol) 325 mg tablet Take 2 tablets (650 mg) by mouth every 6 hours if needed (pain).      amLODIPine (Norvasc) 10 mg tablet Take 1 tablet (10 mg) by mouth once daily.      aspirin 81 mg EC tablet Take 1 tablet (81 mg) by mouth once daily.      atorvastatin (Lipitor) 80 mg tablet Take 1 tablet (80 mg) by mouth once daily.      clopidogrel (Plavix) 75 mg tablet Take 1 tablet (75 mg) by mouth once daily.      isosorbide mononitrate ER (Imdur) 30 mg 24 hr tablet Take 1 tablet (30 mg) by mouth once daily. Do not crush or chew.      lisinopril 2.5 mg tablet Take 1 tablet (2.5 mg) by mouth once daily.      metFORMIN (Glucophage) 1,000 mg tablet Take 1 tablet (1,000 mg) by mouth 2 times a day.      metoprolol tartrate (Lopressor) 50 mg tablet Take 1 tablet by mouth 2 times a day.      pantoprazole (ProtoNix) 40 mg EC tablet Take 1 tablet (40 mg) by mouth once daily. Do not crush, chew, or split.           Objective   Vitals:  Temp:  [35.4 °C (95.7 °F)-36.3 °C (97.3 °F)] 35.4 °C (95.7 °F)  Heart Rate:   [] 113  Resp:  [10-19] 14  Arterial Line BP 1: ()/(47-65) 100/51  FiO2 (%):  [30 %] 30 %    Physical Exam:  GEN: No acute distress. Intubated and sedated  HEENT: Sclera anicteric. Moist mucous membranes.  RESP: Breathing non-labored, equal chest rise. intubated  CV: Regular rate, normotensive  GI: Abdomen soft, nondistended, nontender. OG tube in place  MSK: No gross deformities. Moves all extremities spontaneously.  NEURO: Alert and oriented x3. No focal deficits.  PSYCH: Appropriate mood and affect.  SKIN: No rashes or lesions.    Labs:  Results for orders placed or performed during the hospital encounter of 11/18/24 (from the past 24 hours)   POCT GLUCOSE   Result Value Ref Range    POCT Glucose 189 (H) 74 - 99 mg/dL   POCT GLUCOSE   Result Value Ref Range    POCT Glucose 186 (H) 74 - 99 mg/dL   POCT GLUCOSE   Result Value Ref Range    POCT Glucose 182 (H) 74 - 99 mg/dL   BLOOD GAS ARTERIAL FULL PANEL   Result Value Ref Range    POCT pH, Arterial 7.41 7.38 - 7.42 pH    POCT pCO2, Arterial 41 38 - 42 mm Hg    POCT pO2, Arterial 75 (L) 85 - 95 mm Hg    POCT SO2, Arterial 97 94 - 100 %    POCT Oxy Hemoglobin, Arterial 94.1 94.0 - 98.0 %    POCT Hematocrit Calculated, Arterial 23.0 (L) 36.0 - 46.0 %    POCT Sodium, Arterial 127 (L) 136 - 145 mmol/L    POCT Potassium, Arterial 4.4 3.5 - 5.3 mmol/L    POCT Chloride, Arterial 96 (L) 98 - 107 mmol/L    POCT Ionized Calcium, Arterial 1.11 1.10 - 1.33 mmol/L    POCT Glucose, Arterial 202 (H) 74 - 99 mg/dL    POCT Lactate, Arterial 0.8 0.4 - 2.0 mmol/L    POCT Base Excess, Arterial 1.2 -2.0 - 3.0 mmol/L    POCT HCO3 Calculated, Arterial 26.0 22.0 - 26.0 mmol/L    POCT Hemoglobin, Arterial 7.7 (L) 12.0 - 16.0 g/dL    POCT Anion Gap, Arterial 9 (L) 10 - 25 mmo/L    Patient Temperature 37.0 degrees Celsius    FiO2 30 %   CBC and Auto Differential   Result Value Ref Range    WBC 10.7 4.4 - 11.3 x10*3/uL    nRBC 0.2 (H) 0.0 - 0.0 /100 WBCs    RBC 2.34 (L) 4.00 - 5.20  x10*6/uL    Hemoglobin 7.0 (L) 12.0 - 16.0 g/dL    Hematocrit 22.1 (L) 36.0 - 46.0 %    MCV 94 80 - 100 fL    MCH 29.9 26.0 - 34.0 pg    MCHC 31.7 (L) 32.0 - 36.0 g/dL    RDW 17.1 (H) 11.5 - 14.5 %    Platelets 275 150 - 450 x10*3/uL    Neutrophils % 81.3 40.0 - 80.0 %    Immature Granulocytes %, Automated 0.5 0.0 - 0.9 %    Lymphocytes % 6.5 13.0 - 44.0 %    Monocytes % 10.1 2.0 - 10.0 %    Eosinophils % 1.2 0.0 - 6.0 %    Basophils % 0.4 0.0 - 2.0 %    Neutrophils Absolute 8.70 (H) 1.20 - 7.70 x10*3/uL    Immature Granulocytes Absolute, Automated 0.05 0.00 - 0.70 x10*3/uL    Lymphocytes Absolute 0.69 (L) 1.20 - 4.80 x10*3/uL    Monocytes Absolute 1.08 (H) 0.10 - 1.00 x10*3/uL    Eosinophils Absolute 0.13 0.00 - 0.70 x10*3/uL    Basophils Absolute 0.04 0.00 - 0.10 x10*3/uL   Type and screen   Result Value Ref Range    ABO TYPE AB     Rh TYPE POS     ANTIBODY SCREEN NEG    POCT GLUCOSE   Result Value Ref Range    POCT Glucose 194 (H) 74 - 99 mg/dL   Renal function panel   Result Value Ref Range    Glucose 189 (H) 74 - 99 mg/dL    Sodium 129 (L) 136 - 145 mmol/L    Potassium 4.2 3.5 - 5.3 mmol/L    Chloride 98 98 - 107 mmol/L    Bicarbonate 25 21 - 32 mmol/L    Anion Gap 10 10 - 20 mmol/L    Urea Nitrogen 36 (H) 6 - 23 mg/dL    Creatinine 5.14 (H) 0.50 - 1.05 mg/dL    eGFR 9 (L) >60 mL/min/1.73m*2    Calcium 7.1 (L) 8.6 - 10.6 mg/dL    Phosphorus 3.2 2.5 - 4.9 mg/dL    Albumin 2.2 (L) 3.4 - 5.0 g/dL   Magnesium   Result Value Ref Range    Magnesium 1.88 1.60 - 2.40 mg/dL   POCT GLUCOSE   Result Value Ref Range    POCT Glucose 192 (H) 74 - 99 mg/dL   Renal function panel   Result Value Ref Range    Glucose 225 (H) 74 - 99 mg/dL    Sodium 130 (L) 136 - 145 mmol/L    Potassium 5.3 3.5 - 5.3 mmol/L    Chloride 95 (L) 98 - 107 mmol/L    Bicarbonate 22 21 - 32 mmol/L    Anion Gap 18 10 - 20 mmol/L    Urea Nitrogen 36 (H) 6 - 23 mg/dL    Creatinine 5.57 (H) 0.50 - 1.05 mg/dL    eGFR 8 (L) >60 mL/min/1.73m*2    Calcium 8.0  (L) 8.6 - 10.6 mg/dL    Phosphorus 3.6 2.5 - 4.9 mg/dL    Albumin 2.5 (L) 3.4 - 5.0 g/dL   Magnesium   Result Value Ref Range    Magnesium 2.35 1.60 - 2.40 mg/dL   ECG 12 Lead   Result Value Ref Range    Ventricular Rate 155 BPM    Atrial Rate 214 BPM    QRS Duration 110 ms    QT Interval 298 ms    QTC Calculation(Bazett) 478 ms    R Axis 63 degrees    T Axis -87 degrees    QRS Count 25 beats    Q Onset 211 ms    T Offset 360 ms    QTC Fredericia 408 ms   Protime-INR   Result Value Ref Range    Protime 11.9 9.8 - 12.8 seconds    INR 1.1 0.9 - 1.1   POCT GLUCOSE   Result Value Ref Range    POCT Glucose 219 (H) 74 - 99 mg/dL       Recent imaging results:  ECG 12 Lead    Result Date: 12/5/2024  Atrial fibrillation with rapid ventricular response with premature ventricular or aberrantly conducted complexes Low voltage QRS Incomplete left bundle branch block Marked ST abnormality, possible inferolateral subendocardial injury Abnormal ECG When compared with ECG of 02-DEC-2024 13:18, Atrial fibrillation has replaced Sinus rhythm Vent. rate has increased BY  80 BPM Incomplete left bundle branch block is now Present    XR chest 1 view    Result Date: 12/4/2024  Interpreted By:  Doug Langley, STUDY: XR CHEST 1 VIEW; 12/4/2024 7:12 am   INDICATION: Signs/Symptoms:intubated.   COMPARISON: 12/03/2024.   ACCESSION NUMBER(S): [id number]   ORDERING CLINICIAN: MARIKA CARO   FINDINGS: Endotracheal tube in satisfactory position. Right IJ line overlies the SVC. Left dual-lumen catheter overlies the SVC.   CARDIOMEDIASTINAL SILHOUETTE: Patient is status post median sternotomy.   LUNGS: Slight interval worsening in perihilar interstitial edema/effusion. No pneumothorax.   ABDOMEN: No remarkable upper abdominal findings.   BONES: No acute osseous changes.       1.  There is minimal interval worsening perihilar interstitial edema/effusion. Correlate with cardiac and fluid status.     Signed by: Doug Langley 12/4/2024 9:21 PM Dictation  workstation:   [id number]    XR chest 1 view    Result Date: 12/4/2024  Interpreted By:  Doug Langley, STUDY: XR CHEST 1 VIEW; 12/3/2024 6:45 am   INDICATION: Signs/Symptoms:intubated.   COMPARISON: 12/02/2024.   ACCESSION NUMBER(S): [id number]   ORDERING CLINICIAN: MARIKA CARO   FINDINGS: Endotracheal tube in satisfactory position.   CARDIOMEDIASTINAL SILHOUETTE: Patient is status post median sternotomy.   LUNGS: Minimal interval worsening in perihilar edema/effusions. No pneumothorax.   ABDOMEN: NG tube overlies the body of stomach.   BONES: No acute osseous changes.       1.  Minimal interval worsening in perihilar edema/effusions. Correlate with fluid status. Life-support systems in satisfactory position.     Signed by: Doug Langley 12/4/2024 9:19 PM Dictation workstation:   [id number]    Electrocardiogram, 12-lead PRN ACS symptoms    Result Date: 12/3/2024  Atrial fibrillation with rapid ventricular response Low voltage QRS ST & T wave abnormality, consider inferolateral ischemia Abnormal ECG When compared with ECG of 21-NOV-2024 06:00, Atrial fibrillation has replaced Atrial flutter ST no longer elevated in Inferior leads T wave inversion less evident in Inferior leads T wave inversion less evident in Anterior leads Confirmed by Thal Naveed ([id number]) on 12/3/2024 1:47:13 PM    Electrocardiogram, 12-lead PRN ACS symptoms    Result Date: 12/3/2024  Atrial flutter with variable AV block Low voltage QRS Cannot rule out Anterior infarct (cited on or before 21-NOV-2024) Inferior injury pattern ** ** ACUTE MI / STEMI ** ** Consider right ventricular involvement in acute inferior infarct Abnormal ECG Confirmed by Thal Naveed ([id number]) on 12/3/2024 1:47:09 PM    Electrocardiogram, 12-lead PRN ACS symptoms    Result Date: 12/3/2024  Sinus tachycardia Possible Left atrial enlargement Left axis deviation Right bundle branch block Abnormal ECG When compared with ECG of 19-NOV-2024 17:48, Right bundle branch block is  now Present Confirmed by Naveed Link ([id number]) on 12/3/2024 10:46:30 AM    ECG 12 lead    Result Date: 12/3/2024  Normal sinus rhythm Possible Left atrial enlargement ST & T wave abnormality, consider anterolateral ischemia Prolonged QT Abnormal ECG When compared with ECG of 19-NOV-2024 07:38, T wave inversion now evident in Anterior leads Confirmed by Naveed Link ([id number]) on 12/3/2024 10:36:20 AM    ECG 12 lead    Result Date: 12/3/2024  Normal sinus rhythm Possible Anterior infarct , age undetermined ST & T wave abnormality, consider lateral ischemia Abnormal ECG When compared with ECG of 19-NOV-2024 06:31, Borderline criteria for Anterior infarct are now Present T wave inversion now evident in Lateral leads Confirmed by Naveed Link ([id number]) on 12/3/2024 10:31:15 AM    Electrocardiogram, 12-lead PRN ACS symptoms    Result Date: 12/3/2024  Normal sinus rhythm Nonspecific ST and T wave abnormality Abnormal ECG When compared with ECG of 19-NOV-2024 06:30, No significant change was found Confirmed by Naveed Link ([id number]) on 12/3/2024 10:30:35 AM    XR chest 1 view    Result Date: 12/2/2024  Interpreted By:  Doug Langley, STUDY: XR CHEST 1 VIEW; 12/2/2024 6:39 am   INDICATION: Signs/Symptoms:intubated.   COMPARISON: 12/01/2024.   ACCESSION NUMBER(S): [id number]   ORDERING CLINICIAN: MARIKA CARO   FINDINGS: Endotracheal tube in satisfactory position.   CARDIOMEDIASTINAL SILHOUETTE: Patient is status post median sternotomy.   LUNGS: Slight interval increase in left perihilar edema/effusions. No pneumothorax.   ABDOMEN: NG tube overlies the body of stomach.   BONES: No acute osseous changes.       1.  Slight interval increase in left-sided edema/effusions. No pneumothorax.     Signed by: Doug Langley 12/2/2024 5:51 PM Dictation workstation:   [id number]    Electrocardiogram, 12-lead PRN ACS symptoms    Result Date: 12/2/2024  Atrial fibrillation with rapid ventricular response Low voltage QRS Cannot rule out Anterior  infarct , age undetermined ST & T wave abnormality, consider inferolateral ischemia Abnormal ECG When compared with ECG of 21-NOV-2024 06:51, QRS duration has increased Minimal criteria for Anterior infarct are now Present T wave inversion more evident in Inferior leads T wave inversion less evident in Lateral leads Confirmed by Naveed Link ([id number]) on 12/2/2024 3:00:42 PM    Electrocardiogram, 12-lead PRN ACS symptoms    Result Date: 12/2/2024  Normal sinus rhythm Low voltage QRS Nonspecific ST abnormality Prolonged QT Abnormal ECG When compared with ECG of 27-NOV-2024 21:42, Sinus rhythm has replaced Atrial fibrillation Questionable change in QRS duration Minimal criteria for Anterior infarct are no longer Present    XR chest 1 view    Result Date: 12/1/2024  Interpreted By:  Gene Mendoza, STUDY: XR CHEST 1 VIEW;  12/1/2024 9:19 am   INDICATION: Signs/Symptoms:intubation.     COMPARISON: Chest radiograph dated 11/28/2024 and chest abdomen pelvic CT dated 11/18/2024.   ACCESSION NUMBER(S): [id number]   ORDERING CLINICIAN: MARIKA CARO   FINDINGS: AP radiograph of the chest   Interval advancement of  endotracheal tube now terminating 5.2 cm above the nisha. Right IJ CVC tip overlies the atriocaval junction. The enteric tube extends below the level of the diaphragm and beyond the field of view. There is a left jugular triple-lumen hemodialysis catheter with its tip at the level of the atriocaval junction. There is a medium sternotomy and the cerclage wires appear intact. Surgical clips project at the level of the left cardiac border.   CARDIOMEDIASTINAL SILHOUETTE: Cardiomediastinal silhouette is stable in size and configuration.   LUNGS: Similar mildly prominent interstitial lung markings with Kerley B-lines peripherally in the mid perihilar vascular congestion. New left-greater-than-right hazy airspace opacities in the lungs. Mild blunting of bilateral costophrenic angles. No evidence of pneumothorax   ABDOMEN: No  remarkable upper abdominal findings.   BONES: No acute osseous changes.       1.  Mildly prominent interstitial lung markings with Kerley B-lines peripherally in the mid perihilar vascular congestion consistent with pulmonary edema. 2. New left-greater-than-right hazy airspace opacities in the lungs, which may represent atelectasis/edema, superimposed infection can not be excluded. 3. Small bilateral pleural effusions. 4. Medical devices as detailed above.       MACRO: None   Signed by: Gene Mendoza 12/1/2024 10:01 AM Dictation workstation:   [id number]    XR chest 1 view    Result Date: 11/29/2024  Interpreted By:  Evin Riley, STUDY: XR CHEST 1 VIEW;  11/29/2024 12:20 am   INDICATION: Signs/Symptoms:Ett placement.     COMPARISON: November 28, 2024 at 9:41 p.m. portable AP chest   ACCESSION NUMBER(S): [id number]   ORDERING CLINICIAN: MARIKA CARO   FINDINGS: AP chest   The tip of the endotracheal tube is 6.2 cm above the nisha. Right IJ CVC tip overlies the atriocaval junction. The enteric tube extends below the level of the diaphragm and beyond the field of view. There is a left jugular triple-lumen hemodialysis catheter with its tip at the level of the atriocaval junction. There is a medium sternotomy and the cerclage wires appear intact. Surgical clips project at the level of the left cardiac border.   CARDIOMEDIASTINAL SILHOUETTE: Cardiomediastinal silhouette is stable in size and configuration.   LUNGS: Mildly prominent interstitial lung markings with Kerley B-lines peripherally in mild perihilar vascular congestion. There is mild blunting of the right costophrenic angle consistent with a small pleural effusion. No evidence of a pneumothorax. ABDOMEN: No remarkable upper abdominal findings.   BONES: No acute osseous changes.       1.  Mild changes of pulmonary edema persists. 2.  Medical devices as noted above.       MACRO: None   Signed by: Evin Riley 11/29/2024 8:16 AM Dictation workstation:    [id number]    XR abdomen 1 view    Result Date: 11/29/2024  Interpreted By:  Gene Mendoza and Ogievich Taessa STUDY: XR ABDOMEN 1 VIEW;  11/28/2024 11:51 pm   INDICATION: Signs/Symptoms:Partially upright. Look for dilated loops..     COMPARISON: Abdominal radiographs 11/24/2024   ACCESSION NUMBER(S): [id number]   ORDERING CLINICIAN: MARIKA CARO   FINDINGS: AP erect radiograph of the abdomen.   Enteric tube with distal tip and side port overlying the expected location of the gastric body.   Several gas-filled small and large bowel loops without evidence of distention. Nonobstructive bowel gas pattern. No gross evidence of free air is noted.   Postsurgical changes from median sternotomy. Partial visualization of right IJ central venous catheter with tip overlying the cavoatrial junction. Bibasilar atelectasis versus edema.   Osseous structures demonstrate no acute bony changes.       1. Nonobstructive bowel gas pattern. 2. Enteric tube with distal tip overlying the gastric body.   I personally reviewed the images/study and I agree with the findings as stated by Merlyn Tang DO, PGY-3. This study was interpreted at [place], [place].   MACRO: None   Signed by: Gene Mendoza 11/29/2024 6:29 AM Dictation workstation:   [id number]    XR chest 1 view    Result Date: 11/29/2024  Interpreted By:  Gene Mendoza and Ogievich Taessa STUDY: XR CHEST 1 VIEW;  11/28/2024 10:03 pm   INDICATION: Signs/Symptoms:Ett tibe pulled 2cm. Right chest foreign body likely external.     COMPARISON: Chest x-ray 11/28/2024   ACCESSION NUMBER(S): [id number]   ORDERING CLINICIAN: MARIKA CARO   FINDINGS: AP radiograph of the chest was provided.   LINES/TUBES/DEVICES: Interval retraction of endotracheal tube with tip now projecting 4.2 cm above the nisha. Right IJ CVC with tip overlying the cavoatrial junction. Enteric tube traverses below the diaphragm and out of the field of view. Postsurgical changes  from median sternotomy with intact cerclage wires. Surgical clips project over the cardiomediastinal silhouette.   CARDIOMEDIASTINAL SILHOUETTE: Cardiomediastinal silhouette is stable in size and configuration.   LUNGS: Previously questioned concave density of the right lateral chest wall is no longer evident and was external to the patient.   Mild perihilar vascular congestion with prominent interstitial lung markings. No sizable pleural effusion or pneumothorax.   ABDOMEN: No remarkable upper abdominal findings.   BONES: No acute osseous changes.       1. Interval retraction of endotracheal tube with satisfactory position. 2. Previously questioned concave density of the right lateral chest wall is no longer evident and was external to the patient. 3. Similar findings suggestive of mild pulmonary edema.   I personally reviewed the images/study and I agree with the findings as stated by Merlyn Tang DO, PGY-3. This study was interpreted at [place], [place].   MACRO: None   Signed by: Gene Mendoza 11/29/2024 6:22 AM Dictation workstation:   [id number]    XR chest 1 view    Result Date: 11/29/2024  Interpreted By:  Gene Mendoza,  and Kitty Zuleta STUDY: XR CHEST 1 VIEW;  11/28/2024 8:40 pm   INDICATION: Signs/Symptoms:post intubation.     COMPARISON: Chest radiograph dated 11/27/2024. CT chest dated 11/18/2024   ACCESSION NUMBER(S): [id number]   ORDERING CLINICIAN: MARIKA CARO   FINDINGS: AP radiograph of the chest was provided.   LINES/TUBES/DEVICES: Interval placement of endotracheal tube with distal tip projecting 2 cm above the nisha. Enteric tube traverses below the diaphragm and out of the field of view. Right IJ central venous catheter with distal tip projecting over the cavoatrial junction. Postsurgical changes median sternotomy with surgical clips overlying cardiomediastinal silhouette.   CARDIOMEDIASTINAL SILHOUETTE: Cardiomediastinal silhouette is normal in  size and configuration.   LUNGS: There is a new heterogenous hyperdense concave density overlying the right lateral chest wall which was not present on prior radiograph. Interval increase in right perihilar and infrahilar opacity. Similar to slightly increased hazy bibasilar opacities. No sizable pleural effusion or pneumothorax.   ABDOMEN: No remarkable upper abdominal findings.   BONES: No acute osseous changes.       1. Interval placement of endotracheal tube with distal tip projecting 2 cm above the nisha. Recommend slight retraction. 2. New heterogenous hyperdense concave density overlying the right lateral chest wall favored to be external to the patient. Recommend repeat chest radiograph with removal of clothing and devices for confirmation. 3. Slight interval increase in right perihilar and infrahilar as well as bibasilar opacities which may represent atelectasis and/or edema. Correlate with patient's volume status.   I personally reviewed the images/study and I agree with the findings as stated by Merlyn Tang DO, PGY-3. This study was interpreted at [place], [place].   MACRO: None   Signed by: Gene Mendoza 11/29/2024 6:09 AM Dictation workstation:   [id number]    XR chest 1 view    Result Date: 11/27/2024  Interpreted By:  Gene Mendoza, STUDY: XR CHEST 1 VIEW;  11/27/2024 5:52 pm   INDICATION: Signs/Symptoms:LIJ trialysis line placement.     COMPARISON: Chest radiograph dated 11/25/2024. And chest CT dated 11/18/2024.   ACCESSION NUMBER(S): [id number]   ORDERING CLINICIAN: MARIKA CARO   FINDINGS: AP radiograph of the chest was provided. Limited evaluation secondary to the bilateral costophrenic angles not within the field of view.   Left internal jugular approach central venous catheter tip projecting over the expected location of superior cavoatrial junction. The tip of endotracheal tube remains unchanged in satisfactory position 4.1 cm above the nisha. The  nasogastric tube extends below the level of diaphragm and tip extends beyond field of view. The side port projects below the level of the gastroesophageal junction. The esophageal temperature probe is looped at the level of pharynx noted previously. The patient is status median sternotomy with intact cerclage wires.   CARDIOMEDIASTINAL SILHOUETTE: Cardiomediastinal silhouette remains enlarged but unchanged..   LUNGS: Similar prominence and coarsening of interstitial lung marking bilaterally. No evidence of pneumothorax   ABDOMEN: No remarkable upper abdominal findings.   BONES: No acute osseous changes.       1.  Left internal jugular approach central venous catheter tip projecting over the expected location of superior cavoatrial junction 2. Prominence and coarsening of interstitial lung markings bilaterally, which correlate with pulmonary emphysema on CT dated 11/18/2020 with superimposed mild pulmonary edema. . 3. Medical devices as explained above. Of note, esophagus temperature probe is seen looped in the region of pharynx, positioning recommended.       MACRO: None   Signed by: Gene Mendoza 11/27/2024 6:37 PM Dictation workstation:   [id number]    US renal complete    Result Date: 11/27/2024  Interpreted By:  Abner Jimenez,  Hoda Sherman STUDY: US RENAL COMPLETE;  11/26/2024 2:56 pm   INDICATION: Signs/Symptoms:TRACY.     COMPARISON: Renal ultrasound 05/07/2024 and CT abdomen and pelvis 08/07/2020.   ACCESSION NUMBER(S): [id number]   ORDERING CLINICIAN: MARIKA CARO   TECHNIQUE: Multiple images of the kidneys were obtained  .   FINDINGS: Examination is limited by overlying bowel gas. RIGHT KIDNEY: The right kidney measures 9.19 cm in length. Lobular appearance of the right kidney. Increased renal cortical echogenicity. Thickness is within normal limits. No hydronephrosis is present; no evidence of nephrolithiasis. There is an anechoic well-circumscribed cystic structure within the superior pole  of the right kidney measuring 0.5 x 0.5 x 0.6 cm, compatible with simple renal cyst.   LEFT KIDNEY: The left kidney measures 7.8 cm in length. Lobular appearance of the left kidney. Increased renal cortical echogenicity. Thickness is within normal limits. No hydronephrosis is present; no evidence of nephrolithiasis.   BLADDER: Partially visualized Orozco catheter in the urinary bladder, limiting evaluation..   Incidental findings of bilateral pleural effusions and trace abdominal ascites.       1. Increased renal cortical echogenicity and lobular appearance of the kidneys bilaterally, likely medical renal disease, with relatively atrophic left kidney. No hydronephrosis. 2. Partially visualized bilateral pleural effusions and trace abdominal ascites.   I personally reviewed the images/study and I agree with the findings as stated by Lane Barton MD (Radiology Resident). This study was interpreted at [place], [place].   MACRO: None   Signed by: Abner Jimenez 11/27/2024 5:02 PM Dictation workstation:   [id number]    Electrocardiogram, 12-lead PRN ACS symptoms    Result Date: 11/25/2024  Normal sinus rhythm Low voltage QRS Marked ST abnormality, possible inferior subendocardial injury Prolonged QT Abnormal ECG When compared with ECG of 18-NOV-2024 22:57, T wave inversion no longer evident in Inferior leads Confirmed by Martinez Linko ([id number]) on 11/25/2024 3:24:12 PM    Electrocardiogram, 12-lead PRN ACS symptoms    Result Date: 11/25/2024  Normal sinus rhythm Possible Left atrial enlargement Low voltage QRS Septal infarct , age undetermined ST & T wave abnormality, consider inferior ischemia ST & T wave abnormality, consider anterolateral ischemia Prolonged QT Abnormal ECG No previous ECGs available Confirmed by Martinez Linko ([id number]) on 11/25/2024 3:24:09 PM    XR chest 1 view    Result Date: 11/25/2024  Interpreted By:  Evin Riley, STUDY: XR CHEST 1  VIEW;  11/25/2024 8:58 am   INDICATION: Signs/Symptoms:intubated with PNA concerns.     COMPARISON: Chest dated November 24, 2024   ACCESSION NUMBER(S): [id number]   ORDERING CLINICIAN: MARIKA CARO   FINDINGS: Semi-erect AP portable chest   The tip of the endotracheal tube remains unchanged in satisfactory position 4.1 cm above the nisha. The nasogastric tube extends below the level of the diaphragm and the tip extends beyond the field of view. The side port projects below the level of the gastroesophageal junction. The tip of the right IJ catheter remains at the level of the atriocaval junction. The esophageal temperature probe is looped at the level of the pharynx has noted previously. The patient is status median sternotomy.   CARDIOMEDIASTINAL SILHOUETTE: The cardiomediastinal silhouette remains enlarged but unchanged..   LUNGS: Interstitial pulmonary lung markings remain prominent there is mild blunting of the right costophrenic angle consistent with a small pleural effusion. No evidence of pneumothorax..   ABDOMEN: No remarkable upper abdominal findings.   BONES: No acute osseous changes.       1. Tubes and lines as noted above. Temperature probe looped at the level of the pharynx. 2. Prominent interstitial lung markings, cardiomegaly and a small right pleural effusion consistent with congestive cardiac failure.       MACRO: None   Signed by: Evin Riley 11/25/2024 9:55 AM Dictation workstation:   [id number]    XR abdomen 1 view    Result Date: 11/24/2024  Interpreted By:  Paulino Wilson, STUDY: XR ABDOMEN 1 VIEW;  11/24/2024 9:17 am   INDICATION: Signs/Symptoms:Ileus.   COMPARISON: Abdominal radiographs 11/23/2024; chest, and abdomen CT scan 11/18/2024   ACCESSION NUMBER(S): [id number]   ORDERING CLINICIAN: DAVID BROCK   FINDINGS: 2 AP radiographs of abdomen available for interpretation.   Enteric tube seen coursing below diaphragm with tip overlying expected location of gastric body, however, the  side hole projects at/just past GE junction region. Prominent calcified atherosclerosis of abdominal aorta and its visualized branches.   Nonobstructive bowel gas pattern. Interval improvement in previously noted gaseous prominence of multiple bowel loops. Limited evaluation of pneumoperitoneum on supine imaging, however no gross evidence of free air is noted.   Osseous structures demonstrate no acute bony changes.       1.  Enteric tube tip overlying expected location of gastric body, however, the side hole projects at/just past GE junction region and slight advancement is recommended. 2. Nonobstructive bowel gas pattern.   Signed by: Paulino Wilson 11/24/2024 11:04 AM Dictation workstation:   [id number]    XR chest 1 view    Result Date: 11/24/2024  Interpreted By:  Paulino Wilson, STUDY: XR CHEST 1 VIEW;  11/24/2024 9:17 am   INDICATION: Signs/Symptoms:intubated.   COMPARISON: Chest radiograph 11/20/2025 and chest and abdominal CT scan 11/18/2024   ACCESSION NUMBER(S): [id number]   ORDERING CLINICIAN: DAVID BROCK   FINDINGS: AP radiograph of the chest. Patient is again slightly rotated towards right.   Endotracheal tube tip now terminates 4.1 cm superior to nisha. Enteric tube is again seen coursing below diaphragm and tip not included in field of view. Right IJ approach central venous catheter with tip projecting over lower SVC, unchanged. Esophageal temperature probe is seen looping in the region of pharynx and repositioning recommended. Status post median sternotomy.   CARDIOMEDIASTINAL SILHOUETTE: The cardiomediastinal silhouette is persistently enlarged, grossly stable in size and configuration.   LUNGS: Similar prominence and coarsening of lung markings bilaterally, which correlate with pulmonary emphysema on comparative CT scan with or without superimposed mild pulmonary edema. Trace/small pleural effusions not excluded. There is no pneumothorax.   ABDOMEN: No remarkable upper abdominal findings.    BONES: No acute osseous abnormality.       1. Medical devices as above. Of note, esophageal temperature probe is seen looping in the region of pharynx and repositioning recommended. 2. Similar prominence and coarsening of lung markings bilaterally, which correlate with pulmonary emphysema on comparative CT scan with or without superimposed mild pulmonary edema. 3. Trace/small pleural effusions not excluded.   Signed by: Paulino Wilson 11/24/2024 11:01 AM Dictation workstation:   [id number]    XR abdomen 1 view    Result Date: 11/23/2024  Interpreted By:  Doug Langley, STUDY: XR ABDOMEN 1 VIEW; 11/23/2024 12:33 pm   INDICATION: Signs/Symptoms:c/f ileus.   COMPARISON: 11/20/2024   ACCESSION NUMBER(S): [id number]   ORDERING CLINICIAN: DAVID BROCK   FINDINGS: NG tube  overlies the body of stomach. Air-filled loops of nonobstructed loops of small large bowel. Limited evaluation of pneumoperitoneum on supine imaging, however no gross evidence of free air is noted. Extensive vascular calcifications of the iliac and renal vessels. Linear catheter overlies the pelvis and correlate with Orozco catheter placement or surgical drain..   There is mild basilar interstitial edema.   Osseous structures demonstrate no acute bony changes.       1.  NG tube overlies the body of stomach. Air-filled nondistended loops of bowel. 2. Extensive atherosclerotic calcification of the iliac vessels   Signed by: Doug Langley 11/23/2024 5:31 PM Dictation workstation:   [id number]    XR chest 1 view    Result Date: 11/21/2024  Interpreted By:  Gene Mendoza and Beyersdorf Conner STUDY: XR CHEST 1 VIEW;  11/20/2024 9:16 pm   INDICATION: Signs/Symptoms:rales.   COMPARISON: Single-view chest 11/20/2024   ACCESSION NUMBER(S): [id number]   ORDERING CLINICIAN: BLANCA BASS   FINDINGS: AP radiograph of the chest was provided.   Endotracheal tube terminates 3.3 cm superior to the nisha. Right internal jugular central venous catheter  tip projects over the expected location of the lower SVC. Enteric tube courses below the diaphragm and with its tip terminating below the limits of the exam. Postsurgical changes of the median sternotomy again noted. Sternal cerclage wires are intact.   CARDIOMEDIASTINAL SILHOUETTE: Cardiomediastinal silhouette is normal in size and configuration. Mild aortic knob calcifications.   LUNGS: Faint hazy opacity of the left lung base. No sizable pneumothorax.   ABDOMEN: No remarkable upper abdominal findings.   BONES: No acute osseous changes.       1.  Similar appearance of faint hazy opacity of the left lung field, possibly representing pleural effusion with adjacent consolidation/atelectasis. 2. Medical devices as above.   I personally reviewed the image(s)/study and resident interpretation. I agree with the findings as stated by resident Dylon Lyons. Data analyzed and images interpreted at [facility], [place], [place].   MACRO: None   Signed by: Gene Mendoza 11/21/2024 12:32 AM Dictation workstation:   [id number]    XR abdomen 1 view    Result Date: 11/20/2024  Interpreted By:  Gene Mendoza and Awan Komal STUDY: XR ABDOMEN 1 VIEW;  11/20/2024 11:24 am   INDICATION: Signs/Symptoms:OG placement.     COMPARISON: CT chest 11/18/2024   ACCESSION NUMBER(S): [id number]   ORDERING CLINICIAN: DAVID BROCK   FINDINGS: There is an enteric tube noted with the tip terminating in the gastric body.   Nonobstructive bowel gas pattern. Limited evaluation of pneumoperitoneum on supine imaging, however no gross evidence of free air is noted.   Mild basilar left lung atelectasis in the visualized lungs.   Osseous structures demonstrate no acute bony changes.       1.  An enteric tube noted, in place with the tip terminating in the gastric body.   I personally reviewed the images/study and I agree with the findings as stated by Resident Lakshmi Spain. This study was interpreted at [place], [place].   MACRO: None   Signed by: Gene Mendoza 11/20/2024 2:19 PM Dictation workstation:   [id number]    XR chest 1 view    Result Date: 11/20/2024  Interpreted By:  Gene Mendoza  and Serena Osborne STUDY: XR CHEST 1 VIEW;  11/20/2024 6:15 am   INDICATION: Signs/Symptoms:CVC placement.   COMPARISON: Single-view chest 11/20/2024 0319   ACCESSION NUMBER(S): [id number]   ORDERING CLINICIAN: NATHANIEL RIOS   FINDINGS: AP radiograph of the chest was provided.   Endotracheal tube terminates 3.8 cm superior to the nisha. Interval placement of the right IJ central venous catheter, with its tip terminating over the cavoatrial junction. Postsurgical changes of median sternotomy again noted. Sternal cerclage wires are intact.   CARDIOMEDIASTINAL SILHOUETTE: Cardiomediastinal silhouette is stable in size and configuration.   LUNGS: Interval improvement of hazy opacification of the left lower lung field with  obscuration of the left costophrenic angle. No evidence of pneumothorax bilaterally.   ABDOMEN: No remarkable upper abdominal findings.   BONES: No acute osseous changes.       1.  Interval placement of the right IJ central venous catheter with its tip terminating over the cavoatrial junction. 2. Interval improvement of hazy opacification of the left lower lung fields represents atelectasis/infiltrate.   I personally reviewed the image(s)/study and resident interpretation. I agree with the findings as stated by resident Dylon Lyons. Data analyzed and images interpreted at [facility], [place], [place].   MACRO: None   Signed by: Gene Mendoza 11/20/2024 9:08 AM Dictation workstation:   [id number]    XR chest 1 view    Result Date: 11/20/2024  Interpreted By:  Gene Mendoza and Afshari Mirak Sohrab STUDY: XR CHEST 1 VIEW;  11/20/2024 3:25 am   INDICATION: Signs/Symptoms:ET tube placement.     COMPARISON: CT chest 11/18/2024. Chest x-ray  11/19/2024.   ACCESSION NUMBER(S): [id number]   ORDERING CLINICIAN: DAVID BROCK   FINDINGS: AP radiograph of the chest was provided.   Postsurgical changes of median sternotomy again noted. The cerclage wires are intact. Endotracheal tube tip is approximately 4.4 cm above the nisha. Multiple monitoring devices project over the field of view.   CARDIOMEDIASTINAL SILHOUETTE: Cardiomediastinal silhouette is stable in size and configuration.   LUNGS: There is hazy opacification of the left lower lung fields with obscuration of the left costophrenic angle. No evidence of pneumothorax bilaterally.   ABDOMEN: No remarkable upper abdominal findings.   BONES: No acute osseous changes.       1.  Hazy opacification of the left lower lung fields may represent layering pleural effusion with atelectasis/infiltrate. Findings are new/worsened when compared to the prior exam. 2. Right lung is essentially clear. 3. Interval intubation with the probe. Positioning of the endotracheal tube.   I personally reviewed the images/study and I agree with the findings as stated by resident physician Dr. Larry Majano . This study was interpreted at [place], [place].   MACRO: None   Signed by: Gene Mendoza 11/20/2024 8:44 AM Dictation workstation:   [id number]    XR chest 1 view    Result Date: 11/19/2024  Interpreted By:  Paulino Wilson and Awan Komal STUDY: XR CHEST 1 VIEW;  11/19/2024 1:24 pm   INDICATION: Signs/Symptoms:shortness of breath.   COMPARISON: CT chest 11/18/2024, chest x-ray 08/07/2023   ACCESSION NUMBER(S): [id number]   ORDERING CLINICIAN: DAVID BROCK   FINDINGS: AP radiograph of the chest was provided. Status post prior median sternotomy with intact sternal cerclage wires.   CARDIOMEDIASTINAL SILHOUETTE: Cardiomediastinal silhouette is stable in size and configuration. Mild aortic knob calcification.   LUNGS: There is new diffuse mild interstitial prominence  identified. Mild blunting of bilateral costophrenic angles, suggestive of trace/small pleural effusions. Background upper lung predominant emphysema better seen on comparative CT scan. There is no pneumothorax.   ABDOMEN: No remarkable upper abdominal findings.   BONES: No acute osseous changes.       1.  Suggestion of mild diffuse interstitial pulmonary edema with trace/small bilateral pleural effusions, against background upper lung predominant emphysema. Correlate with patient's volume status.   I personally reviewed the images/study and I agree with the findings as stated by Resident Lakshmi Spain. This study was interpreted at [facility], [place], [place].   MACRO: None   Signed by: Paulino Wilson 11/19/2024 2:16 PM Dictation workstation:   [id number]    Transthoracic Echo (TTE) Complete    Result Date: 11/19/2024   [facility], [address] [place] [address]                Tel [phone] and Fax [phone] TRANSTHORACIC ECHOCARDIOGRAM REPORT  Patient Name:       MARGARET ADRIA       Reading Physician:    [id number] Thalia Velásquez MD Study Date:         11/19/2024          Ordering Provider:    [id number] DAVID BROCK MRN/PID:            [id number]            Fellow: Accession#:         [id number]        Nurse: Date of Birth/Age:  1960 / 64 years Sonographer:          Markel Ledezma RDCS Gender assigned at  F                   Additional Staff: Birth: Height:             157.48 cm           Admit Date: Weight:             48.99 kg            Admission Status:     Inpatient -                                                               Routine BSA / BMI:          1.47 m2 / 19.75     Encounter#:           [id number]                     kg/m2 Blood Pressure:     95/52 mmHg           Department Location:  [facility] Study Type:    TRANSTHORACIC ECHO (TTE) COMPLETE Diagnosis/ICD: Dissection of unspecified site of aorta-I71.00 Indication:    aortic aneurysm CPT Code:      Echo Complete w Full Doppler-79770 Patient History: Pertinent History: DVT/PE, HLD, HTN, CABG x4 1997, T2DM. Study Detail: The following Echo studies were performed: 2D, Doppler, M-Mode and               color flow. Technically challenging study due to patient lying in               supine position, body habitus, the patient's lack of cooperation,               poor acoustic windows, prominent lung artifact and laying on right               side. Definity used as a contrast agent for endocardial border               definition. Total contrast used for this procedure was 2 mL via IV               push. Unable to obtain suprasternal notch view. The patient was               sedated.  Critical Event Critical Event: Test was completed as per department protocol. Critical Finding: Mitral regurgitation. Time Test was Completed: 10:11:00 AM Notified: Dr. Velásquez. Attending notification time: 10:15:00 AM  PHYSICIAN INTERPRETATION: Left Ventricle: Left ventricular ejection fraction is mildly decreased, by visual estimate at 45-50%. There is mild concentric left ventricular hypertrophy. Left venticular wall motion is abnormal. The left ventricular cavity size is normal. There is normal septal and mildly increased posterior left ventricular wall thickness. Left ventricular diastolic filling cannot be determined, due to severe mitral regurgitation. LV Wall Scoring: The basal and mid anterolateral wall, basal inferoseptal segment, and basal inferior segment are hypokinetic. Left Atrium: The left atrium is severely dilated. Right Ventricle: The right ventricle was not well visualized. There is reduced right ventricular systolic function. Right Atrium: The right atrium is normal in size. Aortic Valve: The aortic valve is trileaflet.  There is no evidence of aortic valve regurgitation. The peak instantaneous gradient of the aortic valve is 2 mmHg. Mitral Valve: The mitral valve is mildly thickened. There is mild mitral annular calcification. There is moderate to severe mitral valve regurgitation which is posteriorly directed. The mitral regurgitant orifice area is 31 mm2. The mitral regurgitant volume is 40.13 ml. 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 There is at least systolic blunting of the PV flow if not flow reversal. Mild doming of the AML but no mitral stenosis. Tricuspid Valve: The tricuspid valve is structurally normal. There is moderate tricuspid regurgitation. The Doppler estimated RVSP is moderately elevated at 61.5 mmHg. Pulmonic Valve: The pulmonic valve is structurally normal. There is physiologic pulmonic valve regurgitation. Pericardium: Trivial to small pericardial effusion. Aorta: The aortic root is normal. The aortic root appears normal in size and measures 3.00 cm. Systemic Veins: The inferior vena cava appears dilated, with IVC inspiratory collapse less than 50%. In comparison to the previous echocardiogram(s): There are no prior studies on this patient for comparison purposes. No prior echocardiogram available for comparison.  CONCLUSIONS:  1. Basal and mid anterolateral wall, basal inferoseptal segment, and basal inferior segment are abnormal.  2. Left ventricular ejection fraction is mildly decreased, by visual estimate at 45-50%.  3. Abnormal left venticular wall motion.  4. Left ventricular diastolic filling cannot be determined, due to severe mitral regurgitation.  5. There is reduced right ventricular systolic function.  6. The left atrium is severely dilated.  7. 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 There is at least systolic blunting of the PV flow if not flow reversal. Mild doming of the AML but no mitral stenosis.  8. Moderate to severe mitral valve regurgitation.  9. Moderate tricuspid regurgitation visualized. 10. Moderately elevated right ventricular systolic pressure. 11. Normal aortic root. 12. Primary service notified of findings at the time of reporting. 13. No prior echocardiogram available for comparison. QUANTITATIVE DATA SUMMARY:  2D MEASUREMENTS:          Normal Ranges: Ao Root d:       3.00 cm  (2.0-3.7cm) IVSd:            0.90 cm  (0.6-1.1cm) LVPWd:           1.00 cm  (0.6-1.1cm) LVIDd:           4.70 cm  (3.9-5.9cm) LVIDs:           3.60 cm LV Mass Index:   104 g/m2 LVEDV Index:     58 ml/m2 LV % FS          23.4 %  LA VOLUME:                    Normal Ranges: LA Vol A4C:        115.8 ml   (22+/-6mL/m2) LA Vol A2C:        94.9 ml LA Vol BP:         105.1 ml LA Vol Index A4C:  78.7ml/m2 LA Vol Index A2C:  64.5 ml/m2 LA Vol Index BP:   71.4 ml/m2 LA Area A4C:       28.9 cm2 LA Area A2C:       26.1 cm2 LA Major Axis A4C: 6.1 cm LA Major Axis A2C: 6.1 cm LA Volume Index:   71.5 ml/m2  RA VOLUME BY A/L METHOD:          Normal Ranges: RA Area A4C:             13.2 cm2  AORTA MEASUREMENTS:         Normal Ranges: Asc Ao, d:          3.30 cm (2.1-3.4cm)  LV SYSTOLIC FUNCTION BY 2D PLANIMETRY (MOD):                      Normal Ranges: EF-A4C View:    42 % (>=55%) EF-A2C View:    44 % EF-Biplane:     42 % EF-Visual:      48 % LV EF Reported: 48 %  LV DIASTOLIC FUNCTION:            Normal Ranges: MV Peak E:             1.26 m/s   (0.7-1.2 m/s) MV Peak A:             0.36 m/s   (0.42-0.7 m/s) E/A Ratio:             3.51       (1.0-2.2) MV A Dur:              85.00 msec MV DT:                 195 msec   (150-240 msec)  MITRAL VALVE:           Normal Ranges: MV mean P.0 mmHg (<2mmHg) MV DT:        195 msec (150-240msec)  MITRAL INSUFFICIENCY:             Normal Ranges: PISA Radius:          0.7 cm MR VTI:               128.33 cm MR Vmax:              416.00 cm/s MR Alias Neto:         38.5 cm/s MR Volume:            40.13 ml MR Flow Rt:           130.09 ml/s MR EROA:              31 mm2  AORTIC VALVE:           Normal Ranges: AoV Vmax:      0.72 m/s (<=1.7m/s) AoV Peak P.1 mmHg (<20mmHg) LVOT Max Neto:  0.48 m/s (<=1.1m/s) LVOT VTI:      11.60 cm LVOT Diameter: 1.70 cm  (1.8-2.4cm) AoV Area,Vmax: 1.50 cm2 (2.5-4.5cm2)  RIGHT VENTRICLE: RV Basal 4.40 cm TAPSE:   13.9 mm RV s'    0.06 m/s  TRICUSPID VALVE/RVSP:          Normal Ranges: Peak TR Velocity:     3.41 m/s RV Syst Pressure:     62 mmHg  (< 30mmHg) IVC Diam:             2.70 cm  PULMONIC VALVE:          Normal Ranges: PV Max Neto:     0.6 m/s  (0.6-0.9m/s) PV Max P.2 mmHg  [id number] Thalia Velásquez MD Electronically signed on 2024 at 10:42:59 AM  Wall Scoring  ** Final **       I have reviewed the imaging above as it pertains to the patient's surgical concerns and agree with the radiologist's interpretation.         ASSESSMENT  65 YO F with PMH pancreatitis, DVT/PE, HLD, HTN, CABGx4 , T2DM, GERD, PAD, chronic mesenteric ischemia, tobacco use who presented to [facility] on [facility] as a transfer from [facility] with chest, back, and abdominal pain. 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 Vascular and Cardiac surgery following not offering intervention due to patient frailty and high risk nature of the procedure. CICU course c/b PEA arrest . Given the prolonged hospital course ongoing GOC discussion with family to determine direction of care. Patient option trach/ PEG with ultimate goal of LTAC over comfort care at  this time. [facility] Consulted for PEG tube placement.     PLAN:  - will be available for PEG placement in the OR with ENT   - Hold TF at midnight  - consent to be obtained   - rest of care per ICU  - Please call with any questions or concerns    Patient's exam, labs, and findings discussed with Dr. Sinha, who agrees with the plan as described above.    Monae Bettencourt MD  PGY-3 General Surgery

## 2024-12-05 NOTE — CONSULTS
Otolaryngology - Head and Neck Surgery Consultation Note      Reason For Consult  Tracheostomy Assessment    History Of Present Illness  Humaira Huang is a 64 y.o. 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 PEA arrest on 11/20, intubated since.  ENT consulted for tracheostomy placement     Date of intubation/duration: 11/20, failed extubation 11/28, failed SBT earlier this week  Indication for trach: Prolonged intubation  Prior neck surgery: Not noted on chart review  Anti-coagulation: Prophylactic heparin  INR: 1.1, PT 11.9  Platelet count: 275  Pressors/CVVH: None currently    *HPI, PMH, PSH, FH, SH, and comprehensive ROS were attempted to be obtained from the patient, but the patient currently remains intubated and cannot provide this information. Therefore, the history recorded below represents information gathered from the primary team, nursing staff, EMR and family*    Past Medical History  She has no past medical history on file.  Patient Active Problem List   Diagnosis    Dissection of aorta       Surgical History  She has no past surgical history on file.     Social History  She has no history on file for tobacco use, alcohol use, and drug use.    Family History  No family history on file.     Allergies  Patient has no known allergies.    Review of Systems  ROS were attempted to be obtained from the patient, but the patient currently remains intubated and cannot provide this information     Physical Exam  PHYSICAL EXAMINATION:   Constitutional: well developed, well nourished  Voice:  Unable to evaluate secondary to intubation  Respiration:  Intubated, stable rate on ventilator, chest rise symmetric  Cardiovascular:  No clubbing/cyanosis/edema in hands  Eyes:  Eyelids and periorbital area without laceration or lesion, sclera normal  Neuro:  Intubated and sedated, able to follow  "commands  Head and Face:  Symmetric facial features, no masses or lesions  Salivary Glands:  Parotid and submandibular glands normal bilaterally  Ears:  Normal external ears, EAC without gross lesions or drainage  Nose: External nose midline, anterior rhinoscopy is normal with limited visualization to the anterior aspect of the interior turbinates, no bleeding or drainage, no lesions  Oral Cavity/Oropharynx/Lips:  ETT in place, visualized portions of mucous membranes/floor of mouth/tongue/OP normal, no masses or lesions are noted  Pharynx:  Unable to visualize due to ETT  Neck/Lymph:  No LAD, no thyroid masses, trachea midline, palpable sternal notch, thyroid cartilage, and cricoid cartilage.  Bilateral central lines, 1 triple-lumen and 1 tunneled HD  Skin:  Neck skin is without scar or injury  Psych:  Sedated, unable to evaluate mood and affect       Last Recorded Vitals  Blood pressure (!) 117/49, pulse (!) 115, temperature 35.7 °C (96.3 °F), temperature source Temporal, resp. rate 14, height 1.575 m (5' 2\"), weight 58.4 kg (128 lb 11.2 oz), SpO2 96%.    Medications:  Scheduled medications  amiodarone, 400 mg, oral, BID  aspirin, 81 mg, oral, Daily  atorvastatin, 80 mg, oral, Nightly  heparin, 5,000 Units, subcutaneous, q8h  insulin lispro, 0-5 Units, subcutaneous, q4h  lactated Ringer's, 1,000 mL, intravenous, Once  oxygen, , inhalation, Continuous - Inhalation  pantoprazole, 40 mg, intravenous, BID  potassium chloride, 40 mEq, intravenous, Once  thiamine, 100 mg, oral, Daily      Continuous medications  fentaNYL,  mcg/hr, Last Rate: 75 mcg/hr (12/05/24 0800)  midazolam, 0.5-20 mg/hr, Last Rate: Stopped (12/05/24 0700)      PRN medications  PRN medications: acetaminophen **OR** acetaminophen **OR** acetaminophen, alteplase, dextrose, dextrose, fentaNYL, glucagon, glucagon, hydrOXYzine HCL, ipratropium-albuteroL, oxyCODONE, oxygen, polyethylene glycol, sennosides-docusate sodium    Recent Labs:  Results for " orders placed or performed during the hospital encounter of 11/18/24 (from the past 24 hours)   POCT GLUCOSE   Result Value Ref Range    POCT Glucose 189 (H) 74 - 99 mg/dL   POCT GLUCOSE   Result Value Ref Range    POCT Glucose 186 (H) 74 - 99 mg/dL   POCT GLUCOSE   Result Value Ref Range    POCT Glucose 182 (H) 74 - 99 mg/dL   BLOOD GAS ARTERIAL FULL PANEL   Result Value Ref Range    POCT pH, Arterial 7.41 7.38 - 7.42 pH    POCT pCO2, Arterial 41 38 - 42 mm Hg    POCT pO2, Arterial 75 (L) 85 - 95 mm Hg    POCT SO2, Arterial 97 94 - 100 %    POCT Oxy Hemoglobin, Arterial 94.1 94.0 - 98.0 %    POCT Hematocrit Calculated, Arterial 23.0 (L) 36.0 - 46.0 %    POCT Sodium, Arterial 127 (L) 136 - 145 mmol/L    POCT Potassium, Arterial 4.4 3.5 - 5.3 mmol/L    POCT Chloride, Arterial 96 (L) 98 - 107 mmol/L    POCT Ionized Calcium, Arterial 1.11 1.10 - 1.33 mmol/L    POCT Glucose, Arterial 202 (H) 74 - 99 mg/dL    POCT Lactate, Arterial 0.8 0.4 - 2.0 mmol/L    POCT Base Excess, Arterial 1.2 -2.0 - 3.0 mmol/L    POCT HCO3 Calculated, Arterial 26.0 22.0 - 26.0 mmol/L    POCT Hemoglobin, Arterial 7.7 (L) 12.0 - 16.0 g/dL    POCT Anion Gap, Arterial 9 (L) 10 - 25 mmo/L    Patient Temperature 37.0 degrees Celsius    FiO2 30 %   CBC and Auto Differential   Result Value Ref Range    WBC 10.7 4.4 - 11.3 x10*3/uL    nRBC 0.2 (H) 0.0 - 0.0 /100 WBCs    RBC 2.34 (L) 4.00 - 5.20 x10*6/uL    Hemoglobin 7.0 (L) 12.0 - 16.0 g/dL    Hematocrit 22.1 (L) 36.0 - 46.0 %    MCV 94 80 - 100 fL    MCH 29.9 26.0 - 34.0 pg    MCHC 31.7 (L) 32.0 - 36.0 g/dL    RDW 17.1 (H) 11.5 - 14.5 %    Platelets 275 150 - 450 x10*3/uL    Neutrophils % 81.3 40.0 - 80.0 %    Immature Granulocytes %, Automated 0.5 0.0 - 0.9 %    Lymphocytes % 6.5 13.0 - 44.0 %    Monocytes % 10.1 2.0 - 10.0 %    Eosinophils % 1.2 0.0 - 6.0 %    Basophils % 0.4 0.0 - 2.0 %    Neutrophils Absolute 8.70 (H) 1.20 - 7.70 x10*3/uL    Immature Granulocytes Absolute, Automated 0.05 0.00 -  0.70 x10*3/uL    Lymphocytes Absolute 0.69 (L) 1.20 - 4.80 x10*3/uL    Monocytes Absolute 1.08 (H) 0.10 - 1.00 x10*3/uL    Eosinophils Absolute 0.13 0.00 - 0.70 x10*3/uL    Basophils Absolute 0.04 0.00 - 0.10 x10*3/uL   Type and screen   Result Value Ref Range    ABO TYPE AB     Rh TYPE POS     ANTIBODY SCREEN NEG    POCT GLUCOSE   Result Value Ref Range    POCT Glucose 194 (H) 74 - 99 mg/dL   Renal function panel   Result Value Ref Range    Glucose 189 (H) 74 - 99 mg/dL    Sodium 129 (L) 136 - 145 mmol/L    Potassium 4.2 3.5 - 5.3 mmol/L    Chloride 98 98 - 107 mmol/L    Bicarbonate 25 21 - 32 mmol/L    Anion Gap 10 10 - 20 mmol/L    Urea Nitrogen 36 (H) 6 - 23 mg/dL    Creatinine 5.14 (H) 0.50 - 1.05 mg/dL    eGFR 9 (L) >60 mL/min/1.73m*2    Calcium 7.1 (L) 8.6 - 10.6 mg/dL    Phosphorus 3.2 2.5 - 4.9 mg/dL    Albumin 2.2 (L) 3.4 - 5.0 g/dL   Magnesium   Result Value Ref Range    Magnesium 1.88 1.60 - 2.40 mg/dL   POCT GLUCOSE   Result Value Ref Range    POCT Glucose 192 (H) 74 - 99 mg/dL   Renal function panel   Result Value Ref Range    Glucose 225 (H) 74 - 99 mg/dL    Sodium 130 (L) 136 - 145 mmol/L    Potassium 5.3 3.5 - 5.3 mmol/L    Chloride 95 (L) 98 - 107 mmol/L    Bicarbonate 22 21 - 32 mmol/L    Anion Gap 18 10 - 20 mmol/L    Urea Nitrogen 36 (H) 6 - 23 mg/dL    Creatinine 5.57 (H) 0.50 - 1.05 mg/dL    eGFR 8 (L) >60 mL/min/1.73m*2    Calcium 8.0 (L) 8.6 - 10.6 mg/dL    Phosphorus 3.6 2.5 - 4.9 mg/dL    Albumin 2.5 (L) 3.4 - 5.0 g/dL   Magnesium   Result Value Ref Range    Magnesium 2.35 1.60 - 2.40 mg/dL   ECG 12 Lead   Result Value Ref Range    Ventricular Rate 155 BPM    Atrial Rate 214 BPM    QRS Duration 110 ms    QT Interval 298 ms    QTC Calculation(Bazett) 478 ms    R Axis 63 degrees    T Axis -87 degrees    QRS Count 25 beats    Q Onset 211 ms    T Offset 360 ms    QTC Fredericia 408 ms   Protime-INR   Result Value Ref Range    Protime 11.9 9.8 - 12.8 seconds    INR 1.1 0.9 - 1.1   POCT GLUCOSE    Result Value Ref Range    POCT Glucose 219 (H) 74 - 99 mg/dL       Imaging:   None relevant to the current chief concern     Assessment/Plan     - Discussion was held with patient's child regarding the risks, benefits and indications for tracheostomy including but not limited to bleeding, infection, pneumothorax, trachea-innominate fistula and trachea-esophageal injury, medical complications, and death. Informed consent to proceed with tracheostomy was obtained. All questions were answered.  - Will plan to proceed with bronchoscopy and tracheotomy, in the OR tomorrow depending on attending physician availability. Please hold tube feeds at midnight day prior.  Will keep primary team updated on status  - Discussed with team and family  - Consent obtained from Son and placed in chart    Was seen and evaluated with Dr. Castanon, who agreed the plan as outlined above    Beto Jacome MD MSCI - PGY1  Otolaryngology - Head and Neck Surgery    ENT Consult pager: [phone]  ENT Peds pager: [phone]  ENT Head & Neck Surgery Phone: [phone]  ENT subspecialty team: [place] individual resident who wrote today's note  ENT Outpatient scheduling number: [phone]  Please Page [phone] or call [phone] if Urgent

## 2024-12-06 ENCOUNTER — ANESTHESIA (OUTPATIENT)
Dept: OPERATING ROOM | Facility: HOSPITAL | Age: 64
End: 2024-12-06
Payer: MEDICARE

## 2024-12-06 ENCOUNTER — ANESTHESIA EVENT (OUTPATIENT)
Dept: OPERATING ROOM | Facility: HOSPITAL | Age: 64
End: 2024-12-06
Payer: MEDICARE

## 2024-12-06 ENCOUNTER — APPOINTMENT (OUTPATIENT)
Dept: CARDIOLOGY | Facility: HOSPITAL | Age: 64
End: 2024-12-06
Payer: MEDICARE

## 2024-12-06 PROBLEM — I71.8: Status: ACTIVE | Noted: 2024-12-06

## 2024-12-06 PROBLEM — D62 ANEMIA DUE TO ACUTE BLOOD LOSS: Status: ACTIVE | Noted: 2024-12-06

## 2024-12-06 PROBLEM — I46.9 CARDIAC ARREST: Status: ACTIVE | Noted: 2024-11-18

## 2024-12-06 LAB
ALBUMIN SERPL BCP-MCNC: 2.4 G/DL (ref 3.4–5)
ALBUMIN SERPL BCP-MCNC: 3 G/DL (ref 3.4–5)
ANION GAP BLDA CALCULATED.4IONS-SCNC: 16 MMO/L (ref 10–25)
ANION GAP BLDA CALCULATED.4IONS-SCNC: 9 MMO/L (ref 10–25)
ANION GAP SERPL CALC-SCNC: 18 MMOL/L (ref 10–20)
ANION GAP SERPL CALC-SCNC: 28 MMOL/L (ref 10–20)
APTT PPP: >200 SECONDS (ref 27–38)
BASE EXCESS BLDA CALC-SCNC: -0.5 MMOL/L (ref -2–3)
BASE EXCESS BLDA CALC-SCNC: -4.6 MMOL/L (ref -2–3)
BASOPHILS # BLD AUTO: 0.03 X10*3/UL (ref 0–0.1)
BASOPHILS # BLD AUTO: 0.04 X10*3/UL (ref 0–0.1)
BASOPHILS # BLD AUTO: 0.04 X10*3/UL (ref 0–0.1)
BASOPHILS NFR BLD AUTO: 0.3 %
BASOPHILS NFR BLD AUTO: 0.5 %
BASOPHILS NFR BLD AUTO: 0.5 %
BLOOD EXPIRATION DATE: NORMAL
BODY TEMPERATURE: 37 DEGREES CELSIUS
BODY TEMPERATURE: 37 DEGREES CELSIUS
BUN SERPL-MCNC: 17 MG/DL (ref 6–23)
BUN SERPL-MCNC: 20 MG/DL (ref 6–23)
CA-I BLDA-SCNC: 1.04 MMOL/L (ref 1.1–1.33)
CA-I BLDA-SCNC: 1.12 MMOL/L (ref 1.1–1.33)
CALCIUM SERPL-MCNC: 7.5 MG/DL (ref 8.6–10.6)
CALCIUM SERPL-MCNC: 8.9 MG/DL (ref 8.6–10.6)
CHLORIDE BLDA-SCNC: 98 MMOL/L (ref 98–107)
CHLORIDE BLDA-SCNC: 99 MMOL/L (ref 98–107)
CHLORIDE SERPL-SCNC: 101 MMOL/L (ref 98–107)
CHLORIDE SERPL-SCNC: 96 MMOL/L (ref 98–107)
CO2 SERPL-SCNC: 21 MMOL/L (ref 21–32)
CO2 SERPL-SCNC: 24 MMOL/L (ref 21–32)
CREAT SERPL-MCNC: 3.12 MG/DL (ref 0.5–1.05)
CREAT SERPL-MCNC: 3.32 MG/DL (ref 0.5–1.05)
D DIMER PPP FEU-MCNC: 3234 NG/ML FEU
DISPENSE STATUS: NORMAL
EGFRCR SERPLBLD CKD-EPI 2021: 15 ML/MIN/1.73M*2
EGFRCR SERPLBLD CKD-EPI 2021: 16 ML/MIN/1.73M*2
EJECTION FRACTION: 63 %
EOSINOPHIL # BLD AUTO: 0.03 X10*3/UL (ref 0–0.7)
EOSINOPHIL # BLD AUTO: 0.11 X10*3/UL (ref 0–0.7)
EOSINOPHIL # BLD AUTO: 0.15 X10*3/UL (ref 0–0.7)
EOSINOPHIL NFR BLD AUTO: 0.3 %
EOSINOPHIL NFR BLD AUTO: 1.3 %
EOSINOPHIL NFR BLD AUTO: 1.7 %
ERYTHROCYTE [DISTWIDTH] IN BLOOD BY AUTOMATED COUNT: 15.9 % (ref 11.5–14.5)
ERYTHROCYTE [DISTWIDTH] IN BLOOD BY AUTOMATED COUNT: 16.6 % (ref 11.5–14.5)
ERYTHROCYTE [DISTWIDTH] IN BLOOD BY AUTOMATED COUNT: 17.1 % (ref 11.5–14.5)
ERYTHROCYTE [DISTWIDTH] IN BLOOD BY AUTOMATED COUNT: 17.2 % (ref 11.5–14.5)
FIBRINOGEN PPP-MCNC: 428 MG/DL (ref 200–400)
GLUCOSE BLD MANUAL STRIP-MCNC: 186 MG/DL (ref 74–99)
GLUCOSE BLD MANUAL STRIP-MCNC: 190 MG/DL (ref 74–99)
GLUCOSE BLD MANUAL STRIP-MCNC: 229 MG/DL (ref 74–99)
GLUCOSE BLD MANUAL STRIP-MCNC: 243 MG/DL (ref 74–99)
GLUCOSE BLD MANUAL STRIP-MCNC: 250 MG/DL (ref 74–99)
GLUCOSE BLD MANUAL STRIP-MCNC: 251 MG/DL (ref 74–99)
GLUCOSE BLDA-MCNC: 195 MG/DL (ref 74–99)
GLUCOSE BLDA-MCNC: 265 MG/DL (ref 74–99)
GLUCOSE SERPL-MCNC: 195 MG/DL (ref 74–99)
GLUCOSE SERPL-MCNC: 239 MG/DL (ref 74–99)
HCO3 BLDA-SCNC: 21.6 MMOL/L (ref 22–26)
HCO3 BLDA-SCNC: 24.1 MMOL/L (ref 22–26)
HCT VFR BLD AUTO: 20 % (ref 36–46)
HCT VFR BLD AUTO: 20.9 % (ref 36–46)
HCT VFR BLD AUTO: 21.1 % (ref 36–46)
HCT VFR BLD AUTO: 27.7 % (ref 36–46)
HCT VFR BLD EST: 23 % (ref 36–46)
HCT VFR BLD EST: 26 % (ref 36–46)
HGB BLD-MCNC: 6.4 G/DL (ref 12–16)
HGB BLD-MCNC: 7 G/DL (ref 12–16)
HGB BLD-MCNC: 7.3 G/DL (ref 12–16)
HGB BLD-MCNC: 8.5 G/DL (ref 12–16)
HGB BLDA-MCNC: 7.6 G/DL (ref 12–16)
HGB BLDA-MCNC: 8.7 G/DL (ref 12–16)
IMM GRANULOCYTES # BLD AUTO: 0.05 X10*3/UL (ref 0–0.7)
IMM GRANULOCYTES # BLD AUTO: 0.06 X10*3/UL (ref 0–0.7)
IMM GRANULOCYTES # BLD AUTO: 0.07 X10*3/UL (ref 0–0.7)
IMM GRANULOCYTES NFR BLD AUTO: 0.5 % (ref 0–0.9)
IMM GRANULOCYTES NFR BLD AUTO: 0.6 % (ref 0–0.9)
IMM GRANULOCYTES NFR BLD AUTO: 0.8 % (ref 0–0.9)
INHALED O2 CONCENTRATION: 30 %
INHALED O2 CONCENTRATION: 30 %
INR PPP: 1.2 (ref 0.9–1.1)
INR PPP: 1.2 (ref 0.9–1.1)
LACTATE BLDA-SCNC: 1.4 MMOL/L (ref 0.4–2)
LACTATE BLDA-SCNC: 4.5 MMOL/L (ref 0.4–2)
LACTATE SERPL-SCNC: 1.5 MMOL/L (ref 0.4–2)
LACTATE SERPL-SCNC: 4.5 MMOL/L (ref 0.4–2)
LYMPHOCYTES # BLD AUTO: 0.88 X10*3/UL (ref 1.2–4.8)
LYMPHOCYTES # BLD AUTO: 1.07 X10*3/UL (ref 1.2–4.8)
LYMPHOCYTES # BLD AUTO: 1.3 X10*3/UL (ref 1.2–4.8)
LYMPHOCYTES NFR BLD AUTO: 12.1 %
LYMPHOCYTES NFR BLD AUTO: 15.3 %
LYMPHOCYTES NFR BLD AUTO: 7.9 %
MAGNESIUM SERPL-MCNC: 1.98 MG/DL (ref 1.6–2.4)
MCH RBC QN AUTO: 29.9 PG (ref 26–34)
MCH RBC QN AUTO: 30 PG (ref 26–34)
MCHC RBC AUTO-ENTMCNC: 30.7 G/DL (ref 32–36)
MCHC RBC AUTO-ENTMCNC: 32 G/DL (ref 32–36)
MCHC RBC AUTO-ENTMCNC: 33.5 G/DL (ref 32–36)
MCHC RBC AUTO-ENTMCNC: 34.6 G/DL (ref 32–36)
MCV RBC AUTO: 87 FL (ref 80–100)
MCV RBC AUTO: 90 FL (ref 80–100)
MCV RBC AUTO: 94 FL (ref 80–100)
MCV RBC AUTO: 98 FL (ref 80–100)
MONOCYTES # BLD AUTO: 0.93 X10*3/UL (ref 0.1–1)
MONOCYTES # BLD AUTO: 1.08 X10*3/UL (ref 0.1–1)
MONOCYTES # BLD AUTO: 1.09 X10*3/UL (ref 0.1–1)
MONOCYTES NFR BLD AUTO: 10.9 %
MONOCYTES NFR BLD AUTO: 12.2 %
MONOCYTES NFR BLD AUTO: 9.8 %
MRSA DNA SPEC QL NAA+PROBE: NOT DETECTED
NEUTROPHILS # BLD AUTO: 6.09 X10*3/UL (ref 1.2–7.7)
NEUTROPHILS # BLD AUTO: 6.42 X10*3/UL (ref 1.2–7.7)
NEUTROPHILS # BLD AUTO: 9.03 X10*3/UL (ref 1.2–7.7)
NEUTROPHILS NFR BLD AUTO: 71.4 %
NEUTROPHILS NFR BLD AUTO: 72.7 %
NEUTROPHILS NFR BLD AUTO: 81.2 %
NRBC BLD-RTO: 0.2 /100 WBCS (ref 0–0)
NRBC BLD-RTO: 0.4 /100 WBCS (ref 0–0)
NRBC BLD-RTO: 0.6 /100 WBCS (ref 0–0)
NRBC BLD-RTO: 0.6 /100 WBCS (ref 0–0)
OXYHGB MFR BLDA: 93.2 % (ref 94–98)
OXYHGB MFR BLDA: 93.4 % (ref 94–98)
PCO2 BLDA: 38 MM HG (ref 38–42)
PCO2 BLDA: 44 MM HG (ref 38–42)
PH BLDA: 7.3 PH (ref 7.38–7.42)
PH BLDA: 7.41 PH (ref 7.38–7.42)
PHOSPHATE SERPL-MCNC: 3.2 MG/DL (ref 2.5–4.9)
PHOSPHATE SERPL-MCNC: 3.7 MG/DL (ref 2.5–4.9)
PLATELET # BLD AUTO: 257 X10*3/UL (ref 150–450)
PLATELET # BLD AUTO: 307 X10*3/UL (ref 150–450)
PLATELET # BLD AUTO: 309 X10*3/UL (ref 150–450)
PLATELET # BLD AUTO: 356 X10*3/UL (ref 150–450)
PO2 BLDA: 70 MM HG (ref 85–95)
PO2 BLDA: 75 MM HG (ref 85–95)
POTASSIUM BLDA-SCNC: 4.9 MMOL/L (ref 3.5–5.3)
POTASSIUM BLDA-SCNC: 5 MMOL/L (ref 3.5–5.3)
POTASSIUM SERPL-SCNC: 4.6 MMOL/L (ref 3.5–5.3)
POTASSIUM SERPL-SCNC: 5.6 MMOL/L (ref 3.5–5.3)
PRODUCT BLOOD TYPE: 8400
PRODUCT CODE: NORMAL
PROTHROMBIN TIME: 13.7 SECONDS (ref 9.8–12.8)
PROTHROMBIN TIME: 14 SECONDS (ref 9.8–12.8)
RBC # BLD AUTO: 2.13 X10*6/UL (ref 4–5.2)
RBC # BLD AUTO: 2.33 X10*6/UL (ref 4–5.2)
RBC # BLD AUTO: 2.43 X10*6/UL (ref 4–5.2)
RBC # BLD AUTO: 2.84 X10*6/UL (ref 4–5.2)
SAO2 % BLDA: 96 % (ref 94–100)
SAO2 % BLDA: 96 % (ref 94–100)
SODIUM BLDA-SCNC: 127 MMOL/L (ref 136–145)
SODIUM BLDA-SCNC: 131 MMOL/L (ref 136–145)
SODIUM SERPL-SCNC: 133 MMOL/L (ref 136–145)
SODIUM SERPL-SCNC: 144 MMOL/L (ref 136–145)
UNIT ABO: NORMAL
UNIT NUMBER: NORMAL
UNIT RH: NORMAL
UNIT VOLUME: 350
VANCOMYCIN SERPL-MCNC: 8.2 UG/ML (ref 5–20)
WBC # BLD AUTO: 11.1 X10*3/UL (ref 4.4–11.3)
WBC # BLD AUTO: 14.5 X10*3/UL (ref 4.4–11.3)
WBC # BLD AUTO: 8.5 X10*3/UL (ref 4.4–11.3)
WBC # BLD AUTO: 8.8 X10*3/UL (ref 4.4–11.3)
XM INTEP: NORMAL

## 2024-12-06 PROCEDURE — 85025 COMPLETE CBC W/AUTO DIFF WBC: CPT

## 2024-12-06 PROCEDURE — 2500000004 HC RX 250 GENERAL PHARMACY W/ HCPCS (ALT 636 FOR OP/ED)

## 2024-12-06 PROCEDURE — P9016 RBC LEUKOCYTES REDUCED: HCPCS

## 2024-12-06 PROCEDURE — 87077 CULTURE AEROBIC IDENTIFY: CPT

## 2024-12-06 PROCEDURE — 2500000005 HC RX 250 GENERAL PHARMACY W/O HCPCS

## 2024-12-06 PROCEDURE — 2500000002 HC RX 250 W HCPCS SELF ADMINISTERED DRUGS (ALT 637 FOR MEDICARE OP, ALT 636 FOR OP/ED)

## 2024-12-06 PROCEDURE — 94003 VENT MGMT INPAT SUBQ DAY: CPT

## 2024-12-06 PROCEDURE — 2720000007 HC OR 272 NO HCPCS: Performed by: STUDENT IN AN ORGANIZED HEALTH CARE EDUCATION/TRAINING PROGRAM

## 2024-12-06 PROCEDURE — 87641 MR-STAPH DNA AMP PROBE: CPT

## 2024-12-06 PROCEDURE — 80202 ASSAY OF VANCOMYCIN: CPT | Performed by: INTERNAL MEDICINE

## 2024-12-06 PROCEDURE — 0DH63UZ INSERTION OF FEEDING DEVICE INTO STOMACH, PERCUTANEOUS APPROACH: ICD-10-PCS | Performed by: SURGERY

## 2024-12-06 PROCEDURE — 84132 ASSAY OF SERUM POTASSIUM: CPT

## 2024-12-06 PROCEDURE — 36415 COLL VENOUS BLD VENIPUNCTURE: CPT

## 2024-12-06 PROCEDURE — 85610 PROTHROMBIN TIME: CPT

## 2024-12-06 PROCEDURE — 2500000001 HC RX 250 WO HCPCS SELF ADMINISTERED DRUGS (ALT 637 FOR MEDICARE OP)

## 2024-12-06 PROCEDURE — 83605 ASSAY OF LACTIC ACID: CPT

## 2024-12-06 PROCEDURE — 36556 INSERT NON-TUNNEL CV CATH: CPT | Performed by: INTERNAL MEDICINE

## 2024-12-06 PROCEDURE — 36556 INSERT NON-TUNNEL CV CATH: CPT | Performed by: STUDENT IN AN ORGANIZED HEALTH CARE EDUCATION/TRAINING PROGRAM

## 2024-12-06 PROCEDURE — 36430 TRANSFUSION BLD/BLD COMPNT: CPT

## 2024-12-06 PROCEDURE — 93321 DOPPLER ECHO F-UP/LMTD STD: CPT | Performed by: INTERNAL MEDICINE

## 2024-12-06 PROCEDURE — 93308 TTE F-UP OR LMTD: CPT | Performed by: INTERNAL MEDICINE

## 2024-12-06 PROCEDURE — 31600 PLANNED TRACHEOSTOMY: CPT | Performed by: STUDENT IN AN ORGANIZED HEALTH CARE EDUCATION/TRAINING PROGRAM

## 2024-12-06 PROCEDURE — 85384 FIBRINOGEN ACTIVITY: CPT

## 2024-12-06 PROCEDURE — 85027 COMPLETE CBC AUTOMATED: CPT

## 2024-12-06 PROCEDURE — 71045 X-RAY EXAM CHEST 1 VIEW: CPT | Performed by: RADIOLOGY

## 2024-12-06 PROCEDURE — XXE2X19 MEASUREMENT OF CARDIAC OUTPUT, COMPUTER-AIDED ASSESSMENT, NEW TECHNOLOGY GROUP 9: ICD-10-PCS | Performed by: INTERNAL MEDICINE

## 2024-12-06 PROCEDURE — 83921 ORGANIC ACID SINGLE QUANT: CPT

## 2024-12-06 PROCEDURE — 87040 BLOOD CULTURE FOR BACTERIA: CPT

## 2024-12-06 PROCEDURE — 82947 ASSAY GLUCOSE BLOOD QUANT: CPT

## 2024-12-06 PROCEDURE — 37799 UNLISTED PX VASCULAR SURGERY: CPT

## 2024-12-06 PROCEDURE — 3700000001 HC GENERAL ANESTHESIA TIME - INITIAL BASE CHARGE: Performed by: STUDENT IN AN ORGANIZED HEALTH CARE EDUCATION/TRAINING PROGRAM

## 2024-12-06 PROCEDURE — 2020000001 HC ICU ROOM DAILY

## 2024-12-06 PROCEDURE — 85379 FIBRIN DEGRADATION QUANT: CPT

## 2024-12-06 PROCEDURE — 0B110F4 BYPASS TRACHEA TO CUTANEOUS WITH TRACHEOSTOMY DEVICE, OPEN APPROACH: ICD-10-PCS | Performed by: STUDENT IN AN ORGANIZED HEALTH CARE EDUCATION/TRAINING PROGRAM

## 2024-12-06 PROCEDURE — 3700000002 HC GENERAL ANESTHESIA TIME - EACH INCREMENTAL 1 MINUTE: Performed by: STUDENT IN AN ORGANIZED HEALTH CARE EDUCATION/TRAINING PROGRAM

## 2024-12-06 PROCEDURE — 2500000005 HC RX 250 GENERAL PHARMACY W/O HCPCS: Performed by: STUDENT IN AN ORGANIZED HEALTH CARE EDUCATION/TRAINING PROGRAM

## 2024-12-06 PROCEDURE — 99291 CRITICAL CARE FIRST HOUR: CPT

## 2024-12-06 PROCEDURE — 3600000004 HC OR TIME - INITIAL BASE CHARGE - PROCEDURE LEVEL FOUR: Performed by: STUDENT IN AN ORGANIZED HEALTH CARE EDUCATION/TRAINING PROGRAM

## 2024-12-06 PROCEDURE — 93325 DOPPLER ECHO COLOR FLOW MAPG: CPT | Performed by: INTERNAL MEDICINE

## 2024-12-06 PROCEDURE — 83735 ASSAY OF MAGNESIUM: CPT

## 2024-12-06 PROCEDURE — 3600000009 HC OR TIME - EACH INCREMENTAL 1 MINUTE - PROCEDURE LEVEL FOUR: Performed by: STUDENT IN AN ORGANIZED HEALTH CARE EDUCATION/TRAINING PROGRAM

## 2024-12-06 PROCEDURE — 2500000004 HC RX 250 GENERAL PHARMACY W/ HCPCS (ALT 636 FOR OP/ED): Performed by: STUDENT IN AN ORGANIZED HEALTH CARE EDUCATION/TRAINING PROGRAM

## 2024-12-06 RX ORDER — PROPOFOL 10 MG/ML
INJECTION, EMULSION INTRAVENOUS AS NEEDED
Status: DISCONTINUED | OUTPATIENT
Start: 2024-12-06 | End: 2024-12-06

## 2024-12-06 RX ORDER — AMPICILLIN AND SULBACTAM 2; 1 G/1; G/1
INJECTION, POWDER, FOR SOLUTION INTRAMUSCULAR; INTRAVENOUS AS NEEDED
Status: DISCONTINUED | OUTPATIENT
Start: 2024-12-06 | End: 2024-12-06

## 2024-12-06 RX ORDER — LIDOCAINE HYDROCHLORIDE AND EPINEPHRINE 10; 10 UG/ML; MG/ML
INJECTION, SOLUTION INFILTRATION; PERINEURAL AS NEEDED
Status: DISCONTINUED | OUTPATIENT
Start: 2024-12-06 | End: 2024-12-06 | Stop reason: HOSPADM

## 2024-12-06 RX ORDER — VANCOMYCIN HYDROCHLORIDE 1 G/200ML
1000 INJECTION, SOLUTION INTRAVENOUS ONCE
Status: COMPLETED | OUTPATIENT
Start: 2024-12-06 | End: 2024-12-06

## 2024-12-06 RX ORDER — VANCOMYCIN HYDROCHLORIDE 500 MG/100ML
500 INJECTION, SOLUTION INTRAVENOUS
Status: DISCONTINUED | OUTPATIENT
Start: 2024-12-07 | End: 2024-12-10

## 2024-12-06 RX ORDER — ROCURONIUM BROMIDE 10 MG/ML
INJECTION, SOLUTION INTRAVENOUS AS NEEDED
Status: DISCONTINUED | OUTPATIENT
Start: 2024-12-06 | End: 2024-12-06

## 2024-12-06 RX ORDER — SODIUM CHLORIDE 0.9 G/100ML
IRRIGANT IRRIGATION AS NEEDED
Status: DISCONTINUED | OUTPATIENT
Start: 2024-12-06 | End: 2024-12-06 | Stop reason: HOSPADM

## 2024-12-06 RX ORDER — SILVER NITRATE 38.21; 12.74 MG/1; MG/1
STICK TOPICAL ONCE
Status: COMPLETED | OUTPATIENT
Start: 2024-12-06 | End: 2024-12-06

## 2024-12-06 RX ORDER — EPINEPHRINE 1 MG/ML
INJECTION INTRAMUSCULAR; INTRAVENOUS; SUBCUTANEOUS
Status: DISPENSED
Start: 2024-12-06 | End: 2024-12-07

## 2024-12-06 RX ORDER — INSULIN LISPRO 100 [IU]/ML
3 INJECTION, SOLUTION INTRAVENOUS; SUBCUTANEOUS ONCE
Status: COMPLETED | OUTPATIENT
Start: 2024-12-06 | End: 2024-12-06

## 2024-12-06 RX ORDER — VANCOMYCIN HYDROCHLORIDE 1 G/20ML
INJECTION, POWDER, LYOPHILIZED, FOR SOLUTION INTRAVENOUS DAILY PRN
Status: DISCONTINUED | OUTPATIENT
Start: 2024-12-06 | End: 2024-12-10

## 2024-12-06 RX ORDER — VANCOMYCIN HYDROCHLORIDE 500 MG/100ML
500 INJECTION, SOLUTION INTRAVENOUS ONCE
Status: DISCONTINUED | OUTPATIENT
Start: 2024-12-06 | End: 2024-12-06

## 2024-12-06 RX ORDER — VANCOMYCIN HYDROCHLORIDE 1 G/200ML
1000 INJECTION, SOLUTION INTRAVENOUS ONCE
Status: DISCONTINUED | OUTPATIENT
Start: 2024-12-06 | End: 2024-12-06

## 2024-12-06 RX ADMIN — PIPERACILLIN SODIUM AND TAZOBACTAM SODIUM 2.25 G: 2; .25 INJECTION, SOLUTION INTRAVENOUS at 01:50

## 2024-12-06 RX ADMIN — AMIODARONE HYDROCHLORIDE 360 MG: 1.8 INJECTION, SOLUTION INTRAVENOUS at 00:38

## 2024-12-06 RX ADMIN — PANTOPRAZOLE SODIUM 40 MG: 40 INJECTION, POWDER, FOR SOLUTION INTRAVENOUS at 22:04

## 2024-12-06 RX ADMIN — INSULIN LISPRO 1 UNITS: 100 INJECTION, SOLUTION INTRAVENOUS; SUBCUTANEOUS at 22:09

## 2024-12-06 RX ADMIN — PIPERACILLIN SODIUM AND TAZOBACTAM SODIUM 2.25 G: 2; .25 INJECTION, SOLUTION INTRAVENOUS at 11:01

## 2024-12-06 RX ADMIN — INSULIN LISPRO 2 UNITS: 100 INJECTION, SOLUTION INTRAVENOUS; SUBCUTANEOUS at 16:18

## 2024-12-06 RX ADMIN — THIAMINE HCL TAB 100 MG 100 MG: 100 TAB at 11:00

## 2024-12-06 RX ADMIN — ATORVASTATIN CALCIUM 80 MG: 80 TABLET, FILM COATED ORAL at 22:04

## 2024-12-06 RX ADMIN — SODIUM CHLORIDE, POTASSIUM CHLORIDE, SODIUM LACTATE AND CALCIUM CHLORIDE 500 ML: 600; 310; 30; 20 INJECTION, SOLUTION INTRAVENOUS at 01:23

## 2024-12-06 RX ADMIN — INSULIN LISPRO 2 UNITS: 100 INJECTION, SOLUTION INTRAVENOUS; SUBCUTANEOUS at 03:57

## 2024-12-06 RX ADMIN — Medication 30 PERCENT: at 07:25

## 2024-12-06 RX ADMIN — INSULIN LISPRO 3 UNITS: 100 INJECTION, SOLUTION INTRAVENOUS; SUBCUTANEOUS at 12:47

## 2024-12-06 RX ADMIN — Medication 75 MCG/HR: at 16:31

## 2024-12-06 RX ADMIN — SODIUM ZIRCONIUM CYCLOSILICATE 10 G: 10 POWDER, FOR SUSPENSION ORAL at 22:13

## 2024-12-06 RX ADMIN — MIDAZOLAM IN SODIUM CHLORIDE 2 MG/HR: 1 INJECTION INTRAVENOUS at 00:39

## 2024-12-06 RX ADMIN — AMIODARONE HYDROCHLORIDE 400 MG: 200 TABLET ORAL at 22:04

## 2024-12-06 RX ADMIN — DEXTROSE MONOHYDRATE 21 MMOL: 50 INJECTION, SOLUTION INTRAVENOUS at 02:32

## 2024-12-06 RX ADMIN — ASPIRIN 81 MG CHEWABLE TABLET 81 MG: 81 TABLET CHEWABLE at 10:59

## 2024-12-06 RX ADMIN — SODIUM ZIRCONIUM CYCLOSILICATE 10 G: 10 POWDER, FOR SUSPENSION ORAL at 11:00

## 2024-12-06 RX ADMIN — VANCOMYCIN HYDROCHLORIDE 1000 MG: 1 INJECTION, SOLUTION INTRAVENOUS at 05:54

## 2024-12-06 RX ADMIN — VASOPRESSIN 0.03 UNITS/MIN: 0.2 INJECTION INTRAVENOUS at 22:13

## 2024-12-06 RX ADMIN — VASOPRESSIN 0.03 UNITS/MIN: 0.2 INJECTION INTRAVENOUS at 16:26

## 2024-12-06 RX ADMIN — SODIUM ZIRCONIUM CYCLOSILICATE 10 G: 10 POWDER, FOR SUSPENSION ORAL at 16:17

## 2024-12-06 RX ADMIN — AMIODARONE HYDROCHLORIDE 400 MG: 200 TABLET ORAL at 10:58

## 2024-12-06 RX ADMIN — SILVER NITRATE APPLICATORS 1 APPLICATOR: 25; 75 STICK TOPICAL at 14:57

## 2024-12-06 RX ADMIN — INSULIN LISPRO 3 UNITS: 100 INJECTION, SOLUTION INTRAVENOUS; SUBCUTANEOUS at 11:05

## 2024-12-06 RX ADMIN — Medication 30 PERCENT: at 22:53

## 2024-12-06 RX ADMIN — PANTOPRAZOLE SODIUM 40 MG: 40 INJECTION, POWDER, FOR SOLUTION INTRAVENOUS at 11:00

## 2024-12-06 RX ADMIN — Medication 0.2 MCG/KG/MIN: at 10:52

## 2024-12-06 RX ADMIN — Medication 75 MCG/HR: at 03:57

## 2024-12-06 RX ADMIN — AMIODARONE HYDROCHLORIDE 1 MG/MIN: 1.8 INJECTION, SOLUTION INTRAVENOUS at 01:29

## 2024-12-06 RX ADMIN — PIPERACILLIN SODIUM AND TAZOBACTAM SODIUM 2.25 G: 2; .25 INJECTION, SOLUTION INTRAVENOUS at 23:28

## 2024-12-06 RX ADMIN — Medication 0.2 MCG/KG/MIN: at 22:13

## 2024-12-06 ASSESSMENT — COGNITIVE AND FUNCTIONAL STATUS - GENERAL
TURNING FROM BACK TO SIDE WHILE IN FLAT BAD: TOTAL
MOVING FROM LYING ON BACK TO SITTING ON SIDE OF FLAT BED WITH BEDRAILS: TOTAL
TOILETING: TOTAL
DRESSING REGULAR LOWER BODY CLOTHING: TOTAL
DRESSING REGULAR UPPER BODY CLOTHING: TOTAL
MOBILITY SCORE: 6
STANDING UP FROM CHAIR USING ARMS: TOTAL
HELP NEEDED FOR BATHING: TOTAL
EATING MEALS: TOTAL
WALKING IN HOSPITAL ROOM: TOTAL
PERSONAL GROOMING: TOTAL
CLIMB 3 TO 5 STEPS WITH RAILING: TOTAL
DAILY ACTIVITIY SCORE: 6
MOVING TO AND FROM BED TO CHAIR: TOTAL

## 2024-12-06 ASSESSMENT — PAIN SCALES - GENERAL
PAINLEVEL_OUTOF10: 0 - NO PAIN

## 2024-12-06 NOTE — ANESTHESIA POSTPROCEDURE EVALUATION
Patient: Humaira Huang    Procedure Summary       Date: 12/06/24 Room / Location: [facility] OR 04 / [facility] [place]    Anesthesia Start: 0755 Anesthesia Stop: 0920    Procedures:       Creation Tracheostomy      Insertion Gastrostomy Tube Percutaneous Diagnosis:       Cardiac arrest      (Cardiac arrest [I46.9])    Surgeons: Cecil Mi MD; Epifanio Sinha MD Responsible Provider: Sourav Moise MD    Anesthesia Type: general ASA Status: 5            Anesthesia Type: general    Vitals Value Taken Time   /44 12/06/24 1231   Temp 35.5 °C (95.9 °F) 12/06/24 1226   Pulse 58 12/06/24 1230   Resp 14 12/06/24 1230   SpO2 100 % 12/06/24 1213   Vitals shown include unfiled device data.    Anesthesia Post Evaluation    Patient location during evaluation: ICU  Patient participation: complete - patient cannot participate  Level of consciousness: sedated  Pain management: adequate  Airway patency: patent  Cardiovascular status: hemodynamically unstable  Respiratory status: acceptable and ventilator (tracheostomy)  Hydration status: acceptable  Postoperative Nausea and Vomiting: none        No notable events documented.

## 2024-12-06 NOTE — PROGRESS NOTES
Physical Therapy                 Therapy Communication Note    Patient Name: Humaira Huang  MRN: [id number]  Department: [facility]  Room: 14/14-A  Today's Date: 12/6/2024     Discipline: Physical Therapy    Missed Visit Reason: Missed Visit Reason:  (Pt off the floor receiving trach/PEG.)    Missed Time: Attempt    Comment:

## 2024-12-06 NOTE — CARE PLAN
Problem: Skin  Goal: Prevent/manage excess moisture  Outcome: Progressing  Flowsheets (Taken 12/5/2024 2248)  Prevent/manage excess moisture:   Cleanse incontinence/protect with barrier cream   Monitor for/manage infection if present  Goal: Prevent/minimize sheer/friction injuries  Outcome: Progressing  Flowsheets (Taken 12/5/2024 2248)  Prevent/minimize sheer/friction injuries:   Complete micro-shifts as needed if patient unable. Adjust patient position to relieve pressure points, not a full turn   Use pull sheet  Goal: Promote skin healing  Outcome: Progressing  Flowsheets (Taken 12/5/2024 2248)  Promote skin healing:   Turn/reposition every 2 hours/use positioning/transfer devices   Assess skin/pad under line(s)/device(s)     Problem: Safety - Medical Restraint  Goal: Remains free of injury from restraints (Restraint for Interference with Medical Device)  Outcome: Progressing  Flowsheets (Taken 12/5/2024 2248)  Remains free of injury from restraints (restraint for interference with medical device): Every 2 hours: Monitor safety, psychosocial status, comfort, nutrition and hydration  Goal: Free from restraint(s) (Restraint for Interference with Medical Device)  Outcome: Progressing  Flowsheets (Taken 12/5/2024 2248)  Free from restraint(s) (restraint for interference with medical device): ONCE/SHIFT or MINIMUM Every 12 hours: Assess and document the continuing need for restraints

## 2024-12-06 NOTE — PROGRESS NOTES
Humaira Huang   64 kathleen    @@  N/Room: 17980027/14/14-A admitted to CICU for Aortic dissection.    Subjective: off pressors, intubated at 30% Fio2    Meds:   amiodarone, 400 mg, BID  aspirin, 81 mg, Daily  atorvastatin, 80 mg, Nightly  heparin, 80 Units/kg, Once  insulin lispro, 0-5 Units, q4h  lactated Ringer's, 1,000 mL, Once  oxygen, , Continuous - Inhalation  pantoprazole, 40 mg, BID  sodium zirconium cyclosilicate, 10 g, TID  thiamine, 100 mg, Daily      fentaNYL, Last Rate: 75 mcg/hr (12/05/24 1828)  heparin, Last Rate: 1,100 Units/hr (12/05/24 1900)  midazolam, Last Rate: Stopped (12/05/24 0700)  norepinephrine, Last Rate: Stopped (12/05/24 1817)      acetaminophen, 650 mg, q4h PRN   Or  acetaminophen, 650 mg, q4h PRN   Or  acetaminophen, 650 mg, q4h PRN  alteplase, 2 mg, PRN  dextrose, 12.5 g, q15 min PRN  dextrose, 25 g, q15 min PRN  fentaNYL, 25 mcg, q4h PRN  glucagon, 1 mg, q15 min PRN  glucagon, 1 mg, q15 min PRN  hydrOXYzine HCL, 10 mg, q6h PRN  ipratropium-albuteroL, 3 mL, q6h PRN  oxyCODONE, 2.5 mg, q4h PRN  oxygen, , Continuous PRN - O2/gases  polyethylene glycol, 17 g, Daily PRN  sennosides-docusate sodium, 1 tablet, Nightly PRN      OBJECTIVE:    Vitals:    12/05/24 1809   BP:    Pulse: 78   Resp:    Temp: 36 °C (96.8 °F)   SpO2:           Intake/Output Summary (Last 24 hours) at 12/5/2024 1933  Last data filed at 12/5/2024 1809  Gross per 24 hour   Intake 1669.94 ml   Output --   Net 1669.94 ml       General appearance: intubated  Eyes: non-icteric  Skin: no apparent rash  Heart: [id number]  Lungs: CTA bilat no wheezing/crackles  Abdomen: soft, nt/nd  Extremities: trace edema      Blood Labs:  Results for orders placed or performed during the hospital encounter of 11/18/24 (from the past 24 hours)   POCT GLUCOSE   Result Value Ref Range    POCT Glucose 186 (H) 74 - 99 mg/dL   POCT GLUCOSE   Result Value Ref Range    POCT Glucose 182 (H) 74 - 99 mg/dL   BLOOD GAS ARTERIAL FULL PANEL   Result  Value Ref Range    POCT pH, Arterial 7.41 7.38 - 7.42 pH    POCT pCO2, Arterial 41 38 - 42 mm Hg    POCT pO2, Arterial 75 (L) 85 - 95 mm Hg    POCT SO2, Arterial 97 94 - 100 %    POCT Oxy Hemoglobin, Arterial 94.1 94.0 - 98.0 %    POCT Hematocrit Calculated, Arterial 23.0 (L) 36.0 - 46.0 %    POCT Sodium, Arterial 127 (L) 136 - 145 mmol/L    POCT Potassium, Arterial 4.4 3.5 - 5.3 mmol/L    POCT Chloride, Arterial 96 (L) 98 - 107 mmol/L    POCT Ionized Calcium, Arterial 1.11 1.10 - 1.33 mmol/L    POCT Glucose, Arterial 202 (H) 74 - 99 mg/dL    POCT Lactate, Arterial 0.8 0.4 - 2.0 mmol/L    POCT Base Excess, Arterial 1.2 -2.0 - 3.0 mmol/L    POCT HCO3 Calculated, Arterial 26.0 22.0 - 26.0 mmol/L    POCT Hemoglobin, Arterial 7.7 (L) 12.0 - 16.0 g/dL    POCT Anion Gap, Arterial 9 (L) 10 - 25 mmo/L    Patient Temperature 37.0 degrees Celsius    FiO2 30 %   CBC and Auto Differential   Result Value Ref Range    WBC 10.7 4.4 - 11.3 x10*3/uL    nRBC 0.2 (H) 0.0 - 0.0 /100 WBCs    RBC 2.34 (L) 4.00 - 5.20 x10*6/uL    Hemoglobin 7.0 (L) 12.0 - 16.0 g/dL    Hematocrit 22.1 (L) 36.0 - 46.0 %    MCV 94 80 - 100 fL    MCH 29.9 26.0 - 34.0 pg    MCHC 31.7 (L) 32.0 - 36.0 g/dL    RDW 17.1 (H) 11.5 - 14.5 %    Platelets 275 150 - 450 x10*3/uL    Neutrophils % 81.3 40.0 - 80.0 %    Immature Granulocytes %, Automated 0.5 0.0 - 0.9 %    Lymphocytes % 6.5 13.0 - 44.0 %    Monocytes % 10.1 2.0 - 10.0 %    Eosinophils % 1.2 0.0 - 6.0 %    Basophils % 0.4 0.0 - 2.0 %    Neutrophils Absolute 8.70 (H) 1.20 - 7.70 x10*3/uL    Immature Granulocytes Absolute, Automated 0.05 0.00 - 0.70 x10*3/uL    Lymphocytes Absolute 0.69 (L) 1.20 - 4.80 x10*3/uL    Monocytes Absolute 1.08 (H) 0.10 - 1.00 x10*3/uL    Eosinophils Absolute 0.13 0.00 - 0.70 x10*3/uL    Basophils Absolute 0.04 0.00 - 0.10 x10*3/uL   Type and screen   Result Value Ref Range    ABO TYPE AB     Rh TYPE POS     ANTIBODY SCREEN NEG    POCT GLUCOSE   Result Value Ref Range    POCT  Glucose 194 (H) 74 - 99 mg/dL   Renal function panel   Result Value Ref Range    Glucose 189 (H) 74 - 99 mg/dL    Sodium 129 (L) 136 - 145 mmol/L    Potassium 4.2 3.5 - 5.3 mmol/L    Chloride 98 98 - 107 mmol/L    Bicarbonate 25 21 - 32 mmol/L    Anion Gap 10 10 - 20 mmol/L    Urea Nitrogen 36 (H) 6 - 23 mg/dL    Creatinine 5.14 (H) 0.50 - 1.05 mg/dL    eGFR 9 (L) >60 mL/min/1.73m*2    Calcium 7.1 (L) 8.6 - 10.6 mg/dL    Phosphorus 3.2 2.5 - 4.9 mg/dL    Albumin 2.2 (L) 3.4 - 5.0 g/dL   Magnesium   Result Value Ref Range    Magnesium 1.88 1.60 - 2.40 mg/dL   POCT GLUCOSE   Result Value Ref Range    POCT Glucose 192 (H) 74 - 99 mg/dL   Renal function panel   Result Value Ref Range    Glucose 225 (H) 74 - 99 mg/dL    Sodium 130 (L) 136 - 145 mmol/L    Potassium 5.3 3.5 - 5.3 mmol/L    Chloride 95 (L) 98 - 107 mmol/L    Bicarbonate 22 21 - 32 mmol/L    Anion Gap 18 10 - 20 mmol/L    Urea Nitrogen 36 (H) 6 - 23 mg/dL    Creatinine 5.57 (H) 0.50 - 1.05 mg/dL    eGFR 8 (L) >60 mL/min/1.73m*2    Calcium 8.0 (L) 8.6 - 10.6 mg/dL    Phosphorus 3.6 2.5 - 4.9 mg/dL    Albumin 2.5 (L) 3.4 - 5.0 g/dL   Magnesium   Result Value Ref Range    Magnesium 2.35 1.60 - 2.40 mg/dL   ECG 12 Lead   Result Value Ref Range    Ventricular Rate 155 BPM    Atrial Rate 214 BPM    QRS Duration 110 ms    QT Interval 298 ms    QTC Calculation(Bazett) 478 ms    R Axis 63 degrees    T Axis -87 degrees    QRS Count 25 beats    Q Onset 211 ms    T Offset 360 ms    QTC Fredericia 408 ms   Protime-INR   Result Value Ref Range    Protime 11.9 9.8 - 12.8 seconds    INR 1.1 0.9 - 1.1   POCT GLUCOSE   Result Value Ref Range    POCT Glucose 219 (H) 74 - 99 mg/dL   POCT GLUCOSE   Result Value Ref Range    POCT Glucose 192 (H) 74 - 99 mg/dL   BLOOD GAS ARTERIAL FULL PANEL   Result Value Ref Range    POCT pH, Arterial 7.36 (L) 7.38 - 7.42 pH    POCT pCO2, Arterial 40 38 - 42 mm Hg    POCT pO2, Arterial 77 (L) 85 - 95 mm Hg    POCT SO2, Arterial 96 94 - 100 %     POCT Oxy Hemoglobin, Arterial 93.6 (L) 94.0 - 98.0 %    POCT Hematocrit Calculated, Arterial 23.0 (L) 36.0 - 46.0 %    POCT Sodium, Arterial 127 (L) 136 - 145 mmol/L    POCT Potassium, Arterial 4.9 3.5 - 5.3 mmol/L    POCT Chloride, Arterial 97 (L) 98 - 107 mmol/L    POCT Ionized Calcium, Arterial 1.09 (L) 1.10 - 1.33 mmol/L    POCT Glucose, Arterial 212 (H) 74 - 99 mg/dL    POCT Lactate, Arterial 1.2 0.4 - 2.0 mmol/L    POCT Base Excess, Arterial -2.6 (L) -2.0 - 3.0 mmol/L    POCT HCO3 Calculated, Arterial 22.6 22.0 - 26.0 mmol/L    POCT Hemoglobin, Arterial 7.6 (L) 12.0 - 16.0 g/dL    POCT Anion Gap, Arterial 12 10 - 25 mmo/L    Patient Temperature 37.0 degrees Celsius    FiO2 30 %   Hepatitis B surface antibody   Result Value Ref Range    Hepatitis B Surface AB <3.1 <10.0 mIU/mL   Hepatitis B surface antigen   Result Value Ref Range    Hepatitis B Surface AG Nonreactive Nonreactive   BLOOD GAS MIXED VENOUS FULL PANEL   Result Value Ref Range    POCT pH, Mixed 7.31 (L) 7.33 - 7.43 pH    POCT pCO2, Mixed 48 41 - 51 mm Hg    POCT pO2, Mixed 34 (L) 35 - 45 mm Hg    POCT SO2, Mixed 49 45 - 75 %    POCT Oxy Hemoglobin, Mixed 48.0 45.0 - 75.0 %    POCT Hematocrit Calculated, Mixed 27.0 (L) 36.0 - 46.0 %    POCT Sodium, Mixed 124 (L) 136 - 145 mmol/L    POCT Potassium, Mixed 4.7 3.5 - 5.3 mmol/L    POCT Chloride, Mixed 95 (L) 98 - 107 mmol/L    POCT Ionized Calcium, Mixed 1.16 1.10 - 1.33 mmol/L    POCT Glucose, Mixed 217 (H) 74 - 99 mg/dL    POCT Lactate, Mixed 1.2 0.4 - 2.0 mmol/L    POCT Base Excess, Mixed -2.2 (L) -2.0 - 3.0 mmol/L    POCT HCO3 Calculated, Mixed 24.2 22.0 - 26.0 mmol/L    POCT Hemoglobin, Mixed 9.1 (L) 12.0 - 16.0 g/dL    POCT Anion Gap, Mixed 10 10 - 25 mmo/L    Patient Temperature 37.0 degrees Celsius    FiO2 30 %      US KUB 11/26  IMPRESSION:  1. Increased renal cortical echogenicity and lobular appearance of the kidneys bilaterally, likely medical renal disease, with relatively atrophic left  kidney. No hydronephrosis.  2. Partially visualized bilateral pleural effusions and trace  abdominal ascites.        ASSESSMENT:  Humaira Huang is a  64 y.o.  Year old , with PMHx of  pancreatitis, DVT/PE, HLD, HTN, CABGx4 1997, T2DM, GERD, PAD, chronic mesenteric ischemia, tobacco use who presented to [facility] ([facility] on 11/18/2024 as a transfer from [facility] with chest, back, and abdominal pain. 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 Vascular and Cardiac surgery following. CICU course c/b PEA arrest 11/20, now intubated. Nephrology following for TRACY.     #anuric TRACY-D Baseline creatinine 1.5   - Hemodynamics-not on any pressor support  - Etio :TRACY likely in setting recent hemodynamic insult with PEA.  - 110 ml in bladder scans 12/5  - last SLED 12/1 night, SLED not done for last 2 days given unavailability of machines.       RECOMMENDATIONS:  - no renal recovery, iHD today, will keep her on TTS schedule   - please change catheter to TDC before LTAC placement   - bladder scan BID please  - Strict I/Os.  - No contrast or nephrotoxic drugs if possible.  - will follow      Elise Graham MD  Nephrology Fellow   Daytime / Weekend Renal Pager [phone]  After 7 pm Emergencies [phone] Pager [phone]

## 2024-12-06 NOTE — BRIEF OP NOTE
Date: 2024  OR Location: [facility] OR    Name: Humaira Huang, : 1960, Age: 64 y.o., MRN: [id number], Sex: female    Diagnosis  Pre-op Diagnosis      * Cardiac arrest [I46.9] Post-op Diagnosis     * Cardiac arrest [I46.9]     Procedures  Creation Tracheostomy  86818 - [place] TRACHEOSTOMY PLANNED SEPARATE PROCEDURE    Insertion Gastrostomy Tube Percutaneous  36694 - OR INSERT GASTROSTOMY TUBE PERCUTANEOUS      Surgeons   Panel 1:     * Cecil Mi - Primary  Panel 2:     * Epifanio Sinha - Primary    Resident/Fellow/Other Assistant:  Surgeons and Role:  Panel 1:     * Beto Jacome MD - Resident - Assisting    Panel 2:    Charlee Whaley MD - Resident - Assisting    Staff:   Circulator: Katerine Sutton Person: Vladimir  Circulator: Manolo    Anesthesia Staff: Anesthesiologist: Sourav Moise MD  C-AA: LUIS Deluna    Procedure Summary  Anesthesia: General  ASA: V  Estimated Blood Loss: 0mL  Intra-op Medications:   Administrations occurring from 0740 to 0940 on 24:   Medication Name Total Dose   lidocaine-epinephrine (Xylocaine W/EPI) 1 %-1:100,000 injection 5 mL   sodium chloride 0.9 % irrigation solution 1,000 mL   alteplase (Cathflo Activase) injection 2 mg Cannot be calculated   amiodarone (Pacerone) tablet 400 mg Cannot be calculated   ampicillin-sulbactam (Unasyn) 3 g 3 g   aspirin chewable tablet 81 mg Cannot be calculated   atorvastatin (Lipitor) tablet 80 mg Cannot be calculated   dextrose 50 % injection 12.5 g Cannot be calculated   dextrose 50 % injection 25 g Cannot be calculated   fentanyl (Sublimaze) 1000 mcg in sodium chloride 0.9% 100 mL (10 mcg/mL) infusion (premix) 95 mcg   fentaNYL PF (Sublimaze) injection 25 mcg Cannot be calculated   glucagon (Glucagen) injection 1 mg Cannot be calculated   glucagon (Glucagen) injection 1 mg Cannot be calculated   heparin bolus from bag 4,672 Units Cannot be calculated   hydrOXYzine HCL (Atarax) tablet 10 mg Cannot be calculated    insulin lispro injection 0-5 Units Cannot be calculated   ipratropium-albuteroL (Duo-Neb) 0.5-2.5 mg/3 mL nebulizer solution 3 mL Cannot be calculated   lactated Ringer's bolus 1,000 mL Cannot be calculated   midazolam in NS (Versed) 1 mg/mL infusion solution 2.53 mg   norepinephrine (Levophed) 8 mg in dextrose 5% 250 mL (0.032 mg/mL) infusion (premix) 0.66 mg   oxyCODONE (Roxicodone) immediate release tablet 2.5 mg Cannot be calculated   oxygen (O2) therapy Cannot be calculated   piperacillin-tazobactam (Zosyn) 2.25 g in dextrose (iso) IV 50 mL Cannot be calculated   polyethylene glycol (Glycolax, Miralax) packet 17 g Cannot be calculated   propofol (Diprivan) injection 10 mg/mL 100 mg   rocuronium 10 mg/mL 50 mg   sennosides-docusate sodium (Luda-Colace) 8.6-50 mg per tablet 1 tablet Cannot be calculated   sodium zirconium cyclosilicate (Lokelma) packet 10 g Cannot be calculated   thiamine (Vitamin B-1) tablet 100 mg Cannot be calculated   vancomycin (Vancocin) 500 mg in dextrose 5%  mL Cannot be calculated   vancomycin (Vancocin) pharmacy to dose - pharmacy monitoring Cannot be calculated   potassium phosphates 21 mmol in dextrose 5% 250 mL IV Cannot be calculated              Anesthesia Record               Intraprocedure I/O Totals          Intake    Fentanyl Drip 0.00 mL    The total shown is the total volume documented since Anesthesia Start was filed.    Midazolam 0.00 mL    The total shown is the total volume documented since Anesthesia Start was filed.    Norepinephrine Drip 0.00 mL    The total shown is the total volume documented since Anesthesia Start was filed.    Total Intake 0 mL          Specimen: No specimens collected               Findings: PEG tube placed, 5cm at the skin    Complications:  None; patient tolerated the procedure well.     Disposition: ICU - intubated and hemodynamically stable.  Condition: stable  Specimens Collected: No specimens collected  Attending Attestation:      Epifanio Sinha

## 2024-12-06 NOTE — ANESTHESIA PREPROCEDURE EVALUATION
Patient: Humaira Huang    Procedure Information       Date/Time: 12/06/24 0740    Procedures:       Creation Tracheostomy      Insertion Gastrostomy Tube Percutaneous    Location: [facility] OR 04 / Virtual [facility] OR    Surgeons: Cecil Mi MD; Epifanio Sinha MD            Relevant Problems   Anesthesia (within normal limits)      Cardiac  Aortic dissection  Hypotension on pressors     (+) Cardiac arrest   (+) Ruptured aortic aneurysm (Multi)      Pulmonary  Respiratory failure  Intubated          /Renal   (+) ESRD on hemodialysis (Multi)      Hematology   (+) Anemia due to acute blood loss       Clinical information reviewed:    Allergies  Meds               NPO Detail:  No data recorded     Physical Exam    Airway   Cardiovascular   Rhythm: regular  Rate: normal     Dental    Pulmonary   (+) decreased breath sounds, rales     Abdominal            Anesthesia Plan    History of general anesthesia?: yes  History of complications of general anesthesia?: yes    ASA 5     general   (Patient is intubated , sedated)  intravenous induction   Use of blood products discussed with who consented to blood products.    Plan discussed with CRNA.

## 2024-12-06 NOTE — CONSULTS
Vancomycin Dosing by Pharmacy- INITIAL    Humaira Huang is a 64 y.o. year old female who Pharmacy has been consulted for vancomycin dosing for pneumonia. Based on the patient's indication and renal status this patient will be dosed based on a goal pre-HD level of 20-25.     Patient receives iHD Tuesday, Thursday, Saturday please follow nephro plan for changes in schedule     Visit Vitals  BP (!) 117/49   Pulse 95   Temp 36.4 °C (97.5 °F)   Resp 16        Lab Results   Component Value Date    CREATININE 1.69 (H) 2024    CREATININE 5.57 (H) 2024    CREATININE 5.14 (H) 2024    CREATININE 4.07 (H) 2024        Patient weight is as follows:   Vitals:    24 0000   Weight: 58.4 kg (128 lb 11.2 oz)       Cultures:  Susceptibility data for the encounter in last 14 days.  Collected Specimen Info Organism   24 Swab from Anterior Nares Methicillin Susceptible Staphylococcus aureus (MSSA)         I/O last 3 completed shifts:  In: 2857.1 (48.9 mL/kg) [I.V.:647.1 (11.1 mL/kg); NG/GT:2210]  Out: 250 (4.3 mL/kg) [Urine:250 (0.1 mL/kg/hr)]  Weight: 58.4 kg   I/O during current shift:  I/O this shift:  In: 706 [I.V.:226; Other:400; NG/GT:80]  Out: 3400 [Other:3400]    Temp (24hrs), Av.9 °C (96.7 °F), Min:35.4 °C (95.7 °F), Max:36.4 °C (97.5 °F)         Assessment/Plan     Checked STAT add on vanco level since patient was on vanco recently. Level = 8.2 Will order 1000 mg x1 now  Will initiate vancomycin maintenance, 500 mg following each iHD session  Follow-up level will be ordered on 12/ at AM labs unless clinically indicated sooner.  Will continue to monitor renal function daily while on vancomycin and order serum creatinine at least every 48 hours if not already ordered.  Follow for continued vancomycin needs, clinical response, and signs/symptoms of toxicity.       Kang Zepeda, PharmD

## 2024-12-06 NOTE — OP NOTE
Creation Tracheostomy Operative Note     Date: 2024  OR Location: [facility] OR    Name: Humaira Huang, : 1960, Age: 64 y.o., MRN: [id number], Sex: female    Diagnosis  Pre-op Diagnosis      * Cardiac arrest [I46.9] Post-op Diagnosis     * Cardiac arrest [I46.9]     Procedures  Creation Tracheostomy  76106 - [place] TRACHEOSTOMY PLANNED SEPARATE PROCEDURE    Insertion Gastrostomy Tube Percutaneous  76551 - [place] INSERT GASTROSTOMY TUBE PERCUTANEOUS      Surgeons   Panel 1:     * Cecil Mi - Primary  Panel 2:     * Epifanio Sinha - Primary    Resident/Fellow/Other Assistant:  Surgeons and Role:  Panel 1:     * Beto Jacome MD - Resident - Assisting    Staff:   Circulator: Katerine Sutton Person: Vladimir  Circulator: Manolo    Anesthesia Staff: Anesthesiologist: Sourav Moise MD  C-AA: LUIS Deluna    Procedure Summary  Anesthesia: General  ASA: V  Estimated Blood Loss: 5mL  Intra-op Medications:   Administrations occurring from 0740 to 0940 on 24:   Medication Name Total Dose   lidocaine-epinephrine (Xylocaine W/EPI) 1 %-1:100,000 injection 5 mL   sodium chloride 0.9 % irrigation solution 1,000 mL   alteplase (Cathflo Activase) injection 2 mg Cannot be calculated   amiodarone (Pacerone) tablet 400 mg Cannot be calculated   ampicillin-sulbactam (Unasyn) 3 g 3 g   aspirin chewable tablet 81 mg Cannot be calculated   atorvastatin (Lipitor) tablet 80 mg Cannot be calculated   dextrose 50 % injection 12.5 g Cannot be calculated   dextrose 50 % injection 25 g Cannot be calculated   fentanyl (Sublimaze) 1000 mcg in sodium chloride 0.9% 100 mL (10 mcg/mL) infusion (premix) 95 mcg   fentaNYL PF (Sublimaze) injection 25 mcg Cannot be calculated   glucagon (Glucagen) injection 1 mg Cannot be calculated   glucagon (Glucagen) injection 1 mg Cannot be calculated   heparin bolus from bag 4,672 Units Cannot be calculated   hydrOXYzine HCL (Atarax) tablet 10 mg Cannot be calculated   insulin lispro  injection 0-5 Units Cannot be calculated   ipratropium-albuteroL (Duo-Neb) 0.5-2.5 mg/3 mL nebulizer solution 3 mL Cannot be calculated   lactated Ringer's bolus 1,000 mL Cannot be calculated   midazolam in NS (Versed) 1 mg/mL infusion solution 2.53 mg   norepinephrine (Levophed) 8 mg in dextrose 5% 250 mL (0.032 mg/mL) infusion (premix) 0.66 mg   oxyCODONE (Roxicodone) immediate release tablet 2.5 mg Cannot be calculated   oxygen (O2) therapy Cannot be calculated   piperacillin-tazobactam (Zosyn) 2.25 g in dextrose (iso) IV 50 mL Cannot be calculated   polyethylene glycol (Glycolax, Miralax) packet 17 g Cannot be calculated   propofol (Diprivan) injection 10 mg/mL 100 mg   rocuronium 10 mg/mL 50 mg   sennosides-docusate sodium (Luda-Colace) 8.6-50 mg per tablet 1 tablet Cannot be calculated   sodium zirconium cyclosilicate (Lokelma) packet 10 g Cannot be calculated   sugammadex (Bridion) 200 mg/2 mL injection 200 mg   thiamine (Vitamin B-1) tablet 100 mg Cannot be calculated   vancomycin (Vancocin) 500 mg in dextrose 5%  mL Cannot be calculated   vancomycin (Vancocin) pharmacy to dose - pharmacy monitoring Cannot be calculated   potassium phosphates 21 mmol in dextrose 5% 250 mL IV Cannot be calculated              Anesthesia Record               Intraprocedure I/O Totals          Intake    Fentanyl Drip 0.00 mL    The total shown is the total volume documented since Anesthesia Start was filed.    Midazolam 0.00 mL    The total shown is the total volume documented since Anesthesia Start was filed.    Norepinephrine Drip 0.00 mL    The total shown is the total volume documented since Anesthesia Start was filed.    Total Intake 0 mL          Specimen: No specimens collected              Drains and/or Catheters:   NG/OG/Feeding Tube Left mouth (Active)   Tube Status Continuous tube feeding 12/05/24 2000   Placement Verification Measurements 12/06/24 0400   Distal Tube Measurement 63 cm 12/05/24 2000   Site  Assessment Clean;Dry;Intact 12/06/24 0400   Drainage Appearance Yellow 12/04/24 0800   NG/OG Interventions Irrigated 12/05/24 2000   Irrigant Tap water 12/05/24 2000   Response To Intervention No resistance met 12/06/24 0400   Intake (mL) 30 mL 12/05/24 2000   Intake - Flush (mL) 50 mL 12/05/24 2000   Output (mL) 100 mL 11/30/24 0600       NG/OG/Feeding Tube Gastric 20 Fr. (Active)       Tourniquet Times:         Implants:     Findings: Normal esophagus, stomach, and duodenum    Indications: Humaira Huang is an 64 y.o. female who is having surgery for Cardiac arrest [I46.9].     The patient was seen in the preoperative area. The risks, benefits, complications, treatment options, non-operative alternatives, expected recovery and outcomes were discussed with the patient. The possibilities of reaction to medication, pulmonary aspiration, injury to surrounding structures, bleeding, recurrent infection, the need for additional procedures, failure to diagnose a condition, and creating a complication requiring transfusion or operation were discussed with the patient. The patient concurred with the proposed plan, giving informed consent.  The site of surgery was properly noted/marked if necessary per policy. The patient has been actively warmed in preoperative area. Preoperative antibiotics have been ordered and given within 1 hours of incision. Venous thrombosis prophylaxis have been ordered including bilateral sequential compression devices    Procedure Details: Patient was brought to the operating room on ICU cart.  Timeout was performed per protocol and she received antibiotics and sequential stockings were placed.  Endoscope was advanced into the cervical esophagus and orogastric tube was removed.  Esophagus stomach and duodenum were evaluated and there is no evidence of inflammatory or neoplastic process.  Anterior abdominal wall in the left upper quadrant was prepped and draped.  Single finger depression was seen in  the gastric lumen and at this site safe check technique was performed with simultaneous visualization of the needle in the gastric lumen and air in the aspirating syringe.  At this site an 8 mm transverse incision was created and the Angiocath was advanced into the gastric lumen.  Guidewire was advanced and the guidewire snare and endoscope were brought out through the mouth.  PEG tube was affixed to this and brought back through the anterior abdominal wall.  Endoscope was returned to the gastric lumen for second intubation and visualization.  There is no evidence of bleeding.  External T-bar was placed at 5 cm allowing for 1 cm of laxity.  Endoscope was withdrawn after stomach was desufflated.  Patient tolerated procedure well and was taken back to intensive care unit in critical condition.  Complications:  None; patient tolerated the procedure well.    Disposition: ICU - intubated and critically ill.  Condition: stable                 Additional Details:     Attending Attestation: I was present and scrubbed for the entire procedure.    Cecil Mi  Phone Number: [phone]

## 2024-12-06 NOTE — PROGRESS NOTES
Humaira Huang   64 joseoNga    @@  N/Room: [id number]/14/14-A admitted to CICU for Aortic dissection.    Subjective: off pressors, intubated at 30% Fio2, tach and PEG done12/6    Meds:   amiodarone, 400 mg, BID  aspirin, 81 mg, Daily  atorvastatin, 80 mg, Nightly  heparin, 80 Units/kg, Once  insulin lispro, 0-5 Units, q4h  lactated Ringer's, 1,000 mL, Once  oxygen, , Continuous - Inhalation  pantoprazole, 40 mg, BID  piperacillin-tazobactam, 2.25 g, q8h  sodium zirconium cyclosilicate, 10 g, TID  thiamine, 100 mg, Daily  [START ON 12/7/2024] vancomycin, 500 mg, Once per day on Tuesday Thursday Saturday      amiodarone, Last Rate: Stopped (12/06/24 0315)  fentaNYL, Last Rate: 75 mcg/hr (12/06/24 0824)  midazolam, Last Rate: 2 mg/hr (12/06/24 0824)  norepinephrine, Last Rate: 0.2 mcg/kg/min (12/06/24 1052)      acetaminophen, 650 mg, q4h PRN   Or  acetaminophen, 650 mg, q4h PRN   Or  acetaminophen, 650 mg, q4h PRN  alteplase, 2 mg, PRN  dextrose, 12.5 g, q15 min PRN  dextrose, 25 g, q15 min PRN  fentaNYL, 25 mcg, q4h PRN  glucagon, 1 mg, q15 min PRN  glucagon, 1 mg, q15 min PRN  hydrOXYzine HCL, 10 mg, q6h PRN  ipratropium-albuteroL, 3 mL, q6h PRN  oxyCODONE, 2.5 mg, q4h PRN  oxygen, , Continuous PRN - O2/gases  polyethylene glycol, 17 g, Daily PRN  sennosides-docusate sodium, 1 tablet, Nightly PRN  vancomycin, , Daily PRN      OBJECTIVE:    Vitals:    12/06/24 1325   BP: (!) 133/45   Pulse: 59   Resp: 14   Temp: 36.1 °C (97 °F)   SpO2:           Intake/Output Summary (Last 24 hours) at 12/6/2024 1528  Last data filed at 12/6/2024 0700  Gross per 24 hour   Intake 2497.79 ml   Output 3400 ml   Net -902.21 ml       General appearance: intubated  Eyes: non-icteric  Skin: no apparent rash  Heart: [id number]  Lungs: CTA bilat no wheezing/crackles  Abdomen: soft, nt/nd  Extremities: trace edema      Blood Labs:  Results for orders placed or performed during the hospital encounter of 11/18/24 (from the past 24 hours)   POCT  GLUCOSE   Result Value Ref Range    POCT Glucose 192 (H) 74 - 99 mg/dL   BLOOD GAS ARTERIAL FULL PANEL   Result Value Ref Range    POCT pH, Arterial 7.36 (L) 7.38 - 7.42 pH    POCT pCO2, Arterial 40 38 - 42 mm Hg    POCT pO2, Arterial 77 (L) 85 - 95 mm Hg    POCT SO2, Arterial 96 94 - 100 %    POCT Oxy Hemoglobin, Arterial 93.6 (L) 94.0 - 98.0 %    POCT Hematocrit Calculated, Arterial 23.0 (L) 36.0 - 46.0 %    POCT Sodium, Arterial 127 (L) 136 - 145 mmol/L    POCT Potassium, Arterial 4.9 3.5 - 5.3 mmol/L    POCT Chloride, Arterial 97 (L) 98 - 107 mmol/L    POCT Ionized Calcium, Arterial 1.09 (L) 1.10 - 1.33 mmol/L    POCT Glucose, Arterial 212 (H) 74 - 99 mg/dL    POCT Lactate, Arterial 1.2 0.4 - 2.0 mmol/L    POCT Base Excess, Arterial -2.6 (L) -2.0 - 3.0 mmol/L    POCT HCO3 Calculated, Arterial 22.6 22.0 - 26.0 mmol/L    POCT Hemoglobin, Arterial 7.6 (L) 12.0 - 16.0 g/dL    POCT Anion Gap, Arterial 12 10 - 25 mmo/L    Patient Temperature 37.0 degrees Celsius    FiO2 30 %   Hepatitis B surface antibody   Result Value Ref Range    Hepatitis B Surface AB <3.1 <10.0 mIU/mL   Hepatitis B surface antigen   Result Value Ref Range    Hepatitis B Surface AG Nonreactive Nonreactive   BLOOD GAS MIXED VENOUS FULL PANEL   Result Value Ref Range    POCT pH, Mixed 7.31 (L) 7.33 - 7.43 pH    POCT pCO2, Mixed 48 41 - 51 mm Hg    POCT pO2, Mixed 34 (L) 35 - 45 mm Hg    POCT SO2, Mixed 49 45 - 75 %    POCT Oxy Hemoglobin, Mixed 48.0 45.0 - 75.0 %    POCT Hematocrit Calculated, Mixed 27.0 (L) 36.0 - 46.0 %    POCT Sodium, Mixed 124 (L) 136 - 145 mmol/L    POCT Potassium, Mixed 4.7 3.5 - 5.3 mmol/L    POCT Chloride, Mixed 95 (L) 98 - 107 mmol/L    POCT Ionized Calcium, Mixed 1.16 1.10 - 1.33 mmol/L    POCT Glucose, Mixed 217 (H) 74 - 99 mg/dL    POCT Lactate, Mixed 1.2 0.4 - 2.0 mmol/L    POCT Base Excess, Mixed -2.2 (L) -2.0 - 3.0 mmol/L    POCT HCO3 Calculated, Mixed 24.2 22.0 - 26.0 mmol/L    POCT Hemoglobin, Mixed 9.1 (L) 12.0 -  16.0 g/dL    POCT Anion Gap, Mixed 10 10 - 25 mmo/L    Patient Temperature 37.0 degrees Celsius    FiO2 30 %   POCT GLUCOSE   Result Value Ref Range    POCT Glucose 140 (H) 74 - 99 mg/dL   Renal Function Panel   Result Value Ref Range    Glucose 132 (H) 74 - 99 mg/dL    Sodium 136 136 - 145 mmol/L    Potassium 3.5 3.5 - 5.3 mmol/L    Chloride 94 (L) 98 - 107 mmol/L    Bicarbonate 29 21 - 32 mmol/L    Anion Gap 17 10 - 20 mmol/L    Urea Nitrogen 10 6 - 23 mg/dL    Creatinine 1.69 (H) 0.50 - 1.05 mg/dL    eGFR 33 (L) >60 mL/min/1.73m*2    Calcium 8.4 (L) 8.6 - 10.6 mg/dL    Phosphorus 1.9 (L) 2.5 - 4.9 mg/dL    Albumin 3.3 (L) 3.4 - 5.0 g/dL   POCT GLUCOSE   Result Value Ref Range    POCT Glucose 189 (H) 74 - 99 mg/dL   CBC   Result Value Ref Range    WBC 14.5 (H) 4.4 - 11.3 x10*3/uL    nRBC 0.2 (H) 0.0 - 0.0 /100 WBCs    RBC 2.84 (L) 4.00 - 5.20 x10*6/uL    Hemoglobin 8.5 (L) 12.0 - 16.0 g/dL    Hematocrit 27.7 (L) 36.0 - 46.0 %    MCV 98 80 - 100 fL    MCH 29.9 26.0 - 34.0 pg    MCHC 30.7 (L) 32.0 - 36.0 g/dL    RDW 17.2 (H) 11.5 - 14.5 %    Platelets 356 150 - 450 x10*3/uL   Renal function panel   Result Value Ref Range    Glucose 195 (H) 74 - 99 mg/dL    Sodium 144 136 - 145 mmol/L    Potassium 5.6 (H) 3.5 - 5.3 mmol/L    Chloride 101 98 - 107 mmol/L    Bicarbonate 21 21 - 32 mmol/L    Anion Gap 28 (H) 10 - 20 mmol/L    Urea Nitrogen 17 6 - 23 mg/dL    Creatinine 3.12 (H) 0.50 - 1.05 mg/dL    eGFR 16 (L) >60 mL/min/1.73m*2    Calcium 8.9 8.6 - 10.6 mg/dL    Phosphorus 3.2 2.5 - 4.9 mg/dL    Albumin 3.0 (L) 3.4 - 5.0 g/dL   Coagulation Screen   Result Value Ref Range    Protime 13.7 (H) 9.8 - 12.8 seconds    INR 1.2 (H) 0.9 - 1.1    aPTT >200 (HH) 27 - 38 seconds   Lactate   Result Value Ref Range    Lactate 4.5 (HH) 0.4 - 2.0 mmol/L   Blood Gas Arterial Full Panel   Result Value Ref Range    POCT pH, Arterial 7.30 (L) 7.38 - 7.42 pH    POCT pCO2, Arterial 44 (H) 38 - 42 mm Hg    POCT pO2, Arterial 75 (L) 85 - 95  mm Hg    POCT SO2, Arterial 96 94 - 100 %    POCT Oxy Hemoglobin, Arterial 93.4 (L) 94.0 - 98.0 %    POCT Hematocrit Calculated, Arterial 26.0 (L) 36.0 - 46.0 %    POCT Sodium, Arterial 131 (L) 136 - 145 mmol/L    POCT Potassium, Arterial 5.0 3.5 - 5.3 mmol/L    POCT Chloride, Arterial 98 98 - 107 mmol/L    POCT Ionized Calcium, Arterial 1.12 1.10 - 1.33 mmol/L    POCT Glucose, Arterial 195 (H) 74 - 99 mg/dL    POCT Lactate, Arterial 4.5 (HH) 0.4 - 2.0 mmol/L    POCT Base Excess, Arterial -4.6 (L) -2.0 - 3.0 mmol/L    POCT HCO3 Calculated, Arterial 21.6 (L) 22.0 - 26.0 mmol/L    POCT Hemoglobin, Arterial 8.7 (L) 12.0 - 16.0 g/dL    POCT Anion Gap, Arterial 16 10 - 25 mmo/L    Patient Temperature 37.0 degrees Celsius    FiO2 30 %   Vancomycin   Result Value Ref Range    Vancomycin 8.2 5.0 - 20.0 ug/mL   Transthoracic Echo (TTE) Limited   Result Value Ref Range    LV EF 63 %   POCT GLUCOSE   Result Value Ref Range    POCT Glucose 243 (H) 74 - 99 mg/dL   Blood Culture    Specimen: Arterial Line; Blood culture   Result Value Ref Range    Blood Culture Loaded on Instrument - Culture in progress    CBC and Auto Differential   Result Value Ref Range    WBC 11.1 4.4 - 11.3 x10*3/uL    nRBC 0.4 (H) 0.0 - 0.0 /100 WBCs    RBC 2.33 (L) 4.00 - 5.20 x10*6/uL    Hemoglobin 7.0 (L) 12.0 - 16.0 g/dL    Hematocrit 20.9 (L) 36.0 - 46.0 %    MCV 90 80 - 100 fL    MCH 30.0 26.0 - 34.0 pg    MCHC 33.5 32.0 - 36.0 g/dL    RDW 16.6 (H) 11.5 - 14.5 %    Platelets 309 150 - 450 x10*3/uL    Neutrophils % 81.2 40.0 - 80.0 %    Immature Granulocytes %, Automated 0.5 0.0 - 0.9 %    Lymphocytes % 7.9 13.0 - 44.0 %    Monocytes % 9.8 2.0 - 10.0 %    Eosinophils % 0.3 0.0 - 6.0 %    Basophils % 0.3 0.0 - 2.0 %    Neutrophils Absolute 9.03 (H) 1.20 - 7.70 x10*3/uL    Immature Granulocytes Absolute, Automated 0.06 0.00 - 0.70 x10*3/uL    Lymphocytes Absolute 0.88 (L) 1.20 - 4.80 x10*3/uL    Monocytes Absolute 1.09 (H) 0.10 - 1.00 x10*3/uL     Eosinophils Absolute 0.03 0.00 - 0.70 x10*3/uL    Basophils Absolute 0.03 0.00 - 0.10 x10*3/uL   Renal function panel   Result Value Ref Range    Glucose 239 (H) 74 - 99 mg/dL    Sodium 133 (L) 136 - 145 mmol/L    Potassium 4.6 3.5 - 5.3 mmol/L    Chloride 96 (L) 98 - 107 mmol/L    Bicarbonate 24 21 - 32 mmol/L    Anion Gap 18 10 - 20 mmol/L    Urea Nitrogen 20 6 - 23 mg/dL    Creatinine 3.32 (H) 0.50 - 1.05 mg/dL    eGFR 15 (L) >60 mL/min/1.73m*2    Calcium 7.5 (L) 8.6 - 10.6 mg/dL    Phosphorus 3.7 2.5 - 4.9 mg/dL    Albumin 2.4 (L) 3.4 - 5.0 g/dL   Magnesium   Result Value Ref Range    Magnesium 1.98 1.60 - 2.40 mg/dL   BLOOD GAS ARTERIAL FULL PANEL   Result Value Ref Range    POCT pH, Arterial 7.41 7.38 - 7.42 pH    POCT pCO2, Arterial 38 38 - 42 mm Hg    POCT pO2, Arterial 70 (L) 85 - 95 mm Hg    POCT SO2, Arterial 96 94 - 100 %    POCT Oxy Hemoglobin, Arterial 93.2 (L) 94.0 - 98.0 %    POCT Hematocrit Calculated, Arterial 23.0 (L) 36.0 - 46.0 %    POCT Sodium, Arterial 127 (L) 136 - 145 mmol/L    POCT Potassium, Arterial 4.9 3.5 - 5.3 mmol/L    POCT Chloride, Arterial 99 98 - 107 mmol/L    POCT Ionized Calcium, Arterial 1.04 (L) 1.10 - 1.33 mmol/L    POCT Glucose, Arterial 265 (H) 74 - 99 mg/dL    POCT Lactate, Arterial 1.4 0.4 - 2.0 mmol/L    POCT Base Excess, Arterial -0.5 -2.0 - 3.0 mmol/L    POCT HCO3 Calculated, Arterial 24.1 22.0 - 26.0 mmol/L    POCT Hemoglobin, Arterial 7.6 (L) 12.0 - 16.0 g/dL    POCT Anion Gap, Arterial 9 (L) 10 - 25 mmo/L    Patient Temperature 37.0 degrees Celsius    FiO2 30 %   Lactate   Result Value Ref Range    Lactate 1.5 0.4 - 2.0 mmol/L   POCT GLUCOSE   Result Value Ref Range    POCT Glucose 251 (H) 74 - 99 mg/dL   CBC and Auto Differential   Result Value Ref Range    WBC 8.5 4.4 - 11.3 x10*3/uL    nRBC 0.6 (H) 0.0 - 0.0 /100 WBCs    RBC 2.13 (L) 4.00 - 5.20 x10*6/uL    Hemoglobin 6.4 (LL) 12.0 - 16.0 g/dL    Hematocrit 20.0 (L) 36.0 - 46.0 %    MCV 94 80 - 100 fL    MCH 30.0  26.0 - 34.0 pg    MCHC 32.0 32.0 - 36.0 g/dL    RDW 17.1 (H) 11.5 - 14.5 %    Platelets 307 150 - 450 x10*3/uL    Neutrophils % 71.4 40.0 - 80.0 %    Immature Granulocytes %, Automated 0.6 0.0 - 0.9 %    Lymphocytes % 15.3 13.0 - 44.0 %    Monocytes % 10.9 2.0 - 10.0 %    Eosinophils % 1.3 0.0 - 6.0 %    Basophils % 0.5 0.0 - 2.0 %    Neutrophils Absolute 6.09 1.20 - 7.70 x10*3/uL    Immature Granulocytes Absolute, Automated 0.05 0.00 - 0.70 x10*3/uL    Lymphocytes Absolute 1.30 1.20 - 4.80 x10*3/uL    Monocytes Absolute 0.93 0.10 - 1.00 x10*3/uL    Eosinophils Absolute 0.11 0.00 - 0.70 x10*3/uL    Basophils Absolute 0.04 0.00 - 0.10 x10*3/uL   MRSA Surveillance for Vancomycin De-escalation, PCR    Specimen: Anterior Nares; Swab   Result Value Ref Range    MRSA PCR Not Detected Not Detected   POCT GLUCOSE   Result Value Ref Range    POCT Glucose 250 (H) 74 - 99 mg/dL   Prepare RBC: 1 Units   Result Value Ref Range    PRODUCT CODE [id number]     Unit Number [id number]     Unit ABO AB     Unit RH POS     XM INTEP COMP     Dispense Status IS     Blood Expiration Date 1/2/2025 11:59:00 PM EST     PRODUCT BLOOD TYPE 8400     UNIT VOLUME 350       US KUB 11/26  IMPRESSION:  1. Increased renal cortical echogenicity and lobular appearance of the kidneys bilaterally, likely medical renal disease, with relatively atrophic left kidney. No hydronephrosis.  2. Partially visualized bilateral pleural effusions and trace  abdominal ascites.        ASSESSMENT:  Humaira Huang is a  64 y.o.  Year old , with PMHx of  pancreatitis, DVT/PE, HLD, HTN, CABGx4 1997, T2DM, GERD, PAD, chronic mesenteric ischemia, tobacco use who presented to [facility] on 11/18/2024 as a transfer from [facility] with chest, back, and abdominal pain. 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 Vascular and  Cardiac surgery following. CICU course c/b PEA arrest 11/20, now intubated. Nephrology following for TRACY.     #anuric TRACY-D Baseline creatinine 1.5   - Hemodynamics-not on any pressor support  - Etio :TRACY likely in setting recent hemodynamic insult with PEA.  - 110 ml in bladder scans 12/5  - last SLED 12/1 night, SLED not done for last 2 days given unavailability of machines.       RECOMMENDATIONS:  - AKI_D, will keep her on TTS schedule   - please change catheter to TDC before LTACH placement   - bladder scan BID please  - Strict I/Os.  - No contrast or nephrotoxic drugs if possible.  - will follow      Elise Graham MD  Nephrology Fellow   Daytime / Weekend Renal Pager [phone]  After 7 pm Emergencies [phone] Pager [phone]

## 2024-12-06 NOTE — SIGNIFICANT EVENT
Called to evaluate tracheostomy in the afternoon due to bleeding around trach. There was noted to be steady bright red blood coming from the inferior portion of the stoma. The area was suctioned free of clot. Silver nitrate and surgicel was applied to the inferior portion of the stoma. On re-check one hour after evaluation, the area is hemostatic.

## 2024-12-06 NOTE — PROGRESS NOTES
Subjective     Interval events:  Overnight patient had episode of RVR requiring amio bolus. Her pressor requirements increased to levo 0.2 and was started on vanc/zosyn and given 500cc bolus due to concern for sepsis. Lactate 4.5->1.4.    This morning patient was intubated/sedated.    Meds  Scheduled medications  amiodarone, 400 mg, oral, BID  aspirin, 81 mg, oral, Daily  atorvastatin, 80 mg, oral, Nightly  heparin, 80 Units/kg, intravenous, Once  insulin lispro, 0-5 Units, subcutaneous, q4h  lactated Ringer's, 1,000 mL, intravenous, Once  oxygen, , inhalation, Continuous - Inhalation  pantoprazole, 40 mg, intravenous, BID  piperacillin-tazobactam, 2.25 g, intravenous, q8h  sodium zirconium cyclosilicate, 10 g, oral, TID  thiamine, 100 mg, oral, Daily  [START ON 12/7/2024] vancomycin, 500 mg, intravenous, Once per day on Tuesday Thursday Saturday  vasopressin, , ,       Continuous medications  amiodarone, 1 mg/min, Last Rate: Stopped (12/06/24 0315)  fentaNYL,  mcg/hr, Last Rate: 75 mcg/hr (12/06/24 0824)  midazolam, 0.5-20 mg/hr, Last Rate: 2 mg/hr (12/06/24 0824)  norepinephrine, 0.01-1 mcg/kg/min, Last Rate: 0.15 mcg/kg/min (12/06/24 0825)      PRN medications  PRN medications: acetaminophen **OR** acetaminophen **OR** acetaminophen, alteplase, dextrose, dextrose, fentaNYL, glucagon, glucagon, hydrOXYzine HCL, ipratropium-albuteroL, oxyCODONE, oxygen, polyethylene glycol, sennosides-docusate sodium, vancomycin, vasopressin      Objective   Vital signs in last 24 hours:  Temp:  [35.4 °C (95.7 °F)-36.4 °C (97.5 °F)] 35.8 °C (96.4 °F)  Heart Rate:  [] 57  Resp:  [13-24] 14  Arterial Line BP 1: ()/(41-80) 124/50  FiO2 (%):  [30 %] 30 %    Intake/Output this shift:    Intake/Output Summary (Last 24 hours) at 12/6/2024 1049  Last data filed at 12/6/2024 0700  Gross per 24 hour   Intake 2755.29 ml   Output 3400 ml   Net -644.71 ml       Physical  General: Intubated/sedated  Pulm: No crackles or  rhonchi, no ventilator dyssynchrony  Cardiac: Tachycardia, irregular rhythm, normal S1/S2  Abdomen: Diffusely tender to palpation, non-distended  Extremities: No peripheral edema  Neuro: Intubated/sedated    Results  Results for orders placed or performed during the hospital encounter of 11/18/24 (from the past 24 hours)   Protime-INR   Result Value Ref Range    Protime 11.9 9.8 - 12.8 seconds    INR 1.1 0.9 - 1.1   POCT GLUCOSE   Result Value Ref Range    POCT Glucose 219 (H) 74 - 99 mg/dL   POCT GLUCOSE   Result Value Ref Range    POCT Glucose 192 (H) 74 - 99 mg/dL   BLOOD GAS ARTERIAL FULL PANEL   Result Value Ref Range    POCT pH, Arterial 7.36 (L) 7.38 - 7.42 pH    POCT pCO2, Arterial 40 38 - 42 mm Hg    POCT pO2, Arterial 77 (L) 85 - 95 mm Hg    POCT SO2, Arterial 96 94 - 100 %    POCT Oxy Hemoglobin, Arterial 93.6 (L) 94.0 - 98.0 %    POCT Hematocrit Calculated, Arterial 23.0 (L) 36.0 - 46.0 %    POCT Sodium, Arterial 127 (L) 136 - 145 mmol/L    POCT Potassium, Arterial 4.9 3.5 - 5.3 mmol/L    POCT Chloride, Arterial 97 (L) 98 - 107 mmol/L    POCT Ionized Calcium, Arterial 1.09 (L) 1.10 - 1.33 mmol/L    POCT Glucose, Arterial 212 (H) 74 - 99 mg/dL    POCT Lactate, Arterial 1.2 0.4 - 2.0 mmol/L    POCT Base Excess, Arterial -2.6 (L) -2.0 - 3.0 mmol/L    POCT HCO3 Calculated, Arterial 22.6 22.0 - 26.0 mmol/L    POCT Hemoglobin, Arterial 7.6 (L) 12.0 - 16.0 g/dL    POCT Anion Gap, Arterial 12 10 - 25 mmo/L    Patient Temperature 37.0 degrees Celsius    FiO2 30 %   Hepatitis B surface antibody   Result Value Ref Range    Hepatitis B Surface AB <3.1 <10.0 mIU/mL   Hepatitis B surface antigen   Result Value Ref Range    Hepatitis B Surface AG Nonreactive Nonreactive   BLOOD GAS MIXED VENOUS FULL PANEL   Result Value Ref Range    POCT pH, Mixed 7.31 (L) 7.33 - 7.43 pH    POCT pCO2, Mixed 48 41 - 51 mm Hg    POCT pO2, Mixed 34 (L) 35 - 45 mm Hg    POCT SO2, Mixed 49 45 - 75 %    POCT Oxy Hemoglobin, Mixed 48.0 45.0 -  75.0 %    POCT Hematocrit Calculated, Mixed 27.0 (L) 36.0 - 46.0 %    POCT Sodium, Mixed 124 (L) 136 - 145 mmol/L    POCT Potassium, Mixed 4.7 3.5 - 5.3 mmol/L    POCT Chloride, Mixed 95 (L) 98 - 107 mmol/L    POCT Ionized Calcium, Mixed 1.16 1.10 - 1.33 mmol/L    POCT Glucose, Mixed 217 (H) 74 - 99 mg/dL    POCT Lactate, Mixed 1.2 0.4 - 2.0 mmol/L    POCT Base Excess, Mixed -2.2 (L) -2.0 - 3.0 mmol/L    POCT HCO3 Calculated, Mixed 24.2 22.0 - 26.0 mmol/L    POCT Hemoglobin, Mixed 9.1 (L) 12.0 - 16.0 g/dL    POCT Anion Gap, Mixed 10 10 - 25 mmo/L    Patient Temperature 37.0 degrees Celsius    FiO2 30 %   POCT GLUCOSE   Result Value Ref Range    POCT Glucose 140 (H) 74 - 99 mg/dL   Renal Function Panel   Result Value Ref Range    Glucose 132 (H) 74 - 99 mg/dL    Sodium 136 136 - 145 mmol/L    Potassium 3.5 3.5 - 5.3 mmol/L    Chloride 94 (L) 98 - 107 mmol/L    Bicarbonate 29 21 - 32 mmol/L    Anion Gap 17 10 - 20 mmol/L    Urea Nitrogen 10 6 - 23 mg/dL    Creatinine 1.69 (H) 0.50 - 1.05 mg/dL    eGFR 33 (L) >60 mL/min/1.73m*2    Calcium 8.4 (L) 8.6 - 10.6 mg/dL    Phosphorus 1.9 (L) 2.5 - 4.9 mg/dL    Albumin 3.3 (L) 3.4 - 5.0 g/dL   POCT GLUCOSE   Result Value Ref Range    POCT Glucose 189 (H) 74 - 99 mg/dL   CBC   Result Value Ref Range    WBC 14.5 (H) 4.4 - 11.3 x10*3/uL    nRBC 0.2 (H) 0.0 - 0.0 /100 WBCs    RBC 2.84 (L) 4.00 - 5.20 x10*6/uL    Hemoglobin 8.5 (L) 12.0 - 16.0 g/dL    Hematocrit 27.7 (L) 36.0 - 46.0 %    MCV 98 80 - 100 fL    MCH 29.9 26.0 - 34.0 pg    MCHC 30.7 (L) 32.0 - 36.0 g/dL    RDW 17.2 (H) 11.5 - 14.5 %    Platelets 356 150 - 450 x10*3/uL   Renal function panel   Result Value Ref Range    Glucose 195 (H) 74 - 99 mg/dL    Sodium 144 136 - 145 mmol/L    Potassium 5.6 (H) 3.5 - 5.3 mmol/L    Chloride 101 98 - 107 mmol/L    Bicarbonate 21 21 - 32 mmol/L    Anion Gap 28 (H) 10 - 20 mmol/L    Urea Nitrogen 17 6 - 23 mg/dL    Creatinine 3.12 (H) 0.50 - 1.05 mg/dL    eGFR 16 (L) >60 mL/min/1.73m*2     Calcium 8.9 8.6 - 10.6 mg/dL    Phosphorus 3.2 2.5 - 4.9 mg/dL    Albumin 3.0 (L) 3.4 - 5.0 g/dL   Coagulation Screen   Result Value Ref Range    Protime 13.7 (H) 9.8 - 12.8 seconds    INR 1.2 (H) 0.9 - 1.1    aPTT >200 (HH) 27 - 38 seconds   Lactate   Result Value Ref Range    Lactate 4.5 (HH) 0.4 - 2.0 mmol/L   Blood Gas Arterial Full Panel   Result Value Ref Range    POCT pH, Arterial 7.30 (L) 7.38 - 7.42 pH    POCT pCO2, Arterial 44 (H) 38 - 42 mm Hg    POCT pO2, Arterial 75 (L) 85 - 95 mm Hg    POCT SO2, Arterial 96 94 - 100 %    POCT Oxy Hemoglobin, Arterial 93.4 (L) 94.0 - 98.0 %    POCT Hematocrit Calculated, Arterial 26.0 (L) 36.0 - 46.0 %    POCT Sodium, Arterial 131 (L) 136 - 145 mmol/L    POCT Potassium, Arterial 5.0 3.5 - 5.3 mmol/L    POCT Chloride, Arterial 98 98 - 107 mmol/L    POCT Ionized Calcium, Arterial 1.12 1.10 - 1.33 mmol/L    POCT Glucose, Arterial 195 (H) 74 - 99 mg/dL    POCT Lactate, Arterial 4.5 (HH) 0.4 - 2.0 mmol/L    POCT Base Excess, Arterial -4.6 (L) -2.0 - 3.0 mmol/L    POCT HCO3 Calculated, Arterial 21.6 (L) 22.0 - 26.0 mmol/L    POCT Hemoglobin, Arterial 8.7 (L) 12.0 - 16.0 g/dL    POCT Anion Gap, Arterial 16 10 - 25 mmo/L    Patient Temperature 37.0 degrees Celsius    FiO2 30 %   Vancomycin   Result Value Ref Range    Vancomycin 8.2 5.0 - 20.0 ug/mL   Transthoracic Echo (TTE) Limited   Result Value Ref Range    BSA 1.65 m2   POCT GLUCOSE   Result Value Ref Range    POCT Glucose 243 (H) 74 - 99 mg/dL   Blood Culture    Specimen: Arterial Line; Blood culture   Result Value Ref Range    Blood Culture Loaded on Instrument - Culture in progress    CBC and Auto Differential   Result Value Ref Range    WBC 11.1 4.4 - 11.3 x10*3/uL    nRBC 0.4 (H) 0.0 - 0.0 /100 WBCs    RBC 2.33 (L) 4.00 - 5.20 x10*6/uL    Hemoglobin 7.0 (L) 12.0 - 16.0 g/dL    Hematocrit 20.9 (L) 36.0 - 46.0 %    MCV 90 80 - 100 fL    MCH 30.0 26.0 - 34.0 pg    MCHC 33.5 32.0 - 36.0 g/dL    RDW 16.6 (H) 11.5 - 14.5  %    Platelets 309 150 - 450 x10*3/uL    Neutrophils % 81.2 40.0 - 80.0 %    Immature Granulocytes %, Automated 0.5 0.0 - 0.9 %    Lymphocytes % 7.9 13.0 - 44.0 %    Monocytes % 9.8 2.0 - 10.0 %    Eosinophils % 0.3 0.0 - 6.0 %    Basophils % 0.3 0.0 - 2.0 %    Neutrophils Absolute 9.03 (H) 1.20 - 7.70 x10*3/uL    Immature Granulocytes Absolute, Automated 0.06 0.00 - 0.70 x10*3/uL    Lymphocytes Absolute 0.88 (L) 1.20 - 4.80 x10*3/uL    Monocytes Absolute 1.09 (H) 0.10 - 1.00 x10*3/uL    Eosinophils Absolute 0.03 0.00 - 0.70 x10*3/uL    Basophils Absolute 0.03 0.00 - 0.10 x10*3/uL   Renal function panel   Result Value Ref Range    Glucose 239 (H) 74 - 99 mg/dL    Sodium 133 (L) 136 - 145 mmol/L    Potassium 4.6 3.5 - 5.3 mmol/L    Chloride 96 (L) 98 - 107 mmol/L    Bicarbonate 24 21 - 32 mmol/L    Anion Gap 18 10 - 20 mmol/L    Urea Nitrogen 20 6 - 23 mg/dL    Creatinine 3.32 (H) 0.50 - 1.05 mg/dL    eGFR 15 (L) >60 mL/min/1.73m*2    Calcium 7.5 (L) 8.6 - 10.6 mg/dL    Phosphorus 3.7 2.5 - 4.9 mg/dL    Albumin 2.4 (L) 3.4 - 5.0 g/dL   Magnesium   Result Value Ref Range    Magnesium 1.98 1.60 - 2.40 mg/dL   BLOOD GAS ARTERIAL FULL PANEL   Result Value Ref Range    POCT pH, Arterial 7.41 7.38 - 7.42 pH    POCT pCO2, Arterial 38 38 - 42 mm Hg    POCT pO2, Arterial 70 (L) 85 - 95 mm Hg    POCT SO2, Arterial 96 94 - 100 %    POCT Oxy Hemoglobin, Arterial 93.2 (L) 94.0 - 98.0 %    POCT Hematocrit Calculated, Arterial 23.0 (L) 36.0 - 46.0 %    POCT Sodium, Arterial 127 (L) 136 - 145 mmol/L    POCT Potassium, Arterial 4.9 3.5 - 5.3 mmol/L    POCT Chloride, Arterial 99 98 - 107 mmol/L    POCT Ionized Calcium, Arterial 1.04 (L) 1.10 - 1.33 mmol/L    POCT Glucose, Arterial 265 (H) 74 - 99 mg/dL    POCT Lactate, Arterial 1.4 0.4 - 2.0 mmol/L    POCT Base Excess, Arterial -0.5 -2.0 - 3.0 mmol/L    POCT HCO3 Calculated, Arterial 24.1 22.0 - 26.0 mmol/L    POCT Hemoglobin, Arterial 7.6 (L) 12.0 - 16.0 g/dL    POCT Anion Gap,  Arterial 9 (L) 10 - 25 mmo/L    Patient Temperature 37.0 degrees Celsius    FiO2 30 %   Lactate   Result Value Ref Range    Lactate 1.5 0.4 - 2.0 mmol/L   POCT GLUCOSE   Result Value Ref Range    POCT Glucose 251 (H) 74 - 99 mg/dL       Imaging  XR chest 1 view    Result Date: 12/6/2024  Interpreted By:  Sheryl, Karen,  and [place] Dorothea STUDY: XR CHEST 1 VIEW;  12/6/2024 1:31 am   INDICATION: Signs/Symptoms:new SOB and hypotension, evaluate for PTX, has ET tube.   COMPARISON: Chest radiograph 12/05/2024   ACCESSION NUMBER(S): [id number]   ORDERING CLINICIAN: BASSEM LEWIS   FINDINGS: AP radiograph of the chest was provided.   Endotracheal tube in place with tip projecting 5.8 cm above the nisha. Right internal jugular approach central venous catheter tip overlies the lower superior vena cava. Left internal jugular approach central venous catheter tip overlies the lower superior vena cava. Partially visualized enteric tube.   CARDIOMEDIASTINAL SILHOUETTE: Cardiomediastinal silhouette is stable in size and configuration. Mild calcification of the aortic arch.   LUNGS: Hazy opacity throughout the bilateral lung bases. There is mildly improved aeration of the left upper lung. Diffuse interstitial edema. Increased atelectasis of the right middle pulmonary lobe. No pneumothorax is visualized.   ABDOMEN: No remarkable upper abdominal findings.   BONES: No acute osseous abnormality.       1. Mildly improved aeration of the left upper lung. 2. Otherwise similar hazy bibasilar pulmonary opacities, small pleural effusions, and interstitial edema. 3. Increased atelectasis of the right middle lobe. 4. No pneumothorax. 5. Medical devices in similar positioning as above.   I personally reviewed the image(s)/study and resident interpretation as stated by Dr. Dorothea Lim MD. I agree with the findings as stated. This study was interpreted at [facility], [place], [place].   MACRO:  None   Signed by: Karen Saucedo 12/6/2024 10:32 AM Dictation workstation:   [id number]    XR chest 1 view    Result Date: 12/6/2024  Interpreted By:  Karen Saucedo and Awan Komal STUDY: XR CHEST 1 VIEW;  12/5/2024 6:58 am   INDICATION: Signs/Symptoms:intubated.     COMPARISON: Chest radiograph 12/04/2024, 12/03/2024 and CT chest 11/18/2024   ACCESSION NUMBER(S): [id number]   ORDERING CLINICIAN: MARIKA CARO   FINDINGS: AP radiograph of the chest was provided.   There is endotracheal tube, with tip terminating 6.4 cm superior to the nisha. An enteric tube noted, coursing below the diaphragm with tip projecting outside the field of view on current exam. There is left IJ approach hemodialysis catheter with tip projecting over the expected location of the mid to distal SVC. Right IJ approach central venous catheter with tip projecting over the mid SVC. Patient is status post median sternotomy.   CARDIOMEDIASTINAL SILHOUETTE: Cardiomediastinal silhouette is stable in size and configuration.   LUNGS: There are persistent bibasilar and perihilar airspace and interstitial opacities with perihilar congestion. There is prominence of interstitial markings. Blunting of costophrenic angles bilaterally.   ABDOMEN: No remarkable upper abdominal findings.   BONES: No acute osseous changes.       1. Persistent perihilar/interstitial edema, no significant change since most recent chest radiograph. 2. Questionable trace/small bilateral pleural effusions. 3. Medical devices as explained above.   I personally reviewed the images/study and I agree with the findings as stated by Resident Lakshmi Spain. This study was interpreted at [facility], [place], [place].   MACRO: None   Signed by: Karen Saucedo 12/6/2024 9:50 AM Dictation workstation:   [id number]    ECG 12 Lead    Result Date: 12/5/2024  Atrial fibrillation with rapid ventricular response with premature ventricular or aberrantly conducted  complexes Low voltage QRS Incomplete left bundle branch block Marked ST abnormality, possible inferolateral subendocardial injury Abnormal ECG When compared with ECG of 02-DEC-2024 13:18, Atrial fibrillation has replaced Sinus rhythm Vent. rate has increased BY  80 BPM Incomplete left bundle branch block is now Present        Assessment/Plan   Principal Problem:    Dissection of aorta  Active Problems:    ESRD on hemodialysis (Multi)    Ruptured aortic aneurysm (Multi)    Anemia due to acute blood loss    Cardiac arrest  64 y.o. female with history of pancreatitis, DVT/PE, HLD, HTN, CABGx4 1997, T2DM, GERD, PAD, chronic mesenteric ischemia, tobacco use who presented to [facility] on [facility]/18/[facility] as a transfer from [facility] with chest, back, and abdominal pain. Now post PEA arrest, with intubation/sedation. Family agreed for trach/PEG and LTAC. Patient had septic episode 12/6 overnight and underwent trach/PEG.      Updates:  -Likely septic, will replace CVC and trialysis line today  -cw vanc/zosyn and levo as needed  -Trach/PEG today  -fu cultures     Neuro:  #Sedation/anxiety  -fentanyl, versed     Cardiac:  #Aflutter/Afib with RVR  ::now sinus tachycardia  -HR in 160-180s on 12/5 am  -s/p one dose of digoxin 250 mcg (digoxin is not dialyzable)  -s/p amio bolus 12/6 for RVR  -Amiodarone 400 mg daily (also poorly dialyzed)  -HR 110s-120s post     #PEA arrest 11/20  ::likely hypoxia driven: PNA vs aspiration     #Aortic dissection  #Mural Thrombus  #Distal aortic arch saccular aneurysm   ::CTA CAP 11/18- 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   -Vascular surgery discussed case at aortic conference and there are no plans for surgery, palliative measures recommended  -Holding heparin      #NSTEMI  #CAD s/p CABGx4 1997  #PAD  #HTN #DLD  ::EKG- NSR, rate 71, TWI aVL, V1-V6, 1mm ORQUIDEA  in aVR  ::Trop peak 5700  ::Lipid HDL 41.5, , TG 75, Chol 157  -Holding home amlodipine 10mg, imdur 30mg, lisinopril 2.5mg, metop tartrate 50mg bid iso hypotension   -Continue ASA and atorvastatin  -Holding plavix 75mg. Can resume after all procedures are done (TDC, trach/PEG)     #HFrEF (45-50%, 11/19/24)  -Not on GDMT at the moment      Pulm:  #Acute hypoxic respiratory failure, post-arrest  :s/p Zosyn 11/20 - 11/26 for PNA  :: s/p Vancomycin   -Failed extubation on 11/28  -Trach today     GI:  #Ischemic hepatitis secondary to PEA arrest, improving  ::likely 2/2 hypoxia and hypotension  -LFTs downtrending     Renal:  #TRACY on CKD likely 2/2 arrest  #Anuric  -iHD  -Bladder scans qshift     Endo:   #T2DM  -Hold home metformin   -Mild SSI + hypoglycemia protocol      #Tube Feeds  :: goal, 20 ml/hr  -f/up nutrition  -can resume later today after PEG per ACS     Infectious  #sepsis  -fu blood and sputum culutre  -cw vanc zosyn (12/6- )  -levo and fluids as needed     F: None  E: K>4, Mag>2  N: TF, Stop at midnight  G: pantoprazole  A: PIV, OG, [place], replacing CVC/trialysis today  DVT: SCDs  CODE: DNR/okay to intubate (confirmed with family 11/21)  NOK: Daughter Josy [phone]          LOS: 18 days     Sixto Mayen MD  PGY-2

## 2024-12-06 NOTE — SIGNIFICANT EVENT
POST PEG PLACEMENT:    Bumper at 5cm    - may use PEG for meds/flushes immediately  - may start tube feeds in 3 hours  - maintain bumper 1cm from skin, should spin freely  - check for bleeding every 4 hours for next few days  - cleanse daily, apply dressing ABOVE bumper only  - maintain abdominal binder at all times to prevent PEG from being pulled  - ok to continue ASA and SQH  - if PEG placed to gravity, please secure with drain viramontes  - please page for with any questions or concerns      Charlee Whaley MD  PGY-1   [facility]  Service Pager: [phone]

## 2024-12-06 NOTE — OP NOTE
Creation Tracheostomy Operative Note     Date: 2024  OR Location: [facility] OR    Name: Humaira Huang, : 1960, Age: 64 y.o., MRN: [id number], Sex: female    Diagnosis  Pre-op Diagnosis      * Cardiac arrest [I46.9] Post-op Diagnosis     * Cardiac arrest [I46.9]     Procedures  Creation Tracheostomy  27039 - TN TRACHEOSTOMY PLANNED SEPARATE PROCEDURE    Insertion Gastrostomy Tube Percutaneous  02687 - TN INSERT GASTROSTOMY TUBE PERCUTANEOUS      Surgeons   Panel 1:     * Cecil Mi - Primary  Panel 2:     * Epifanio Sinha - Primary    Resident/Fellow/Other Assistant:  Surgeons and Role:  Panel 1:     * Beto Jacome MD - Resident - Assisting    Staff:   Circulator: Katerine Sutton Person: Vladimir  Circulator: Manolo    Anesthesia Staff: Anesthesiologist: Sourav Miose MD  C-AA: LUIS Deluna    Procedure Summary  Anesthesia: General  ASA: V  Estimated Blood Loss: 2mL  Intra-op Medications:   Administrations occurring from 0740 to 0940 on 24:   Medication Name Total Dose   lidocaine-epinephrine (Xylocaine W/EPI) 1 %-1:100,000 injection 5 mL   sodium chloride 0.9 % irrigation solution 1,000 mL   alteplase (Cathflo Activase) injection 2 mg Cannot be calculated   amiodarone (Pacerone) tablet 400 mg Cannot be calculated   aspirin chewable tablet 81 mg Cannot be calculated   atorvastatin (Lipitor) tablet 80 mg Cannot be calculated   dextrose 50 % injection 12.5 g Cannot be calculated   dextrose 50 % injection 25 g Cannot be calculated   fentanyl (Sublimaze) 1000 mcg in sodium chloride 0.9% 100 mL (10 mcg/mL) infusion (premix) 95 mcg   fentaNYL PF (Sublimaze) injection 25 mcg Cannot be calculated   glucagon (Glucagen) injection 1 mg Cannot be calculated   glucagon (Glucagen) injection 1 mg Cannot be calculated   heparin bolus from bag 4,672 Units Cannot be calculated   hydrOXYzine HCL (Atarax) tablet 10 mg Cannot be calculated   insulin lispro injection 0-5 Units Cannot be calculated    ipratropium-albuteroL (Duo-Neb) 0.5-2.5 mg/3 mL nebulizer solution 3 mL Cannot be calculated   lactated Ringer's bolus 1,000 mL Cannot be calculated   midazolam in NS (Versed) 1 mg/mL infusion solution 2.53 mg   norepinephrine (Levophed) 8 mg in dextrose 5% 250 mL (0.032 mg/mL) infusion (premix) 0.66 mg   oxyCODONE (Roxicodone) immediate release tablet 2.5 mg Cannot be calculated   oxygen (O2) therapy Cannot be calculated   piperacillin-tazobactam (Zosyn) 2.25 g in dextrose (iso) IV 50 mL Cannot be calculated   polyethylene glycol (Glycolax, Miralax) packet 17 g Cannot be calculated   sennosides-docusate sodium (Luda-Colace) 8.6-50 mg per tablet 1 tablet Cannot be calculated   sodium zirconium cyclosilicate (Lokelma) packet 10 g Cannot be calculated   thiamine (Vitamin B-1) tablet 100 mg Cannot be calculated   vancomycin (Vancocin) 500 mg in dextrose 5%  mL Cannot be calculated   vancomycin (Vancocin) pharmacy to dose - pharmacy monitoring Cannot be calculated   potassium phosphates 21 mmol in dextrose 5% 250 mL IV Cannot be calculated   ampicillin-sulbactam (Unasyn) 3 g 3 g   propofol (Diprivan) injection 10 mg/mL 100 mg   rocuronium 10 mg/mL 50 mg   sugammadex (Bridion) 200 mg/2 mL injection 200 mg              Anesthesia Record               Intraprocedure I/O Totals          Intake    Fentanyl Drip 0.00 mL    The total shown is the total volume documented since Anesthesia Start was filed.    Midazolam 0.00 mL    The total shown is the total volume documented since Anesthesia Start was filed.    Norepinephrine Drip 0.00 mL    The total shown is the total volume documented since Anesthesia Start was filed.    Total Intake 0 mL          Specimen: No specimens collected              Drains and/or Catheters:   NG/OG/Feeding Tube Left mouth (Active)   Tube Status Continuous tube feeding 12/05/24 2000   Placement Verification Measurements 12/06/24 0400   Distal Tube Measurement 63 cm 12/05/24 2000   Site  Assessment Clean;Dry;Intact 12/06/24 0400   Drainage Appearance Yellow 12/04/24 0800   NG/OG Interventions Irrigated 12/05/24 2000   Irrigant Tap water 12/05/24 2000   Response To Intervention No resistance met 12/06/24 0400   Intake (mL) 30 mL 12/05/24 2000   Intake - Flush (mL) 50 mL 12/05/24 2000   Output (mL) 100 mL 11/30/24 0600       NG/OG/Feeding Tube Gastric 20 Fr. (Active)       Tourniquet Times:         Implants:     Findings: 6-0 Cuffed Shiley placed, cuff inflated    Indications: Humaira Huang is an 64 y.o. female who is having surgery for Cardiac arrest [I46.9].     The patient was seen in the preoperative area. The risks, benefits, complications, treatment options, non-operative alternatives, expected recovery and outcomes were discussed with the patient. The possibilities of reaction to medication, pulmonary aspiration, injury to surrounding structures, bleeding, recurrent infection, the need for additional procedures, failure to diagnose a condition, and creating a complication requiring transfusion or operation were discussed with the patient. The patient concurred with the proposed plan, giving informed consent.  The site of surgery was properly noted/marked if necessary per policy. The patient has been actively warmed in preoperative area. Preoperative antibiotics have been ordered and given within 1 hours of incision. Venous thrombosis prophylaxis have been ordered including bilateral sequential compression devices    Procedure Details:   After appropriate patient identification, patient was brought down to the operating room from her bed in the ICU and was positioned into a semirecumbent position.  After this, general anesthesia was induced and a proper timeout was performed.     A shoulder roll was placed. The anterior neck landmarks were palpated and marked and the planned horizontal incision was infiltrated with 1% lidocaine with 1:100,000 units of epinephrine which was allowed time to take  effect. The anterior neck was then prepped and draped in standard sterile fashion and a #15 blade was used to incise the planned incision.  A defatting technique Was used down to the midline raphae.  This was then divided. The strap muscles were retracted laterally on each side and a combination of blunt dissection and cautery were used to expose the anterior surface of the trachea.  A cardiac hook was then used to retract the trachea superiorly, and electrocautery was used for dissection of the tracheal cartilage and to expose the tracheal rings. Anesthesia then advanced their ET tube to prevent violation of the cuff and a #15 blade was used to make an incision just below the first tracheal ring. Curved dhillon scissors were then used to cut through the ring on each side and a window was made. The endotracheal tube was then carefully withdrawn under direct visualization and once superior to the window, the 6-0 cuffed shiley tracheostomy tube was easily placed. The ventilator circuit was then hooked up and end-tidal CO2 was confirmed. The retractors and cric hook were then removed. The tracheostomy tube was then secured at 5 points with 2-0 Prolene suture and a velcro trach collar was placed.    The anterior neck was then cleansed and dried, and the patient was turned back over to our general surgery colleagues, and after placement of the PEG tube was transferred back to the CICU in stable condition    Dr. Mi was present for the critical portions of the procedure.    Complications:  None; patient tolerated the procedure well.    Disposition:  Previous disposition as before, transferred back to CICU  Condition: stable     Additional Details: None    Attending Attestation:     Cecil Mi  Phone Number: [phone]

## 2024-12-06 NOTE — SIGNIFICANT EVENT
POST OP CHECK    S:    POD 0 from PEG placement. Patient tolerated the procedure well. She remains trach dependent - some bleeding at trach site, ENT at bedside to evaluate.     O:   Visit Vitals  BP (!) 133/45   Pulse 59   Temp 36.1 °C (97 °F) (Temporal)   Resp 14      PHYSICAL EXAM  Skin: warm and dry  Neuro: intubated/sedated  Cardiac: non-tachycardic on monitor  Pulmonary: trach in place, some bleeding around trach - ENT at bedside  Abdomen: soft, non-distended, unable to assess tenderness but no visible grimacing to palpation. PEG at 5cm at the skin, bumper freely moves, dried blood near insertion but no active bleeding    A/P:  - okay for meds, flushes and tube feeds  - keep bumper 1cm from skin, should be able to spin freely  - keep dressing above the bumper only  - check for bleeding q4h  - please page for any questions or concerns    Charlee Whaley MD  PGY-1   [facility]  [id number]

## 2024-12-07 ENCOUNTER — APPOINTMENT (OUTPATIENT)
Dept: RADIOLOGY | Facility: HOSPITAL | Age: 64
DRG: 004 | End: 2024-12-07
Payer: MEDICARE

## 2024-12-07 LAB
ABO GROUP (TYPE) IN BLOOD: NORMAL
ALBUMIN SERPL BCP-MCNC: 2.1 G/DL (ref 3.4–5)
ALBUMIN SERPL BCP-MCNC: 2.1 G/DL (ref 3.4–5)
ANION GAP SERPL CALC-SCNC: 15 MMOL/L (ref 10–20)
ANION GAP SERPL CALC-SCNC: 17 MMOL/L (ref 10–20)
ANTIBODY SCREEN: NORMAL
BASOPHILS # BLD AUTO: 0.03 X10*3/UL (ref 0–0.1)
BASOPHILS # BLD AUTO: 0.04 X10*3/UL (ref 0–0.1)
BASOPHILS # BLD AUTO: 0.05 X10*3/UL (ref 0–0.1)
BASOPHILS NFR BLD AUTO: 0.2 %
BASOPHILS NFR BLD AUTO: 0.4 %
BASOPHILS NFR BLD AUTO: 0.4 %
BUN SERPL-MCNC: 30 MG/DL (ref 6–23)
BUN SERPL-MCNC: 30 MG/DL (ref 6–23)
CALCIUM SERPL-MCNC: 6.9 MG/DL (ref 8.6–10.6)
CALCIUM SERPL-MCNC: 6.9 MG/DL (ref 8.6–10.6)
CHLORIDE SERPL-SCNC: 92 MMOL/L (ref 98–107)
CHLORIDE SERPL-SCNC: 93 MMOL/L (ref 98–107)
CO2 SERPL-SCNC: 24 MMOL/L (ref 21–32)
CO2 SERPL-SCNC: 25 MMOL/L (ref 21–32)
COMPONENT CODE: NORMAL
CREAT SERPL-MCNC: 4.17 MG/DL (ref 0.5–1.05)
CREAT SERPL-MCNC: 4.47 MG/DL (ref 0.5–1.05)
DAT-POLYSPECIFIC: NORMAL
EGFRCR SERPLBLD CKD-EPI 2021: 10 ML/MIN/1.73M*2
EGFRCR SERPLBLD CKD-EPI 2021: 11 ML/MIN/1.73M*2
EOSINOPHIL # BLD AUTO: 0.02 X10*3/UL (ref 0–0.7)
EOSINOPHIL # BLD AUTO: 0.1 X10*3/UL (ref 0–0.7)
EOSINOPHIL # BLD AUTO: 0.17 X10*3/UL (ref 0–0.7)
EOSINOPHIL NFR BLD AUTO: 0.2 %
EOSINOPHIL NFR BLD AUTO: 0.9 %
EOSINOPHIL NFR BLD AUTO: 1.3 %
ERYTHROCYTE [DISTWIDTH] IN BLOOD BY AUTOMATED COUNT: 15.9 % (ref 11.5–14.5)
ERYTHROCYTE [DISTWIDTH] IN BLOOD BY AUTOMATED COUNT: 16.1 % (ref 11.5–14.5)
ERYTHROCYTE [DISTWIDTH] IN BLOOD BY AUTOMATED COUNT: 16.3 % (ref 11.5–14.5)
ERYTHROCYTE [DISTWIDTH] IN BLOOD BY AUTOMATED COUNT: 16.3 % (ref 11.5–14.5)
GLUCOSE BLD MANUAL STRIP-MCNC: 199 MG/DL (ref 74–99)
GLUCOSE BLD MANUAL STRIP-MCNC: 214 MG/DL (ref 74–99)
GLUCOSE BLD MANUAL STRIP-MCNC: 242 MG/DL (ref 74–99)
GLUCOSE BLD MANUAL STRIP-MCNC: 253 MG/DL (ref 74–99)
GLUCOSE BLD MANUAL STRIP-MCNC: 278 MG/DL (ref 74–99)
GLUCOSE SERPL-MCNC: 214 MG/DL (ref 74–99)
GLUCOSE SERPL-MCNC: 217 MG/DL (ref 74–99)
HCT VFR BLD AUTO: 16.4 % (ref 36–46)
HCT VFR BLD AUTO: 17 % (ref 36–46)
HCT VFR BLD AUTO: 21.3 % (ref 36–46)
HCT VFR BLD AUTO: 22.6 % (ref 36–46)
HGB BLD-MCNC: 5.8 G/DL (ref 12–16)
HGB BLD-MCNC: 6 G/DL (ref 12–16)
HGB BLD-MCNC: 7.1 G/DL (ref 12–16)
HGB BLD-MCNC: 7.4 G/DL (ref 12–16)
IMM GRANULOCYTES # BLD AUTO: 0.05 X10*3/UL (ref 0–0.7)
IMM GRANULOCYTES # BLD AUTO: 0.06 X10*3/UL (ref 0–0.7)
IMM GRANULOCYTES # BLD AUTO: 0.08 X10*3/UL (ref 0–0.7)
IMM GRANULOCYTES NFR BLD AUTO: 0.4 % (ref 0–0.9)
IMM GRANULOCYTES NFR BLD AUTO: 0.5 % (ref 0–0.9)
IMM GRANULOCYTES NFR BLD AUTO: 0.6 % (ref 0–0.9)
LACTATE BLDV-SCNC: 1.1 MMOL/L (ref 0.4–2)
LYMPHOCYTES # BLD AUTO: 1.16 X10*3/UL (ref 1.2–4.8)
LYMPHOCYTES # BLD AUTO: 1.19 X10*3/UL (ref 1.2–4.8)
LYMPHOCYTES # BLD AUTO: 1.23 X10*3/UL (ref 1.2–4.8)
LYMPHOCYTES NFR BLD AUTO: 10.3 %
LYMPHOCYTES NFR BLD AUTO: 9.1 %
LYMPHOCYTES NFR BLD AUTO: 9.7 %
MAGNESIUM SERPL-MCNC: 1.8 MG/DL (ref 1.6–2.4)
MCH RBC QN AUTO: 30.2 PG (ref 26–34)
MCH RBC QN AUTO: 30.4 PG (ref 26–34)
MCH RBC QN AUTO: 30.5 PG (ref 26–34)
MCH RBC QN AUTO: 30.7 PG (ref 26–34)
MCHC RBC AUTO-ENTMCNC: 32.7 G/DL (ref 32–36)
MCHC RBC AUTO-ENTMCNC: 33.3 G/DL (ref 32–36)
MCHC RBC AUTO-ENTMCNC: 35.3 G/DL (ref 32–36)
MCHC RBC AUTO-ENTMCNC: 35.4 G/DL (ref 32–36)
MCV RBC AUTO: 86 FL (ref 80–100)
MCV RBC AUTO: 86 FL (ref 80–100)
MCV RBC AUTO: 92 FL (ref 80–100)
MCV RBC AUTO: 92 FL (ref 80–100)
MONOCYTES # BLD AUTO: 1.04 X10*3/UL (ref 0.1–1)
MONOCYTES # BLD AUTO: 1.3 X10*3/UL (ref 0.1–1)
MONOCYTES # BLD AUTO: 1.5 X10*3/UL (ref 0.1–1)
MONOCYTES NFR BLD AUTO: 11.4 %
MONOCYTES NFR BLD AUTO: 11.5 %
MONOCYTES NFR BLD AUTO: 8.2 %
NEUTROPHILS # BLD AUTO: 10.15 X10*3/UL (ref 1.2–7.7)
NEUTROPHILS # BLD AUTO: 10.33 X10*3/UL (ref 1.2–7.7)
NEUTROPHILS # BLD AUTO: 8.65 X10*3/UL (ref 1.2–7.7)
NEUTROPHILS NFR BLD AUTO: 76.5 %
NEUTROPHILS NFR BLD AUTO: 77.2 %
NEUTROPHILS NFR BLD AUTO: 81.2 %
NRBC BLD-RTO: 0.6 /100 WBCS (ref 0–0)
NRBC BLD-RTO: 0.7 /100 WBCS (ref 0–0)
NRBC BLD-RTO: 0.7 /100 WBCS (ref 0–0)
NRBC BLD-RTO: 0.8 /100 WBCS (ref 0–0)
PHOSPHATE SERPL-MCNC: 4.8 MG/DL (ref 2.5–4.9)
PHOSPHATE SERPL-MCNC: 4.8 MG/DL (ref 2.5–4.9)
PLATELET # BLD AUTO: 199 X10*3/UL (ref 150–450)
PLATELET # BLD AUTO: 224 X10*3/UL (ref 150–450)
PLATELET # BLD AUTO: 224 X10*3/UL (ref 150–450)
PLATELET # BLD AUTO: 227 X10*3/UL (ref 150–450)
POTASSIUM SERPL-SCNC: 4.4 MMOL/L (ref 3.5–5.3)
POTASSIUM SERPL-SCNC: 4.5 MMOL/L (ref 3.5–5.3)
RBC # BLD AUTO: 1.91 X10*6/UL (ref 4–5.2)
RBC # BLD AUTO: 1.97 X10*6/UL (ref 4–5.2)
RBC # BLD AUTO: 2.31 X10*6/UL (ref 4–5.2)
RBC # BLD AUTO: 2.45 X10*6/UL (ref 4–5.2)
RH FACTOR (ANTIGEN D): NORMAL
SODIUM SERPL-SCNC: 128 MMOL/L (ref 136–145)
SODIUM SERPL-SCNC: 129 MMOL/L (ref 136–145)
UNIT NUMBER: NORMAL
VANCOMYCIN SERPL-MCNC: 24.8 UG/ML (ref 5–20)
WBC # BLD AUTO: 11.3 X10*3/UL (ref 4.4–11.3)
WBC # BLD AUTO: 12.3 X10*3/UL (ref 4.4–11.3)
WBC # BLD AUTO: 12.7 X10*3/UL (ref 4.4–11.3)
WBC # BLD AUTO: 13.1 X10*3/UL (ref 4.4–11.3)

## 2024-12-07 PROCEDURE — 36430 TRANSFUSION BLD/BLD COMPNT: CPT

## 2024-12-07 PROCEDURE — 2500000001 HC RX 250 WO HCPCS SELF ADMINISTERED DRUGS (ALT 637 FOR MEDICARE OP)

## 2024-12-07 PROCEDURE — 82947 ASSAY GLUCOSE BLOOD QUANT: CPT

## 2024-12-07 PROCEDURE — 85025 COMPLETE CBC W/AUTO DIFF WBC: CPT

## 2024-12-07 PROCEDURE — 2500000004 HC RX 250 GENERAL PHARMACY W/ HCPCS (ALT 636 FOR OP/ED): Performed by: STUDENT IN AN ORGANIZED HEALTH CARE EDUCATION/TRAINING PROGRAM

## 2024-12-07 PROCEDURE — 85027 COMPLETE CBC AUTOMATED: CPT

## 2024-12-07 PROCEDURE — 80202 ASSAY OF VANCOMYCIN: CPT

## 2024-12-07 PROCEDURE — 93010 ELECTROCARDIOGRAM REPORT: CPT | Performed by: INTERNAL MEDICINE

## 2024-12-07 PROCEDURE — 83605 ASSAY OF LACTIC ACID: CPT

## 2024-12-07 PROCEDURE — P9040 RBC LEUKOREDUCED IRRADIATED: HCPCS

## 2024-12-07 PROCEDURE — 86885 COOMBS TEST INDIRECT QUAL: CPT

## 2024-12-07 PROCEDURE — 86870 RBC ANTIBODY IDENTIFICATION: CPT

## 2024-12-07 PROCEDURE — 86078 PHYS BLOOD BANK SERV REACTJ: CPT | Performed by: INTERNAL MEDICINE

## 2024-12-07 PROCEDURE — 86077 PHYS BLOOD BANK SERV XMATCH: CPT

## 2024-12-07 PROCEDURE — P9016 RBC LEUKOCYTES REDUCED: HCPCS

## 2024-12-07 PROCEDURE — 37799 UNLISTED PX VASCULAR SURGERY: CPT

## 2024-12-07 PROCEDURE — 2500000004 HC RX 250 GENERAL PHARMACY W/ HCPCS (ALT 636 FOR OP/ED)

## 2024-12-07 PROCEDURE — 71045 X-RAY EXAM CHEST 1 VIEW: CPT | Performed by: RADIOLOGY

## 2024-12-07 PROCEDURE — 71045 X-RAY EXAM CHEST 1 VIEW: CPT

## 2024-12-07 PROCEDURE — 80069 RENAL FUNCTION PANEL: CPT

## 2024-12-07 PROCEDURE — 86900 BLOOD TYPING SEROLOGIC ABO: CPT

## 2024-12-07 PROCEDURE — 99232 SBSQ HOSP IP/OBS MODERATE 35: CPT | Performed by: INTERNAL MEDICINE

## 2024-12-07 PROCEDURE — 86880 COOMBS TEST DIRECT: CPT

## 2024-12-07 PROCEDURE — 83735 ASSAY OF MAGNESIUM: CPT

## 2024-12-07 PROCEDURE — 2500000002 HC RX 250 W HCPCS SELF ADMINISTERED DRUGS (ALT 637 FOR MEDICARE OP, ALT 636 FOR OP/ED)

## 2024-12-07 PROCEDURE — 99291 CRITICAL CARE FIRST HOUR: CPT

## 2024-12-07 PROCEDURE — 99232 SBSQ HOSP IP/OBS MODERATE 35: CPT | Performed by: STUDENT IN AN ORGANIZED HEALTH CARE EDUCATION/TRAINING PROGRAM

## 2024-12-07 PROCEDURE — 2500000005 HC RX 250 GENERAL PHARMACY W/O HCPCS

## 2024-12-07 PROCEDURE — 86922 COMPATIBILITY TEST ANTIGLOB: CPT

## 2024-12-07 PROCEDURE — 2020000001 HC ICU ROOM DAILY

## 2024-12-07 PROCEDURE — 86902 BLOOD TYPE ANTIGEN DONOR EA: CPT

## 2024-12-07 PROCEDURE — 94003 VENT MGMT INPAT SUBQ DAY: CPT

## 2024-12-07 RX ORDER — MIDAZOLAM HYDROCHLORIDE 1 MG/ML
1 INJECTION, SOLUTION INTRAVENOUS CONTINUOUS
Status: DISCONTINUED | OUTPATIENT
Start: 2024-12-07 | End: 2024-12-08

## 2024-12-07 RX ORDER — MIDAZOLAM HYDROCHLORIDE 1 MG/ML
INJECTION, SOLUTION INTRAVENOUS
Status: COMPLETED
Start: 2024-12-07 | End: 2024-12-07

## 2024-12-07 RX ORDER — DIGOXIN 0.25 MG/ML
250 INJECTION INTRAMUSCULAR; INTRAVENOUS ONCE
Status: COMPLETED | OUTPATIENT
Start: 2024-12-07 | End: 2024-12-07

## 2024-12-07 RX ADMIN — PANTOPRAZOLE SODIUM 40 MG: 40 INJECTION, POWDER, FOR SOLUTION INTRAVENOUS at 21:19

## 2024-12-07 RX ADMIN — Medication 75 MCG/HR: at 19:33

## 2024-12-07 RX ADMIN — OXYCODONE HYDROCHLORIDE 2.5 MG: 5 TABLET ORAL at 16:48

## 2024-12-07 RX ADMIN — SODIUM ZIRCONIUM CYCLOSILICATE 10 G: 10 POWDER, FOR SUSPENSION ORAL at 15:17

## 2024-12-07 RX ADMIN — INSULIN LISPRO 3 UNITS: 100 INJECTION, SOLUTION INTRAVENOUS; SUBCUTANEOUS at 21:00

## 2024-12-07 RX ADMIN — MIDAZOLAM IN SODIUM CHLORIDE 1 MG/HR: 1 INJECTION INTRAVENOUS at 04:24

## 2024-12-07 RX ADMIN — THIAMINE HCL TAB 100 MG 100 MG: 100 TAB at 08:23

## 2024-12-07 RX ADMIN — MIDAZOLAM IN SODIUM CHLORIDE 1 MG/HR: 1 INJECTION INTRAVENOUS at 21:59

## 2024-12-07 RX ADMIN — PIPERACILLIN SODIUM AND TAZOBACTAM SODIUM 2.25 G: 2; .25 INJECTION, SOLUTION INTRAVENOUS at 23:06

## 2024-12-07 RX ADMIN — SODIUM ZIRCONIUM CYCLOSILICATE 10 G: 10 POWDER, FOR SUSPENSION ORAL at 21:23

## 2024-12-07 RX ADMIN — SODIUM ZIRCONIUM CYCLOSILICATE 10 G: 10 POWDER, FOR SUSPENSION ORAL at 08:23

## 2024-12-07 RX ADMIN — Medication 0.18 MCG/KG/MIN: at 09:11

## 2024-12-07 RX ADMIN — MIDAZOLAM IN SODIUM CHLORIDE 1 MG/HR: 1 INJECTION INTRAVENOUS at 21:53

## 2024-12-07 RX ADMIN — HYDROCORTISONE SODIUM SUCCINATE 50 MG: 100 INJECTION, POWDER, FOR SOLUTION INTRAMUSCULAR; INTRAVENOUS at 15:17

## 2024-12-07 RX ADMIN — AMIODARONE HYDROCHLORIDE 400 MG: 200 TABLET ORAL at 08:23

## 2024-12-07 RX ADMIN — ATORVASTATIN CALCIUM 80 MG: 80 TABLET, FILM COATED ORAL at 21:23

## 2024-12-07 RX ADMIN — Medication 75 MCG/HR: at 22:00

## 2024-12-07 RX ADMIN — INSULIN LISPRO 1 UNITS: 100 INJECTION, SOLUTION INTRAVENOUS; SUBCUTANEOUS at 04:04

## 2024-12-07 RX ADMIN — DIGOXIN 250 MCG: 0.25 INJECTION INTRAMUSCULAR; INTRAVENOUS at 16:48

## 2024-12-07 RX ADMIN — Medication 30 PERCENT: at 20:47

## 2024-12-07 RX ADMIN — INSULIN LISPRO 2 UNITS: 100 INJECTION, SOLUTION INTRAVENOUS; SUBCUTANEOUS at 13:07

## 2024-12-07 RX ADMIN — Medication 75 MCG/HR: at 04:24

## 2024-12-07 RX ADMIN — ASPIRIN 81 MG CHEWABLE TABLET 81 MG: 81 TABLET CHEWABLE at 08:23

## 2024-12-07 RX ADMIN — Medication 30 PERCENT: at 08:00

## 2024-12-07 RX ADMIN — PANTOPRAZOLE SODIUM 40 MG: 40 INJECTION, POWDER, FOR SOLUTION INTRAVENOUS at 08:23

## 2024-12-07 RX ADMIN — Medication 0.14 MCG/KG/MIN: at 23:13

## 2024-12-07 RX ADMIN — PIPERACILLIN SODIUM AND TAZOBACTAM SODIUM 2.25 G: 2; .25 INJECTION, SOLUTION INTRAVENOUS at 08:23

## 2024-12-07 RX ADMIN — PIPERACILLIN SODIUM AND TAZOBACTAM SODIUM 2.25 G: 2; .25 INJECTION, SOLUTION INTRAVENOUS at 15:17

## 2024-12-07 RX ADMIN — INSULIN LISPRO 3 UNITS: 100 INJECTION, SOLUTION INTRAVENOUS; SUBCUTANEOUS at 15:48

## 2024-12-07 RX ADMIN — HYDROCORTISONE SODIUM SUCCINATE 50 MG: 100 INJECTION, POWDER, FOR SOLUTION INTRAMUSCULAR; INTRAVENOUS at 21:13

## 2024-12-07 RX ADMIN — INSULIN LISPRO 2 UNITS: 100 INJECTION, SOLUTION INTRAVENOUS; SUBCUTANEOUS at 08:42

## 2024-12-07 ASSESSMENT — PAIN SCALES - GENERAL
PAINLEVEL_OUTOF10: 0 - NO PAIN

## 2024-12-07 NOTE — PROGRESS NOTES
Subjective   Attempted to place new dialysis line overnight. Was unsuccessful with concern for internal jugular thrombus.  She has a low Hgb of 6.1 this morning, and a recheck was sent. On heavy sedation this morning, so less awake.     Meds  Scheduled medications  amiodarone, 400 mg, oral, BID  aspirin, 81 mg, oral, Daily  atorvastatin, 80 mg, oral, Nightly  heparin, 80 Units/kg, intravenous, Once  insulin lispro, 0-5 Units, subcutaneous, q4h  lactated Ringer's, 1,000 mL, intravenous, Once  oxygen, , inhalation, Continuous - Inhalation  pantoprazole, 40 mg, intravenous, BID  piperacillin-tazobactam, 2.25 g, intravenous, q8h  sodium zirconium cyclosilicate, 10 g, oral, TID  thiamine, 100 mg, oral, Daily  vancomycin, 500 mg, intravenous, Once per day on Tuesday Thursday Saturday      Continuous medications  amiodarone, 1 mg/min, Last Rate: Stopped (12/06/24 0315)  fentaNYL,  mcg/hr, Last Rate: Stopped (12/07/24 1215)  norepinephrine, 0.01-1 mcg/kg/min, Last Rate: 0.16 mcg/kg/min (12/07/24 1038)      PRN medications  PRN medications: acetaminophen **OR** acetaminophen **OR** acetaminophen, alteplase, dextrose, dextrose, fentaNYL, glucagon, glucagon, hydrOXYzine HCL, ipratropium-albuteroL, oxyCODONE, oxygen, polyethylene glycol, sennosides-docusate sodium, vancomycin      Objective   Vital signs in last 24 hours:  Temp:  [35.5 °C (95.9 °F)-36.6 °C (97.9 °F)] 36 °C (96.8 °F)  Heart Rate:  [58-68] 61  Resp:  [0-26] 14  BP: (124-135)/(42-47) 124/42  Arterial Line BP 1: (109-155)/(40-58) 135/45  FiO2 (%):  [30 %] 30 %    Intake/Output this shift:    Intake/Output Summary (Last 24 hours) at 12/7/2024 1330  Last data filed at 12/7/2024 0823  Gross per 24 hour   Intake 992.48 ml   Output --   Net 992.48 ml       Physical  General: Patient is awake, non-toxic appearing, normal body habitus  HEENT: Old blood around trach site, no bleeding overnight  Pulm: Normal WOB at rest and when sitting up, coarse breath  sounds  Cardiac: Regular rate and rhythm, normal S1/S2  Abdomen: Tender to palpation around g-tube site, non-distended with no inflammation on induration  Extremities: No peripheral edema  Neuro: Patient awakens briefly to voice, does not follow commands    Results  Results for orders placed or performed during the hospital encounter of 11/18/24 (from the past 24 hours)   CBC and Auto Differential   Result Value Ref Range    WBC 8.8 4.4 - 11.3 x10*3/uL    nRBC 0.6 (H) 0.0 - 0.0 /100 WBCs    RBC 2.43 (L) 4.00 - 5.20 x10*6/uL    Hemoglobin 7.3 (L) 12.0 - 16.0 g/dL    Hematocrit 21.1 (L) 36.0 - 46.0 %    MCV 87 80 - 100 fL    MCH 30.0 26.0 - 34.0 pg    MCHC 34.6 32.0 - 36.0 g/dL    RDW 15.9 (H) 11.5 - 14.5 %    Platelets 257 150 - 450 x10*3/uL    Neutrophils % 72.7 40.0 - 80.0 %    Immature Granulocytes %, Automated 0.8 0.0 - 0.9 %    Lymphocytes % 12.1 13.0 - 44.0 %    Monocytes % 12.2 2.0 - 10.0 %    Eosinophils % 1.7 0.0 - 6.0 %    Basophils % 0.5 0.0 - 2.0 %    Neutrophils Absolute 6.42 1.20 - 7.70 x10*3/uL    Immature Granulocytes Absolute, Automated 0.07 0.00 - 0.70 x10*3/uL    Lymphocytes Absolute 1.07 (L) 1.20 - 4.80 x10*3/uL    Monocytes Absolute 1.08 (H) 0.10 - 1.00 x10*3/uL    Eosinophils Absolute 0.15 0.00 - 0.70 x10*3/uL    Basophils Absolute 0.04 0.00 - 0.10 x10*3/uL   Protime-INR   Result Value Ref Range    Protime 14.0 (H) 9.8 - 12.8 seconds    INR 1.2 (H) 0.9 - 1.1   Fibrinogen   Result Value Ref Range    Fibrinogen 428 (H) 200 - 400 mg/dL   D-dimer, VTE Exclusion   Result Value Ref Range    D-Dimer, Quantitative VTE Exclusion 3,234 (H) <=500 ng/mL FEU   POCT GLUCOSE   Result Value Ref Range    POCT Glucose 229 (H) 74 - 99 mg/dL   POCT GLUCOSE   Result Value Ref Range    POCT Glucose 186 (H) 74 - 99 mg/dL   POCT GLUCOSE   Result Value Ref Range    POCT Glucose 190 (H) 74 - 99 mg/dL   POCT GLUCOSE   Result Value Ref Range    POCT Glucose 199 (H) 74 - 99 mg/dL   Blood Gas Lactic Acid, Venous   Result  Value Ref Range    POCT Lactate, Venous 1.1 0.4 - 2.0 mmol/L   CBC and Auto Differential   Result Value Ref Range    WBC 12.7 (H) 4.4 - 11.3 x10*3/uL    nRBC 0.6 (H) 0.0 - 0.0 /100 WBCs    RBC 1.97 (L) 4.00 - 5.20 x10*6/uL    Hemoglobin 6.0 (LL) 12.0 - 16.0 g/dL    Hematocrit 17.0 (L) 36.0 - 46.0 %    MCV 86 80 - 100 fL    MCH 30.5 26.0 - 34.0 pg    MCHC 35.3 32.0 - 36.0 g/dL    RDW 16.3 (H) 11.5 - 14.5 %    Platelets 227 150 - 450 x10*3/uL    Neutrophils % 81.2 40.0 - 80.0 %    Immature Granulocytes %, Automated 0.5 0.0 - 0.9 %    Lymphocytes % 9.7 13.0 - 44.0 %    Monocytes % 8.2 2.0 - 10.0 %    Eosinophils % 0.2 0.0 - 6.0 %    Basophils % 0.2 0.0 - 2.0 %    Neutrophils Absolute 10.33 (H) 1.20 - 7.70 x10*3/uL    Immature Granulocytes Absolute, Automated 0.06 0.00 - 0.70 x10*3/uL    Lymphocytes Absolute 1.23 1.20 - 4.80 x10*3/uL    Monocytes Absolute 1.04 (H) 0.10 - 1.00 x10*3/uL    Eosinophils Absolute 0.02 0.00 - 0.70 x10*3/uL    Basophils Absolute 0.03 0.00 - 0.10 x10*3/uL   Renal function panel   Result Value Ref Range    Glucose 217 (H) 74 - 99 mg/dL    Sodium 129 (L) 136 - 145 mmol/L    Potassium 4.5 3.5 - 5.3 mmol/L    Chloride 93 (L) 98 - 107 mmol/L    Bicarbonate 24 21 - 32 mmol/L    Anion Gap 17 10 - 20 mmol/L    Urea Nitrogen 30 (H) 6 - 23 mg/dL    Creatinine 4.17 (H) 0.50 - 1.05 mg/dL    eGFR 11 (L) >60 mL/min/1.73m*2    Calcium 6.9 (L) 8.6 - 10.6 mg/dL    Phosphorus 4.8 2.5 - 4.9 mg/dL    Albumin 2.1 (L) 3.4 - 5.0 g/dL   Magnesium   Result Value Ref Range    Magnesium 1.80 1.60 - 2.40 mg/dL   Vancomycin   Result Value Ref Range    Vancomycin 24.8 (H) 5.0 - 20.0 ug/mL   CBC   Result Value Ref Range    WBC 12.3 (H) 4.4 - 11.3 x10*3/uL    nRBC 0.7 (H) 0.0 - 0.0 /100 WBCs    RBC 1.91 (L) 4.00 - 5.20 x10*6/uL    Hemoglobin 5.8 (LL) 12.0 - 16.0 g/dL    Hematocrit 16.4 (L) 36.0 - 46.0 %    MCV 86 80 - 100 fL    MCH 30.4 26.0 - 34.0 pg    MCHC 35.4 32.0 - 36.0 g/dL    RDW 15.9 (H) 11.5 - 14.5 %    Platelets  224 150 - 450 x10*3/uL   Type and screen   Result Value Ref Range    ABO TYPE AB     Rh TYPE POS     ANTIBODY SCREEN POS    Renal Function Panel   Result Value Ref Range    Glucose 214 (H) 74 - 99 mg/dL    Sodium 128 (L) 136 - 145 mmol/L    Potassium 4.4 3.5 - 5.3 mmol/L    Chloride 92 (L) 98 - 107 mmol/L    Bicarbonate 25 21 - 32 mmol/L    Anion Gap 15 10 - 20 mmol/L    Urea Nitrogen 30 (H) 6 - 23 mg/dL    Creatinine 4.47 (H) 0.50 - 1.05 mg/dL    eGFR 10 (L) >60 mL/min/1.73m*2    Calcium 6.9 (L) 8.6 - 10.6 mg/dL    Phosphorus 4.8 2.5 - 4.9 mg/dL    Albumin 2.1 (L) 3.4 - 5.0 g/dL   Prepare RBC: 1 Units   Result Value Ref Range    PRODUCT CODE L3459G25     Unit Number [id number]     Unit ABO AB     Unit RH POS     XM INTEP COMP     Dispense Status IS     Blood Expiration Date 1/13/2025 11:59:00 PM EST     PRODUCT BLOOD TYPE 8400     UNIT VOLUME 350    POCT GLUCOSE   Result Value Ref Range    POCT Glucose 214 (H) 74 - 99 mg/dL   CBC and Auto Differential   Result Value Ref Range    WBC 11.3 4.4 - 11.3 x10*3/uL    nRBC 0.8 (H) 0.0 - 0.0 /100 WBCs    RBC 2.31 (L) 4.00 - 5.20 x10*6/uL    Hemoglobin 7.1 (L) 12.0 - 16.0 g/dL    Hematocrit 21.3 (L) 36.0 - 46.0 %    MCV 92 80 - 100 fL    MCH 30.7 26.0 - 34.0 pg    MCHC 33.3 32.0 - 36.0 g/dL    RDW 16.3 (H) 11.5 - 14.5 %    Platelets 199 150 - 450 x10*3/uL    Neutrophils % 76.5 40.0 - 80.0 %    Immature Granulocytes %, Automated 0.4 0.0 - 0.9 %    Lymphocytes % 10.3 13.0 - 44.0 %    Monocytes % 11.5 2.0 - 10.0 %    Eosinophils % 0.9 0.0 - 6.0 %    Basophils % 0.4 0.0 - 2.0 %    Neutrophils Absolute 8.65 (H) 1.20 - 7.70 x10*3/uL    Immature Granulocytes Absolute, Automated 0.05 0.00 - 0.70 x10*3/uL    Lymphocytes Absolute 1.16 (L) 1.20 - 4.80 x10*3/uL    Monocytes Absolute 1.30 (H) 0.10 - 1.00 x10*3/uL    Eosinophils Absolute 0.10 0.00 - 0.70 x10*3/uL    Basophils Absolute 0.04 0.00 - 0.10 x10*3/uL   POCT GLUCOSE   Result Value Ref Range    POCT Glucose 242 (H) 74 - 99  mg/dL       Imaging  XR chest 1 view    Result Date: 12/7/2024  Interpreted By:  Vernon Santos, STUDY: XR CHEST 1 VIEW; 12/7/2024 7:17 am   INDICATION: Signs/Symptoms:intubated.   COMPARISON: Radiograph dated 12/06/2024   ACCESSION NUMBER(S): [id number]   ORDERING CLINICIAN: BASSEM LEWIS   FINDINGS: Tracheostomy cannula is unchanged. Interval removal of left IJ central venous catheter. Right IJ central venous catheter is unchanged.   Status post median sternotomy. Cardiac silhouette size is within normal limits. Calcification of the aortic knob.   Interval improvement in the aeration of the lungs with residual bibasilar pleural effusion and atelectasis. No sizable pneumothorax.   No acute osseous abnormality.       Interval improvement in the aeration of the lungs with residual bibasilar pleural effusion and atelectasis.     Signed by: Vernon Franklin 12/7/2024 9:14 AM Dictation workstation:   [id number]    XR chest 1 view    Result Date: 12/7/2024  Interpreted By:  Vernon Santos,  and Serena Osborne STUDY: XR CHEST 1 VIEW;  12/6/2024 9:14 pm   INDICATION: Signs/Symptoms:Central line placement.   COMPARISON: Single-view chest 12/06/2024   ACCESSION NUMBER(S): [id number]   ORDERING CLINICIAN: BASSEM LEWIS   FINDINGS: AP radiograph of the chest was provided.   Interval placement of tracheostomy cannula. Right internal jugular central venous catheter tip projects over the expected location of the lower SVC. Left internal jugular central venous catheter tip projects over the expected location of the lower SVC. Sternal cerclage wires are intact.   CARDIOMEDIASTINAL SILHOUETTE: Cardiomediastinal silhouette is stable in size and configuration. Aortic knob calcifications are noted.   LUNGS: Similar hazy opacity of the left-greater-than-right lung base. Intervally worsened hilar and interstitial opacities. No pneumothorax.   ABDOMEN: No remarkable upper abdominal findings.   BONES: No  acute osseous changes.       1.  Bilateral internal jugular central venous catheters terminate over the lower SVC. Interval placement of a tracheostomy cannula. 2. Similar hazy opacities of the left-greater-than-right lung bases, likely representing layering pleural effusions. Cannot exclude underlying infiltrate or infection.   I personally reviewed the image(s)/study and resident interpretation. I agree with the findings as stated by resident Dylon Lyons. Data analyzed and images interpreted at [facility], [place], [place].   MACRO: None   Signed by: Vernon Franklin 12/7/2024 9:13 AM Dictation workstation:   [id number]    Transthoracic Echo (TTE) Limited    Result Date: 12/6/2024   [facility], [address] [place] [address]                Tel [phone] and Fax [phone] TRANSTHORACIC ECHOCARDIOGRAM REPORT  Patient Name:         MARGARET COVINGTON      Camryn Physician: [id number] Tad Berg MD Study Date:           12/6/2024          Ordering Provider: [id number] BASSEM LEWIS MRN/PID:              [id number]           Fellow: Accession#:           [id number]       Nurse: Date of Birth/Age:    1960 / 64      Sonographer:                       years Gender assigned at    F                  Additional Staff: Birth: Height:                                  Admit Date:        11/18/2024 Weight:                                  Admission Status:  Inpatient - STAT BSA / BMI:            m2 / kg/m2         Encounter#:        [id number] Study Type:    TRANSTHORACIC ECHO (TTE) LIMITED Diagnosis/ICD: Shock, unspecified-R57.9 CPT Code:      Echo Limited-85974; Doppler Limited-69811; Color Doppler-31815  Study Detail: The following Echo studies were performed: 2D, Doppler and color               flow.  PHYSICIAN INTERPRETATION: Left  Ventricle: The left ventricular systolic function is normal, with a visually estimated ejection fraction of 60-65%. There are no regional left ventricular wall motion abnormalities. The left ventricular cavity size is normal. Left ventricular diastolic filling was not assessed. Left Atrium: The left atrium is severely dilated. Right Ventricle: The right ventricle is normal in size. There is normal right ventricular global systolic function. Right Atrium: The right atrium is mild to moderately dilated. Aortic Valve: The aortic valve is probably trileaflet. There is trace aortic valve regurgitation. Mitral Valve: The mitral valve is mildly thickened. There is mild mitral annular calcification. There is moderate to severe mitral valve regurgitation. Tricuspid Valve: The tricuspid valve is structurally normal. There is moderate tricuspid regurgitation. Pulmonic Valve: The pulmonic valve is not well visualized. Pulmonic valve regurgitation was not assessed. Pericardium: Trivial pericardial effusion. Aorta: The aortic root was not well visualized. Pulmonary Artery: The pulmonary artery is not well visualized. Systemic Veins: The inferior vena cava appears dilated, with IVC inspiratory collapse less than 50%. In comparison to the previous echocardiogram(s): Compared with study dated 11/19/2024, no significant change.  CONCLUSIONS:  1. The left ventricular systolic function is normal, with a visually estimated ejection fraction of 60-65%.  2. There is normal right ventricular global systolic function.  3. The left atrium is severely dilated.  4. The right atrium is mild to moderately dilated.  5. Moderate to severe mitral valve regurgitation.  6. Moderate tricuspid regurgitation visualized.  7. The pulmonary artery is not well visualized.  8. Limited on call fellow echo. RECOMMENDATIONS: Utilizing an FDA cleared automated machine learning algorithm (EchoGo Heart Failure by [employer]), the analysis of the apical 4-chamber  echocardiogram suggests the presence of heart failure with preserved ejection fraction (HFpEF)*. Clinical correlation looking for additional heart failure signs and symptoms is recommended, as a definite diagnosis of heart failure cannot be made by imaging alone. *Per ACC/AHA/HFSA universal diagnosis of heart failure, HFpEF is defined as 1) signs and symptoms leading to clinical diagnosis of heart failure, 2) an ejection fraction of at least 50%, and 3) evidence of elevated intra-cardiac filling pressures by echocardiography, BNP elevation, or catheterization.  QUANTITATIVE DATA SUMMARY:  LV SYSTOLIC FUNCTION BY 2D PLANIMETRY (MOD):                      Normal Ranges: EF-Visual:      63 % LV EF Reported: 63 %  [id number] Tad Berg MD Electronically signed on 12/6/2024 at 11:25:10 AM  ** Final **     XR chest 1 view    Result Date: 12/6/2024  Interpreted By:  Sheryl, Karen,  and Gilby Dorothea STUDY: XR CHEST 1 VIEW;  12/6/2024 1:31 am   INDICATION: Signs/Symptoms:new SOB and hypotension, evaluate for PTX, has ET tube.   COMPARISON: Chest radiograph 12/05/2024   ACCESSION NUMBER(S): [id number]   ORDERING CLINICIAN: BASSEM LEWIS   FINDINGS: AP radiograph of the chest was provided.   Endotracheal tube in place with tip projecting 5.8 cm above the nisha. Right internal jugular approach central venous catheter tip overlies the lower superior vena cava. Left internal jugular approach central venous catheter tip overlies the lower superior vena cava. Partially visualized enteric tube.   CARDIOMEDIASTINAL SILHOUETTE: Cardiomediastinal silhouette is stable in size and configuration. Mild calcification of the aortic arch.   LUNGS: Hazy opacity throughout the bilateral lung bases. There is mildly improved aeration of the left upper lung. Diffuse interstitial edema. Increased atelectasis of the right middle pulmonary lobe. No pneumothorax is visualized.   ABDOMEN: No remarkable upper abdominal findings.   BONES: No  acute osseous abnormality.       1. Mildly improved aeration of the left upper lung. 2. Otherwise similar hazy bibasilar pulmonary opacities, small pleural effusions, and interstitial edema. 3. Increased atelectasis of the right middle lobe. 4. No pneumothorax. 5. Medical devices in similar positioning as above.   I personally reviewed the image(s)/study and resident interpretation as stated by Dr. Dorothea Lim MD. I agree with the findings as stated. This study was interpreted at [facility], [place], [place].   MACRO: None   Signed by: Karen Saucedo 12/6/2024 10:32 AM Dictation workstation:   [id number]        Assessment/Plan   Principal Problem:    Dissection of aorta  Active Problems:    ESRD on hemodialysis (Multi)    Ruptured aortic aneurysm (Multi)    Anemia due to acute blood loss    Cardiac arrest    64 y.o. female with history of pancreatitis, DVT/PE, HLD, HTN, CABGx4 1997, T2DM, GERD, PAD, chronic mesenteric ischemia, tobacco use who presented to [facility] on 11/18/2024 as a transfer from [facility] with chest, back, and abdominal pain. Now post PEA arrest, with intubation/sedation. Family agreed for trach/PEG and LTAC. Patient had septic episode 12/6 overnight and underwent trach/PEG.      Updates:  -Added stress dose steroids due to high pressor requirements  -Follow up ultrasound for internal jugular thrombus  -Follow up cultures (MRSA nares negative)  -Plan for TDC on Monday with dialysis afterwards     Neuro:  #Sedation/anxiety  -fentanyl, versed     Cardiac:  #Shock, likely septic in origin  -Continue levophed -> currently at 0.16  -Start stress dose steroids 100 mq q8h  -No organisms seen on respiratory cultures  -WBC count up to 11.4  -Blood culture NGTD at 1 day    #Aflutter/Afib with RVR  ::now sinus tachycardia  -HR in 160-180s on 12/5 am  -s/p one dose of digoxin 250 mcg (digoxin is not dialyzable)  -s/p amio bolus 12/6  for RVR  -Amiodarone 400 mg daily (also poorly dialyzed)  -HR 110s-120s post     #PEA arrest 11/20  ::likely hypoxia driven: PNA vs aspiration     #Aortic dissection  #Mural Thrombus  #Distal aortic arch saccular aneurysm   ::CTA CAP 11/18- 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   -Vascular surgery discussed case at aortic conference and there are no plans for surgery, palliative measures recommended  -Holding heparin      #NSTEMI  #CAD s/p CABGx4 1997  #PAD  #HTN #DLD  ::EKG- NSR, rate 71, TWI aVL, V1-V6, 1mm ORQUIDEA in aVR  ::Trop peak 5700  ::Lipid HDL 41.5, , TG 75, Chol 157  -Holding home amlodipine 10mg, imdur 30mg, lisinopril 2.5mg, metop tartrate 50mg bid iso hypotension   -Continue ASA and atorvastatin  -Holding plavix 75mg. Can resume after all procedures are done (TDC, trach/PEG)     #HFrEF (45-50%, 11/19/24)  -Not on GDMT at the moment      Pulm:  #Acute hypoxic respiratory failure, post-arrest  :s/p Zosyn 11/20 - 11/26 for PNA  :: s/p Vancomycin   -Failed extubation on 11/28  -Trach today     GI:  #Ischemic hepatitis secondary to PEA arrest, improving  ::likely 2/2 hypoxia and hypotension  -LFTs downtrending     Renal:  #TRACY on CKD likely 2/2 arrest  #Anuric  -iHD     Endo:   #T2DM  -Hold home metformin   -Mild SSI + hypoglycemia protocol      #Tube Feeds  :: goal, 20 ml/hr  -f/up nutrition  -Currently at goal     Infectious  #sepsis  -fu blood and sputum culutre  -cw vanc zosyn (12/6- )     F: None  E: K>4, Mag>2  N: TF  G: pantoprazole  A: PIV, OG, [place]  DVT: SCDs  CODE: DNR/okay to intubate (confirmed with family 11/21)  NOK: Daughter Josy [phone]         LOS: 19 days     Jaime Mckeon MD/PhD   PGY-2

## 2024-12-07 NOTE — PROGRESS NOTES
Pharmacy to Dose Vancomycin:  Humaira Huang is a 64 y.o. female ordered pharmacy to dose vancomycin for pneumonia. Today is day 2 of therapy.  Goal: Trough 20-25mg/L (pre HD)  Results from last 7 days   Lab Units 12/07/24  0406 12/06/24  1458 12/06/24  1111 12/06/24  0405 12/06/24  0018   BUN mg/dL 30*  --   --  20 17   CREATININE mg/dL 4.17*  --   --  3.32* 3.12*   WBC AUTO x10*3/uL 12.7* 8.8 8.5 11.1 14.5*   VANCOMYCIN RM ug/mL 24.8*  --   --   --  8.2      Staph/MRSA Screen Culture   Date/Time Value Ref Range Status   12/03/2024 04:53 AM (A)  Final    Isolated: Methicillin Susceptible Staphylococcus aureus (MSSA)     Urine Culture   Date/Time Value Ref Range Status   11/25/2024 12:22 PM No growth  Final     Blood Culture   Date/Time Value Ref Range Status   12/06/2024 03:43 AM No growth at 1 day  Preliminary     Gram Stain   Date/Time Value Ref Range Status   12/06/2024 11:38 AM (2+) Few Polymorphonuclear leukocytes  Preliminary   12/06/2024 11:38 AM No organisms seen  Preliminary      Susceptibility data from last 90 days.  Collected Specimen Info Organism   12/03/24 Swab from Anterior Nares Methicillin Susceptible Staphylococcus aureus (MSSA)     Antibiotics: Zosyn (Day 2).  Pertinent Medications: Norepi drip, vasopressin.  Renal function is HD dependent.  Level today is 24.8 this morning prior to HD.    Plan:  Will give vanco 500mg today after HD.  Follow-up level ordered for 12/10 AM (prior to next HD session) unless clinically indicated sooner.  Pharmacy will continue daily vancomycin monitoring. Please contact the pharmacy with any questions.    Thank you,  Clyde Turner [facility]

## 2024-12-07 NOTE — PROGRESS NOTES
Subjective:  Concerns for some bleeding yesterday which was controlled with silver nitrate and surgicel. On AM check, no bleeding from trach or neck swelling.    Per RN report, pt had drop in Hgb and required blood. Do not suspect this is from the trach.     Objective:  Visit Vitals  BP (!) 125/43   Pulse 63   Temp 36.4 °C (97.5 °F) (Temporal)   Resp 18     General: Alert, oriented, no acute distress  Resp: Breathing comfortably on vent  Head: Atraumatic, normocephalic  Oral Cavity: MMM  Ears: external ears normal   Nose: external nose normal  Neck: tracheostomy site healing well, no active hemorrhage, trach collar adjusted    Assessment:  64 year old s/p  tracheostomy by Dr. Mi on 12/6. Currently doing well.       Plan:  - Continue standard trach care  - ENT to cut sutures 12/11  - Okay to restart DVT PPx and AC if medically indicated   - Please page with any questions or concerns     ENT  [id number]

## 2024-12-07 NOTE — PROCEDURES
Central Line    Date/Time: 12/6/2024 8:47 PM    Performed by: Cherelle Early MD  Authorized by: Cherelle Early MD    Consent:     Consent obtained:  Written    Consent given by:  Guardian    Risks, benefits, and alternatives were discussed: yes      Risks discussed:  Arterial puncture, incorrect placement, nerve damage, bleeding, infection and pneumothorax  Universal protocol:     Procedure explained and questions answered to patient or proxy's satisfaction: yes      Relevant documents present and verified: yes      Test results available: yes      Imaging studies available: yes      Required blood products, implants, devices, and special equipment available: yes      Site/side marked: yes      Immediately prior to procedure, a time out was called: yes      Patient identity confirmed:  Hospital-assigned identification number  Pre-procedure details:     Indication(s): central venous access, hemodynamic monitoring and insufficient peripheral access      Hand hygiene: Hand hygiene performed prior to insertion      Sterile barrier technique: All elements of maximal sterile technique followed      Skin preparation:  Chlorhexidine    Skin preparation agent: Skin preparation agent completely dried prior to procedure    Sedation:     Sedation type:  Deep  Anesthesia:     Anesthesia method:  Local infiltration    Local anesthetic:  Lidocaine 1% WITH epi and lidocaine 1% w/o epi  Procedure details:     Location:  R internal jugular    Patient position:  Supine    Procedural supplies:  Triple lumen    Catheter size:  7 Fr    Landmarks identified: yes      Ultrasound guidance: yes      Ultrasound guidance timing: real time      Sterile ultrasound techniques: Sterile gel and sterile probe covers were used      Number of attempts:  1    Successful placement: yes    Post-procedure details:     Post-procedure:  Dressing applied and line sutured    Assessment:  Blood return through all ports and free fluid flow    Procedure  completion:  Tolerated well, no immediate complications

## 2024-12-07 NOTE — SIGNIFICANT EVENT
12/06/24 2335   Pre-Procedure Checklist   Emergent Line Insertion Yes   Type of Line to be Placed CVC   Consent Obtained Yes   Emergency Medication Necessary Yes (order prior to procedure)   Patient Identified with 2 Independent Identifiers Yes   Review of Allergies, Anticoagulation, Relevant Labs, ECG/Telemetry Yes   Risks/Benefits/Alternatives Discussed with Patient/POA/Legal Representative Yes   Stop Sign on Door Yes   Time Out Performed Yes   Catheter Exchange Yes   Positioning and Preparation Checklist   All People, Including Patient, in the Room with Cap and Mask Yes   Fluoroscopy Used to Identify Vessel and Guide Insertion; Sterile Cover Used Yes   Ultrasound Used to Identify Vessel and Guide Insertion; Sterile Cover Used Yes   Full Barrier Precautions Followed (Mask, Cap, Gown, Gloves) Yes   Hands Washed Yes   Monitors Attached with Sound Alarms On Yes   Full Body Sterile Drape (Head-to-Toe) Used to Cover Patient Yes   [place] Position (For IJ and Subclavian) Yes   CHG Skin Prep Used and Allowed to Air Dry Prior to Skin Procedure Yes   Procedure Checklist   Blood Aspirated From All Lumens, All Ports Subsequently Flushed Yes   Catheter Caps Placed On All Lumens; Lumens Clamped Yes   Maintain Guidewire Control Throughout, Ensuring Guidewire Removal Yes   Maintain Sterile Field Throughout Insertion Yes   Catheter Secured Yes   Confirmatory Test of Venous Placement Blood gas   Post-Procedure Checklist   Date and Time Written on Dressing Yes   Sharp and Wire Count and Safe Disposal of all Sharps/Wires Yes   Sterile Dressing Applied Per Protocol Yes   X-ray Ordered or ECG Image Yes

## 2024-12-08 ENCOUNTER — APPOINTMENT (OUTPATIENT)
Dept: RADIOLOGY | Facility: HOSPITAL | Age: 64
DRG: 004 | End: 2024-12-08
Payer: MEDICARE

## 2024-12-08 VITALS
HEART RATE: 72 BPM | DIASTOLIC BLOOD PRESSURE: 57 MMHG | BODY MASS INDEX: 25.56 KG/M2 | WEIGHT: 138.89 LBS | RESPIRATION RATE: 19 BRPM | SYSTOLIC BLOOD PRESSURE: 135 MMHG | TEMPERATURE: 97 F | HEIGHT: 62 IN | OXYGEN SATURATION: 100 %

## 2024-12-08 LAB
ABO GROUP (TYPE) IN BLOOD: NORMAL
ALBUMIN SERPL BCP-MCNC: 2.2 G/DL (ref 3.4–5)
ANION GAP BLDA CALCULATED.4IONS-SCNC: 15 MMO/L (ref 10–25)
ANION GAP BLDA CALCULATED.4IONS-SCNC: 20 MMO/L (ref 10–25)
ANION GAP SERPL CALC-SCNC: 17 MMOL/L (ref 10–20)
ANTIBODY SCREEN: NORMAL
BACTERIA BLD CULT: NORMAL
BACTERIA SPEC RESP CULT: ABNORMAL
BASE EXCESS BLDA CALC-SCNC: -5.8 MMOL/L (ref -2–3)
BASE EXCESS BLDA CALC-SCNC: -8.5 MMOL/L (ref -2–3)
BASOPHILS # BLD AUTO: 0.02 X10*3/UL (ref 0–0.1)
BASOPHILS NFR BLD AUTO: 0.1 %
BLOOD EXPIRATION DATE: NORMAL
BLOOD EXPIRATION DATE: NORMAL
BODY TEMPERATURE: 37 DEGREES CELSIUS
BODY TEMPERATURE: 37 DEGREES CELSIUS
BUN SERPL-MCNC: 36 MG/DL (ref 6–23)
CA-I BLDA-SCNC: 0.97 MMOL/L (ref 1.1–1.33)
CA-I BLDA-SCNC: 0.99 MMOL/L (ref 1.1–1.33)
CALCIUM SERPL-MCNC: 6.9 MG/DL (ref 8.6–10.6)
CHLORIDE BLDA-SCNC: 92 MMOL/L (ref 98–107)
CHLORIDE BLDA-SCNC: 92 MMOL/L (ref 98–107)
CHLORIDE SERPL-SCNC: 91 MMOL/L (ref 98–107)
CO2 SERPL-SCNC: 23 MMOL/L (ref 21–32)
CORTIS AM PEAK SERPL-MSCNC: >150 UG/DL (ref 5–20)
CREAT SERPL-MCNC: 4.95 MG/DL (ref 0.5–1.05)
DAT-POLYSPECIFIC: NORMAL
DISPENSE STATUS: NORMAL
DISPENSE STATUS: NORMAL
EGFRCR SERPLBLD CKD-EPI 2021: 9 ML/MIN/1.73M*2
EOSINOPHIL # BLD AUTO: 0.01 X10*3/UL (ref 0–0.7)
EOSINOPHIL NFR BLD AUTO: 0.1 %
ERYTHROCYTE [DISTWIDTH] IN BLOOD BY AUTOMATED COUNT: 15.5 % (ref 11.5–14.5)
GLUCOSE BLD MANUAL STRIP-MCNC: 242 MG/DL (ref 74–99)
GLUCOSE BLD MANUAL STRIP-MCNC: 267 MG/DL (ref 74–99)
GLUCOSE BLD MANUAL STRIP-MCNC: 329 MG/DL (ref 74–99)
GLUCOSE BLD MANUAL STRIP-MCNC: 332 MG/DL (ref 74–99)
GLUCOSE BLD MANUAL STRIP-MCNC: 365 MG/DL (ref 74–99)
GLUCOSE BLD MANUAL STRIP-MCNC: 433 MG/DL (ref 74–99)
GLUCOSE BLDA-MCNC: 364 MG/DL (ref 74–99)
GLUCOSE BLDA-MCNC: 366 MG/DL (ref 74–99)
GLUCOSE SERPL-MCNC: 265 MG/DL (ref 74–99)
GRAM STN SPEC: ABNORMAL
GRAM STN SPEC: ABNORMAL
HCO3 BLDA-SCNC: 17.3 MMOL/L (ref 22–26)
HCO3 BLDA-SCNC: 19.4 MMOL/L (ref 22–26)
HCT VFR BLD AUTO: 27.7 % (ref 36–46)
HCT VFR BLD EST: 26 % (ref 36–46)
HCT VFR BLD EST: 28 % (ref 36–46)
HGB BLD-MCNC: 9.2 G/DL (ref 12–16)
HGB BLDA-MCNC: 8.8 G/DL (ref 12–16)
HGB BLDA-MCNC: 9.3 G/DL (ref 12–16)
IMM GRANULOCYTES # BLD AUTO: 0.09 X10*3/UL (ref 0–0.7)
IMM GRANULOCYTES NFR BLD AUTO: 0.6 % (ref 0–0.9)
INHALED O2 CONCENTRATION: 30 %
INHALED O2 CONCENTRATION: 60 %
LACTATE BLDA-SCNC: 1.3 MMOL/L (ref 0.4–2)
LACTATE BLDA-SCNC: 4.5 MMOL/L (ref 0.4–2)
LYMPHOCYTES # BLD AUTO: 0.51 X10*3/UL (ref 1.2–4.8)
LYMPHOCYTES NFR BLD AUTO: 3.5 %
MAGNESIUM SERPL-MCNC: 1.89 MG/DL (ref 1.6–2.4)
MCH RBC QN AUTO: 30.9 PG (ref 26–34)
MCHC RBC AUTO-ENTMCNC: 33.2 G/DL (ref 32–36)
MCV RBC AUTO: 93 FL (ref 80–100)
MONOCYTES # BLD AUTO: 0.67 X10*3/UL (ref 0.1–1)
MONOCYTES NFR BLD AUTO: 4.6 %
NEUTROPHILS # BLD AUTO: 13.3 X10*3/UL (ref 1.2–7.7)
NEUTROPHILS NFR BLD AUTO: 91.1 %
NRBC BLD-RTO: 0.5 /100 WBCS (ref 0–0)
OXYHGB MFR BLDA: 86.8 % (ref 94–98)
OXYHGB MFR BLDA: 97.1 % (ref 94–98)
PCO2 BLDA: 36 MM HG (ref 38–42)
PCO2 BLDA: 36 MM HG (ref 38–42)
PH BLDA: 7.29 PH (ref 7.38–7.42)
PH BLDA: 7.34 PH (ref 7.38–7.42)
PHOSPHATE SERPL-MCNC: 5.4 MG/DL (ref 2.5–4.9)
PLATELET # BLD AUTO: 212 X10*3/UL (ref 150–450)
PO2 BLDA: 160 MM HG (ref 85–95)
PO2 BLDA: 59 MM HG (ref 85–95)
POTASSIUM BLDA-SCNC: 4.8 MMOL/L (ref 3.5–5.3)
POTASSIUM BLDA-SCNC: 4.8 MMOL/L (ref 3.5–5.3)
POTASSIUM SERPL-SCNC: 4.4 MMOL/L (ref 3.5–5.3)
PRODUCT BLOOD TYPE: 8400
PRODUCT BLOOD TYPE: 9500
PRODUCT CODE: NORMAL
PRODUCT CODE: NORMAL
RBC # BLD AUTO: 2.98 X10*6/UL (ref 4–5.2)
RH FACTOR (ANTIGEN D): NORMAL
SAO2 % BLDA: 100 % (ref 94–100)
SAO2 % BLDA: 89 % (ref 94–100)
SODIUM BLDA-SCNC: 122 MMOL/L (ref 136–145)
SODIUM BLDA-SCNC: 124 MMOL/L (ref 136–145)
SODIUM SERPL-SCNC: 127 MMOL/L (ref 136–145)
UNIT ABO: NORMAL
UNIT ABO: NORMAL
UNIT NUMBER: NORMAL
UNIT NUMBER: NORMAL
UNIT RH: NORMAL
UNIT RH: NORMAL
UNIT VOLUME: 350
UNIT VOLUME: 350
WBC # BLD AUTO: 14.6 X10*3/UL (ref 4.4–11.3)
XM INTEP: NORMAL
XM INTEP: NORMAL

## 2024-12-08 PROCEDURE — 86870 RBC ANTIBODY IDENTIFICATION: CPT

## 2024-12-08 PROCEDURE — 2500000004 HC RX 250 GENERAL PHARMACY W/ HCPCS (ALT 636 FOR OP/ED)

## 2024-12-08 PROCEDURE — 2020000001 HC ICU ROOM DAILY

## 2024-12-08 PROCEDURE — 37799 UNLISTED PX VASCULAR SURGERY: CPT

## 2024-12-08 PROCEDURE — 2500000002 HC RX 250 W HCPCS SELF ADMINISTERED DRUGS (ALT 637 FOR MEDICARE OP, ALT 636 FOR OP/ED)

## 2024-12-08 PROCEDURE — 51701 INSERT BLADDER CATHETER: CPT

## 2024-12-08 PROCEDURE — 86077 PHYS BLOOD BANK SERV XMATCH: CPT

## 2024-12-08 PROCEDURE — 82947 ASSAY GLUCOSE BLOOD QUANT: CPT

## 2024-12-08 PROCEDURE — 86901 BLOOD TYPING SEROLOGIC RH(D): CPT

## 2024-12-08 PROCEDURE — 71045 X-RAY EXAM CHEST 1 VIEW: CPT | Performed by: RADIOLOGY

## 2024-12-08 PROCEDURE — 2500000001 HC RX 250 WO HCPCS SELF ADMINISTERED DRUGS (ALT 637 FOR MEDICARE OP)

## 2024-12-08 PROCEDURE — 83735 ASSAY OF MAGNESIUM: CPT

## 2024-12-08 PROCEDURE — 94003 VENT MGMT INPAT SUBQ DAY: CPT

## 2024-12-08 PROCEDURE — 99232 SBSQ HOSP IP/OBS MODERATE 35: CPT | Performed by: INTERNAL MEDICINE

## 2024-12-08 PROCEDURE — 2500000005 HC RX 250 GENERAL PHARMACY W/O HCPCS

## 2024-12-08 PROCEDURE — 93010 ELECTROCARDIOGRAM REPORT: CPT | Performed by: INTERNAL MEDICINE

## 2024-12-08 PROCEDURE — 84132 ASSAY OF SERUM POTASSIUM: CPT

## 2024-12-08 PROCEDURE — 80069 RENAL FUNCTION PANEL: CPT

## 2024-12-08 PROCEDURE — 99291 CRITICAL CARE FIRST HOUR: CPT

## 2024-12-08 PROCEDURE — 71045 X-RAY EXAM CHEST 1 VIEW: CPT

## 2024-12-08 PROCEDURE — 86880 COOMBS TEST DIRECT: CPT

## 2024-12-08 PROCEDURE — 86885 COOMBS TEST INDIRECT QUAL: CPT

## 2024-12-08 PROCEDURE — 82533 TOTAL CORTISOL: CPT

## 2024-12-08 RX ORDER — DEXMEDETOMIDINE HYDROCHLORIDE 4 UG/ML
.1-1.5 INJECTION, SOLUTION INTRAVENOUS CONTINUOUS
Status: DISCONTINUED | OUTPATIENT
Start: 2024-12-08 | End: 2024-12-09

## 2024-12-08 RX ORDER — INSULIN LISPRO 100 [IU]/ML
0-10 INJECTION, SOLUTION INTRAVENOUS; SUBCUTANEOUS
Status: DISCONTINUED | OUTPATIENT
Start: 2024-12-08 | End: 2024-12-08

## 2024-12-08 RX ORDER — INSULIN LISPRO 100 [IU]/ML
0-10 INJECTION, SOLUTION INTRAVENOUS; SUBCUTANEOUS EVERY 4 HOURS
Status: DISCONTINUED | OUTPATIENT
Start: 2024-12-08 | End: 2024-12-18 | Stop reason: HOSPADM

## 2024-12-08 RX ORDER — DEXTROSE 50 % IN WATER (D50W) INTRAVENOUS SYRINGE
25
Status: DISCONTINUED | OUTPATIENT
Start: 2024-12-08 | End: 2024-12-18 | Stop reason: HOSPADM

## 2024-12-08 RX ORDER — DEXTROSE 50 % IN WATER (D50W) INTRAVENOUS SYRINGE
12.5
Status: DISCONTINUED | OUTPATIENT
Start: 2024-12-08 | End: 2024-12-18 | Stop reason: HOSPADM

## 2024-12-08 RX ORDER — SEVELAMER CARBONATE 800 MG/1
800 TABLET, FILM COATED ORAL
Status: DISCONTINUED | OUTPATIENT
Start: 2024-12-08 | End: 2024-12-10

## 2024-12-08 RX ADMIN — INSULIN LISPRO 3 UNITS: 100 INJECTION, SOLUTION INTRAVENOUS; SUBCUTANEOUS at 04:29

## 2024-12-08 RX ADMIN — AMIODARONE HYDROCHLORIDE 150 MG: 1.5 INJECTION, SOLUTION INTRAVENOUS at 06:13

## 2024-12-08 RX ADMIN — THIAMINE HCL TAB 100 MG 100 MG: 100 TAB at 08:33

## 2024-12-08 RX ADMIN — Medication 40 PERCENT: at 20:00

## 2024-12-08 RX ADMIN — DEXMEDETOMIDINE HYDROCHLORIDE 0.2 MCG/KG/HR: 400 INJECTION INTRAVENOUS at 07:42

## 2024-12-08 RX ADMIN — OXYCODONE HYDROCHLORIDE 2.5 MG: 5 TABLET ORAL at 06:22

## 2024-12-08 RX ADMIN — SEVELAMER CARBONATE 800 MG: 800 TABLET, FILM COATED ORAL at 08:33

## 2024-12-08 RX ADMIN — ASPIRIN 81 MG CHEWABLE TABLET 81 MG: 81 TABLET CHEWABLE at 08:33

## 2024-12-08 RX ADMIN — PIPERACILLIN SODIUM AND TAZOBACTAM SODIUM 2.25 G: 2; .25 INJECTION, SOLUTION INTRAVENOUS at 14:52

## 2024-12-08 RX ADMIN — INSULIN LISPRO 10 UNITS: 100 INJECTION, SOLUTION INTRAVENOUS; SUBCUTANEOUS at 17:16

## 2024-12-08 RX ADMIN — ATORVASTATIN CALCIUM 80 MG: 80 TABLET, FILM COATED ORAL at 20:56

## 2024-12-08 RX ADMIN — SEVELAMER CARBONATE 800 MG: 800 TABLET, FILM COATED ORAL at 16:01

## 2024-12-08 RX ADMIN — INSULIN LISPRO 2 UNITS: 100 INJECTION, SOLUTION INTRAVENOUS; SUBCUTANEOUS at 00:18

## 2024-12-08 RX ADMIN — PANTOPRAZOLE SODIUM 40 MG: 40 INJECTION, POWDER, FOR SOLUTION INTRAVENOUS at 20:56

## 2024-12-08 RX ADMIN — PANTOPRAZOLE SODIUM 40 MG: 40 INJECTION, POWDER, FOR SOLUTION INTRAVENOUS at 08:33

## 2024-12-08 RX ADMIN — HYDROXYZINE HYDROCHLORIDE 10 MG: 10 TABLET ORAL at 06:22

## 2024-12-08 RX ADMIN — SEVELAMER CARBONATE 800 MG: 800 TABLET, FILM COATED ORAL at 12:08

## 2024-12-08 RX ADMIN — Medication 40 PERCENT: at 10:46

## 2024-12-08 RX ADMIN — INSULIN LISPRO 8 UNITS: 100 INJECTION, SOLUTION INTRAVENOUS; SUBCUTANEOUS at 20:53

## 2024-12-08 RX ADMIN — INSULIN LISPRO 4 UNITS: 100 INJECTION, SOLUTION INTRAVENOUS; SUBCUTANEOUS at 12:07

## 2024-12-08 RX ADMIN — PIPERACILLIN SODIUM AND TAZOBACTAM SODIUM 2.25 G: 2; .25 INJECTION, SOLUTION INTRAVENOUS at 06:33

## 2024-12-08 RX ADMIN — INSULIN LISPRO 5 UNITS: 100 INJECTION, SOLUTION INTRAVENOUS; SUBCUTANEOUS at 08:59

## 2024-12-08 RX ADMIN — SODIUM ZIRCONIUM CYCLOSILICATE 10 G: 10 POWDER, FOR SUSPENSION ORAL at 08:33

## 2024-12-08 ASSESSMENT — PAIN SCALES - GENERAL
PAINLEVEL_OUTOF10: 0 - NO PAIN
PAINLEVEL_OUTOF10: 5 - MODERATE PAIN
PAINLEVEL_OUTOF10: 0 - NO PAIN

## 2024-12-08 ASSESSMENT — COGNITIVE AND FUNCTIONAL STATUS - GENERAL
CLIMB 3 TO 5 STEPS WITH RAILING: TOTAL
DRESSING REGULAR LOWER BODY CLOTHING: TOTAL
MOVING TO AND FROM BED TO CHAIR: TOTAL
DAILY ACTIVITIY SCORE: 6
HELP NEEDED FOR BATHING: TOTAL
PERSONAL GROOMING: TOTAL
DRESSING REGULAR UPPER BODY CLOTHING: TOTAL
STANDING UP FROM CHAIR USING ARMS: TOTAL
WALKING IN HOSPITAL ROOM: TOTAL
MOBILITY SCORE: 6
MOVING FROM LYING ON BACK TO SITTING ON SIDE OF FLAT BED WITH BEDRAILS: TOTAL
TURNING FROM BACK TO SIDE WHILE IN FLAT BAD: TOTAL
TOILETING: TOTAL
EATING MEALS: TOTAL

## 2024-12-08 NOTE — PROGRESS NOTES
Subjective   Had another episode of tachycardia overnight with HR to 130s. She received 150 mg of amiodarone with resolution.  Was uncomfortable this morning and was started on Precedex.     Meds  Scheduled medications  aspirin, 81 mg, oral, Daily  atorvastatin, 80 mg, oral, Nightly  heparin, 80 Units/kg, intravenous, Once  insulin lispro, 0-5 Units, subcutaneous, q4h  lactated Ringer's, 1,000 mL, intravenous, Once  oxygen, , inhalation, Continuous - Inhalation  pantoprazole, 40 mg, intravenous, BID  piperacillin-tazobactam, 2.25 g, intravenous, q8h  sevelamer carbonate, 800 mg, oral, TID  sodium zirconium cyclosilicate, 10 g, oral, TID  thiamine, 100 mg, oral, Daily  vancomycin, 500 mg, intravenous, Once per day on Tuesday Thursday Saturday      Continuous medications  dexmedeTOMIDine, 0.1-1.5 mcg/kg/hr, Last Rate: 0.2 mcg/kg/hr (12/08/24 0742)  [Held by provider] fentaNYL,  mcg/hr, Last Rate: Stopped (12/08/24 0643)  norepinephrine, 0.01-1 mcg/kg/min, Last Rate: Stopped (12/08/24 0643)      PRN medications  PRN medications: acetaminophen **OR** acetaminophen **OR** acetaminophen, alteplase, dextrose, dextrose, fentaNYL, glucagon, glucagon, hydrOXYzine HCL, ipratropium-albuteroL, oxyCODONE, oxygen, polyethylene glycol, sennosides-docusate sodium, vancomycin      Objective   Vital signs in last 24 hours:  Temp:  [35.3 °C (95.5 °F)-36.4 °C (97.5 °F)] 35.3 °C (95.5 °F)  Heart Rate:  [] 134  Resp:  [14-24] 22  BP: (124-135)/(42-57) 135/57  Arterial Line BP 1: ()/(39-90) 121/66  FiO2 (%):  [30 %] 30 %    Intake/Output this shift:    Intake/Output Summary (Last 24 hours) at 12/8/2024 0831  Last data filed at 12/8/2024 0742  Gross per 24 hour   Intake 8914.44 ml   Output 100 ml   Net 8814.44 ml       Physical  General: Patient is awake, uncomfortable, normal body habitus  Pulm: Tachypnea, shallow breaths, no crackles or rhonchi  Cardiac: Regular rate and rhythm, normal S1/S2  Abdomen: Non-tender to  palpation, non-distended. Some dried blood around g-tube site.  Extremities: No peripheral edema  Neuro: Patient alert to voice, can follow basic commands, tries to vocalize and has spontaneous movement    Results  Results for orders placed or performed during the hospital encounter of 11/18/24 (from the past 24 hours)   Prepare RBC: 1 Units   Result Value Ref Range    PRODUCT CODE D6176S84     Unit Number [id number]     Unit ABO O     Unit RH NEG     XM INTEP COMP     Dispense Status IS     Blood Expiration Date 12/24/2024 11:59:00 PM EST     PRODUCT BLOOD TYPE 9500     UNIT VOLUME 350    CBC and Auto Differential   Result Value Ref Range    WBC 11.3 4.4 - 11.3 x10*3/uL    nRBC 0.8 (H) 0.0 - 0.0 /100 WBCs    RBC 2.31 (L) 4.00 - 5.20 x10*6/uL    Hemoglobin 7.1 (L) 12.0 - 16.0 g/dL    Hematocrit 21.3 (L) 36.0 - 46.0 %    MCV 92 80 - 100 fL    MCH 30.7 26.0 - 34.0 pg    MCHC 33.3 32.0 - 36.0 g/dL    RDW 16.3 (H) 11.5 - 14.5 %    Platelets 199 150 - 450 x10*3/uL    Neutrophils % 76.5 40.0 - 80.0 %    Immature Granulocytes %, Automated 0.4 0.0 - 0.9 %    Lymphocytes % 10.3 13.0 - 44.0 %    Monocytes % 11.5 2.0 - 10.0 %    Eosinophils % 0.9 0.0 - 6.0 %    Basophils % 0.4 0.0 - 2.0 %    Neutrophils Absolute 8.65 (H) 1.20 - 7.70 x10*3/uL    Immature Granulocytes Absolute, Automated 0.05 0.00 - 0.70 x10*3/uL    Lymphocytes Absolute 1.16 (L) 1.20 - 4.80 x10*3/uL    Monocytes Absolute 1.30 (H) 0.10 - 1.00 x10*3/uL    Eosinophils Absolute 0.10 0.00 - 0.70 x10*3/uL    Basophils Absolute 0.04 0.00 - 0.10 x10*3/uL   POCT GLUCOSE   Result Value Ref Range    POCT Glucose 242 (H) 74 - 99 mg/dL   POCT GLUCOSE   Result Value Ref Range    POCT Glucose 278 (H) 74 - 99 mg/dL   CBC and Auto Differential   Result Value Ref Range    WBC 13.1 (H) 4.4 - 11.3 x10*3/uL    nRBC 0.7 (H) 0.0 - 0.0 /100 WBCs    RBC 2.45 (L) 4.00 - 5.20 x10*6/uL    Hemoglobin 7.4 (L) 12.0 - 16.0 g/dL    Hematocrit 22.6 (L) 36.0 - 46.0 %    MCV 92 80 - 100 fL     MCH 30.2 26.0 - 34.0 pg    MCHC 32.7 32.0 - 36.0 g/dL    RDW 16.1 (H) 11.5 - 14.5 %    Platelets 224 150 - 450 x10*3/uL    Neutrophils % 77.2 40.0 - 80.0 %    Immature Granulocytes %, Automated 0.6 0.0 - 0.9 %    Lymphocytes % 9.1 13.0 - 44.0 %    Monocytes % 11.4 2.0 - 10.0 %    Eosinophils % 1.3 0.0 - 6.0 %    Basophils % 0.4 0.0 - 2.0 %    Neutrophils Absolute 10.15 (H) 1.20 - 7.70 x10*3/uL    Immature Granulocytes Absolute, Automated 0.08 0.00 - 0.70 x10*3/uL    Lymphocytes Absolute 1.19 (L) 1.20 - 4.80 x10*3/uL    Monocytes Absolute 1.50 (H) 0.10 - 1.00 x10*3/uL    Eosinophils Absolute 0.17 0.00 - 0.70 x10*3/uL    Basophils Absolute 0.05 0.00 - 0.10 x10*3/uL   POCT GLUCOSE   Result Value Ref Range    POCT Glucose 253 (H) 74 - 99 mg/dL   CBC and Auto Differential   Result Value Ref Range    WBC 14.6 (H) 4.4 - 11.3 x10*3/uL    nRBC 0.5 (H) 0.0 - 0.0 /100 WBCs    RBC 2.98 (L) 4.00 - 5.20 x10*6/uL    Hemoglobin 9.2 (L) 12.0 - 16.0 g/dL    Hematocrit 27.7 (L) 36.0 - 46.0 %    MCV 93 80 - 100 fL    MCH 30.9 26.0 - 34.0 pg    MCHC 33.2 32.0 - 36.0 g/dL    RDW 15.5 (H) 11.5 - 14.5 %    Platelets 212 150 - 450 x10*3/uL    Neutrophils % 91.1 40.0 - 80.0 %    Immature Granulocytes %, Automated 0.6 0.0 - 0.9 %    Lymphocytes % 3.5 13.0 - 44.0 %    Monocytes % 4.6 2.0 - 10.0 %    Eosinophils % 0.1 0.0 - 6.0 %    Basophils % 0.1 0.0 - 2.0 %    Neutrophils Absolute 13.30 (H) 1.20 - 7.70 x10*3/uL    Immature Granulocytes Absolute, Automated 0.09 0.00 - 0.70 x10*3/uL    Lymphocytes Absolute 0.51 (L) 1.20 - 4.80 x10*3/uL    Monocytes Absolute 0.67 0.10 - 1.00 x10*3/uL    Eosinophils Absolute 0.01 0.00 - 0.70 x10*3/uL    Basophils Absolute 0.02 0.00 - 0.10 x10*3/uL   POCT GLUCOSE   Result Value Ref Range    POCT Glucose 242 (H) 74 - 99 mg/dL   Renal function panel   Result Value Ref Range    Glucose 265 (H) 74 - 99 mg/dL    Sodium 127 (L) 136 - 145 mmol/L    Potassium 4.4 3.5 - 5.3 mmol/L    Chloride 91 (L) 98 - 107 mmol/L     Bicarbonate 23 21 - 32 mmol/L    Anion Gap 17 10 - 20 mmol/L    Urea Nitrogen 36 (H) 6 - 23 mg/dL    Creatinine 4.95 (H) 0.50 - 1.05 mg/dL    eGFR 9 (L) >60 mL/min/1.73m*2    Calcium 6.9 (L) 8.6 - 10.6 mg/dL    Phosphorus 5.4 (H) 2.5 - 4.9 mg/dL    Albumin 2.2 (L) 3.4 - 5.0 g/dL   Magnesium   Result Value Ref Range    Magnesium 1.89 1.60 - 2.40 mg/dL   Type and screen   Result Value Ref Range    ABO TYPE AB     Rh TYPE POS     ANTIBODY SCREEN POS    Cortisol AM   Result Value Ref Range    Cortisol  A.M. >150.0 (H) 5.0 - 20.0 ug/dL   POCT GLUCOSE   Result Value Ref Range    POCT Glucose 267 (H) 74 - 99 mg/dL   BLOOD GAS ARTERIAL FULL PANEL   Result Value Ref Range    POCT pH, Arterial 7.29 (L) 7.38 - 7.42 pH    POCT pCO2, Arterial 36 (L) 38 - 42 mm Hg    POCT pO2, Arterial 59 (L) 85 - 95 mm Hg    POCT SO2, Arterial 89 (L) 94 - 100 %    POCT Oxy Hemoglobin, Arterial 86.8 (L) 94.0 - 98.0 %    POCT Hematocrit Calculated, Arterial 28.0 (L) 36.0 - 46.0 %    POCT Sodium, Arterial 124 (L) 136 - 145 mmol/L    POCT Potassium, Arterial 4.8 3.5 - 5.3 mmol/L    POCT Chloride, Arterial 92 (L) 98 - 107 mmol/L    POCT Ionized Calcium, Arterial 0.99 (L) 1.10 - 1.33 mmol/L    POCT Glucose, Arterial 366 (H) 74 - 99 mg/dL    POCT Lactate, Arterial 4.5 (HH) 0.4 - 2.0 mmol/L    POCT Base Excess, Arterial -8.5 (L) -2.0 - 3.0 mmol/L    POCT HCO3 Calculated, Arterial 17.3 (L) 22.0 - 26.0 mmol/L    POCT Hemoglobin, Arterial 9.3 (L) 12.0 - 16.0 g/dL    POCT Anion Gap, Arterial 20 10 - 25 mmo/L    Patient Temperature 37.0 degrees Celsius    FiO2 30 %       Imaging  XR chest 1 view    Result Date: 12/7/2024  Interpreted By:  Vernon Santos, STUDY: XR CHEST 1 VIEW; 12/7/2024 7:17 am   INDICATION: Signs/Symptoms:intubated.   COMPARISON: Radiograph dated 12/06/2024   ACCESSION NUMBER(S): [id number]   ORDERING CLINICIAN: BASSEM LEWIS   FINDINGS: Tracheostomy cannula is unchanged. Interval removal of left IJ central venous catheter.  Right IJ central venous catheter is unchanged.   Status post median sternotomy. Cardiac silhouette size is within normal limits. Calcification of the aortic knob.   Interval improvement in the aeration of the lungs with residual bibasilar pleural effusion and atelectasis. No sizable pneumothorax.   No acute osseous abnormality.       Interval improvement in the aeration of the lungs with residual bibasilar pleural effusion and atelectasis.     Signed by: Vernon Franklin 12/7/2024 9:14 AM Dictation workstation:   [id number]    XR chest 1 view    Result Date: 12/7/2024  Interpreted By:  Vernon Santos,  and Serena Osborne STUDY: XR CHEST 1 VIEW;  12/6/2024 9:14 pm   INDICATION: Signs/Symptoms:Central line placement.   COMPARISON: Single-view chest 12/06/2024   ACCESSION NUMBER(S): [id number]   ORDERING CLINICIAN: BASSEM LEWIS   FINDINGS: AP radiograph of the chest was provided.   Interval placement of tracheostomy cannula. Right internal jugular central venous catheter tip projects over the expected location of the lower SVC. Left internal jugular central venous catheter tip projects over the expected location of the lower SVC. Sternal cerclage wires are intact.   CARDIOMEDIASTINAL SILHOUETTE: Cardiomediastinal silhouette is stable in size and configuration. Aortic knob calcifications are noted.   LUNGS: Similar hazy opacity of the left-greater-than-right lung base. Intervally worsened hilar and interstitial opacities. No pneumothorax.   ABDOMEN: No remarkable upper abdominal findings.   BONES: No acute osseous changes.       1.  Bilateral internal jugular central venous catheters terminate over the lower SVC. Interval placement of a tracheostomy cannula. 2. Similar hazy opacities of the left-greater-than-right lung bases, likely representing layering pleural effusions. Cannot exclude underlying infiltrate or infection.   I personally reviewed the image(s)/study and resident interpretation. I  agree with the findings as stated by resident Dylon Lyons. Data analyzed and images interpreted at [facility], [place], [place].   MACRO: None   Signed by: Vernon Franklin 12/7/2024 9:13 AM Dictation workstation:   [id number]        Assessment/Plan   Principal Problem:    Dissection of aorta  Active Problems:    ESRD on hemodialysis (Multi)    Ruptured aortic aneurysm (Multi)    Anemia due to acute blood loss    Cardiac arrest    64 y.o. female with history of pancreatitis, DVT/PE, HLD, HTN, CABGx4 1997, T2DM, GERD, PAD, chronic mesenteric ischemia, tobacco use who presented to [facility] on [facility]/18/[facility] as a transfer from [facility] with chest, back, and abdominal pain. Now post PEA arrest, with intubation/sedation. Family agreed for trach/PEG and LTAC. Patient had septic episode 12/6 overnight and underwent trach/PEG.      Updates:  -Stress dose steroids stopped  -Another episode of Afib RVR this morning, s/p amiodarone  -Added precedex for sedation to treat patient anxiety  -Levo down to 0.06 (Systolic>90 or MAP>60)  -Plan for TDC on Monday with dialysis afterwards. Stop tube feeds at midnight.  -Hyperglycemic to 400s, changed to SSI2 from SSI1    Neuro:  #Sedation/anxiety  -Precedex drip     Cardiac:  #Shock, likely septic in origin  -Continue levophed -> currently at 0.06  -Stress dose steroids stopped  -No organisms seen on respiratory cultures  -WBC count up to 14.6 (s/p steroids)  -Blood culture NGTD at 2 day  -Will complete 5 day course if lines assumed to be source     #Aflutter/Afib with RVR  #Driven by anxiety vs hypovolemia  ::now sinus tachycardia  -HR in 160-180s on 12/5 am  -s/p one dose of digoxin 250 mcg (digoxin is not dialyzable)  -s/p amio bolus 12/6 for RVR  -Precedex for anxiety, hypovolemia treated with blood transfusions yesterday  -Not on anticoagulation due to upcoming procedure     #PEA arrest 11/20  ::likely hypoxia  driven: PNA vs aspiration     #Aortic dissection  #Mural Thrombus  #Distal aortic arch saccular aneurysm   ::CTA CAP 11/18- 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   -Vascular surgery discussed case at aortic conference and there are no plans for surgery, palliative measures recommended     #NSTEMI  #CAD s/p CABGx4 1997  #PAD  #HTN #DLD  ::EKG- NSR, rate 71, TWI aVL, V1-V6, 1mm ORQUIDEA in aVR  ::Trop peak 5700  ::Lipid HDL 41.5, , TG 75, Chol 157  -Holding home amlodipine 10mg, imdur 30mg, lisinopril 2.5mg, metop tartrate 50mg bid iso hypotension   -Continue ASA and atorvastatin  -Holding plavix 75mg. Can resume after all procedures are done (TDC, trach/PEG)    #HFrEF (45-50%, 11/19/24)  -Not on GDMT at the moment      Pulm:  #Acute hypoxic respiratory failure, post-arrest  :s/p Zosyn 11/20 - 11/26 for PNA  :: s/p Vancomycin   -Failed extubation on 11/28  - On trach with vent     GI:  #Ischemic hepatitis secondary to PEA arrest, improving  ::likely 2/2 hypoxia and hypotension  -LFTs downtrending     Renal:  #TRACY on CKD likely 2/2 arrest  #Anuric  -iHD planned after TDC tomorrow    #Hyponatremia  -Decrease free water flushes from Q4 to Q6     Endo:   #T2DM  -Hold home metformin   -POC Glucose: 214-365  -Increase to SSI2 from SSI1     #Tube Feeds  :: goal, 20 ml/hr  -f/up nutrition  -Currently at goal     Infectious  #sepsis  -fu blood and sputum culutre  -cw vanc zosyn (12/6- 12/11)     F: None  E: K>4, Mag>2  N: TF  G: pantoprazole  A: PIV, OG, [place]  DVT: SCDs  CODE: DNR/okay to intubate (confirmed with family 11/21)  NOK: Daughter Josy [phone]        LOS: 20 days     Jaime Mckeon MD/PhD   PGY-2

## 2024-12-08 NOTE — PROGRESS NOTES
Humaira Huang   64 joseoNga    @@  N/Room: [id number]/14/14-A admitted to CICU for Aortic dissection.    Subjective:   intubated at 40% Fio2, tach and PEG     Meds:   aspirin, 81 mg, Daily  atorvastatin, 80 mg, Nightly  heparin, 80 Units/kg, Once  insulin lispro, 0-5 Units, q4h  lactated Ringer's, 1,000 mL, Once  oxygen, , Continuous - Inhalation  pantoprazole, 40 mg, BID  piperacillin-tazobactam, 2.25 g, q8h  sevelamer carbonate, 800 mg, TID  thiamine, 100 mg, Daily  vancomycin, 500 mg, Once per day on Tuesday Thursday Saturday      dexmedeTOMIDine, Last Rate: Stopped (12/08/24 0900)  [Held by provider] fentaNYL, Last Rate: Stopped (12/08/24 0643)  norepinephrine, Last Rate: 0.08 mcg/kg/min (12/08/24 0900)      acetaminophen, 650 mg, q4h PRN   Or  acetaminophen, 650 mg, q4h PRN   Or  acetaminophen, 650 mg, q4h PRN  alteplase, 2 mg, PRN  dextrose, 12.5 g, q15 min PRN  dextrose, 25 g, q15 min PRN  fentaNYL, 25 mcg, q4h PRN  glucagon, 1 mg, q15 min PRN  glucagon, 1 mg, q15 min PRN  hydrOXYzine HCL, 10 mg, q6h PRN  ipratropium-albuteroL, 3 mL, q6h PRN  oxyCODONE, 2.5 mg, q4h PRN  oxygen, , Continuous PRN - O2/gases  polyethylene glycol, 17 g, Daily PRN  sennosides-docusate sodium, 1 tablet, Nightly PRN  vancomycin, , Daily PRN      OBJECTIVE:    Vitals:    12/08/24 1000   BP:    Pulse: 73   Resp: 17   Temp:    SpO2: 98%          Intake/Output Summary (Last 24 hours) at 12/8/2024 1031  Last data filed at 12/8/2024 0845  Gross per 24 hour   Intake 8923.55 ml   Output 100 ml   Net 8823.55 ml       General appearance: Tach  Eyes: non-icteric  Skin: no apparent rash  Heart: [id number]  Lungs: CTA bilat no wheezing/crackles  Abdomen: soft, nt/nd  Extremities: trace edema  Access: None    Blood Labs:  Results for orders placed or performed during the hospital encounter of 11/18/24 (from the past 24 hours)   CBC and Auto Differential   Result Value Ref Range    WBC 11.3 4.4 - 11.3 x10*3/uL    nRBC 0.8 (H) 0.0 - 0.0 /100 WBCs     RBC 2.31 (L) 4.00 - 5.20 x10*6/uL    Hemoglobin 7.1 (L) 12.0 - 16.0 g/dL    Hematocrit 21.3 (L) 36.0 - 46.0 %    MCV 92 80 - 100 fL    MCH 30.7 26.0 - 34.0 pg    MCHC 33.3 32.0 - 36.0 g/dL    RDW 16.3 (H) 11.5 - 14.5 %    Platelets 199 150 - 450 x10*3/uL    Neutrophils % 76.5 40.0 - 80.0 %    Immature Granulocytes %, Automated 0.4 0.0 - 0.9 %    Lymphocytes % 10.3 13.0 - 44.0 %    Monocytes % 11.5 2.0 - 10.0 %    Eosinophils % 0.9 0.0 - 6.0 %    Basophils % 0.4 0.0 - 2.0 %    Neutrophils Absolute 8.65 (H) 1.20 - 7.70 x10*3/uL    Immature Granulocytes Absolute, Automated 0.05 0.00 - 0.70 x10*3/uL    Lymphocytes Absolute 1.16 (L) 1.20 - 4.80 x10*3/uL    Monocytes Absolute 1.30 (H) 0.10 - 1.00 x10*3/uL    Eosinophils Absolute 0.10 0.00 - 0.70 x10*3/uL    Basophils Absolute 0.04 0.00 - 0.10 x10*3/uL   POCT GLUCOSE   Result Value Ref Range    POCT Glucose 242 (H) 74 - 99 mg/dL   POCT GLUCOSE   Result Value Ref Range    POCT Glucose 278 (H) 74 - 99 mg/dL   CBC and Auto Differential   Result Value Ref Range    WBC 13.1 (H) 4.4 - 11.3 x10*3/uL    nRBC 0.7 (H) 0.0 - 0.0 /100 WBCs    RBC 2.45 (L) 4.00 - 5.20 x10*6/uL    Hemoglobin 7.4 (L) 12.0 - 16.0 g/dL    Hematocrit 22.6 (L) 36.0 - 46.0 %    MCV 92 80 - 100 fL    MCH 30.2 26.0 - 34.0 pg    MCHC 32.7 32.0 - 36.0 g/dL    RDW 16.1 (H) 11.5 - 14.5 %    Platelets 224 150 - 450 x10*3/uL    Neutrophils % 77.2 40.0 - 80.0 %    Immature Granulocytes %, Automated 0.6 0.0 - 0.9 %    Lymphocytes % 9.1 13.0 - 44.0 %    Monocytes % 11.4 2.0 - 10.0 %    Eosinophils % 1.3 0.0 - 6.0 %    Basophils % 0.4 0.0 - 2.0 %    Neutrophils Absolute 10.15 (H) 1.20 - 7.70 x10*3/uL    Immature Granulocytes Absolute, Automated 0.08 0.00 - 0.70 x10*3/uL    Lymphocytes Absolute 1.19 (L) 1.20 - 4.80 x10*3/uL    Monocytes Absolute 1.50 (H) 0.10 - 1.00 x10*3/uL    Eosinophils Absolute 0.17 0.00 - 0.70 x10*3/uL    Basophils Absolute 0.05 0.00 - 0.10 x10*3/uL   POCT GLUCOSE   Result Value Ref Range    POCT  Glucose 253 (H) 74 - 99 mg/dL   CBC and Auto Differential   Result Value Ref Range    WBC 14.6 (H) 4.4 - 11.3 x10*3/uL    nRBC 0.5 (H) 0.0 - 0.0 /100 WBCs    RBC 2.98 (L) 4.00 - 5.20 x10*6/uL    Hemoglobin 9.2 (L) 12.0 - 16.0 g/dL    Hematocrit 27.7 (L) 36.0 - 46.0 %    MCV 93 80 - 100 fL    MCH 30.9 26.0 - 34.0 pg    MCHC 33.2 32.0 - 36.0 g/dL    RDW 15.5 (H) 11.5 - 14.5 %    Platelets 212 150 - 450 x10*3/uL    Neutrophils % 91.1 40.0 - 80.0 %    Immature Granulocytes %, Automated 0.6 0.0 - 0.9 %    Lymphocytes % 3.5 13.0 - 44.0 %    Monocytes % 4.6 2.0 - 10.0 %    Eosinophils % 0.1 0.0 - 6.0 %    Basophils % 0.1 0.0 - 2.0 %    Neutrophils Absolute 13.30 (H) 1.20 - 7.70 x10*3/uL    Immature Granulocytes Absolute, Automated 0.09 0.00 - 0.70 x10*3/uL    Lymphocytes Absolute 0.51 (L) 1.20 - 4.80 x10*3/uL    Monocytes Absolute 0.67 0.10 - 1.00 x10*3/uL    Eosinophils Absolute 0.01 0.00 - 0.70 x10*3/uL    Basophils Absolute 0.02 0.00 - 0.10 x10*3/uL   POCT GLUCOSE   Result Value Ref Range    POCT Glucose 242 (H) 74 - 99 mg/dL   Renal function panel   Result Value Ref Range    Glucose 265 (H) 74 - 99 mg/dL    Sodium 127 (L) 136 - 145 mmol/L    Potassium 4.4 3.5 - 5.3 mmol/L    Chloride 91 (L) 98 - 107 mmol/L    Bicarbonate 23 21 - 32 mmol/L    Anion Gap 17 10 - 20 mmol/L    Urea Nitrogen 36 (H) 6 - 23 mg/dL    Creatinine 4.95 (H) 0.50 - 1.05 mg/dL    eGFR 9 (L) >60 mL/min/1.73m*2    Calcium 6.9 (L) 8.6 - 10.6 mg/dL    Phosphorus 5.4 (H) 2.5 - 4.9 mg/dL    Albumin 2.2 (L) 3.4 - 5.0 g/dL   Magnesium   Result Value Ref Range    Magnesium 1.89 1.60 - 2.40 mg/dL   Type and screen   Result Value Ref Range    ABO TYPE AB     Rh TYPE POS     ANTIBODY SCREEN POS    Cortisol AM   Result Value Ref Range    Cortisol  A.M. >150.0 (H) 5.0 - 20.0 ug/dL   Direct Antiglobulin Test   Result Value Ref Range    RAFA-POLYSPECIFIC NEG    POCT GLUCOSE   Result Value Ref Range    POCT Glucose 267 (H) 74 - 99 mg/dL   BLOOD GAS ARTERIAL FULL PANEL    Result Value Ref Range    POCT pH, Arterial 7.29 (L) 7.38 - 7.42 pH    POCT pCO2, Arterial 36 (L) 38 - 42 mm Hg    POCT pO2, Arterial 59 (L) 85 - 95 mm Hg    POCT SO2, Arterial 89 (L) 94 - 100 %    POCT Oxy Hemoglobin, Arterial 86.8 (L) 94.0 - 98.0 %    POCT Hematocrit Calculated, Arterial 28.0 (L) 36.0 - 46.0 %    POCT Sodium, Arterial 124 (L) 136 - 145 mmol/L    POCT Potassium, Arterial 4.8 3.5 - 5.3 mmol/L    POCT Chloride, Arterial 92 (L) 98 - 107 mmol/L    POCT Ionized Calcium, Arterial 0.99 (L) 1.10 - 1.33 mmol/L    POCT Glucose, Arterial 366 (H) 74 - 99 mg/dL    POCT Lactate, Arterial 4.5 (HH) 0.4 - 2.0 mmol/L    POCT Base Excess, Arterial -8.5 (L) -2.0 - 3.0 mmol/L    POCT HCO3 Calculated, Arterial 17.3 (L) 22.0 - 26.0 mmol/L    POCT Hemoglobin, Arterial 9.3 (L) 12.0 - 16.0 g/dL    POCT Anion Gap, Arterial 20 10 - 25 mmo/L    Patient Temperature 37.0 degrees Celsius    FiO2 30 %   Blood Gas Arterial Full Panel   Result Value Ref Range    POCT pH, Arterial 7.34 (L) 7.38 - 7.42 pH    POCT pCO2, Arterial 36 (L) 38 - 42 mm Hg    POCT pO2, Arterial 160 (H) 85 - 95 mm Hg    POCT SO2, Arterial 100 94 - 100 %    POCT Oxy Hemoglobin, Arterial 97.1 94.0 - 98.0 %    POCT Hematocrit Calculated, Arterial 26.0 (L) 36.0 - 46.0 %    POCT Sodium, Arterial 122 (L) 136 - 145 mmol/L    POCT Potassium, Arterial 4.8 3.5 - 5.3 mmol/L    POCT Chloride, Arterial 92 (L) 98 - 107 mmol/L    POCT Ionized Calcium, Arterial 0.97 (L) 1.10 - 1.33 mmol/L    POCT Glucose, Arterial 364 (H) 74 - 99 mg/dL    POCT Lactate, Arterial 1.3 0.4 - 2.0 mmol/L    POCT Base Excess, Arterial -5.8 (L) -2.0 - 3.0 mmol/L    POCT HCO3 Calculated, Arterial 19.4 (L) 22.0 - 26.0 mmol/L    POCT Hemoglobin, Arterial 8.8 (L) 12.0 - 16.0 g/dL    POCT Anion Gap, Arterial 15 10 - 25 mmo/L    Patient Temperature 37.0 degrees Celsius    FiO2 60 %   POCT GLUCOSE   Result Value Ref Range    POCT Glucose 365 (H) 74 - 99 mg/dL      US KUB 11/26  IMPRESSION:  1. Increased  renal cortical echogenicity and lobular appearance of the kidneys bilaterally, likely medical renal disease, with relatively atrophic left kidney. No hydronephrosis.  2. Partially visualized bilateral pleural effusions and trace  abdominal ascites.        ASSESSMENT:  Humaira Huang is a  64 y.o.  Year old , with PMHx of  pancreatitis, DVT/PE, HLD, HTN, CABGx4 1997, T2DM, GERD, PAD, chronic mesenteric ischemia, tobacco use who presented to [facility] on 11/18/2024 as a transfer from [facility] with chest, back, and abdominal pain. 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 Vascular and Cardiac surgery following. CICU course c/b PEA arrest 11/20, now intubated. Nephrology following for TRACY.     #anuric TRACY-D Baseline creatinine 1.5   - Hemodynamics-not on any pressor support  - Etiology :TRACY likely in setting recent hemodynamic insult with PEA.  - 110 ml in bladder scans 12/5  - last SLED 12/1 night  - No access for RRT      RECOMMENDATIONS:  - No absolute indication for RRT on assessment.  - Planned for TDC tomorrow, then iHD after TDC.  - Continue TTS HD.  - bladder scan BID please  - Strict I/Os.  - No contrast or nephrotoxic drugs if possible.      Jair Castaneda MD  Nephrology Fellow   Daytime / Weekend Renal Pager [phone]  After 7 pm Emergencies [phone] Pager [phone]

## 2024-12-08 NOTE — CARE PLAN
The patient's goals for the shift include      The clinical goals for the shift include pt. will have a HR between  during shift and remain HDS

## 2024-12-08 NOTE — CARE PLAN
Problem: Pain - Adult  Goal: Verbalizes/displays adequate comfort level or baseline comfort level  Outcome: Progressing     Problem: Safety - Adult  Goal: Free from fall injury  Outcome: Progressing     Problem: Discharge Planning  Goal: Discharge to home or other facility with appropriate resources  Outcome: Progressing     Problem: Chronic Conditions and Co-morbidities  Goal: Patient's chronic conditions and co-morbidity symptoms are monitored and maintained or improved  Outcome: Progressing     Problem: Skin  Goal: Participates in plan/prevention/treatment measures  Outcome: Progressing  Flowsheets (Taken 12/8/2024 1113)  Participates in plan/prevention/treatment measures:   Elevate heels   Discuss with provider PT/OT consult  Goal: Prevent/manage excess moisture  Outcome: Progressing  Flowsheets (Taken 12/8/2024 1113)  Prevent/manage excess moisture:   Monitor for/manage infection if present   Moisturize dry skin  Goal: Prevent/minimize sheer/friction injuries  Outcome: Progressing  Flowsheets (Taken 12/8/2024 1113)  Prevent/minimize sheer/friction injuries:   Complete micro-shifts as needed if patient unable. Adjust patient position to relieve pressure points, not a full turn   Increase activity/out of bed for meals   Use pull sheet   HOB 30 degrees or less   Turn/reposition every 2 hours/use positioning/transfer devices  Goal: Decreased wound size/increased tissue granulation at next dressing change  Outcome: Progressing  Flowsheets (Taken 12/8/2024 1113)  Decreased wound size/increased tissue granulation at next dressing change:   Protective dressings over bony prominences   Promote sleep for wound healing  Goal: Promote/optimize nutrition  Outcome: Progressing  Flowsheets (Taken 12/8/2024 1113)  Promote/optimize nutrition: Monitor/record intake including meals  Goal: Promote skin healing  Outcome: Progressing  Flowsheets (Taken 12/8/2024 1113)  Promote skin healing:   Turn/reposition every 2 hours/use  positioning/transfer devices   Protective dressings over bony prominences   Assess skin/pad under line(s)/device(s)   Ensure correct size (line/device) and apply per  instructions     Problem: Safety - Medical Restraint  Goal: Remains free of injury from restraints (Restraint for Interference with Medical Device)  Outcome: Progressing  Flowsheets (Taken 12/5/2024 2248 by Elliott Olmedo RN)  Remains free of injury from restraints (restraint for interference with medical device): Every 2 hours: Monitor safety, psychosocial status, comfort, nutrition and hydration  Goal: Free from restraint(s) (Restraint for Interference with Medical Device)  Outcome: Progressing  Flowsheets (Taken 12/5/2024 2248 by Elliott Olmedo RN)  Free from restraint(s) (restraint for interference with medical device): ONCE/SHIFT or MINIMUM Every 12 hours: Assess and document the continuing need for restraints     Problem: Mechanical Ventilation  Goal: Patient Will Maintain Patent Airway  Outcome: Progressing  Goal: Oral health is maintained or improved  Outcome: Progressing  Goal: Tracheostomy will be managed safely  Outcome: Progressing  Goal: ET tube will be managed safely  Outcome: Progressing  Goal: Ability to express needs and understand communication  Outcome: Progressing  Goal: Mobility/activity is maintained at optimum level for patient  Outcome: Progressing     Problem: Knowledge Deficit  Goal: Patient/family/caregiver demonstrates understanding of disease process, treatment plan, medications, and discharge instructions  Outcome: Progressing

## 2024-12-09 ENCOUNTER — APPOINTMENT (OUTPATIENT)
Dept: VASCULAR MEDICINE | Facility: HOSPITAL | Age: 64
DRG: 004 | End: 2024-12-09
Payer: MEDICARE

## 2024-12-09 LAB
ALBUMIN SERPL BCP-MCNC: 2.3 G/DL (ref 3.4–5)
ANION GAP BLDA CALCULATED.4IONS-SCNC: 15 MMO/L (ref 10–25)
ANION GAP SERPL CALC-SCNC: 22 MMOL/L (ref 10–20)
ATRIAL RATE: 163 BPM
ATRIAL RATE: 234 BPM
ATRIAL RATE: 326 BPM
ATRIAL RATE: 84 BPM
ATRIAL RATE: 86 BPM
BACTERIA SPEC RESP CULT: ABNORMAL
BASE EXCESS BLDA CALC-SCNC: -3.2 MMOL/L (ref -2–3)
BASOPHILS # BLD AUTO: 0.01 X10*3/UL (ref 0–0.1)
BASOPHILS NFR BLD AUTO: 0.1 %
BB ANTIBODY IDENTIFICATION: NORMAL
BB ANTIBODY IDENTIFICATION: NORMAL
BODY SURFACE AREA: 1.72 M2
BODY TEMPERATURE: 37 DEGREES CELSIUS
BUN SERPL-MCNC: 48 MG/DL (ref 6–23)
CA-I BLDA-SCNC: 1.01 MMOL/L (ref 1.1–1.33)
CALCIUM SERPL-MCNC: 7 MG/DL (ref 8.6–10.6)
CASE #: NORMAL
CASE #: NORMAL
CHLORIDE BLDA-SCNC: 91 MMOL/L (ref 98–107)
CHLORIDE SERPL-SCNC: 90 MMOL/L (ref 98–107)
CO2 SERPL-SCNC: 20 MMOL/L (ref 21–32)
CREAT SERPL-MCNC: 6.37 MG/DL (ref 0.5–1.05)
EGFRCR SERPLBLD CKD-EPI 2021: 7 ML/MIN/1.73M*2
EJECTION FRACTION: 63 %
EOSINOPHIL # BLD AUTO: 0.03 X10*3/UL (ref 0–0.7)
EOSINOPHIL NFR BLD AUTO: 0.2 %
ERYTHROCYTE [DISTWIDTH] IN BLOOD BY AUTOMATED COUNT: 14.6 % (ref 11.5–14.5)
ERYTHROCYTE [DISTWIDTH] IN BLOOD BY AUTOMATED COUNT: 14.9 % (ref 11.5–14.5)
FOLATE SERPL-MCNC: 9.2 NG/ML
GLUCOSE BLD MANUAL STRIP-MCNC: 119 MG/DL (ref 74–99)
GLUCOSE BLD MANUAL STRIP-MCNC: 139 MG/DL (ref 74–99)
GLUCOSE BLD MANUAL STRIP-MCNC: 162 MG/DL (ref 74–99)
GLUCOSE BLD MANUAL STRIP-MCNC: 169 MG/DL (ref 74–99)
GLUCOSE BLD MANUAL STRIP-MCNC: 178 MG/DL (ref 74–99)
GLUCOSE BLD MANUAL STRIP-MCNC: 218 MG/DL (ref 74–99)
GLUCOSE BLD MANUAL STRIP-MCNC: 226 MG/DL (ref 74–99)
GLUCOSE BLDA-MCNC: 245 MG/DL (ref 74–99)
GLUCOSE SERPL-MCNC: 217 MG/DL (ref 74–99)
GRAM STN SPEC: ABNORMAL
GRAM STN SPEC: ABNORMAL
HCO3 BLDA-SCNC: 20.8 MMOL/L (ref 22–26)
HCT VFR BLD AUTO: 21.6 % (ref 36–46)
HCT VFR BLD AUTO: 21.7 % (ref 36–46)
HCT VFR BLD EST: 25 % (ref 36–46)
HGB BLD-MCNC: 7.6 G/DL (ref 12–16)
HGB BLD-MCNC: 7.6 G/DL (ref 12–16)
HGB BLDA-MCNC: 8.3 G/DL (ref 12–16)
HGB RETIC QN: 30 PG (ref 28–38)
IMM GRANULOCYTES # BLD AUTO: 0.06 X10*3/UL (ref 0–0.7)
IMM GRANULOCYTES NFR BLD AUTO: 0.4 % (ref 0–0.9)
IMMATURE RETIC FRACTION: 36.4 %
INHALED O2 CONCENTRATION: 40 %
LACTATE BLDA-SCNC: 1.2 MMOL/L (ref 0.4–2)
LDH SERPL L TO P-CCNC: 254 U/L (ref 84–246)
LYMPHOCYTES # BLD AUTO: 1.04 X10*3/UL (ref 1.2–4.8)
LYMPHOCYTES NFR BLD AUTO: 7.5 %
MAGNESIUM SERPL-MCNC: 2.01 MG/DL (ref 1.6–2.4)
MCH RBC QN AUTO: 30.6 PG (ref 26–34)
MCH RBC QN AUTO: 30.9 PG (ref 26–34)
MCHC RBC AUTO-ENTMCNC: 35 G/DL (ref 32–36)
MCHC RBC AUTO-ENTMCNC: 35.2 G/DL (ref 32–36)
MCV RBC AUTO: 88 FL (ref 80–100)
MCV RBC AUTO: 88 FL (ref 80–100)
MONOCYTES # BLD AUTO: 1.23 X10*3/UL (ref 0.1–1)
MONOCYTES NFR BLD AUTO: 8.9 %
NEUTROPHILS # BLD AUTO: 11.45 X10*3/UL (ref 1.2–7.7)
NEUTROPHILS NFR BLD AUTO: 82.9 %
NRBC BLD-RTO: 0.6 /100 WBCS (ref 0–0)
NRBC BLD-RTO: 0.7 /100 WBCS (ref 0–0)
OXYHGB MFR BLDA: 97.2 % (ref 94–98)
P AXIS: 24 DEGREES
P AXIS: 77 DEGREES
P OFFSET: 200 MS
P OFFSET: 203 MS
P ONSET: 139 MS
P ONSET: 141 MS
PCO2 BLDA: 32 MM HG (ref 38–42)
PH BLDA: 7.42 PH (ref 7.38–7.42)
PHOSPHATE SERPL-MCNC: 4.7 MG/DL (ref 2.5–4.9)
PLATELET # BLD AUTO: 177 X10*3/UL (ref 150–450)
PLATELET # BLD AUTO: 188 X10*3/UL (ref 150–450)
PO2 BLDA: 118 MM HG (ref 85–95)
POTASSIUM BLDA-SCNC: 3.2 MMOL/L (ref 3.5–5.3)
POTASSIUM SERPL-SCNC: 3.3 MMOL/L (ref 3.5–5.3)
PR INTERVAL: 146 MS
PR INTERVAL: 162 MS
Q ONSET: 212 MS
Q ONSET: 214 MS
Q ONSET: 218 MS
Q ONSET: 220 MS
Q ONSET: 222 MS
QRS COUNT: 14 BEATS
QRS COUNT: 15 BEATS
QRS COUNT: 20 BEATS
QRS COUNT: 22 BEATS
QRS COUNT: 27 BEATS
QRS DURATION: 108 MS
QRS DURATION: 116 MS
QRS DURATION: 126 MS
QRS DURATION: 86 MS
QRS DURATION: 94 MS
QT INTERVAL: 306 MS
QT INTERVAL: 308 MS
QT INTERVAL: 318 MS
QT INTERVAL: 320 MS
QT INTERVAL: 350 MS
QTC CALCULATION(BAZETT): 366 MS
QTC CALCULATION(BAZETT): 413 MS
QTC CALCULATION(BAZETT): 452 MS
QTC CALCULATION(BAZETT): 473 MS
QTC CALCULATION(BAZETT): 507 MS
QTC FREDERICIA: 345 MS
QTC FREDERICIA: 391 MS
QTC FREDERICIA: 403 MS
QTC FREDERICIA: 414 MS
QTC FREDERICIA: 429 MS
R AXIS: 2 DEGREES
R AXIS: 29 DEGREES
R AXIS: 31 DEGREES
R AXIS: 40 DEGREES
R AXIS: 46 DEGREES
RBC # BLD AUTO: 2.46 X10*6/UL (ref 4–5.2)
RBC # BLD AUTO: 2.48 X10*6/UL (ref 4–5.2)
RETICS #: 0.12 X10*6/UL (ref 0.02–0.11)
RETICS/RBC NFR AUTO: 4.8 % (ref 0.5–2)
SAO2 % BLDA: 100 % (ref 94–100)
SODIUM BLDA-SCNC: 124 MMOL/L (ref 136–145)
SODIUM SERPL-SCNC: 129 MMOL/L (ref 136–145)
T AXIS: 190 DEGREES
T AXIS: 21 DEGREES
T AXIS: 224 DEGREES
T AXIS: 244 DEGREES
T AXIS: 250 DEGREES
T OFFSET: 365 MS
T OFFSET: 372 MS
T OFFSET: 374 MS
T OFFSET: 379 MS
T OFFSET: 397 MS
VENTRICULAR RATE: 120 BPM
VENTRICULAR RATE: 133 BPM
VENTRICULAR RATE: 163 BPM
VENTRICULAR RATE: 84 BPM
VENTRICULAR RATE: 86 BPM
VIT B12 SERPL-MCNC: 1370 PG/ML (ref 211–911)
WBC # BLD AUTO: 13.2 X10*3/UL (ref 4.4–11.3)
WBC # BLD AUTO: 13.8 X10*3/UL (ref 4.4–11.3)

## 2024-12-09 PROCEDURE — 85045 AUTOMATED RETICULOCYTE COUNT: CPT

## 2024-12-09 PROCEDURE — 2500000005 HC RX 250 GENERAL PHARMACY W/O HCPCS

## 2024-12-09 PROCEDURE — 82607 VITAMIN B-12: CPT

## 2024-12-09 PROCEDURE — 2500000001 HC RX 250 WO HCPCS SELF ADMINISTERED DRUGS (ALT 637 FOR MEDICARE OP)

## 2024-12-09 PROCEDURE — 99232 SBSQ HOSP IP/OBS MODERATE 35: CPT

## 2024-12-09 PROCEDURE — 93971 EXTREMITY STUDY: CPT

## 2024-12-09 PROCEDURE — 85025 COMPLETE CBC W/AUTO DIFF WBC: CPT

## 2024-12-09 PROCEDURE — 83615 LACTATE (LD) (LDH) ENZYME: CPT

## 2024-12-09 PROCEDURE — 84132 ASSAY OF SERUM POTASSIUM: CPT

## 2024-12-09 PROCEDURE — 2500000002 HC RX 250 W HCPCS SELF ADMINISTERED DRUGS (ALT 637 FOR MEDICARE OP, ALT 636 FOR OP/ED)

## 2024-12-09 PROCEDURE — 2500000004 HC RX 250 GENERAL PHARMACY W/ HCPCS (ALT 636 FOR OP/ED)

## 2024-12-09 PROCEDURE — 83735 ASSAY OF MAGNESIUM: CPT

## 2024-12-09 PROCEDURE — 71045 X-RAY EXAM CHEST 1 VIEW: CPT | Performed by: RADIOLOGY

## 2024-12-09 PROCEDURE — 82746 ASSAY OF FOLIC ACID SERUM: CPT

## 2024-12-09 PROCEDURE — 82947 ASSAY GLUCOSE BLOOD QUANT: CPT

## 2024-12-09 PROCEDURE — 94003 VENT MGMT INPAT SUBQ DAY: CPT

## 2024-12-09 PROCEDURE — 93971 EXTREMITY STUDY: CPT | Performed by: SURGERY

## 2024-12-09 PROCEDURE — 2020000001 HC ICU ROOM DAILY

## 2024-12-09 PROCEDURE — 99291 CRITICAL CARE FIRST HOUR: CPT

## 2024-12-09 PROCEDURE — 74018 RADEX ABDOMEN 1 VIEW: CPT | Performed by: RADIOLOGY

## 2024-12-09 PROCEDURE — 85027 COMPLETE CBC AUTOMATED: CPT

## 2024-12-09 PROCEDURE — 37799 UNLISTED PX VASCULAR SURGERY: CPT

## 2024-12-09 RX ORDER — QUETIAPINE FUMARATE 25 MG/1
25 TABLET, FILM COATED ORAL NIGHTLY
Status: DISCONTINUED | OUTPATIENT
Start: 2024-12-09 | End: 2024-12-10

## 2024-12-09 RX ORDER — ACETAMINOPHEN 325 MG/1
650 TABLET ORAL EVERY 4 HOURS PRN
Status: DISCONTINUED | OUTPATIENT
Start: 2024-12-09 | End: 2024-12-15

## 2024-12-09 RX ORDER — QUETIAPINE FUMARATE 25 MG/1
12.5 TABLET, FILM COATED ORAL ONCE
Status: COMPLETED | OUTPATIENT
Start: 2024-12-09 | End: 2024-12-09

## 2024-12-09 RX ORDER — DICYCLOMINE HYDROCHLORIDE 10 MG/1
10 CAPSULE ORAL 4 TIMES DAILY PRN
Status: DISCONTINUED | OUTPATIENT
Start: 2024-12-09 | End: 2024-12-10

## 2024-12-09 RX ORDER — METOPROLOL TARTRATE 25 MG/1
12.5 TABLET, FILM COATED ORAL EVERY 6 HOURS
Status: DISCONTINUED | OUTPATIENT
Start: 2024-12-09 | End: 2024-12-10

## 2024-12-09 RX ORDER — HYDROXYZINE HYDROCHLORIDE 25 MG/1
25 TABLET, FILM COATED ORAL ONCE
Status: COMPLETED | OUTPATIENT
Start: 2024-12-09 | End: 2024-12-09

## 2024-12-09 RX ORDER — LIDOCAINE 560 MG/1
1 PATCH PERCUTANEOUS; TOPICAL; TRANSDERMAL DAILY
Status: DISCONTINUED | OUTPATIENT
Start: 2024-12-09 | End: 2024-12-18 | Stop reason: HOSPADM

## 2024-12-09 RX ORDER — ACETAMINOPHEN 650 MG/1
650 SUPPOSITORY RECTAL EVERY 4 HOURS PRN
Status: DISCONTINUED | OUTPATIENT
Start: 2024-12-09 | End: 2024-12-15

## 2024-12-09 RX ORDER — HYDROXYZINE HYDROCHLORIDE 25 MG/1
TABLET, FILM COATED ORAL
Status: COMPLETED
Start: 2024-12-09 | End: 2024-12-09

## 2024-12-09 RX ORDER — FOLIC ACID 1 MG/1
1 TABLET ORAL DAILY
Status: DISCONTINUED | OUTPATIENT
Start: 2024-12-09 | End: 2024-12-10

## 2024-12-09 RX ORDER — ACETAMINOPHEN 160 MG/5ML
975 SOLUTION ORAL EVERY 8 HOURS PRN
Status: DISCONTINUED | OUTPATIENT
Start: 2024-12-09 | End: 2024-12-15

## 2024-12-09 RX ORDER — ONDANSETRON HYDROCHLORIDE 2 MG/ML
4 INJECTION, SOLUTION INTRAVENOUS EVERY 6 HOURS PRN
Status: DISCONTINUED | OUTPATIENT
Start: 2024-12-09 | End: 2024-12-18 | Stop reason: HOSPADM

## 2024-12-09 RX ADMIN — ASPIRIN 81 MG CHEWABLE TABLET 81 MG: 81 TABLET CHEWABLE at 08:22

## 2024-12-09 RX ADMIN — METOPROLOL TARTRATE 12.5 MG: 25 TABLET, FILM COATED ORAL at 17:51

## 2024-12-09 RX ADMIN — PIPERACILLIN SODIUM AND TAZOBACTAM SODIUM 2.25 G: 2; .25 INJECTION, SOLUTION INTRAVENOUS at 08:22

## 2024-12-09 RX ADMIN — Medication 40 PERCENT: at 08:23

## 2024-12-09 RX ADMIN — ATORVASTATIN CALCIUM 80 MG: 80 TABLET, FILM COATED ORAL at 20:39

## 2024-12-09 RX ADMIN — SEVELAMER CARBONATE 800 MG: 800 TABLET, FILM COATED ORAL at 17:54

## 2024-12-09 RX ADMIN — INSULIN LISPRO 2 UNITS: 100 INJECTION, SOLUTION INTRAVENOUS; SUBCUTANEOUS at 20:39

## 2024-12-09 RX ADMIN — OXYCODONE HYDROCHLORIDE 2.5 MG: 5 TABLET ORAL at 18:54

## 2024-12-09 RX ADMIN — OXYCODONE HYDROCHLORIDE 2.5 MG: 5 TABLET ORAL at 14:36

## 2024-12-09 RX ADMIN — INSULIN LISPRO 2 UNITS: 100 INJECTION, SOLUTION INTRAVENOUS; SUBCUTANEOUS at 08:26

## 2024-12-09 RX ADMIN — QUETIAPINE FUMARATE 12.5 MG: 25 TABLET ORAL at 10:47

## 2024-12-09 RX ADMIN — PANTOPRAZOLE SODIUM 40 MG: 40 INJECTION, POWDER, FOR SOLUTION INTRAVENOUS at 08:22

## 2024-12-09 RX ADMIN — PANTOPRAZOLE SODIUM 40 MG: 40 INJECTION, POWDER, FOR SOLUTION INTRAVENOUS at 20:39

## 2024-12-09 RX ADMIN — ACETAMINOPHEN 1000 MG: 160 SOLUTION ORAL at 12:28

## 2024-12-09 RX ADMIN — HYDROXYZINE HYDROCHLORIDE 25 MG: 25 TABLET ORAL at 16:09

## 2024-12-09 RX ADMIN — QUETIAPINE FUMARATE 25 MG: 25 TABLET ORAL at 20:39

## 2024-12-09 RX ADMIN — DEXMEDETOMIDINE HYDROCHLORIDE 0.25 MCG/KG/HR: 400 INJECTION INTRAVENOUS at 00:52

## 2024-12-09 RX ADMIN — HYDROXYZINE HYDROCHLORIDE 25 MG: 25 TABLET, FILM COATED ORAL at 16:09

## 2024-12-09 RX ADMIN — LIDOCAINE 4% 1 PATCH: 40 PATCH TOPICAL at 17:54

## 2024-12-09 RX ADMIN — PIPERACILLIN SODIUM AND TAZOBACTAM SODIUM 2.25 G: 2; .25 INJECTION, SOLUTION INTRAVENOUS at 00:23

## 2024-12-09 RX ADMIN — PIPERACILLIN SODIUM AND TAZOBACTAM SODIUM 2.25 G: 2; .25 INJECTION, SOLUTION INTRAVENOUS at 16:47

## 2024-12-09 RX ADMIN — INSULIN LISPRO 4 UNITS: 100 INJECTION, SOLUTION INTRAVENOUS; SUBCUTANEOUS at 00:51

## 2024-12-09 RX ADMIN — THIAMINE HCL TAB 100 MG 100 MG: 100 TAB at 08:22

## 2024-12-09 RX ADMIN — ONDANSETRON 4 MG: 2 INJECTION INTRAMUSCULAR; INTRAVENOUS at 22:06

## 2024-12-09 RX ADMIN — SEVELAMER CARBONATE 800 MG: 800 TABLET, FILM COATED ORAL at 12:28

## 2024-12-09 RX ADMIN — Medication 40 PERCENT: at 07:42

## 2024-12-09 RX ADMIN — SEVELAMER CARBONATE 800 MG: 800 TABLET, FILM COATED ORAL at 08:22

## 2024-12-09 RX ADMIN — INSULIN LISPRO 4 UNITS: 100 INJECTION, SOLUTION INTRAVENOUS; SUBCUTANEOUS at 04:30

## 2024-12-09 RX ADMIN — Medication 40 PERCENT: at 20:00

## 2024-12-09 RX ADMIN — FOLIC ACID 1 MG: 1 TABLET ORAL at 08:22

## 2024-12-09 ASSESSMENT — PAIN - FUNCTIONAL ASSESSMENT

## 2024-12-09 ASSESSMENT — PAIN SCALES - GENERAL
PAINLEVEL_OUTOF10: 3
PAINLEVEL_OUTOF10: 7
PAINLEVEL_OUTOF10: 0 - NO PAIN
PAINLEVEL_OUTOF10: 7
PAINLEVEL_OUTOF10: 7
PAINLEVEL_OUTOF10: 5 - MODERATE PAIN

## 2024-12-09 ASSESSMENT — PAIN SCALES - PAIN ASSESSMENT IN ADVANCED DEMENTIA (PAINAD)
TOTALSCORE: MEDICATION (SEE MAR)
TOTALSCORE: MEDICATION (SEE MAR)

## 2024-12-09 ASSESSMENT — PAIN DESCRIPTION - LOCATION: LOCATION: BACK

## 2024-12-09 ASSESSMENT — PAIN DESCRIPTION - DESCRIPTORS
DESCRIPTORS: ACHING

## 2024-12-09 NOTE — CONSULTS
Wound Care Consult     Visit Date: 12/9/2024      Patient Name: Humaira Huang         MRN: [id number]           YOB: 1960     Reason for Consult: coccyx PI        Wound Assessment:  Wound 11/27/24 Pressure Injury Coccyx (Active)   Wound Image   12/09/24 1613   Site Assessment Dark edges;Fragile;Painful;Red;Purple 12/09/24 1613   Luda-Wound Assessment Non-blanchable erythema 12/09/24 1613   Non-staged Wound Description Not applicable 12/09/24 1613   Pressure Injury Stage DTPI 12/09/24 1613   Shape Round 12/03/24 1008   Wound Length (cm) 4 cm 12/09/24 1613   Wound Width (cm) 4 cm 12/09/24 1613   Wound Surface Area (cm^2) 16 cm^2 12/09/24 1613   Wound Depth (cm) 0 cm 12/03/24 1008   Wound Volume (cm^3) 0 cm^3 12/03/24 1008   Wound Healing % 100 12/03/24 1008   State of Healing Non-healing 12/03/24 1008   Margins Poorly defined 12/05/24 0800   Drainage Description Serosanguineous 12/09/24 1613   Drainage Amount Scant 12/09/24 1613   Dressing Moisture barrier;Silicone border dressing 12/09/24 1613   Dressing Changed New 12/09/24 1613   Dressing Status Dry;Clean;Occlusive 12/09/24 0400       Wound 12/02/24 Back Lateral;Left;Lower (Active)   Wound Image   12/02/24 2150   Drainage Description None 12/09/24 0400   Drainage Amount None 12/08/24 1600   Dressing Open to air 12/09/24 0400   Dressing Changed Changed 12/08/24 0800   Dressing Status Clean;Dry 12/08/24 1600       Wound 12/03/24 Pressure Injury Heel Left (Active)   Wound Image   12/03/24 1001   Site Assessment Dry;Clean;Intact;Red 12/09/24 0400   Luda-Wound Assessment Dry;Clean;Intact 12/09/24 0400   Non-staged Wound Description Not applicable 12/05/24 0800   Pressure Injury Stage DTPI 12/03/24 1001   Shape round 12/03/24 1001   Wound Length (cm) 7 cm 12/03/24 1001   Wound Width (cm) 4.5 cm 12/03/24 1001   Wound Surface Area (cm^2) 31.5 cm^2 12/03/24 1001   Wound Depth (cm) 0 cm 12/03/24 1001   Wound Volume (cm^3) 0 cm^3 12/03/24 1001   State of Healing  Non-healing 12/03/24 1001   Margins Poorly defined 12/05/24 0800   Drainage Description None 12/09/24 0400   Drainage Amount None 12/08/24 1600   Dressing Foam 12/09/24 0400   Dressing Changed Reinforced 12/06/24 0800   Dressing Status Clean;Dry;Occlusive 12/09/24 0400       Wound Team Summary Assessment:   Patient with evolving deep tissue pressure injury to coccyx. Apply Triad wound ointment dressing daily or as needed. Cover with mepilex border dressing. Patient must be turned and repositioned every 2 hours while in bed.   Triad can be applied directly from the tube or by using a gloved finger. Gently spread Triad evenly over the area of application to the thickness of a dime. To remove, Use pH-balanced wound cleanser to soften Triad. Gently wipe to remove without scrubbing. Triad can stay in place for 5-7 days, with higher exudate levels requiring more frequent re-applications. In the perineal area, reapply Triad after each episode of incontinence.         Dottie Kaur RN  12/9/2024  4:14 PM

## 2024-12-09 NOTE — PROGRESS NOTES
Humaira Huang   64 kathleen    @@  N/Room: 30635646/14/14-A admitted to CICU for Aortic dissection.    Subjective:   intubated at 40% Fio2, tach and PEG     Meds:   aspirin, 81 mg, Daily  atorvastatin, 80 mg, Nightly  folic acid, 1 mg, Daily  heparin, 80 Units/kg, Once  insulin lispro, 0-10 Units, q4h  lactated Ringer's, 1,000 mL, Once  oxygen, , Continuous - Inhalation  pantoprazole, 40 mg, BID  piperacillin-tazobactam, 2.25 g, q8h  sevelamer carbonate, 800 mg, TID  thiamine, 100 mg, Daily  vancomycin, 500 mg, Once per day on Tuesday Thursday Saturday      dexmedeTOMIDine, Last Rate: 0.1 mcg/kg/hr (12/09/24 0908)  [Held by provider] fentaNYL, Last Rate: Stopped (12/08/24 0643)  norepinephrine, Last Rate: Stopped (12/09/24 0820)      acetaminophen, 650 mg, q4h PRN   Or  acetaminophen, 650 mg, q4h PRN   Or  acetaminophen, 650 mg, q4h PRN  alteplase, 2 mg, PRN  dextrose, 12.5 g, q15 min PRN  dextrose, 25 g, q15 min PRN  fentaNYL, 25 mcg, q4h PRN  glucagon, 1 mg, q15 min PRN  hydrOXYzine HCL, 10 mg, q6h PRN  ipratropium-albuteroL, 3 mL, q6h PRN  oxyCODONE, 2.5 mg, q4h PRN  oxygen, , Continuous PRN - O2/gases  polyethylene glycol, 17 g, Daily PRN  sennosides-docusate sodium, 1 tablet, Nightly PRN  vancomycin, , Daily PRN      OBJECTIVE:    Vitals:    12/09/24 0823   BP:    Pulse:    Resp:    Temp:    SpO2: 96%          Intake/Output Summary (Last 24 hours) at 12/9/2024 0947  Last data filed at 12/9/2024 0822  Gross per 24 hour   Intake 1121.44 ml   Output 285 ml   Net 836.44 ml       General appearance: Tach  Eyes: non-icteric  Skin: no apparent rash  Heart: [id number]  Lungs: CTA bilat no wheezing/crackles  Abdomen: soft, nt/nd  Extremities: trace edema  Access: None    Blood Labs:  Results for orders placed or performed during the hospital encounter of 11/18/24 (from the past 24 hours)   POCT GLUCOSE   Result Value Ref Range    POCT Glucose 329 (H) 74 - 99 mg/dL   POCT GLUCOSE   Result Value Ref Range    POCT Glucose  433 (H) 74 - 99 mg/dL   POCT GLUCOSE   Result Value Ref Range    POCT Glucose 332 (H) 74 - 99 mg/dL   CBC   Result Value Ref Range    WBC 13.2 (H) 4.4 - 11.3 x10*3/uL    nRBC 0.7 (H) 0.0 - 0.0 /100 WBCs    RBC 2.46 (L) 4.00 - 5.20 x10*6/uL    Hemoglobin 7.6 (L) 12.0 - 16.0 g/dL    Hematocrit 21.6 (L) 36.0 - 46.0 %    MCV 88 80 - 100 fL    MCH 30.9 26.0 - 34.0 pg    MCHC 35.2 32.0 - 36.0 g/dL    RDW 14.6 (H) 11.5 - 14.5 %    Platelets 188 150 - 450 x10*3/uL   POCT GLUCOSE   Result Value Ref Range    POCT Glucose 226 (H) 74 - 99 mg/dL   POCT GLUCOSE   Result Value Ref Range    POCT Glucose 218 (H) 74 - 99 mg/dL   CBC and Auto Differential   Result Value Ref Range    WBC 13.8 (H) 4.4 - 11.3 x10*3/uL    nRBC 0.6 (H) 0.0 - 0.0 /100 WBCs    RBC 2.48 (L) 4.00 - 5.20 x10*6/uL    Hemoglobin 7.6 (L) 12.0 - 16.0 g/dL    Hematocrit 21.7 (L) 36.0 - 46.0 %    MCV 88 80 - 100 fL    MCH 30.6 26.0 - 34.0 pg    MCHC 35.0 32.0 - 36.0 g/dL    RDW 14.9 (H) 11.5 - 14.5 %    Platelets 177 150 - 450 x10*3/uL    Neutrophils % 82.9 40.0 - 80.0 %    Immature Granulocytes %, Automated 0.4 0.0 - 0.9 %    Lymphocytes % 7.5 13.0 - 44.0 %    Monocytes % 8.9 2.0 - 10.0 %    Eosinophils % 0.2 0.0 - 6.0 %    Basophils % 0.1 0.0 - 2.0 %    Neutrophils Absolute 11.45 (H) 1.20 - 7.70 x10*3/uL    Immature Granulocytes Absolute, Automated 0.06 0.00 - 0.70 x10*3/uL    Lymphocytes Absolute 1.04 (L) 1.20 - 4.80 x10*3/uL    Monocytes Absolute 1.23 (H) 0.10 - 1.00 x10*3/uL    Eosinophils Absolute 0.03 0.00 - 0.70 x10*3/uL    Basophils Absolute 0.01 0.00 - 0.10 x10*3/uL   Renal function panel   Result Value Ref Range    Glucose 217 (H) 74 - 99 mg/dL    Sodium 129 (L) 136 - 145 mmol/L    Potassium 3.3 (L) 3.5 - 5.3 mmol/L    Chloride 90 (L) 98 - 107 mmol/L    Bicarbonate 20 (L) 21 - 32 mmol/L    Anion Gap 22 (H) 10 - 20 mmol/L    Urea Nitrogen 48 (H) 6 - 23 mg/dL    Creatinine 6.37 (H) 0.50 - 1.05 mg/dL    eGFR 7 (L) >60 mL/min/1.73m*2    Calcium 7.0 (L) 8.6 -  10.6 mg/dL    Phosphorus 4.7 2.5 - 4.9 mg/dL    Albumin 2.3 (L) 3.4 - 5.0 g/dL   Magnesium   Result Value Ref Range    Magnesium 2.01 1.60 - 2.40 mg/dL   Lactate Dehydrogenase   Result Value Ref Range     (H) 84 - 246 U/L   Folate   Result Value Ref Range    Folate, Serum 9.2 >5.0 ng/mL   Vitamin B12   Result Value Ref Range    Vitamin B12 1,370 (H) 211 - 911 pg/mL   Reticulocytes   Result Value Ref Range    Retic % 4.8 (H) 0.5 - 2.0 %    Retic Absolute 0.119 (H) 0.017 - 0.110 x10*6/uL    Reticulocyte Hemoglobin 30 28 - 38 pg    Immature Retic fraction 36.4 (H) <=16.0 %   BLOOD GAS ARTERIAL FULL PANEL   Result Value Ref Range    POCT pH, Arterial 7.42 7.38 - 7.42 pH    POCT pCO2, Arterial 32 (L) 38 - 42 mm Hg    POCT pO2, Arterial 118 (H) 85 - 95 mm Hg    POCT SO2, Arterial 100 94 - 100 %    POCT Oxy Hemoglobin, Arterial 97.2 94.0 - 98.0 %    POCT Hematocrit Calculated, Arterial 25.0 (L) 36.0 - 46.0 %    POCT Sodium, Arterial 124 (L) 136 - 145 mmol/L    POCT Potassium, Arterial 3.2 (L) 3.5 - 5.3 mmol/L    POCT Chloride, Arterial 91 (L) 98 - 107 mmol/L    POCT Ionized Calcium, Arterial 1.01 (L) 1.10 - 1.33 mmol/L    POCT Glucose, Arterial 245 (H) 74 - 99 mg/dL    POCT Lactate, Arterial 1.2 0.4 - 2.0 mmol/L    POCT Base Excess, Arterial -3.2 (L) -2.0 - 3.0 mmol/L    POCT HCO3 Calculated, Arterial 20.8 (L) 22.0 - 26.0 mmol/L    POCT Hemoglobin, Arterial 8.3 (L) 12.0 - 16.0 g/dL    POCT Anion Gap, Arterial 15 10 - 25 mmo/L    Patient Temperature 37.0 degrees Celsius    FiO2 40 %   POCT GLUCOSE   Result Value Ref Range    POCT Glucose 169 (H) 74 - 99 mg/dL      US KUB 11/26  IMPRESSION:  1. Increased renal cortical echogenicity and lobular appearance of the kidneys bilaterally, likely medical renal disease, with relatively atrophic left kidney. No hydronephrosis.  2. Partially visualized bilateral pleural effusions and trace  abdominal ascites.        ASSESSMENT:  Humaira Huang is a  64 y.o.  Year old , with PMHx  of  pancreatitis, DVT/PE, HLD, HTN, CABGx4 1997, T2DM, GERD, PAD, chronic mesenteric ischemia, tobacco use who presented to [facility] on [facility]/18/2024 as a transfer from [facility] with chest, back, and abdominal pain. 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 Vascular and Cardiac surgery following. CICU course c/b PEA arrest 11/20, now intubated. Nephrology following for TRACY.     #anuric TRACY-D Baseline creatinine 1.5   - Hemodynamics-not on any pressor support  - Etiology :TRACY likely in setting recent hemodynamic insult with PEA.  - 110 ml in bladder scans 12/5  - last SLED 12/1 night then transitioned to iHD      RECOMMENDATIONS:  - initial plan for TDC today, there is no line yet for the patient and likely to get tomorrow as primary team informed, will dialyse tomorrow  - will keep her on TTS schedule  - bladder scan BID please  - Strict I/Os.  - No contrast or nephrotoxic drugs if possible.      Elise Graham MD  Nephrology Fellow   Daytime / Weekend Renal Pager [phone]  After 7 pm Emergencies [phone] Pager [phone]

## 2024-12-09 NOTE — PROGRESS NOTES
Social Work Progress Note  SW spoke with patient's son Seth Richter on the phone and emailed him a list of LTACH facilities. NORBERTO to follow up.   MIGUEL ROCK

## 2024-12-09 NOTE — CARE PLAN
Problem: Safety - Medical Restraint  Goal: Remains free of injury from restraints (Restraint for Interference with Medical Device)  Outcome: Not Progressing  Flowsheets (Taken 12/5/2024 2248 by Elliott Olmedo RN)  Remains free of injury from restraints (restraint for interference with medical device): Every 2 hours: Monitor safety, psychosocial status, comfort, nutrition and hydration  Goal: Free from restraint(s) (Restraint for Interference with Medical Device)  Outcome: Not Progressing  Flowsheets (Taken 12/5/2024 2248 by Elliott Olmedo RN)  Free from restraint(s) (restraint for interference with medical device): ONCE/SHIFT or MINIMUM Every 12 hours: Assess and document the continuing need for restraints    Problem: Safety - Medical Restraint  Goal: Remains free of injury from restraints (Restraint for Interference with Medical Device)  Outcome: Not Progressing  Flowsheets (Taken 12/5/2024 2248 by Elliott Olmedo RN)  Remains free of injury from restraints (restraint for interference with medical device): Every 2 hours: Monitor safety, psychosocial status, comfort, nutrition and hydration  Goal: Free from restraint(s) (Restraint for Interference with Medical Device)  Outcome: Not Progressing  Flowsheets (Taken 12/5/2024 2248 by Elliott Olmedo RN)  Free from restraint(s) (restraint for interference with medical device): ONCE/SHIFT or MINIMUM Every 12 hours: Assess and document the continuing need for restraints      The patient's goals for the shift include patient will remain safe without injuries with soft restraints.     The clinical goals for the shift include pt remains HDS throughout shift and in NSR

## 2024-12-09 NOTE — PROGRESS NOTES
Subjective   No events overnight    Meds  Scheduled medications  aspirin, 81 mg, oral, Daily  atorvastatin, 80 mg, oral, Nightly  heparin, 80 Units/kg, intravenous, Once  insulin lispro, 0-10 Units, subcutaneous, q4h  lactated Ringer's, 1,000 mL, intravenous, Once  oxygen, , inhalation, Continuous - Inhalation  pantoprazole, 40 mg, intravenous, BID  piperacillin-tazobactam, 2.25 g, intravenous, q8h  sevelamer carbonate, 800 mg, oral, TID  thiamine, 100 mg, oral, Daily  vancomycin, 500 mg, intravenous, Once per day on Tuesday Thursday Saturday      Continuous medications  dexmedeTOMIDine, 0.1-1.5 mcg/kg/hr, Last Rate: 0.25 mcg/kg/hr (12/09/24 0400)  [Held by provider] fentaNYL,  mcg/hr, Last Rate: Stopped (12/08/24 0643)  norepinephrine, 0.01-1 mcg/kg/min, Last Rate: 0.03 mcg/kg/min (12/09/24 0400)      PRN medications  PRN medications: acetaminophen **OR** acetaminophen **OR** acetaminophen, alteplase, dextrose, dextrose, fentaNYL, glucagon, hydrOXYzine HCL, ipratropium-albuteroL, oxyCODONE, oxygen, polyethylene glycol, sennosides-docusate sodium, vancomycin      Objective   Vital signs in last 24 hours:  Temp:  [35.3 °C (95.5 °F)-36.1 °C (97 °F)] 35.8 °C (96.4 °F)  Heart Rate:  [] 79  Resp:  [12-25] 20  Arterial Line BP 1: ()/() 121/57  FiO2 (%):  [40 %] 40 %    Intake/Output this shift:    Intake/Output Summary (Last 24 hours) at 12/9/2024 0557  Last data filed at 12/9/2024 0500  Gross per 24 hour   Intake 9279.01 ml   Output 385 ml   Net 8894.01 ml       Physical  General: Patient somnolent, sick appearing  Pulm: No fighting vent, normal breath sounds  Cardiac: Regular rate and rhythm, normal S1/S2  Abdomen: Non-tender to palpation, non-distended, g-tube site clean  Extremities: No peripheral edema,  Neuro: Patient alerts to voice, weakly follows commands    Results  Results for orders placed or performed during the hospital encounter of 11/18/24 (from the past 24 hours)   BLOOD GAS  ARTERIAL FULL PANEL   Result Value Ref Range    POCT pH, Arterial 7.29 (L) 7.38 - 7.42 pH    POCT pCO2, Arterial 36 (L) 38 - 42 mm Hg    POCT pO2, Arterial 59 (L) 85 - 95 mm Hg    POCT SO2, Arterial 89 (L) 94 - 100 %    POCT Oxy Hemoglobin, Arterial 86.8 (L) 94.0 - 98.0 %    POCT Hematocrit Calculated, Arterial 28.0 (L) 36.0 - 46.0 %    POCT Sodium, Arterial 124 (L) 136 - 145 mmol/L    POCT Potassium, Arterial 4.8 3.5 - 5.3 mmol/L    POCT Chloride, Arterial 92 (L) 98 - 107 mmol/L    POCT Ionized Calcium, Arterial 0.99 (L) 1.10 - 1.33 mmol/L    POCT Glucose, Arterial 366 (H) 74 - 99 mg/dL    POCT Lactate, Arterial 4.5 (HH) 0.4 - 2.0 mmol/L    POCT Base Excess, Arterial -8.5 (L) -2.0 - 3.0 mmol/L    POCT HCO3 Calculated, Arterial 17.3 (L) 22.0 - 26.0 mmol/L    POCT Hemoglobin, Arterial 9.3 (L) 12.0 - 16.0 g/dL    POCT Anion Gap, Arterial 20 10 - 25 mmo/L    Patient Temperature 37.0 degrees Celsius    FiO2 30 %   Blood Gas Arterial Full Panel   Result Value Ref Range    POCT pH, Arterial 7.34 (L) 7.38 - 7.42 pH    POCT pCO2, Arterial 36 (L) 38 - 42 mm Hg    POCT pO2, Arterial 160 (H) 85 - 95 mm Hg    POCT SO2, Arterial 100 94 - 100 %    POCT Oxy Hemoglobin, Arterial 97.1 94.0 - 98.0 %    POCT Hematocrit Calculated, Arterial 26.0 (L) 36.0 - 46.0 %    POCT Sodium, Arterial 122 (L) 136 - 145 mmol/L    POCT Potassium, Arterial 4.8 3.5 - 5.3 mmol/L    POCT Chloride, Arterial 92 (L) 98 - 107 mmol/L    POCT Ionized Calcium, Arterial 0.97 (L) 1.10 - 1.33 mmol/L    POCT Glucose, Arterial 364 (H) 74 - 99 mg/dL    POCT Lactate, Arterial 1.3 0.4 - 2.0 mmol/L    POCT Base Excess, Arterial -5.8 (L) -2.0 - 3.0 mmol/L    POCT HCO3 Calculated, Arterial 19.4 (L) 22.0 - 26.0 mmol/L    POCT Hemoglobin, Arterial 8.8 (L) 12.0 - 16.0 g/dL    POCT Anion Gap, Arterial 15 10 - 25 mmo/L    Patient Temperature 37.0 degrees Celsius    FiO2 60 %   POCT GLUCOSE   Result Value Ref Range    POCT Glucose 365 (H) 74 - 99 mg/dL   POCT GLUCOSE   Result  Value Ref Range    POCT Glucose 329 (H) 74 - 99 mg/dL   POCT GLUCOSE   Result Value Ref Range    POCT Glucose 433 (H) 74 - 99 mg/dL   POCT GLUCOSE   Result Value Ref Range    POCT Glucose 332 (H) 74 - 99 mg/dL   CBC   Result Value Ref Range    WBC 13.2 (H) 4.4 - 11.3 x10*3/uL    nRBC 0.7 (H) 0.0 - 0.0 /100 WBCs    RBC 2.46 (L) 4.00 - 5.20 x10*6/uL    Hemoglobin 7.6 (L) 12.0 - 16.0 g/dL    Hematocrit 21.6 (L) 36.0 - 46.0 %    MCV 88 80 - 100 fL    MCH 30.9 26.0 - 34.0 pg    MCHC 35.2 32.0 - 36.0 g/dL    RDW 14.6 (H) 11.5 - 14.5 %    Platelets 188 150 - 450 x10*3/uL   POCT GLUCOSE   Result Value Ref Range    POCT Glucose 226 (H) 74 - 99 mg/dL   POCT GLUCOSE   Result Value Ref Range    POCT Glucose 218 (H) 74 - 99 mg/dL   BLOOD GAS ARTERIAL FULL PANEL   Result Value Ref Range    POCT pH, Arterial 7.42 7.38 - 7.42 pH    POCT pCO2, Arterial 32 (L) 38 - 42 mm Hg    POCT pO2, Arterial 118 (H) 85 - 95 mm Hg    POCT SO2, Arterial 100 94 - 100 %    POCT Oxy Hemoglobin, Arterial 97.2 94.0 - 98.0 %    POCT Hematocrit Calculated, Arterial 25.0 (L) 36.0 - 46.0 %    POCT Sodium, Arterial 124 (L) 136 - 145 mmol/L    POCT Potassium, Arterial 3.2 (L) 3.5 - 5.3 mmol/L    POCT Chloride, Arterial 91 (L) 98 - 107 mmol/L    POCT Ionized Calcium, Arterial 1.01 (L) 1.10 - 1.33 mmol/L    POCT Glucose, Arterial 245 (H) 74 - 99 mg/dL    POCT Lactate, Arterial 1.2 0.4 - 2.0 mmol/L    POCT Base Excess, Arterial -3.2 (L) -2.0 - 3.0 mmol/L    POCT HCO3 Calculated, Arterial 20.8 (L) 22.0 - 26.0 mmol/L    POCT Hemoglobin, Arterial 8.3 (L) 12.0 - 16.0 g/dL    POCT Anion Gap, Arterial 15 10 - 25 mmo/L    Patient Temperature 37.0 degrees Celsius    FiO2 40 %       Imaging  XR chest 1 view    Result Date: 12/8/2024  Interpreted By:  Vernon Santos  and Kitty Zuleta STUDY: XR CHEST 1 VIEW;  12/8/2024 8:01 am; 12/8/2024 6:50 am   INDICATION: Signs/Symptoms:sob; Signs/Symptoms:intubated.     COMPARISON: Chest x-ray 12/08/2024-12/04/2024    ACCESSION NUMBER(S): [id number]; [id number]   ORDERING CLINICIAN: CARL GILLOMBARDO; MEISAM MOGHBELLI   FINDINGS: AP radiograph of the chest was provided at 06:48 and 07:58 AM. Dictation will be based on the most recent radiograph.   LINES/TUBES/DEVICES: Similar positioning of tracheostomy cannula Right IJ central venous catheter with tip overlying superior cavoatrial junction. Postsurgical changes from median sternotomy.   CARDIOMEDIASTINAL SILHOUETTE: Cardiomediastinal silhouette is stable in size and configuration.   LUNGS: Persistent bibasilar airspace opacities of the blunting of the costophrenic angles. No evidence of pneumothorax. Increased interstitial markings of the lungs.   ABDOMEN: No remarkable upper abdominal findings.   BONES: No acute osseous changes.       1. Persistent bibasilar perfusion and bibasilar infiltrate which might be due to atelectasis or infectious process. 2. Persistent interstitial prominence and ground-glass opacity in keeping with component of interstitial pulmonary edema.   I personally reviewed the images/study and I agree with the findings as stated by Merlyn Tang DO, PGY-3. This study was interpreted at [place], [place].   MACRO: None   Signed by: Vernon Franklin 12/8/2024 11:05 AM Dictation workstation:   [id number]    XR chest 1 view    Result Date: 12/8/2024  Interpreted By:  Vernon Santos,  and Kitty Zuleta STUDY: XR CHEST 1 VIEW;  12/8/2024 8:01 am; 12/8/2024 6:50 am   INDICATION: Signs/Symptoms:sob; Signs/Symptoms:intubated.     COMPARISON: Chest x-ray 12/08/2024-12/04/2024   ACCESSION NUMBER(S): [id number]; [id number]   ORDERING CLINICIAN: CARL GILLOMBARDO; MEISAM MOGHBELLI   FINDINGS: AP radiograph of the chest was provided at 06:48 and 07:58 AM. Dictation will be based on the most recent radiograph.   LINES/TUBES/DEVICES: Similar positioning of tracheostomy cannula Right IJ central venous  catheter with tip overlying superior cavoatrial junction. Postsurgical changes from median sternotomy.   CARDIOMEDIASTINAL SILHOUETTE: Cardiomediastinal silhouette is stable in size and configuration.   LUNGS: Persistent bibasilar airspace opacities of the blunting of the costophrenic angles. No evidence of pneumothorax. Increased interstitial markings of the lungs.   ABDOMEN: No remarkable upper abdominal findings.   BONES: No acute osseous changes.       1. Persistent bibasilar perfusion and bibasilar infiltrate which might be due to atelectasis or infectious process. 2. Persistent interstitial prominence and ground-glass opacity in keeping with component of interstitial pulmonary edema.   I personally reviewed the images/study and I agree with the findings as stated by Merlyn Tang DO, PGY-3. This study was interpreted at [facility], [place], [place].   MACRO: None   Signed by: Vernon Franklin 12/8/2024 11:05 AM Dictation workstation:   [id number]    XR chest 1 view    Result Date: 12/7/2024  Interpreted By:  Vernon Santos, STUDY: XR CHEST 1 VIEW; 12/7/2024 7:17 am   INDICATION: Signs/Symptoms:intubated.   COMPARISON: Radiograph dated 12/06/2024   ACCESSION NUMBER(S): [id number]   ORDERING CLINICIAN: BASSEM LEWIS   FINDINGS: Tracheostomy cannula is unchanged. Interval removal of left IJ central venous catheter. Right IJ central venous catheter is unchanged.   Status post median sternotomy. Cardiac silhouette size is within normal limits. Calcification of the aortic knob.   Interval improvement in the aeration of the lungs with residual bibasilar pleural effusion and atelectasis. No sizable pneumothorax.   No acute osseous abnormality.       Interval improvement in the aeration of the lungs with residual bibasilar pleural effusion and atelectasis.     Signed by: Vernon Franklin 12/7/2024 9:14 AM Dictation workstation:   [id number]         Assessment/Plan   Principal Problem:    Dissection of aorta  Active Problems:    ESRD on hemodialysis (Multi)    Ruptured aortic aneurysm (Multi)    Anemia due to acute blood loss    Cardiac arrest    64 y.o. female with history of pancreatitis, DVT/PE, HLD, HTN, CABGx4 1997, T2DM, GERD, PAD, chronic mesenteric ischemia, tobacco use who presented to [facility] on [facility]/18/2024 as a transfer from [facility] with chest, back, and abdominal pain. Now post PEA arrest, with intubation/sedation. Family agreed for trach/PEG and LTAC. Patient had septic episode 12/6 overnight and underwent trach/PEG.      Updates:  -Plan for dialysis today... may need to figure out line placement  -Decreasing pressor requirements  -Precedex drip stopped  -Will hold off on treating stenotrophomonas pneumonia given decreasing pressor requirements  -Check folate, B12, retic index  -Continue SBT/SAT     Neuro:  #Sedation/anxiety  -No sedation  -Quetiapine 25 mg nightly     Cardiac:  #Shock, likely septic in origin  -Continue levophed -> currently at 0.06  -Stress dose steroids stopped  -No organisms seen on respiratory cultures  -WBC count up to 14.6 (s/p steroids)  -Blood culture NGTD at 2 day  -Will complete 5 day course if lines assumed to be source  -Vanc/Zosyn day 4      #Aflutter/Afib with RVR  #Driven by anxiety vs hypovolemia  ::now sinus tachycardia  -HR in 160-180s on 12/5 am  -s/p one dose of digoxin 250 mcg (digoxin is not dialyzable)  -s/p amio bolus 12/6 for RVR  -Precedex for anxiety, hypovolemia treated with blood transfusions yesterday  -Not on anticoagulation due to upcoming procedure  -No events overnight     #PEA arrest 11/20  ::likely hypoxia driven: PNA vs aspiration     #Aortic dissection  #Mural Thrombus  #Distal aortic arch saccular aneurysm   ::CTA CAP 11/18- 2.6 cm saccular aneurysm of distal aortic arch, mural thrombus in aortic arch and desc abd aorta, 2 areas of focal dissection in  desc thoracic aorta.   -Vascular surgery discussed case at aortic conference and there are no plans for surgery, palliative measures recommended     #NSTEMI  #CAD s/p CABGx4 1997  #PAD  #HTN #DLD  ::EKG- NSR, rate 71, TWI aVL, V1-V6, 1mm ORQUIDEA in aVR  ::Trop peak 5700  ::Lipid HDL 41.5, , TG 75, Chol 157  -Holding home amlodipine 10mg, imdur 30mg, lisinopril 2.5mg, metop tartrate 50mg bid iso hypotension   -Continue ASA and atorvastatin  -Holding plavix 75mg. Can resume after all procedures are done (TDC, trach/PEG)     #HFrEF (45-50%, 11/19/24)  -Not on GDMT at the moment   -TTE on 12/6 with EF of 60-65% with moderate to severe mitral regurgitation     Pulm:  #Acute hypoxic respiratory failure, post-arrest  :s/p Zosyn 11/20 - 11/26 for PNA  :: s/p Vancomycin   -Failed extubation on 11/28  - On trach with vent     GI:  #Ischemic hepatitis secondary to PEA arrest, improving  ::likely 2/2 hypoxia and hypotension  -LFTs downtrending     Renal:  #TRACY on CKD likely 2/2 arrest  #Anuric  -iHD planned after TDC tomorrow     #Hyponatremia  -Decrease free water flushes from Q4 to Q6     Endo:   #T2DM  -Hold home metformin   -POC Glucose: 214-365  -Increase to SSI2 from SSI1     #Tube Feeds  :: goal, 20 ml/hr  -f/up nutrition  -Currently at goal     Infectious  #sepsis  -fu blood and sputum culutre  -cw vanc zosyn (12/6- 12/11)     F: None  E: K>4, Mag>2  N: TF  G: pantoprazole  A: PIV, OG, Altagracia  DVT: SCDs  CODE: DNR/okay to intubate (confirmed with family 11/21)  NOK: Daughter Josy [phone]       LOS: 21 days     Jaime Mckeon MD/PhD   PGY-2

## 2024-12-10 LAB
ALBUMIN SERPL BCP-MCNC: 2.4 G/DL (ref 3.4–5)
ANION GAP SERPL CALC-SCNC: 19 MMOL/L (ref 10–20)
BACTERIA BLD CULT: NORMAL
BASOPHILS # BLD AUTO: 0.01 X10*3/UL (ref 0–0.1)
BASOPHILS NFR BLD AUTO: 0.1 %
BUN SERPL-MCNC: 52 MG/DL (ref 6–23)
CALCIUM SERPL-MCNC: 7.2 MG/DL (ref 8.6–10.6)
CHLORIDE SERPL-SCNC: 90 MMOL/L (ref 98–107)
CO2 SERPL-SCNC: 22 MMOL/L (ref 21–32)
CREAT SERPL-MCNC: 6.46 MG/DL (ref 0.5–1.05)
DIAGNOSIS-BB-PR33: NORMAL
EGFRCR SERPLBLD CKD-EPI 2021: 7 ML/MIN/1.73M*2
EOSINOPHIL # BLD AUTO: 0.02 X10*3/UL (ref 0–0.7)
EOSINOPHIL NFR BLD AUTO: 0.2 %
ERYTHROCYTE [DISTWIDTH] IN BLOOD BY AUTOMATED COUNT: 16.2 % (ref 11.5–14.5)
GLUCOSE BLD MANUAL STRIP-MCNC: 130 MG/DL (ref 74–99)
GLUCOSE BLD MANUAL STRIP-MCNC: 148 MG/DL (ref 74–99)
GLUCOSE BLD MANUAL STRIP-MCNC: 163 MG/DL (ref 74–99)
GLUCOSE BLD MANUAL STRIP-MCNC: 95 MG/DL (ref 74–99)
GLUCOSE SERPL-MCNC: 150 MG/DL (ref 74–99)
HCT VFR BLD AUTO: 21 % (ref 36–46)
HGB BLD-MCNC: 7.2 G/DL (ref 12–16)
IMM GRANULOCYTES # BLD AUTO: 0.07 X10*3/UL (ref 0–0.7)
IMM GRANULOCYTES NFR BLD AUTO: 0.6 % (ref 0–0.9)
LYMPHOCYTES # BLD AUTO: 0.69 X10*3/UL (ref 1.2–4.8)
LYMPHOCYTES NFR BLD AUTO: 5.7 %
MAGNESIUM SERPL-MCNC: 1.93 MG/DL (ref 1.6–2.4)
MCH RBC QN AUTO: 31.6 PG (ref 26–34)
MCHC RBC AUTO-ENTMCNC: 34.3 G/DL (ref 32–36)
MCV RBC AUTO: 92 FL (ref 80–100)
MONOCYTES # BLD AUTO: 0.77 X10*3/UL (ref 0.1–1)
MONOCYTES NFR BLD AUTO: 6.3 %
NEUTROPHILS # BLD AUTO: 10.6 X10*3/UL (ref 1.2–7.7)
NEUTROPHILS NFR BLD AUTO: 87.1 %
NRBC BLD-RTO: 0.7 /100 WBCS (ref 0–0)
PATH REV-IMMUNOHEMATOLOGY-PR30: NORMAL
PATH REV-IMMUNOHEMATOLOGY-PR30: NORMAL
PATH REVIEW-TRANSFUSION REACTION: NORMAL
PHOSPHATE SERPL-MCNC: 5.5 MG/DL (ref 2.5–4.9)
PLATELET # BLD AUTO: 174 X10*3/UL (ref 150–450)
POTASSIUM SERPL-SCNC: 3.2 MMOL/L (ref 3.5–5.3)
RBC # BLD AUTO: 2.28 X10*6/UL (ref 4–5.2)
SODIUM SERPL-SCNC: 128 MMOL/L (ref 136–145)
TYPE OF REACTION: NORMAL
VANCOMYCIN SERPL-MCNC: 19.7 UG/ML (ref 5–20)
WBC # BLD AUTO: 12.2 X10*3/UL (ref 4.4–11.3)

## 2024-12-10 PROCEDURE — 36556 INSERT NON-TUNNEL CV CATH: CPT | Performed by: STUDENT IN AN ORGANIZED HEALTH CARE EDUCATION/TRAINING PROGRAM

## 2024-12-10 PROCEDURE — 2500000005 HC RX 250 GENERAL PHARMACY W/O HCPCS

## 2024-12-10 PROCEDURE — 2500000004 HC RX 250 GENERAL PHARMACY W/ HCPCS (ALT 636 FOR OP/ED)

## 2024-12-10 PROCEDURE — 2500000001 HC RX 250 WO HCPCS SELF ADMINISTERED DRUGS (ALT 637 FOR MEDICARE OP)

## 2024-12-10 PROCEDURE — 37799 UNLISTED PX VASCULAR SURGERY: CPT

## 2024-12-10 PROCEDURE — 99291 CRITICAL CARE FIRST HOUR: CPT

## 2024-12-10 PROCEDURE — 74018 RADEX ABDOMEN 1 VIEW: CPT

## 2024-12-10 PROCEDURE — 94003 VENT MGMT INPAT SUBQ DAY: CPT

## 2024-12-10 PROCEDURE — 82947 ASSAY GLUCOSE BLOOD QUANT: CPT

## 2024-12-10 PROCEDURE — 80202 ASSAY OF VANCOMYCIN: CPT

## 2024-12-10 PROCEDURE — 84100 ASSAY OF PHOSPHORUS: CPT

## 2024-12-10 PROCEDURE — 94762 N-INVAS EAR/PLS OXIMTRY CONT: CPT

## 2024-12-10 PROCEDURE — 99232 SBSQ HOSP IP/OBS MODERATE 35: CPT

## 2024-12-10 PROCEDURE — 2500000002 HC RX 250 W HCPCS SELF ADMINISTERED DRUGS (ALT 637 FOR MEDICARE OP, ALT 636 FOR OP/ED)

## 2024-12-10 PROCEDURE — 85025 COMPLETE CBC W/AUTO DIFF WBC: CPT

## 2024-12-10 PROCEDURE — 2020000001 HC ICU ROOM DAILY

## 2024-12-10 PROCEDURE — 71045 X-RAY EXAM CHEST 1 VIEW: CPT

## 2024-12-10 PROCEDURE — 8010000001 HC DIALYSIS - HEMODIALYSIS PER DAY

## 2024-12-10 PROCEDURE — 83735 ASSAY OF MAGNESIUM: CPT

## 2024-12-10 RX ORDER — QUETIAPINE FUMARATE 25 MG/1
25 TABLET, FILM COATED ORAL NIGHTLY
Status: DISCONTINUED | OUTPATIENT
Start: 2024-12-10 | End: 2024-12-18 | Stop reason: HOSPADM

## 2024-12-10 RX ORDER — FOLIC ACID 1 MG/1
1 TABLET ORAL DAILY
Status: DISCONTINUED | OUTPATIENT
Start: 2024-12-11 | End: 2024-12-18 | Stop reason: HOSPADM

## 2024-12-10 RX ORDER — POTASSIUM CHLORIDE 1.5 G/1.58G
40 POWDER, FOR SOLUTION ORAL ONCE
Status: COMPLETED | OUTPATIENT
Start: 2024-12-10 | End: 2024-12-10

## 2024-12-10 RX ORDER — HEPARIN SODIUM 5000 [USP'U]/ML
INJECTION, SOLUTION INTRAVENOUS; SUBCUTANEOUS
Status: DISCONTINUED
Start: 2024-12-10 | End: 2024-12-10 | Stop reason: WASHOUT

## 2024-12-10 RX ORDER — HEPARIN SODIUM,PORCINE/PF 10 UNIT/ML
10 SYRINGE (ML) INTRAVENOUS AS NEEDED
Status: DISCONTINUED | OUTPATIENT
Start: 2024-12-10 | End: 2024-12-18 | Stop reason: HOSPADM

## 2024-12-10 RX ORDER — SENNOSIDES 8.6 MG/1
2 TABLET ORAL 2 TIMES DAILY
Status: DISCONTINUED | OUTPATIENT
Start: 2024-12-10 | End: 2024-12-10

## 2024-12-10 RX ORDER — SENNOSIDES 8.6 MG/1
2 TABLET ORAL 2 TIMES DAILY
Status: DISCONTINUED | OUTPATIENT
Start: 2024-12-10 | End: 2024-12-18 | Stop reason: HOSPADM

## 2024-12-10 RX ORDER — HEPARIN SODIUM 1000 [USP'U]/ML
INJECTION, SOLUTION INTRAVENOUS; SUBCUTANEOUS
Status: COMPLETED
Start: 2024-12-10 | End: 2024-12-10

## 2024-12-10 RX ORDER — HYDROXYZINE HYDROCHLORIDE 10 MG/1
10 TABLET, FILM COATED ORAL EVERY 6 HOURS PRN
Status: DISCONTINUED | OUTPATIENT
Start: 2024-12-10 | End: 2024-12-18 | Stop reason: HOSPADM

## 2024-12-10 RX ORDER — ATORVASTATIN CALCIUM 80 MG/1
80 TABLET, FILM COATED ORAL NIGHTLY
Status: DISCONTINUED | OUTPATIENT
Start: 2024-12-10 | End: 2024-12-18 | Stop reason: HOSPADM

## 2024-12-10 RX ORDER — SEVELAMER CARBONATE FOR ORAL SUSPENSION 800 MG/1
0.8 POWDER, FOR SUSPENSION ORAL
Status: DISCONTINUED | OUTPATIENT
Start: 2024-12-10 | End: 2024-12-11

## 2024-12-10 RX ORDER — OXYCODONE HYDROCHLORIDE 5 MG/1
2.5 TABLET ORAL EVERY 4 HOURS PRN
Status: DISCONTINUED | OUTPATIENT
Start: 2024-12-10 | End: 2024-12-18 | Stop reason: HOSPADM

## 2024-12-10 RX ORDER — METOPROLOL TARTRATE 25 MG/1
12.5 TABLET, FILM COATED ORAL EVERY 6 HOURS
Status: COMPLETED | OUTPATIENT
Start: 2024-12-10 | End: 2024-12-11

## 2024-12-10 RX ORDER — DIATRIZOATE MEGLUMINE AND DIATRIZOATE SODIUM 660; 100 MG/ML; MG/ML
60 SOLUTION ORAL; RECTAL ONCE
Status: CANCELLED | OUTPATIENT
Start: 2024-12-10 | End: 2024-12-10

## 2024-12-10 RX ORDER — NAPROXEN SODIUM 220 MG/1
81 TABLET, FILM COATED ORAL DAILY
Status: DISCONTINUED | OUTPATIENT
Start: 2024-12-11 | End: 2024-12-18 | Stop reason: HOSPADM

## 2024-12-10 RX ORDER — DICYCLOMINE HYDROCHLORIDE 10 MG/1
10 CAPSULE ORAL 4 TIMES DAILY PRN
Status: DISCONTINUED | OUTPATIENT
Start: 2024-12-10 | End: 2024-12-18 | Stop reason: HOSPADM

## 2024-12-10 RX ORDER — SENNOSIDES 8.6 MG/1
1 TABLET ORAL 2 TIMES DAILY
Status: DISCONTINUED | OUTPATIENT
Start: 2024-12-10 | End: 2024-12-10

## 2024-12-10 RX ORDER — LANOLIN ALCOHOL/MO/W.PET/CERES
100 CREAM (GRAM) TOPICAL DAILY
Status: DISCONTINUED | OUTPATIENT
Start: 2024-12-11 | End: 2024-12-18 | Stop reason: HOSPADM

## 2024-12-10 RX ORDER — POTASSIUM CHLORIDE 14.9 MG/ML
20 INJECTION INTRAVENOUS
Status: COMPLETED | OUTPATIENT
Start: 2024-12-10 | End: 2024-12-10

## 2024-12-10 RX ORDER — VANCOMYCIN HYDROCHLORIDE 500 MG/100ML
500 INJECTION, SOLUTION INTRAVENOUS
Status: DISCONTINUED | OUTPATIENT
Start: 2024-12-11 | End: 2024-12-10

## 2024-12-10 RX ORDER — POTASSIUM CHLORIDE 1.5 G/1.58G
40 POWDER, FOR SOLUTION ORAL ONCE
Status: DISCONTINUED | OUTPATIENT
Start: 2024-12-10 | End: 2024-12-10

## 2024-12-10 RX ADMIN — OXYCODONE HYDROCHLORIDE 2.5 MG: 5 TABLET ORAL at 17:43

## 2024-12-10 RX ADMIN — SENNOSIDES 17.2 MG: 8.6 TABLET, FILM COATED ORAL at 22:08

## 2024-12-10 RX ADMIN — Medication 40 PERCENT: at 20:00

## 2024-12-10 RX ADMIN — HEPARIN SODIUM: 1000 INJECTION INTRAVENOUS; SUBCUTANEOUS at 13:19

## 2024-12-10 RX ADMIN — FENTANYL CITRATE 25 MCG: 50 INJECTION INTRAMUSCULAR; INTRAVENOUS at 12:22

## 2024-12-10 RX ADMIN — ATORVASTATIN CALCIUM 80 MG: 80 TABLET, FILM COATED ORAL at 22:08

## 2024-12-10 RX ADMIN — PANTOPRAZOLE SODIUM 40 MG: 40 INJECTION, POWDER, FOR SOLUTION INTRAVENOUS at 22:08

## 2024-12-10 RX ADMIN — PANTOPRAZOLE SODIUM 40 MG: 40 INJECTION, POWDER, FOR SOLUTION INTRAVENOUS at 08:00

## 2024-12-10 RX ADMIN — ASPIRIN 81 MG CHEWABLE TABLET 81 MG: 81 TABLET CHEWABLE at 08:21

## 2024-12-10 RX ADMIN — PIPERACILLIN SODIUM AND TAZOBACTAM SODIUM 2.25 G: 2; .25 INJECTION, SOLUTION INTRAVENOUS at 00:52

## 2024-12-10 RX ADMIN — LIDOCAINE 4% 1 PATCH: 40 PATCH TOPICAL at 08:17

## 2024-12-10 RX ADMIN — METOPROLOL TARTRATE 12.5 MG: 25 TABLET, FILM COATED ORAL at 16:02

## 2024-12-10 RX ADMIN — ACETAMINOPHEN 1000 MG: 160 SOLUTION ORAL at 15:03

## 2024-12-10 RX ADMIN — METOPROLOL TARTRATE 12.5 MG: 25 TABLET, FILM COATED ORAL at 10:45

## 2024-12-10 RX ADMIN — OXYCODONE HYDROCHLORIDE 2.5 MG: 5 TABLET ORAL at 12:35

## 2024-12-10 RX ADMIN — SEVELAMER CARBONATE 0.8 G: 800 POWDER, FOR SUSPENSION ORAL at 16:11

## 2024-12-10 RX ADMIN — POTASSIUM CHLORIDE 20 MEQ: 14.9 INJECTION, SOLUTION INTRAVENOUS at 06:07

## 2024-12-10 RX ADMIN — POTASSIUM CHLORIDE 20 MEQ: 14.9 INJECTION, SOLUTION INTRAVENOUS at 08:00

## 2024-12-10 RX ADMIN — SODIUM CHLORIDE, POTASSIUM CHLORIDE, SODIUM LACTATE AND CALCIUM CHLORIDE 500 ML: 600; 310; 30; 20 INJECTION, SOLUTION INTRAVENOUS at 01:15

## 2024-12-10 RX ADMIN — THIAMINE HCL TAB 100 MG 100 MG: 100 TAB at 08:22

## 2024-12-10 RX ADMIN — Medication 40 PERCENT: at 08:08

## 2024-12-10 RX ADMIN — QUETIAPINE FUMARATE 25 MG: 25 TABLET ORAL at 22:08

## 2024-12-10 RX ADMIN — PIPERACILLIN SODIUM AND TAZOBACTAM SODIUM 2.25 G: 2; .25 INJECTION, SOLUTION INTRAVENOUS at 16:02

## 2024-12-10 RX ADMIN — FOLIC ACID 1 MG: 1 TABLET ORAL at 08:22

## 2024-12-10 RX ADMIN — POTASSIUM CHLORIDE 40 MEQ: 1.5 POWDER, FOR SOLUTION ORAL at 07:59

## 2024-12-10 RX ADMIN — INSULIN LISPRO 2 UNITS: 100 INJECTION, SOLUTION INTRAVENOUS; SUBCUTANEOUS at 16:08

## 2024-12-10 RX ADMIN — SEVELAMER CARBONATE 0.8 G: 800 POWDER, FOR SUSPENSION ORAL at 13:29

## 2024-12-10 RX ADMIN — PIPERACILLIN SODIUM AND TAZOBACTAM SODIUM 2.25 G: 2; .25 INJECTION, SOLUTION INTRAVENOUS at 08:00

## 2024-12-10 RX ADMIN — SENNOSIDES 17.2 MG: 8.6 TABLET, FILM COATED ORAL at 08:01

## 2024-12-10 ASSESSMENT — PAIN - FUNCTIONAL ASSESSMENT
PAIN_FUNCTIONAL_ASSESSMENT: CPOT (CRITICAL CARE PAIN OBSERVATION TOOL)
PAIN_FUNCTIONAL_ASSESSMENT: 0-10
PAIN_FUNCTIONAL_ASSESSMENT: CPOT (CRITICAL CARE PAIN OBSERVATION TOOL)
PAIN_FUNCTIONAL_ASSESSMENT: CPOT (CRITICAL CARE PAIN OBSERVATION TOOL)

## 2024-12-10 ASSESSMENT — PAIN SCALES - GENERAL: PAINLEVEL_OUTOF10: 0 - NO PAIN

## 2024-12-10 NOTE — PROGRESS NOTES
Social Work Progress Note  NORBERTO called patient's son Seth Richter several times today to see if he chose an LTACH facility for a referral. Seth did not  the phone and mailbox was full. NORBERTO called Seth's brother Erik and requested that he asks Seth to give NORBERTO a call. NORBERTO also asked bedside nurse to let SW know if son comes to the unit.   MIGUEL ROCK

## 2024-12-10 NOTE — PROCEDURES
Central Line    Date/Time: 12/10/2024 1:12 PM    Performed by: Cherelle Early MD  Authorized by: Cherelle Early MD    Consent:     Consent obtained:  Written    Consent given by:  Guardian    Risks, benefits, and alternatives were discussed: yes      Risks discussed:  Arterial puncture, bleeding, incorrect placement, infection, nerve damage and pneumothorax  Universal protocol:     Procedure explained and questions answered to patient or proxy's satisfaction: yes      Relevant documents present and verified: yes      Test results available: yes      Imaging studies available: yes      Required blood products, implants, devices, and special equipment available: yes      Site/side marked: yes      Immediately prior to procedure, a time out was called: yes      Patient identity confirmed:  Arm band  Pre-procedure details:     Indication(s): central venous access and hemodynamic monitoring      Hand hygiene: Hand hygiene performed prior to insertion      Sterile barrier technique: All elements of maximal sterile technique followed      Skin preparation:  Povidone-iodine    Skin preparation agent: Skin preparation agent completely dried prior to procedure    Sedation:     Sedation type:  Moderate sedation  Anesthesia:     Anesthesia method:  Local infiltration    Local anesthetic:  Lidocaine 1% w/o epi  Procedure details:     Location:  R internal jugular    Patient position:  Supine    Catheter size:  13 Fr    Ultrasound guidance: no      Number of attempts:  1    Successful placement: yes    Post-procedure details:     Post-procedure:  Line sutured and dressing applied    Assessment:  Free fluid flow and blood return through all ports    Procedure completion:  Tolerated well, no immediate complications  Comments:      Trialysis line placement w/ exchange of central line.

## 2024-12-10 NOTE — CARE PLAN
The patient's goals for the shift include      The clinical goals for the shift include pt remains HDS throughout shift and in NSR    Over the shift, the patient did not make progress toward the following goals. Barriers to progression include fluid status. Recommendations to address these barriers include continue with current plan of care.

## 2024-12-10 NOTE — PROGRESS NOTES
Vancomycin Dosing by Pharmacy- Cessation of Therapy    Consult to pharmacy for vancomycin dosing has been discontinued by the prescriber, pharmacy will sign off at this time.    Please call pharmacy if there are further questions or re-enter a consult if vancomycin is resumed.     Gabriel Alejandro, PharmD

## 2024-12-10 NOTE — CARE PLAN
Problem: Pain - Adult  Goal: Verbalizes/displays adequate comfort level or baseline comfort level  Outcome: Progressing     Problem: Safety - Adult  Goal: Free from fall injury  Outcome: Progressing     Problem: Skin  Goal: Participates in plan/prevention/treatment measures  Outcome: Progressing  Goal: Prevent/manage excess moisture  Outcome: Progressing  Goal: Prevent/minimize sheer/friction injuries  Outcome: Progressing  Goal: Decreased wound size/increased tissue granulation at next dressing change  Outcome: Progressing  Goal: Promote/optimize nutrition  Outcome: Progressing

## 2024-12-10 NOTE — PROGRESS NOTES
Humaira Huang   64 kathleen    @@  [facility]/Room: 75272105/14/14-A admitted to CICU for Aortic dissection.    Subjective:   at 40% Fio2, tach and PEG     Meds:   [START ON 12/11/2024] aspirin, 81 mg, Daily  atorvastatin, 80 mg, Nightly  [START ON 12/11/2024] folic acid, 1 mg, Daily  heparin, 80 Units/kg, Once  insulin lispro, 0-10 Units, q4h  lactated Ringer's, 1,000 mL, Once  lidocaine, 1 patch, Daily  metoprolol tartrate, 12.5 mg, q6h  oxygen, , Continuous - Inhalation  pantoprazole, 40 mg, BID  piperacillin-tazobactam, 2.25 g, q8h  QUEtiapine, 25 mg, Nightly  sennosides, 2 tablet, BID  sevelamer carbonate, 0.8 g, TID  [START ON 12/11/2024] thiamine, 100 mg, Daily           acetaminophen, 650 mg, q4h PRN   Or  acetaminophen, 1,000 mg, q8h PRN   Or  acetaminophen, 650 mg, q4h PRN  alteplase, 2 mg, PRN  dextrose, 12.5 g, q15 min PRN  dextrose, 25 g, q15 min PRN  dicyclomine, 10 mg, 4x daily PRN  fentaNYL, 25 mcg, q4h PRN  glucagon, 1 mg, q15 min PRN  hydrOXYzine HCL, 10 mg, q6h PRN  ipratropium-albuteroL, 3 mL, q6h PRN  ondansetron, 4 mg, q6h PRN  oxyCODONE, 2.5 mg, q4h PRN  oxygen, , Continuous PRN - O2/gases  polyethylene glycol, 17 g, Daily PRN      OBJECTIVE:    Vitals:    12/10/24 1100   BP:    Pulse: 77   Resp: 19   Temp:    SpO2: 97%          Intake/Output Summary (Last 24 hours) at 12/10/2024 1146  Last data filed at 12/10/2024 0830  Gross per 24 hour   Intake 1010 ml   Output --   Net 1010 ml       General appearance: Tach  Eyes: non-icteric  Skin: no apparent rash  Heart: [id number]  Lungs: CTA bilat no wheezing/crackles  Abdomen: soft, nt/nd  Extremities: trace edema  Access: None    Blood Labs:  Results for orders placed or performed during the hospital encounter of 11/18/24 (from the past 24 hours)   POCT GLUCOSE   Result Value Ref Range    POCT Glucose 119 (H) 74 - 99 mg/dL   Electrocardiogram, 12-lead PRN ACS symptoms   Result Value Ref Range    Ventricular Rate 120 BPM    Atrial Rate 326 BPM    QRS  Duration 94 ms    QT Interval 320 ms    QTC Calculation(Bazett) 452 ms    R Axis 31 degrees    T Axis 244 degrees    QRS Count 20 beats    Q Onset 214 ms    T Offset 374 ms    QTC Fredericia 403 ms   POCT GLUCOSE   Result Value Ref Range    POCT Glucose 139 (H) 74 - 99 mg/dL   POCT GLUCOSE   Result Value Ref Range    POCT Glucose 178 (H) 74 - 99 mg/dL   POCT GLUCOSE   Result Value Ref Range    POCT Glucose 162 (H) 74 - 99 mg/dL   CBC and Auto Differential   Result Value Ref Range    WBC 12.2 (H) 4.4 - 11.3 x10*3/uL    nRBC 0.7 (H) 0.0 - 0.0 /100 WBCs    RBC 2.28 (L) 4.00 - 5.20 x10*6/uL    Hemoglobin 7.2 (L) 12.0 - 16.0 g/dL    Hematocrit 21.0 (L) 36.0 - 46.0 %    MCV 92 80 - 100 fL    MCH 31.6 26.0 - 34.0 pg    MCHC 34.3 32.0 - 36.0 g/dL    RDW 16.2 (H) 11.5 - 14.5 %    Platelets 174 150 - 450 x10*3/uL    Neutrophils % 87.1 40.0 - 80.0 %    Immature Granulocytes %, Automated 0.6 0.0 - 0.9 %    Lymphocytes % 5.7 13.0 - 44.0 %    Monocytes % 6.3 2.0 - 10.0 %    Eosinophils % 0.2 0.0 - 6.0 %    Basophils % 0.1 0.0 - 2.0 %    Neutrophils Absolute 10.60 (H) 1.20 - 7.70 x10*3/uL    Immature Granulocytes Absolute, Automated 0.07 0.00 - 0.70 x10*3/uL    Lymphocytes Absolute 0.69 (L) 1.20 - 4.80 x10*3/uL    Monocytes Absolute 0.77 0.10 - 1.00 x10*3/uL    Eosinophils Absolute 0.02 0.00 - 0.70 x10*3/uL    Basophils Absolute 0.01 0.00 - 0.10 x10*3/uL   Renal function panel   Result Value Ref Range    Glucose 150 (H) 74 - 99 mg/dL    Sodium 128 (L) 136 - 145 mmol/L    Potassium 3.2 (L) 3.5 - 5.3 mmol/L    Chloride 90 (L) 98 - 107 mmol/L    Bicarbonate 22 21 - 32 mmol/L    Anion Gap 19 10 - 20 mmol/L    Urea Nitrogen 52 (H) 6 - 23 mg/dL    Creatinine 6.46 (H) 0.50 - 1.05 mg/dL    eGFR 7 (L) >60 mL/min/1.73m*2    Calcium 7.2 (L) 8.6 - 10.6 mg/dL    Phosphorus 5.5 (H) 2.5 - 4.9 mg/dL    Albumin 2.4 (L) 3.4 - 5.0 g/dL   Magnesium   Result Value Ref Range    Magnesium 1.93 1.60 - 2.40 mg/dL   Vancomycin   Result Value Ref Range     Vancomycin 19.7 5.0 - 20.0 ug/mL   POCT GLUCOSE   Result Value Ref Range    POCT Glucose 130 (H) 74 - 99 mg/dL      US KUB 11/26  IMPRESSION:  1. Increased renal cortical echogenicity and lobular appearance of the kidneys bilaterally, likely medical renal disease, with relatively atrophic left kidney. No hydronephrosis.  2. Partially visualized bilateral pleural effusions and trace  abdominal ascites.        ASSESSMENT:  Humaira Huang is a  64 y.o.  Year old , with PMHx of  pancreatitis, DVT/PE, HLD, HTN, CABGx4 1997, T2DM, GERD, PAD, chronic mesenteric ischemia, tobacco use who presented to [facility] on 11/18/2024 as a transfer from [facility] with chest, back, and abdominal pain. 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 Vascular and Cardiac surgery following. CICU course c/b PEA arrest 11/20, now intubated. Nephrology following for TRACY.     #anuric TRACY-D Baseline creatinine 1.5   - Hemodynamics-not on any pressor support  - Etiology :TRACY likely in setting recent hemodynamic insult with PEA.  - 119 ml in bladder scans 12/10  - last SLED 12/1 night then transitioned to iHD      RECOMMENDATIONS:  - will get TDC today and then will keep her on TTS schedule, dialysis today  - bladder scan BID please  - Strict I/Os.  - No contrast or nephrotoxic drugs if possible.  - will follow      Elise Graham MD  Nephrology Fellow   Daytime / Weekend Renal Pager [phone]  After 7 pm Emergencies [phone] Pager [phone]

## 2024-12-10 NOTE — SIGNIFICANT EVENT
Evaluation of the PEG tube  PEG tube placed 12/6.  Called by primary team for concerns of abdominal distension n/v.      Flushed PEG tube with 60cc of saline and subsequent draw back of gastric contents/bile. KUB from 12/10 AM showing nonobstructive bowel gas pattern and G tube balloon projecting over gastric body.     Abdominal exam: soft, mild tenderness in the epigastric region, no visible distension, no evidence of peritonitis, no evidence of rebound or guarding. PEG tube remains at 5cm at the skin and able to move freely.    A/P  - Low suspicion for G tube displacement -> consistent with physical exam and aspiration of the gastric contents on examination of the tube itself  - please obtain a G tube contrast study: KUB prior to contrast, then contrast via G tube and KUB immediately following contrast administration, and then a repeat KUB 5-10mins following contrast administration.  - keep NPO pending G-tube contrast study  - work up of vomiting and stomach distension per primary  - rest of care per primary  - please page with any questions or concerns    Patient discussed with Dr. Ernestine Whaley MD  PGY-1   [facility]  Service Pager: [phone]

## 2024-12-10 NOTE — PROGRESS NOTES
"Vancomycin Dosing by Pharmacy    Humaira Huang is a 64 y.o. year old female who Pharmacy has been consulted for vancomycin dosing for pneumonia. Based on the patient's indication and renal status this patient is being dosed based on a goal pre-HD level of 20-25.     Renal function is currently ESRD on iHD (TTS).      Current vancomycin dose: 500 mg given after dialysis  Most recent trough level: 19.7 mcg/mL    Visit Vitals  /57   Pulse 76   Temp 35.8 °C (96.4 °F) (Temporal)   Resp 14   Ht 1.575 m (5' 2\")   Wt 66.2 kg (146 lb)   SpO2 98%   BMI 26.70 kg/m²   BSA 1.7 m²        Lab Results   Component Value Date    CREATININE 6.46 (H) 12/10/2024    CREATININE 6.37 (H) 12/09/2024    CREATININE 4.95 (H) 12/08/2024    CREATININE 4.47 (H) 12/07/2024        Lab Results   Component Value Date    VANCORANDOM 19.7 12/10/2024    VANCORANDOM 24.8 (H) 12/07/2024    VANCORANDOM 8.2 12/06/2024        I/O last 3 completed shifts:  In: 1456.1 (21.5 mL/kg) [I.V.:216.1 (3.2 mL/kg); NG/GT:940; IV Piggyback:300]  Out: 285 (4.2 mL/kg) [Urine:55 (0 mL/kg/hr); Emesis/NG output:100; Stool:130]  Weight: 67.6 kg       Staph/MRSA Screen Culture   Date/Time Value Ref Range Status   12/03/2024 04:53 AM (A)  Final    Isolated: Methicillin Susceptible Staphylococcus aureus (MSSA)     Urine Culture   Date/Time Value Ref Range Status   11/25/2024 12:22 PM No growth  Final     Blood Culture   Date/Time Value Ref Range Status   12/06/2024 03:43 AM No growth at 3 days  Preliminary     Gram Stain   Date/Time Value Ref Range Status   12/06/2024 11:38 AM (2+) Few Polymorphonuclear leukocytes  Final   12/06/2024 11:38 AM No organisms seen  Final          Assessment/Plan    Within goal random/trough level    The next level will be obtained on 12/17/24 at 1st AM labs. May be obtained sooner if clinically indicated.   Will continue to monitor renal function daily while on vancomycin and order serum creatinine at least every 48 hours if not already " ordered.  Follow for continued vancomycin needs, clinical response, and signs/symptoms of toxicity.     [place]ana Mcpherson, PharmD

## 2024-12-10 NOTE — PROGRESS NOTES
Subjective   Vomited once overnight and got a 500 mL bolus for low blood pressures (80/50). Abdominal x-ray showing ileus. Stomach pain improved after vomiting, still has some stomach pain this morning.     Meds  Scheduled medications  aspirin, 81 mg, oral, Daily  atorvastatin, 80 mg, oral, Nightly  folic acid, 1 mg, oral, Daily  heparin, 80 Units/kg, intravenous, Once  insulin lispro, 0-10 Units, subcutaneous, q4h  lactated Ringer's, 1,000 mL, intravenous, Once  lidocaine, 1 patch, transdermal, Daily  metoprolol tartrate, 12.5 mg, oral, q6h  oxygen, , inhalation, Continuous - Inhalation  pantoprazole, 40 mg, intravenous, BID  piperacillin-tazobactam, 2.25 g, intravenous, q8h  potassium chloride, 20 mEq, intravenous, q2h  QUEtiapine, 25 mg, oral, Nightly  sennosides, 1 tablet, oral, BID  sevelamer carbonate, 800 mg, oral, TID  thiamine, 100 mg, oral, Daily  vancomycin, 500 mg, intravenous, Once per day on Tuesday Thursday Saturday      Continuous medications     PRN medications  PRN medications: acetaminophen **OR** acetaminophen **OR** acetaminophen, alteplase, dextrose, dextrose, dicyclomine, fentaNYL, glucagon, hydrOXYzine HCL, ipratropium-albuteroL, ondansetron, oxyCODONE, oxygen, polyethylene glycol, vancomycin      Objective   Vital signs in last 24 hours:  Temp:  [35.7 °C (96.3 °F)-36.4 °C (97.5 °F)] 35.8 °C (96.4 °F)  Heart Rate:  [] 76  Resp:  [12-32] 14  Arterial Line BP 1: ()/() 110/72  FiO2 (%):  [40 %] 40 %    Intake/Output this shift:    Intake/Output Summary (Last 24 hours) at 12/10/2024 0655  Last data filed at 12/10/2024 0317  Gross per 24 hour   Intake 833.29 ml   Output --   Net 833.29 ml       Physical  General: Patient is awake, normal body habitus  Pulm: Normal WOB at rest, no crackles or rhonchi  Cardiac: Regular rate and rhythm, normal S1/S2  Abdomen: Tenderness to palpation in epigastric area, non-distended  Extremities: No peripheral edema,  Neuro: Patient awakens to  voice, following commands    Results  Results for orders placed or performed during the hospital encounter of 11/18/24 (from the past 24 hours)   POCT GLUCOSE   Result Value Ref Range    POCT Glucose 169 (H) 74 - 99 mg/dL   POCT GLUCOSE   Result Value Ref Range    POCT Glucose 119 (H) 74 - 99 mg/dL   Electrocardiogram, 12-lead PRN ACS symptoms   Result Value Ref Range    Ventricular Rate 120 BPM    Atrial Rate 326 BPM    QRS Duration 94 ms    QT Interval 320 ms    QTC Calculation(Bazett) 452 ms    R Axis 31 degrees    T Axis 244 degrees    QRS Count 20 beats    Q Onset 214 ms    T Offset 374 ms    QTC Fredericia 403 ms   POCT GLUCOSE   Result Value Ref Range    POCT Glucose 139 (H) 74 - 99 mg/dL   POCT GLUCOSE   Result Value Ref Range    POCT Glucose 178 (H) 74 - 99 mg/dL   POCT GLUCOSE   Result Value Ref Range    POCT Glucose 162 (H) 74 - 99 mg/dL   CBC and Auto Differential   Result Value Ref Range    WBC 12.2 (H) 4.4 - 11.3 x10*3/uL    nRBC 0.7 (H) 0.0 - 0.0 /100 WBCs    RBC 2.28 (L) 4.00 - 5.20 x10*6/uL    Hemoglobin 7.2 (L) 12.0 - 16.0 g/dL    Hematocrit 21.0 (L) 36.0 - 46.0 %    MCV 92 80 - 100 fL    MCH 31.6 26.0 - 34.0 pg    MCHC 34.3 32.0 - 36.0 g/dL    RDW 16.2 (H) 11.5 - 14.5 %    Platelets 174 150 - 450 x10*3/uL    Neutrophils % 87.1 40.0 - 80.0 %    Immature Granulocytes %, Automated 0.6 0.0 - 0.9 %    Lymphocytes % 5.7 13.0 - 44.0 %    Monocytes % 6.3 2.0 - 10.0 %    Eosinophils % 0.2 0.0 - 6.0 %    Basophils % 0.1 0.0 - 2.0 %    Neutrophils Absolute 10.60 (H) 1.20 - 7.70 x10*3/uL    Immature Granulocytes Absolute, Automated 0.07 0.00 - 0.70 x10*3/uL    Lymphocytes Absolute 0.69 (L) 1.20 - 4.80 x10*3/uL    Monocytes Absolute 0.77 0.10 - 1.00 x10*3/uL    Eosinophils Absolute 0.02 0.00 - 0.70 x10*3/uL    Basophils Absolute 0.01 0.00 - 0.10 x10*3/uL   Renal function panel   Result Value Ref Range    Glucose 150 (H) 74 - 99 mg/dL    Sodium 128 (L) 136 - 145 mmol/L    Potassium 3.2 (L) 3.5 - 5.3 mmol/L     Chloride 90 (L) 98 - 107 mmol/L    Bicarbonate 22 21 - 32 mmol/L    Anion Gap 19 10 - 20 mmol/L    Urea Nitrogen 52 (H) 6 - 23 mg/dL    Creatinine 6.46 (H) 0.50 - 1.05 mg/dL    eGFR 7 (L) >60 mL/min/1.73m*2    Calcium 7.2 (L) 8.6 - 10.6 mg/dL    Phosphorus 5.5 (H) 2.5 - 4.9 mg/dL    Albumin 2.4 (L) 3.4 - 5.0 g/dL   Magnesium   Result Value Ref Range    Magnesium 1.93 1.60 - 2.40 mg/dL   Vancomycin   Result Value Ref Range    Vancomycin 19.7 5.0 - 20.0 ug/mL       Imaging  XR chest 1 view    Result Date: 12/10/2024  STUDY: XR CHEST 1 VIEW;  12/9/2024 10:12 pm   INDICATION: Signs/Symptoms:emesis, rule out aspiration.     COMPARISON: Chest radiograph 12/08/2024, CT chest 11/18/2024.   ACCESSION NUMBER(S): [id number]   ORDERING CLINICIAN: CARL GILLOMBARDO   FINDINGS: AP radiograph of the chest was provided.   MEDICAL DEVICES: Tracheostomy appears properly positioned. Status post median sternotomy with intact appearing wires in similar configuration to prior exam. Right IJ CVC distal tip overlying the lower SVC. Enteric contrast opacifying the stomach.   CARDIOMEDIASTINAL SILHOUETTE: Cardiomediastinal silhouette is stable in size and configuration.   LUNGS: Interval decrease in prominent interstitial lung markings. Improving bibasilar opacities, now predominantly linear. No pneumothorax or pleural effusion.   ABDOMEN: No remarkable upper abdominal findings.   BONES: No acute osseous changes.       1. Improving bibasilar opacities which could represent atelectasis and/or consolidation. 2. Improving interstitial pulmonary edema.   I personally reviewed the images/study and I agree with the findings as stated by Dr. Linwood Lucia. This study was interpreted at [facility], [place], [place].   MACRO: None     Dictation workstation:   [id number]    Electrocardiogram, 12-lead PRN ACS symptoms    Result Date: 12/9/2024  Atrial flutter with variable AV block Low voltage QRS Nonspecific ST and T  wave abnormality Abnormal ECG When compared with ECG of 05-DEC-2024 21:05, No significant change was found    Vascular US Upper Extremity Venous Duplex Left    Result Date: 12/9/2024  Preliminary Cardiology Report           [place] [address]   Tel [phone] and Fax [phone]       Preliminary Vascular Lab Report  VASC US UPPER EXTREMITY VENOUS DUPLEX LEFT  Patient Name:      MARGARET COVINGTON Reading Physician:  [id number] Armand Renteria MD Study Date:        12/9/2024     Ordering Physician: [id number] BASSEM LEWIS MRN/PID:           [id number]      Technologist:       Juany Huerta RVT Accession#:        [id number]  Technologist 2: Date of Birth/Age: 1960      Encounter#:         [id number] Gender:            F Admission Status:  Inpatient     Location Performed: [facility]  Diagnosis/ICD: Left arm swelling-M79.89 Procedure/CPT: 96473 Peripheral venous duplex scan for DVT Limited  PRELIMINARY CONCLUSIONS: Right Upper Venous: The subclavian vein demonstrates a normal spontaneous and phasic flow. Left Upper Venous: No evidence of acute deep vein thrombus visualized in the left upper extremity. Limited visualization of IJV due to trach placement.  Imaging & Doppler Findings:  Right             Thrombus   Flow Subclavian Distal   None   Pulsatile  Left                Compress Thrombus   Flow Internal Jugular      Yes      None   Pulsatile Subclavian            Yes      None Subclavian Proximal   Yes      None   Pulsatile Subclavian Mid        Yes      None Subclavian Distal     Yes      None   Pulsatile Axillary              Yes      None   Pulsatile Brachial              Yes      None Cephalic              Yes      None Basilic               Yes      None VASCULAR PRELIMINARY REPORT completed by Juany Huerta RVT on 12/9/2024 at 9:10:04 AM  ** Final **     XR chest 1 view    Result Date: 12/8/2024  Interpreted By:  Vernon Santos  and Kitty  Merlyn STUDY: XR CHEST 1 VIEW;  12/8/2024 8:01 am; 12/8/2024 6:50 am   INDICATION: Signs/Symptoms:sob; Signs/Symptoms:intubated.     COMPARISON: Chest x-ray 12/08/2024-12/04/2024   ACCESSION NUMBER(S): [id number]; [id number]   ORDERING CLINICIAN: CARL GILLOMBARDO; MEISAM MOGHBELLI   FINDINGS: AP radiograph of the chest was provided at 06:48 and 07:58 AM. Dictation will be based on the most recent radiograph.   LINES/TUBES/DEVICES: Similar positioning of tracheostomy cannula Right IJ central venous catheter with tip overlying superior cavoatrial junction. Postsurgical changes from median sternotomy.   CARDIOMEDIASTINAL SILHOUETTE: Cardiomediastinal silhouette is stable in size and configuration.   LUNGS: Persistent bibasilar airspace opacities of the blunting of the costophrenic angles. No evidence of pneumothorax. Increased interstitial markings of the lungs.   ABDOMEN: No remarkable upper abdominal findings.   BONES: No acute osseous changes.       1. Persistent bibasilar perfusion and bibasilar infiltrate which might be due to atelectasis or infectious process. 2. Persistent interstitial prominence and ground-glass opacity in keeping with component of interstitial pulmonary edema.   I personally reviewed the images/study and I agree with the findings as stated by Merlyn Tang DO, PGY-3. This study was interpreted at [facility], [place], [place].   MACRO: None   Signed by: Vernon Franklin 12/8/2024 11:05 AM Dictation workstation:   [id number]        Assessment/Plan   Principal Problem:    Dissection of aorta  Active Problems:    ESRD on hemodialysis (Multi)    Ruptured aortic aneurysm (Multi)    Anemia due to acute blood loss    Cardiac arrest  64 y.o. female with history of pancreatitis, DVT/PE, HLD, HTN, CABGx4 1997, T2DM, GERD, PAD, chronic mesenteric ischemia, tobacco use who presented to [facility] on 11/18/[facility]24 as a transfer from [facility]  Hospital with chest, back, and abdominal pain. Now post PEA arrest, with intubation/sedation. Family agreed for trach/PEG and LTAC. Patient had septic episode 12/6 overnight and underwent trach/PEG.      Updates:  -Needs dialysis today, will do line exchange today  -White count improving  -Will do a 5 day course of antibiotics  -Folate, B12 wnl, Retic index low 119,000  -Start senna BID for ileus seen on KUB  -Continue SBT/SAT (doing well with CPAP trial this morning)  -Hold tube feeds  -LTAC list sent to soon     Neuro:  #Sedation/anxiety  -No sedation  -Quetiapine 25 mg nightly     Cardiac:   #Aflutter/Afib with RVR  #Driven by anxiety vs hypovolemia  ::now sinus tachycardia  -HR in 160-180s on 12/5 am  -s/p one dose of digoxin 250 mcg (digoxin is not dialyzable)  -s/p amio bolus 12/6 for RVR  -Continue metoprolol 12.5 mg Q6H     #PEA arrest 11/20  ::likely hypoxia driven: PNA vs aspiration     #Aortic dissection  #Mural Thrombus  #Distal aortic arch saccular aneurysm   ::CTA CAP 11/18- 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   -Vascular surgery discussed case at aortic conference and there are no plans for surgery, palliative measures recommended     #NSTEMI  #CAD s/p CABGx4 1997  #PAD  #HTN #DLD  ::EKG- NSR, rate 71, TWI aVL, V1-V6, 1mm ORQUIDEA in aVR  ::Trop peak 5700  ::Lipid HDL 41.5, , TG 75, Chol 157  -Holding home amlodipine 10mg, imdur 30mg, lisinopril 2.5mg, metop tartrate 50mg bid iso hypotension   -Continue ASA and atorvastatin  -Holding plavix 75mg. Can resume after all procedures are done (TDC, trach/PEG)     #HFrecEF (45-50%, 11/19/24)  #Moderate/Severe MR  -TTE on 12/6 with EF of 60-65% with moderate to severe mitral regurgitation  -Not on GDMT     Pulm:  #Acute hypoxic respiratory failure, post-arrest  :s/p Zosyn 11/20 - 11/26 for PNA  :: s/p Vancomycin   -Failed extubation on  11/28  -Failed SBT on 12/9     GI:  #Concern for Ileus  -Started Senna 8.6 mg BID  -KUB yesterday with stomach distension  -Repeat KUB, can consider to do low intermittent suction on PEG or talk with ACS if continues to be distended     Renal:  #TRACY on CKD likely 2/2 arrest  #Anuric  -Worsening electrolytes today, may plan for iHD  -Sevalemar carbonate 800 mg TID     #Hyponatremia  -Dialysis today     Endo:   #T2DM  -Hold home metformin   -POC Glucose: 119-162  -Increase to SSI2 from SSI1     #Tube Feeds  :: goal, 20 ml/hr  -f/up nutrition  -Currently at goal     Infectious  #sepsis  -fu blood and sputum culutre  -Trach aspirates growing steno  -Blood cultures NGTD at 3 days  - vanc zosyn (12/6- 12/10)     Hem/Onc  #Hypoproliferative Anemia  #Hemolytic transfusion reaction  -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

## 2024-12-10 NOTE — PROGRESS NOTES
"Nutrition Follow Up Assessment:   Nutrition Assessment       Patient is a 64 y.o. female presenting on admission day 21. Pt is now s/p trach/PEG placement. Plan for dialysis today. Planned for 5 day course of abx. Has had improving white cell count. On SBT/SAT. Per MD pt was doing well with CPAP trial this morning. Pt with abdominal distention. Ileus was seen on KUB. TF to be held.       Nutrition History:  Food and Nutrient History: Pt has had order for Two Kevin HN @ 20ml/hr. NPO today d/t c/f ileus. Per flowsheets, pt was documented to have received Isosource 1.5 @ 10ml/hr last night.    Anthropometrics:  Height: 157.5 cm (5' 2\")   Weight: 66.2 kg (146 lb)   BMI (Calculated): 26.7  IBW/kg (Dietitian Calculated): 50 kg  Percent of IBW: 110 %       Weight History:   Date/Time Weight   12/10/24 1147 66.2 kg (146 lb)   12/10/24 0000 66.2 kg (146 lb)   12/09/24 0400 67.6 kg (149 lb 0.5 oz)   12/08/24 0600 63 kg (138 lb 14.2 oz)   12/07/24 2349 63 kg (138 lb 14.2 oz)   12/07/24 0600 62.2 kg (137 lb 2 oz)   12/06/24 0527 62.2 kg (137 lb 2 oz)   12/04/24 0000 58.4 kg (128 lb 11.2 oz)   12/03/24 1110 58.6 kg (129 lb 3 oz)   12/03/24 0600 58.6 kg (129 lb 3 oz)   12/03/24 0000 58.6 kg (129 lb 3.2 oz)   12/02/24 0110 62.3 kg (137 lb 5.6 oz)   12/01/24 1700 59.6 kg (131 lb 8 oz)   12/01/24 0000 60.9 kg (134 lb 3.2 oz)   11/30/24 0456 60.5 kg (133 lb 6.1 oz)   11/29/24 0559 60.6 kg (133 lb 9.6 oz)   11/28/24 2312 60.6 kg (133 lb 9.6 oz)     Admit Weight: 49.2 kg (108 lb 7.5 oz)     Weight Change %:  Weight History / % Weight Change: weight has been trending upwards over admission- fluid related    Nutrition Focused Physical Exam Findings:  Edema:  Edema: +1 trace  Edema Location: generalized  Physical Findings:  Skin: Positive (DTPI to coccyx, + left heel)    Nutrition Significant Labs:  CBC Trend:   Results from last 7 days   Lab Units 12/10/24  0312 12/09/24  0501 12/09/24  0042 12/07/24  2136   WBC AUTO x10*3/uL 12.2* 13.8* " "13.2* 14.6*   RBC AUTO x10*6/uL 2.28* 2.48* 2.46* 2.98*   HEMOGLOBIN g/dL 7.2* 7.6* 7.6* 9.2*   HEMATOCRIT % 21.0* 21.7* 21.6* 27.7*   MCV fL 92 88 88 93   PLATELETS AUTO x10*3/uL 174 177 188 212    , BMP Trend:   Results from last 7 days   Lab Units 12/10/24  0312 12/09/24  0501 12/08/24  0253 12/07/24  0606   GLUCOSE mg/dL 150* 217* 265* 214*   CALCIUM mg/dL 7.2* 7.0* 6.9* 6.9*   SODIUM mmol/L 128* 129* 127* 128*   POTASSIUM mmol/L 3.2* 3.3* 4.4 4.4   CO2 mmol/L 22 20* 23 25   CHLORIDE mmol/L 90* 90* 91* 92*   BUN mg/dL 52* 48* 36* 30*   CREATININE mg/dL 6.46* 6.37* 4.95* 4.47*    , Renal Lab Trend:   Results from last 7 days   Lab Units 12/10/24  0312 12/09/24  0501 12/08/24  0253 12/07/24  0606   POTASSIUM mmol/L 3.2* 3.3* 4.4 4.4   PHOSPHORUS mg/dL 5.5* 4.7 5.4* 4.8   SODIUM mmol/L 128* 129* 127* 128*   MAGNESIUM mg/dL 1.93 2.01 1.89  --    EGFR mL/min/1.73m*2 7* 7* 9* 10*   BUN mg/dL 52* 48* 36* 30*   CREATININE mg/dL 6.46* 6.37* 4.95* 4.47*    , Vit D: No results found for: \"VITD25\" , Vit B12:   Lab Results   Component Value Date    [id number] 1,370 (H) 12/09/2024        Nutrition Specific Medications:  Scheduled medications  [START ON 12/11/2024] aspirin, 81 mg, g-tube, Daily  atorvastatin, 80 mg, g-tube, Nightly  [START ON 12/11/2024] folic acid, 1 mg, g-tube, Daily  heparin, 80 Units/kg, intravenous, Once  insulin lispro, 0-10 Units, subcutaneous, q4h  lactated Ringer's, 1,000 mL, intravenous, Once  lidocaine, 1 patch, transdermal, Daily  metoprolol tartrate, 12.5 mg, g-tube, q6h  oxygen, , inhalation, Continuous - Inhalation  pantoprazole, 40 mg, intravenous, BID  piperacillin-tazobactam, 2.25 g, intravenous, q8h  QUEtiapine, 25 mg, g-tube, Nightly  sennosides, 2 tablet, g-tube, BID  sevelamer carbonate, 0.8 g, nasogastric tube, TID  [START ON 12/11/2024] thiamine, 100 mg, g-tube, Daily      Continuous medications     PRN medications  PRN medications: acetaminophen **OR** acetaminophen **OR** " acetaminophen, alteplase, dextrose, dextrose, dicyclomine, fentaNYL, glucagon, hydrOXYzine HCL, ipratropium-albuteroL, ondansetron, oxyCODONE, oxygen, polyethylene glycol      I/O:   Last BM Date: 12/09/24; Stool Appearance: Loose (12/09/24 2000)    Dietary Orders (From admission, onward)       Start     Ordered    12/11/24 0500  Enteral feeding with NPO TwoCal HN; 20; 150 (mL); Water; Bottled water; Every 6 hours  Diet effective tomorrow        Question Answer Comment   Tube feeding formula: TwoCal HN    Tube feeding continuous rate (mL/hr): 20    Tube feeding flush (mL): 150 mL   Flush type: Water    Water type: Bottled water    Flush frequency: Every 6 hours        12/10/24 0722                     Estimated Needs:   Total Energy Estimated Needs (kCal):  (7290-4514)  Method for Estimating Needs: 25-30kcal/kg x admit wt  Total Protein Estimated Needs (g): 65 g  Method for Estimating Needs: 1.3g/kg admit wt (+)  Total Fluid Estimated Needs (mL):  (per MD/team)  Method for Estimating Needs: per MD/team        Nutrition Diagnosis   Malnutrition Diagnosis  Patient has Malnutrition Diagnosis: Yes  Diagnosis Status: Ongoing  Malnutrition Diagnosis: Severe malnutrition related to acute disease or injury  As Evidenced by: < 50% estimated energy needs for >/= 5 days, mild- moderate subcutaneous fat loss + muscle wasting            Nutrition Interventions/Recommendations         Nutrition Prescription:  Individualized Nutrition Prescription Provided for : enteral nutrition        Nutrition Interventions:   Interventions: Enteral intake  Once able to restart TF recommend trying Glucerna 1.5 as glucose has frequently been > 180mg/dL   Glucerna 1.5 @ 35ml/hr goal   Start @ 15ml/hr and increase [id number] until goal is met  FWF per MD/team discretion     Glucerna 1.5 @ 35ml/hr= 1260kcal, 69gm protein, and 638ml free water    Nutrition Monitoring and Evaluation   Food/Nutrient Related History Monitoring  Monitoring and Evaluation  Plan: Enteral and parenteral nutrition intake  Enteral and Parenteral Nutrition Intake: Enteral nutrition intake  Criteria: Tolerate TF @ goal- TF to meet > 75% estimated energy needs    Body Composition/Growth/Weight History  Monitoring and Evaluation Plan: Weight, Weight change  Criteria: minimze loss    Biochemical Data, Medical Tests and Procedures  Monitoring and Evaluation Plan: Electrolyte/renal panel, Glucose/endocrine profile  Electrolyte and Renal Panel: Magnesium, Phosphorus, Potassium  Criteria: 140-180mg/dL              Time Spent (min): 60 minutes

## 2024-12-11 LAB
ABO GROUP (TYPE) IN BLOOD: NORMAL
ALBUMIN SERPL BCP-MCNC: 2.4 G/DL (ref 3.4–5)
ANION GAP SERPL CALC-SCNC: 19 MMOL/L (ref 10–20)
ANTIBODY SCREEN: NORMAL
BASOPHILS # BLD AUTO: 0.01 X10*3/UL (ref 0–0.1)
BASOPHILS NFR BLD AUTO: 0.1 %
BB ANTIBODY IDENTIFICATION: NORMAL
BUN SERPL-MCNC: 25 MG/DL (ref 6–23)
CALCIUM SERPL-MCNC: 7.7 MG/DL (ref 8.6–10.6)
CASE #: NORMAL
CHLORIDE SERPL-SCNC: 95 MMOL/L (ref 98–107)
CO2 SERPL-SCNC: 24 MMOL/L (ref 21–32)
CREAT SERPL-MCNC: 4.02 MG/DL (ref 0.5–1.05)
DAT-POLYSPECIFIC: NORMAL
EGFRCR SERPLBLD CKD-EPI 2021: 12 ML/MIN/1.73M*2
EOSINOPHIL # BLD AUTO: 0.06 X10*3/UL (ref 0–0.7)
EOSINOPHIL NFR BLD AUTO: 0.7 %
ERYTHROCYTE [DISTWIDTH] IN BLOOD BY AUTOMATED COUNT: 16.3 % (ref 11.5–14.5)
GLUCOSE BLD MANUAL STRIP-MCNC: 100 MG/DL (ref 74–99)
GLUCOSE BLD MANUAL STRIP-MCNC: 144 MG/DL (ref 74–99)
GLUCOSE BLD MANUAL STRIP-MCNC: 161 MG/DL (ref 74–99)
GLUCOSE BLD MANUAL STRIP-MCNC: 167 MG/DL (ref 74–99)
GLUCOSE BLD MANUAL STRIP-MCNC: 84 MG/DL (ref 74–99)
GLUCOSE BLD MANUAL STRIP-MCNC: 92 MG/DL (ref 74–99)
GLUCOSE BLD MANUAL STRIP-MCNC: 94 MG/DL (ref 74–99)
GLUCOSE SERPL-MCNC: 95 MG/DL (ref 74–99)
HCT VFR BLD AUTO: 21.3 % (ref 36–46)
HGB BLD-MCNC: 7.4 G/DL (ref 12–16)
IMM GRANULOCYTES # BLD AUTO: 0.04 X10*3/UL (ref 0–0.7)
IMM GRANULOCYTES NFR BLD AUTO: 0.4 % (ref 0–0.9)
LYMPHOCYTES # BLD AUTO: 0.61 X10*3/UL (ref 1.2–4.8)
LYMPHOCYTES NFR BLD AUTO: 6.8 %
MAGNESIUM SERPL-MCNC: 1.9 MG/DL (ref 1.6–2.4)
MCH RBC QN AUTO: 30.6 PG (ref 26–34)
MCHC RBC AUTO-ENTMCNC: 34.7 G/DL (ref 32–36)
MCV RBC AUTO: 88 FL (ref 80–100)
MONOCYTES # BLD AUTO: 0.69 X10*3/UL (ref 0.1–1)
MONOCYTES NFR BLD AUTO: 7.7 %
NEUTROPHILS # BLD AUTO: 7.5 X10*3/UL (ref 1.2–7.7)
NEUTROPHILS NFR BLD AUTO: 84.3 %
NRBC BLD-RTO: 1 /100 WBCS (ref 0–0)
PATH REV-IMMUNOHEMATOLOGY-PR30: NORMAL
PHOSPHATE SERPL-MCNC: 3.9 MG/DL (ref 2.5–4.9)
PLATELET # BLD AUTO: 169 X10*3/UL (ref 150–450)
POTASSIUM SERPL-SCNC: 3.5 MMOL/L (ref 3.5–5.3)
RBC # BLD AUTO: 2.42 X10*6/UL (ref 4–5.2)
RH FACTOR (ANTIGEN D): NORMAL
SODIUM SERPL-SCNC: 134 MMOL/L (ref 136–145)
WBC # BLD AUTO: 8.9 X10*3/UL (ref 4.4–11.3)

## 2024-12-11 PROCEDURE — 86901 BLOOD TYPING SEROLOGIC RH(D): CPT

## 2024-12-11 PROCEDURE — 2500000002 HC RX 250 W HCPCS SELF ADMINISTERED DRUGS (ALT 637 FOR MEDICARE OP, ALT 636 FOR OP/ED)

## 2024-12-11 PROCEDURE — 99232 SBSQ HOSP IP/OBS MODERATE 35: CPT

## 2024-12-11 PROCEDURE — 86077 PHYS BLOOD BANK SERV XMATCH: CPT

## 2024-12-11 PROCEDURE — 99232 SBSQ HOSP IP/OBS MODERATE 35: CPT | Performed by: STUDENT IN AN ORGANIZED HEALTH CARE EDUCATION/TRAINING PROGRAM

## 2024-12-11 PROCEDURE — 99291 CRITICAL CARE FIRST HOUR: CPT

## 2024-12-11 PROCEDURE — 83735 ASSAY OF MAGNESIUM: CPT

## 2024-12-11 PROCEDURE — 2500000004 HC RX 250 GENERAL PHARMACY W/ HCPCS (ALT 636 FOR OP/ED)

## 2024-12-11 PROCEDURE — 80069 RENAL FUNCTION PANEL: CPT

## 2024-12-11 PROCEDURE — 94003 VENT MGMT INPAT SUBQ DAY: CPT

## 2024-12-11 PROCEDURE — 97530 THERAPEUTIC ACTIVITIES: CPT | Mod: GP

## 2024-12-11 PROCEDURE — 85025 COMPLETE CBC W/AUTO DIFF WBC: CPT

## 2024-12-11 PROCEDURE — 82947 ASSAY GLUCOSE BLOOD QUANT: CPT

## 2024-12-11 PROCEDURE — 2500000001 HC RX 250 WO HCPCS SELF ADMINISTERED DRUGS (ALT 637 FOR MEDICARE OP)

## 2024-12-11 PROCEDURE — 2500000005 HC RX 250 GENERAL PHARMACY W/O HCPCS

## 2024-12-11 PROCEDURE — 2020000001 HC ICU ROOM DAILY

## 2024-12-11 PROCEDURE — 37799 UNLISTED PX VASCULAR SURGERY: CPT

## 2024-12-11 PROCEDURE — 86870 RBC ANTIBODY IDENTIFICATION: CPT

## 2024-12-11 PROCEDURE — 86880 COOMBS TEST DIRECT: CPT

## 2024-12-11 PROCEDURE — 97530 THERAPEUTIC ACTIVITIES: CPT | Mod: GO

## 2024-12-11 RX ORDER — METOPROLOL SUCCINATE 50 MG/1
50 TABLET, EXTENDED RELEASE ORAL DAILY
Status: DISCONTINUED | OUTPATIENT
Start: 2024-12-12 | End: 2024-12-14

## 2024-12-11 RX ADMIN — QUETIAPINE FUMARATE 25 MG: 25 TABLET ORAL at 20:34

## 2024-12-11 RX ADMIN — OXYCODONE HYDROCHLORIDE 2.5 MG: 5 TABLET ORAL at 17:25

## 2024-12-11 RX ADMIN — PIPERACILLIN SODIUM AND TAZOBACTAM SODIUM 2.25 G: 2; .25 INJECTION, SOLUTION INTRAVENOUS at 00:13

## 2024-12-11 RX ADMIN — FOLIC ACID 1 MG: 1 TABLET ORAL at 08:24

## 2024-12-11 RX ADMIN — METOPROLOL TARTRATE 12.5 MG: 25 TABLET, FILM COATED ORAL at 12:13

## 2024-12-11 RX ADMIN — PANTOPRAZOLE SODIUM 40 MG: 40 INJECTION, POWDER, FOR SOLUTION INTRAVENOUS at 08:24

## 2024-12-11 RX ADMIN — PANTOPRAZOLE SODIUM 40 MG: 40 INJECTION, POWDER, FOR SOLUTION INTRAVENOUS at 20:34

## 2024-12-11 RX ADMIN — THIAMINE HCL TAB 100 MG 100 MG: 100 TAB at 08:24

## 2024-12-11 RX ADMIN — SENNOSIDES 17.2 MG: 8.6 TABLET, FILM COATED ORAL at 20:34

## 2024-12-11 RX ADMIN — FENTANYL CITRATE 25 MCG: 50 INJECTION INTRAMUSCULAR; INTRAVENOUS at 20:34

## 2024-12-11 RX ADMIN — OXYCODONE HYDROCHLORIDE 2.5 MG: 5 TABLET ORAL at 12:13

## 2024-12-11 RX ADMIN — ASPIRIN 81 MG CHEWABLE TABLET 81 MG: 81 TABLET CHEWABLE at 08:24

## 2024-12-11 RX ADMIN — METOPROLOL TARTRATE 12.5 MG: 25 TABLET, FILM COATED ORAL at 06:30

## 2024-12-11 RX ADMIN — METOPROLOL TARTRATE 12.5 MG: 25 TABLET, FILM COATED ORAL at 00:13

## 2024-12-11 RX ADMIN — ATORVASTATIN CALCIUM 80 MG: 80 TABLET, FILM COATED ORAL at 20:34

## 2024-12-11 RX ADMIN — PIPERACILLIN SODIUM AND TAZOBACTAM SODIUM 2.25 G: 2; .25 INJECTION, SOLUTION INTRAVENOUS at 08:24

## 2024-12-11 RX ADMIN — SENNOSIDES 17.2 MG: 8.6 TABLET, FILM COATED ORAL at 08:24

## 2024-12-11 RX ADMIN — DICYCLOMINE HYDROCHLORIDE 10 MG: 10 CAPSULE ORAL at 20:34

## 2024-12-11 RX ADMIN — Medication 40 PERCENT: at 08:17

## 2024-12-11 RX ADMIN — INSULIN LISPRO 2 UNITS: 100 INJECTION, SOLUTION INTRAVENOUS; SUBCUTANEOUS at 20:36

## 2024-12-11 RX ADMIN — ACETAMINOPHEN 1000 MG: 160 SOLUTION ORAL at 20:34

## 2024-12-11 RX ADMIN — METOPROLOL TARTRATE 12.5 MG: 25 TABLET, FILM COATED ORAL at 17:30

## 2024-12-11 RX ADMIN — ONDANSETRON 4 MG: 2 INJECTION INTRAMUSCULAR; INTRAVENOUS at 20:34

## 2024-12-11 RX ADMIN — LIDOCAINE 4% 1 PATCH: 40 PATCH TOPICAL at 08:24

## 2024-12-11 ASSESSMENT — COGNITIVE AND FUNCTIONAL STATUS - GENERAL
CLIMB 3 TO 5 STEPS WITH RAILING: TOTAL
DRESSING REGULAR LOWER BODY CLOTHING: TOTAL
MOVING FROM LYING ON BACK TO SITTING ON SIDE OF FLAT BED WITH BEDRAILS: TOTAL
MOVING TO AND FROM BED TO CHAIR: TOTAL
PERSONAL GROOMING: A LOT
HELP NEEDED FOR BATHING: A LOT
STANDING UP FROM CHAIR USING ARMS: TOTAL
DRESSING REGULAR UPPER BODY CLOTHING: A LOT
TOILETING: TOTAL
EATING MEALS: TOTAL
WALKING IN HOSPITAL ROOM: TOTAL
MOBILITY SCORE: 6
DAILY ACTIVITIY SCORE: 9
TURNING FROM BACK TO SIDE WHILE IN FLAT BAD: TOTAL

## 2024-12-11 ASSESSMENT — PAIN SCALES - GENERAL
PAINLEVEL_OUTOF10: 7
PAINLEVEL_OUTOF10: 2

## 2024-12-11 ASSESSMENT — PAIN - FUNCTIONAL ASSESSMENT
PAIN_FUNCTIONAL_ASSESSMENT: 0-10
PAIN_FUNCTIONAL_ASSESSMENT: CPOT (CRITICAL CARE PAIN OBSERVATION TOOL)

## 2024-12-11 NOTE — CARE PLAN
The patient's goals for the shift include      The clinical goals for the shift include Pt will remai HDs    Over the shift, the patient did not make progress toward the following goals. Barriers to progression include complexity of condition. Recommendations to address these barriers include continue with plan of care.

## 2024-12-11 NOTE — PROGRESS NOTES
General Surgery Progress Note  Subjective    Subjective:  PEG still at 5cm at the skin. Tube feeds still turned off. Trach in place.      Objective    Objective:      Vital signs:   Temp:  [35.8 °C (96.4 °F)-36.3 °C (97.3 °F)] 35.8 °C (96.4 °F)  Heart Rate:  [64-89] 69  Resp:  [7-24] 15  Arterial Line BP 1: (110-164)/() 130/56  FiO2 (%):  [40 %] 40 %    Physical Exam:  General: no acute distress  Respiratory: trach in place  Cardiovascular: non-tachycardic on monitor  GI: soft, non-distended, shakes head no to tenderness to palpation. PEG at 5cm at the skin.  Skin: warm and dry  Neuro: shakes head yes and no to answer questions.    I/O last 2 completed shifts:  In: 1440 (22.4 mL/kg) [I.V.:600 (9.3 mL/kg); Other:400; NG/GT:190; IV Piggyback:250]  Out: 2780 (43.3 mL/kg) [Other:2780]  Weight: 64.2 kg      Labs Past 18 Hours:  Recent Results (from the past 18 hours)   POCT GLUCOSE    Collection Time: 12/10/24  8:19 PM   Result Value Ref Range    POCT Glucose 95 74 - 99 mg/dL   POCT GLUCOSE    Collection Time: 12/11/24 12:20 AM   Result Value Ref Range    POCT Glucose 94 74 - 99 mg/dL   CBC and Auto Differential    Collection Time: 12/11/24  3:58 AM   Result Value Ref Range    WBC 8.9 4.4 - 11.3 x10*3/uL    nRBC 1.0 (H) 0.0 - 0.0 /100 WBCs    RBC 2.42 (L) 4.00 - 5.20 x10*6/uL    Hemoglobin 7.4 (L) 12.0 - 16.0 g/dL    Hematocrit 21.3 (L) 36.0 - 46.0 %    MCV 88 80 - 100 fL    MCH 30.6 26.0 - 34.0 pg    MCHC 34.7 32.0 - 36.0 g/dL    RDW 16.3 (H) 11.5 - 14.5 %    Platelets 169 150 - 450 x10*3/uL    Neutrophils % 84.3 40.0 - 80.0 %    Immature Granulocytes %, Automated 0.4 0.0 - 0.9 %    Lymphocytes % 6.8 13.0 - 44.0 %    Monocytes % 7.7 2.0 - 10.0 %    Eosinophils % 0.7 0.0 - 6.0 %    Basophils % 0.1 0.0 - 2.0 %    Neutrophils Absolute 7.50 1.20 - 7.70 x10*3/uL    Immature Granulocytes Absolute, Automated 0.04 0.00 - 0.70 x10*3/uL    Lymphocytes Absolute 0.61 (L) 1.20 - 4.80 x10*3/uL    Monocytes Absolute 0.69 0.10  - 1.00 x10*3/uL    Eosinophils Absolute 0.06 0.00 - 0.70 x10*3/uL    Basophils Absolute 0.01 0.00 - 0.10 x10*3/uL   Renal function panel    Collection Time: 12/11/24  3:58 AM   Result Value Ref Range    Glucose 95 74 - 99 mg/dL    Sodium 134 (L) 136 - 145 mmol/L    Potassium 3.5 3.5 - 5.3 mmol/L    Chloride 95 (L) 98 - 107 mmol/L    Bicarbonate 24 21 - 32 mmol/L    Anion Gap 19 10 - 20 mmol/L    Urea Nitrogen 25 (H) 6 - 23 mg/dL    Creatinine 4.02 (H) 0.50 - 1.05 mg/dL    eGFR 12 (L) >60 mL/min/1.73m*2    Calcium 7.7 (L) 8.6 - 10.6 mg/dL    Phosphorus 3.9 2.5 - 4.9 mg/dL    Albumin 2.4 (L) 3.4 - 5.0 g/dL   Magnesium    Collection Time: 12/11/24  3:58 AM   Result Value Ref Range    Magnesium 1.90 1.60 - 2.40 mg/dL   Type and screen    Collection Time: 12/11/24  3:58 AM   Result Value Ref Range    ABO TYPE AB     Rh TYPE POS     ANTIBODY SCREEN POS    Direct Antiglobulin Test    Collection Time: 12/11/24  3:58 AM   Result Value Ref Range    RAFA-POLYSPECIFIC NEG    POCT GLUCOSE    Collection Time: 12/11/24  4:01 AM   Result Value Ref Range    POCT Glucose 100 (H) 74 - 99 mg/dL   POCT GLUCOSE    Collection Time: 12/11/24  7:29 AM   Result Value Ref Range    POCT Glucose 84 74 - 99 mg/dL        Meds:    Current Facility-Administered Medications:     acetaminophen (Tylenol) tablet 650 mg, 650 mg, oral, q4h PRN **OR** acetaminophen (Tylenol) oral liquid 1,000 mg, 1,000 mg, nasogastric tube, q8h PRN, 1,000 mg at 12/10/24 1503 **OR** acetaminophen (Tylenol) suppository 650 mg, 650 mg, rectal, q4h PRN, Jaime Mckeon MD    alteplase (Cathflo Activase) injection 2 mg, 2 mg, intra-catheter, PRN, Bright Mayen MD, 2 mg at 12/04/24 1011    aspirin chewable tablet 81 mg, 81 mg, g-tube, Daily, Jaime Mckeon MD, 81 mg at 12/11/24 0824    atorvastatin (Lipitor) tablet 80 mg, 80 mg, g-tube, Nightly, Jaime Mckeon MD, 80 mg at 12/10/24 2208    dextrose 50 % injection 12.5 g, 12.5 g, intravenous, q15 min PRN, Jaime Mckeon MD    dextrose  50 % injection 25 g, 25 g, intravenous, q15 min PRN, Jaime Mckeon MD    dicyclomine (Bentyl) capsule 10 mg, 10 mg, g-tube, 4x daily PRN, Jaime Mckeon MD    fentaNYL PF (Sublimaze) injection 25 mcg, 25 mcg, intravenous, q4h PRN, Bright Mayen MD, 25 mcg at 12/10/24 1222    folic acid (Folvite) tablet 1 mg, 1 mg, g-tube, Daily, Jaime Mckeon MD, 1 mg at 12/11/24 0824    glucagon (Glucagen) injection 1 mg, 1 mg, intramuscular, q15 min PRN, Jaime Mckeon MD    heparin bolus from bag 4,672 Units, 80 Units/kg, intravenous, Once, Bright Mayen MD    heparin flush 10 unit/mL syringe 10 Units, 10 Units, intra-catheter, PRN, Jaime Mckeon MD    hydrOXYzine HCL (Atarax) tablet 10 mg, 10 mg, g-tube, q6h PRN, Jaime Mckeon MD    insulin lispro injection 0-10 Units, 0-10 Units, subcutaneous, q4h, Jaime Mckeon MD, 2 Units at 12/10/24 1608    ipratropium-albuteroL (Duo-Neb) 0.5-2.5 mg/3 mL nebulizer solution 3 mL, 3 mL, nebulization, q6h PRN, Brgiht Mayen MD, 3 mL at 11/20/24 1905    lactated Ringer's bolus 1,000 mL, 1,000 mL, intravenous, Once, Bright Mayen MD    lidocaine 4 % patch 1 patch, 1 patch, transdermal, Daily, Jaime Mckeon MD, 1 patch at 12/11/24 0824    metoprolol tartrate (Lopressor) tablet 12.5 mg, 12.5 mg, g-tube, q6h, Jaime Mckeon MD, 12.5 mg at 12/11/24 0630    ondansetron (Zofran) injection 4 mg, 4 mg, intravenous, q6h PRN, Chayo Terrell MD, 4 mg at 12/09/24 2206    oxyCODONE (Roxicodone) immediate release tablet 2.5 mg, 2.5 mg, g-tube, q4h PRN, Jaime Mckeon MD, 2.5 mg at 12/10/24 1743    oxygen (O2) therapy, , inhalation, Continuous PRN - O2/gases, Bright Mayen MD, 40 percent at 12/08/24 1046    oxygen (O2) therapy, , inhalation, Continuous - Inhalation, Bright Mayen MD, 40 percent at 12/11/24 0817    [DISCONTINUED] pantoprazole (ProtoNix) EC tablet 40 mg, 40 mg, oral, Daily before breakfast **OR** pantoprazole (ProtoNix) injection 40 mg, 40 mg, intravenous, BID, Bright Mayen MD, 40 mg at 12/11/24  0824    polyethylene glycol (Glycolax, Miralax) packet 17 g, 17 g, oral, Daily PRN, Bright Mayen MD, 17 g at 11/28/24 0903    QUEtiapine (SEROquel) tablet 25 mg, 25 mg, g-tube, Nightly, Jaime Mckeon MD, 25 mg at 12/10/24 2208    sennosides (Senokot) tablet 17.2 mg, 2 tablet, g-tube, BID, Jaime Mckeon MD, 17.2 mg at 12/11/24 0824    thiamine (Vitamin B-1) tablet 100 mg, 100 mg, g-tube, Daily, Jaime Mckeon MD, 100 mg at 12/11/24 0824       Medications reviewed.  Vital signs reviewed.  Labs reviewed.   Imaging reviewed.      Assessment/Plan    Assessment and Plan:  63 YO F with PMH pancreatitis, DVT/PE, HLD, HTN, CABGx4 1997, T2DM, GERD, PAD, chronic mesenteric ischemia, tobacco use who presented to [facility] on 11/18/2024 as a transfer from [facility] with chest, back, and abdominal pain. 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 Vascular and Cardiac surgery following not offering intervention due to patient frailty and high risk nature of the procedure. CICU course c/b PEA arrest 11/20. Given the prolonged hospital course ongoing GOC discussion with family to determine direction of care. Patient option trach/ PEG with ultimate goal of LTAC over comfort care at this time. [facility] Consulted for PEG tube placement. PEG placed on 12/6. Re-engaged on 12/10 for concerns for abdominal distension, nausea, and vomiting.    Plan Today:   - low suspicion for G tube displacement consistent with physical exam and G-tube contrast KUB showing contrast in stomach  - work up of vomiting and concern for stomach distension per primary  - rest of care per primary  - please page with any questions or concerns    Patient discussed with Dr. Ernestine Whaley MD  PGY-1   [facility]  Service Pager: [phone]

## 2024-12-11 NOTE — PROGRESS NOTES
Physical Therapy    Physical Therapy Treatment    Patient Name: Humaira Huang  MRN: [id number]  Department: [facility]  Room: 14/14-A  Today's Date: 12/11/2024  Time Calculation  Start Time: 1139  Stop Time: 1202  Time Calculation (min): 23 min    Assessment/Plan   PT Assessment  PT Assessment Results: Decreased strength, Decreased range of motion, Decreased endurance, Impaired balance, Decreased mobility, Decreased cognition, Decreased skin integrity, Pain, Impaired judgement, Decreased safety awareness  Rehab Prognosis: Fair  Barriers to Discharge Home:  (medical acuity)  End of Session Communication: Bedside nurse  End of Session Patient Position: Bed, 3 rail up, Alarm off, not on at start of session (B wrist restraints donned)     PT Plan  Treatment/Interventions: Bed mobility, Transfer training, Balance training, Neuromuscular re-education, Strengthening, Endurance training, Range of motion, Therapeutic exercise, Therapeutic activity, Positioning, Postural re-education  PT Plan: Ongoing PT  PT Frequency: 3 times per week  PT Discharge Recommendations: Moderate intensity level of continued care  PT Recommended Transfer Status: Assist x2  PT - OK to Discharge: Yes    General Visit Information:   PT  Visit  PT Received On: 12/11/24  Response to Previous Treatment:  (Since last PT session- received tracheostomy and PEG tube placement on 12/6)  General  Family/Caregiver Present: No  Co-Treatment: OT  Co-Treatment Reason: Pt vent dependent with intent to mobilize to EOB  Prior to Session Communication: Bedside nurse  Patient Position Received: Bed, 3 rail up, Alarm off, not on at start of session (B wrist restraints)  General Comment: Pt wanting to sit at EOB when asked. Limited full assessment during session 2/2 pt's level of cooperation. Appeared to shake head yes to most questions.    Subjective     Precautions:  Precautions  Hearing/Visual Limitations: Pt kept eyes closed for 50% of the time. Opening eyes at EOB but  disengaged with therapists and not making eye contact for entirity of session.  Medical Precautions: Cardiac precautions, Fall precautions, Oxygen therapy device and L/min  Precautions Comment: Normotension per RN    Lines/Tubes:   Telemetry   R internal jugular Trialysis    Trach to vent (VC-AC, 40% FiO2, 5 PEEP)   PEG   Arterial line     Vital Signs     Vitals Session Pre PT During PT Post PT   Heart Rate 69  76   Resp 14  18   SpO2 99  100   /53  133/57     Objective     Pain:  Pain Assessment  Pain Assessment: 0-10  0-10 (Numeric) Pain Score:  (Pt nodding head yes to having pain- nodded head when asked if it was her back and her bottom. Appeared very uncomfortable throughout session and restless.)    Cognition:  Cognition  Overall Cognitive Status: Impaired  Arousal/Alertness: Inconsistent responses to stimuli (appeared to be limited at times behaviorally but pt also highly distractible)  Orientation Level:  (pt oriented to self, knew she was at the hospital. After those questions, pt did not appear to answer the remaining questions with 100% reliability. Pt nodded head yes to being 2021 as the year. Reorientation provided x 2 times.)  Following Commands: Follows one step commands with repetition (~50-75% one step. Limited at times by behavior/cooperation)  Cognition Comments: CAM-ICU (+)  Insight: Moderate  Impulsive: Moderately  Processing Speed: Delayed    Activity Tolerance:  Activity Tolerance  Early Mobility/Exercise Safety Screen: Proceed with mobilization - No exclusion criteria met    Treatments:   Therapeutic Activity  Therapeutic Activity Performed: Yes  Therapeutic Activity 1: Pt sat EOB total of 10 minutes with minAx1 2/2 L lateral lean and slight posterior lean. Once pt positioning B UE support on bed, pt able to sit with CGA. Cues for upright posture. Pt very restless. Moving consistently while at EOB. Pt also with R cervical sidebend preference. Able to correct to cueing and maintain  briefly but returned to this position.    Bed Mobility  Bed Mobility: Yes  Bed Mobility 1  Bed Mobility 1: Supine to sitting, Sitting to supine  Level of Assistance 1: Dependent (x2 perosn; cues for sequencing)  Bed Mobility Comments 1: draw sheet    Outcome Measures:  [facility] Basic Mobility  Turning from your back to your side while in a flat bed without using bedrails: Total  Moving from lying on your back to sitting on the side of a flat bed without using bedrails: Total  Moving to and from bed to chair (including a wheelchair): Total  Standing up from a chair using your arms (e.g. wheelchair or bedside chair): Total  To walk in hospital room: Total  Climbing 3-5 steps with railing: Total  Basic Mobility - Total Score: 6    Confusion Assessment Method-ICU (CAM-ICU)  Feature 1: Acute Onset or Fluctuating Course: Positive  Feature 2: Inattention: Positive  Feature 3: Altered Level of Consciousness: Positive  Overall CAM-ICU: Positive    FSS-ICU  Ambulation: Unable to attempt due to weakness  Rolling: Total assistance (performs 25% or requires another person)  Sitting: Moderate assistance (performs 50 - 74% of task)  Transfer Sit-to-Stand: Unable to perform  Transfer Supine-to-Sit: Total assistance (performs 25% or requires another person)  Total Score: 5    Early Mobility/Exercise Safety Screen: Proceed with mobilization - No exclusion criteria met  ICU Mobility Scale: Sitting over edge of bed [3]  E = Exercise and Early Mobility  Early Mobility/Exercise Safety Screen: Proceed with mobilization - No exclusion criteria met  ICU Mobility Scale: Sitting over edge of bed    Education Documentation  Mobility Training, taught by Litzy Pérez PT at 12/11/2024  5:02 PM.  Learner: Patient  Readiness: Acceptance  Method: Explanation  Response: Needs Reinforcement  Comment: mobility precautions    Education Comments  No comments found.      Encounter Problems       Encounter Problems (Active)       Balance       Pt will  be able to sit statically/dynamically EOB >12 minutes with CGA with UE support as needed for improved postural control (Progressing)       Start:  11/26/24    Expected End:  12/25/24               Mobility          PT Transfers       Transfer from bed to chair with modAx2 (Progressing)       Start:  11/26/24    Expected End:  12/25/24            Patient to transfer to and from sit to supine with modAx1 (Progressing)       Start:  11/26/24    Expected End:  12/25/24            Patient will transfer sit to and from stand with modAx2 (Progressing)       Start:  11/26/24    Expected End:  12/25/24               Pain - Adult            Signed by Litzy Pérez DPT

## 2024-12-11 NOTE — PROGRESS NOTES
Subjective   No events overnight. Denies any belly pain or discomfort this morning. Denies fevers, chills or shortness of breath.     Meds  Scheduled medications  aspirin, 81 mg, g-tube, Daily  atorvastatin, 80 mg, g-tube, Nightly  folic acid, 1 mg, g-tube, Daily  heparin, 80 Units/kg, intravenous, Once  insulin lispro, 0-10 Units, subcutaneous, q4h  lactated Ringer's, 1,000 mL, intravenous, Once  lidocaine, 1 patch, transdermal, Daily  metoprolol tartrate, 12.5 mg, g-tube, q6h  oxygen, , inhalation, Continuous - Inhalation  pantoprazole, 40 mg, intravenous, BID  QUEtiapine, 25 mg, g-tube, Nightly  sennosides, 2 tablet, g-tube, BID  thiamine, 100 mg, g-tube, Daily      Continuous medications     PRN medications  PRN medications: acetaminophen **OR** acetaminophen **OR** acetaminophen, alteplase, dextrose, dextrose, dicyclomine, fentaNYL, glucagon, heparin flush, hydrOXYzine HCL, ipratropium-albuteroL, ondansetron, oxyCODONE, oxygen, polyethylene glycol      Objective   Vital signs in last 24 hours:  Temp:  [35.8 °C (96.4 °F)-36.3 °C (97.3 °F)] 35.8 °C (96.4 °F)  Heart Rate:  [64-89] 74  Resp:  [7-24] 17  Arterial Line BP 1: ()/() 129/56  FiO2 (%):  [40 %] 40 %    Intake/Output this shift:    Intake/Output Summary (Last 24 hours) at 12/11/2024 1401  Last data filed at 12/11/2024 0630  Gross per 24 hour   Intake 1190 ml   Output 2780 ml   Net -1590 ml       Physical  General: Patient is somnolent, normal body habitus  Pulm: Normal WOB at rest, no crackles or rhonchi  Cardiac: Regular rate and rhythm, normal S1/S2  Abdomen: Non-tender to palpation, non-distended  Extremities: No peripheral edema  Neuro: Patient awake and responsive, follows commands    Results  Results for orders placed or performed during the hospital encounter of 11/18/24 (from the past 24 hours)   POCT GLUCOSE   Result Value Ref Range    POCT Glucose 163 (H) 74 - 99 mg/dL   POCT GLUCOSE   Result Value Ref Range    POCT Glucose 95 74 - 99  mg/dL   POCT GLUCOSE   Result Value Ref Range    POCT Glucose 94 74 - 99 mg/dL   CBC and Auto Differential   Result Value Ref Range    WBC 8.9 4.4 - 11.3 x10*3/uL    nRBC 1.0 (H) 0.0 - 0.0 /100 WBCs    RBC 2.42 (L) 4.00 - 5.20 x10*6/uL    Hemoglobin 7.4 (L) 12.0 - 16.0 g/dL    Hematocrit 21.3 (L) 36.0 - 46.0 %    MCV 88 80 - 100 fL    MCH 30.6 26.0 - 34.0 pg    MCHC 34.7 32.0 - 36.0 g/dL    RDW 16.3 (H) 11.5 - 14.5 %    Platelets 169 150 - 450 x10*3/uL    Neutrophils % 84.3 40.0 - 80.0 %    Immature Granulocytes %, Automated 0.4 0.0 - 0.9 %    Lymphocytes % 6.8 13.0 - 44.0 %    Monocytes % 7.7 2.0 - 10.0 %    Eosinophils % 0.7 0.0 - 6.0 %    Basophils % 0.1 0.0 - 2.0 %    Neutrophils Absolute 7.50 1.20 - 7.70 x10*3/uL    Immature Granulocytes Absolute, Automated 0.04 0.00 - 0.70 x10*3/uL    Lymphocytes Absolute 0.61 (L) 1.20 - 4.80 x10*3/uL    Monocytes Absolute 0.69 0.10 - 1.00 x10*3/uL    Eosinophils Absolute 0.06 0.00 - 0.70 x10*3/uL    Basophils Absolute 0.01 0.00 - 0.10 x10*3/uL   Renal function panel   Result Value Ref Range    Glucose 95 74 - 99 mg/dL    Sodium 134 (L) 136 - 145 mmol/L    Potassium 3.5 3.5 - 5.3 mmol/L    Chloride 95 (L) 98 - 107 mmol/L    Bicarbonate 24 21 - 32 mmol/L    Anion Gap 19 10 - 20 mmol/L    Urea Nitrogen 25 (H) 6 - 23 mg/dL    Creatinine 4.02 (H) 0.50 - 1.05 mg/dL    eGFR 12 (L) >60 mL/min/1.73m*2    Calcium 7.7 (L) 8.6 - 10.6 mg/dL    Phosphorus 3.9 2.5 - 4.9 mg/dL    Albumin 2.4 (L) 3.4 - 5.0 g/dL   Magnesium   Result Value Ref Range    Magnesium 1.90 1.60 - 2.40 mg/dL   Type and screen   Result Value Ref Range    ABO TYPE AB     Rh TYPE POS     ANTIBODY SCREEN POS    BB ORDER ONLY - Antibody Identification   Result Value Ref Range    Antibody ID Anti-JkB     CASE # [id number]    Direct Antiglobulin Test   Result Value Ref Range    RAFA-POLYSPECIFIC NEG    POCT GLUCOSE   Result Value Ref Range    POCT Glucose 100 (H) 74 - 99 mg/dL   POCT GLUCOSE   Result Value Ref Range    POCT  Glucose 84 74 - 99 mg/dL   POCT GLUCOSE   Result Value Ref Range    POCT Glucose 92 74 - 99 mg/dL       Imaging  XR abdomen 1 view    Result Date: 12/11/2024  Interpreted By:  Karen Saucedo, STUDY: XR ABDOMEN 1 VIEW;  12/10/2024 5:37 pm   INDICATION: Signs/Symptoms:PEG tube study.     COMPARISON: 12/10/2024   ACCESSION NUMBER(S): [id number]   ORDERING CLINICIAN: CARL GILLOMBARDO   FINDINGS: Nonobstructive bowel gas pattern. Limited evaluation of pneumoperitoneum on supine imaging, however no gross evidence of free air is noted.   Contrast identified within the fundus and proximal gastric body.. Gastrostomy tube projected over the superior margins of the proximal gastric body. No evidence of extravasation of contrast.   Osseous structures demonstrate no acute bony changes.       1.  Nonspecific bowel gas pattern. 2. Peg tube in the projection of the superior margins of the gastric body. Contrast within the proximal stomach.   MACRO: None   Signed by: Karen Saucedo 12/11/2024 10:05 AM Dictation workstation:   [id number]    XR abdomen 1 view    Addendum Date: 12/10/2024    Interpreted By:  Gene Mendoza, ADDENDUM: Nonobstructive bowel gas pattern. There is again noted gastrostomy balloon projecting over the gastric body. Positive contrast within the stomach. Air is again noted within the stomach. . Limited evaluation of pneumoperitoneum on supine imaging, however no gross evidence of free air is noted.   Signed by: Gene Mendoza 12/10/2024 5:00 PM   -------- ORIGINAL REPORT -------- Dictation workstation:   [id number]    Result Date: 12/10/2024  Interpreted By:  Gene Mendoza and Awan Komal STUDY: XR ABDOMEN 1 VIEW;  12/10/2024 9:59 am   INDICATION: Signs/Symptoms:Recent vomiting, stomach distension.     COMPARISON: Abdominal radiograph 12/09/2024, 11/28/2024   ACCESSION NUMBER(S): [id number]   ORDERING CLINICIAN: CARL GILLOMBARDO   FINDINGS: AP radiograph of the abdomen was provided for interpretation.    Nonobstructive bowel gas pattern. There is again noted gastrostomy balloon projecting over the gastric body. Positive contrast within the stomach injected through existing gastrostomy tube. Air is again noted within the stomach. . Limited evaluation of pneumoperitoneum on supine imaging, however no gross evidence of free air is noted.   Visualized lungs are clear.   Osseous structures demonstrate no acute bony changes.       1.  Nonobstructive bowel gas pattern.   I personally reviewed the images/study and I agree with the findings as stated by Resident Lakshmi Spain. This study was interpreted at [place], [place].   MACRO: None   Signed by: Gene Mendoza 12/10/2024 2:30 PM Dictation workstation:   [id number]    XR chest 1 view    Result Date: 12/10/2024  Interpreted By:  Gene Mendoza, STUDY: XR CHEST 1 VIEW;  12/10/2024 1:43 pm   INDICATION: Signs/Symptoms:Confirm line placement.     COMPARISON: Chest radiograph dated 12/09/2024 and chest CT dated 11/18/2024.   ACCESSION NUMBER(S): [id number]   ORDERING CLINICIAN: CARL GILLOMBARDO   FINDINGS: AP radiograph of the chest was provided   Right internal jugular approach central venous catheter distal tip overlying the right atrium Tracheostomy cannula in stable position. Status post median sternotomy with intact appearing wires in similar configuration to prior exam.     CARDIOMEDIASTINAL SILHOUETTE: Cardiomediastinal silhouette is normal in size and configuration.   LUNGS: Interval decrease in prominent interstitial lung markings. Improving bilateral opacities, predominantly linear. No pneumothorax over sizable pleural effusions   ABDOMEN: No remarkable upper abdominal findings. Enteric contrast opacifying the stomach   BONES: No acute osseous changes.       1.  Right internal jugular approach central venous catheter distal tip overlying the right atrium.. 2. Improving interstitial pulmonary edema. 3. Improving basilar  opacities which may represent atelectasis and/or consolidation.       MACRO: None   Signed by: Gene Mendoza 12/10/2024 2:59 PM Dictation workstation:   [id number]    XR abdomen 1 view    Result Date: 12/10/2024  Interpreted By:  Karen Saucedo, STUDY: XR ABDOMEN 1 VIEW;  12/9/2024 7:57 pm   INDICATION: Signs/Symptoms:Abdominal pain.     COMPARISON: 11/28/2024   ACCESSION NUMBER(S): [id number]   ORDERING CLINICIAN: CARL GILLOMBARDO   FINDINGS: Nonobstructive bowel gas pattern. Limited evaluation of pneumoperitoneum on supine imaging, however no gross evidence of free air is noted.   Gastrostomy balloon projected partially over the superior margins of the mid gastric body. Air is seen within the stomach as well as nondilated small and large bowel loops.   Osseous structures demonstrate no acute bony changes.       1.  Nonspecific nonobstructive bowel gas pattern   MACRO: None   Signed by: Karen Saucedo 12/10/2024 8:11 AM Dictation workstation:   [id number]    XR chest 1 view    Result Date: 12/10/2024  Interpreted By:  Karen Saucedo and Ohs Zachary STUDY: XR CHEST 1 VIEW;  12/9/2024 10:12 pm   INDICATION: Signs/Symptoms:emesis, rule out aspiration.     COMPARISON: Chest radiograph 12/08/2024, CT chest 11/18/2024.   ACCESSION NUMBER(S): [id number]   ORDERING CLINICIAN: CARL GILLOMBARDO   FINDINGS: AP radiograph of the chest was provided.   MEDICAL DEVICES: Tracheostomy appears properly positioned. Status post median sternotomy with intact appearing wires in similar configuration to prior exam. Right IJ CVC distal tip overlying the lower SVC. Enteric contrast opacifying the stomach.   CARDIOMEDIASTINAL SILHOUETTE: Cardiomediastinal silhouette is stable in size and configuration.   LUNGS: Interval decrease in prominent interstitial lung markings. Improving bibasilar opacities, now predominantly linear. No pneumothorax or pleural effusion.   ABDOMEN: No remarkable upper abdominal findings.   BONES: No acute  osseous changes.       1. Improving bibasilar opacities which could represent atelectasis and/or consolidation. 2. Improving interstitial pulmonary edema.   I personally reviewed the images/study and I agree with the findings as stated by Dr. Linwood Lucia. This study was interpreted at [facility], [place], [place].   MACRO: None   Signed by: Karen Saucedo 12/10/2024 8:10 AM Dictation workstation:   [id number]        Assessment/Plan   Assessment & Plan  Dissection of aorta    ESRD on hemodialysis (Multi)    Cardiac arrest    Ruptured aortic aneurysm (Multi)    Anemia due to acute blood loss    64 y.o. female with history of pancreatitis, DVT/PE, HLD, HTN, CABGx4 1997, T2DM, GERD, PAD, chronic mesenteric ischemia, tobacco use who presented to [facility] on 11/18/2024 as a transfer from [facility] with chest, back, and abdominal pain. Now post PEA arrest, with intubation/sedation. Family agreed for trach/PEG and LTAC. Patient had septic episode 12/6 overnight and underwent trach/PEG.      Updates:  -G-tube study overnight shows normal g-tube function  -Restart tube feeds today  -Stop Zosyn  -Repeat CPAP trial -> pulling only 300 mL so not appropriate for trach mask  -Son chose LTAC, family looking for her SSN     Neuro:  #Sedation/anxiety  -No sedation  -Quetiapine 25 mg nightly     Cardiac:   #Aflutter/Afib with RVR  #Driven by anxiety vs hypovolemia  ::now sinus tachycardia  -HR in 160-180s on 12/5 am  -s/p one dose of digoxin 250 mcg (digoxin is not dialyzable)  -s/p amio bolus 12/6 for RVR  -Continue metoprolol 12.5 mg Q6H     #PEA arrest 11/20  ::likely hypoxia driven: PNA vs aspiration     #Aortic dissection  #Mural Thrombus  #Distal aortic arch saccular aneurysm   ::CTA CAP 11/18- 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   -Vascular  surgery discussed case at aortic conference and there are no plans for surgery, palliative measures recommended     #NSTEMI  #CAD s/p CABGx4 1997  #PAD  #HTN #DLD  ::EKG- NSR, rate 71, TWI aVL, V1-V6, 1mm ORQUIDEA in aVR  ::Trop peak 5700  ::Lipid HDL 41.5, , TG 75, Chol 157  -Holding home amlodipine 10mg, imdur 30mg, lisinopril 2.5mg, metop tartrate 50mg bid iso hypotension   -Continue ASA and atorvastatin  -Holding plavix 75mg. Can resume after all procedures are done (TDC, trach/PEG)     #HFrecEF (45-50%, 11/19/24)  #Moderate/Severe MR  -TTE on 12/6 with EF of 60-65% with moderate to severe mitral regurgitation  -Not on GDMT     Pulm:  #Acute hypoxic respiratory failure, post-arrest  :s/p Zosyn 11/20 - 11/26 for PNA  :: s/p Vancomycin   -Failed extubation on 11/28  -Failed SBT on 12/9     GI:  #Concern for Ileus  -Started Senna 8.6 mg BID  -KUB yesterday with stomach distension  -Repeat KUB, can consider to do low intermittent suction on PEG or talk with ACS if continues to be distended     Renal:  #TRACY on CKD likely 2/2 arrest  #Anuric  -Worsening electrolytes today, may plan for iHD  -Sevalemar carbonate 800 mg TID      #Hyponatremia  -Dialysis today     Endo:   #T2DM  -Hold home metformin   -POC Glucose: 119-162  -Increase to SSI2 from SSI1     #Tube Feeds  :: goal, 20 ml/hr  -f/up nutrition  -Currently at goal     Infectious  #sepsis  -fu blood and sputum culutre  -Trach aspirates growing steno  -Blood cultures NGTD at 3 days  -cw vanc zosyn (12/6- 12/10)     Hem/Onc  #Hypoproliferative Anemia  #Hemolytic transfusion reaction  -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

## 2024-12-11 NOTE — PROGRESS NOTES
Occupational Therapy    OT Treatment    Patient Name: Humaira Huang  MRN: [id number]  [facility]  Room: 14/14-A  Today's Date: 12/11/2024  Time Calculation  Start Time: 1139  Stop Time: 1202  Time Calculation (min): 23 min      Assessment:  End of Session Communication: Bedside nurse  End of Session Patient Position: Bed, 3 rail up, Alarm off, not on at start of session (bilat wrist restraints)     Plan:  Treatment Interventions: ADL retraining, Functional transfer training, UE strengthening/ROM, Endurance training, Cognitive reorientation, Patient/family training, Equipment evaluation/education, Fine motor coordination activities, Neuromuscular reeducation, Compensatory technique education  OT Frequency: 3 times per week  OT Discharge Recommendations: Moderate intensity level of continued care  Equipment Recommended upon Discharge:  (TBD)  OT Recommended Transfer Status: Assist of 2  OT - OK to Discharge: Yes  Treatment Interventions: ADL retraining, Functional transfer training, UE strengthening/ROM, Endurance training, Cognitive reorientation, Patient/family training, Equipment evaluation/education, Fine motor coordination activities, Neuromuscular reeducation, Compensatory technique education    Subjective   Previous Visit Info:  OT Last Visit  OT Received On: 12/11/24    General:  General  Family/Caregiver Present: No  Co-Treatment: PT  Co-Treatment Reason: Pt vent dependent with intent to mobilize  Prior to Session Communication: Bedside nurse  Patient Position Received: Bed, 3 rail up, Alarm off, not on at start of session (bilat wrist restraints)  General Comment: Pt supine in bed on arrival, agreeable to participate. S/p PEG and tracheostomy placement 12/6. VC AC 40% FiO2, RR 14, PEEP 5.    Precautions:  Medical Precautions: Cardiac precautions, Fall precautions, Oxygen therapy device and L/min  Precautions Comment: Normotension per RN    Vitals   12/11/24 1139 12/11/24 1200 12/11/24 1202   Vital  "Signs   Vitals Session Pre OT  --  Post OT   Heart Rate 67 77 75   Resp 14 17 15   SpO2 100 % 100 % 100 %   /53  --  137/55   MAP (mmHg) 78  --  82         Pain:  Pain Assessment  Pain Assessment:  (Nodded head \"yes\" to pain but unable to elaborate. Pain appeared to be in back and buttocks)    Objective    Cognition:  Cognition  Overall Cognitive Status: Impaired  Orientation Level:  (With choices, pt oriented to hospital and month. Appeared to nod head \"yes\" to majority of questions)  Following Commands: Follows one step commands with repetition (and increased time with ~50-75% accuracy)  Cognition Comments: CAM-ICU (+). Occasional reaches for lines and tubes    Bed Mobility/Transfers: Bed Mobility  Bed Mobility: Yes  Bed Mobility 1  Bed Mobility 1: Supine to sitting, Sitting to supine  Level of Assistance 1: Dependent, +2  Bed Mobility Comments 1: Draw sheet, HOB elevated    Therapy/Activity: Therapeutic Activity  Therapeutic Activity Performed: Yes  Therapeutic Activity 1: Pt tolerated 10 minutes seated EOB, mostly min A with frequent attempts to reposition self. Sitting balance improved to CGA for BUE support on bed. Head laterally flexed right at rest, able to return to neutral with cues but unable to sustain for majority of session.  Therapeutic Activity 2: While seated EOB, pt performed x3 repetitions guided trunk rotation left and right    Outcome Measures:[facility] Daily Activity  Putting on and taking off regular lower body clothing: Total  Bathing (including washing, rinsing, drying): A lot  Putting on and taking off regular upper body clothing: A lot  Toileting, which includes using toilet, bedpan or urinal: Total  Taking care of personal grooming such as brushing teeth: A lot  Eating Meals: Total  Daily Activity - Total Score: 9    ICU Mobility Scale: Sitting over edge of bed [3]    Education Documentation  Body Mechanics, taught by Michelle Marshall, OT at 12/11/2024  4:11 PM.  Learner: " Patient  Readiness: Acceptance  Method: Explanation, Demonstration  Response: Needs Reinforcement    Precautions, taught by Michelle Marshall OT at 12/11/2024  4:11 PM.  Learner: Patient  Readiness: Acceptance  Method: Explanation, Demonstration  Response: Needs Reinforcement    ADL Training, taught by Michelle Marshall OT at 12/11/2024  4:11 PM.  Learner: Patient  Readiness: Acceptance  Method: Explanation, Demonstration  Response: Needs Reinforcement    Education Comments  No comments found.        OP EDUCATION:       Goals:  Encounter Problems       Encounter Problems (Active)       ADLs       Patient with complete upper body dressing with minimal assist  level of assistance donning and doffing all UE clothes (Progressing)       Start:  11/26/24    Expected End:  12/25/24            Patient with complete lower body dressing with moderate assist level of assistance donning and doffing all LE clothes  with PRN adaptive equipment (Progressing)       Start:  11/26/24    Expected End:  12/25/24            Patient will complete daily grooming tasks with minimal assist  level of assistance and PRN adaptive equipment. (Progressing)       Start:  11/26/24    Expected End:  12/25/24               BALANCE       Pt will tolerate sitting EOB >10 min with mod A during functional tasks and ADLs without LOB and while maintaining trunk and head in upright, midline position.  (Progressing)       Start:  11/26/24    Expected End:  12/25/24               COGNITION/SAFETY       Pt will be alert and oriented x 3 and follow >90% simple 2-step commands with stimulation.   (Progressing)       Start:  11/26/24    Expected End:  12/25/24               TRANSFERS       Patient will perform bed mobility moderate assist level of assistance in order to improve safety and independence with mobility (Progressing)       Start:  11/26/24    Expected End:  12/25/24            Patient will complete sit to stand transfer with moderate  assist level of assistance and least restrictive device in order to improve safety and prepare for out of bed mobility. (Progressing)       Start:  11/26/24    Expected End:  12/25/24

## 2024-12-11 NOTE — PROGRESS NOTES
Social Work Progress Note  NORBERTO spoke with patient's son Seth. He chose [facility] in [place] for a referral. NORBERTO sent a referral packet to [facility]. SW to follow up.   MIGUEL ROCK  Update 3pm: [facility] is requesting patient's SSN. Patient's SSN does not show in EPIC. SW contacted [facility]. They did not have patient's SSN on file. Sw also spoke with Medical records at [facility] where patient was transferred from. They also did not have patient's SSN on file. SW could not get hold of patient's son Seth and called patient's sister Eugenia. Eugenia said that she would reach out to Seth to see if he has patient's SSN. SW to follow up.

## 2024-12-11 NOTE — PROGRESS NOTES
ENT DAILY PROGRESS NOTE  Name: Humaira Huang  MRN: [id number]  : 1960    Identification Statement  The patient is a 64 y.o. 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 Patient underwent OR placement of 6-0 cuffed shiley with Dr. Mi on 24.     Subjective  Patient seen and examined. Trach sutures removed. No further bleeding episodes reported since . No acute events overnight  Patient remains in the ICU on ventilator with FiO2 40% and PEEP 5. Velcro ties appropriately adjusted.     Objective  Temp:  [35.8 °C (96.4 °F)-36.3 °C (97.3 °F)] 35.8 °C (96.4 °F)  Heart Rate:  [64-89] 69  Resp:  [7-24] 15  Arterial Line BP 1: (110-164)/() 130/56  FiO2 (%):  [40 %] 40 %  Gen: Alert, awake, no acute distress  Resp: Breathing comfortably on ventilator with FiO2 40% and PEEP 5.   Head and Face: no obvious masses or lesions  Oral Cavity: MMM  Ears: Normal external ears   Nose: External nose midline   Neck: 6-0 cuffed shiley, trach sutures removed. Velcro tie appropriately adjusted. No bleeding surrounding tracheostomy. No evidence of skin breakdown inferior to tracheostomy. No palpable crepitus.         Intake/Output Summary (Last 24 hours) at 2024 1037  Last data filed at 2024 0630  Gross per 24 hour   Intake 1190 ml   Output 2780 ml   Net -1590 ml       Labs  Results for orders placed or performed during the hospital encounter of 24 (from the past 24 hours)   POCT GLUCOSE   Result Value Ref Range    POCT Glucose 148 (H) 74 - 99 mg/dL   POCT GLUCOSE   Result Value Ref Range    POCT Glucose 163 (H) 74 - 99 mg/dL   POCT GLUCOSE   Result Value Ref Range    POCT Glucose 95 74 - 99 mg/dL   POCT GLUCOSE   Result Value Ref Range    POCT Glucose 94 74 - 99 mg/dL    CBC and Auto Differential   Result Value Ref Range    WBC 8.9 4.4 - 11.3 x10*3/uL    nRBC 1.0 (H) 0.0 - 0.0 /100 WBCs    RBC 2.42 (L) 4.00 - 5.20 x10*6/uL    Hemoglobin 7.4 (L) 12.0 - 16.0 g/dL    Hematocrit 21.3 (L) 36.0 - 46.0 %    MCV 88 80 - 100 fL    MCH 30.6 26.0 - 34.0 pg    MCHC 34.7 32.0 - 36.0 g/dL    RDW 16.3 (H) 11.5 - 14.5 %    Platelets 169 150 - 450 x10*3/uL    Neutrophils % 84.3 40.0 - 80.0 %    Immature Granulocytes %, Automated 0.4 0.0 - 0.9 %    Lymphocytes % 6.8 13.0 - 44.0 %    Monocytes % 7.7 2.0 - 10.0 %    Eosinophils % 0.7 0.0 - 6.0 %    Basophils % 0.1 0.0 - 2.0 %    Neutrophils Absolute 7.50 1.20 - 7.70 x10*3/uL    Immature Granulocytes Absolute, Automated 0.04 0.00 - 0.70 x10*3/uL    Lymphocytes Absolute 0.61 (L) 1.20 - 4.80 x10*3/uL    Monocytes Absolute 0.69 0.10 - 1.00 x10*3/uL    Eosinophils Absolute 0.06 0.00 - 0.70 x10*3/uL    Basophils Absolute 0.01 0.00 - 0.10 x10*3/uL   Renal function panel   Result Value Ref Range    Glucose 95 74 - 99 mg/dL    Sodium 134 (L) 136 - 145 mmol/L    Potassium 3.5 3.5 - 5.3 mmol/L    Chloride 95 (L) 98 - 107 mmol/L    Bicarbonate 24 21 - 32 mmol/L    Anion Gap 19 10 - 20 mmol/L    Urea Nitrogen 25 (H) 6 - 23 mg/dL    Creatinine 4.02 (H) 0.50 - 1.05 mg/dL    eGFR 12 (L) >60 mL/min/1.73m*2    Calcium 7.7 (L) 8.6 - 10.6 mg/dL    Phosphorus 3.9 2.5 - 4.9 mg/dL    Albumin 2.4 (L) 3.4 - 5.0 g/dL   Magnesium   Result Value Ref Range    Magnesium 1.90 1.60 - 2.40 mg/dL   Type and screen   Result Value Ref Range    ABO TYPE AB     Rh TYPE POS     ANTIBODY SCREEN POS    Direct Antiglobulin Test   Result Value Ref Range    RAFA-POLYSPECIFIC NEG    POCT GLUCOSE   Result Value Ref Range    POCT Glucose 100 (H) 74 - 99 mg/dL   POCT GLUCOSE   Result Value Ref Range    POCT Glucose 84 74 - 99 mg/dL       Assessment  Patient is a 64 year old female who initially presented with 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 Patient underwent OR placement of 6-0 cuffed shiley with Dr. Mi on 12/6/24. Trach sutures removed.     Plan:  -Continue routine trach care per nursing  -Please call back if patient has been off vent and not in need of positive pressure therapy for 24-48 hours and we can change to cuffless trach before discharge  -Please call with any questions regarding ordering homegoing trach supplies prior to discharge  -patient scheduled for ENT follow-up on 3/13/25 with Dr. Sierra  - ENT will continue to see weekly while patient is admitted  -please contact ENT with questions or concerns  -recommendations relayed to primary team    Randi Aquino PA-C  Otolaryngology- Head & Neck Surgery    ENT Consult pager: [phone]  Please page if urgent

## 2024-12-11 NOTE — PROGRESS NOTES
Humaira Huang   64 [email or web address]    @@  N/Room: 67675440/14/14-A admitted to CICU for Aortic dissection.    Subjective:   at 40% Fio2, tach and PEG     Meds:   aspirin, 81 mg, Daily  atorvastatin, 80 mg, Nightly  folic acid, 1 mg, Daily  heparin, 80 Units/kg, Once  insulin lispro, 0-10 Units, q4h  lactated Ringer's, 1,000 mL, Once  lidocaine, 1 patch, Daily  metoprolol tartrate, 12.5 mg, q6h  oxygen, , Continuous - Inhalation  pantoprazole, 40 mg, BID  QUEtiapine, 25 mg, Nightly  sennosides, 2 tablet, BID  thiamine, 100 mg, Daily           acetaminophen, 650 mg, q4h PRN   Or  acetaminophen, 1,000 mg, q8h PRN   Or  acetaminophen, 650 mg, q4h PRN  alteplase, 2 mg, PRN  dextrose, 12.5 g, q15 min PRN  dextrose, 25 g, q15 min PRN  dicyclomine, 10 mg, 4x daily PRN  fentaNYL, 25 mcg, q4h PRN  glucagon, 1 mg, q15 min PRN  heparin flush, 10 Units, PRN  hydrOXYzine HCL, 10 mg, q6h PRN  ipratropium-albuteroL, 3 mL, q6h PRN  ondansetron, 4 mg, q6h PRN  oxyCODONE, 2.5 mg, q4h PRN  oxygen, , Continuous PRN - O2/gases  polyethylene glycol, 17 g, Daily PRN      OBJECTIVE:    Vitals:    12/11/24 1300   BP:    Pulse: 70   Resp: (!) 7   Temp:    SpO2: 97%          Intake/Output Summary (Last 24 hours) at 12/11/2024 1336  Last data filed at 12/11/2024 0630  Gross per 24 hour   Intake 1190 ml   Output 2780 ml   Net -1590 ml       General appearance: Tach  Eyes: non-icteric  Skin: no apparent rash  Heart: [id number]  Lungs: CTA bilat no wheezing/crackles  Abdomen: soft, nt/nd  Extremities: trace edema  Access: None    Blood Labs:  Results for orders placed or performed during the hospital encounter of 11/18/24 (from the past 24 hours)   POCT GLUCOSE   Result Value Ref Range    POCT Glucose 163 (H) 74 - 99 mg/dL   POCT GLUCOSE   Result Value Ref Range    POCT Glucose 95 74 - 99 mg/dL   POCT GLUCOSE   Result Value Ref Range    POCT Glucose 94 74 - 99 mg/dL   CBC and Auto Differential   Result Value Ref Range    WBC 8.9 4.4 - 11.3 x10*3/uL     nRBC 1.0 (H) 0.0 - 0.0 /100 WBCs    RBC 2.42 (L) 4.00 - 5.20 x10*6/uL    Hemoglobin 7.4 (L) 12.0 - 16.0 g/dL    Hematocrit 21.3 (L) 36.0 - 46.0 %    MCV 88 80 - 100 fL    MCH 30.6 26.0 - 34.0 pg    MCHC 34.7 32.0 - 36.0 g/dL    RDW 16.3 (H) 11.5 - 14.5 %    Platelets 169 150 - 450 x10*3/uL    Neutrophils % 84.3 40.0 - 80.0 %    Immature Granulocytes %, Automated 0.4 0.0 - 0.9 %    Lymphocytes % 6.8 13.0 - 44.0 %    Monocytes % 7.7 2.0 - 10.0 %    Eosinophils % 0.7 0.0 - 6.0 %    Basophils % 0.1 0.0 - 2.0 %    Neutrophils Absolute 7.50 1.20 - 7.70 x10*3/uL    Immature Granulocytes Absolute, Automated 0.04 0.00 - 0.70 x10*3/uL    Lymphocytes Absolute 0.61 (L) 1.20 - 4.80 x10*3/uL    Monocytes Absolute 0.69 0.10 - 1.00 x10*3/uL    Eosinophils Absolute 0.06 0.00 - 0.70 x10*3/uL    Basophils Absolute 0.01 0.00 - 0.10 x10*3/uL   Renal function panel   Result Value Ref Range    Glucose 95 74 - 99 mg/dL    Sodium 134 (L) 136 - 145 mmol/L    Potassium 3.5 3.5 - 5.3 mmol/L    Chloride 95 (L) 98 - 107 mmol/L    Bicarbonate 24 21 - 32 mmol/L    Anion Gap 19 10 - 20 mmol/L    Urea Nitrogen 25 (H) 6 - 23 mg/dL    Creatinine 4.02 (H) 0.50 - 1.05 mg/dL    eGFR 12 (L) >60 mL/min/1.73m*2    Calcium 7.7 (L) 8.6 - 10.6 mg/dL    Phosphorus 3.9 2.5 - 4.9 mg/dL    Albumin 2.4 (L) 3.4 - 5.0 g/dL   Magnesium   Result Value Ref Range    Magnesium 1.90 1.60 - 2.40 mg/dL   Type and screen   Result Value Ref Range    ABO TYPE AB     Rh TYPE POS     ANTIBODY SCREEN POS    BB ORDER ONLY - Antibody Identification   Result Value Ref Range    Antibody ID Anti-JkB     CASE # [id number]    Direct Antiglobulin Test   Result Value Ref Range    RAFA-POLYSPECIFIC NEG    POCT GLUCOSE   Result Value Ref Range    POCT Glucose 100 (H) 74 - 99 mg/dL   POCT GLUCOSE   Result Value Ref Range    POCT Glucose 84 74 - 99 mg/dL   POCT GLUCOSE   Result Value Ref Range    POCT Glucose 92 74 - 99 mg/dL      US KUB 11/26  IMPRESSION:  1. Increased renal cortical  echogenicity and lobular appearance of the kidneys bilaterally, likely medical renal disease, with relatively atrophic left kidney. No hydronephrosis.  2. Partially visualized bilateral pleural effusions and trace  abdominal ascites.        ASSESSMENT:  Humaira Huang is a  64 y.o.  Year old , with PMHx of  pancreatitis, DVT/PE, HLD, HTN, CABGx4 1997, T2DM, GERD, PAD, chronic mesenteric ischemia, tobacco use who presented to [facility] on 11/18/2024 as a transfer from [facility] with chest, back, and abdominal pain. 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 Vascular and Cardiac surgery following. CICU course c/b PEA arrest 11/20, now intubated. Nephrology following for TRACY.     #anuric TRACY-D Baseline creatinine 1.5   - Hemodynamics-not on any pressor support  - Etiology :TRACY likely in setting recent hemodynamic insult with PEA.  - 119 ml in bladder scans 12/10  - last SLED 12/1 night then transitioned to iHD      RECOMMENDATIONS:  - TDC not placed yet, temp line was swapped with central line. Please place consult with IR for TDC placement.   - dialysis per TTS schedule  - bladder scan BID please  - Strict I/Os.  - No contrast or nephrotoxic drugs if possible.  - will follow      Elise Graham MD  Nephrology Fellow   Daytime / Weekend Renal Pager [phone]  After 7 pm Emergencies [phone] Pager [phone]

## 2024-12-12 LAB
ALBUMIN SERPL BCP-MCNC: 2.2 G/DL (ref 3.4–5)
ANION GAP SERPL CALC-SCNC: 19 MMOL/L (ref 10–20)
BASOPHILS # BLD AUTO: 0 X10*3/UL (ref 0–0.1)
BASOPHILS NFR BLD AUTO: 0 %
BUN SERPL-MCNC: 37 MG/DL (ref 6–23)
CALCIUM SERPL-MCNC: 7.2 MG/DL (ref 8.6–10.6)
CHLORIDE SERPL-SCNC: 97 MMOL/L (ref 98–107)
CO2 SERPL-SCNC: 23 MMOL/L (ref 21–32)
CREAT SERPL-MCNC: 5.03 MG/DL (ref 0.5–1.05)
EGFRCR SERPLBLD CKD-EPI 2021: 9 ML/MIN/1.73M*2
EOSINOPHIL # BLD AUTO: 0.07 X10*3/UL (ref 0–0.7)
EOSINOPHIL NFR BLD AUTO: 0.8 %
ERYTHROCYTE [DISTWIDTH] IN BLOOD BY AUTOMATED COUNT: 17.1 % (ref 11.5–14.5)
FERRITIN SERPL-MCNC: 327 NG/ML (ref 8–150)
GLUCOSE BLD MANUAL STRIP-MCNC: 100 MG/DL (ref 74–99)
GLUCOSE BLD MANUAL STRIP-MCNC: 109 MG/DL (ref 74–99)
GLUCOSE BLD MANUAL STRIP-MCNC: 117 MG/DL (ref 74–99)
GLUCOSE BLD MANUAL STRIP-MCNC: 123 MG/DL (ref 74–99)
GLUCOSE BLD MANUAL STRIP-MCNC: 177 MG/DL (ref 74–99)
GLUCOSE SERPL-MCNC: 134 MG/DL (ref 74–99)
HCT VFR BLD AUTO: 20.8 % (ref 36–46)
HGB BLD-MCNC: 7.1 G/DL (ref 12–16)
IMM GRANULOCYTES # BLD AUTO: 0.03 X10*3/UL (ref 0–0.7)
IMM GRANULOCYTES NFR BLD AUTO: 0.3 % (ref 0–0.9)
IRON SATN MFR SERPL: 32 % (ref 25–45)
IRON SERPL-MCNC: 71 UG/DL (ref 35–150)
LYMPHOCYTES # BLD AUTO: 0.66 X10*3/UL (ref 1.2–4.8)
LYMPHOCYTES NFR BLD AUTO: 7.6 %
MAGNESIUM SERPL-MCNC: 1.95 MG/DL (ref 1.6–2.4)
MCH RBC QN AUTO: 30.5 PG (ref 26–34)
MCHC RBC AUTO-ENTMCNC: 34.1 G/DL (ref 32–36)
MCV RBC AUTO: 89 FL (ref 80–100)
METHYLMALONATE SERPL-SCNC: 0.64 UMOL/L (ref 0–0.4)
MONOCYTES # BLD AUTO: 0.69 X10*3/UL (ref 0.1–1)
MONOCYTES NFR BLD AUTO: 7.9 %
NEUTROPHILS # BLD AUTO: 7.24 X10*3/UL (ref 1.2–7.7)
NEUTROPHILS NFR BLD AUTO: 83.4 %
NRBC BLD-RTO: 1.2 /100 WBCS (ref 0–0)
PHOSPHATE SERPL-MCNC: 4.3 MG/DL (ref 2.5–4.9)
PLATELET # BLD AUTO: 169 X10*3/UL (ref 150–450)
POTASSIUM SERPL-SCNC: 3.4 MMOL/L (ref 3.5–5.3)
RBC # BLD AUTO: 2.33 X10*6/UL (ref 4–5.2)
SODIUM SERPL-SCNC: 136 MMOL/L (ref 136–145)
TIBC SERPL-MCNC: 219 UG/DL (ref 240–445)
UIBC SERPL-MCNC: 148 UG/DL (ref 110–370)
WBC # BLD AUTO: 8.7 X10*3/UL (ref 4.4–11.3)

## 2024-12-12 PROCEDURE — 2500000001 HC RX 250 WO HCPCS SELF ADMINISTERED DRUGS (ALT 637 FOR MEDICARE OP)

## 2024-12-12 PROCEDURE — 99232 SBSQ HOSP IP/OBS MODERATE 35: CPT

## 2024-12-12 PROCEDURE — 85025 COMPLETE CBC W/AUTO DIFF WBC: CPT

## 2024-12-12 PROCEDURE — 2500000004 HC RX 250 GENERAL PHARMACY W/ HCPCS (ALT 636 FOR OP/ED)

## 2024-12-12 PROCEDURE — 2020000001 HC ICU ROOM DAILY

## 2024-12-12 PROCEDURE — 2500000005 HC RX 250 GENERAL PHARMACY W/O HCPCS

## 2024-12-12 PROCEDURE — 99291 CRITICAL CARE FIRST HOUR: CPT

## 2024-12-12 PROCEDURE — 82728 ASSAY OF FERRITIN: CPT

## 2024-12-12 PROCEDURE — 37799 UNLISTED PX VASCULAR SURGERY: CPT

## 2024-12-12 PROCEDURE — 83735 ASSAY OF MAGNESIUM: CPT

## 2024-12-12 PROCEDURE — 80069 RENAL FUNCTION PANEL: CPT

## 2024-12-12 PROCEDURE — 82947 ASSAY GLUCOSE BLOOD QUANT: CPT

## 2024-12-12 PROCEDURE — 83540 ASSAY OF IRON: CPT

## 2024-12-12 PROCEDURE — 94003 VENT MGMT INPAT SUBQ DAY: CPT

## 2024-12-12 PROCEDURE — 8010000001 HC DIALYSIS - HEMODIALYSIS PER DAY

## 2024-12-12 PROCEDURE — 2500000002 HC RX 250 W HCPCS SELF ADMINISTERED DRUGS (ALT 637 FOR MEDICARE OP, ALT 636 FOR OP/ED)

## 2024-12-12 PROCEDURE — 74018 RADEX ABDOMEN 1 VIEW: CPT | Performed by: RADIOLOGY

## 2024-12-12 RX ORDER — POTASSIUM CHLORIDE 1.5 G/1.58G
40 POWDER, FOR SOLUTION ORAL ONCE
Status: COMPLETED | OUTPATIENT
Start: 2024-12-12 | End: 2024-12-12

## 2024-12-12 RX ORDER — HYDROMORPHONE HYDROCHLORIDE 1 MG/ML
0.3 INJECTION, SOLUTION INTRAMUSCULAR; INTRAVENOUS; SUBCUTANEOUS ONCE
Status: COMPLETED | OUTPATIENT
Start: 2024-12-12 | End: 2024-12-12

## 2024-12-12 RX ADMIN — FENTANYL CITRATE 25 MCG: 50 INJECTION INTRAMUSCULAR; INTRAVENOUS at 09:48

## 2024-12-12 RX ADMIN — Medication 40 PERCENT: at 20:05

## 2024-12-12 RX ADMIN — ACETAMINOPHEN 1000 MG: 160 SOLUTION ORAL at 11:05

## 2024-12-12 RX ADMIN — DICYCLOMINE HYDROCHLORIDE 10 MG: 10 CAPSULE ORAL at 04:12

## 2024-12-12 RX ADMIN — POTASSIUM CHLORIDE 40 MEQ: 1.5 POWDER, FOR SOLUTION ORAL at 06:19

## 2024-12-12 RX ADMIN — FENTANYL CITRATE 25 MCG: 50 INJECTION INTRAMUSCULAR; INTRAVENOUS at 13:26

## 2024-12-12 RX ADMIN — METOPROLOL SUCCINATE 50 MG: 50 TABLET, EXTENDED RELEASE ORAL at 08:58

## 2024-12-12 RX ADMIN — PANTOPRAZOLE SODIUM 40 MG: 40 INJECTION, POWDER, FOR SOLUTION INTRAVENOUS at 20:15

## 2024-12-12 RX ADMIN — QUETIAPINE FUMARATE 25 MG: 25 TABLET ORAL at 20:15

## 2024-12-12 RX ADMIN — FENTANYL CITRATE 25 MCG: 50 INJECTION INTRAMUSCULAR; INTRAVENOUS at 05:38

## 2024-12-12 RX ADMIN — Medication 40 PERCENT: at 07:50

## 2024-12-12 RX ADMIN — THIAMINE HCL TAB 100 MG 100 MG: 100 TAB at 08:58

## 2024-12-12 RX ADMIN — OXYCODONE HYDROCHLORIDE 2.5 MG: 5 TABLET ORAL at 20:15

## 2024-12-12 RX ADMIN — DICYCLOMINE HYDROCHLORIDE 10 MG: 10 CAPSULE ORAL at 20:15

## 2024-12-12 RX ADMIN — FENTANYL CITRATE 25 MCG: 50 INJECTION INTRAMUSCULAR; INTRAVENOUS at 23:31

## 2024-12-12 RX ADMIN — ASPIRIN 81 MG CHEWABLE TABLET 81 MG: 81 TABLET CHEWABLE at 08:58

## 2024-12-12 RX ADMIN — ACETAMINOPHEN 1000 MG: 160 SOLUTION ORAL at 04:12

## 2024-12-12 RX ADMIN — ONDANSETRON 4 MG: 2 INJECTION INTRAMUSCULAR; INTRAVENOUS at 20:15

## 2024-12-12 RX ADMIN — OXYCODONE HYDROCHLORIDE 2.5 MG: 5 TABLET ORAL at 08:58

## 2024-12-12 RX ADMIN — INSULIN LISPRO 2 UNITS: 100 INJECTION, SOLUTION INTRAVENOUS; SUBCUTANEOUS at 00:15

## 2024-12-12 RX ADMIN — OXYCODONE HYDROCHLORIDE 2.5 MG: 5 TABLET ORAL at 04:12

## 2024-12-12 RX ADMIN — FOLIC ACID 1 MG: 1 TABLET ORAL at 08:58

## 2024-12-12 RX ADMIN — PANTOPRAZOLE SODIUM 40 MG: 40 INJECTION, POWDER, FOR SOLUTION INTRAVENOUS at 08:58

## 2024-12-12 RX ADMIN — ONDANSETRON 4 MG: 2 INJECTION INTRAMUSCULAR; INTRAVENOUS at 04:12

## 2024-12-12 RX ADMIN — ACETAMINOPHEN 1000 MG: 160 SOLUTION ORAL at 20:15

## 2024-12-12 RX ADMIN — HYDROMORPHONE HYDROCHLORIDE 0.3 MG: 1 INJECTION, SOLUTION INTRAMUSCULAR; INTRAVENOUS; SUBCUTANEOUS at 11:29

## 2024-12-12 RX ADMIN — INSULIN LISPRO 2 UNITS: 100 INJECTION, SOLUTION INTRAVENOUS; SUBCUTANEOUS at 13:26

## 2024-12-12 RX ADMIN — LIDOCAINE 4% 1 PATCH: 40 PATCH TOPICAL at 09:00

## 2024-12-12 RX ADMIN — SENNOSIDES 17.2 MG: 8.6 TABLET, FILM COATED ORAL at 20:16

## 2024-12-12 RX ADMIN — ATORVASTATIN CALCIUM 80 MG: 80 TABLET, FILM COATED ORAL at 20:16

## 2024-12-12 RX ADMIN — FENTANYL CITRATE 25 MCG: 50 INJECTION INTRAMUSCULAR; INTRAVENOUS at 18:57

## 2024-12-12 ASSESSMENT — PAIN SCALES - GENERAL
PAINLEVEL_OUTOF10: 2
PAINLEVEL_OUTOF10: 0 - NO PAIN
PAINLEVEL_OUTOF10: 7
PAINLEVEL_OUTOF10: 6

## 2024-12-12 ASSESSMENT — PAIN - FUNCTIONAL ASSESSMENT
PAIN_FUNCTIONAL_ASSESSMENT: CPOT (CRITICAL CARE PAIN OBSERVATION TOOL)

## 2024-12-12 NOTE — PROGRESS NOTES
Social Work Progress Note  NOBRERTO was able to obtain patient's SSN from [facility]. NORBERTO reached out to  Precert Team to start a precert request. NORBERTO updated LTACH liaison Chiki and sent him today's MAR. NORBERTO to follow up.   MIGUEL ROCK  Addition: Patient's SSN is .  Update 12:15 pm: [facility] in [place] is able to accept the patient, pending precert.

## 2024-12-12 NOTE — PROGRESS NOTES
Subjective   No events overnight. Complaining of some abdominal pain this morning but KUB looks ok.     Meds  Scheduled medications  aspirin, 81 mg, g-tube, Daily  atorvastatin, 80 mg, g-tube, Nightly  folic acid, 1 mg, g-tube, Daily  heparin, 80 Units/kg, intravenous, Once  insulin lispro, 0-10 Units, subcutaneous, q4h  lactated Ringer's, 1,000 mL, intravenous, Once  lidocaine, 1 patch, transdermal, Daily  metoprolol succinate XL, 50 mg, oral, Daily  oxygen, , inhalation, Continuous - Inhalation  pantoprazole, 40 mg, intravenous, BID  QUEtiapine, 25 mg, g-tube, Nightly  sennosides, 2 tablet, g-tube, BID  thiamine, 100 mg, g-tube, Daily      Continuous medications     PRN medications  PRN medications: acetaminophen **OR** acetaminophen **OR** acetaminophen, alteplase, dextrose, dextrose, dicyclomine, fentaNYL, glucagon, heparin flush, hydrOXYzine HCL, ipratropium-albuteroL, ondansetron, oxyCODONE, oxygen, polyethylene glycol      Objective   Vital signs in last 24 hours:  Temp:  [35.2 °C (95.4 °F)-36.2 °C (97.2 °F)] 36.1 °C (97 °F)  Heart Rate:  [] 62  Resp:  [0-23] 10  BP: (112-135)/() 115/55  Arterial Line BP 1: ()/() 108/90  FiO2 (%):  [40 %] 40 %    Intake/Output this shift:    Intake/Output Summary (Last 24 hours) at 12/12/2024 1322  Last data filed at 12/12/2024 1300  Gross per 24 hour   Intake 660 ml   Output 50 ml   Net 610 ml       Physical  General: Patient is awake, non-toxic appearing, normal body habitus  Pulm: Normal WOB at rest, no crackles or rhonchi  Cardiac: Regular rate and rhythm, normal S1/S2  Abdomen: Mild epigastric tenderness to palpation, non-distended  Extremities: No peripheral edema  Neuro: Patient awake, follows commands    Results  Results for orders placed or performed during the hospital encounter of 11/18/24 (from the past 24 hours)   POCT GLUCOSE   Result Value Ref Range    POCT Glucose 144 (H) 74 - 99 mg/dL   POCT GLUCOSE   Result Value Ref Range    POCT  Glucose 167 (H) 74 - 99 mg/dL   POCT GLUCOSE   Result Value Ref Range    POCT Glucose 161 (H) 74 - 99 mg/dL   POCT GLUCOSE   Result Value Ref Range    POCT Glucose 123 (H) 74 - 99 mg/dL   CBC and Auto Differential   Result Value Ref Range    WBC 8.7 4.4 - 11.3 x10*3/uL    nRBC 1.2 (H) 0.0 - 0.0 /100 WBCs    RBC 2.33 (L) 4.00 - 5.20 x10*6/uL    Hemoglobin 7.1 (L) 12.0 - 16.0 g/dL    Hematocrit 20.8 (L) 36.0 - 46.0 %    MCV 89 80 - 100 fL    MCH 30.5 26.0 - 34.0 pg    MCHC 34.1 32.0 - 36.0 g/dL    RDW 17.1 (H) 11.5 - 14.5 %    Platelets 169 150 - 450 x10*3/uL    Neutrophils % 83.4 40.0 - 80.0 %    Immature Granulocytes %, Automated 0.3 0.0 - 0.9 %    Lymphocytes % 7.6 13.0 - 44.0 %    Monocytes % 7.9 2.0 - 10.0 %    Eosinophils % 0.8 0.0 - 6.0 %    Basophils % 0.0 0.0 - 2.0 %    Neutrophils Absolute 7.24 1.20 - 7.70 x10*3/uL    Immature Granulocytes Absolute, Automated 0.03 0.00 - 0.70 x10*3/uL    Lymphocytes Absolute 0.66 (L) 1.20 - 4.80 x10*3/uL    Monocytes Absolute 0.69 0.10 - 1.00 x10*3/uL    Eosinophils Absolute 0.07 0.00 - 0.70 x10*3/uL    Basophils Absolute 0.00 0.00 - 0.10 x10*3/uL   Renal function panel   Result Value Ref Range    Glucose 134 (H) 74 - 99 mg/dL    Sodium 136 136 - 145 mmol/L    Potassium 3.4 (L) 3.5 - 5.3 mmol/L    Chloride 97 (L) 98 - 107 mmol/L    Bicarbonate 23 21 - 32 mmol/L    Anion Gap 19 10 - 20 mmol/L    Urea Nitrogen 37 (H) 6 - 23 mg/dL    Creatinine 5.03 (H) 0.50 - 1.05 mg/dL    eGFR 9 (L) >60 mL/min/1.73m*2    Calcium 7.2 (L) 8.6 - 10.6 mg/dL    Phosphorus 4.3 2.5 - 4.9 mg/dL    Albumin 2.2 (L) 3.4 - 5.0 g/dL   Magnesium   Result Value Ref Range    Magnesium 1.95 1.60 - 2.40 mg/dL   Iron and TIBC   Result Value Ref Range    Iron 71 35 - 150 ug/dL    UIBC 148 110 - 370 ug/dL    TIBC 219 (L) 240 - 445 ug/dL    % Saturation 32 25 - 45 %   Ferritin   Result Value Ref Range    Ferritin 327 (H) 8 - 150 ng/mL   POCT GLUCOSE   Result Value Ref Range    POCT Glucose 117 (H) 74 - 99 mg/dL    POCT GLUCOSE   Result Value Ref Range    POCT Glucose 177 (H) 74 - 99 mg/dL       Imaging  XR abdomen 1 view    Result Date: 12/12/2024  Interpreted By:  Karen Saucedo, STUDY: XR ABDOMEN 1 VIEW;  12/12/2024 11:33 am   INDICATION: Signs/Symptoms:Abdominal pain.     COMPARISON: 12/10/2024   ACCESSION NUMBER(S): [id number]   ORDERING CLINICIAN: CARL GILLOMBARDO   FINDINGS: Nonobstructive bowel gas pattern. Limited evaluation of pneumoperitoneum on supine imaging, however no gross evidence of free air is noted.   Again noted is a PEG tube in the left mid abdominal quadrant. Minimal contrast within colonic diverticuli from prior GI study.   Osseous structures demonstrate no acute bony changes.       1.  Nonspecific and nonobstructive bowel gas pattern.   MACRO: None   Signed by: Karen Saucedo 12/12/2024 1:02 PM Dictation workstation:   [id number]    XR abdomen 1 view    Result Date: 12/11/2024  Interpreted By:  Karen Saucedo, STUDY: XR ABDOMEN 1 VIEW;  12/10/2024 5:37 pm   INDICATION: Signs/Symptoms:PEG tube study.     COMPARISON: 12/10/2024   ACCESSION NUMBER(S): [id number]   ORDERING CLINICIAN: CARL GILLOMBARDO   FINDINGS: Nonobstructive bowel gas pattern. Limited evaluation of pneumoperitoneum on supine imaging, however no gross evidence of free air is noted.   Contrast identified within the fundus and proximal gastric body.. Gastrostomy tube projected over the superior margins of the proximal gastric body. No evidence of extravasation of contrast.   Osseous structures demonstrate no acute bony changes.       1.  Nonspecific bowel gas pattern. 2. Peg tube in the projection of the superior margins of the gastric body. Contrast within the proximal stomach.   MACRO: None   Signed by: Karen Saucedo 12/11/2024 10:05 AM Dictation workstation:   [id number]    XR chest 1 view    Result Date: 12/10/2024  Interpreted By:  Gene Mendoza, STUDY: XR CHEST 1 VIEW;  12/10/2024 1:43 pm   INDICATION:  Signs/Symptoms:Confirm line placement.     COMPARISON: Chest radiograph dated 12/09/2024 and chest CT dated 11/18/2024.   ACCESSION NUMBER(S): [id number]   ORDERING CLINICIAN: CARL GILLOMBARDO   FINDINGS: AP radiograph of the chest was provided   Right internal jugular approach central venous catheter distal tip overlying the right atrium Tracheostomy cannula in stable position. Status post median sternotomy with intact appearing wires in similar configuration to prior exam.     CARDIOMEDIASTINAL SILHOUETTE: Cardiomediastinal silhouette is normal in size and configuration.   LUNGS: Interval decrease in prominent interstitial lung markings. Improving bilateral opacities, predominantly linear. No pneumothorax over sizable pleural effusions   ABDOMEN: No remarkable upper abdominal findings. Enteric contrast opacifying the stomach   BONES: No acute osseous changes.       1.  Right internal jugular approach central venous catheter distal tip overlying the right atrium.. 2. Improving interstitial pulmonary edema. 3. Improving basilar opacities which may represent atelectasis and/or consolidation.       MACRO: None   Signed by: Gene Mendoza 12/10/2024 2:59 PM Dictation workstation:   [id number]        Assessment/Plan   Assessment & Plan  Dissection of aorta    ESRD on hemodialysis (Multi)    Cardiac arrest    Ruptured aortic aneurysm (Multi)    Anemia due to acute blood loss      64 y.o. female with history of pancreatitis, DVT/PE, HLD, HTN, CABGx4 1997, T2DM, GERD, PAD, chronic mesenteric ischemia, tobacco use who presented to [facility] on 11/18/2024 as a transfer from [facility] with chest, back, and abdominal pain. Now post PEA arrest, with intubation/sedation. Family agreed for trach/PEG and LTAC. Patient had septic episode 12/6 overnight and underwent trach/PEG.      Updates:  -Accepted by LTAC, may go over the weekend  -Plan for TDC placement tomorrow  -Dialysis today  -Heart rates  controlled  -May remove arterial line today     Neuro:  #Sedation/anxiety  -No sedation  -Quetiapine 25 mg nightly     Cardiac:   #Aflutter/Afib with RVR  #Driven by anxiety vs hypovolemia  ::now sinus tachycardia  -HR in 160-180s on 12/5 am  -s/p one dose of digoxin 250 mcg (digoxin is not dialyzable)  -s/p amio bolus 12/6 for RVR  -Continue metoprolol 12.5 mg Q6H     #PEA arrest 11/20  ::likely hypoxia driven: PNA vs aspiration     #Aortic dissection  #Mural Thrombus  #Distal aortic arch saccular aneurysm   ::CTA CAP 11/18- 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   -Vascular surgery discussed case at aortic conference and there are no plans for surgery, palliative measures recommended     #NSTEMI  #CAD s/p CABGx4 1997  #PAD  #HTN #DLD  ::EKG- NSR, rate 71, TWI aVL, V1-V6, 1mm ORQUIDEA in aVR  ::Trop peak 5700  ::Lipid HDL 41.5, , TG 75, Chol 157  -Holding home amlodipine 10mg, imdur 30mg, lisinopril 2.5mg, metop tartrate 50mg bid iso hypotension   -Continue ASA and atorvastatin  -Holding plavix 75mg. Can resume after all procedures are done (TDC, trach/PEG)     #HFrecEF (45-50%, 11/19/24)  #Moderate/Severe MR  -TTE on 12/6 with EF of 60-65% with moderate to severe mitral regurgitation  -Not on GDMT     Pulm:  #Acute hypoxic respiratory failure, post-arrest  :s/p Zosyn 11/20 - 11/26 for PNA  :: s/p Vancomycin   -Failed extubation on 11/28  -Failed SBT on 12/9     GI:  #Concern for Ileus  -Continue senna 17.2 mg BID  -Repeat KUB looks good  -G-tube functioning well in fluoro study     Renal:  #TRACY on CKD likely 2/2 arrest  #Anuric  -Continue TuThrSat dialysis     Endo:   #T2DM  -Continue SSI2     #Tube Feeds  :: goal, 20 ml/hr  -f/up nutrition  -Currently at 10 mL/hr     Infectious  -No active issues     Hem/Onc  #Hypoproliferative Anemia  #Hemolytic transfusion reaction  -Anti-JKB antibodies identified in  panel (anti-Zarate)  -Elevated , decreased reticulocyte count 119,00  -Hgb: 7.6 -> 7.2  -Continue folate 1 mg  -Continue to monitor     F: None  E: K>4, Mag>2  N: TF  G: pantoprazole  A: Trialysis, PEG, Altagracia (may remove today)  DVT: SCDs  CODE: DNR/okay to intubate (confirmed with family 11/21)  NOK: Son Rober [phone]          LOS: 24 days     Jaime Mckeon MD/PhD   PGY-2

## 2024-12-12 NOTE — PROGRESS NOTES
Occupational Therapy    Communication Note    Patient Name: Humaira Huang  MRN: [id number]  Today's Date: 12/12/2024   Room: 14/14-A    Discipline: Occupational Therapy      Missed Visit Reason:  (1345: pt on HD at bedside, unavailable for OT tx.)      12/12/24 at 1:46 PM   Michelle Marshall, OT   Rehab Office: [phone]

## 2024-12-12 NOTE — CARE PLAN
The patient's goals for the shift include      The clinical goals for the shift include pt will remain HDS throughout shift

## 2024-12-12 NOTE — CARE PLAN
Problem: Skin  Goal: Participates in plan/prevention/treatment measures  Outcome: Progressing  Goal: Prevent/manage excess moisture  Outcome: Progressing  Goal: Prevent/minimize sheer/friction injuries  Outcome: Progressing  Goal: Decreased wound size/increased tissue granulation at next dressing change  Outcome: Progressing  Goal: Promote/optimize nutrition  Outcome: Progressing  Goal: Promote skin healing  Outcome: Progressing     Problem: Safety - Medical Restraint  Goal: Remains free of injury from restraints (Restraint for Interference with Medical Device)  Outcome: Progressing  Goal: Free from restraint(s) (Restraint for Interference with Medical Device)  Outcome: Progressing

## 2024-12-12 NOTE — PROGRESS NOTES
Humaira Huang   64 joseoNga    @@  N/Room: 94250316/14/14-A admitted to CICU for Aortic dissection.    Subjective:   at 40% Fio2, tach and PEG     Meds:   aspirin, 81 mg, Daily  atorvastatin, 80 mg, Nightly  folic acid, 1 mg, Daily  heparin, 80 Units/kg, Once  insulin lispro, 0-10 Units, q4h  lactated Ringer's, 1,000 mL, Once  lidocaine, 1 patch, Daily  metoprolol succinate XL, 50 mg, Daily  oxygen, , Continuous - Inhalation  pantoprazole, 40 mg, BID  QUEtiapine, 25 mg, Nightly  sennosides, 2 tablet, BID  thiamine, 100 mg, Daily           acetaminophen, 650 mg, q4h PRN   Or  acetaminophen, 1,000 mg, q8h PRN   Or  acetaminophen, 650 mg, q4h PRN  alteplase, 2 mg, PRN  dextrose, 12.5 g, q15 min PRN  dextrose, 25 g, q15 min PRN  dicyclomine, 10 mg, 4x daily PRN  fentaNYL, 25 mcg, q4h PRN  glucagon, 1 mg, q15 min PRN  heparin flush, 10 Units, PRN  hydrOXYzine HCL, 10 mg, q6h PRN  ipratropium-albuteroL, 3 mL, q6h PRN  ondansetron, 4 mg, q6h PRN  oxyCODONE, 2.5 mg, q4h PRN  oxygen, , Continuous PRN - O2/gases  polyethylene glycol, 17 g, Daily PRN      OBJECTIVE:    Vitals:    12/12/24 1630   BP:    Pulse: 94   Resp: 15   Temp: 36 °C (96.8 °F)   SpO2:           Intake/Output Summary (Last 24 hours) at 12/12/2024 1725  Last data filed at 12/12/2024 1630  Gross per 24 hour   Intake 1430 ml   Output 2000 ml   Net -570 ml       General appearance: Tach  Eyes: non-icteric  Skin: no apparent rash  Heart: [id number]  Lungs: CTA bilat no wheezing/crackles  Abdomen: soft, nt/nd  Extremities: trace edema  Access: None    Blood Labs:  Results for orders placed or performed during the hospital encounter of 11/18/24 (from the past 24 hours)   POCT GLUCOSE   Result Value Ref Range    POCT Glucose 167 (H) 74 - 99 mg/dL   POCT GLUCOSE   Result Value Ref Range    POCT Glucose 161 (H) 74 - 99 mg/dL   POCT GLUCOSE   Result Value Ref Range    POCT Glucose 123 (H) 74 - 99 mg/dL   CBC and Auto Differential   Result Value Ref Range    WBC 8.7  4.4 - 11.3 x10*3/uL    nRBC 1.2 (H) 0.0 - 0.0 /100 WBCs    RBC 2.33 (L) 4.00 - 5.20 x10*6/uL    Hemoglobin 7.1 (L) 12.0 - 16.0 g/dL    Hematocrit 20.8 (L) 36.0 - 46.0 %    MCV 89 80 - 100 fL    MCH 30.5 26.0 - 34.0 pg    MCHC 34.1 32.0 - 36.0 g/dL    RDW 17.1 (H) 11.5 - 14.5 %    Platelets 169 150 - 450 x10*3/uL    Neutrophils % 83.4 40.0 - 80.0 %    Immature Granulocytes %, Automated 0.3 0.0 - 0.9 %    Lymphocytes % 7.6 13.0 - 44.0 %    Monocytes % 7.9 2.0 - 10.0 %    Eosinophils % 0.8 0.0 - 6.0 %    Basophils % 0.0 0.0 - 2.0 %    Neutrophils Absolute 7.24 1.20 - 7.70 x10*3/uL    Immature Granulocytes Absolute, Automated 0.03 0.00 - 0.70 x10*3/uL    Lymphocytes Absolute 0.66 (L) 1.20 - 4.80 x10*3/uL    Monocytes Absolute 0.69 0.10 - 1.00 x10*3/uL    Eosinophils Absolute 0.07 0.00 - 0.70 x10*3/uL    Basophils Absolute 0.00 0.00 - 0.10 x10*3/uL   Renal function panel   Result Value Ref Range    Glucose 134 (H) 74 - 99 mg/dL    Sodium 136 136 - 145 mmol/L    Potassium 3.4 (L) 3.5 - 5.3 mmol/L    Chloride 97 (L) 98 - 107 mmol/L    Bicarbonate 23 21 - 32 mmol/L    Anion Gap 19 10 - 20 mmol/L    Urea Nitrogen 37 (H) 6 - 23 mg/dL    Creatinine 5.03 (H) 0.50 - 1.05 mg/dL    eGFR 9 (L) >60 mL/min/1.73m*2    Calcium 7.2 (L) 8.6 - 10.6 mg/dL    Phosphorus 4.3 2.5 - 4.9 mg/dL    Albumin 2.2 (L) 3.4 - 5.0 g/dL   Magnesium   Result Value Ref Range    Magnesium 1.95 1.60 - 2.40 mg/dL   Iron and TIBC   Result Value Ref Range    Iron 71 35 - 150 ug/dL    UIBC 148 110 - 370 ug/dL    TIBC 219 (L) 240 - 445 ug/dL    % Saturation 32 25 - 45 %   Ferritin   Result Value Ref Range    Ferritin 327 (H) 8 - 150 ng/mL   POCT GLUCOSE   Result Value Ref Range    POCT Glucose 117 (H) 74 - 99 mg/dL   POCT GLUCOSE   Result Value Ref Range    POCT Glucose 177 (H) 74 - 99 mg/dL   POCT GLUCOSE   Result Value Ref Range    POCT Glucose 109 (H) 74 - 99 mg/dL      US KUB 11/26  IMPRESSION:  1. Increased renal cortical echogenicity and lobular appearance  of the kidneys bilaterally, likely medical renal disease, with relatively atrophic left kidney. No hydronephrosis.  2. Partially visualized bilateral pleural effusions and trace  abdominal ascites.        ASSESSMENT:  Humaira Huang is a  64 y.o.  Year old , with PMHx of  pancreatitis, DVT/PE, HLD, HTN, CABGx4 1997, T2DM, GERD, PAD, chronic mesenteric ischemia, tobacco use who presented to [facility] on [facility]/18/20[facility] as a transfer from [facility] with chest, back, and abdominal pain. 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 Vascular and Cardiac surgery following. CICU course c/b PEA arrest 11/20, now intubated. Nephrology following for TRACY.     #anuric TRACY-D Baseline creatinine 1.5   - Hemodynamics-not on any pressor support  - Etiology :TRACY likely in setting recent hemodynamic insult with PEA.  - 119 ml in bladder scans 12/10  - last SLED 12/1 night then transitioned to iHD      RECOMMENDATIONS:  - TDC likely today  - dialysis per TTS schedule  - bladder scan BID please  - Strict I/Os.  - No contrast or nephrotoxic drugs if possible.  - will follow      Elise Graham MD  Nephrology Fellow   Daytime / Weekend Renal Pager [phone]  After 7 pm Emergencies [phone] Pager [phone]

## 2024-12-13 LAB
ALBUMIN SERPL BCP-MCNC: 2.3 G/DL (ref 3.4–5)
ANION GAP SERPL CALC-SCNC: 16 MMOL/L (ref 10–20)
BASOPHILS # BLD AUTO: 0.02 X10*3/UL (ref 0–0.1)
BASOPHILS NFR BLD AUTO: 0.2 %
BUN SERPL-MCNC: 20 MG/DL (ref 6–23)
CALCIUM SERPL-MCNC: 7.4 MG/DL (ref 8.6–10.6)
CHLORIDE SERPL-SCNC: 96 MMOL/L (ref 98–107)
CO2 SERPL-SCNC: 28 MMOL/L (ref 21–32)
CREAT SERPL-MCNC: 3.37 MG/DL (ref 0.5–1.05)
EGFRCR SERPLBLD CKD-EPI 2021: 14 ML/MIN/1.73M*2
EOSINOPHIL # BLD AUTO: 0.32 X10*3/UL (ref 0–0.7)
EOSINOPHIL NFR BLD AUTO: 3.7 %
ERYTHROCYTE [DISTWIDTH] IN BLOOD BY AUTOMATED COUNT: 18 % (ref 11.5–14.5)
GLUCOSE BLD MANUAL STRIP-MCNC: 109 MG/DL (ref 74–99)
GLUCOSE BLD MANUAL STRIP-MCNC: 130 MG/DL (ref 74–99)
GLUCOSE BLD MANUAL STRIP-MCNC: 131 MG/DL (ref 74–99)
GLUCOSE BLD MANUAL STRIP-MCNC: 145 MG/DL (ref 74–99)
GLUCOSE BLD MANUAL STRIP-MCNC: 147 MG/DL (ref 74–99)
GLUCOSE BLD MANUAL STRIP-MCNC: 165 MG/DL (ref 74–99)
GLUCOSE BLD MANUAL STRIP-MCNC: 184 MG/DL (ref 74–99)
GLUCOSE SERPL-MCNC: 122 MG/DL (ref 74–99)
HCT VFR BLD AUTO: 23.1 % (ref 36–46)
HGB BLD-MCNC: 7.8 G/DL (ref 12–16)
IMM GRANULOCYTES # BLD AUTO: 0.04 X10*3/UL (ref 0–0.7)
IMM GRANULOCYTES NFR BLD AUTO: 0.5 % (ref 0–0.9)
LYMPHOCYTES # BLD AUTO: 0.72 X10*3/UL (ref 1.2–4.8)
LYMPHOCYTES NFR BLD AUTO: 8.2 %
MAGNESIUM SERPL-MCNC: 1.86 MG/DL (ref 1.6–2.4)
MCH RBC QN AUTO: 30.8 PG (ref 26–34)
MCHC RBC AUTO-ENTMCNC: 33.8 G/DL (ref 32–36)
MCV RBC AUTO: 91 FL (ref 80–100)
MONOCYTES # BLD AUTO: 0.6 X10*3/UL (ref 0.1–1)
MONOCYTES NFR BLD AUTO: 6.8 %
NEUTROPHILS # BLD AUTO: 7.06 X10*3/UL (ref 1.2–7.7)
NEUTROPHILS NFR BLD AUTO: 80.6 %
NRBC BLD-RTO: 0.5 /100 WBCS (ref 0–0)
PHOSPHATE SERPL-MCNC: 2.5 MG/DL (ref 2.5–4.9)
PLATELET # BLD AUTO: 207 X10*3/UL (ref 150–450)
POTASSIUM SERPL-SCNC: 3.5 MMOL/L (ref 3.5–5.3)
RBC # BLD AUTO: 2.53 X10*6/UL (ref 4–5.2)
SODIUM SERPL-SCNC: 136 MMOL/L (ref 136–145)
WBC # BLD AUTO: 8.8 X10*3/UL (ref 4.4–11.3)

## 2024-12-13 PROCEDURE — 2020000001 HC ICU ROOM DAILY

## 2024-12-13 PROCEDURE — 84100 ASSAY OF PHOSPHORUS: CPT

## 2024-12-13 PROCEDURE — 99291 CRITICAL CARE FIRST HOUR: CPT

## 2024-12-13 PROCEDURE — 36415 COLL VENOUS BLD VENIPUNCTURE: CPT

## 2024-12-13 PROCEDURE — 36558 INSERT TUNNELED CV CATH: CPT | Performed by: RADIOLOGY

## 2024-12-13 PROCEDURE — 97530 THERAPEUTIC ACTIVITIES: CPT | Mod: GO

## 2024-12-13 PROCEDURE — 2500000004 HC RX 250 GENERAL PHARMACY W/ HCPCS (ALT 636 FOR OP/ED)

## 2024-12-13 PROCEDURE — 77001 FLUOROGUIDE FOR VEIN DEVICE: CPT | Performed by: RADIOLOGY

## 2024-12-13 PROCEDURE — 2500000001 HC RX 250 WO HCPCS SELF ADMINISTERED DRUGS (ALT 637 FOR MEDICARE OP)

## 2024-12-13 PROCEDURE — 36010 PLACE CATHETER IN VEIN: CPT | Performed by: RADIOLOGY

## 2024-12-13 PROCEDURE — 83735 ASSAY OF MAGNESIUM: CPT

## 2024-12-13 PROCEDURE — 85025 COMPLETE CBC W/AUTO DIFF WBC: CPT

## 2024-12-13 PROCEDURE — 82947 ASSAY GLUCOSE BLOOD QUANT: CPT

## 2024-12-13 PROCEDURE — 2500000005 HC RX 250 GENERAL PHARMACY W/O HCPCS

## 2024-12-13 PROCEDURE — 74018 RADEX ABDOMEN 1 VIEW: CPT

## 2024-12-13 PROCEDURE — 94003 VENT MGMT INPAT SUBQ DAY: CPT

## 2024-12-13 PROCEDURE — 2500000002 HC RX 250 W HCPCS SELF ADMINISTERED DRUGS (ALT 637 FOR MEDICARE OP, ALT 636 FOR OP/ED)

## 2024-12-13 PROCEDURE — 97530 THERAPEUTIC ACTIVITIES: CPT | Mod: GP

## 2024-12-13 PROCEDURE — 2500000004 HC RX 250 GENERAL PHARMACY W/ HCPCS (ALT 636 FOR OP/ED): Performed by: RADIOLOGY

## 2024-12-13 RX ORDER — MAGNESIUM SULFATE HEPTAHYDRATE 40 MG/ML
2 INJECTION, SOLUTION INTRAVENOUS ONCE
Status: COMPLETED | OUTPATIENT
Start: 2024-12-13 | End: 2024-12-13

## 2024-12-13 RX ORDER — OXYCODONE HYDROCHLORIDE 5 MG/1
2.5 TABLET ORAL EVERY 6 HOURS PRN
Status: DISCONTINUED | OUTPATIENT
Start: 2024-12-13 | End: 2024-12-18 | Stop reason: HOSPADM

## 2024-12-13 RX ORDER — OXYCODONE HYDROCHLORIDE 5 MG/1
2.5 TABLET ORAL EVERY 6 HOURS PRN
Status: DISCONTINUED | OUTPATIENT
Start: 2024-12-13 | End: 2024-12-13

## 2024-12-13 RX ORDER — FENTANYL CITRATE 50 UG/ML
INJECTION, SOLUTION INTRAMUSCULAR; INTRAVENOUS
Status: COMPLETED | OUTPATIENT
Start: 2024-12-13 | End: 2024-12-13

## 2024-12-13 RX ORDER — HYDROMORPHONE HYDROCHLORIDE 1 MG/ML
0.3 INJECTION, SOLUTION INTRAMUSCULAR; INTRAVENOUS; SUBCUTANEOUS
Status: DISCONTINUED | OUTPATIENT
Start: 2024-12-13 | End: 2024-12-18 | Stop reason: HOSPADM

## 2024-12-13 RX ORDER — POTASSIUM CHLORIDE 1.5 G/1.58G
40 POWDER, FOR SOLUTION ORAL ONCE
Status: COMPLETED | OUTPATIENT
Start: 2024-12-13 | End: 2024-12-13

## 2024-12-13 RX ADMIN — PANTOPRAZOLE SODIUM 40 MG: 40 INJECTION, POWDER, FOR SOLUTION INTRAVENOUS at 22:09

## 2024-12-13 RX ADMIN — INSULIN LISPRO 2 UNITS: 100 INJECTION, SOLUTION INTRAVENOUS; SUBCUTANEOUS at 22:10

## 2024-12-13 RX ADMIN — MAGNESIUM SULFATE HEPTAHYDRATE 2 G: 2 INJECTION, SOLUTION INTRAVENOUS at 07:27

## 2024-12-13 RX ADMIN — FENTANYL CITRATE 25 MCG: 50 INJECTION, SOLUTION INTRAMUSCULAR; INTRAVENOUS at 12:40

## 2024-12-13 RX ADMIN — FOLIC ACID 1 MG: 1 TABLET ORAL at 08:23

## 2024-12-13 RX ADMIN — THIAMINE HCL TAB 100 MG 100 MG: 100 TAB at 08:23

## 2024-12-13 RX ADMIN — ATORVASTATIN CALCIUM 80 MG: 80 TABLET, FILM COATED ORAL at 22:09

## 2024-12-13 RX ADMIN — Medication 40 PERCENT: at 07:56

## 2024-12-13 RX ADMIN — PANTOPRAZOLE SODIUM 40 MG: 40 INJECTION, POWDER, FOR SOLUTION INTRAVENOUS at 08:24

## 2024-12-13 RX ADMIN — POTASSIUM CHLORIDE 40 MEQ: 1.5 POWDER, FOR SOLUTION ORAL at 08:33

## 2024-12-13 RX ADMIN — SENNOSIDES 17.2 MG: 8.6 TABLET, FILM COATED ORAL at 22:09

## 2024-12-13 RX ADMIN — FENTANYL CITRATE 25 MCG: 50 INJECTION INTRAMUSCULAR; INTRAVENOUS at 15:56

## 2024-12-13 RX ADMIN — FENTANYL CITRATE 25 MCG: 50 INJECTION, SOLUTION INTRAMUSCULAR; INTRAVENOUS at 13:08

## 2024-12-13 RX ADMIN — FENTANYL CITRATE 25 MCG: 50 INJECTION INTRAMUSCULAR; INTRAVENOUS at 09:32

## 2024-12-13 RX ADMIN — LIDOCAINE 4% 1 PATCH: 40 PATCH TOPICAL at 08:23

## 2024-12-13 RX ADMIN — METOPROLOL SUCCINATE 50 MG: 50 TABLET, EXTENDED RELEASE ORAL at 08:23

## 2024-12-13 RX ADMIN — HYDROMORPHONE HYDROCHLORIDE 0.3 MG: 1 INJECTION, SOLUTION INTRAMUSCULAR; INTRAVENOUS; SUBCUTANEOUS at 18:54

## 2024-12-13 RX ADMIN — OXYCODONE HYDROCHLORIDE 2.5 MG: 5 TABLET ORAL at 08:22

## 2024-12-13 RX ADMIN — Medication 40 PERCENT: at 20:26

## 2024-12-13 RX ADMIN — ASPIRIN 81 MG CHEWABLE TABLET 81 MG: 81 TABLET CHEWABLE at 08:22

## 2024-12-13 RX ADMIN — QUETIAPINE FUMARATE 25 MG: 25 TABLET ORAL at 22:09

## 2024-12-13 RX ADMIN — OXYCODONE HYDROCHLORIDE 2.5 MG: 5 TABLET ORAL at 16:04

## 2024-12-13 ASSESSMENT — COGNITIVE AND FUNCTIONAL STATUS - GENERAL
TURNING FROM BACK TO SIDE WHILE IN FLAT BAD: TOTAL
MOVING FROM LYING ON BACK TO SITTING ON SIDE OF FLAT BED WITH BEDRAILS: TOTAL
HELP NEEDED FOR BATHING: A LOT
TOILETING: TOTAL
DRESSING REGULAR UPPER BODY CLOTHING: A LOT
DRESSING REGULAR LOWER BODY CLOTHING: TOTAL
MOVING TO AND FROM BED TO CHAIR: TOTAL
DAILY ACTIVITIY SCORE: 9
MOBILITY SCORE: 6
PERSONAL GROOMING: A LOT
STANDING UP FROM CHAIR USING ARMS: TOTAL
WALKING IN HOSPITAL ROOM: TOTAL
CLIMB 3 TO 5 STEPS WITH RAILING: TOTAL
EATING MEALS: TOTAL

## 2024-12-13 ASSESSMENT — PAIN SCALES - GENERAL
PAINLEVEL_OUTOF10: 0 - NO PAIN
PAINLEVEL_OUTOF10: 3
PAINLEVEL_OUTOF10: 0 - NO PAIN
PAINLEVEL_OUTOF10: 3
PAINLEVEL_OUTOF10: 0 - NO PAIN
PAINLEVEL_OUTOF10: 0 - NO PAIN
PAINLEVEL_OUTOF10: 5 - MODERATE PAIN

## 2024-12-13 ASSESSMENT — PAIN - FUNCTIONAL ASSESSMENT
PAIN_FUNCTIONAL_ASSESSMENT: CPOT (CRITICAL CARE PAIN OBSERVATION TOOL)
PAIN_FUNCTIONAL_ASSESSMENT: 0-10
PAIN_FUNCTIONAL_ASSESSMENT: 0-10
PAIN_FUNCTIONAL_ASSESSMENT: CPOT (CRITICAL CARE PAIN OBSERVATION TOOL)
PAIN_FUNCTIONAL_ASSESSMENT: CPOT (CRITICAL CARE PAIN OBSERVATION TOOL)
PAIN_FUNCTIONAL_ASSESSMENT: FLACC (FACE, LEGS, ACTIVITY, CRY, CONSOLABILITY)
PAIN_FUNCTIONAL_ASSESSMENT: 0-10

## 2024-12-13 ASSESSMENT — PAIN SCALES - PAIN ASSESSMENT IN ADVANCED DEMENTIA (PAINAD): TOTALSCORE: MEDICATION (SEE MAR)

## 2024-12-13 NOTE — PROGRESS NOTES
Physical Therapy    Physical Therapy Treatment    Patient Name: Humaira Huang  MRN: [id number]  Department: [facility]  Room: 14/14-A  Today's Date: 12/13/2024  Time Calculation  Start Time: 1016  Stop Time: 1040  Time Calculation (min): 24 min    Assessment/Plan   PT Assessment  Physical Needs: Other (Comment) (going to Long term care 2/2 vent dependence)  End of Session Communication: Bedside nurse  End of Session Patient Position: Bed, 3 rail up, Alarm off, not on at start of session     PT Plan  Treatment/Interventions: Bed mobility, Transfer training, Balance training, Neuromuscular re-education, Strengthening, Endurance training, Range of motion, Therapeutic exercise, Therapeutic activity, Positioning, Postural re-education  PT Plan: Ongoing PT  PT Frequency: 3 times per week  PT Discharge Recommendations: Moderate intensity level of continued care  PT Recommended Transfer Status: Total assist, Assist x2  PT - OK to Discharge: Yes    General Visit Information:   PT  Visit  PT Received On: 12/13/24  Response to Previous Treatment: Patient with no complaints from previous session.  General  Family/Caregiver Present: No  Co-Treatment: OT  Co-Treatment Reason: Pt vent dependent with intent to mobilize to EOB  Prior to Session Communication: Bedside nurse  Patient Position Received: Bed, 3 rail up, Alarm off, not on at start of session  General Comment: Pt leaning heavily towards the R with head and upper trunk with vent tubing taught. Noted that trach securement on the L side was no longer velcroed down. Resecured and stable throughout. Pt did reach towards trach and trach tubing x3 throughout session. RN notified. Pt appears confused but defers to not answering questions most of the time.    Subjective     Precautions:  Precautions  Hearing/Visual Limitations: Pt opened eyes 50% of the time during session but still avoidant of eye contact with therapists  Medical Precautions: Cardiac precautions, Fall precautions,  Oxygen therapy device and L/min  Precautions Comment: Normotension per RN    Lines/Tubes:   Telemetry   Trach to vent (CPAP, 10 Psupport, 5 PEEP)   PEG     Vital Signs     Vitals Session Pre PT During PT Post PT   Heart Rate 65  62   Resp 22  16   SpO2 97 >/= 94% 95   /51 130/70 113/62     Objective     Pain:  Pain Assessment  Pain Assessment: 0-10  0-10 (Numeric) Pain Score:  (Pt denied pain initially)    Cognition:  Cognition  Overall Cognitive Status: Impaired  Arousal/Alertness: Inconsistent responses to stimuli  Orientation Level:  (pt thought she was in [facility]. Did not answer other orientation questions. Reorientation questions.)  Following Commands:  (pt followed ~50% one step commands with repetition. Appeared to have behavioral component.)    Activity Tolerance:  Activity Tolerance  Early Mobility/Exercise Safety Screen: Proceed with mobilization - No exclusion criteria met    Treatments:  Therapeutic Activity  Therapeutic Activity Performed: Yes  Therapeutic Activity 1: Pt sat EOB total of 10 minutes with. Without UE support, pt requires mod-maxAx1 for R lateral and posterior lean. Cues for midline positioning. With B UE support- pt requiring minAx1 majority of duration, but  brief bouts of CGA.  Therapeutic Activity 2: Pt was only able to tolerate standing for 15 seconds with B arm-in-arm support, B knees blocked, and therapist's hands supporting hips with depAx2    Bed Mobility  Bed Mobility: Yes  Bed Mobility 1  Bed Mobility 1: Supine to sitting, Sitting to supine  Level of Assistance 1: Dependent (x2 person; cues for sequencing)  Bed Mobility Comments 1: HOB elevated for supine->sit transfer; draw sheet needed. During supine->sit, pt did attempt to use UEs to assist    Transfers  Transfer: Yes  Transfer 1  Technique 1: Sit to stand, Stand to sit  Transfer Level of Assistance 1: Dependent (x2 person; cues for sequencing; B arm-in-arm assist, B knees blocked, therapist's hands behind hips. Pt able  to hold onto therapist's arm with R UE but not L UE)  Trials/Comments 1: achieved 50% stand    Outcome Measures:  [facility] Basic Mobility  Turning from your back to your side while in a flat bed without using bedrails: Total  Moving from lying on your back to sitting on the side of a flat bed without using bedrails: Total  Moving to and from bed to chair (including a wheelchair): Total  Standing up from a chair using your arms (e.g. wheelchair or bedside chair): Total  To walk in hospital room: Total  Climbing 3-5 steps with railing: Total  Basic Mobility - Total Score: 6    Confusion Assessment Method-ICU (CAM-ICU)  Feature 1: Acute Onset or Fluctuating Course: Positive    FSS-ICU  Ambulation: Unable to attempt due to weakness  Rolling: Total assistance (performs 25% or requires another person)  Sitting: Maximal assistance (performs 25% - 49% of task)  Transfer Sit-to-Stand: Total assistance (performs 25% or requires another person)  Transfer Supine-to-Sit: Total assistance (performs 25% or requires another person)  Total Score: 5    Early Mobility/Exercise Safety Screen: Proceed with mobilization - No exclusion criteria met  ICU Mobility Scale: Standing [4]  E = Exercise and Early Mobility  Early Mobility/Exercise Safety Screen: Proceed with mobilization - No exclusion criteria met  ICU Mobility Scale: Standing    Education Documentation  Mobility Training, taught by Litzy Pérez PT at 12/13/2024 11:44 AM.  Learner: Patient  Readiness: Acceptance  Method: Explanation  Response: Needs Reinforcement  Comment: mobility precautions, importance of not pulling on lines/tubes    Education Comments  No comments found.      Encounter Problems       Encounter Problems (Active)       Balance       Pt will be able to sit statically/dynamically EOB >12 minutes with CGA with UE support as needed for improved postural control (Progressing)       Start:  11/26/24    Expected End:  12/25/24               Mobility          PT  Transfers       Transfer from bed to chair with modAx2 (Progressing)       Start:  11/26/24    Expected End:  12/25/24            Patient to transfer to and from sit to supine with modAx1 (Progressing)       Start:  11/26/24    Expected End:  12/25/24            Patient will transfer sit to and from stand with modAx2 (Progressing)       Start:  11/26/24    Expected End:  12/25/24               Pain - Adult            Signed by Litzy Pérez DPT

## 2024-12-13 NOTE — POST-PROCEDURE NOTE
Interventional Radiology Brief Postprocedure Note    Attending: Dr Joseph Marshall    Assistant: Dr Mikey Quezada    Diagnosis: ESRD    Description of procedure:  Under ultrasound and fluoroscopic guidance, a tunneled dialysis catheter was placed in the right internal jugular vein. The patient tolerated the procedure well without complication. Please refer to PACs for detailed report.     Anesthesia:  MAC Moderate    Complications: None    Estimated Blood Loss: minimal        No specimens collected      See detailed result report with images in PACS.    The patient tolerated the procedure well without incident or complication and is in stable condition.

## 2024-12-13 NOTE — PROGRESS NOTES
Subjective   No events overnight, denies any pain this morning.    Meds  Scheduled medications  aspirin, 81 mg, g-tube, Daily  atorvastatin, 80 mg, g-tube, Nightly  folic acid, 1 mg, g-tube, Daily  heparin, 80 Units/kg, intravenous, Once  insulin lispro, 0-10 Units, subcutaneous, q4h  lactated Ringer's, 1,000 mL, intravenous, Once  lidocaine, 1 patch, transdermal, Daily  metoprolol succinate XL, 50 mg, oral, Daily  oxygen, , inhalation, Continuous - Inhalation  pantoprazole, 40 mg, intravenous, BID  QUEtiapine, 25 mg, g-tube, Nightly  sennosides, 2 tablet, g-tube, BID  thiamine, 100 mg, g-tube, Daily      Continuous medications     PRN medications  PRN medications: acetaminophen **OR** acetaminophen **OR** acetaminophen, alteplase, dextrose, dextrose, dicyclomine, fentaNYL, glucagon, heparin flush, hydrOXYzine HCL, ipratropium-albuteroL, ondansetron, oxyCODONE, oxygen, polyethylene glycol      Objective   Vital signs in last 24 hours:  Temp:  [35.4 °C (95.7 °F)-36.2 °C (97.2 °F)] 35.4 °C (95.7 °F)  Heart Rate:  [] 60  Resp:  [0-21] 15  BP: (102-143)/() 131/70  Arterial Line BP 1: ()/(33-90) 111/59  FiO2 (%):  [40 %] 40 %    Intake/Output this shift:    Intake/Output Summary (Last 24 hours) at 12/13/2024 1034  Last data filed at 12/13/2024 0947  Gross per 24 hour   Intake 1290 ml   Output 2000 ml   Net -710 ml       Physical  General: Patient is awake, non-toxic appearing, normal body habitus  Pulm: Normal WOB at rest, no ventilator dyssynchrony, transmitted upper airway sounds  Cardiac: Regular rate and rhythm, normal S1/S2  Abdomen: Non-tender to palpation, non-distended  Extremities: No peripheral edema  Neuro: Patient awake, can follow complex commands, intact strength bilaterally    Results  Results for orders placed or performed during the hospital encounter of 11/18/24 (from the past 24 hours)   POCT GLUCOSE   Result Value Ref Range    POCT Glucose 177 (H) 74 - 99 mg/dL   POCT GLUCOSE    Result Value Ref Range    POCT Glucose 109 (H) 74 - 99 mg/dL   POCT GLUCOSE   Result Value Ref Range    POCT Glucose 100 (H) 74 - 99 mg/dL   POCT GLUCOSE   Result Value Ref Range    POCT Glucose 130 (H) 74 - 99 mg/dL   POCT GLUCOSE   Result Value Ref Range    POCT Glucose 109 (H) 74 - 99 mg/dL   CBC and Auto Differential   Result Value Ref Range    WBC 8.8 4.4 - 11.3 x10*3/uL    nRBC 0.5 (H) 0.0 - 0.0 /100 WBCs    RBC 2.53 (L) 4.00 - 5.20 x10*6/uL    Hemoglobin 7.8 (L) 12.0 - 16.0 g/dL    Hematocrit 23.1 (L) 36.0 - 46.0 %    MCV 91 80 - 100 fL    MCH 30.8 26.0 - 34.0 pg    MCHC 33.8 32.0 - 36.0 g/dL    RDW 18.0 (H) 11.5 - 14.5 %    Platelets 207 150 - 450 x10*3/uL    Neutrophils % 80.6 40.0 - 80.0 %    Immature Granulocytes %, Automated 0.5 0.0 - 0.9 %    Lymphocytes % 8.2 13.0 - 44.0 %    Monocytes % 6.8 2.0 - 10.0 %    Eosinophils % 3.7 0.0 - 6.0 %    Basophils % 0.2 0.0 - 2.0 %    Neutrophils Absolute 7.06 1.20 - 7.70 x10*3/uL    Immature Granulocytes Absolute, Automated 0.04 0.00 - 0.70 x10*3/uL    Lymphocytes Absolute 0.72 (L) 1.20 - 4.80 x10*3/uL    Monocytes Absolute 0.60 0.10 - 1.00 x10*3/uL    Eosinophils Absolute 0.32 0.00 - 0.70 x10*3/uL    Basophils Absolute 0.02 0.00 - 0.10 x10*3/uL   Renal function panel   Result Value Ref Range    Glucose 122 (H) 74 - 99 mg/dL    Sodium 136 136 - 145 mmol/L    Potassium 3.5 3.5 - 5.3 mmol/L    Chloride 96 (L) 98 - 107 mmol/L    Bicarbonate 28 21 - 32 mmol/L    Anion Gap 16 10 - 20 mmol/L    Urea Nitrogen 20 6 - 23 mg/dL    Creatinine 3.37 (H) 0.50 - 1.05 mg/dL    eGFR 14 (L) >60 mL/min/1.73m*2    Calcium 7.4 (L) 8.6 - 10.6 mg/dL    Phosphorus 2.5 2.5 - 4.9 mg/dL    Albumin 2.3 (L) 3.4 - 5.0 g/dL   Magnesium   Result Value Ref Range    Magnesium 1.86 1.60 - 2.40 mg/dL   POCT GLUCOSE   Result Value Ref Range    POCT Glucose 131 (H) 74 - 99 mg/dL       Imaging  XR abdomen 1 view    Result Date: 12/12/2024  Interpreted By:  Karen Saucedo, STUDY: XR ABDOMEN 1 VIEW;   12/12/2024 11:33 am   INDICATION: Signs/Symptoms:Abdominal pain.     COMPARISON: 12/10/2024   ACCESSION NUMBER(S): [id number]   ORDERING CLINICIAN: CARL GILLOMBARDO   FINDINGS: Nonobstructive bowel gas pattern. Limited evaluation of pneumoperitoneum on supine imaging, however no gross evidence of free air is noted.   Again noted is a PEG tube in the left mid abdominal quadrant. Minimal contrast within colonic diverticuli from prior GI study.   Osseous structures demonstrate no acute bony changes.       1.  Nonspecific and nonobstructive bowel gas pattern.   MACRO: None   Signed by: Karen Saucedo 12/12/2024 1:02 PM Dictation workstation:   [id number]        Assessment/Plan   Assessment & Plan  Dissection of aorta    ESRD on hemodialysis (Multi)    Cardiac arrest    Ruptured aortic aneurysm (Multi)    Anemia due to acute blood loss      64 y.o. female with history of pancreatitis, DVT/PE, HLD, HTN, CABGx4 1997, T2DM, GERD, PAD, chronic mesenteric ischemia, tobacco use who presented to [facility] on 11/18/[facility] as a transfer from [facility] with chest, back, and abdominal pain. Now post PEA arrest, with intubation/sedation. Family agreed for trach/PEG and LTAC. Patient had septic episode 12/6 overnight and underwent trach/PEG.      Updates:  -Trial off restraints  -Tunneled dialysis line today  -Potential discharge by Sunday  -Consider restarting Plavix after TDC     Neuro:  #Sedation/anxiety  -No sedation  -Quetiapine 25 mg nightly     Cardiac:   #Aflutter/Afib with RVR  #Driven by anxiety vs hypovolemia  ::now sinus tachycardia  -HR in 160-180s on 12/5 am  -s/p one dose of digoxin 250 mcg (digoxin is not dialyzable)  -s/p amio bolus 12/6 for RVR  -Continue metoprolol 12.5 mg Q6H     #PEA arrest 11/20  ::likely hypoxia driven: PNA vs aspiration     #Aortic dissection  #Mural Thrombus  #Distal aortic arch saccular aneurysm   ::CTA CAP 11/18- 2.6 cm saccular aneurysm of distal aortic  arch, mural thrombus in aortic arch and desc abd aorta, 2 areas of focal dissection in desc thoracic aorta.   -Vascular surgery discussed case at aortic conference and there are no plans for surgery, palliative measures recommended     #NSTEMI  #CAD s/p CABGx4 1997  #PAD  #HTN #DLD  ::EKG- NSR, rate 71, TWI aVL, V1-V6, 1mm ORQUIDEA in aVR  ::Trop peak 5700  ::Lipid HDL 41.5, , TG 75, Chol 157  -Holding home amlodipine 10mg, imdur 30mg, lisinopril 2.5mg, metop tartrate 50mg bid iso hypotension   -Continue ASA and atorvastatin  -Holding plavix 75mg. Can resume after all procedures are done (TDC, trach/PEG)     #HFrecEF (45-50%, 11/19/24)  #Moderate/Severe MR  -TTE on 12/6 with EF of 60-65% with moderate to severe mitral regurgitation  -Not on GDMT     Pulm:  #Acute hypoxic respiratory failure, post-arrest  :s/p Zosyn 11/20 - 11/26 for PNA  :: s/p Vancomycin   -Failed extubation on 11/28  -Failed SBT on 12/9     GI:  #Concern for Ileus  -Started Senna 8.6 mg BID  -KUB yesterday with stomach distension  -Repeat KUB, can consider to do low intermittent suction on PEG or talk with ACS if continues to be distended     Renal:  #TRACY on CKD likely 2/2 arrest  #Anuric  -Worsening electrolytes today, may plan for iHD  -Sevalemar carbonate 800 mg TID      #Hyponatremia  -Dialysis today     Endo:   #T2DM  -Hold home metformin   -POC Glucose: 119-162  -Increase to SSI2 from SSI1     #Tube Feeds  :: goal, 20 ml/hr  -f/up nutrition  -Currently at goal     Infectious  #sepsis  -fu blood and sputum culutre  -Trach aspirates growing steno  -Blood cultures NGTD at 3 days  -cw vanc zosyn (12/6- 12/10)     Hem/Onc  #Hypoproliferative Anemia  #Hemolytic transfusion reaction  -Anti-JKB antibodies identified in panel (anti-Zarate)  -Elevated , decreased reticulocyte count 119,00  -Hgb: 7.6 -> 7.2  -Continue to monitor     F: None  E: K>4, Mag>2  N: TF  G: pantoprazole  A: Trialysis, PEG  DVT: SCDs  CODE: DNR/okay to intubate  (confirmed with family 11/21)  NOK: Son Rober [phone]     LOS: 25 days     Jaime Mckeon MD/PhD   PGY-2

## 2024-12-13 NOTE — CARE PLAN
The patient's goals for the shift include      The clinical goals for the shift include Pt will remain HDs

## 2024-12-13 NOTE — PRE-PROCEDURE NOTE
Interventional Radiology Preprocedure Note    Indication for procedure: The primary encounter diagnosis was Dissection of aorta. Diagnoses of Cardiac arrest, Shock (Multi), Acute renal failure, unspecified acute renal failure type (CMS-HCC), Acute thrombosis of internal jugular vein, unspecified laterality (Multi), Generalized edema, and Other specified soft tissue disorders were also pertinent to this visit.    Relevant review of systems: NA    Relevant Labs:   Lab Results   Component Value Date    CREATININE 3.37 (H) 12/13/2024    EGFR 14 (L) 12/13/2024    INR 1.2 (H) 12/06/2024    PROTIME 14.0 (H) 12/06/2024       Planned Sedation/Anesthesia: Moderate    Airway assessment: normal    Directed physical examination:    Tracheostomy in place  Right internal jugular HD catheter    Mallampati:  unable to assess    ASA Score: ASA 3 - Patient with moderate systemic disease with functional limitations    Benefits, risks and alternatives of procedure and planned sedation have been discussed with the patient and/or their representative. All questions answered and they agree to proceed.

## 2024-12-13 NOTE — PROGRESS NOTES
Occupational Therapy    OT Treatment    Patient Name: Humaira Huang  MRN: [id number]  Department: [facility]  Room: 14/14-A  Today's Date: 12/13/2024  Time Calculation  Start Time: 1016  Stop Time: 1040  Time Calculation (min): 24 min      Assessment:  End of Session Communication: Bedside nurse  End of Session Patient Position: Bed, 3 rail up, Alarm off, not on at start of session     Plan:  Treatment Interventions: ADL retraining, Functional transfer training, UE strengthening/ROM, Endurance training, Cognitive reorientation, Patient/family training, Equipment evaluation/education, Fine motor coordination activities, Neuromuscular reeducation, Compensatory technique education  OT Frequency: 3 times per week  OT Discharge Recommendations: Moderate intensity level of continued care  Equipment Recommended upon Discharge:  (TBD)  OT Recommended Transfer Status: Assist of 2  OT - OK to Discharge: Yes  Treatment Interventions: ADL retraining, Functional transfer training, UE strengthening/ROM, Endurance training, Cognitive reorientation, Patient/family training, Equipment evaluation/education, Fine motor coordination activities, Neuromuscular reeducation, Compensatory technique education    Subjective   Previous Visit Info:  OT Last Visit  OT Received On: 12/13/24    General:  General  Family/Caregiver Present: No  Co-Treatment: PT  Co-Treatment Reason: Pt vent dependent with intent to mobilize to EOB  Prior to Session Communication: Bedside nurse  Patient Position Received: Bed, 3 rail up, Alarm off, not on at start of session (restraints off)  General Comment: Pt supine in bed on arrival, agreeable with encouragement. Tracheostomy CPAP 40% FiO2, PEEP 5, PS 10.    Precautions:  Medical Precautions: Cardiac precautions, Fall precautions, Oxygen therapy device and L/min  Precautions Comment: Normotension per RN    Vitals:   12/13/24 1016 12/13/24 1040   Vital Signs   Vitals Session Pre OT Post OT   Heart Rate 65 60   Resp 17  12   SpO2 97 % 100 %   /51 113/62   MAP (mmHg) 71 80             Pain:  Pain Assessment  Pain Assessment:  (denied pain at rest)    Objective    Cognition:  Cognition  Overall Cognitive Status: Impaired  Arousal/Alertness: Inconsistent responses to stimuli  Orientation Level:  (Mouthed being in [place], otherwise did not respond to orientation questions. Reoriented.)  Following Commands:  (~50% command following with repetition and increased time. Difficult to differentiate confusion with tasks versus behavioral component.)  Cognition Comments: Made frequent attempts to pull on tracheostomy, educated on risk of causing self harm and nodded in understanding.     Bed Mobility/Transfers: Bed Mobility  Bed Mobility: Yes  Bed Mobility 1  Bed Mobility 1: Supine to sitting, Sitting to supine  Level of Assistance 1: Dependent, +2  Bed Mobility Comments 1: HOB elevated, draw sheet    Transfers  Transfer: Yes  Transfer 1  Transfer From 1: Sit to  Transfer to 1: Stand  Transfer Level of Assistance 1: Dependent, +2  Trials/Comments 1: bilat arm in arm assist and support at hips. achieved ~50% partial stand     Therapy/Activity: Therapeutic Activity  Therapeutic Activity Performed: Yes  Therapeutic Activity 1: Pt tolerated EOB sitting 10 minutes. With UE support, pt CGA-min A. Without UE support, pt mod-max A for R lateral and posterior lean    Outcome Measures:[facility] Daily Activity  Putting on and taking off regular lower body clothing: Total  Bathing (including washing, rinsing, drying): A lot  Putting on and taking off regular upper body clothing: A lot  Toileting, which includes using toilet, bedpan or urinal: Total  Taking care of personal grooming such as brushing teeth: A lot  Eating Meals: Total  Daily Activity - Total Score: 9     ICU Mobility Scale: Standing [4]    Education Documentation  Body Mechanics, taught by Michelle Marshall OT at 12/13/2024  2:57 PM.  Learner: Patient  Readiness: Acceptance  Method:  Explanation, Demonstration  Response: Needs Reinforcement    Precautions, taught by Michelle Marshall OT at 12/13/2024  2:57 PM.  Learner: Patient  Readiness: Acceptance  Method: Explanation, Demonstration  Response: Needs Reinforcement    ADL Training, taught by Michelle Marshall OT at 12/13/2024  2:57 PM.  Learner: Patient  Readiness: Acceptance  Method: Explanation, Demonstration  Response: Needs Reinforcement    Education Comments  No comments found.      OP EDUCATION:       Goals:  Encounter Problems       Encounter Problems (Active)       ADLs       Patient with complete upper body dressing with minimal assist  level of assistance donning and doffing all UE clothes (Progressing)       Start:  11/26/24    Expected End:  12/25/24            Patient with complete lower body dressing with moderate assist level of assistance donning and doffing all LE clothes  with PRN adaptive equipment (Progressing)       Start:  11/26/24    Expected End:  12/25/24            Patient will complete daily grooming tasks with minimal assist  level of assistance and PRN adaptive equipment. (Progressing)       Start:  11/26/24    Expected End:  12/25/24               BALANCE       Pt will tolerate sitting EOB >10 min with mod A during functional tasks and ADLs without LOB and while maintaining trunk and head in upright, midline position.  (Progressing)       Start:  11/26/24    Expected End:  12/25/24               COGNITION/SAFETY       Pt will be alert and oriented x 3 and follow >90% simple 2-step commands with stimulation.   (Progressing)       Start:  11/26/24    Expected End:  12/25/24               TRANSFERS       Patient will perform bed mobility moderate assist level of assistance in order to improve safety and independence with mobility (Progressing)       Start:  11/26/24    Expected End:  12/25/24            Patient will complete sit to stand transfer with moderate assist level of assistance and least  restrictive device in order to improve safety and prepare for out of bed mobility. (Progressing)       Start:  11/26/24    Expected End:  12/25/24

## 2024-12-14 LAB
ABO GROUP (TYPE) IN BLOOD: NORMAL
ALBUMIN SERPL BCP-MCNC: 2.5 G/DL (ref 3.4–5)
ANION GAP SERPL CALC-SCNC: 20 MMOL/L (ref 10–20)
ANTIBODY SCREEN: NORMAL
BASOPHILS # BLD AUTO: 0.01 X10*3/UL (ref 0–0.1)
BASOPHILS NFR BLD AUTO: 0.1 %
BUN SERPL-MCNC: 32 MG/DL (ref 6–23)
CALCIUM SERPL-MCNC: 7.7 MG/DL (ref 8.6–10.6)
CHLORIDE SERPL-SCNC: 96 MMOL/L (ref 98–107)
CO2 SERPL-SCNC: 24 MMOL/L (ref 21–32)
CREAT SERPL-MCNC: 4.37 MG/DL (ref 0.5–1.05)
DAT-POLYSPECIFIC: NORMAL
EGFRCR SERPLBLD CKD-EPI 2021: 11 ML/MIN/1.73M*2
EOSINOPHIL # BLD AUTO: 0.13 X10*3/UL (ref 0–0.7)
EOSINOPHIL NFR BLD AUTO: 1.6 %
ERYTHROCYTE [DISTWIDTH] IN BLOOD BY AUTOMATED COUNT: 18.5 % (ref 11.5–14.5)
GLUCOSE BLD MANUAL STRIP-MCNC: 108 MG/DL (ref 74–99)
GLUCOSE BLD MANUAL STRIP-MCNC: 139 MG/DL (ref 74–99)
GLUCOSE BLD MANUAL STRIP-MCNC: 158 MG/DL (ref 74–99)
GLUCOSE BLD MANUAL STRIP-MCNC: 162 MG/DL (ref 74–99)
GLUCOSE BLD MANUAL STRIP-MCNC: 195 MG/DL (ref 74–99)
GLUCOSE SERPL-MCNC: 173 MG/DL (ref 74–99)
HCT VFR BLD AUTO: 25.1 % (ref 36–46)
HGB BLD-MCNC: 8.1 G/DL (ref 12–16)
IMM GRANULOCYTES # BLD AUTO: 0.03 X10*3/UL (ref 0–0.7)
IMM GRANULOCYTES NFR BLD AUTO: 0.4 % (ref 0–0.9)
LYMPHOCYTES # BLD AUTO: 0.99 X10*3/UL (ref 1.2–4.8)
LYMPHOCYTES NFR BLD AUTO: 12.1 %
MAGNESIUM SERPL-MCNC: 2.45 MG/DL (ref 1.6–2.4)
MCH RBC QN AUTO: 31.2 PG (ref 26–34)
MCHC RBC AUTO-ENTMCNC: 32.3 G/DL (ref 32–36)
MCV RBC AUTO: 97 FL (ref 80–100)
MONOCYTES # BLD AUTO: 0.74 X10*3/UL (ref 0.1–1)
MONOCYTES NFR BLD AUTO: 9 %
NEUTROPHILS # BLD AUTO: 6.31 X10*3/UL (ref 1.2–7.7)
NEUTROPHILS NFR BLD AUTO: 76.8 %
NRBC BLD-RTO: 0.6 /100 WBCS (ref 0–0)
PHOSPHATE SERPL-MCNC: 3.7 MG/DL (ref 2.5–4.9)
PLATELET # BLD AUTO: 230 X10*3/UL (ref 150–450)
POTASSIUM SERPL-SCNC: 4.6 MMOL/L (ref 3.5–5.3)
RBC # BLD AUTO: 2.6 X10*6/UL (ref 4–5.2)
RH FACTOR (ANTIGEN D): NORMAL
SODIUM SERPL-SCNC: 135 MMOL/L (ref 136–145)
WBC # BLD AUTO: 8.2 X10*3/UL (ref 4.4–11.3)

## 2024-12-14 PROCEDURE — 2500000002 HC RX 250 W HCPCS SELF ADMINISTERED DRUGS (ALT 637 FOR MEDICARE OP, ALT 636 FOR OP/ED)

## 2024-12-14 PROCEDURE — 2500000001 HC RX 250 WO HCPCS SELF ADMINISTERED DRUGS (ALT 637 FOR MEDICARE OP)

## 2024-12-14 PROCEDURE — 8010000001 HC DIALYSIS - HEMODIALYSIS PER DAY

## 2024-12-14 PROCEDURE — 93010 ELECTROCARDIOGRAM REPORT: CPT | Performed by: INTERNAL MEDICINE

## 2024-12-14 PROCEDURE — 86880 COOMBS TEST DIRECT: CPT

## 2024-12-14 PROCEDURE — 94003 VENT MGMT INPAT SUBQ DAY: CPT

## 2024-12-14 PROCEDURE — 51701 INSERT BLADDER CATHETER: CPT

## 2024-12-14 PROCEDURE — 2500000004 HC RX 250 GENERAL PHARMACY W/ HCPCS (ALT 636 FOR OP/ED)

## 2024-12-14 PROCEDURE — 85025 COMPLETE CBC W/AUTO DIFF WBC: CPT

## 2024-12-14 PROCEDURE — 36415 COLL VENOUS BLD VENIPUNCTURE: CPT

## 2024-12-14 PROCEDURE — 2500000005 HC RX 250 GENERAL PHARMACY W/O HCPCS

## 2024-12-14 PROCEDURE — 86077 PHYS BLOOD BANK SERV XMATCH: CPT

## 2024-12-14 PROCEDURE — 74018 RADEX ABDOMEN 1 VIEW: CPT | Performed by: RADIOLOGY

## 2024-12-14 PROCEDURE — 2020000001 HC ICU ROOM DAILY

## 2024-12-14 PROCEDURE — 86870 RBC ANTIBODY IDENTIFICATION: CPT

## 2024-12-14 PROCEDURE — 80069 RENAL FUNCTION PANEL: CPT

## 2024-12-14 PROCEDURE — 99291 CRITICAL CARE FIRST HOUR: CPT

## 2024-12-14 PROCEDURE — 86885 COOMBS TEST INDIRECT QUAL: CPT

## 2024-12-14 PROCEDURE — 99232 SBSQ HOSP IP/OBS MODERATE 35: CPT | Performed by: INTERNAL MEDICINE

## 2024-12-14 PROCEDURE — 83735 ASSAY OF MAGNESIUM: CPT

## 2024-12-14 PROCEDURE — 86901 BLOOD TYPING SEROLOGIC RH(D): CPT

## 2024-12-14 PROCEDURE — 82947 ASSAY GLUCOSE BLOOD QUANT: CPT

## 2024-12-14 RX ORDER — METOPROLOL TARTRATE 25 MG/1
12.5 TABLET, FILM COATED ORAL EVERY 6 HOURS
Status: DISCONTINUED | OUTPATIENT
Start: 2024-12-14 | End: 2024-12-16

## 2024-12-14 RX ORDER — CLOPIDOGREL BISULFATE 75 MG/1
75 TABLET ORAL DAILY
Status: DISCONTINUED | OUTPATIENT
Start: 2024-12-14 | End: 2024-12-18 | Stop reason: HOSPADM

## 2024-12-14 RX ADMIN — PANTOPRAZOLE SODIUM 40 MG: 40 INJECTION, POWDER, FOR SOLUTION INTRAVENOUS at 08:09

## 2024-12-14 RX ADMIN — INSULIN LISPRO 2 UNITS: 100 INJECTION, SOLUTION INTRAVENOUS; SUBCUTANEOUS at 00:13

## 2024-12-14 RX ADMIN — THIAMINE HCL TAB 100 MG 100 MG: 100 TAB at 08:08

## 2024-12-14 RX ADMIN — SENNOSIDES 17.2 MG: 8.6 TABLET, FILM COATED ORAL at 21:57

## 2024-12-14 RX ADMIN — HYDROMORPHONE HYDROCHLORIDE 0.3 MG: 1 INJECTION, SOLUTION INTRAMUSCULAR; INTRAVENOUS; SUBCUTANEOUS at 09:04

## 2024-12-14 RX ADMIN — ASPIRIN 81 MG CHEWABLE TABLET 81 MG: 81 TABLET CHEWABLE at 08:08

## 2024-12-14 RX ADMIN — INSULIN LISPRO 2 UNITS: 100 INJECTION, SOLUTION INTRAVENOUS; SUBCUTANEOUS at 08:09

## 2024-12-14 RX ADMIN — SENNOSIDES 17.2 MG: 8.6 TABLET, FILM COATED ORAL at 08:07

## 2024-12-14 RX ADMIN — OXYCODONE HYDROCHLORIDE 2.5 MG: 5 TABLET ORAL at 16:12

## 2024-12-14 RX ADMIN — INSULIN LISPRO 2 UNITS: 100 INJECTION, SOLUTION INTRAVENOUS; SUBCUTANEOUS at 12:27

## 2024-12-14 RX ADMIN — Medication 40 PERCENT: at 22:31

## 2024-12-14 RX ADMIN — FOLIC ACID 1 MG: 1 TABLET ORAL at 08:08

## 2024-12-14 RX ADMIN — Medication 30 PERCENT: at 09:14

## 2024-12-14 RX ADMIN — ATORVASTATIN CALCIUM 80 MG: 80 TABLET, FILM COATED ORAL at 21:57

## 2024-12-14 RX ADMIN — CLOPIDOGREL BISULFATE 75 MG: 75 TABLET ORAL at 14:20

## 2024-12-14 RX ADMIN — QUETIAPINE FUMARATE 25 MG: 25 TABLET ORAL at 21:57

## 2024-12-14 RX ADMIN — METOPROLOL TARTRATE 12.5 MG: 25 TABLET, FILM COATED ORAL at 15:17

## 2024-12-14 RX ADMIN — OXYCODONE HYDROCHLORIDE 2.5 MG: 5 TABLET ORAL at 04:51

## 2024-12-14 RX ADMIN — METOPROLOL TARTRATE 12.5 MG: 25 TABLET, FILM COATED ORAL at 22:32

## 2024-12-14 SDOH — SOCIAL STABILITY: SOCIAL INSECURITY: ABUSE: ADULT

## 2024-12-14 SDOH — ECONOMIC STABILITY: FOOD INSECURITY
WITHIN THE PAST 12 MONTHS, YOU WORRIED THAT YOUR FOOD WOULD RUN OUT BEFORE YOU GOT THE MONEY TO BUY MORE.: PATIENT DECLINED

## 2024-12-14 SDOH — SOCIAL STABILITY: SOCIAL INSECURITY: WERE YOU ABLE TO COMPLETE ALL THE BEHAVIORAL HEALTH SCREENINGS?: YES

## 2024-12-14 SDOH — SOCIAL STABILITY: SOCIAL INSECURITY: HAS ANYONE EVER THREATENED TO HURT YOUR FAMILY OR YOUR PETS?: NO

## 2024-12-14 SDOH — ECONOMIC STABILITY: FOOD INSECURITY: WITHIN THE PAST 12 MONTHS, THE FOOD YOU BOUGHT JUST DIDN'T LAST AND YOU DIDN'T HAVE MONEY TO GET MORE.: PATIENT DECLINED

## 2024-12-14 SDOH — SOCIAL STABILITY: SOCIAL INSECURITY: DO YOU FEEL UNSAFE GOING BACK TO THE PLACE WHERE YOU ARE LIVING?: NO

## 2024-12-14 SDOH — HEALTH STABILITY: MENTAL HEALTH
DO YOU FEEL STRESS - TENSE, RESTLESS, NERVOUS, OR ANXIOUS, OR UNABLE TO SLEEP AT NIGHT BECAUSE YOUR MIND IS TROUBLED ALL THE TIME - THESE DAYS?: PATIENT DECLINED

## 2024-12-14 SDOH — SOCIAL STABILITY: SOCIAL INSECURITY
WITHIN THE LAST YEAR, HAVE YOU BEEN RAPED OR FORCED TO HAVE ANY KIND OF SEXUAL ACTIVITY BY YOUR PARTNER OR EX-PARTNER?: PATIENT DECLINED

## 2024-12-14 SDOH — SOCIAL STABILITY: SOCIAL INSECURITY: DOES ANYONE TRY TO KEEP YOU FROM HAVING/CONTACTING OTHER FRIENDS OR DOING THINGS OUTSIDE YOUR HOME?: NO

## 2024-12-14 SDOH — SOCIAL STABILITY: SOCIAL INSECURITY
WITHIN THE LAST YEAR, HAVE YOU BEEN HUMILIATED OR EMOTIONALLY ABUSED IN OTHER WAYS BY YOUR PARTNER OR EX-PARTNER?: PATIENT DECLINED

## 2024-12-14 SDOH — SOCIAL STABILITY: SOCIAL INSECURITY: DO YOU FEEL ANYONE HAS EXPLOITED OR TAKEN ADVANTAGE OF YOU FINANCIALLY OR OF YOUR PERSONAL PROPERTY?: NO

## 2024-12-14 SDOH — ECONOMIC STABILITY: HOUSING INSECURITY: AT ANY TIME IN THE PAST 12 MONTHS, WERE YOU HOMELESS OR LIVING IN A SHELTER (INCLUDING NOW)?: PATIENT DECLINED

## 2024-12-14 SDOH — ECONOMIC STABILITY: HOUSING INSECURITY: IN THE PAST 12 MONTHS, HOW MANY TIMES HAVE YOU MOVED WHERE YOU WERE LIVING?: 0

## 2024-12-14 SDOH — SOCIAL STABILITY: SOCIAL INSECURITY: ARE YOU OR HAVE YOU BEEN THREATENED OR ABUSED PHYSICALLY, EMOTIONALLY, OR SEXUALLY BY ANYONE?: NO

## 2024-12-14 SDOH — ECONOMIC STABILITY: HOUSING INSECURITY: IN THE LAST 12 MONTHS, WAS THERE A TIME WHEN YOU WERE NOT ABLE TO PAY THE MORTGAGE OR RENT ON TIME?: PATIENT DECLINED

## 2024-12-14 SDOH — SOCIAL STABILITY: SOCIAL INSECURITY: ARE THERE ANY APPARENT SIGNS OF INJURIES/BEHAVIORS THAT COULD BE RELATED TO ABUSE/NEGLECT?: NO

## 2024-12-14 SDOH — SOCIAL STABILITY: SOCIAL INSECURITY: HAVE YOU HAD THOUGHTS OF HARMING ANYONE ELSE?: NO

## 2024-12-14 SDOH — ECONOMIC STABILITY: INCOME INSECURITY
IN THE PAST 12 MONTHS HAS THE ELECTRIC, GAS, OIL, OR WATER COMPANY THREATENED TO SHUT OFF SERVICES IN YOUR HOME?: PATIENT DECLINED

## 2024-12-14 SDOH — SOCIAL STABILITY: SOCIAL INSECURITY: HAVE YOU HAD ANY THOUGHTS OF HARMING ANYONE ELSE?: NO

## 2024-12-14 SDOH — ECONOMIC STABILITY: FOOD INSECURITY: HOW HARD IS IT FOR YOU TO PAY FOR THE VERY BASICS LIKE FOOD, HOUSING, MEDICAL CARE, AND HEATING?: PATIENT DECLINED

## 2024-12-14 SDOH — SOCIAL STABILITY: SOCIAL INSECURITY
WITHIN THE LAST YEAR, HAVE YOU BEEN KICKED, HIT, SLAPPED, OR OTHERWISE PHYSICALLY HURT BY YOUR PARTNER OR EX-PARTNER?: PATIENT DECLINED

## 2024-12-14 SDOH — SOCIAL STABILITY: SOCIAL INSECURITY: WITHIN THE LAST YEAR, HAVE YOU BEEN AFRAID OF YOUR PARTNER OR EX-PARTNER?: PATIENT DECLINED

## 2024-12-14 SDOH — ECONOMIC STABILITY: TRANSPORTATION INSECURITY
IN THE PAST 12 MONTHS, HAS LACK OF TRANSPORTATION KEPT YOU FROM MEDICAL APPOINTMENTS OR FROM GETTING MEDICATIONS?: PATIENT DECLINED

## 2024-12-14 ASSESSMENT — COGNITIVE AND FUNCTIONAL STATUS - GENERAL
MOBILITY SCORE: 6
CLIMB 3 TO 5 STEPS WITH RAILING: TOTAL
DAILY ACTIVITIY SCORE: 6
MOVING FROM LYING ON BACK TO SITTING ON SIDE OF FLAT BED WITH BEDRAILS: TOTAL
PERSONAL GROOMING: TOTAL
DRESSING REGULAR UPPER BODY CLOTHING: TOTAL
WALKING IN HOSPITAL ROOM: TOTAL
STANDING UP FROM CHAIR USING ARMS: TOTAL
TURNING FROM BACK TO SIDE WHILE IN FLAT BAD: TOTAL
DRESSING REGULAR LOWER BODY CLOTHING: TOTAL
PATIENT BASELINE BEDBOUND: YES
TOILETING: TOTAL
EATING MEALS: TOTAL
HELP NEEDED FOR BATHING: TOTAL
MOVING TO AND FROM BED TO CHAIR: TOTAL

## 2024-12-14 ASSESSMENT — PAIN SCALES - GENERAL
PAINLEVEL_OUTOF10: 0 - NO PAIN
PAINLEVEL_OUTOF10: 4

## 2024-12-14 ASSESSMENT — ACTIVITIES OF DAILY LIVING (ADL)
PATIENT'S MEMORY ADEQUATE TO SAFELY COMPLETE DAILY ACTIVITIES?: NO
HEARING - RIGHT EAR: FUNCTIONAL
ASSISTIVE_DEVICE: OTHER (COMMENT)
GROOMING: DEPENDENT
ADEQUATE_TO_COMPLETE_ADL: YES
BATHING: DEPENDENT
TOILETING: DEPENDENT
JUDGMENT_ADEQUATE_SAFELY_COMPLETE_DAILY_ACTIVITIES: NO
LACK_OF_TRANSPORTATION: PATIENT DECLINED
HEARING - LEFT EAR: FUNCTIONAL
DRESSING YOURSELF: DEPENDENT
FEEDING YOURSELF: DEPENDENT
WALKS IN HOME: DEPENDENT

## 2024-12-14 ASSESSMENT — LIFESTYLE VARIABLES
AUDIT-C TOTAL SCORE: 0
AUDIT-C TOTAL SCORE: 0
HOW MANY STANDARD DRINKS CONTAINING ALCOHOL DO YOU HAVE ON A TYPICAL DAY: PATIENT DOES NOT DRINK
HOW OFTEN DO YOU HAVE A DRINK CONTAINING ALCOHOL: NEVER
HOW OFTEN DO YOU HAVE 6 OR MORE DRINKS ON ONE OCCASION: NEVER
SKIP TO QUESTIONS 9-10: 1

## 2024-12-14 ASSESSMENT — PAIN - FUNCTIONAL ASSESSMENT

## 2024-12-14 ASSESSMENT — PATIENT HEALTH QUESTIONNAIRE - PHQ9
SUM OF ALL RESPONSES TO PHQ9 QUESTIONS 1 & 2: 0
2. FEELING DOWN, DEPRESSED OR HOPELESS: NOT AT ALL
1. LITTLE INTEREST OR PLEASURE IN DOING THINGS: NOT AT ALL

## 2024-12-14 ASSESSMENT — COLUMBIA-SUICIDE SEVERITY RATING SCALE - C-SSRS
1. IN THE PAST MONTH, HAVE YOU WISHED YOU WERE DEAD OR WISHED YOU COULD GO TO SLEEP AND NOT WAKE UP?: NO
6. HAVE YOU EVER DONE ANYTHING, STARTED TO DO ANYTHING, OR PREPARED TO DO ANYTHING TO END YOUR LIFE?: NO
2. HAVE YOU ACTUALLY HAD ANY THOUGHTS OF KILLING YOURSELF?: NO

## 2024-12-14 NOTE — CARE PLAN
The patient's goals for the shift include      The clinical goals for the shift include patient will remain HD stable      Problem: Pain - Adult  Goal: Verbalizes/displays adequate comfort level or baseline comfort level  Outcome: Progressing     Problem: Safety - Adult  Goal: Free from fall injury  Outcome: Progressing     Problem: Discharge Planning  Goal: Discharge to home or other facility with appropriate resources  Outcome: Progressing     Problem: Chronic Conditions and Co-morbidities  Goal: Patient's chronic conditions and co-morbidity symptoms are monitored and maintained or improved  Outcome: Progressing     Problem: Skin  Goal: Participates in plan/prevention/treatment measures  Outcome: Progressing  Flowsheets (Taken 12/14/2024 0854)  Participates in plan/prevention/treatment measures:   Elevate heels   Discuss with provider PT/OT consult  Goal: Prevent/manage excess moisture  Outcome: Progressing  Flowsheets (Taken 12/14/2024 0854)  Prevent/manage excess moisture:   Cleanse incontinence/protect with barrier cream   Follow provider orders for dressing changes   Monitor for/manage infection if present  Goal: Prevent/minimize sheer/friction injuries  Outcome: Progressing  Flowsheets (Taken 12/14/2024 0854)  Prevent/minimize sheer/friction injuries:   Complete micro-shifts as needed if patient unable. Adjust patient position to relieve pressure points, not a full turn   Use pull sheet   HOB 30 degrees or less   Turn/reposition every 2 hours/use positioning/transfer devices   Utilize specialty bed per algorithm  Goal: Decreased wound size/increased tissue granulation at next dressing change  Outcome: Progressing  Flowsheets (Taken 12/14/2024 0854)  Decreased wound size/increased tissue granulation at next dressing change:   Utilize specialty bed per algorithm   Protective dressings over bony prominences  Goal: Promote/optimize nutrition  Outcome: Progressing  Flowsheets (Taken 12/14/2024  0854)  Promote/optimize nutrition: Monitor/record intake including meals  Goal: Promote skin healing  Outcome: Progressing  Flowsheets (Taken 12/14/2024 0854)  Promote skin healing:   Protective dressings over bony prominences   Assess skin/pad under line(s)/device(s)   Turn/reposition every 2 hours/use positioning/transfer devices   Ensure correct size (line/device) and apply per  instructions     Problem: Safety - Medical Restraint  Goal: Remains free of injury from restraints (Restraint for Interference with Medical Device)  Outcome: Progressing  Flowsheets (Taken 12/14/2024 0854)  Remains free of injury from restraints (restraint for interference with medical device): Every 2 hours: Monitor safety, psychosocial status, comfort, nutrition and hydration  Goal: Free from restraint(s) (Restraint for Interference with Medical Device)  Outcome: Progressing  Flowsheets (Taken 12/14/2024 0854)  Free from restraint(s) (restraint for interference with medical device):   ONCE/SHIFT or MINIMUM Every 12 hours: Assess and document the continuing need for restraints   Every 24 hours: Continued use of restraint requires Licensed Independent Practitioner to perform face to face examination and written order   Identify and implement measures to help patient regain control     Problem: Knowledge Deficit  Goal: Patient/family/caregiver demonstrates understanding of disease process, treatment plan, medications, and discharge instructions  Outcome: Progressing     Problem: Mechanical Ventilation  Goal: Patient Will Maintain Patent Airway  Outcome: Progressing  Goal: Oral health is maintained or improved  Outcome: Progressing  Goal: Tracheostomy will be managed safely  Outcome: Progressing  Goal: ET tube will be managed safely  Outcome: Progressing  Goal: Ability to express needs and understand communication  Outcome: Progressing  Goal: Mobility/activity is maintained at optimum level for patient  Outcome: Progressing      Problem: Pain  Goal: Takes deep breaths with improved pain control throughout the shift  Outcome: Progressing  Goal: Turns in bed with improved pain control throughout the shift  Outcome: Progressing  Goal: Walks with improved pain control throughout the shift  Outcome: Progressing  Goal: Performs ADL's with improved pain control throughout shift  Outcome: Progressing  Goal: Participates in PT with improved pain control throughout the shift  Outcome: Progressing  Goal: Free from opioid side effects throughout the shift  Outcome: Progressing  Goal: Free from acute confusion related to pain meds throughout the shift  Outcome: Progressing     Problem: Nutrition  Goal: Less than 5 days NPO/clear liquids  Outcome: Progressing  Goal: Oral intake greater than 50%  Outcome: Progressing  Goal: Oral intake greater 75%  Outcome: Progressing  Goal: Consume prescribed supplement  Outcome: Progressing  Goal: Adequate PO fluid intake  Outcome: Progressing  Goal: Nutrition support goals are met within 48 hrs  Outcome: Progressing  Goal: Nutrition support is meeting 75% of nutrient needs  Outcome: Progressing  Goal: Tube feed tolerance  Outcome: Progressing  Goal: BG  mg/dL  Outcome: Progressing  Goal: Lab values WNL  Outcome: Progressing  Goal: Electrolytes WNL  Outcome: Progressing  Goal: Promote healing  Outcome: Progressing  Goal: Maintain stable weight  Outcome: Progressing  Goal: Reduce weight from edema/fluid  Outcome: Progressing  Goal: Gradual weight gain  Outcome: Progressing  Goal: Improve ostomy output  Outcome: Progressing     Problem: Fall/Injury  Goal: Not fall by end of shift  Outcome: Progressing  Goal: Be free from injury by end of the shift  Outcome: Progressing  Goal: Verbalize understanding of personal risk factors for fall in the hospital  Outcome: Progressing  Goal: Verbalize understanding of risk factor reduction measures to prevent injury from fall in the home  Outcome: Progressing  Goal: Use  assistive devices by end of the shift  Outcome: Progressing  Goal: Pace activities to prevent fatigue by end of the shift  Outcome: Progressing

## 2024-12-14 NOTE — PROGRESS NOTES
Subjective   Interval History: was noted to have very little UOP (25cc)> Otherwise NAEON     No events overnight. Pt denied any pain this morning.     Pt trach'd and PEG'd and interactions were limited.    Meds  Scheduled medications  aspirin, 81 mg, g-tube, Daily  atorvastatin, 80 mg, g-tube, Nightly  folic acid, 1 mg, g-tube, Daily  heparin, 80 Units/kg, intravenous, Once  insulin lispro, 0-10 Units, subcutaneous, q4h  lactated Ringer's, 1,000 mL, intravenous, Once  lidocaine, 1 patch, transdermal, Daily  metoprolol succinate XL, 50 mg, oral, Daily  oxygen, , inhalation, Continuous - Inhalation  pantoprazole, 40 mg, intravenous, BID  QUEtiapine, 25 mg, g-tube, Nightly  sennosides, 2 tablet, g-tube, BID  thiamine, 100 mg, g-tube, Daily      Continuous medications     PRN medications  PRN medications: acetaminophen **OR** acetaminophen **OR** acetaminophen, alteplase, dextrose, dextrose, dicyclomine, fentaNYL, glucagon, heparin flush, HYDROmorphone, hydrOXYzine HCL, ipratropium-albuteroL, ondansetron, oxyCODONE, oxyCODONE, oxygen, polyethylene glycol      Objective   Vital signs in last 24 hours:  Temp:  [35 °C (95 °F)-36.3 °C (97.3 °F)] 36.1 °C (97 °F)  Heart Rate:  [54-73] 58  Resp:  [10-28] 16  BP: ()/(49-80) 128/57  FiO2 (%):  [40 %] 40 %    Intake/Output this shift:    Intake/Output Summary (Last 24 hours) at 12/14/2024 0705  Last data filed at 12/14/2024 0600  Gross per 24 hour   Intake 520 ml   Output 25 ml   Net 495 ml       Physical  General: Patient is awake, non-toxic appearing, normal body habitus. Can nod/shake head in response to some questions.   Pulm: Normal WOB at rest, no ventilator dyssynchrony, transmitted upper airway sounds  Cardiac: Regular rate and rhythm, normal S1/S2  Abdomen: Soft, non-tender to palpation, non-distended, no guarding, no rebound    Extremities: No peripheral edema  Neuro: Patient awake, can follow simple commands    Results  Results for orders placed or performed  during the hospital encounter of 11/18/24 (from the past 24 hours)   POCT GLUCOSE   Result Value Ref Range    POCT Glucose 131 (H) 74 - 99 mg/dL   POCT GLUCOSE   Result Value Ref Range    POCT Glucose 147 (H) 74 - 99 mg/dL   POCT GLUCOSE   Result Value Ref Range    POCT Glucose 145 (H) 74 - 99 mg/dL   POCT GLUCOSE   Result Value Ref Range    POCT Glucose 165 (H) 74 - 99 mg/dL   POCT GLUCOSE   Result Value Ref Range    POCT Glucose 184 (H) 74 - 99 mg/dL   POCT GLUCOSE   Result Value Ref Range    POCT Glucose 139 (H) 74 - 99 mg/dL       Imaging  XR abdomen 1 view    Result Date: 12/13/2024  Interpreted By:  Gene Mendoza, STUDY: XR ABDOMEN 1 VIEW;  12/13/2024 4:27 pm   INDICATION: Signs/Symptoms:stomach pain.     COMPARISON: Abdomen radiographs dated 12/12/2024 and chest abdominal and pelvic CT dated 11/18/2024   ACCESSION NUMBER(S): [id number]   ORDERING CLINICIAN: CARL GILLOMBARDO   FINDINGS: AP supine view of the abdomen was provided.   Nonobstructive bowel gas pattern. Limited evaluation of pneumoperitoneum on supine imaging, however no gross evidence of free air is noted. No evidence of extraluminal or portal venous gas. Again seen a patchy tube in the left mid abdomen quadrant.   Bibasilar atelectasis/edema/consolidation.   Osseous structures demonstrate no acute bony changes.       1.  Nonspecific and nonobstructive bowel gas pattern.   MACRO: None   Signed by: Gene Mendoza 12/13/2024 7:27 PM Dictation workstation:   [id number]    XR abdomen 1 view    Result Date: 12/12/2024  Interpreted By:  Karen Saucedo, STUDY: XR ABDOMEN 1 VIEW;  12/12/2024 11:33 am   INDICATION: Signs/Symptoms:Abdominal pain.     COMPARISON: 12/10/2024   ACCESSION NUMBER(S): [id number]   ORDERING CLINICIAN: CARL GILLOMBARDO   FINDINGS: Nonobstructive bowel gas pattern. Limited evaluation of pneumoperitoneum on supine imaging, however no gross evidence of free air is noted.   Again noted is a PEG tube in the left mid abdominal quadrant.  Minimal contrast within colonic diverticuli from prior GI study.   Osseous structures demonstrate no acute bony changes.       1.  Nonspecific and nonobstructive bowel gas pattern.   MACRO: None   Signed by: Karen Saucedo 12/12/2024 1:02 PM Dictation workstation:   [id number]        Assessment/Plan   Assessment & Plan  Dissection of aorta    ESRD on hemodialysis (Multi)    Cardiac arrest    Ruptured aortic aneurysm (Multi)    Anemia due to acute blood loss      64 y.o. female with history of pancreatitis, DVT/PE, HLD, HTN, CABGx4 1997, T2DM, GERD, PAD, chronic mesenteric ischemia, tobacco use who presented to [facility] on 11/18/2024 as a transfer from [facility] with chest, back, and abdominal pain. Now post PEA arrest, with intubation/sedation. Family agreed for trach/PEG and LTAC. Patient had septic episode 12/6 overnight and underwent trach/PEG. IR-guided TDC line placed 12/13/24.     Updates 12/14/24:  -SBT by RT in process, currently weaning and on CPAP  -Restarted Plavix 75mg   -Repeat KUB ordered this morning to re-eval abd distention distention-improved from prior, without signs of obstruction on read, non-distended and pt passed soft BM this morning   -Restarted trickle feeds at 10cc/h with 150cc flushes , can re-eval feeds in AM   -Changed metoprolol succinate ->12.5mg q6h tartrate d/t inability to crush metop succinate for PEG  -PPI DC'd   -Pt rejected by insurance for LTAC-need Peer to Peer by 12PM Monday 12/16 ([phone] option 4)       Neuro:  #Sedation/anxiety  -No sedation  -Quetiapine 25 mg nightly     Cardiac:   #Aflutter/Afib with RVR  #Driven by anxiety vs hypovolemia  ::now sinus tachycardia  -HR in 160-180s on 12/5 am  -s/p one dose of digoxin 250 mcg (digoxin is not dialyzable)  -s/p amio bolus 12/6 for RVR  -Continue metoprolol 12.5 mg Q6H     #PEA arrest 11/20  ::likely hypoxia driven: PNA vs aspiration     #Aortic dissection  #Mural Thrombus  #Distal  aortic arch saccular aneurysm   ::CTA CAP 11/18- 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   -Vascular surgery discussed case at aortic conference and there are no plans for surgery, palliative measures recommended     #NSTEMI  #CAD s/p CABGx4 1997  #PAD  #HTN #DLD  ::EKG- NSR, rate 71, TWI aVL, V1-V6, 1mm ORQUIDEA in aVR  ::Trop peak 5700  ::Lipid HDL 41.5, , TG 75, Chol 157  -Holding home amlodipine 10mg, imdur 30mg, lisinopril 2.5mg, metop tartrate 50mg bid iso hypotension   -Continue ASA and atorvastatin  -Holding plavix 75mg. Can resume after all procedures are done (TDC, trach/PEG)     #HFrecEF (45-50%, 11/19/24)  #Moderate/Severe MR  -TTE on 12/6 with EF of 60-65% with moderate to severe mitral regurgitation  -Not on GDMT     Pulm:  #Acute hypoxic respiratory failure, post-arrest  :s/p Zosyn 11/20 - 11/26 for PNA  :: s/p Vancomycin   -Failed extubation on 11/28  -Failed SBT on 12/9     GI:  #Concern for Ileus  #Abd distention (improved)  -Started Senna 8.6 mg BID  -KUB yesterday with stomach distension  -Repeat KUB, can consider to do low intermittent suction on PEG or talk with ACS if continues to be distended  -Repeated KUB today, and improved from prior. Started trickle feeds at 10cc/h today.     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 7.1 -> 7.8 -> 8.1  -Continue to monitor     F: None  E: K>4, Mag>2  N: TF  G: pantoprazole  A: Trialysis, PEG  DVT: SCDs  CODE: DNR/okay to intubate (confirmed with family 11/21)  NOK: Kristopher Richter [phone]     LOS: 26 days     Jaime Mckeon MD/PhD   PGY-2

## 2024-12-14 NOTE — PROGRESS NOTES
Precert ws denied for LTACH. Direct precert team provided P2P info:  peer to peer: please call [phone] option 4 by 12mon Monday. LSW updated resident, intern and [facility]. Care Transitions will continue to follow.      MARCI Earl

## 2024-12-15 VITALS
HEART RATE: 89 BPM | HEIGHT: 62 IN | DIASTOLIC BLOOD PRESSURE: 105 MMHG | RESPIRATION RATE: 23 BRPM | BODY MASS INDEX: 24.1 KG/M2 | SYSTOLIC BLOOD PRESSURE: 130 MMHG | WEIGHT: 130.95 LBS | TEMPERATURE: 96.8 F | OXYGEN SATURATION: 100 %

## 2024-12-15 LAB
ALBUMIN SERPL BCP-MCNC: 2.6 G/DL (ref 3.4–5)
ANION GAP SERPL CALC-SCNC: 16 MMOL/L (ref 10–20)
BASOPHILS # BLD AUTO: 0.01 X10*3/UL (ref 0–0.1)
BASOPHILS NFR BLD AUTO: 0.1 %
BUN SERPL-MCNC: 18 MG/DL (ref 6–23)
CALCIUM SERPL-MCNC: 7.6 MG/DL (ref 8.6–10.6)
CHLORIDE SERPL-SCNC: 96 MMOL/L (ref 98–107)
CO2 SERPL-SCNC: 26 MMOL/L (ref 21–32)
CREAT SERPL-MCNC: 2.8 MG/DL (ref 0.5–1.05)
EGFRCR SERPLBLD CKD-EPI 2021: 18 ML/MIN/1.73M*2
EOSINOPHIL # BLD AUTO: 0.08 X10*3/UL (ref 0–0.7)
EOSINOPHIL NFR BLD AUTO: 1 %
ERYTHROCYTE [DISTWIDTH] IN BLOOD BY AUTOMATED COUNT: 19.1 % (ref 11.5–14.5)
GLUCOSE BLD MANUAL STRIP-MCNC: 173 MG/DL (ref 74–99)
GLUCOSE BLD MANUAL STRIP-MCNC: 174 MG/DL (ref 74–99)
GLUCOSE BLD MANUAL STRIP-MCNC: 193 MG/DL (ref 74–99)
GLUCOSE BLD MANUAL STRIP-MCNC: 235 MG/DL (ref 74–99)
GLUCOSE BLD MANUAL STRIP-MCNC: 249 MG/DL (ref 74–99)
GLUCOSE SERPL-MCNC: 204 MG/DL (ref 74–99)
HCT VFR BLD AUTO: 25.1 % (ref 36–46)
HGB BLD-MCNC: 8.3 G/DL (ref 12–16)
IMM GRANULOCYTES # BLD AUTO: 0.04 X10*3/UL (ref 0–0.7)
IMM GRANULOCYTES NFR BLD AUTO: 0.5 % (ref 0–0.9)
LYMPHOCYTES # BLD AUTO: 0.61 X10*3/UL (ref 1.2–4.8)
LYMPHOCYTES NFR BLD AUTO: 7.5 %
MAGNESIUM SERPL-MCNC: 2.01 MG/DL (ref 1.6–2.4)
MCH RBC QN AUTO: 32 PG (ref 26–34)
MCHC RBC AUTO-ENTMCNC: 33.1 G/DL (ref 32–36)
MCV RBC AUTO: 97 FL (ref 80–100)
MONOCYTES # BLD AUTO: 0.5 X10*3/UL (ref 0.1–1)
MONOCYTES NFR BLD AUTO: 6.2 %
NEUTROPHILS # BLD AUTO: 6.85 X10*3/UL (ref 1.2–7.7)
NEUTROPHILS NFR BLD AUTO: 84.7 %
NRBC BLD-RTO: 0.9 /100 WBCS (ref 0–0)
PHOSPHATE SERPL-MCNC: 1.6 MG/DL (ref 2.5–4.9)
PLATELET # BLD AUTO: 255 X10*3/UL (ref 150–450)
POTASSIUM SERPL-SCNC: 3.9 MMOL/L (ref 3.5–5.3)
RBC # BLD AUTO: 2.59 X10*6/UL (ref 4–5.2)
SODIUM SERPL-SCNC: 134 MMOL/L (ref 136–145)
WBC # BLD AUTO: 8.1 X10*3/UL (ref 4.4–11.3)

## 2024-12-15 PROCEDURE — 2500000005 HC RX 250 GENERAL PHARMACY W/O HCPCS

## 2024-12-15 PROCEDURE — 2500000004 HC RX 250 GENERAL PHARMACY W/ HCPCS (ALT 636 FOR OP/ED)

## 2024-12-15 PROCEDURE — 2500000001 HC RX 250 WO HCPCS SELF ADMINISTERED DRUGS (ALT 637 FOR MEDICARE OP)

## 2024-12-15 PROCEDURE — 85025 COMPLETE CBC W/AUTO DIFF WBC: CPT

## 2024-12-15 PROCEDURE — 74018 RADEX ABDOMEN 1 VIEW: CPT | Performed by: RADIOLOGY

## 2024-12-15 PROCEDURE — 94762 N-INVAS EAR/PLS OXIMTRY CONT: CPT

## 2024-12-15 PROCEDURE — 2500000002 HC RX 250 W HCPCS SELF ADMINISTERED DRUGS (ALT 637 FOR MEDICARE OP, ALT 636 FOR OP/ED)

## 2024-12-15 PROCEDURE — 82947 ASSAY GLUCOSE BLOOD QUANT: CPT

## 2024-12-15 PROCEDURE — 83735 ASSAY OF MAGNESIUM: CPT

## 2024-12-15 PROCEDURE — 80069 RENAL FUNCTION PANEL: CPT

## 2024-12-15 PROCEDURE — 99291 CRITICAL CARE FIRST HOUR: CPT

## 2024-12-15 PROCEDURE — 2020000001 HC ICU ROOM DAILY

## 2024-12-15 PROCEDURE — 94003 VENT MGMT INPAT SUBQ DAY: CPT

## 2024-12-15 PROCEDURE — 36415 COLL VENOUS BLD VENIPUNCTURE: CPT

## 2024-12-15 RX ORDER — ACETAMINOPHEN 160 MG/5ML
975 SOLUTION ORAL EVERY 8 HOURS
Status: DISCONTINUED | OUTPATIENT
Start: 2024-12-15 | End: 2024-12-16

## 2024-12-15 RX ORDER — DIATRIZOATE MEGLUMINE AND DIATRIZOATE SODIUM 660; 100 MG/ML; MG/ML
60 SOLUTION ORAL; RECTAL ONCE
Status: DISCONTINUED | OUTPATIENT
Start: 2024-12-15 | End: 2024-12-18 | Stop reason: HOSPADM

## 2024-12-15 RX ORDER — HEPARIN SODIUM 5000 [USP'U]/ML
5000 INJECTION, SOLUTION INTRAVENOUS; SUBCUTANEOUS 2 TIMES DAILY
Status: DISCONTINUED | OUTPATIENT
Start: 2024-12-15 | End: 2024-12-18 | Stop reason: HOSPADM

## 2024-12-15 RX ADMIN — INSULIN LISPRO 4 UNITS: 100 INJECTION, SOLUTION INTRAVENOUS; SUBCUTANEOUS at 17:23

## 2024-12-15 RX ADMIN — HYDROMORPHONE HYDROCHLORIDE 0.3 MG: 1 INJECTION, SOLUTION INTRAMUSCULAR; INTRAVENOUS; SUBCUTANEOUS at 10:27

## 2024-12-15 RX ADMIN — INSULIN LISPRO 2 UNITS: 100 INJECTION, SOLUTION INTRAVENOUS; SUBCUTANEOUS at 05:05

## 2024-12-15 RX ADMIN — CLOPIDOGREL BISULFATE 75 MG: 75 TABLET ORAL at 08:35

## 2024-12-15 RX ADMIN — ACETAMINOPHEN 1000 MG: 160 SOLUTION ORAL at 19:43

## 2024-12-15 RX ADMIN — THIAMINE HCL TAB 100 MG 100 MG: 100 TAB at 08:35

## 2024-12-15 RX ADMIN — SENNOSIDES 17.2 MG: 8.6 TABLET, FILM COATED ORAL at 08:36

## 2024-12-15 RX ADMIN — DICYCLOMINE HYDROCHLORIDE 10 MG: 10 CAPSULE ORAL at 10:28

## 2024-12-15 RX ADMIN — LIDOCAINE 4% 1 PATCH: 40 PATCH TOPICAL at 08:36

## 2024-12-15 RX ADMIN — POTASSIUM PHOSPHATE, MONOBASIC POTASSIUM PHOSPHATE, DIBASIC 21 MMOL: 224; 236 INJECTION, SOLUTION, CONCENTRATE INTRAVENOUS at 09:26

## 2024-12-15 RX ADMIN — OXYCODONE HYDROCHLORIDE 2.5 MG: 5 TABLET ORAL at 09:37

## 2024-12-15 RX ADMIN — Medication 10 L/MIN: at 20:51

## 2024-12-15 RX ADMIN — METOPROLOL TARTRATE 12.5 MG: 25 TABLET, FILM COATED ORAL at 10:27

## 2024-12-15 RX ADMIN — ATORVASTATIN CALCIUM 80 MG: 80 TABLET, FILM COATED ORAL at 20:56

## 2024-12-15 RX ADMIN — Medication 40 PERCENT: at 08:33

## 2024-12-15 RX ADMIN — HEPARIN SODIUM 5000 UNITS: 5000 INJECTION, SOLUTION INTRAVENOUS; SUBCUTANEOUS at 20:56

## 2024-12-15 RX ADMIN — HYDROMORPHONE HYDROCHLORIDE 0.3 MG: 1 INJECTION, SOLUTION INTRAMUSCULAR; INTRAVENOUS; SUBCUTANEOUS at 17:22

## 2024-12-15 RX ADMIN — FOLIC ACID 1 MG: 1 TABLET ORAL at 08:35

## 2024-12-15 RX ADMIN — ACETAMINOPHEN 1000 MG: 160 SOLUTION ORAL at 05:17

## 2024-12-15 RX ADMIN — INSULIN LISPRO 2 UNITS: 100 INJECTION, SOLUTION INTRAVENOUS; SUBCUTANEOUS at 21:02

## 2024-12-15 RX ADMIN — HEPARIN SODIUM 5000 UNITS: 5000 INJECTION, SOLUTION INTRAVENOUS; SUBCUTANEOUS at 11:34

## 2024-12-15 RX ADMIN — ASPIRIN 81 MG CHEWABLE TABLET 81 MG: 81 TABLET CHEWABLE at 08:35

## 2024-12-15 RX ADMIN — INSULIN LISPRO 4 UNITS: 100 INJECTION, SOLUTION INTRAVENOUS; SUBCUTANEOUS at 13:40

## 2024-12-15 RX ADMIN — METOPROLOL TARTRATE 12.5 MG: 25 TABLET, FILM COATED ORAL at 05:09

## 2024-12-15 RX ADMIN — METOPROLOL TARTRATE 12.5 MG: 25 TABLET, FILM COATED ORAL at 16:38

## 2024-12-15 RX ADMIN — QUETIAPINE FUMARATE 25 MG: 25 TABLET ORAL at 20:56

## 2024-12-15 RX ADMIN — OXYCODONE HYDROCHLORIDE 2.5 MG: 5 TABLET ORAL at 16:38

## 2024-12-15 RX ADMIN — INSULIN LISPRO 2 UNITS: 100 INJECTION, SOLUTION INTRAVENOUS; SUBCUTANEOUS at 08:38

## 2024-12-15 ASSESSMENT — PAIN - FUNCTIONAL ASSESSMENT

## 2024-12-15 ASSESSMENT — PAIN SCALES - GENERAL
PAINLEVEL_OUTOF10: 0 - NO PAIN
PAINLEVEL_OUTOF10: 0 - NO PAIN
PAINLEVEL_OUTOF10: 7
PAINLEVEL_OUTOF10: 0 - NO PAIN
PAINLEVEL_OUTOF10: 5 - MODERATE PAIN
PAINLEVEL_OUTOF10: 0 - NO PAIN
PAINLEVEL_OUTOF10: 3
PAINLEVEL_OUTOF10: 0 - NO PAIN
PAINLEVEL_OUTOF10: 0 - NO PAIN
PAINLEVEL_OUTOF10: 8

## 2024-12-15 ASSESSMENT — PAIN SCALES - PAIN ASSESSMENT IN ADVANCED DEMENTIA (PAINAD)
TOTALSCORE: MEDICATION (SEE MAR)

## 2024-12-15 NOTE — CARE PLAN
Problem: Skin  Goal: Participates in plan/prevention/treatment measures  Flowsheets (Taken 12/15/2024 1123)  Participates in plan/prevention/treatment measures: Elevate heels  Goal: Prevent/manage excess moisture  Flowsheets (Taken 12/15/2024 1123)  Prevent/manage excess moisture: Moisturize dry skin  Goal: Prevent/minimize sheer/friction injuries  Flowsheets (Taken 12/15/2024 1123)  Prevent/minimize sheer/friction injuries:   Use pull sheet   Turn/reposition every 2 hours/use positioning/transfer devices   HOB 30 degrees or less  Goal: Decreased wound size/increased tissue granulation at next dressing change  Flowsheets (Taken 12/15/2024 1123)  Decreased wound size/increased tissue granulation at next dressing change: Promote sleep for wound healing  Goal: Promote skin healing  Flowsheets (Taken 12/15/2024 1123)  Promote skin healing: Assess skin/pad under line(s)/device(s)     Problem: Safety - Medical Restraint  Goal: Remains free of injury from restraints (Restraint for Interference with Medical Device)  Outcome: Progressing  Flowsheets (Taken 12/15/2024 1123)  Remains free of injury from restraints (restraint for interference with medical device): Every 2 hours: Monitor safety, psychosocial status, comfort, nutrition and hydration  Goal: Free from restraint(s) (Restraint for Interference with Medical Device)  Outcome: Progressing  Flowsheets (Taken 12/15/2024 1123)  Free from restraint(s) (restraint for interference with medical device): Every 24 hours: Continued use of restraint requires Licensed Independent Practitioner to perform face to face examination and written order

## 2024-12-15 NOTE — PROGRESS NOTES
Subjective     Interval History: Humaira Huang has no new complaints    Medications    Current Facility-Administered Medications:     acetaminophen (Tylenol) tablet 650 mg, 650 mg, oral, q4h PRN **OR** acetaminophen (Tylenol) oral liquid 1,000 mg, 1,000 mg, nasogastric tube, q8h PRN, 1,000 mg at 12/12/24 2015 **OR** acetaminophen (Tylenol) suppository 650 mg, 650 mg, rectal, q4h PRN, Jaime Mckeon MD    alteplase (Cathflo Activase) injection 2 mg, 2 mg, intra-catheter, PRN, Bright Mayen MD, 2 mg at 12/04/24 1011    aspirin chewable tablet 81 mg, 81 mg, g-tube, Daily, Jaime Mckeon MD, 81 mg at 12/14/24 0808    atorvastatin (Lipitor) tablet 80 mg, 80 mg, g-tube, Nightly, Jaime Mckeon MD, 80 mg at 12/13/24 2209    clopidogrel (Plavix) tablet 75 mg, 75 mg, oral, Daily, Michelle Arreaga MD, 75 mg at 12/14/24 1420    dextrose 50 % injection 12.5 g, 12.5 g, intravenous, q15 min PRN, Jaime Mckeon MD    dextrose 50 % injection 25 g, 25 g, intravenous, q15 min PRN, Jaime Mckeon MD    dicyclomine (Bentyl) capsule 10 mg, 10 mg, g-tube, 4x daily PRN, Jaime Mckeon MD, 10 mg at 12/12/24 2015    fentaNYL PF (Sublimaze) injection 25 mcg, 25 mcg, intravenous, q4h PRN, Bright Mayen MD, 25 mcg at 12/13/24 1556    folic acid (Folvite) tablet 1 mg, 1 mg, g-tube, Daily, Jaime Mckeon MD, 1 mg at 12/14/24 0808    glucagon (Glucagen) injection 1 mg, 1 mg, intramuscular, q15 min PRN, Jaime Mckeon MD    heparin bolus from bag 4,672 Units, 80 Units/kg, intravenous, Once, Bright Mayen MD    heparin flush 10 unit/mL syringe 10 Units, 10 Units, intra-catheter, PRN, Jaime Mckeon MD    HYDROmorphone (Dilaudid) injection 0.3 mg, 0.3 mg, intravenous, q3h PRN, Jaime Mckeon MD, 0.3 mg at 12/14/24 0904    hydrOXYzine HCL (Atarax) tablet 10 mg, 10 mg, g-tube, q6h PRN, Jaime Mckeon MD    insulin lispro injection 0-10 Units, 0-10 Units, subcutaneous, q4h, Jaime Mckeon MD, 2 Units at 12/14/24 1227    ipratropium-albuteroL (Duo-Neb) 0.5-2.5 mg/3 mL  nebulizer solution 3 mL, 3 mL, nebulization, q6h PRN, Bright Mayen MD, 3 mL at 11/20/24 1905    lactated Ringer's bolus 1,000 mL, 1,000 mL, intravenous, Once, Bright Mayen MD    lidocaine 4 % patch 1 patch, 1 patch, transdermal, Daily, Jaime Mckeon MD, 1 patch at 12/13/24 0823    metoprolol tartrate (Lopressor) tablet 12.5 mg, 12.5 mg, oral, q6h, Jaime Mckeon MD, 12.5 mg at 12/14/24 1517    ondansetron (Zofran) injection 4 mg, 4 mg, intravenous, q6h PRN, Chayo Terrell MD, 4 mg at 12/12/24 2015    oxyCODONE (Roxicodone) immediate release tablet 2.5 mg, 2.5 mg, g-tube, q4h PRN, Jaime Mckeon MD, 2.5 mg at 12/14/24 1612    oxyCODONE (Roxicodone) immediate release tablet 2.5 mg, 2.5 mg, oral, q6h PRN, Jaime Mckeon MD    oxygen (O2) therapy, , inhalation, Continuous PRN - O2/gases, Bright Mayen MD, 40 percent at 12/08/24 1046    oxygen (O2) therapy, , inhalation, Continuous - Inhalation, Bright Mayen MD, 30 percent at 12/14/24 0914    polyethylene glycol (Glycolax, Miralax) packet 17 g, 17 g, oral, Daily PRN, Bright Mayen MD, 17 g at 11/28/24 0903    QUEtiapine (SEROquel) tablet 25 mg, 25 mg, g-tube, Nightly, Jaime Mckeon MD, 25 mg at 12/13/24 2209    sennosides (Senokot) tablet 17.2 mg, 2 tablet, g-tube, BID, Jaime Mckeon MD, 17.2 mg at 12/14/24 0807    thiamine (Vitamin B-1) tablet 100 mg, 100 mg, g-tube, Daily, Jaime Mckeon MD, 100 mg at 12/14/24 0808    Objective     Physical Exam  Heart S1 S2 RRR, Lungs CTA, + edema      Vital signs in last 24 hours:  Temp:  [35.8 °C (96.4 °F)-36.4 °C (97.5 °F)] 36 °C (96.8 °F)  Heart Rate:  [] 107  Resp:  [10-18] 16  BP: (103-157)/(51-87) 110/68  FiO2 (%):  [40 %] 40 %       Intake/Output last 3 shifts:  I/O last 3 completed shifts:  In: 2062.2 (34.7 mL/kg) [I.V.:472.2 (7.9 mL/kg); Blood:700; NG/GT:890]  Out: 25 (0.4 mL/kg) [Urine:25 (0 mL/kg/hr)]  Weight: 59.4 kg     Labs:  Results from last 7 days   Lab Units 12/14/24  0521   WBC AUTO x10*3/uL 8.2   RBC AUTO  x10*6/uL 2.60*   HEMOGLOBIN g/dL 8.1*   HEMATOCRIT % 25.1*     Results from last 7 days   Lab Units 12/14/24  0521   SODIUM mmol/L 135*   POTASSIUM mmol/L 4.6   CHLORIDE mmol/L 96*   CO2 mmol/L 24   BUN mg/dL 32*   CREATININE mg/dL 4.37*   CALCIUM mg/dL 7.7*   PHOSPHORUS mg/dL 3.7   MAGNESIUM mg/dL 2.45*       Assessment/Plan     Assessment & Plan  Dissection of aorta    Cardiac arrest    Ruptured aortic aneurysm (Multi)    Anemia due to acute blood loss    Remains dialysis dependent, agree with supportive care, continue regular HD    Barry Stinson MD  12/14/2024  8:18 PM

## 2024-12-15 NOTE — PROGRESS NOTES
Subjective   No events overnight    Meds  Scheduled medications  aspirin, 81 mg, g-tube, Daily  atorvastatin, 80 mg, g-tube, Nightly  clopidogrel, 75 mg, oral, Daily  folic acid, 1 mg, g-tube, Daily  heparin, 80 Units/kg, intravenous, Once  insulin lispro, 0-10 Units, subcutaneous, q4h  lactated Ringer's, 1,000 mL, intravenous, Once  lidocaine, 1 patch, transdermal, Daily  metoprolol tartrate, 12.5 mg, oral, q6h  oxygen, , inhalation, Continuous - Inhalation  potassium phosphate, 21 mmol, intravenous, Once  QUEtiapine, 25 mg, g-tube, Nightly  sennosides, 2 tablet, g-tube, BID  thiamine, 100 mg, g-tube, Daily      Continuous medications     PRN medications  PRN medications: acetaminophen **OR** acetaminophen **OR** acetaminophen, alteplase, dextrose, dextrose, dicyclomine, fentaNYL, glucagon, heparin flush, HYDROmorphone, hydrOXYzine HCL, ipratropium-albuteroL, ondansetron, oxyCODONE, oxyCODONE, oxygen, polyethylene glycol      Objective   Vital signs in last 24 hours:  Temp:  [35.9 °C (96.6 °F)-36.5 °C (97.7 °F)] 36.5 °C (97.7 °F)  Heart Rate:  [] 74  Resp:  [14-24] 22  BP: ()/(53-87) 130/85  FiO2 (%):  [40 %] 40 %    Intake/Output this shift:    Intake/Output Summary (Last 24 hours) at 12/15/2024 0718  Last data filed at 12/15/2024 0300  Gross per 24 hour   Intake 2552.21 ml   Output 2400 ml   Net 152.21 ml       Physical  General: Patient is awake, non-toxic appearing, normal body habitus  Pulm: Normal WOB at rest and when sitting up, no crackles or rhonchi  Cardiac: Regular rate and rhythm, normal S1/S2  Abdomen: Non-tender to palpation, non-distended  Extremities: No peripheral edema, or cyanosis  Neuro: Patient alert and oriented, cranial nerves grossly intact, normal strength and sensation.    Results  Results for orders placed or performed during the hospital encounter of 11/18/24 (from the past 24 hours)   POCT GLUCOSE   Result Value Ref Range    POCT Glucose 195 (H) 74 - 99 mg/dL   POCT GLUCOSE    Result Value Ref Range    POCT Glucose 158 (H) 74 - 99 mg/dL   POCT GLUCOSE   Result Value Ref Range    POCT Glucose 108 (H) 74 - 99 mg/dL   POCT GLUCOSE   Result Value Ref Range    POCT Glucose 162 (H) 74 - 99 mg/dL   POCT GLUCOSE   Result Value Ref Range    POCT Glucose 174 (H) 74 - 99 mg/dL   CBC and Auto Differential   Result Value Ref Range    WBC 8.1 4.4 - 11.3 x10*3/uL    nRBC 0.9 (H) 0.0 - 0.0 /100 WBCs    RBC 2.59 (L) 4.00 - 5.20 x10*6/uL    Hemoglobin 8.3 (L) 12.0 - 16.0 g/dL    Hematocrit 25.1 (L) 36.0 - 46.0 %    MCV 97 80 - 100 fL    MCH 32.0 26.0 - 34.0 pg    MCHC 33.1 32.0 - 36.0 g/dL    RDW 19.1 (H) 11.5 - 14.5 %    Platelets 255 150 - 450 x10*3/uL    Neutrophils % 84.7 40.0 - 80.0 %    Immature Granulocytes %, Automated 0.5 0.0 - 0.9 %    Lymphocytes % 7.5 13.0 - 44.0 %    Monocytes % 6.2 2.0 - 10.0 %    Eosinophils % 1.0 0.0 - 6.0 %    Basophils % 0.1 0.0 - 2.0 %    Neutrophils Absolute 6.85 1.20 - 7.70 x10*3/uL    Immature Granulocytes Absolute, Automated 0.04 0.00 - 0.70 x10*3/uL    Lymphocytes Absolute 0.61 (L) 1.20 - 4.80 x10*3/uL    Monocytes Absolute 0.50 0.10 - 1.00 x10*3/uL    Eosinophils Absolute 0.08 0.00 - 0.70 x10*3/uL    Basophils Absolute 0.01 0.00 - 0.10 x10*3/uL   Renal function panel   Result Value Ref Range    Glucose 204 (H) 74 - 99 mg/dL    Sodium 134 (L) 136 - 145 mmol/L    Potassium 3.9 3.5 - 5.3 mmol/L    Chloride 96 (L) 98 - 107 mmol/L    Bicarbonate 26 21 - 32 mmol/L    Anion Gap 16 10 - 20 mmol/L    Urea Nitrogen 18 6 - 23 mg/dL    Creatinine 2.80 (H) 0.50 - 1.05 mg/dL    eGFR 18 (L) >60 mL/min/1.73m*2    Calcium 7.6 (L) 8.6 - 10.6 mg/dL    Phosphorus 1.6 (L) 2.5 - 4.9 mg/dL    Albumin 2.6 (L) 3.4 - 5.0 g/dL   Magnesium   Result Value Ref Range    Magnesium 2.01 1.60 - 2.40 mg/dL       Imaging  XR abdomen 1 view    Result Date: 12/14/2024  Interpreted By:  Doug Langley, STUDY: XR ABDOMEN 1 VIEW; 12/14/2024 1:26 pm   INDICATION: Signs/Symptoms:Eval for abdominal  distention.   COMPARISON: 12/13/2024.   ACCESSION NUMBER(S): [id number]   ORDERING CLINICIAN: BASSEM LEWIS   FINDINGS: Feeding tube  overlies the fundus of stomach.. Nonobstructive bowel gas pattern. Limited evaluation of pneumoperitoneum on supine imaging, however no gross evidence of free air is noted. Left upper quadrant vascular calcifications.   Visualized lungs are clear.   Osseous structures demonstrate no acute bony changes.       1.  Feeding tube overlies the fundus of the stomach. Air-filled nondistended loops of small large bowel.   Signed by: Doug Langley 12/14/2024 2:41 PM Dictation workstation:   [id number]    XR abdomen 1 view    Result Date: 12/13/2024  Interpreted By:  Gene Mendoza, STUDY: XR ABDOMEN 1 VIEW;  12/13/2024 4:27 pm   INDICATION: Signs/Symptoms:stomach pain.     COMPARISON: Abdomen radiographs dated 12/12/2024 and chest abdominal and pelvic CT dated 11/18/2024   ACCESSION NUMBER(S): [id number]   ORDERING CLINICIAN: CARL GILLOMBARDO   FINDINGS: AP supine view of the abdomen was provided.   Nonobstructive bowel gas pattern. Limited evaluation of pneumoperitoneum on supine imaging, however no gross evidence of free air is noted. No evidence of extraluminal or portal venous gas. Again seen a patchy tube in the left mid abdomen quadrant.   Bibasilar atelectasis/edema/consolidation.   Osseous structures demonstrate no acute bony changes.       1.  Nonspecific and nonobstructive bowel gas pattern.   MACRO: None   Signed by: Gene Mendoza 12/13/2024 7:27 PM Dictation workstation:   [id number]        Assessment/Plan   Assessment & Plan  Dissection of aorta    Cardiac arrest    Ruptured aortic aneurysm (Multi)    Anemia due to acute blood loss    64 y.o. female with history of pancreatitis, DVT/PE, HLD, HTN, CABGx4 1997, T2DM, GERD, PAD, chronic mesenteric ischemia, tobacco use who presented to [facility] on 11/18/2024 as a transfer from [facility] with  chest, back, and abdominal pain. Now post PEA arrest, with intubation/sedation. Family agreed for trach/PEG and LTAC. Patient had septic episode 12/6 overnight and underwent trach/PEG. IR-guided TDC line placed 12/13/24.     Updates 12/15/24:  -Continuing CPAP trials and trach collars.  -Pt rejected by insurance for LTAC-need Peer to Peer by 12PM Monday 12/16 ([phone] option 4)        Neuro:  #Sedation/anxiety  -No sedation  -Quetiapine 25 mg nightly     Cardiac:   #Aflutter/Afib with RVR  #Driven by anxiety vs hypovolemia  ::now sinus tachycardia  -HR in 160-180s on 12/5 am  -s/p one dose of digoxin 250 mcg (digoxin is not dialyzable)  -s/p amio bolus 12/6 for RVR  -Continue metoprolol 12.5 mg Q6H     #PEA arrest 11/20  ::likely hypoxia driven: PNA vs aspiration     #Aortic dissection  #Mural Thrombus  #Distal aortic arch saccular aneurysm   ::CTA CAP 11/18- 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   -Vascular surgery discussed case at aortic conference and there are no plans for surgery, palliative measures recommended     #NSTEMI  #CAD s/p CABGx4 1997  #PAD  #HTN #DLD  ::EKG- NSR, rate 71, TWI aVL, V1-V6, 1mm ORQUIDEA in aVR  ::Trop peak 5700  ::Lipid HDL 41.5, , TG 75, Chol 157  -Holding home amlodipine 10mg, imdur 30mg, lisinopril 2.5mg, metop tartrate 50mg bid iso hypotension   -Continue ASA and atorvastatin  -Continue Plavix 75 mg     #HFrecEF (45-50%, 11/19/24)  #Moderate/Severe MR  -TTE on 12/6 with EF of 60-65% with moderate to severe mitral regurgitation  -Not on GDMT     Pulm:  #Acute hypoxic respiratory failure, post-arrest  :s/p Zosyn 11/20 - 11/26 for PNA  :: s/p Vancomycin   -Failed extubation on 11/28  -Doing well with CPAP trials, but not ready for sustained trach collar yet     GI:  #Concern for Ileus  #Abd distention (improved)  -Started Senna 8.6 mg BID  -KUB yesterday with stomach  distension  -Repeat KUB, can consider to do low intermittent suction on PEG or talk with ACS if continues to be distended  -Tube feeds to 10 mL/hr     Renal:  #TRACY on CKD likely 2/2 arrest  #Anuric  -Worsening electrolytes today, may plan for iHD  -Sevalemar carbonate 800 mg TID      #Hyponatremia  -Dialysis today     Endo:   #T2DM  -Hold home metformin   -POC Glucose: 119-162  -Continue SSI2     #Tube Feeds  :: Had been   -f/up nutrition  -10 mL/hr    Infectious  -No acute problems     Hem/Onc  #Hypoproliferative Anemia  #Hemolytic transfusion reaction  -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 7.1 -> 7.8 -> 8.1  -Continue to monitor     F: None  E: K>4, Mag>2  N: TF  G: pantoprazole  A: TDC PEG  DVT: SCDs  CODE: DNR/okay to intubate (confirmed with family 11/21)  NOK: Kristopher Richter [phone]      LOS: 26 days       LOS: 27 days     Jaime Mckeon MD/PhD   PGY-2

## 2024-12-16 LAB
ALBUMIN SERPL BCP-MCNC: 2.9 G/DL (ref 3.4–5)
ANION GAP BLDV CALCULATED.4IONS-SCNC: 16 MMOL/L (ref 10–25)
ANION GAP SERPL CALC-SCNC: 17 MMOL/L (ref 10–20)
ATRIAL RATE: 75 BPM
BASE EXCESS BLDV CALC-SCNC: -4.8 MMOL/L (ref -2–3)
BASOPHILS # BLD AUTO: 0.02 X10*3/UL (ref 0–0.1)
BASOPHILS NFR BLD AUTO: 0.2 %
BB ANTIBODY IDENTIFICATION: NORMAL
BODY TEMPERATURE: 37 DEGREES CELSIUS
BUN SERPL-MCNC: 25 MG/DL (ref 6–23)
CA-I BLDV-SCNC: 1.03 MMOL/L (ref 1.1–1.33)
CALCIUM SERPL-MCNC: 7.8 MG/DL (ref 8.6–10.6)
CASE #: NORMAL
CHLORIDE BLDV-SCNC: 97 MMOL/L (ref 98–107)
CHLORIDE SERPL-SCNC: 95 MMOL/L (ref 98–107)
CO2 SERPL-SCNC: 25 MMOL/L (ref 21–32)
CREAT SERPL-MCNC: 3.89 MG/DL (ref 0.5–1.05)
EGFRCR SERPLBLD CKD-EPI 2021: 12 ML/MIN/1.73M*2
EOSINOPHIL # BLD AUTO: 0.07 X10*3/UL (ref 0–0.7)
EOSINOPHIL NFR BLD AUTO: 0.8 %
ERYTHROCYTE [DISTWIDTH] IN BLOOD BY AUTOMATED COUNT: 19.5 % (ref 11.5–14.5)
GLUCOSE BLD MANUAL STRIP-MCNC: 121 MG/DL (ref 74–99)
GLUCOSE BLD MANUAL STRIP-MCNC: 127 MG/DL (ref 74–99)
GLUCOSE BLD MANUAL STRIP-MCNC: 128 MG/DL (ref 74–99)
GLUCOSE BLD MANUAL STRIP-MCNC: 132 MG/DL (ref 74–99)
GLUCOSE BLD MANUAL STRIP-MCNC: 142 MG/DL (ref 74–99)
GLUCOSE BLD MANUAL STRIP-MCNC: 143 MG/DL (ref 74–99)
GLUCOSE BLD MANUAL STRIP-MCNC: 152 MG/DL (ref 74–99)
GLUCOSE BLDV-MCNC: 137 MG/DL (ref 74–99)
GLUCOSE SERPL-MCNC: 149 MG/DL (ref 74–99)
HCO3 BLDV-SCNC: 21.1 MMOL/L (ref 22–26)
HCT VFR BLD AUTO: 27.2 % (ref 36–46)
HCT VFR BLD EST: 27 % (ref 36–46)
HGB BLD-MCNC: 8.7 G/DL (ref 12–16)
HGB BLDV-MCNC: 8.9 G/DL (ref 12–16)
IMM GRANULOCYTES # BLD AUTO: 0.06 X10*3/UL (ref 0–0.7)
IMM GRANULOCYTES NFR BLD AUTO: 0.7 % (ref 0–0.9)
INHALED O2 CONCENTRATION: 35 %
LACTATE BLDV-SCNC: 3.2 MMOL/L (ref 0.4–2)
LACTATE SERPL-SCNC: 3.6 MMOL/L (ref 0.4–2)
LACTATE SERPL-SCNC: 4.6 MMOL/L (ref 0.4–2)
LYMPHOCYTES # BLD AUTO: 1.04 X10*3/UL (ref 1.2–4.8)
LYMPHOCYTES NFR BLD AUTO: 12.6 %
MAGNESIUM SERPL-MCNC: 2.19 MG/DL (ref 1.6–2.4)
MCH RBC QN AUTO: 31 PG (ref 26–34)
MCHC RBC AUTO-ENTMCNC: 32 G/DL (ref 32–36)
MCV RBC AUTO: 97 FL (ref 80–100)
MONOCYTES # BLD AUTO: 0.8 X10*3/UL (ref 0.1–1)
MONOCYTES NFR BLD AUTO: 9.7 %
NEUTROPHILS # BLD AUTO: 6.25 X10*3/UL (ref 1.2–7.7)
NEUTROPHILS NFR BLD AUTO: 76 %
NRBC BLD-RTO: 0.8 /100 WBCS (ref 0–0)
OXYHGB MFR BLDV: 68.2 % (ref 45–75)
P AXIS: 81 DEGREES
P OFFSET: 211 MS
P ONSET: 154 MS
PCO2 BLDV: 42 MM HG (ref 41–51)
PH BLDV: 7.31 PH (ref 7.33–7.43)
PHOSPHATE SERPL-MCNC: 3.5 MG/DL (ref 2.5–4.9)
PLATELET # BLD AUTO: 293 X10*3/UL (ref 150–450)
PO2 BLDV: 43 MM HG (ref 35–45)
POTASSIUM BLDV-SCNC: 5.5 MMOL/L (ref 3.5–5.3)
POTASSIUM SERPL-SCNC: 4.3 MMOL/L (ref 3.5–5.3)
PR INTERVAL: 118 MS
Q ONSET: 213 MS
QRS COUNT: 13 BEATS
QRS DURATION: 94 MS
QT INTERVAL: 432 MS
QTC CALCULATION(BAZETT): 482 MS
QTC FREDERICIA: 465 MS
R AXIS: 7 DEGREES
RBC # BLD AUTO: 2.81 X10*6/UL (ref 4–5.2)
SAO2 % BLDV: 71 % (ref 45–75)
SODIUM BLDV-SCNC: 129 MMOL/L (ref 136–145)
SODIUM SERPL-SCNC: 133 MMOL/L (ref 136–145)
T AXIS: -34 DEGREES
T OFFSET: 429 MS
VENTRICULAR RATE: 75 BPM
WBC # BLD AUTO: 8.2 X10*3/UL (ref 4.4–11.3)

## 2024-12-16 PROCEDURE — 2500000004 HC RX 250 GENERAL PHARMACY W/ HCPCS (ALT 636 FOR OP/ED)

## 2024-12-16 PROCEDURE — 80069 RENAL FUNCTION PANEL: CPT

## 2024-12-16 PROCEDURE — 2500000001 HC RX 250 WO HCPCS SELF ADMINISTERED DRUGS (ALT 637 FOR MEDICARE OP)

## 2024-12-16 PROCEDURE — 36415 COLL VENOUS BLD VENIPUNCTURE: CPT

## 2024-12-16 PROCEDURE — 82947 ASSAY GLUCOSE BLOOD QUANT: CPT

## 2024-12-16 PROCEDURE — 83605 ASSAY OF LACTIC ACID: CPT

## 2024-12-16 PROCEDURE — 2500000005 HC RX 250 GENERAL PHARMACY W/O HCPCS

## 2024-12-16 PROCEDURE — 2500000002 HC RX 250 W HCPCS SELF ADMINISTERED DRUGS (ALT 637 FOR MEDICARE OP, ALT 636 FOR OP/ED)

## 2024-12-16 PROCEDURE — 97535 SELF CARE MNGMENT TRAINING: CPT | Mod: GO

## 2024-12-16 PROCEDURE — 83735 ASSAY OF MAGNESIUM: CPT

## 2024-12-16 PROCEDURE — 85025 COMPLETE CBC W/AUTO DIFF WBC: CPT

## 2024-12-16 PROCEDURE — 99232 SBSQ HOSP IP/OBS MODERATE 35: CPT

## 2024-12-16 PROCEDURE — 84132 ASSAY OF SERUM POTASSIUM: CPT

## 2024-12-16 PROCEDURE — 76937 US GUIDE VASCULAR ACCESS: CPT

## 2024-12-16 PROCEDURE — 2020000001 HC ICU ROOM DAILY

## 2024-12-16 PROCEDURE — 97530 THERAPEUTIC ACTIVITIES: CPT | Mod: GP | Performed by: STUDENT IN AN ORGANIZED HEALTH CARE EDUCATION/TRAINING PROGRAM

## 2024-12-16 PROCEDURE — 97530 THERAPEUTIC ACTIVITIES: CPT | Mod: GO

## 2024-12-16 PROCEDURE — 97110 THERAPEUTIC EXERCISES: CPT | Mod: GP | Performed by: STUDENT IN AN ORGANIZED HEALTH CARE EDUCATION/TRAINING PROGRAM

## 2024-12-16 PROCEDURE — 99291 CRITICAL CARE FIRST HOUR: CPT

## 2024-12-16 RX ORDER — SODIUM BICARBONATE 1 MEQ/ML
50 SYRINGE (ML) INTRAVENOUS ONCE
Status: DISCONTINUED | OUTPATIENT
Start: 2024-12-16 | End: 2024-12-16

## 2024-12-16 RX ORDER — METOPROLOL TARTRATE 25 MG/1
25 TABLET, FILM COATED ORAL EVERY 6 HOURS
Status: DISCONTINUED | OUTPATIENT
Start: 2024-12-16 | End: 2024-12-17

## 2024-12-16 RX ORDER — ACETAMINOPHEN 10 MG/ML
1000 INJECTION, SOLUTION INTRAVENOUS EVERY 8 HOURS
Status: DISCONTINUED | OUTPATIENT
Start: 2024-12-16 | End: 2024-12-17

## 2024-12-16 RX ADMIN — Medication 35 L/MIN: at 08:00

## 2024-12-16 RX ADMIN — METOPROLOL TARTRATE 25 MG: 25 TABLET, FILM COATED ORAL at 18:42

## 2024-12-16 RX ADMIN — HYDROXYZINE HYDROCHLORIDE 10 MG: 10 TABLET ORAL at 20:06

## 2024-12-16 RX ADMIN — SODIUM CHLORIDE, SODIUM LACTATE, POTASSIUM CHLORIDE, AND CALCIUM CHLORIDE 500 ML: 600; 310; 30; 20 INJECTION, SOLUTION INTRAVENOUS at 18:21

## 2024-12-16 RX ADMIN — INSULIN LISPRO 2 UNITS: 100 INJECTION, SOLUTION INTRAVENOUS; SUBCUTANEOUS at 04:45

## 2024-12-16 RX ADMIN — HYDROMORPHONE HYDROCHLORIDE 0.3 MG: 1 INJECTION, SOLUTION INTRAMUSCULAR; INTRAVENOUS; SUBCUTANEOUS at 03:43

## 2024-12-16 RX ADMIN — HYDROXYZINE HYDROCHLORIDE 10 MG: 10 TABLET ORAL at 08:06

## 2024-12-16 RX ADMIN — SENNOSIDES 17.2 MG: 8.6 TABLET, FILM COATED ORAL at 20:06

## 2024-12-16 RX ADMIN — OXYCODONE HYDROCHLORIDE 2.5 MG: 5 TABLET ORAL at 18:42

## 2024-12-16 RX ADMIN — LIDOCAINE 4% 1 PATCH: 40 PATCH TOPICAL at 08:06

## 2024-12-16 RX ADMIN — THIAMINE HCL TAB 100 MG 100 MG: 100 TAB at 08:06

## 2024-12-16 RX ADMIN — CLOPIDOGREL BISULFATE 75 MG: 75 TABLET ORAL at 08:06

## 2024-12-16 RX ADMIN — FOLIC ACID 1 MG: 1 TABLET ORAL at 08:06

## 2024-12-16 RX ADMIN — ACETAMINOPHEN 1000 MG: 10 INJECTION, SOLUTION INTRAVENOUS at 08:05

## 2024-12-16 RX ADMIN — METOPROLOL TARTRATE 25 MG: 25 TABLET, FILM COATED ORAL at 23:17

## 2024-12-16 RX ADMIN — HEPARIN SODIUM 5000 UNITS: 5000 INJECTION, SOLUTION INTRAVENOUS; SUBCUTANEOUS at 20:06

## 2024-12-16 RX ADMIN — ATORVASTATIN CALCIUM 80 MG: 80 TABLET, FILM COATED ORAL at 20:06

## 2024-12-16 RX ADMIN — HEPARIN SODIUM 5000 UNITS: 5000 INJECTION, SOLUTION INTRAVENOUS; SUBCUTANEOUS at 08:05

## 2024-12-16 RX ADMIN — ASPIRIN 81 MG CHEWABLE TABLET 81 MG: 81 TABLET CHEWABLE at 08:06

## 2024-12-16 RX ADMIN — SODIUM CHLORIDE, POTASSIUM CHLORIDE, SODIUM LACTATE AND CALCIUM CHLORIDE 500 ML: 600; 310; 30; 20 INJECTION, SOLUTION INTRAVENOUS at 14:46

## 2024-12-16 RX ADMIN — QUETIAPINE FUMARATE 25 MG: 25 TABLET ORAL at 20:06

## 2024-12-16 RX ADMIN — OXYCODONE HYDROCHLORIDE 2.5 MG: 5 TABLET ORAL at 23:22

## 2024-12-16 RX ADMIN — METOPROLOL TARTRATE 12.5 MG: 25 TABLET, FILM COATED ORAL at 11:32

## 2024-12-16 RX ADMIN — HYDROMORPHONE HYDROCHLORIDE 0.3 MG: 1 INJECTION, SOLUTION INTRAMUSCULAR; INTRAVENOUS; SUBCUTANEOUS at 12:33

## 2024-12-16 RX ADMIN — OXYCODONE HYDROCHLORIDE 2.5 MG: 5 TABLET ORAL at 11:31

## 2024-12-16 RX ADMIN — ACETAMINOPHEN 1000 MG: 10 INJECTION, SOLUTION INTRAVENOUS at 16:43

## 2024-12-16 ASSESSMENT — PAIN - FUNCTIONAL ASSESSMENT
PAIN_FUNCTIONAL_ASSESSMENT: CPOT (CRITICAL CARE PAIN OBSERVATION TOOL)

## 2024-12-16 ASSESSMENT — COGNITIVE AND FUNCTIONAL STATUS - GENERAL
DAILY ACTIVITIY SCORE: 9
EATING MEALS: TOTAL
CLIMB 3 TO 5 STEPS WITH RAILING: TOTAL
MOBILITY SCORE: 8
PERSONAL GROOMING: A LOT
TOILETING: TOTAL
DRESSING REGULAR LOWER BODY CLOTHING: TOTAL
HELP NEEDED FOR BATHING: A LOT
DRESSING REGULAR UPPER BODY CLOTHING: A LOT
STANDING UP FROM CHAIR USING ARMS: TOTAL
WALKING IN HOSPITAL ROOM: TOTAL
MOVING TO AND FROM BED TO CHAIR: TOTAL
TURNING FROM BACK TO SIDE WHILE IN FLAT BAD: A LOT
MOVING FROM LYING ON BACK TO SITTING ON SIDE OF FLAT BED WITH BEDRAILS: A LOT

## 2024-12-16 ASSESSMENT — PAIN SCALES - GENERAL
PAINLEVEL_OUTOF10: 6
PAINLEVEL_OUTOF10: 7
PAINLEVEL_OUTOF10: 0 - NO PAIN
PAINLEVEL_OUTOF10: 10 - WORST POSSIBLE PAIN

## 2024-12-16 NOTE — PROGRESS NOTES
General Surgery Progress Note  Subjective    Subjective:  Patient doing well at re-assessment during morning rounds. She was resting comfortably and there have been no new issues with arriola catheter placed overnight.       Objective    Objective:      Vital signs:   Temp:  [35.9 °C (96.6 °F)-36.3 °C (97.3 °F)] 36.3 °C (97.3 °F)  Heart Rate:  [] 91  Resp:  [14-35] 22  BP: ()/() 119/74  FiO2 (%):  [40 %] 40 %    Physical Exam:  General: resting comfortably  Respiratory: trach in place  Cardiovascular: non-tachycardic on monitor  GI: soft, non-distended. 18Fr arriola catheter in place, anchored to the skin with silk sutures, drain sponge with some dried blood. Catheter clamped.   Skin: warm and dry      I/O last 2 completed shifts:  In: 1590 (26.8 mL/kg) [I.V.:400 (6.7 mL/kg); Other:400; NG/GT:540; IV Piggyback:250]  Out: 2400 (40.4 mL/kg) [Other:2400]  Weight: 59.4 kg      Labs Past 18 Hours:  Recent Results (from the past 18 hours)   POCT GLUCOSE    Collection Time: 12/15/24  4:38 PM   Result Value Ref Range    POCT Glucose 249 (H) 74 - 99 mg/dL   POCT GLUCOSE    Collection Time: 12/15/24  8:19 PM   Result Value Ref Range    POCT Glucose 173 (H) 74 - 99 mg/dL   POCT GLUCOSE    Collection Time: 12/16/24 12:16 AM   Result Value Ref Range    POCT Glucose 142 (H) 74 - 99 mg/dL   CBC and Auto Differential    Collection Time: 12/16/24  1:34 AM   Result Value Ref Range    WBC 8.2 4.4 - 11.3 x10*3/uL    nRBC 0.8 (H) 0.0 - 0.0 /100 WBCs    RBC 2.81 (L) 4.00 - 5.20 x10*6/uL    Hemoglobin 8.7 (L) 12.0 - 16.0 g/dL    Hematocrit 27.2 (L) 36.0 - 46.0 %    MCV 97 80 - 100 fL    MCH 31.0 26.0 - 34.0 pg    MCHC 32.0 32.0 - 36.0 g/dL    RDW 19.5 (H) 11.5 - 14.5 %    Platelets 293 150 - 450 x10*3/uL    Neutrophils % 76.0 40.0 - 80.0 %    Immature Granulocytes %, Automated 0.7 0.0 - 0.9 %    Lymphocytes % 12.6 13.0 - 44.0 %    Monocytes % 9.7 2.0 - 10.0 %    Eosinophils % 0.8 0.0 - 6.0 %    Basophils % 0.2 0.0 - 2.0 %     Neutrophils Absolute 6.25 1.20 - 7.70 x10*3/uL    Immature Granulocytes Absolute, Automated 0.06 0.00 - 0.70 x10*3/uL    Lymphocytes Absolute 1.04 (L) 1.20 - 4.80 x10*3/uL    Monocytes Absolute 0.80 0.10 - 1.00 x10*3/uL    Eosinophils Absolute 0.07 0.00 - 0.70 x10*3/uL    Basophils Absolute 0.02 0.00 - 0.10 x10*3/uL   Renal function panel    Collection Time: 12/16/24  1:34 AM   Result Value Ref Range    Glucose 149 (H) 74 - 99 mg/dL    Sodium 133 (L) 136 - 145 mmol/L    Potassium 4.3 3.5 - 5.3 mmol/L    Chloride 95 (L) 98 - 107 mmol/L    Bicarbonate 25 21 - 32 mmol/L    Anion Gap 17 10 - 20 mmol/L    Urea Nitrogen 25 (H) 6 - 23 mg/dL    Creatinine 3.89 (H) 0.50 - 1.05 mg/dL    eGFR 12 (L) >60 mL/min/1.73m*2    Calcium 7.8 (L) 8.6 - 10.6 mg/dL    Phosphorus 3.5 2.5 - 4.9 mg/dL    Albumin 2.9 (L) 3.4 - 5.0 g/dL   Magnesium    Collection Time: 12/16/24  1:34 AM   Result Value Ref Range    Magnesium 2.19 1.60 - 2.40 mg/dL   POCT GLUCOSE    Collection Time: 12/16/24  4:44 AM   Result Value Ref Range    POCT Glucose 152 (H) 74 - 99 mg/dL        Meds:    Current Facility-Administered Medications:     alteplase (Cathflo Activase) injection 2 mg, 2 mg, intra-catheter, PRN, Bright Mayen MD, 2 mg at 12/04/24 1011    aspirin chewable tablet 81 mg, 81 mg, g-tube, Daily, Jaime Mckeon MD, 81 mg at 12/15/24 0835    atorvastatin (Lipitor) tablet 80 mg, 80 mg, g-tube, Nightly, Jaime Mckeon MD, 80 mg at 12/15/24 2056    clopidogrel (Plavix) tablet 75 mg, 75 mg, oral, Daily, Michelle Arreaga MD, 75 mg at 12/15/24 0835    dextrose 50 % injection 12.5 g, 12.5 g, intravenous, q15 min PRN, Jaime Mckeon MD    dextrose 50 % injection 25 g, 25 g, intravenous, q15 min PRN, Jaime Mckeon MD    diatrizoate willian-diatrizoat sod (Gastrografin 37% organic bound iodine) solution 60 mL, 60 mL, oral, Once, Trey Jimenez MD    dicyclomine (Bentyl) capsule 10 mg, 10 mg, g-tube, 4x daily PRN, Jaime Mckeon MD, 10 mg at 12/15/24 1028    fentaNYL PF  (Sublimaze) injection 25 mcg, 25 mcg, intravenous, q4h PRN, Bright Mayen MD, 25 mcg at 12/13/24 1556    folic acid (Folvite) tablet 1 mg, 1 mg, g-tube, Daily, Jaime Mckeon MD, 1 mg at 12/15/24 0835    glucagon (Glucagen) injection 1 mg, 1 mg, intramuscular, q15 min PRN, Jaime Mckeon MD    heparin (porcine) injection 5,000 Units, 5,000 Units, subcutaneous, BID, Jaime Mckeon MD, 5,000 Units at 12/15/24 2056    heparin flush 10 unit/mL syringe 10 Units, 10 Units, intra-catheter, PRN, Jaime Mckeon MD    HYDROmorphone (Dilaudid) injection 0.3 mg, 0.3 mg, intravenous, q3h PRN, Jaime Mckeon MD, 0.3 mg at 12/16/24 0343    hydrOXYzine HCL (Atarax) tablet 10 mg, 10 mg, g-tube, q6h PRN, Jaime Mckeon MD    insulin lispro injection 0-10 Units, 0-10 Units, subcutaneous, q4h, Jaime Mckeon MD, 2 Units at 12/16/24 0445    ipratropium-albuteroL (Duo-Neb) 0.5-2.5 mg/3 mL nebulizer solution 3 mL, 3 mL, nebulization, q6h PRN, Bright Mayen MD, 3 mL at 11/20/24 1905    lactated Ringer's bolus 1,000 mL, 1,000 mL, intravenous, Once, Bright Mayen MD    lidocaine 4 % patch 1 patch, 1 patch, transdermal, Daily, Jaime Mckeon MD, 1 patch at 12/15/24 0836    [Held by provider] metoprolol tartrate (Lopressor) tablet 12.5 mg, 12.5 mg, oral, q6h, Jaime Mckeon MD, 12.5 mg at 12/15/24 1638    ondansetron (Zofran) injection 4 mg, 4 mg, intravenous, q6h PRN, Chayo Terrell MD, 4 mg at 12/12/24 2015    oxyCODONE (Roxicodone) immediate release tablet 2.5 mg, 2.5 mg, g-tube, q4h PRN, Jaime Mckeon MD, 2.5 mg at 12/15/24 1638    oxyCODONE (Roxicodone) immediate release tablet 2.5 mg, 2.5 mg, oral, q6h PRN, Jaime Mckeon MD    oxygen (O2) therapy, , inhalation, Continuous PRN - O2/gases, Bright Mayen MD, 40 percent at 12/08/24 1046    oxygen (O2) therapy, , inhalation, Continuous - Inhalation, Bright Mayen MD, 10 L/min at 12/15/24 2051    polyethylene glycol (Glycolax, Miralax) packet 17 g, 17 g, oral, Daily PRN, Bright Mayen MD, 17 g at  11/28/24 0903    QUEtiapine (SEROquel) tablet 25 mg, 25 mg, g-tube, Nightly, Jaime Mckeon MD, 25 mg at 12/15/24 2056    sennosides (Senokot) tablet 17.2 mg, 2 tablet, g-tube, BID, Jaime Mckeon MD, 17.2 mg at 12/15/24 0836    thiamine (Vitamin B-1) tablet 100 mg, 100 mg, g-tube, Daily, Jaime Mckeon MD, 100 mg at 12/15/24 0835       Medications reviewed.  Vital signs reviewed.  Labs reviewed.   Imaging reviewed.      Assessment/Plan    Assessment and Plan:  63 YO F with PMH pancreatitis, DVT/PE, HLD, HTN, CABGx4 1997, T2DM, GERD, PAD, chronic mesenteric ischemia, tobacco use who presented to [facility] on 11/18/2024 as a transfer from [facility] with chest, back, and abdominal pain. 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 Vascular and Cardiac surgery following not offering intervention due to patient frailty and high risk nature of the procedure. CICU course c/b PEA arrest 11/20. Given the prolonged hospital course ongoing GOC discussion with family to determine direction of care. Patient option trach/ PEG with ultimate goal of LTAC over comfort care at this time. [facility] Consulted for PEG tube placement. PEG placed on 12/6. Re-engaged on 12/10 for concerns for abdominal distension, nausea, and vomiting.    UPDATE 12/16 overnight: [facility] reengaged due to patient pulling her PEG tube out.     18 Fr arriola was reinserted through the same PEG tube site. Gastrografin was pushed into the arriola and intraluminal placement of the arriola in the stomach was confirmed on 2 view KUB. Contrast seen in the stomach with no spillage into the abdominal cavity. Arriola balloon was inflated with 10 ml sterile normal saline. Arriola secured at the skin with a 2-0 silk suture    Plan   - Plan for PEG this week to replace dislodged tube pending surgeon availability  - Continue  to hold tube feeds for now, ok to use catheter for for meds in the meantime as needed  - Continue to monitor for signs of peritonitis   - rest of care per primary  - please page with any questions or concerns    Patient discussed with chief resident Dr. Barry and attending, Dr. Sinha.       Kiko Cho MD  PGY-1  [place] [facility]  Service Pager: [phone]

## 2024-12-16 NOTE — PROGRESS NOTES
Humaira Huang   64 kathleen    @WT@  N/Room: 93130485/14/14-A admitted to CICU for Aortic dissection.    Subjective:   35L /min oxygen requirement, tach and PEG     Meds:   acetaminophen, 1,000 mg, q8h  aspirin, 81 mg, Daily  atorvastatin, 80 mg, Nightly  clopidogrel, 75 mg, Daily  diatrizoate willian-diatrizoat sod, 60 mL, Once  folic acid, 1 mg, Daily  heparin, 5,000 Units, BID  insulin lispro, 0-10 Units, q4h  lactated Ringer's, 1,000 mL, Once  lidocaine, 1 patch, Daily  [Held by provider] metoprolol tartrate, 12.5 mg, q6h  oxygen, , Continuous - Inhalation  QUEtiapine, 25 mg, Nightly  sennosides, 2 tablet, BID  thiamine, 100 mg, Daily           alteplase, 2 mg, PRN  dextrose, 12.5 g, q15 min PRN  dextrose, 25 g, q15 min PRN  dicyclomine, 10 mg, 4x daily PRN  fentaNYL, 25 mcg, q4h PRN  glucagon, 1 mg, q15 min PRN  heparin flush, 10 Units, PRN  HYDROmorphone, 0.3 mg, q3h PRN  hydrOXYzine HCL, 10 mg, q6h PRN  ipratropium-albuteroL, 3 mL, q6h PRN  ondansetron, 4 mg, q6h PRN  oxyCODONE, 2.5 mg, q4h PRN  oxyCODONE, 2.5 mg, q6h PRN  oxygen, , Continuous PRN - O2/gases  polyethylene glycol, 17 g, Daily PRN      OBJECTIVE:    Vitals:    12/16/24 1000   BP: 112/77   Pulse: 102   Resp: 23   Temp:    SpO2: 98%          Intake/Output Summary (Last 24 hours) at 12/16/2024 1026  Last data filed at 12/15/2024 2000  Gross per 24 hour   Intake 320 ml   Output --   Net 320 ml       General appearance: Tach  Eyes: non-icteric  Skin: no apparent rash  Heart: [id number]  Lungs: CTA bilat no wheezing/crackles  Abdomen: soft, nt/nd  Extremities: trace edema  Access: None    Blood Labs:  Results for orders placed or performed during the hospital encounter of 11/18/24 (from the past 24 hours)   POCT GLUCOSE   Result Value Ref Range    POCT Glucose 235 (H) 74 - 99 mg/dL   POCT GLUCOSE   Result Value Ref Range    POCT Glucose 249 (H) 74 - 99 mg/dL   POCT GLUCOSE   Result Value Ref Range    POCT Glucose 173 (H) 74 - 99 mg/dL   POCT GLUCOSE    Result Value Ref Range    POCT Glucose 142 (H) 74 - 99 mg/dL   CBC and Auto Differential   Result Value Ref Range    WBC 8.2 4.4 - 11.3 x10*3/uL    nRBC 0.8 (H) 0.0 - 0.0 /100 WBCs    RBC 2.81 (L) 4.00 - 5.20 x10*6/uL    Hemoglobin 8.7 (L) 12.0 - 16.0 g/dL    Hematocrit 27.2 (L) 36.0 - 46.0 %    MCV 97 80 - 100 fL    MCH 31.0 26.0 - 34.0 pg    MCHC 32.0 32.0 - 36.0 g/dL    RDW 19.5 (H) 11.5 - 14.5 %    Platelets 293 150 - 450 x10*3/uL    Neutrophils % 76.0 40.0 - 80.0 %    Immature Granulocytes %, Automated 0.7 0.0 - 0.9 %    Lymphocytes % 12.6 13.0 - 44.0 %    Monocytes % 9.7 2.0 - 10.0 %    Eosinophils % 0.8 0.0 - 6.0 %    Basophils % 0.2 0.0 - 2.0 %    Neutrophils Absolute 6.25 1.20 - 7.70 x10*3/uL    Immature Granulocytes Absolute, Automated 0.06 0.00 - 0.70 x10*3/uL    Lymphocytes Absolute 1.04 (L) 1.20 - 4.80 x10*3/uL    Monocytes Absolute 0.80 0.10 - 1.00 x10*3/uL    Eosinophils Absolute 0.07 0.00 - 0.70 x10*3/uL    Basophils Absolute 0.02 0.00 - 0.10 x10*3/uL   Renal function panel   Result Value Ref Range    Glucose 149 (H) 74 - 99 mg/dL    Sodium 133 (L) 136 - 145 mmol/L    Potassium 4.3 3.5 - 5.3 mmol/L    Chloride 95 (L) 98 - 107 mmol/L    Bicarbonate 25 21 - 32 mmol/L    Anion Gap 17 10 - 20 mmol/L    Urea Nitrogen 25 (H) 6 - 23 mg/dL    Creatinine 3.89 (H) 0.50 - 1.05 mg/dL    eGFR 12 (L) >60 mL/min/1.73m*2    Calcium 7.8 (L) 8.6 - 10.6 mg/dL    Phosphorus 3.5 2.5 - 4.9 mg/dL    Albumin 2.9 (L) 3.4 - 5.0 g/dL   Magnesium   Result Value Ref Range    Magnesium 2.19 1.60 - 2.40 mg/dL   POCT GLUCOSE   Result Value Ref Range    POCT Glucose 152 (H) 74 - 99 mg/dL   POCT GLUCOSE   Result Value Ref Range    POCT Glucose 127 (H) 74 - 99 mg/dL      US KUB 11/26  IMPRESSION:  1. Increased renal cortical echogenicity and lobular appearance of the kidneys bilaterally, likely medical renal disease, with relatively atrophic left kidney. No hydronephrosis.  2. Partially visualized bilateral pleural effusions and  trace  abdominal ascites.        ASSESSMENT:  Humaira Huang is a  64 y.o.  Year old , with PMHx of  pancreatitis, DVT/PE, HLD, HTN, CABGx4 1997, T2DM, GERD, PAD, chronic mesenteric ischemia, tobacco use who presented to [facility] on 11/18/2024 as a transfer from [facility] with chest, back, and abdominal pain. 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 Vascular and Cardiac surgery following. CICU course c/b PEA arrest 11/20, now intubated. Nephrology following for TRACY.     #anuric TRACY-D Baseline creatinine 1.5   - Hemodynamics-not on any pressor support  - Etiology :TRACY likely in setting recent hemodynamic insult with PEA.  - last SLED 12/1 night then transitioned to iHD      RECOMMENDATIONS:  - BS 57 ml only, dialysis tomorrow per TTS schedule  - bladder scan BID please  - Strict I/Os.  - No contrast or nephrotoxic drugs if possible.  - will follow      Elise Graham MD  Nephrology Fellow   Daytime / Weekend Renal Pager [phone]  After 7 pm Emergencies [phone] Pager [phone]

## 2024-12-16 NOTE — PROGRESS NOTES
General Surgery Progress Note  Subjective    Subjective:  Called to the bedside by the primary team that patient was agitated and she pulled her PEG tube out. Patient just shaking her head with the trach collar in place. Restraints placed for BLUE.       Objective    Objective:      Vital signs:   Temp:  [35.9 °C (96.6 °F)-36.5 °C (97.7 °F)] 36 °C (96.8 °F)  Heart Rate:  [] 90  Resp:  [14-35] 15  BP: ()/() 108/62  FiO2 (%):  [40 %] 40 %    Physical Exam:  General: agitated, shaking her head  Respiratory: trach in place  Cardiovascular: non-tachycardic on monitor  GI: soft, non-distended. Peg no longer in place and prior PEG site with serosanguinous oozing.   Skin: warm and dry      I/O last 2 completed shifts:  In: 1590 (26.8 mL/kg) [I.V.:400 (6.7 mL/kg); Other:400; NG/GT:540; IV Piggyback:250]  Out: 2400 (40.4 mL/kg) [Other:2400]  Weight: 59.4 kg      Labs Past 18 Hours:  Recent Results (from the past 18 hours)   POCT GLUCOSE    Collection Time: 12/15/24  8:03 AM   Result Value Ref Range    POCT Glucose 193 (H) 74 - 99 mg/dL   POCT GLUCOSE    Collection Time: 12/15/24 11:49 AM   Result Value Ref Range    POCT Glucose 235 (H) 74 - 99 mg/dL   POCT GLUCOSE    Collection Time: 12/15/24  4:38 PM   Result Value Ref Range    POCT Glucose 249 (H) 74 - 99 mg/dL   POCT GLUCOSE    Collection Time: 12/15/24  8:19 PM   Result Value Ref Range    POCT Glucose 173 (H) 74 - 99 mg/dL   POCT GLUCOSE    Collection Time: 12/16/24 12:16 AM   Result Value Ref Range    POCT Glucose 142 (H) 74 - 99 mg/dL        Meds:    Current Facility-Administered Medications:     acetaminophen (Tylenol) oral liquid 1,000 mg, 1,000 mg, oral, q8h, Jaime Mckeon MD, 1,000 mg at 12/15/24 1943    alteplase (Cathflo Activase) injection 2 mg, 2 mg, intra-catheter, PRN, Bright Mayen MD, 2 mg at 12/04/24 1011    aspirin chewable tablet 81 mg, 81 mg, g-tube, Daily, Jaime Mckeon MD, 81 mg at 12/15/24 0835    atorvastatin (Lipitor) tablet 80  mg, 80 mg, g-tube, Nightly, Jaime Mckeon MD, 80 mg at 12/15/24 2056    clopidogrel (Plavix) tablet 75 mg, 75 mg, oral, Daily, Michelle Arreaga MD, 75 mg at 12/15/24 0835    dextrose 50 % injection 12.5 g, 12.5 g, intravenous, q15 min PRN, Jaime Mckeon MD    dextrose 50 % injection 25 g, 25 g, intravenous, q15 min PRN, Jaime Mckeon MD    diatrizoate willian-diatrizoat sod (Gastrografin 37% organic bound iodine) solution 60 mL, 60 mL, oral, Once, Trey Jimenez MD    dicyclomine (Bentyl) capsule 10 mg, 10 mg, g-tube, 4x daily PRN, Jaime Mckeon MD, 10 mg at 12/15/24 1028    fentaNYL PF (Sublimaze) injection 25 mcg, 25 mcg, intravenous, q4h PRN, Bright Mayen MD, 25 mcg at 12/13/24 1556    folic acid (Folvite) tablet 1 mg, 1 mg, g-tube, Daily, Jaime Mckeon MD, 1 mg at 12/15/24 0835    glucagon (Glucagen) injection 1 mg, 1 mg, intramuscular, q15 min PRN, Jaime Mckeon MD    heparin (porcine) injection 5,000 Units, 5,000 Units, subcutaneous, BID, Jaime Mckeon MD, 5,000 Units at 12/15/24 2056    heparin flush 10 unit/mL syringe 10 Units, 10 Units, intra-catheter, PRN, Jaime Mckeon MD    HYDROmorphone (Dilaudid) injection 0.3 mg, 0.3 mg, intravenous, q3h PRN, Jaime Mckeon MD, 0.3 mg at 12/15/24 1722    hydrOXYzine HCL (Atarax) tablet 10 mg, 10 mg, g-tube, q6h PRN, Jaime Mckeon MD    insulin lispro injection 0-10 Units, 0-10 Units, subcutaneous, q4h, Jaime Mckeon MD, 2 Units at 12/15/24 2102    ipratropium-albuteroL (Duo-Neb) 0.5-2.5 mg/3 mL nebulizer solution 3 mL, 3 mL, nebulization, q6h PRN, Bright Mayen MD, 3 mL at 11/20/24 1905    lactated Ringer's bolus 1,000 mL, 1,000 mL, intravenous, Once, Bright Mayen MD    lidocaine 4 % patch 1 patch, 1 patch, transdermal, Daily, Jaime Mckeon MD, 1 patch at 12/15/24 0836    metoprolol tartrate (Lopressor) tablet 12.5 mg, 12.5 mg, oral, q6h, Jaime Mckeon MD, 12.5 mg at 12/15/24 1638    ondansetron (Zofran) injection 4 mg, 4 mg, intravenous, q6h PRN, Chayo Terrell MD, 4 mg at  12/12/24 2015    oxyCODONE (Roxicodone) immediate release tablet 2.5 mg, 2.5 mg, g-tube, q4h PRN, Jaime Mckeon MD, 2.5 mg at 12/15/24 1638    oxyCODONE (Roxicodone) immediate release tablet 2.5 mg, 2.5 mg, oral, q6h PRN, Jaime Mckeon MD    oxygen (O2) therapy, , inhalation, Continuous PRN - O2/gases, Bright Mayen MD, 40 percent at 12/08/24 1046    oxygen (O2) therapy, , inhalation, Continuous - Inhalation, Bright Mayen MD, 10 L/min at 12/15/24 2051    polyethylene glycol (Glycolax, Miralax) packet 17 g, 17 g, oral, Daily PRN, Bright Mayen MD, 17 g at 11/28/24 0903    QUEtiapine (SEROquel) tablet 25 mg, 25 mg, g-tube, Nightly, Jaime Mckeon MD, 25 mg at 12/15/24 2056    sennosides (Senokot) tablet 17.2 mg, 2 tablet, g-tube, BID, Jaime Mckeon MD, 17.2 mg at 12/15/24 0836    thiamine (Vitamin B-1) tablet 100 mg, 100 mg, g-tube, Daily, Jaime Mckeon MD, 100 mg at 12/15/24 0835       Medications reviewed.  Vital signs reviewed.  Labs reviewed.   Imaging reviewed.      Assessment/Plan    Assessment and Plan:  65 YO F with PMH pancreatitis, DVT/PE, HLD, HTN, CABGx4 1997, T2DM, GERD, PAD, chronic mesenteric ischemia, tobacco use who presented to [facility] on 11/18/2024 as a transfer from [facility] with chest, back, and abdominal pain. 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 Vascular and Cardiac surgery following not offering intervention due to patient frailty and high risk nature of the procedure. CICU course c/b PEA arrest 11/20. Given the prolonged hospital course ongoing GOC discussion with family to determine direction of care. Patient option trach/ PEG with ultimate goal of LTAC over comfort care at this time. [facility] Consulted for PEG tube placement. PEG placed on 12/6. Re-engaged on 12/10 for concerns for abdominal distension, nausea, and  vomiting.    UPDATE 12/16 am: [facility] reengaged due to patient pulling her PEG tube out.     18 Fr arriola was reinserted through the same PEG tube site. Gastrografin was pushed into the arriola and intraluminal placement of the arriola in the stomach was confirmed on 2 view KUB. Contrast seen in the stomach with no spillage into the abdominal cavity. Arriola balloon was inflated with 10 ml sterile normal saline. Arriola secured at the skin with a 2-0 silk suture.     Plan Today:   - 18 Fr arriola reinserted through the old PEG tube tract   - Monitor for signs of peritonitis   - Serial abdominal exams (will plan to see the patient again overnight)   - Hold tube feeds overnight   - Convert PO meds to IV (especially tylenol and metoprolol)   - rest of care per primary  - please page with any questions or concerns    Patient discussed with chief resident Dr. Santos and attending Dr. Sinha    Update 4 am: Abdomen Exam  Focally tender and tense around the PEG tube site. Otherwise abdomen soft and non tender, non distended.     Primary team informed about starting IV tylenol and IV metoprolol and hold PO meds.     Trey Jimenez MD  PGY-1  [facility]  Service Pager: [phone]

## 2024-12-16 NOTE — PROGRESS NOTES
Physical Therapy    Physical Therapy Treatment    Patient Name: Humaira Huang  MRN: [id number]  Today's Date: 12/16/2024  Room: 14/14A  Time Calculation  Start Time: 0907  Stop Time: 0935  Time Calculation (min): 28 min       Assessment/Plan   PT Plan  Treatment/Interventions: Bed mobility, Transfer training, Balance training, Neuromuscular re-education, Strengthening, Endurance training, Range of motion, Therapeutic exercise, Therapeutic activity, Positioning, Postural re-education  PT Plan: Ongoing PT  PT Frequency: 3 times per week  PT Discharge Recommendations: Moderate intensity level of continued care  PT Recommended Transfer Status: Total assist  PT - OK to Discharge: Yes    General Visit Information:   Reason for Referral: Presented from OSH 11/18 with chest, back and abdominal pain. 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 Course c/b PEA arrest 11/20 requiring 3 rounds of CPR and intubation. Unable to extubate d/t multiple failed SBT trials, c/f developing PNA  Past Medical History Relevant to Rehab: history of pancreatitis, DVT/PE, HLD, HTN, CABGx4 1997, T2DM, GERD, PAD, chronic mesenteric ischemia, tobacco use  Co-Treatment: PT  Co-Treatment Reason: co-treatment to increase functional capacity and safety with high acuity patient   Subjective: Patient is alert, agreeable to PT.  Noted to have pulled PEG overnight.  Precautions:  Precautions  Medical Precautions: Fall precautions, Cardiac precautions, Oxygen therapy device and L/min  Precautions Comment: BUE restraints and mitts  Vital Signs:     12/16/24 0907 12/16/24 0935   Vital Signs   Vitals Session Pre PT Post PT   Heart Rate 107 109   SpO2 100 % 100 %   BP (!) 144/92 135/87       Objective   Pain:  Pain Assessment  0-10 (Numeric) Pain Score: 0 - No pain (post 0)  Cognition:  Cognition  Orientation Level:  (oriented to person,  place)  Following Commands:  (follows approx 50% 1 step commands, questionable effort with delayed response and intermittent shaking head no)  Lines/Tubes/Drains:  Surgical Airway Shiley Cuffed 6 (Active)   Number of days: 10       Gastrostomy/Enterostomy Percutaneous endoscopic gastrostomy (PEG) LUQ (Active)   Number of days: 9       Hemodialysis Cath 12/13/24 Triple lumen Right Tunneled catheter (Active)   Number of days: 2     Medical Gas Therapy: Supplemental oxygen  Medical Gas Delivery Method: Trach mask  Oxygen Dose: *35 L/min  Continuous Medications/Drips:       PT Treatments:  Therapeutic Exercise  Therapeutic Exercise Activity 1: BLE PF, DF, heel slide, isometric quad 5 x 1 AAROM  Therapeutic Activity  Therapeutic Activity 1: EOB 12 minutes min A  Therapeutic Activity 2: CGA 2 min x 2 c BUE support     Bed Mobility 1  Bed Mobility 1: Supine to sitting  Level of Assistance 1: Maximum assistance, +2  Bed Mobility Comments 1: TAPS, HOB 30  Bed Mobility 2  Bed Mobility  2: Sitting to supine  Level of Assistance 2: Maximum assistance, +2  Bed Mobility Comments 2: LE assist, TAPS     Transfers  Transfer: Yes  Transfer 1  Transfer From 1: Sit to  Transfer to 1: Stand  Transfer Device 1:  (2 HHA)  Transfer Level of Assistance 1: Maximum assistance, +2  Trials/Comments 1: x2  Transfers 2  Transfer From 2: Stand to  Transfer to 2: Sit  Transfer Device 2:  (2 HHA, TAPS)  Transfer Level of Assistance 2: Maximum assistance, +2  Trials/Comments 2: x2             Outcome Measures:  [facility] Basic Mobility  Turning from your back to your side while in a flat bed without using bedrails: A lot  Moving from lying on your back to sitting on the side of a flat bed without using bedrails: A lot  Moving to and from bed to chair (including a wheelchair): Total  Standing up from a chair using your arms (e.g. wheelchair or bedside chair): Total  To walk in hospital room: Total  Climbing 3-5 steps with railing: Total  Basic Mobility -  Total Score: 8           FSS-ICU  Ambulation: Unable to attempt due to weakness  Rolling: Maximal assistance (performs 25% - 49% of task)  Sitting: Minimal assistance (performs 75% or more of task)  Transfer Sit-to-Stand: Maximal assistance (performs 25% - 49% of task)  Transfer Supine-to-Sit: Maximal assistance (performs 25% - 49% of task)  Total Score: 10  ICU Mobility Screen  ICU Mobility Scale: Standing             Education Documentation  Mobility Training, taught by Cecil Powell PT at 12/16/2024 11:29 AM.  Learner: Patient  Readiness: Acceptance  Method: Explanation  Response: Needs Reinforcement  Comment: mobility precautions    Education Comments  No comments found.          OP EDUCATION:       Encounter Problems       Encounter Problems (Active)       Balance       Pt will be able to sit statically/dynamically EOB >12 minutes with CGA with UE support as needed for improved postural control (Progressing)       Start:  11/26/24    Expected End:  12/25/24               Mobility          PT Transfers       Transfer from bed to chair with modAx2 (Progressing)       Start:  11/26/24    Expected End:  12/25/24            Patient to transfer to and from sit to supine with modAx1 (Progressing)       Start:  11/26/24    Expected End:  12/25/24            Patient will transfer sit to and from stand with modAx2 (Progressing)       Start:  11/26/24    Expected End:  12/25/24               Pain - Adult              Assessment: Patient is progressing Fairly with therapy this date.  Patient able to complete multiple bed level there-ex, EOB, and standing trials.  Continues to have questionable orientation and command following often delaying reactions and shaking head no but able to complete with verbal/tactile cues and encouragement.  Initial stand with near full erect posture and second trial with patient letting go of UE support and with minimal bed clearance.  Patient remains appropriate for MOD intensity therapy  when medically appropriate for discharge from acute stay.  Will continue to follow.      12/16/24 at 11:30 AM   Cecil Powell, PT   Rehab Office: [phone]

## 2024-12-16 NOTE — ASSESSMENT & PLAN NOTE
64 y.o. female with history of pancreatitis, DVT/PE, HLD, HTN, CABGx4 1997, T2DM, GERD, PAD, chronic mesenteric ischemia, tobacco use who presented to [facility] on 11/18/2024 as a transfer from [facility] with chest, back, and abdominal pain. Now post PEA arrest, with intubation/sedation. Family agreed for trach/PEG and LTAC. Patient had septic episode 12/6 overnight and underwent trach/PEG. IR-guided TDC line placed 12/13/24.     Updates 12/16/24:  -On trach collar over the past day  -Episodic abdominal pain with bowel movements  -Peer to peer at 2 pm today  -Plan for PEG replacement tomorrow morning     Neuro:  #Sedation/anxiety  -No sedation  -Quetiapine 25 mg nightly     Cardiac:   #Aflutter/Afib with RVR  #Driven by anxiety vs hypovolemia  ::now sinus tachycardia  -HR in 160-180s on 12/5 am  -s/p one dose of digoxin 250 mcg (digoxin is not dialyzable)  -s/p amio bolus 12/6 for RVR  -Continue metoprolol 12.5 mg Q6H     #PEA arrest 11/20  ::likely hypoxia driven: PNA vs aspiration     #Aortic dissection  #Mural Thrombus  #Distal aortic arch saccular aneurysm   ::CTA CAP 11/18- 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   -Vascular surgery discussed case at aortic conference and there are no plans for surgery, palliative measures recommended     #NSTEMI  #CAD s/p CABGx4 1997  #PAD  #HTN #DLD  ::EKG- NSR, rate 71, TWI aVL, V1-V6, 1mm ORQUIDEA in aVR  ::Trop peak 5700  ::Lipid HDL 41.5, , TG 75, Chol 157  -Holding home amlodipine 10mg, imdur 30mg, lisinopril 2.5mg, metop tartrate 50mg bid iso hypotension   -Continue ASA and atorvastatin  -Continue Plavix 75 mg     #HFrecEF (45-50%, 11/19/24)  #Moderate/Severe MR  -TTE on 12/6 with EF of 60-65% with moderate to severe mitral regurgitation  -Not on GDMT     Pulm:  #Acute hypoxic respiratory failure, post-arrest  :s/p Zosyn  11/20 - 11/26 for PNA  :: s/p Vancomycin   -Failed extubation on 11/28  -On sustained trach collar     GI:  #Displaced PEG tube  #Nutrition  -Started Senna 8.6 mg BID  -Displaced PEG tube after pulling it out overnight  -Consulted ACS, pending replacement depending on surgery availability  -Holding tube feeds     Renal:  #TRACY on CKD likely 2/2 arrest  #Anuric  -Sevalemar carbonate 800 mg TID   -Continue TuThrSat dialysis  -Tunneled line placed on 12/16      Endo:   #T2DM  -Hold home metformin   -POC Glucose: 119-162  -Continue SSI2       Infectious  -No acute problems     Hem/Onc  #Hypoproliferative Anemia  #Hemolytic transfusion reaction  -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 7.1 -> 7.8 -> 8.1  -Continue to monitor     F: None  E: K>4, Mag>2  N: Npo for now  G: pantoprazole  A: TDC PEG  DVT: SCDs  CODE: DNR/okay to intubate (confirmed with family 11/21)  NOK: Son Rober [phone]

## 2024-12-16 NOTE — CARE PLAN
The patient's goals for the shift include        Problem: Pain - Adult  Goal: Verbalizes/displays adequate comfort level or baseline comfort level  Outcome: Progressing     Problem: Safety - Adult  Goal: Free from fall injury  Outcome: Progressing     Problem: Discharge Planning  Goal: Discharge to home or other facility with appropriate resources  Outcome: Progressing     Problem: Chronic Conditions and Co-morbidities  Goal: Patient's chronic conditions and co-morbidity symptoms are monitored and maintained or improved  Outcome: Progressing     Problem: Skin  Goal: Participates in plan/prevention/treatment measures  Outcome: Progressing  Flowsheets (Taken 12/15/2024 2109)  Participates in plan/prevention/treatment measures: Elevate heels  Goal: Prevent/manage excess moisture  Outcome: Progressing  Flowsheets (Taken 12/15/2024 2109)  Prevent/manage excess moisture:   Cleanse incontinence/protect with barrier cream   Moisturize dry skin   Monitor for/manage infection if present  Goal: Prevent/minimize sheer/friction injuries  Outcome: Progressing  Flowsheets (Taken 12/15/2024 2109)  Prevent/minimize sheer/friction injuries:   Complete micro-shifts as needed if patient unable. Adjust patient position to relieve pressure points, not a full turn   Turn/reposition every 2 hours/use positioning/transfer devices   HOB 30 degrees or less   Utilize specialty bed per algorithm   Use pull sheet  Goal: Decreased wound size/increased tissue granulation at next dressing change  Outcome: Progressing  Flowsheets (Taken 12/15/2024 2109)  Decreased wound size/increased tissue granulation at next dressing change:   Promote sleep for wound healing   Utilize specialty bed per algorithm  Goal: Promote/optimize nutrition  Outcome: Progressing  Flowsheets (Taken 12/15/2024 2109)  Promote/optimize nutrition: Monitor/record intake including meals  Goal: Promote skin healing  Outcome: Progressing  Flowsheets (Taken 12/15/2024 2109)  Promote  skin healing:   Assess skin/pad under line(s)/device(s)   Protective dressings over bony prominences     Problem: Safety - Medical Restraint  Goal: Remains free of injury from restraints (Restraint for Interference with Medical Device)  12/15/2024 2111 by Olivier Amato RN  Flowsheets (Taken 12/15/2024 2111)  Remains free of injury from restraints (restraint for interference with medical device):   Determine that other, less restrictive measures have been tried or would not be effective before applying the restraint   Evaluate the patient's condition at the time of restraint application   Inform patient/family regarding the reason for restraint   Every 2 hours: Monitor safety, psychosocial status, comfort, nutrition and hydration  12/15/2024 2109 by Olivier Amtao RN  Outcome: Progressing  Goal: Free from restraint(s) (Restraint for Interference with Medical Device)  12/15/2024 2111 by Olivier Amato RN  Flowsheets (Taken 12/15/2024 2111)  Free from restraint(s) (restraint for interference with medical device):   ONCE/SHIFT or MINIMUM Every 12 hours: Assess and document the continuing need for restraints   Every 24 hours: Continued use of restraint requires Licensed Independent Practitioner to perform face to face examination and written order   Identify and implement measures to help patient regain control  12/15/2024 2109 by Olivier Amato RN  Outcome: Progressing     Problem: Knowledge Deficit  Goal: Patient/family/caregiver demonstrates understanding of disease process, treatment plan, medications, and discharge instructions  Outcome: Progressing     Problem: Mechanical Ventilation  Goal: Patient Will Maintain Patent Airway  Outcome: Progressing  Goal: Oral health is maintained or improved  Outcome: Progressing  Goal: Tracheostomy will be managed safely  Outcome: Progressing  Goal: ET tube will be managed safely  Outcome: Progressing  Goal: Ability to express needs and understand communication  Outcome:  Progressing  Goal: Mobility/activity is maintained at optimum level for patient  Outcome: Progressing     Problem: Pain  Goal: Takes deep breaths with improved pain control throughout the shift  Outcome: Progressing  Goal: Turns in bed with improved pain control throughout the shift  Outcome: Progressing  Goal: Walks with improved pain control throughout the shift  Outcome: Progressing  Goal: Performs ADL's with improved pain control throughout shift  Outcome: Progressing  Goal: Participates in PT with improved pain control throughout the shift  Outcome: Progressing  Goal: Free from opioid side effects throughout the shift  Outcome: Progressing  Goal: Free from acute confusion related to pain meds throughout the shift  Outcome: Progressing     Problem: Nutrition  Goal: Less than 5 days NPO/clear liquids  Outcome: Progressing  Goal: Oral intake greater than 50%  Outcome: Progressing  Goal: Oral intake greater 75%  Outcome: Progressing  Goal: Consume prescribed supplement  Outcome: Progressing  Goal: Adequate PO fluid intake  Outcome: Progressing  Goal: Nutrition support goals are met within 48 hrs  Outcome: Progressing  Goal: Nutrition support is meeting 75% of nutrient needs  Outcome: Progressing  Goal: Tube feed tolerance  Outcome: Progressing  Goal: BG  mg/dL  Outcome: Progressing  Goal: Lab values WNL  Outcome: Progressing  Goal: Electrolytes WNL  Outcome: Progressing  Goal: Promote healing  Outcome: Progressing  Goal: Maintain stable weight  Outcome: Progressing  Goal: Reduce weight from edema/fluid  Outcome: Progressing  Goal: Gradual weight gain  Outcome: Progressing  Goal: Improve ostomy output  Outcome: Progressing     Problem: Fall/Injury  Goal: Not fall by end of shift  Outcome: Progressing  Goal: Be free from injury by end of the shift  Outcome: Progressing  Goal: Verbalize understanding of personal risk factors for fall in the hospital  Outcome: Progressing  Goal: Verbalize understanding of risk  factor reduction measures to prevent injury from fall in the home  Outcome: Progressing  Goal: Use assistive devices by end of the shift  Outcome: Progressing  Goal: Pace activities to prevent fatigue by end of the shift  Outcome: Progressing

## 2024-12-16 NOTE — CARE PLAN
The patient's goals for the shift include        Problem: Pain - Adult  Goal: Verbalizes/displays adequate comfort level or baseline comfort level  Outcome: Progressing     Problem: Safety - Adult  Goal: Free from fall injury  Outcome: Progressing     Problem: Discharge Planning  Goal: Discharge to home or other facility with appropriate resources  Outcome: Progressing     Problem: Chronic Conditions and Co-morbidities  Goal: Patient's chronic conditions and co-morbidity symptoms are monitored and maintained or improved  Outcome: Progressing     Problem: Skin  Goal: Participates in plan/prevention/treatment measures  Outcome: Progressing  Flowsheets (Taken 12/15/2024 2109)  Participates in plan/prevention/treatment measures: Elevate heels  Goal: Prevent/manage excess moisture  Outcome: Progressing  Flowsheets (Taken 12/15/2024 2109)  Prevent/manage excess moisture:   Cleanse incontinence/protect with barrier cream   Moisturize dry skin   Monitor for/manage infection if present  Goal: Prevent/minimize sheer/friction injuries  Outcome: Progressing  Flowsheets (Taken 12/15/2024 2109)  Prevent/minimize sheer/friction injuries:   Complete micro-shifts as needed if patient unable. Adjust patient position to relieve pressure points, not a full turn   Turn/reposition every 2 hours/use positioning/transfer devices   HOB 30 degrees or less   Utilize specialty bed per algorithm   Use pull sheet  Goal: Decreased wound size/increased tissue granulation at next dressing change  Outcome: Progressing  Flowsheets (Taken 12/15/2024 2109)  Decreased wound size/increased tissue granulation at next dressing change:   Promote sleep for wound healing   Utilize specialty bed per algorithm  Goal: Promote/optimize nutrition  Outcome: Progressing  Flowsheets (Taken 12/15/2024 2109)  Promote/optimize nutrition: Monitor/record intake including meals  Goal: Promote skin healing  Outcome: Progressing  Flowsheets (Taken 12/15/2024 2109)  Promote  skin healing:   Assess skin/pad under line(s)/device(s)   Protective dressings over bony prominences     Problem: Safety - Medical Restraint  Goal: Remains free of injury from restraints (Restraint for Interference with Medical Device)  Outcome: Progressing  Goal: Free from restraint(s) (Restraint for Interference with Medical Device)  Outcome: Progressing     Problem: Knowledge Deficit  Goal: Patient/family/caregiver demonstrates understanding of disease process, treatment plan, medications, and discharge instructions  Outcome: Progressing     Problem: Mechanical Ventilation  Goal: Patient Will Maintain Patent Airway  Outcome: Progressing  Goal: Oral health is maintained or improved  Outcome: Progressing  Goal: Tracheostomy will be managed safely  Outcome: Progressing  Goal: ET tube will be managed safely  Outcome: Progressing  Goal: Ability to express needs and understand communication  Outcome: Progressing  Goal: Mobility/activity is maintained at optimum level for patient  Outcome: Progressing     Problem: Pain  Goal: Takes deep breaths with improved pain control throughout the shift  Outcome: Progressing  Goal: Turns in bed with improved pain control throughout the shift  Outcome: Progressing  Goal: Walks with improved pain control throughout the shift  Outcome: Progressing  Goal: Performs ADL's with improved pain control throughout shift  Outcome: Progressing  Goal: Participates in PT with improved pain control throughout the shift  Outcome: Progressing  Goal: Free from opioid side effects throughout the shift  Outcome: Progressing  Goal: Free from acute confusion related to pain meds throughout the shift  Outcome: Progressing     Problem: Nutrition  Goal: Less than 5 days NPO/clear liquids  Outcome: Progressing  Goal: Oral intake greater than 50%  Outcome: Progressing  Goal: Oral intake greater 75%  Outcome: Progressing  Goal: Consume prescribed supplement  Outcome: Progressing  Goal: Adequate PO fluid  intake  Outcome: Progressing  Goal: Nutrition support goals are met within 48 hrs  Outcome: Progressing  Goal: Nutrition support is meeting 75% of nutrient needs  Outcome: Progressing  Goal: Tube feed tolerance  Outcome: Progressing  Goal: BG  mg/dL  Outcome: Progressing  Goal: Lab values WNL  Outcome: Progressing  Goal: Electrolytes WNL  Outcome: Progressing  Goal: Promote healing  Outcome: Progressing  Goal: Maintain stable weight  Outcome: Progressing  Goal: Reduce weight from edema/fluid  Outcome: Progressing  Goal: Gradual weight gain  Outcome: Progressing  Goal: Improve ostomy output  Outcome: Progressing     Problem: Fall/Injury  Goal: Not fall by end of shift  Outcome: Progressing  Goal: Be free from injury by end of the shift  Outcome: Progressing  Goal: Verbalize understanding of personal risk factors for fall in the hospital  Outcome: Progressing  Goal: Verbalize understanding of risk factor reduction measures to prevent injury from fall in the home  Outcome: Progressing  Goal: Use assistive devices by end of the shift  Outcome: Progressing  Goal: Pace activities to prevent fatigue by end of the shift  Outcome: Progressing

## 2024-12-16 NOTE — PROGRESS NOTES
Subjective   Pulled out PEG tube overnight. [facility] came by and evaluated, and paced a Orozco. Plan is for placement of PEG sometime this week. She had some abdominal pain this morning.     Meds  Scheduled medications  acetaminophen, 1,000 mg, intravenous, q8h  aspirin, 81 mg, g-tube, Daily  atorvastatin, 80 mg, g-tube, Nightly  clopidogrel, 75 mg, oral, Daily  diatrizoate willian-diatrizoat sod, 60 mL, oral, Once  folic acid, 1 mg, g-tube, Daily  heparin, 5,000 Units, subcutaneous, BID  insulin lispro, 0-10 Units, subcutaneous, q4h  lactated Ringer's, 1,000 mL, intravenous, Once  lidocaine, 1 patch, transdermal, Daily  metoprolol tartrate, 12.5 mg, oral, q6h  oxygen, , inhalation, Continuous - Inhalation  QUEtiapine, 25 mg, g-tube, Nightly  sennosides, 2 tablet, g-tube, BID  thiamine, 100 mg, g-tube, Daily      Continuous medications     PRN medications  PRN medications: alteplase, dextrose, dextrose, dicyclomine, fentaNYL, glucagon, heparin flush, HYDROmorphone, hydrOXYzine HCL, ipratropium-albuteroL, ondansetron, oxyCODONE, oxyCODONE, oxygen, polyethylene glycol      Objective   Vital signs in last 24 hours:  Temp:  [35.9 °C (96.6 °F)-36.4 °C (97.5 °F)] 35.9 °C (96.6 °F)  Heart Rate:  [] 110  Resp:  [15-35] 21  BP: ()/() 138/76  FiO2 (%):  [35 %-40 %] 35 %    Intake/Output this shift:    Intake/Output Summary (Last 24 hours) at 12/16/2024 1248  Last data filed at 12/15/2024 2000  Gross per 24 hour   Intake 170 ml   Output --   Net 170 ml       Physical  General: Patient is awake, uncomfortable appearing, normal body habitus  Pulm: Normal WOB at rest , no crackles or rhonchi  Cardiac: Regular rate and rhythm, normal S1/S2  Abdomen: Diffusely tender to palpation, non-distended  Extremities: No peripheral edema, or cyanosis  Neuro: Patient alert and oriented, cranial nerves grossly intact, normal strength and sensation.    Results  Results for orders placed or performed during the hospital encounter of  11/18/24 (from the past 24 hours)   POCT GLUCOSE   Result Value Ref Range    POCT Glucose 249 (H) 74 - 99 mg/dL   POCT GLUCOSE   Result Value Ref Range    POCT Glucose 173 (H) 74 - 99 mg/dL   POCT GLUCOSE   Result Value Ref Range    POCT Glucose 142 (H) 74 - 99 mg/dL   CBC and Auto Differential   Result Value Ref Range    WBC 8.2 4.4 - 11.3 x10*3/uL    nRBC 0.8 (H) 0.0 - 0.0 /100 WBCs    RBC 2.81 (L) 4.00 - 5.20 x10*6/uL    Hemoglobin 8.7 (L) 12.0 - 16.0 g/dL    Hematocrit 27.2 (L) 36.0 - 46.0 %    MCV 97 80 - 100 fL    MCH 31.0 26.0 - 34.0 pg    MCHC 32.0 32.0 - 36.0 g/dL    RDW 19.5 (H) 11.5 - 14.5 %    Platelets 293 150 - 450 x10*3/uL    Neutrophils % 76.0 40.0 - 80.0 %    Immature Granulocytes %, Automated 0.7 0.0 - 0.9 %    Lymphocytes % 12.6 13.0 - 44.0 %    Monocytes % 9.7 2.0 - 10.0 %    Eosinophils % 0.8 0.0 - 6.0 %    Basophils % 0.2 0.0 - 2.0 %    Neutrophils Absolute 6.25 1.20 - 7.70 x10*3/uL    Immature Granulocytes Absolute, Automated 0.06 0.00 - 0.70 x10*3/uL    Lymphocytes Absolute 1.04 (L) 1.20 - 4.80 x10*3/uL    Monocytes Absolute 0.80 0.10 - 1.00 x10*3/uL    Eosinophils Absolute 0.07 0.00 - 0.70 x10*3/uL    Basophils Absolute 0.02 0.00 - 0.10 x10*3/uL   Renal function panel   Result Value Ref Range    Glucose 149 (H) 74 - 99 mg/dL    Sodium 133 (L) 136 - 145 mmol/L    Potassium 4.3 3.5 - 5.3 mmol/L    Chloride 95 (L) 98 - 107 mmol/L    Bicarbonate 25 21 - 32 mmol/L    Anion Gap 17 10 - 20 mmol/L    Urea Nitrogen 25 (H) 6 - 23 mg/dL    Creatinine 3.89 (H) 0.50 - 1.05 mg/dL    eGFR 12 (L) >60 mL/min/1.73m*2    Calcium 7.8 (L) 8.6 - 10.6 mg/dL    Phosphorus 3.5 2.5 - 4.9 mg/dL    Albumin 2.9 (L) 3.4 - 5.0 g/dL   Magnesium   Result Value Ref Range    Magnesium 2.19 1.60 - 2.40 mg/dL   POCT GLUCOSE   Result Value Ref Range    POCT Glucose 152 (H) 74 - 99 mg/dL   POCT GLUCOSE   Result Value Ref Range    POCT Glucose 127 (H) 74 - 99 mg/dL   POCT GLUCOSE   Result Value Ref Range    POCT Glucose 121 (H) 74  - 99 mg/dL       Imaging  XR abdomen 1 view    Result Date: 12/16/2024  Interpreted By:  Doug Langley and Sheng Max STUDY: XR ABDOMEN 1 VIEW;  12/15/2024 11:30 pm   INDICATION: Signs/Symptoms:Gastrograffin theough arriola insterted in prior PEG tube site..     COMPARISON: Abdominal radiograph 12/15/2024, 12/14/2024, 12/13/2024   ACCESSION NUMBER(S): [id number]   ORDERING CLINICIAN: BASSEM LEWIS   FINDINGS: Percutaneous gastrostomy tube tip projects over the gastric fundus. Incompletely imaged central venous catheter tip projects over the right atrium.   Injected radiopaque Gastrografin contrast opacifies the stomach. There is scattered radiopaque material superior to the gastric bubble. This may be within small bowel but correlate with any concern for Gastrografin leak.   Visualized lungs are clear.   Osseous structures demonstrate no acute bony changes.       There is Gastrografin within the stomach. There is radiopaque density superior to the gastric bubble. While this may be within small bowel but can not exclude extraluminal contrast leak. Short-term follow-up recommended.   MACRO: Critical Finding:  See findings. Notification was initiated on 12/16/2024 at 11:42 am by  Doug Langley.  (**-YCF-**) Instructions:   Signed by: Doug Langley 12/16/2024 11:43 AM Dictation workstation:   [id number]    XR abdomen 1 view    Result Date: 12/16/2024  Interpreted By:  Doug Langley, STUDY: XR ABDOMEN 1 VIEW; 12/15/2024 6:26 pm   INDICATION: Signs/Symptoms:New abdominal pain.   COMPARISON: 12/14/2024.   ACCESSION NUMBER(S): [id number]   ORDERING CLINICIAN: BASSEM LEWIS   FINDINGS: Feeding tube  overlies the fundus of stomach. Air-filled loops of small large bowel but no gross evidence of obstruction. Limited evaluation of pneumoperitoneum on supine imaging, however no gross evidence of free air is noted.   Visualized lungs are clear.   Osseous structures demonstrate no acute bony changes.       1.   Nonobstructive bowel gas pattern.   Signed by: Doug Langley 12/16/2024 11:38 AM Dictation workstation:   [id number]    XR abdomen 1 view    Result Date: 12/14/2024  Interpreted By:  Doug Langley, STUDY: XR ABDOMEN 1 VIEW; 12/14/2024 1:26 pm   INDICATION: Signs/Symptoms:Eval for abdominal distention.   COMPARISON: 12/13/2024.   ACCESSION NUMBER(S): [id number]   ORDERING CLINICIAN: BASSEM LEWIS   FINDINGS: Feeding tube  overlies the fundus of stomach.. Nonobstructive bowel gas pattern. Limited evaluation of pneumoperitoneum on supine imaging, however no gross evidence of free air is noted. Left upper quadrant vascular calcifications.   Visualized lungs are clear.   Osseous structures demonstrate no acute bony changes.       1.  Feeding tube overlies the fundus of the stomach. Air-filled nondistended loops of small large bowel.   Signed by: Doug Langley 12/14/2024 2:41 PM Dictation workstation:   [id number]        Assessment/Plan   Assessment & Plan  Dissection of aorta    Cardiac arrest    Ruptured aortic aneurysm (Multi)    Anemia due to acute blood loss    64 y.o. female with history of pancreatitis, DVT/PE, HLD, HTN, CABGx4 1997, T2DM, GERD, PAD, chronic mesenteric ischemia, tobacco use who presented to [facility] on 11/18/2024 as a transfer from [facility] with chest, back, and abdominal pain. Now post PEA arrest, with intubation/sedation. Family agreed for trach/PEG and LTAC. Patient had septic episode 12/6 overnight and underwent trach/PEG. IR-guided TDC line placed 12/13/24.     Updates 12/16/24:  -On trach collar over the past day  -Episodic abdominal pain with bowel movements  -Peer to peer at 2 pm today  -Plan for PEG replacement tomorrow morning     Neuro:  #Sedation/anxiety  -No sedation  -Quetiapine 25 mg nightly     Cardiac:   #Aflutter/Afib with RVR  #Driven by anxiety vs hypovolemia  ::now sinus tachycardia  -HR in 160-180s on 12/5 am  -s/p one dose of  digoxin 250 mcg (digoxin is not dialyzable)  -s/p amio bolus 12/6 for RVR  -Continue metoprolol 12.5 mg Q6H     #PEA arrest 11/20  ::likely hypoxia driven: PNA vs aspiration     #Aortic dissection  #Mural Thrombus  #Distal aortic arch saccular aneurysm   ::CTA CAP 11/18- 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   -Vascular surgery discussed case at aortic conference and there are no plans for surgery, palliative measures recommended     #NSTEMI  #CAD s/p CABGx4 1997  #PAD  #HTN #DLD  ::EKG- NSR, rate 71, TWI aVL, V1-V6, 1mm ORQUIDEA in aVR  ::Trop peak 5700  ::Lipid HDL 41.5, , TG 75, Chol 157  -Holding home amlodipine 10mg, imdur 30mg, lisinopril 2.5mg, metop tartrate 50mg bid iso hypotension   -Continue ASA and atorvastatin  -Continue Plavix 75 mg     #HFrecEF (45-50%, 11/19/24)  #Moderate/Severe MR  -TTE on 12/6 with EF of 60-65% with moderate to severe mitral regurgitation  -Not on GDMT     Pulm:  #Acute hypoxic respiratory failure, post-arrest  :s/p Zosyn 11/20 - 11/26 for PNA  :: s/p Vancomycin   -Failed extubation on 11/28  -On sustained trach collar     GI:  #Displaced PEG tube  #Nutrition  -Started Senna 8.6 mg BID  -Displaced PEG tube after pulling it out overnight  -Consulted ACS, pending replacement depending on surgery availability  -Holding tube feeds     Renal:  #TRACY on CKD likely 2/2 arrest  #Anuric  -Sevalemar carbonate 800 mg TID   -Continue TuThrSat dialysis  -Tunneled line placed on 12/16      Endo:   #T2DM  -Hold home metformin   -POC Glucose: 119-162  -Continue SSI2       Infectious  -No acute problems     Hem/Onc  #Hypoproliferative Anemia  #Hemolytic transfusion reaction  -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 7.1 -> 7.8 -> 8.1  -Continue to monitor     F: None  E: K>4, Mag>2  N: Npo for  now  G: pantoprazole  A: TDC PEG  DVT: SCDs  CODE: DNR/okay to intubate (confirmed with family 11/21)  NOK: Son Rober [phone]        LOS: 28 days     Jaime Mckeon MD/PhD   PGY-2

## 2024-12-16 NOTE — CONSULTS
Wound Care Consult     Visit Date: 12/16/2024      Patient Name: Humaira Huang         MRN: [id number]           YOB: 1960     Reason for Consult: reassessment of coccyx pressure injury         Wound History: HAPI on the sacrum and left heel      Pertinent Labs:   Albumin   Date Value Ref Range Status   12/16/2024 2.9 (L) 3.4 - 5.0 g/dL Final     Albumin, Urine Random   Date Value Ref Range Status   11/25/2024 426.2 Not established mg/L Final       Wound Assessment:  Wound 11/27/24 Pressure Injury Coccyx (Active)   Wound Image   12/16/24 1342   Site Assessment Eschar;Sloughing;Yellow 12/16/24 1342   Luda-Wound Assessment Clean;Dry 12/16/24 1342   Non-staged Wound Description Full thickness 12/16/24 1342   Pressure Injury Stage U 12/16/24 1342   Shape Round 12/16/24 1342   Wound Length (cm) 4 cm 12/16/24 1342   Wound Width (cm) 4.5 cm 12/16/24 1342   Wound Surface Area (cm^2) 18 cm^2 12/16/24 1342   Wound Depth (cm) 0 cm 12/16/24 1342   Wound Volume (cm^3) 0 cm^3 12/16/24 1342   Wound Healing % 100 12/16/24 1342   State of Healing Eschar;Slough 12/16/24 1342   Margins Well-defined edges 12/16/24 1342   Drainage Description None 12/16/24 1342   Drainage Amount None 12/16/24 1342   Dressing Hydrophilic;Moisture barrier;Silicone border dressing 12/16/24 1342   Dressing Changed New 12/16/24 1342   Dressing Status Clean;Dry 12/16/24 1342     Wound Team Summary Assessment: The wound care team came to bedside to assess the patient sacrum pressure injury.  The patient's sacrum wound is about 90% black eschar and slough on the periwound skin.  The wound was cleansed with vashe wound cleanser and dressed with triad cream and mepilex border dressing. The patient was repositioned at this time.      Wound Team Plan:   Recommendation for sacrum: Daily  Cleanse the wound with vashe wound cleanser and gently pat dry   Apply a thick layer of triad cream over the wound and cover with a mepilex border dressing    Left  heel: Daily   Continue to keep the heel dry and offloaded at all times  Use EHOB daniella boot to keep the patient's heel of the bed       DAWOOD Torre  12/16/2024  6:09 PM

## 2024-12-17 PROBLEM — R29.898 SEVERE MUSCLE DECONDITIONING: Status: ACTIVE | Noted: 2024-01-01

## 2024-12-17 PROBLEM — R54 FRAILTY: Status: ACTIVE | Noted: 2024-01-01

## 2024-12-17 PROBLEM — N17.0 ACUTE RENAL FAILURE WITH TUBULAR NECROSIS (CMS-HCC): Status: ACTIVE | Noted: 2024-01-01

## 2024-12-17 PROBLEM — Z95.1 HX OF CABG: Status: ACTIVE | Noted: 2024-01-01

## 2024-12-17 PROBLEM — K55.1 CHRONIC MESENTERIC ISCHEMIA (MULTI): Status: ACTIVE | Noted: 2024-01-01

## 2024-12-17 PROBLEM — R41.0 DISORIENTATION: Status: ACTIVE | Noted: 2024-01-01

## 2024-12-17 PROBLEM — Z43.0: Status: ACTIVE | Noted: 2024-01-01

## 2024-12-17 LAB
ABO GROUP (TYPE) IN BLOOD: NORMAL
ALBUMIN SERPL BCP-MCNC: 2.3 G/DL (ref 3.4–5)
ANION GAP BLDV CALCULATED.4IONS-SCNC: 12 MMOL/L (ref 10–25)
ANION GAP BLDV CALCULATED.4IONS-SCNC: 12 MMOL/L (ref 10–25)
ANION GAP BLDV CALCULATED.4IONS-SCNC: 15 MMOL/L (ref 10–25)
ANION GAP SERPL CALC-SCNC: 20 MMOL/L (ref 10–20)
ANTIBODY SCREEN: NORMAL
ATRIAL RATE: 131 BPM
ATRIAL RATE: 214 BPM
ATRIAL RATE: 326 BPM
BASE EXCESS BLDV CALC-SCNC: -1.4 MMOL/L (ref -2–3)
BASE EXCESS BLDV CALC-SCNC: -3.5 MMOL/L (ref -2–3)
BASE EXCESS BLDV CALC-SCNC: 1 MMOL/L (ref -2–3)
BASOPHILS # BLD AUTO: 0.01 X10*3/UL (ref 0–0.1)
BASOPHILS NFR BLD AUTO: 0.1 %
BB ANTIBODY IDENTIFICATION: NORMAL
BODY TEMPERATURE: 37 DEGREES CELSIUS
BUN SERPL-MCNC: 36 MG/DL (ref 6–23)
CA-I BLDV-SCNC: 1.02 MMOL/L (ref 1.1–1.33)
CA-I BLDV-SCNC: 1.02 MMOL/L (ref 1.1–1.33)
CA-I BLDV-SCNC: 1.03 MMOL/L (ref 1.1–1.33)
CALCIUM SERPL-MCNC: 7.5 MG/DL (ref 8.6–10.6)
CASE #: NORMAL
CHLORIDE BLDV-SCNC: 97 MMOL/L (ref 98–107)
CHLORIDE BLDV-SCNC: 97 MMOL/L (ref 98–107)
CHLORIDE BLDV-SCNC: 99 MMOL/L (ref 98–107)
CHLORIDE SERPL-SCNC: 96 MMOL/L (ref 98–107)
CO2 SERPL-SCNC: 24 MMOL/L (ref 21–32)
CREAT SERPL-MCNC: 4.77 MG/DL (ref 0.5–1.05)
DAT-POLYSPECIFIC: NORMAL
EGFRCR SERPLBLD CKD-EPI 2021: 10 ML/MIN/1.73M*2
EOSINOPHIL # BLD AUTO: 0.01 X10*3/UL (ref 0–0.7)
EOSINOPHIL NFR BLD AUTO: 0.1 %
ERYTHROCYTE [DISTWIDTH] IN BLOOD BY AUTOMATED COUNT: 19.8 % (ref 11.5–14.5)
GLUCOSE BLD MANUAL STRIP-MCNC: 108 MG/DL (ref 74–99)
GLUCOSE BLD MANUAL STRIP-MCNC: 108 MG/DL (ref 74–99)
GLUCOSE BLD MANUAL STRIP-MCNC: 144 MG/DL (ref 74–99)
GLUCOSE BLD MANUAL STRIP-MCNC: 151 MG/DL (ref 74–99)
GLUCOSE BLD MANUAL STRIP-MCNC: 151 MG/DL (ref 74–99)
GLUCOSE BLD MANUAL STRIP-MCNC: 152 MG/DL (ref 74–99)
GLUCOSE BLDV-MCNC: 125 MG/DL (ref 74–99)
GLUCOSE BLDV-MCNC: 152 MG/DL (ref 74–99)
GLUCOSE BLDV-MCNC: 164 MG/DL (ref 74–99)
GLUCOSE SERPL-MCNC: 150 MG/DL (ref 74–99)
HCO3 BLDV-SCNC: 23.4 MMOL/L (ref 22–26)
HCO3 BLDV-SCNC: 24.7 MMOL/L (ref 22–26)
HCO3 BLDV-SCNC: 27.4 MMOL/L (ref 22–26)
HCT VFR BLD AUTO: 23.8 % (ref 36–46)
HCT VFR BLD EST: 22 % (ref 36–46)
HCT VFR BLD EST: 26 % (ref 36–46)
HCT VFR BLD EST: 29 % (ref 36–46)
HGB BLD-MCNC: 7.9 G/DL (ref 12–16)
HGB BLDV-MCNC: 7.4 G/DL (ref 12–16)
HGB BLDV-MCNC: 8.6 G/DL (ref 12–16)
HGB BLDV-MCNC: 9.5 G/DL (ref 12–16)
IMM GRANULOCYTES # BLD AUTO: 0.06 X10*3/UL (ref 0–0.7)
IMM GRANULOCYTES NFR BLD AUTO: 0.6 % (ref 0–0.9)
INHALED O2 CONCENTRATION: 35 %
LACTATE BLDV-SCNC: 1.3 MMOL/L (ref 0.4–2)
LACTATE BLDV-SCNC: 1.8 MMOL/L (ref 0.4–2)
LACTATE BLDV-SCNC: 3.7 MMOL/L (ref 0.4–2)
LYMPHOCYTES # BLD AUTO: 0.65 X10*3/UL (ref 1.2–4.8)
LYMPHOCYTES NFR BLD AUTO: 6.9 %
MAGNESIUM SERPL-MCNC: 2.06 MG/DL (ref 1.6–2.4)
MCH RBC QN AUTO: 31.5 PG (ref 26–34)
MCHC RBC AUTO-ENTMCNC: 33.2 G/DL (ref 32–36)
MCV RBC AUTO: 95 FL (ref 80–100)
MONOCYTES # BLD AUTO: 0.67 X10*3/UL (ref 0.1–1)
MONOCYTES NFR BLD AUTO: 7.1 %
NEUTROPHILS # BLD AUTO: 8.01 X10*3/UL (ref 1.2–7.7)
NEUTROPHILS NFR BLD AUTO: 85.2 %
NRBC BLD-RTO: 1.4 /100 WBCS (ref 0–0)
OXYHGB MFR BLDV: 48.8 % (ref 45–75)
OXYHGB MFR BLDV: 50.4 % (ref 45–75)
OXYHGB MFR BLDV: 64 % (ref 45–75)
PATH REV-IMMUNOHEMATOLOGY-PR30: NORMAL
PCO2 BLDV: 48 MM HG (ref 41–51)
PCO2 BLDV: 51 MM HG (ref 41–51)
PCO2 BLDV: 52 MM HG (ref 41–51)
PH BLDV: 7.27 PH (ref 7.33–7.43)
PH BLDV: 7.32 PH (ref 7.33–7.43)
PH BLDV: 7.33 PH (ref 7.33–7.43)
PHOSPHATE SERPL-MCNC: 5.2 MG/DL (ref 2.5–4.9)
PLATELET # BLD AUTO: 275 X10*3/UL (ref 150–450)
PO2 BLDV: 30 MM HG (ref 35–45)
PO2 BLDV: 33 MM HG (ref 35–45)
PO2 BLDV: 41 MM HG (ref 35–45)
POTASSIUM BLDV-SCNC: 4.9 MMOL/L (ref 3.5–5.3)
POTASSIUM BLDV-SCNC: 5.1 MMOL/L (ref 3.5–5.3)
POTASSIUM BLDV-SCNC: 5.4 MMOL/L (ref 3.5–5.3)
POTASSIUM SERPL-SCNC: 5.1 MMOL/L (ref 3.5–5.3)
Q ONSET: 211 MS
Q ONSET: 214 MS
Q ONSET: 217 MS
QRS COUNT: 16 BEATS
QRS COUNT: 20 BEATS
QRS COUNT: 25 BEATS
QRS DURATION: 110 MS
QRS DURATION: 94 MS
QRS DURATION: 96 MS
QT INTERVAL: 298 MS
QT INTERVAL: 320 MS
QT INTERVAL: 358 MS
QTC CALCULATION(BAZETT): 447 MS
QTC CALCULATION(BAZETT): 452 MS
QTC CALCULATION(BAZETT): 478 MS
QTC FREDERICIA: 403 MS
QTC FREDERICIA: 408 MS
QTC FREDERICIA: 415 MS
R AXIS: 31 DEGREES
R AXIS: 63 DEGREES
R AXIS: 78 DEGREES
RBC # BLD AUTO: 2.51 X10*6/UL (ref 4–5.2)
RH FACTOR (ANTIGEN D): NORMAL
SAO2 % BLDV: 50 % (ref 45–75)
SAO2 % BLDV: 52 % (ref 45–75)
SAO2 % BLDV: 66 % (ref 45–75)
SODIUM BLDV-SCNC: 130 MMOL/L (ref 136–145)
SODIUM BLDV-SCNC: 131 MMOL/L (ref 136–145)
SODIUM BLDV-SCNC: 131 MMOL/L (ref 136–145)
SODIUM SERPL-SCNC: 135 MMOL/L (ref 136–145)
T AXIS: -86 DEGREES
T AXIS: -87 DEGREES
T AXIS: 244 DEGREES
T OFFSET: 360 MS
T OFFSET: 374 MS
T OFFSET: 396 MS
VENTRICULAR RATE: 120 BPM
VENTRICULAR RATE: 155 BPM
VENTRICULAR RATE: 94 BPM
WBC # BLD AUTO: 9.4 X10*3/UL (ref 4.4–11.3)

## 2024-12-17 PROCEDURE — 2500000002 HC RX 250 W HCPCS SELF ADMINISTERED DRUGS (ALT 637 FOR MEDICARE OP, ALT 636 FOR OP/ED)

## 2024-12-17 PROCEDURE — 83735 ASSAY OF MAGNESIUM: CPT

## 2024-12-17 PROCEDURE — 86901 BLOOD TYPING SEROLOGIC RH(D): CPT

## 2024-12-17 PROCEDURE — 84484 ASSAY OF TROPONIN QUANT: CPT

## 2024-12-17 PROCEDURE — 84132 ASSAY OF SERUM POTASSIUM: CPT

## 2024-12-17 PROCEDURE — 2500000004 HC RX 250 GENERAL PHARMACY W/ HCPCS (ALT 636 FOR OP/ED): Mod: JZ | Performed by: NURSE PRACTITIONER

## 2024-12-17 PROCEDURE — 85025 COMPLETE CBC W/AUTO DIFF WBC: CPT

## 2024-12-17 PROCEDURE — 80069 RENAL FUNCTION PANEL: CPT | Mod: CCI

## 2024-12-17 PROCEDURE — 97535 SELF CARE MNGMENT TRAINING: CPT | Mod: GO

## 2024-12-17 PROCEDURE — 97530 THERAPEUTIC ACTIVITIES: CPT | Mod: GO

## 2024-12-17 PROCEDURE — 1100000001 HC PRIVATE ROOM DAILY

## 2024-12-17 PROCEDURE — 2500000005 HC RX 250 GENERAL PHARMACY W/O HCPCS

## 2024-12-17 PROCEDURE — 36415 COLL VENOUS BLD VENIPUNCTURE: CPT

## 2024-12-17 PROCEDURE — 84075 ASSAY ALKALINE PHOSPHATASE: CPT

## 2024-12-17 PROCEDURE — 99232 SBSQ HOSP IP/OBS MODERATE 35: CPT | Performed by: STUDENT IN AN ORGANIZED HEALTH CARE EDUCATION/TRAINING PROGRAM

## 2024-12-17 PROCEDURE — 86922 COMPATIBILITY TEST ANTIGLOB: CPT

## 2024-12-17 PROCEDURE — 99233 SBSQ HOSP IP/OBS HIGH 50: CPT

## 2024-12-17 PROCEDURE — 2500000004 HC RX 250 GENERAL PHARMACY W/ HCPCS (ALT 636 FOR OP/ED)

## 2024-12-17 PROCEDURE — 83605 ASSAY OF LACTIC ACID: CPT

## 2024-12-17 PROCEDURE — 2500000001 HC RX 250 WO HCPCS SELF ADMINISTERED DRUGS (ALT 637 FOR MEDICARE OP)

## 2024-12-17 PROCEDURE — 85610 PROTHROMBIN TIME: CPT

## 2024-12-17 PROCEDURE — 86880 COOMBS TEST DIRECT: CPT

## 2024-12-17 PROCEDURE — 2500000004 HC RX 250 GENERAL PHARMACY W/ HCPCS (ALT 636 FOR OP/ED): Mod: JZ

## 2024-12-17 PROCEDURE — 85027 COMPLETE CBC AUTOMATED: CPT

## 2024-12-17 PROCEDURE — 99232 SBSQ HOSP IP/OBS MODERATE 35: CPT

## 2024-12-17 PROCEDURE — 84132 ASSAY OF SERUM POTASSIUM: CPT | Performed by: INTERNAL MEDICINE

## 2024-12-17 PROCEDURE — 86870 RBC ANTIBODY IDENTIFICATION: CPT

## 2024-12-17 PROCEDURE — 82947 ASSAY GLUCOSE BLOOD QUANT: CPT

## 2024-12-17 PROCEDURE — 86077 PHYS BLOOD BANK SERV XMATCH: CPT

## 2024-12-17 PROCEDURE — 86885 COOMBS TEST INDIRECT QUAL: CPT

## 2024-12-17 RX ORDER — VANCOMYCIN HYDROCHLORIDE 1 G/20ML
INJECTION, POWDER, LYOPHILIZED, FOR SOLUTION INTRAVENOUS DAILY PRN
Status: DISCONTINUED | OUTPATIENT
Start: 2024-12-17 | End: 2024-12-18 | Stop reason: HOSPADM

## 2024-12-17 RX ORDER — FENTANYL CITRATE 50 UG/ML
50 INJECTION, SOLUTION INTRAMUSCULAR; INTRAVENOUS ONCE
Status: COMPLETED | OUTPATIENT
Start: 2024-12-17 | End: 2024-12-17

## 2024-12-17 RX ORDER — MIDAZOLAM HYDROCHLORIDE 1 MG/ML
2 INJECTION INTRAMUSCULAR; INTRAVENOUS ONCE
Status: COMPLETED | OUTPATIENT
Start: 2024-12-17 | End: 2024-12-17

## 2024-12-17 RX ORDER — MIDAZOLAM HYDROCHLORIDE 1 MG/ML
INJECTION INTRAMUSCULAR; INTRAVENOUS
Status: COMPLETED
Start: 2024-12-17 | End: 2024-12-17

## 2024-12-17 RX ORDER — NALOXONE HYDROCHLORIDE 0.4 MG/ML
INJECTION, SOLUTION INTRAMUSCULAR; INTRAVENOUS; SUBCUTANEOUS
Status: COMPLETED
Start: 2024-12-17 | End: 2024-12-17

## 2024-12-17 RX ORDER — CEFAZOLIN SODIUM 2 G/100ML
2 INJECTION, SOLUTION INTRAVENOUS ONCE
Status: COMPLETED | OUTPATIENT
Start: 2024-12-17 | End: 2024-12-17

## 2024-12-17 RX ORDER — ACETAMINOPHEN 160 MG/5ML
1000 SOLUTION ORAL EVERY 8 HOURS
Status: DISCONTINUED | OUTPATIENT
Start: 2024-12-17 | End: 2024-12-18 | Stop reason: HOSPADM

## 2024-12-17 RX ORDER — NOREPINEPHRINE BITARTRATE/D5W 8 MG/250ML
PLASTIC BAG, INJECTION (ML) INTRAVENOUS
Status: COMPLETED
Start: 2024-12-17 | End: 2024-12-17

## 2024-12-17 RX ORDER — METOPROLOL TARTRATE 50 MG/1
50 TABLET ORAL EVERY 6 HOURS
Status: DISCONTINUED | OUTPATIENT
Start: 2024-12-17 | End: 2024-12-18 | Stop reason: HOSPADM

## 2024-12-17 RX ORDER — NALOXONE HYDROCHLORIDE 0.4 MG/ML
0.1 INJECTION, SOLUTION INTRAMUSCULAR; INTRAVENOUS; SUBCUTANEOUS ONCE
Status: COMPLETED | OUTPATIENT
Start: 2024-12-17 | End: 2024-12-17

## 2024-12-17 RX ADMIN — LIDOCAINE 4% 1 PATCH: 40 PATCH TOPICAL at 08:01

## 2024-12-17 RX ADMIN — OXYCODONE HYDROCHLORIDE 2.5 MG: 5 TABLET ORAL at 20:58

## 2024-12-17 RX ADMIN — THIAMINE HCL TAB 100 MG 100 MG: 100 TAB at 08:02

## 2024-12-17 RX ADMIN — HEPARIN SODIUM 5000 UNITS: 5000 INJECTION, SOLUTION INTRAVENOUS; SUBCUTANEOUS at 20:58

## 2024-12-17 RX ADMIN — METOPROLOL TARTRATE 50 MG: 50 TABLET, FILM COATED ORAL at 16:59

## 2024-12-17 RX ADMIN — SODIUM CHLORIDE, POTASSIUM CHLORIDE, SODIUM LACTATE AND CALCIUM CHLORIDE 500 ML: 600; 310; 30; 20 INJECTION, SOLUTION INTRAVENOUS at 19:43

## 2024-12-17 RX ADMIN — SENNOSIDES 17.2 MG: 8.6 TABLET, FILM COATED ORAL at 08:03

## 2024-12-17 RX ADMIN — NOREPINEPHRINE BITARTRATE 0.1 MCG: 8 INJECTION, SOLUTION INTRAVENOUS at 23:23

## 2024-12-17 RX ADMIN — Medication 35 PERCENT: at 20:00

## 2024-12-17 RX ADMIN — MIDAZOLAM HYDROCHLORIDE 2 MG: 1 INJECTION, SOLUTION INTRAMUSCULAR; INTRAVENOUS at 15:15

## 2024-12-17 RX ADMIN — SODIUM CHLORIDE, SODIUM LACTATE, POTASSIUM CHLORIDE, AND CALCIUM CHLORIDE 500 ML: 600; 310; 30; 20 INJECTION, SOLUTION INTRAVENOUS at 22:19

## 2024-12-17 RX ADMIN — FENTANYL CITRATE 50 MCG: 50 INJECTION, SOLUTION INTRAMUSCULAR; INTRAVENOUS at 15:16

## 2024-12-17 RX ADMIN — OXYCODONE HYDROCHLORIDE 2.5 MG: 5 TABLET ORAL at 13:20

## 2024-12-17 RX ADMIN — CLOPIDOGREL BISULFATE 75 MG: 75 TABLET ORAL at 19:45

## 2024-12-17 RX ADMIN — ATORVASTATIN CALCIUM 80 MG: 80 TABLET, FILM COATED ORAL at 20:58

## 2024-12-17 RX ADMIN — SENNOSIDES 17.2 MG: 8.6 TABLET, FILM COATED ORAL at 20:58

## 2024-12-17 RX ADMIN — ACETAMINOPHEN 1000 MG: 160 SOLUTION ORAL at 18:36

## 2024-12-17 RX ADMIN — CEFAZOLIN SODIUM 2 G: 2 INJECTION, SOLUTION INTRAVENOUS at 13:54

## 2024-12-17 RX ADMIN — Medication 0.1 MCG: at 23:23

## 2024-12-17 RX ADMIN — ASPIRIN 81 MG CHEWABLE TABLET 81 MG: 81 TABLET CHEWABLE at 08:02

## 2024-12-17 RX ADMIN — FOLIC ACID 1 MG: 1 TABLET ORAL at 08:02

## 2024-12-17 RX ADMIN — NALOXONE HYDROCHLORIDE 0.1 MG: 0.4 INJECTION, SOLUTION INTRAMUSCULAR; INTRAVENOUS; SUBCUTANEOUS at 23:21

## 2024-12-17 RX ADMIN — QUETIAPINE FUMARATE 25 MG: 25 TABLET ORAL at 20:58

## 2024-12-17 RX ADMIN — MIDAZOLAM HYDROCHLORIDE 2 MG: 1 INJECTION INTRAMUSCULAR; INTRAVENOUS at 15:15

## 2024-12-17 RX ADMIN — METOPROLOL TARTRATE 50 MG: 50 TABLET, FILM COATED ORAL at 10:32

## 2024-12-17 RX ADMIN — OXYCODONE HYDROCHLORIDE 2.5 MG: 5 TABLET ORAL at 03:45

## 2024-12-17 RX ADMIN — ACETAMINOPHEN 1000 MG: 10 INJECTION, SOLUTION INTRAVENOUS at 07:38

## 2024-12-17 RX ADMIN — METOPROLOL TARTRATE 25 MG: 25 TABLET, FILM COATED ORAL at 05:53

## 2024-12-17 ASSESSMENT — COGNITIVE AND FUNCTIONAL STATUS - GENERAL
TURNING FROM BACK TO SIDE WHILE IN FLAT BAD: TOTAL
TOILETING: TOTAL
STANDING UP FROM CHAIR USING ARMS: TOTAL
PERSONAL GROOMING: TOTAL
EATING MEALS: TOTAL
WALKING IN HOSPITAL ROOM: TOTAL
DAILY ACTIVITIY SCORE: 6
MOBILITY SCORE: 6
DRESSING REGULAR LOWER BODY CLOTHING: TOTAL
MOVING FROM LYING ON BACK TO SITTING ON SIDE OF FLAT BED WITH BEDRAILS: TOTAL
DRESSING REGULAR UPPER BODY CLOTHING: TOTAL
MOVING TO AND FROM BED TO CHAIR: TOTAL
CLIMB 3 TO 5 STEPS WITH RAILING: TOTAL
HELP NEEDED FOR BATHING: TOTAL

## 2024-12-17 ASSESSMENT — PAIN SCALES - PAIN ASSESSMENT IN ADVANCED DEMENTIA (PAINAD)
TOTALSCORE: MEDICATION (SEE MAR)
TOTALSCORE: MEDICATION (SEE MAR)

## 2024-12-17 ASSESSMENT — PAIN DESCRIPTION - ORIENTATION
ORIENTATION: RIGHT;MID
ORIENTATION: RIGHT;MID

## 2024-12-17 ASSESSMENT — PAIN SCALES - GENERAL: PAINLEVEL_OUTOF10: 0 - NO PAIN

## 2024-12-17 ASSESSMENT — PAIN DESCRIPTION - LOCATION
LOCATION: ABDOMEN

## 2024-12-17 ASSESSMENT — ACTIVITIES OF DAILY LIVING (ADL): HOME_MANAGEMENT_TIME_ENTRY: 13

## 2024-12-17 NOTE — PROGRESS NOTES
Physical Therapy                 Therapy Communication Note    Patient Name: Humaira Huang  MRN: [id number]  Department: [facility]  Room: 14/14-A  Today's Date: 12/17/2024     Discipline: Physical Therapy    PT Missed Visit: Yes     Missed Visit Reason: Missed Visit Reason:  (Pt currently on dialysis and unavailable for PT.)    Missed Time: Attempt    Comment:

## 2024-12-17 NOTE — SIGNIFICANT EVENT
Statement of necessity  Patient is a medically complex patient with TRACY-D on TuThrSat dialysis with no anticipated renal recovery, chronic abdominal pain and acute hypoxic respiratory failure. She initially presented for thoracic aortic dissection which was not a candidate for surgical or endovascular management. She is instead being managed medically. Hospital course was complicated by PEA arrest on 11/19 with intubation and sedation, and failed extubation on 11/28. She had a tracheostomy and PEG tube placed on 12/6. She was on  mechanical ventilation from 11/19-12/13 and was weaned to trach mask on 12/14. While traditionally being just on trach mask is not a good candidate for LTAC, she has had a prolonged period of intubation and mechanical ventilation and therefore be high risk for needing ventilatory support. It would probably be safer for her to go to a facility that has the resources to support her if she decompensates, as well as provide physical therapy, tube feeds, and medication support.

## 2024-12-17 NOTE — PROGRESS NOTES
Humaira Huang   64 kathleen    @@  N/Room: 22052398/14/14-A admitted to CICU for Aortic dissection.    Subjective:   35L /min oxygen requirement, tach and PEG     Meds:   acetaminophen, 1,000 mg, q8h  aspirin, 81 mg, Daily  atorvastatin, 80 mg, Nightly  clopidogrel, 75 mg, Daily  diatrizoate willian-diatrizoat sod, 60 mL, Once  folic acid, 1 mg, Daily  heparin, 5,000 Units, BID  insulin lispro, 0-10 Units, q4h  lactated Ringer's, 1,000 mL, Once  lidocaine, 1 patch, Daily  metoprolol tartrate, 25 mg, q6h  oxygen, , Continuous - Inhalation  QUEtiapine, 25 mg, Nightly  sennosides, 2 tablet, BID  thiamine, 100 mg, Daily           alteplase, 2 mg, PRN  dextrose, 12.5 g, q15 min PRN  dextrose, 25 g, q15 min PRN  dicyclomine, 10 mg, 4x daily PRN  fentaNYL, 25 mcg, q4h PRN  glucagon, 1 mg, q15 min PRN  heparin flush, 10 Units, PRN  HYDROmorphone, 0.3 mg, q3h PRN  hydrOXYzine HCL, 10 mg, q6h PRN  ipratropium-albuteroL, 3 mL, q6h PRN  ondansetron, 4 mg, q6h PRN  oxyCODONE, 2.5 mg, q4h PRN  oxyCODONE, 2.5 mg, q6h PRN  oxygen, , Continuous PRN - O2/gases  polyethylene glycol, 17 g, Daily PRN      OBJECTIVE:    Vitals:    12/17/24 0600   BP: 136/88   Pulse: 78   Resp: 23   Temp:    SpO2: 100%          Intake/Output Summary (Last 24 hours) at 12/17/2024 0758  Last data filed at 12/17/2024 0400  Gross per 24 hour   Intake 1104.16 ml   Output --   Net 1104.16 ml       General appearance: Tach  Eyes: non-icteric  Skin: no apparent rash  Heart: [id number]  Lungs: CTA bilat no wheezing/crackles  Abdomen: soft, nt/nd  Extremities: trace edema  Access: None    Blood Labs:  Results for orders placed or performed during the hospital encounter of 11/18/24 (from the past 24 hours)   POCT GLUCOSE   Result Value Ref Range    POCT Glucose 121 (H) 74 - 99 mg/dL   Lactate   Result Value Ref Range    Lactate 3.6 (H) 0.4 - 2.0 mmol/L   POCT GLUCOSE   Result Value Ref Range    POCT Glucose 132 (H) 74 - 99 mg/dL   POCT GLUCOSE   Result Value Ref Range     POCT Glucose 128 (H) 74 - 99 mg/dL   Lactate   Result Value Ref Range    Lactate 4.6 (HH) 0.4 - 2.0 mmol/L   BLOOD GAS VENOUS FULL PANEL   Result Value Ref Range    POCT pH, Venous 7.31 (L) 7.33 - 7.43 pH    POCT pCO2, Venous 42 41 - 51 mm Hg    POCT pO2, Venous 43 35 - 45 mm Hg    POCT SO2, Venous 71 45 - 75 %    POCT Oxy Hemoglobin, Venous 68.2 45.0 - 75.0 %    POCT Hematocrit Calculated, Venous 27.0 (L) 36.0 - 46.0 %    POCT Sodium, Venous 129 (L) 136 - 145 mmol/L    POCT Potassium, Venous 5.5 (H) 3.5 - 5.3 mmol/L    POCT Chloride, Venous 97 (L) 98 - 107 mmol/L    POCT Ionized Calicum, Venous 1.03 (L) 1.10 - 1.33 mmol/L    POCT Glucose, Venous 137 (H) 74 - 99 mg/dL    POCT Lactate, Venous 3.2 (H) 0.4 - 2.0 mmol/L    POCT Base Excess, Venous -4.8 (L) -2.0 - 3.0 mmol/L    POCT HCO3 Calculated, Venous 21.1 (L) 22.0 - 26.0 mmol/L    POCT Hemoglobin, Venous 8.9 (L) 12.0 - 16.0 g/dL    POCT Anion Gap, Venous 16.0 10.0 - 25.0 mmol/L    Patient Temperature 37.0 degrees Celsius    FiO2 35 %   POCT GLUCOSE   Result Value Ref Range    POCT Glucose 143 (H) 74 - 99 mg/dL   CBC and Auto Differential   Result Value Ref Range    WBC 9.4 4.4 - 11.3 x10*3/uL    nRBC 1.4 (H) 0.0 - 0.0 /100 WBCs    RBC 2.51 (L) 4.00 - 5.20 x10*6/uL    Hemoglobin 7.9 (L) 12.0 - 16.0 g/dL    Hematocrit 23.8 (L) 36.0 - 46.0 %    MCV 95 80 - 100 fL    MCH 31.5 26.0 - 34.0 pg    MCHC 33.2 32.0 - 36.0 g/dL    RDW 19.8 (H) 11.5 - 14.5 %    Platelets 275 150 - 450 x10*3/uL    Neutrophils % 85.2 40.0 - 80.0 %    Immature Granulocytes %, Automated 0.6 0.0 - 0.9 %    Lymphocytes % 6.9 13.0 - 44.0 %    Monocytes % 7.1 2.0 - 10.0 %    Eosinophils % 0.1 0.0 - 6.0 %    Basophils % 0.1 0.0 - 2.0 %    Neutrophils Absolute 8.01 (H) 1.20 - 7.70 x10*3/uL    Immature Granulocytes Absolute, Automated 0.06 0.00 - 0.70 x10*3/uL    Lymphocytes Absolute 0.65 (L) 1.20 - 4.80 x10*3/uL    Monocytes Absolute 0.67 0.10 - 1.00 x10*3/uL    Eosinophils Absolute 0.01 0.00 - 0.70  x10*3/uL    Basophils Absolute 0.01 0.00 - 0.10 x10*3/uL   Renal function panel   Result Value Ref Range    Glucose 150 (H) 74 - 99 mg/dL    Sodium 135 (L) 136 - 145 mmol/L    Potassium 5.1 3.5 - 5.3 mmol/L    Chloride 96 (L) 98 - 107 mmol/L    Bicarbonate 24 21 - 32 mmol/L    Anion Gap 20 10 - 20 mmol/L    Urea Nitrogen 36 (H) 6 - 23 mg/dL    Creatinine 4.77 (H) 0.50 - 1.05 mg/dL    eGFR 10 (L) >60 mL/min/1.73m*2    Calcium 7.5 (L) 8.6 - 10.6 mg/dL    Phosphorus 5.2 (H) 2.5 - 4.9 mg/dL    Albumin 2.3 (L) 3.4 - 5.0 g/dL   Magnesium   Result Value Ref Range    Magnesium 2.06 1.60 - 2.40 mg/dL   Type and screen   Result Value Ref Range    ABO TYPE AB     Rh TYPE POS     ANTIBODY SCREEN POS    Direct Antiglobulin Test   Result Value Ref Range    RAFA-POLYSPECIFIC NEG    POCT GLUCOSE   Result Value Ref Range    POCT Glucose 151 (H) 74 - 99 mg/dL   BLOOD GAS VENOUS FULL PANEL   Result Value Ref Range    POCT pH, Venous 7.27 (L) 7.33 - 7.43 pH    POCT pCO2, Venous 51 41 - 51 mm Hg    POCT pO2, Venous 41 35 - 45 mm Hg    POCT SO2, Venous 66 45 - 75 %    POCT Oxy Hemoglobin, Venous 64.0 45.0 - 75.0 %    POCT Hematocrit Calculated, Venous 26.0 (L) 36.0 - 46.0 %    POCT Sodium, Venous 130 (L) 136 - 145 mmol/L    POCT Potassium, Venous 5.4 (H) 3.5 - 5.3 mmol/L    POCT Chloride, Venous 97 (L) 98 - 107 mmol/L    POCT Ionized Calicum, Venous 1.02 (L) 1.10 - 1.33 mmol/L    POCT Glucose, Venous 164 (H) 74 - 99 mg/dL    POCT Lactate, Venous 1.3 0.4 - 2.0 mmol/L    POCT Base Excess, Venous -3.5 (L) -2.0 - 3.0 mmol/L    POCT HCO3 Calculated, Venous 23.4 22.0 - 26.0 mmol/L    POCT Hemoglobin, Venous 8.6 (L) 12.0 - 16.0 g/dL    POCT Anion Gap, Venous 15.0 10.0 - 25.0 mmol/L    Patient Temperature 37.0 degrees Celsius    FiO2 35 %   POCT GLUCOSE   Result Value Ref Range    POCT Glucose 151 (H) 74 - 99 mg/dL      US KUB 11/26  IMPRESSION:  1. Increased renal cortical echogenicity and lobular appearance of the kidneys bilaterally, likely  medical renal disease, with relatively atrophic left kidney. No hydronephrosis.  2. Partially visualized bilateral pleural effusions and trace  abdominal ascites.        ASSESSMENT:  Humaira Huang is a  64 y.o.  Year old , with PMHx of  pancreatitis, DVT/PE, HLD, HTN, CABGx4 1997, T2DM, GERD, PAD, chronic mesenteric ischemia, tobacco use who presented to [facility] on 11/18/2024 as a transfer from [facility] with chest, back, and abdominal pain. 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 Vascular and Cardiac surgery following. CICU course c/b PEA arrest 11/20, now intubated. Nephrology following for TRACY.     #anuric TRACY-D Baseline creatinine 1.5   - Hemodynamics-not on any pressor support  - Etiology :TRACY likely in setting recent hemodynamic insult with PEA.  - last SLED 12/1 night then transitioned to iHD      RECOMMENDATIONS:  - BS 62 ml only, dialysis today per TTS schedule  - bladder scan BID please  - Strict I/Os.  - No contrast or nephrotoxic drugs if possible.  - will follow      Elise Graham MD  Nephrology Fellow   Daytime / Weekend Renal Pager [phone]  After 7 pm Emergencies [phone] Pager [phone]

## 2024-12-17 NOTE — ASSESSMENT & PLAN NOTE
64 y.o. female with history of pancreatitis, DVT/PE, HLD, HTN, CABGx4 1997, T2DM, GERD, PAD, chronic mesenteric ischemia, tobacco use who presented to [facility] on 11/18/2024 as a transfer from [facility] with chest, back, and abdominal pain. Now post PEA arrest, with intubation/sedation. Family agreed for trach/PEG and LTAC. Patient had septic episode 12/6 overnight and underwent trach/PEG. IR-guided TDC line placed 12/13/24.     Updates 12/17/24:  -On trach collar over the past day  -Insurance situation evolving, [id number] initially denied LTAC. Appealing. Family will also explore SNF options  -G-tube replacement today will get a dose of perioperative cefazolin. Restart tube feeds after replaced and verified  -Change to cuffless trach today  -Increase metoprolol to 50 mg Q6H for medical impulse control to target HR in 60s  -Transfer to floor     Neuro:  #Sedation/anxiety  -Quetiapine 25 mg nightly     Cardiac:   #Aflutter/Afib with RVR  #Driven by anxiety vs hypovolemia  ::now sinus tachycardia  -HR in 160-180s on 12/5 am  -s/p one dose of digoxin 250 mcg (digoxin is not dialyzable)  -s/p amio bolus 12/6 for RVR  -Increase to metoprolol 50 mg Q6H     #PEA arrest 11/20  ::likely hypoxia driven: PNA vs aspiration     #Aortic dissection  #Mural Thrombus  #Distal aortic arch saccular aneurysm   ::CTA CAP 11/18- 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   -Vascular surgery discussed case at aortic conference and there are no plans for surgery, palliative measures recommended     #NSTEMI  #CAD s/p CABGx4 1997  #PAD  #HTN #DLD  ::EKG- NSR, rate 71, TWI aVL, V1-V6, 1mm ORQUIDEA in aVR  ::Trop peak 5700  ::Lipid HDL 41.5, , TG 75, Chol 157  -Holding home amlodipine 10mg, imdur 30mg, lisinopril 2.5mg, metop tartrate 50mg bid iso hypotension   -Continue ASA and  atorvastatin  -Continue Plavix 75 mg     #HFrecEF (45-50%, 11/19/24)  #Moderate/Severe MR  -TTE on 12/6 with EF of 60-65% with moderate to severe mitral regurgitation  -Not on GDMT     Pulm:  #Acute hypoxic respiratory failure, post-arrest  :s/p Zosyn 11/20 - 11/26 for PNA  :: s/p Vancomycin   -Failed extubation on 11/28  -On sustained trach collar     GI:  #Displaced PEG tube  #Nutrition  -Started Senna 8.6 mg BID  -Displaced PEG tube after pulling it out overnight  -Consulted ACS, pending replacement depending on surgery availability  -Holding tube feeds     Renal:  #TRACY on CKD likely 2/2 arrest  #Anuric  -Sevalemar carbonate 800 mg TID   -Continue TuThrSat dialysis  -Tunneled line placed on 12/16      Endo:   #T2DM  -Hold home metformin   -POC Glucose: 119-162  -Continue SSI2       Infectious  -No acute problems     Hem/Onc  #Hypoproliferative Anemia,stable  #Hemolytic transfusion reaction  -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 7.1 -> 7.8 -> 8.1  -Continue to monitor     F: None  E: K>4, Mag>2  N: Npo for now, restart TF after trach  G: pantoprazole  A: TDC PEG  DVT: heparin 5000 units BID  CODE: DNR/okay to intubate (confirmed with family 11/21)  NOK: Son Rober [phone]

## 2024-12-17 NOTE — CARE PLAN
The clinical goals for the shift include pt will remain HDS    Over the shift, the patient did   Problem: Skin  Goal: Participates in plan/prevention/treatment measures  Outcome: Progressing  Flowsheets (Taken 12/17/2024 1121)  Participates in plan/prevention/treatment measures:   Discuss with provider PT/OT consult   Elevate heels  Goal: Prevent/manage excess moisture  Outcome: Progressing  Flowsheets (Taken 12/17/2024 1121)  Prevent/manage excess moisture:   Cleanse incontinence/protect with barrier cream   Follow provider orders for dressing changes   Monitor for/manage infection if present  Goal: Prevent/minimize sheer/friction injuries  Outcome: Progressing  Flowsheets (Taken 12/17/2024 1121)  Prevent/minimize sheer/friction injuries:   Complete micro-shifts as needed if patient unable. Adjust patient position to relieve pressure points, not a full turn   HOB 30 degrees or less   Turn/reposition every 2 hours/use positioning/transfer devices   Use pull sheet   Utilize specialty bed per algorithm  Goal: Decreased wound size/increased tissue granulation at next dressing change  Outcome: Progressing  Flowsheets (Taken 12/17/2024 1121)  Decreased wound size/increased tissue granulation at next dressing change:   Promote sleep for wound healing   Protective dressings over bony prominences  Goal: Promote skin healing  Outcome: Progressing  Flowsheets (Taken 12/17/2024 1121)  Promote skin healing:   Assess skin/pad under line(s)/device(s)   Protective dressings over bony prominences   Turn/reposition every 2 hours/use positioning/transfer devices     Problem: Safety - Medical Restraint  Goal: Remains free of injury from restraints (Restraint for Interference with Medical Device)  Outcome: Progressing  Flowsheets (Taken 12/17/2024 1121)  Remains free of injury from restraints (restraint for interference with medical device):   Determine that other, less restrictive measures have been tried or would not be effective  before applying the restraint   Every 2 hours: Monitor safety, psychosocial status, comfort, nutrition and hydration   make progress toward the following goals.

## 2024-12-17 NOTE — PROGRESS NOTES
Social Work Progress Note  P2P was denied for LTACH. SW called patient's son to offer SNF choice list. He did not , SW left a message asking for a call back.   MIGUEL ROCK  Update 2:15 pm: NORBERTO spoke with patient's son Seth and emailed him a list of SNFs that offer tracheostomy care. SW to follow up.

## 2024-12-17 NOTE — SIGNIFICANT EVENT
Contacted by ICU team requesting tracheostomy change to cuffless trach. Patient has been on trach collar for 2 days. Attempted to change trach to cuffless trach. Upon entry into room, general surgery in the room performing PEG replacement. Please page ENT at [phone] when trach able to be exchanged to cuffless trach.     Randi Aquino PA-C  Otolaryngology- Head & Neck Surgery    ENT Consult pager: [phone]  Please page if urgent

## 2024-12-17 NOTE — PROGRESS NOTES
"Nutrition Follow Up Assessment:   Nutrition Assessment       Pt pulled out her PEG and pending replacement. For the last week + there has been difficulty getting TF higher than 10ml/hr. Nutrition status c/b ileus and abdominal distention. Pt has been having episodic abdominal pain with bowel movements. Pt anuric. Had tunneled line placed on 12/16.     Anthropometrics:  Height: 157.5 cm (5' 2\")   Weight: 57.1 kg (125 lb 14.1 oz)   BMI (Calculated): 23.02  IBW/kg (Dietitian Calculated): 50 kg  Percent of IBW: 110 %     Weight History:   Date/Time Weight   12/17/24 0000 57.1 kg (125 lb 14.1 oz)   12/14/24 0800 59.4 kg (130 lb 15.3 oz)   12/14/24 0600 59.4 kg (130 lb 15.3 oz)   12/14/24 0500 59.4 kg (131 lb)   12/13/24 0000 65.4 kg (144 lb 2.9 oz)   12/12/24 1420 65.4 kg (144 lb 2.9 oz)   12/11/24 0436 64.2 kg (141 lb 8.6 oz)   12/10/24 1147 66.2 kg (146 lb)   12/10/24 0000 66.2 kg (146 lb)   12/09/24 0400 67.6 kg (149 lb 0.5 oz)   12/08/24 0600 63 kg (138 lb 14.2 oz)   12/07/24 2349 63 kg (138 lb 14.2 oz)   12/07/24 0600 62.2 kg (137 lb 2 oz)   12/06/24 0527 62.2 kg (137 lb 2 oz)   12/04/24 0000 58.4 kg (128 lb 11.2 oz)   12/03/24 1110 58.6 kg (129 lb 3 oz)   12/03/24 0600 58.6 kg (129 lb 3 oz)   12/03/24 0000 58.6 kg (129 lb 3.2 oz)   Admission Weight: 49.2 kg (108 lb 7.5 oz)     Weight Change %:  Weight History / % Weight Change: fluid shifts effecting weight changes    Nutrition Focused Physical Exam Findings:  Edema:  Edema: +1 trace  Edema Location: generalized  Physical Findings:  Skin: Positive (unstageable pressure injury to coccyx)    Nutrition Significant Labs:  CBC Trend:   Results from last 7 days   Lab Units 12/17/24  0119 12/16/24  0134 12/15/24  0525 12/14/24  0521   WBC AUTO x10*3/uL 9.4 8.2 8.1 8.2   RBC AUTO x10*6/uL 2.51* 2.81* 2.59* 2.60*   HEMOGLOBIN g/dL 7.9* 8.7* 8.3* 8.1*   HEMATOCRIT % 23.8* 27.2* 25.1* 25.1*   MCV fL 95 97 97 97   PLATELETS AUTO x10*3/uL 275 293 255 230    , BMP Trend: " "  Results from last 7 days   Lab Units 12/17/24  0119 12/16/24  0134 12/15/24  0525 12/14/24  0521   GLUCOSE mg/dL 150* 149* 204* 173*   CALCIUM mg/dL 7.5* 7.8* 7.6* 7.7*   SODIUM mmol/L 135* 133* 134* 135*   POTASSIUM mmol/L 5.1 4.3 3.9 4.6   CO2 mmol/L 24 25 26 24   CHLORIDE mmol/L 96* 95* 96* 96*   BUN mg/dL 36* 25* 18 32*   CREATININE mg/dL 4.77* 3.89* 2.80* 4.37*    , Renal Lab Trend:   Results from last 7 days   Lab Units 12/17/24  0119 12/16/24  0134 12/15/24  0525 12/14/24  0521   POTASSIUM mmol/L 5.1 4.3 3.9 4.6   PHOSPHORUS mg/dL 5.2* 3.5 1.6* 3.7   SODIUM mmol/L 135* 133* 134* 135*   MAGNESIUM mg/dL 2.06 2.19 2.01 2.45*   EGFR mL/min/1.73m*2 10* 12* 18* 11*   BUN mg/dL 36* 25* 18 32*   CREATININE mg/dL 4.77* 3.89* 2.80* 4.37*    , Vit D: No results found for: \"VITD25\" , Vit B12:   Lab Results   Component Value Date    [id number] 1,370 (H) 12/09/2024        Nutrition Specific Medications:  Scheduled medications  acetaminophen, 1,000 mg, intravenous, q8h  aspirin, 81 mg, g-tube, Daily  atorvastatin, 80 mg, g-tube, Nightly  clopidogrel, 75 mg, oral, Daily  diatrizoate willian-diatrizoat sod, 60 mL, oral, Once  folic acid, 1 mg, g-tube, Daily  heparin, 5,000 Units, subcutaneous, BID  insulin lispro, 0-10 Units, subcutaneous, q4h  lactated Ringer's, 1,000 mL, intravenous, Once  lidocaine, 1 patch, transdermal, Daily  metoprolol tartrate, 25 mg, oral, q6h  oxygen, , inhalation, Continuous - Inhalation  QUEtiapine, 25 mg, g-tube, Nightly  sennosides, 2 tablet, g-tube, BID  thiamine, 100 mg, g-tube, Daily      Continuous medications     PRN medications  PRN medications: alteplase, dextrose, dextrose, dicyclomine, fentaNYL, glucagon, heparin flush, HYDROmorphone, hydrOXYzine HCL, ipratropium-albuteroL, ondansetron, oxyCODONE, oxyCODONE, oxygen, polyethylene glycol      I/O:   Last BM Date: 12/16/24; Stool Appearance: Liquid (12/16/24 2000)    Dietary Orders (From admission, onward)       Start     Ordered    12/14/24 " 0723  May Not Participate in Room Service  ( ROOM SERVICE MAY NOT PARTICIPATE)  Once        Question:  .  Answer:  Yes    12/14/24 0723                     Estimated Needs:   Total Energy Estimated Needs (kCal):  (~ 1500kcal)  Method for Estimating Needs: 30kcal/kg x admit wt  Total Protein Estimated Needs (g): 75 g  Method for Estimating Needs: ~ 1.5g/kg x admit wt  Total Fluid Estimated Needs (mL):  (per MD/team)  Method for Estimating Needs: per MD/team        Nutrition Diagnosis   Malnutrition Diagnosis  Patient has Malnutrition Diagnosis: Yes  Diagnosis Status: Ongoing  Malnutrition Diagnosis: Severe malnutrition related to acute disease or injury  As Evidenced by: < 50% estimated energy needs for >/= 5 days, mild- moderate subcutaneous fat loss + muscle wasting  Additional Assessment Information: unable to reach TF goal rate, feeds ferequently held            Nutrition Interventions/Recommendations         Nutrition Prescription:  Individualized Nutrition Prescription Provided for : enteral nutrition        Nutrition Interventions:   Interventions: Enteral intake  One PEG replaced, recommend restarting previous formula   Two Kevin HN @ 30ml/hr + 1 pkt Prostat AWC  daily   Start @ 10ml/hr and increase by [id number]   FWF per MD/team discretion     Recommend daily MVI-M to fully meet micronutrient needs while on TF     If pt continues with abdominal distention and has s/s of intolerance, recommend trial of Vital 1.5 to see if there is any improvement   Vital 1.5 @ 40ml/hr + 1pkt Prostat AWC daily    Start @ 10ml/hr and increase by [id number] until goal is met   FWF per MD/team discretion       Two Kevin @ 30 + 1 pkt prostat AWC= 1540kcal, 76gm protein    Vital @ 40 + 1pkt prostat AWC = 1440kcal, 82gm protein     Collaboration and Referral of Nutrition Care: Team meeting involving nutrition professional      Nutrition Monitoring and Evaluation   Food/Nutrient Related History Monitoring  Monitoring and Evaluation Plan:  Enteral and parenteral nutrition intake  Enteral and Parenteral Nutrition Intake: Enteral nutrition intake  Criteria: Tf to meet > 75% estimated energy needs    Body Composition/Growth/Weight History  Monitoring and Evaluation Plan: Weight  Criteria: minimze loss    Biochemical Data, Medical Tests and Procedures  Monitoring and Evaluation Plan: Electrolyte/renal panel  Electrolyte and Renal Panel: Magnesium, Phosphorus, Potassium  Criteria: lytes WNL  Glucose/Endocrine Profile: Glucose, casual  Criteria: 14-180mg/dL              Time Spent (min): 60 minutes

## 2024-12-17 NOTE — PROGRESS NOTES
Subjective   No events overnight. Was in pain this morning, but improved with medications this morning. Remains on trach mask    Meds  Scheduled medications  acetaminophen, 1,000 mg, intravenous, q8h  aspirin, 81 mg, g-tube, Daily  atorvastatin, 80 mg, g-tube, Nightly  clopidogrel, 75 mg, oral, Daily  diatrizoate willian-diatrizoat sod, 60 mL, oral, Once  folic acid, 1 mg, g-tube, Daily  heparin, 5,000 Units, subcutaneous, BID  insulin lispro, 0-10 Units, subcutaneous, q4h  lactated Ringer's, 1,000 mL, intravenous, Once  lidocaine, 1 patch, transdermal, Daily  metoprolol tartrate, 25 mg, oral, q6h  oxygen, , inhalation, Continuous - Inhalation  QUEtiapine, 25 mg, g-tube, Nightly  sennosides, 2 tablet, g-tube, BID  thiamine, 100 mg, g-tube, Daily      Continuous medications     PRN medications  PRN medications: alteplase, dextrose, dextrose, dicyclomine, fentaNYL, glucagon, heparin flush, HYDROmorphone, hydrOXYzine HCL, ipratropium-albuteroL, ondansetron, oxyCODONE, oxyCODONE, oxygen, polyethylene glycol      Objective   Vital signs in last 24 hours:  Temp:  [35.8 °C (96.4 °F)-36.4 °C (97.5 °F)] 35.8 °C (96.4 °F)  Heart Rate:  [] 78  Resp:  [14-37] 23  BP: ()/(54-94) 136/88  FiO2 (%):  [35 %] 35 %    Intake/Output this shift:    Intake/Output Summary (Last 24 hours) at 12/17/2024 0659  Last data filed at 12/17/2024 0400  Gross per 24 hour   Intake 1104.16 ml   Output --   Net 1104.16 ml       Physical  General: Patient is awake, uncomfortable appearing  Pulm: Increased belly breathing at rest, no crackles or rhonchi  Cardiac: Regular rate and rhythm, normal S1/S2  Abdomen: Non-tender to palpation, non-distended  Extremities: No peripheral edema  Neuro: Patient awake, follows commands, moves all four extremities    Results  Results for orders placed or performed during the hospital encounter of 11/18/24 (from the past 24 hours)   POCT GLUCOSE   Result Value Ref Range    POCT Glucose 127 (H) 74 - 99 mg/dL    POCT GLUCOSE   Result Value Ref Range    POCT Glucose 121 (H) 74 - 99 mg/dL   Lactate   Result Value Ref Range    Lactate 3.6 (H) 0.4 - 2.0 mmol/L   POCT GLUCOSE   Result Value Ref Range    POCT Glucose 132 (H) 74 - 99 mg/dL   POCT GLUCOSE   Result Value Ref Range    POCT Glucose 128 (H) 74 - 99 mg/dL   Lactate   Result Value Ref Range    Lactate 4.6 (HH) 0.4 - 2.0 mmol/L   BLOOD GAS VENOUS FULL PANEL   Result Value Ref Range    POCT pH, Venous 7.31 (L) 7.33 - 7.43 pH    POCT pCO2, Venous 42 41 - 51 mm Hg    POCT pO2, Venous 43 35 - 45 mm Hg    POCT SO2, Venous 71 45 - 75 %    POCT Oxy Hemoglobin, Venous 68.2 45.0 - 75.0 %    POCT Hematocrit Calculated, Venous 27.0 (L) 36.0 - 46.0 %    POCT Sodium, Venous 129 (L) 136 - 145 mmol/L    POCT Potassium, Venous 5.5 (H) 3.5 - 5.3 mmol/L    POCT Chloride, Venous 97 (L) 98 - 107 mmol/L    POCT Ionized Calicum, Venous 1.03 (L) 1.10 - 1.33 mmol/L    POCT Glucose, Venous 137 (H) 74 - 99 mg/dL    POCT Lactate, Venous 3.2 (H) 0.4 - 2.0 mmol/L    POCT Base Excess, Venous -4.8 (L) -2.0 - 3.0 mmol/L    POCT HCO3 Calculated, Venous 21.1 (L) 22.0 - 26.0 mmol/L    POCT Hemoglobin, Venous 8.9 (L) 12.0 - 16.0 g/dL    POCT Anion Gap, Venous 16.0 10.0 - 25.0 mmol/L    Patient Temperature 37.0 degrees Celsius    FiO2 35 %   POCT GLUCOSE   Result Value Ref Range    POCT Glucose 143 (H) 74 - 99 mg/dL   CBC and Auto Differential   Result Value Ref Range    WBC 9.4 4.4 - 11.3 x10*3/uL    nRBC 1.4 (H) 0.0 - 0.0 /100 WBCs    RBC 2.51 (L) 4.00 - 5.20 x10*6/uL    Hemoglobin 7.9 (L) 12.0 - 16.0 g/dL    Hematocrit 23.8 (L) 36.0 - 46.0 %    MCV 95 80 - 100 fL    MCH 31.5 26.0 - 34.0 pg    MCHC 33.2 32.0 - 36.0 g/dL    RDW 19.8 (H) 11.5 - 14.5 %    Platelets 275 150 - 450 x10*3/uL    Neutrophils % 85.2 40.0 - 80.0 %    Immature Granulocytes %, Automated 0.6 0.0 - 0.9 %    Lymphocytes % 6.9 13.0 - 44.0 %    Monocytes % 7.1 2.0 - 10.0 %    Eosinophils % 0.1 0.0 - 6.0 %    Basophils % 0.1 0.0 - 2.0 %     Neutrophils Absolute 8.01 (H) 1.20 - 7.70 x10*3/uL    Immature Granulocytes Absolute, Automated 0.06 0.00 - 0.70 x10*3/uL    Lymphocytes Absolute 0.65 (L) 1.20 - 4.80 x10*3/uL    Monocytes Absolute 0.67 0.10 - 1.00 x10*3/uL    Eosinophils Absolute 0.01 0.00 - 0.70 x10*3/uL    Basophils Absolute 0.01 0.00 - 0.10 x10*3/uL   Renal function panel   Result Value Ref Range    Glucose 150 (H) 74 - 99 mg/dL    Sodium 135 (L) 136 - 145 mmol/L    Potassium 5.1 3.5 - 5.3 mmol/L    Chloride 96 (L) 98 - 107 mmol/L    Bicarbonate 24 21 - 32 mmol/L    Anion Gap 20 10 - 20 mmol/L    Urea Nitrogen 36 (H) 6 - 23 mg/dL    Creatinine 4.77 (H) 0.50 - 1.05 mg/dL    eGFR 10 (L) >60 mL/min/1.73m*2    Calcium 7.5 (L) 8.6 - 10.6 mg/dL    Phosphorus 5.2 (H) 2.5 - 4.9 mg/dL    Albumin 2.3 (L) 3.4 - 5.0 g/dL   Magnesium   Result Value Ref Range    Magnesium 2.06 1.60 - 2.40 mg/dL   Type and screen   Result Value Ref Range    ABO TYPE AB     Rh TYPE POS     ANTIBODY SCREEN POS    Direct Antiglobulin Test   Result Value Ref Range    RAFA-POLYSPECIFIC NEG    POCT GLUCOSE   Result Value Ref Range    POCT Glucose 151 (H) 74 - 99 mg/dL   BLOOD GAS VENOUS FULL PANEL   Result Value Ref Range    POCT pH, Venous 7.27 (L) 7.33 - 7.43 pH    POCT pCO2, Venous 51 41 - 51 mm Hg    POCT pO2, Venous 41 35 - 45 mm Hg    POCT SO2, Venous 66 45 - 75 %    POCT Oxy Hemoglobin, Venous 64.0 45.0 - 75.0 %    POCT Hematocrit Calculated, Venous 26.0 (L) 36.0 - 46.0 %    POCT Sodium, Venous 130 (L) 136 - 145 mmol/L    POCT Potassium, Venous 5.4 (H) 3.5 - 5.3 mmol/L    POCT Chloride, Venous 97 (L) 98 - 107 mmol/L    POCT Ionized Calicum, Venous 1.02 (L) 1.10 - 1.33 mmol/L    POCT Glucose, Venous 164 (H) 74 - 99 mg/dL    POCT Lactate, Venous 1.3 0.4 - 2.0 mmol/L    POCT Base Excess, Venous -3.5 (L) -2.0 - 3.0 mmol/L    POCT HCO3 Calculated, Venous 23.4 22.0 - 26.0 mmol/L    POCT Hemoglobin, Venous 8.6 (L) 12.0 - 16.0 g/dL    POCT Anion Gap, Venous 15.0 10.0 - 25.0 mmol/L     Patient Temperature 37.0 degrees Celsius    FiO2 35 %       Imaging  XR abdomen 1 view    Result Date: 12/16/2024  Interpreted By:  Doug Langley and Sheng Max STUDY: XR ABDOMEN 1 VIEW;  12/15/2024 11:30 pm   INDICATION: Signs/Symptoms:Gastrograffin theough arriola insterted in prior PEG tube site..     COMPARISON: Abdominal radiograph 12/15/2024, 12/14/2024, 12/13/2024   ACCESSION NUMBER(S): [id number]   ORDERING CLINICIAN: BASSEM LEWIS   FINDINGS: Percutaneous gastrostomy tube tip projects over the gastric fundus. Incompletely imaged central venous catheter tip projects over the right atrium.   Injected radiopaque Gastrografin contrast opacifies the stomach. There is scattered radiopaque material superior to the gastric bubble. This may be within small bowel but correlate with any concern for Gastrografin leak.   Visualized lungs are clear.   Osseous structures demonstrate no acute bony changes.       There is Gastrografin within the stomach. There is radiopaque density superior to the gastric bubble. While this may be within small bowel but can not exclude extraluminal contrast leak. Short-term follow-up recommended.   MACRO: Critical Finding:  See findings. Notification was initiated on 12/16/2024 at 11:42 am by  Doug Langley.  (**-YCF-**) Instructions:   Signed by: Doug Langley 12/16/2024 11:43 AM Dictation workstation:   [id number]    XR abdomen 1 view    Result Date: 12/16/2024  Interpreted By:  Doug Langley, STUDY: XR ABDOMEN 1 VIEW; 12/15/2024 6:26 pm   INDICATION: Signs/Symptoms:New abdominal pain.   COMPARISON: 12/14/2024.   ACCESSION NUMBER(S): [id number]   ORDERING CLINICIAN: BASSEM LEWIS   FINDINGS: Feeding tube  overlies the fundus of stomach. Air-filled loops of small large bowel but no gross evidence of obstruction. Limited evaluation of pneumoperitoneum on supine imaging, however no gross evidence of free air is noted.   Visualized lungs are clear.   Osseous structures  demonstrate no acute bony changes.       1.  Nonobstructive bowel gas pattern.   Signed by: Doug Langley 12/16/2024 11:38 AM Dictation workstation:   [id number]        Assessment/Plan   Assessment & Plan  Dissection of aorta    Cardiac arrest    Ruptured aortic aneurysm (Multi)    Anemia due to acute blood loss    64 y.o. female with history of pancreatitis, DVT/PE, HLD, HTN, CABGx4 1997, T2DM, GERD, PAD, chronic mesenteric ischemia, tobacco use who presented to [facility] on 11/18/[facility] as a transfer from [facility] with chest, back, and abdominal pain. Now post PEA arrest, with intubation/sedation. Family agreed for trach/PEG and LTAC. Patient had septic episode 12/6 overnight and underwent trach/PEG. IR-guided TDC line placed 12/13/24.     Updates 12/17/24:  -On trach collar over the past day  -Insurance situation evolving, [facility] initially denied LTAC. Appealing. Family will also explore SNF options  -G-tube replacement today will get a dose of perioperative cefazolin. Restart tube feeds after replaced and verified  -Change to cuffless trach today  -Increase metoprolol to 50 mg Q6H for medical impulse control to target HR in 60s  -Transfer to floor     Neuro:  #Sedation/anxiety  -Quetiapine 25 mg nightly     Cardiac:   #Aflutter/Afib with RVR  #Driven by anxiety vs hypovolemia  ::now sinus tachycardia  -HR in 160-180s on 12/5 am  -s/p one dose of digoxin 250 mcg (digoxin is not dialyzable)  -s/p amio bolus 12/6 for RVR  -Increase to metoprolol 50 mg Q6H     #PEA arrest 11/20  ::likely hypoxia driven: PNA vs aspiration     #Aortic dissection  #Mural Thrombus  #Distal aortic arch saccular aneurysm   ::CTA CAP 11/18- 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   -Vascular surgery discussed case at aortic conference and there are no plans for surgery, palliative measures  recommended     #NSTEMI  #CAD s/p CABGx4 1997  #PAD  #HTN #DLD  ::EKG- NSR, rate 71, TWI aVL, V1-V6, 1mm ORQUIDEA in aVR  ::Trop peak 5700  ::Lipid HDL 41.5, , TG 75, Chol 157  -Holding home amlodipine 10mg, imdur 30mg, lisinopril 2.5mg, metop tartrate 50mg bid iso hypotension   -Continue ASA and atorvastatin  -Continue Plavix 75 mg     #HFrecEF (45-50%, 11/19/24)  #Moderate/Severe MR  -TTE on 12/6 with EF of 60-65% with moderate to severe mitral regurgitation  -Not on GDMT     Pulm:  #Acute hypoxic respiratory failure, post-arrest  :s/p Zosyn 11/20 - 11/26 for PNA  :: s/p Vancomycin   -Failed extubation on 11/28  -On sustained trach collar     GI:  #Displaced PEG tube  #Nutrition  -Started Senna 8.6 mg BID  -Displaced PEG tube after pulling it out overnight  -Consulted ACS, pending replacement depending on surgery availability  -Holding tube feeds     Renal:  #TRACY on CKD likely 2/2 arrest  #Anuric  -Sevalemar carbonate 800 mg TID   -Continue TuThrSat dialysis  -Tunneled line placed on 12/16      Endo:   #T2DM  -Hold home metformin   -POC Glucose: 119-162  -Continue SSI2       Infectious  -No acute problems     Hem/Onc  #Hypoproliferative Anemia,stable  #Hemolytic transfusion reaction  -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 7.1 -> 7.8 -> 8.1  -Continue to monitor     F: None  E: K>4, Mag>2  N: Npo for now, restart TF after trach  G: pantoprazole  A: TDC PEG  DVT: heparin 5000 units BID  CODE: DNR/okay to intubate (confirmed with family 11/21)  NOK: Kristopher Richter [phone]        LOS: 29 days     Jaime Mckeon MD/PhD   PGY-2

## 2024-12-17 NOTE — PROGRESS NOTES
ENT DAILY PROGRESS NOTE  Name: Humaira Huang  MRN: [id number]  : 1960    Identification Statement  The patient is a 64 y.o. 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 Patient underwent OR placement of 6-0 cuffed shiley with Dr. Mi on 24.     Subjective  Called by primary team requesting tracheostomy change to uncuffed trach. Patient has been on trach collar for the past 48 hours and has not required pressure support and no desaturation events reported. No acute events overnight. Patient currently on trach collar in ICU with FiO2 35%. Trach changed from 6-0 cuffed shiley to 6-0 uncuffed shiley tracheostomy    Objective  Temp:  [35.8 °C (96.4 °F)-36.4 °C (97.5 °F)] 35.8 °C (96.4 °F)  Heart Rate:  [] 82  Resp:  [13-34] 32  BP: ()/(54-98) 103/63  FiO2 (%):  [35 %] 35 %  Gen: female patient laying in bed, no acute distress  Resp: breathing comfortably on humidified trach collar with FIO2 35%, no stridor  Head and Face: no obvious masses or lesions  Oral Cavity: MMM  Ears: Normal external ears   Nose: External nose midline   Neck: 6-0 uncuffed shiley tracheostomy, velcro ties appropriately adjusted. No bleeding surrounding trach site. Mepilex dressing inferior to tracheostomy. Right neck lines.         Intake/Output Summary (Last 24 hours) at 2024 1616  Last data filed at 2024 1434  Gross per 24 hour   Intake 3925.83 ml   Output 400 ml   Net 3525.83 ml       Labs  Results for orders placed or performed during the hospital encounter of 24 (from the past 24 hours)   POCT GLUCOSE   Result Value Ref Range    POCT Glucose 128 (H) 74 - 99 mg/dL   Lactate   Result Value Ref Range    Lactate 4.6 (HH) 0.4 - 2.0 mmol/L   BLOOD GAS VENOUS FULL PANEL   Result  Value Ref Range    POCT pH, Venous 7.31 (L) 7.33 - 7.43 pH    POCT pCO2, Venous 42 41 - 51 mm Hg    POCT pO2, Venous 43 35 - 45 mm Hg    POCT SO2, Venous 71 45 - 75 %    POCT Oxy Hemoglobin, Venous 68.2 45.0 - 75.0 %    POCT Hematocrit Calculated, Venous 27.0 (L) 36.0 - 46.0 %    POCT Sodium, Venous 129 (L) 136 - 145 mmol/L    POCT Potassium, Venous 5.5 (H) 3.5 - 5.3 mmol/L    POCT Chloride, Venous 97 (L) 98 - 107 mmol/L    POCT Ionized Calicum, Venous 1.03 (L) 1.10 - 1.33 mmol/L    POCT Glucose, Venous 137 (H) 74 - 99 mg/dL    POCT Lactate, Venous 3.2 (H) 0.4 - 2.0 mmol/L    POCT Base Excess, Venous -4.8 (L) -2.0 - 3.0 mmol/L    POCT HCO3 Calculated, Venous 21.1 (L) 22.0 - 26.0 mmol/L    POCT Hemoglobin, Venous 8.9 (L) 12.0 - 16.0 g/dL    POCT Anion Gap, Venous 16.0 10.0 - 25.0 mmol/L    Patient Temperature 37.0 degrees Celsius    FiO2 35 %   POCT GLUCOSE   Result Value Ref Range    POCT Glucose 143 (H) 74 - 99 mg/dL   CBC and Auto Differential   Result Value Ref Range    WBC 9.4 4.4 - 11.3 x10*3/uL    nRBC 1.4 (H) 0.0 - 0.0 /100 WBCs    RBC 2.51 (L) 4.00 - 5.20 x10*6/uL    Hemoglobin 7.9 (L) 12.0 - 16.0 g/dL    Hematocrit 23.8 (L) 36.0 - 46.0 %    MCV 95 80 - 100 fL    MCH 31.5 26.0 - 34.0 pg    MCHC 33.2 32.0 - 36.0 g/dL    RDW 19.8 (H) 11.5 - 14.5 %    Platelets 275 150 - 450 x10*3/uL    Neutrophils % 85.2 40.0 - 80.0 %    Immature Granulocytes %, Automated 0.6 0.0 - 0.9 %    Lymphocytes % 6.9 13.0 - 44.0 %    Monocytes % 7.1 2.0 - 10.0 %    Eosinophils % 0.1 0.0 - 6.0 %    Basophils % 0.1 0.0 - 2.0 %    Neutrophils Absolute 8.01 (H) 1.20 - 7.70 x10*3/uL    Immature Granulocytes Absolute, Automated 0.06 0.00 - 0.70 x10*3/uL    Lymphocytes Absolute 0.65 (L) 1.20 - 4.80 x10*3/uL    Monocytes Absolute 0.67 0.10 - 1.00 x10*3/uL    Eosinophils Absolute 0.01 0.00 - 0.70 x10*3/uL    Basophils Absolute 0.01 0.00 - 0.10 x10*3/uL   Renal function panel   Result Value Ref Range    Glucose 150 (H) 74 - 99 mg/dL    Sodium  135 (L) 136 - 145 mmol/L    Potassium 5.1 3.5 - 5.3 mmol/L    Chloride 96 (L) 98 - 107 mmol/L    Bicarbonate 24 21 - 32 mmol/L    Anion Gap 20 10 - 20 mmol/L    Urea Nitrogen 36 (H) 6 - 23 mg/dL    Creatinine 4.77 (H) 0.50 - 1.05 mg/dL    eGFR 10 (L) >60 mL/min/1.73m*2    Calcium 7.5 (L) 8.6 - 10.6 mg/dL    Phosphorus 5.2 (H) 2.5 - 4.9 mg/dL    Albumin 2.3 (L) 3.4 - 5.0 g/dL   Magnesium   Result Value Ref Range    Magnesium 2.06 1.60 - 2.40 mg/dL   Type and screen   Result Value Ref Range    ABO TYPE AB     Rh TYPE POS     ANTIBODY SCREEN POS    BB ORDER ONLY - Antibody Identification   Result Value Ref Range    Antibody ID [id number]-[id number]     CASE # [id number]    Direct Antiglobulin Test   Result Value Ref Range    RAFA-POLYSPECIFIC NEG    POCT GLUCOSE   Result Value Ref Range    POCT Glucose 151 (H) 74 - 99 mg/dL   BLOOD GAS VENOUS FULL PANEL   Result Value Ref Range    POCT pH, Venous 7.27 (L) 7.33 - 7.43 pH    POCT pCO2, Venous 51 41 - 51 mm Hg    POCT pO2, Venous 41 35 - 45 mm Hg    POCT SO2, Venous 66 45 - 75 %    POCT Oxy Hemoglobin, Venous 64.0 45.0 - 75.0 %    POCT Hematocrit Calculated, Venous 26.0 (L) 36.0 - 46.0 %    POCT Sodium, Venous 130 (L) 136 - 145 mmol/L    POCT Potassium, Venous 5.4 (H) 3.5 - 5.3 mmol/L    POCT Chloride, Venous 97 (L) 98 - 107 mmol/L    POCT Ionized Calicum, Venous 1.02 (L) 1.10 - 1.33 mmol/L    POCT Glucose, Venous 164 (H) 74 - 99 mg/dL    POCT Lactate, Venous 1.3 0.4 - 2.0 mmol/L    POCT Base Excess, Venous -3.5 (L) -2.0 - 3.0 mmol/L    POCT HCO3 Calculated, Venous 23.4 22.0 - 26.0 mmol/L    POCT Hemoglobin, Venous 8.6 (L) 12.0 - 16.0 g/dL    POCT Anion Gap, Venous 15.0 10.0 - 25.0 mmol/L    Patient Temperature 37.0 degrees Celsius    FiO2 35 %   POCT GLUCOSE   Result Value Ref Range    POCT Glucose 151 (H) 74 - 99 mg/dL   BLOOD GAS VENOUS FULL PANEL   Result Value Ref Range    POCT pH, Venous 7.33 7.33 - 7.43 pH    POCT pCO2, Venous 52 (H) 41 - 51 mm Hg    POCT pO2, Venous 30 (L) 35  - 45 mm Hg    POCT SO2, Venous 50 45 - 75 %    POCT Oxy Hemoglobin, Venous 48.8 45.0 - 75.0 %    POCT Hematocrit Calculated, Venous 29.0 (L) 36.0 - 46.0 %    POCT Sodium, Venous 131 (L) 136 - 145 mmol/L    POCT Potassium, Venous 5.1 3.5 - 5.3 mmol/L    POCT Chloride, Venous 97 (L) 98 - 107 mmol/L    POCT Ionized Calicum, Venous 1.03 (L) 1.10 - 1.33 mmol/L    POCT Glucose, Venous 125 (H) 74 - 99 mg/dL    POCT Lactate, Venous 1.8 0.4 - 2.0 mmol/L    POCT Base Excess, Venous 1.0 -2.0 - 3.0 mmol/L    POCT HCO3 Calculated, Venous 27.4 (H) 22.0 - 26.0 mmol/L    POCT Hemoglobin, Venous 9.5 (L) 12.0 - 16.0 g/dL    POCT Anion Gap, Venous 12.0 10.0 - 25.0 mmol/L    Patient Temperature 37.0 degrees Celsius    FiO2 35 %   POCT GLUCOSE   Result Value Ref Range    POCT Glucose 108 (H) 74 - 99 mg/dL   POCT GLUCOSE   Result Value Ref Range    POCT Glucose 108 (H) 74 - 99 mg/dL     Trach change:  The pt's name and personal identifier's were verified.  Respiratory therapy and ENT resident were present for the tracheostomy tube change.  The patient was placed in the supine position and a shoulder roll was placed.  The old 6-0 cuffed shiley tracheostomy tube was removed and immediately replaced with a 6-0 uncuffed shiley tracheostomy tube.  Confirmation of placement was achieved with positive return of tracheal secretions.  The patient was returned to an inclined position and tolerated the position well.  There were no complications.    Notes about the new Shiley Tracheostomy tubes:  - The outer diameter of the new trachs and the old trachs are the same however, the new trach inner diameter is larger   - The new inner cannula is clear, clips in and out of the outer cannula, and is resuable (caution: they look similar to the former disposable inner cannulas)  - To try finger occlusion trials, the inner cannula must be temporarily removed during assessment only  - The 4.0 and 6.0 Shileys do not come with caps in the box with the trach,  they come in a separate box now.     Assessment  Humaira Huang is a 64 y.o. female who underwent OR tracheostomy with placement of 6-0 cuffed shiley with Dr. Mi on 12/6/24. Primary team requesting trach change to cuffless tracheostomy. Patient has been on trach collar for 2 days and not in need of positive pressure. Trach exchanged to 6-0 uncuffed shiley and patient tolerated the procedure well.     Plan:  -continued care per primary team  -continue standard trach care per nursing protocol  -continue trach stoma wound care  -please contact ENT with questions or concerns  -ENT will continue to see weekly while patient is inpatient  -patient scheduled for ENT follow-up on 3/13/25    Randi Aquino PA-C  Otolaryngology- Head & Neck Surgery    ENT Consult pager: [phone]  Please page if urgent

## 2024-12-18 VITALS
WEIGHT: 126.1 LBS | OXYGEN SATURATION: 94 % | BODY MASS INDEX: 23.21 KG/M2 | DIASTOLIC BLOOD PRESSURE: 36 MMHG | TEMPERATURE: 95.9 F | HEIGHT: 62 IN | SYSTOLIC BLOOD PRESSURE: 77 MMHG

## 2024-12-18 LAB
ALBUMIN SERPL BCP-MCNC: 2 G/DL (ref 3.4–5)
ALBUMIN SERPL BCP-MCNC: 2 G/DL (ref 3.4–5)
ALBUMIN SERPL BCP-MCNC: 2.2 G/DL (ref 3.4–5)
ALP SERPL-CCNC: 476 U/L (ref 33–136)
ALT SERPL W P-5'-P-CCNC: 25 U/L (ref 7–45)
ANION GAP BLDA CALCULATED.4IONS-SCNC: 22 MMO/L (ref 10–25)
ANION GAP BLDV CALCULATED.4IONS-SCNC: 23 MMOL/L (ref 10–25)
ANION GAP BLDV CALCULATED.4IONS-SCNC: 26 MMOL/L (ref 10–25)
ANION GAP SERPL CALC-SCNC: 15 MMOL/L (ref 10–20)
ANION GAP SERPL CALC-SCNC: 25 MMOL/L (ref 10–20)
ANION GAP SERPL CALC-SCNC: 32 MMOL/L (ref 10–20)
APTT PPP: 28 SECONDS (ref 27–38)
AST SERPL W P-5'-P-CCNC: 47 U/L (ref 9–39)
BASE EXCESS BLDA CALC-SCNC: -8.8 MMOL/L (ref -2–3)
BASE EXCESS BLDV CALC-SCNC: -12.5 MMOL/L (ref -2–3)
BASE EXCESS BLDV CALC-SCNC: -16 MMOL/L (ref -2–3)
BASO STIPL BLD QL SMEAR: PRESENT
BASOPHILS # BLD MANUAL: 0 X10*3/UL (ref 0–0.1)
BASOPHILS NFR BLD MANUAL: 0 %
BILIRUB SERPL-MCNC: 1.2 MG/DL (ref 0–1.2)
BODY TEMPERATURE: 37 DEGREES CELSIUS
BUN SERPL-MCNC: 24 MG/DL (ref 6–23)
BUN SERPL-MCNC: 28 MG/DL (ref 6–23)
BUN SERPL-MCNC: 29 MG/DL (ref 6–23)
BURR CELLS BLD QL SMEAR: ABNORMAL
CA-I BLDA-SCNC: 0.97 MMOL/L (ref 1.1–1.33)
CA-I BLDV-SCNC: 0.96 MMOL/L (ref 1.1–1.33)
CA-I BLDV-SCNC: 0.96 MMOL/L (ref 1.1–1.33)
CALCIUM SERPL-MCNC: 7 MG/DL (ref 8.6–10.6)
CALCIUM SERPL-MCNC: 7 MG/DL (ref 8.6–10.6)
CALCIUM SERPL-MCNC: 7.1 MG/DL (ref 8.6–10.6)
CARDIAC TROPONIN I PNL SERPL HS: 120 NG/L (ref 0–34)
CHLORIDE BLDA-SCNC: 98 MMOL/L (ref 98–107)
CHLORIDE BLDV-SCNC: 97 MMOL/L (ref 98–107)
CHLORIDE BLDV-SCNC: 97 MMOL/L (ref 98–107)
CHLORIDE SERPL-SCNC: 96 MMOL/L (ref 98–107)
CHLORIDE SERPL-SCNC: 97 MMOL/L (ref 98–107)
CHLORIDE SERPL-SCNC: 97 MMOL/L (ref 98–107)
CO2 SERPL-SCNC: 15 MMOL/L (ref 21–32)
CO2 SERPL-SCNC: 19 MMOL/L (ref 21–32)
CO2 SERPL-SCNC: 27 MMOL/L (ref 21–32)
CREAT SERPL-MCNC: 2.77 MG/DL (ref 0.5–1.05)
CREAT SERPL-MCNC: 3.08 MG/DL (ref 0.5–1.05)
CREAT SERPL-MCNC: 3.1 MG/DL (ref 0.5–1.05)
EGFRCR SERPLBLD CKD-EPI 2021: 16 ML/MIN/1.73M*2
EGFRCR SERPLBLD CKD-EPI 2021: 16 ML/MIN/1.73M*2
EGFRCR SERPLBLD CKD-EPI 2021: 18 ML/MIN/1.73M*2
EOSINOPHIL # BLD MANUAL: 0 X10*3/UL (ref 0–0.7)
EOSINOPHIL NFR BLD MANUAL: 0 %
ERYTHROCYTE [DISTWIDTH] IN BLOOD BY AUTOMATED COUNT: 19.8 % (ref 11.5–14.5)
ERYTHROCYTE [DISTWIDTH] IN BLOOD BY AUTOMATED COUNT: 21.2 % (ref 11.5–14.5)
GLUCOSE BLD MANUAL STRIP-MCNC: 138 MG/DL (ref 74–99)
GLUCOSE BLD MANUAL STRIP-MCNC: 75 MG/DL (ref 74–99)
GLUCOSE BLD MANUAL STRIP-MCNC: 78 MG/DL (ref 74–99)
GLUCOSE BLDA-MCNC: 103 MG/DL (ref 74–99)
GLUCOSE BLDV-MCNC: 125 MG/DL (ref 74–99)
GLUCOSE BLDV-MCNC: 99 MG/DL (ref 74–99)
GLUCOSE SERPL-MCNC: 125 MG/DL (ref 74–99)
GLUCOSE SERPL-MCNC: 151 MG/DL (ref 74–99)
GLUCOSE SERPL-MCNC: 87 MG/DL (ref 74–99)
HCO3 BLDA-SCNC: 17.5 MMOL/L (ref 22–26)
HCO3 BLDV-SCNC: 13.7 MMOL/L (ref 22–26)
HCO3 BLDV-SCNC: 16 MMOL/L (ref 22–26)
HCT VFR BLD AUTO: 18.1 % (ref 36–46)
HCT VFR BLD AUTO: 22.6 % (ref 36–46)
HCT VFR BLD EST: 20 % (ref 36–46)
HCT VFR BLD EST: 20 % (ref 36–46)
HCT VFR BLD EST: 26 % (ref 36–46)
HGB BLD-MCNC: 6.2 G/DL (ref 12–16)
HGB BLD-MCNC: 7.2 G/DL (ref 12–16)
HGB BLDA-MCNC: 6.6 G/DL (ref 12–16)
HGB BLDV-MCNC: 6.5 G/DL (ref 12–16)
HGB BLDV-MCNC: 8.5 G/DL (ref 12–16)
IMM GRANULOCYTES # BLD AUTO: 0.23 X10*3/UL (ref 0–0.7)
IMM GRANULOCYTES NFR BLD AUTO: 2 % (ref 0–0.9)
INHALED O2 CONCENTRATION: 35 %
INHALED O2 CONCENTRATION: 35 %
INR PPP: 1.6 (ref 0.9–1.1)
LACTATE BLDA-SCNC: 13.9 MMOL/L (ref 0.4–2)
LACTATE BLDV-SCNC: 12.5 MMOL/L (ref 0.4–2)
LACTATE BLDV-SCNC: 12.5 MMOL/L (ref 0.4–2)
LACTATE BLDV-SCNC: >17 MMOL/L (ref 0.4–2)
LACTATE BLDV-SCNC: >17 MMOL/L (ref 0.4–2)
LACTATE SERPL-SCNC: 12.2 MMOL/L (ref 0.4–2)
LACTATE SERPL-SCNC: 18.7 MMOL/L (ref 0.4–2)
LACTATE SERPL-SCNC: 3.6 MMOL/L (ref 0.4–2)
LACTATE SERPL-SCNC: 5.7 MMOL/L (ref 0.4–2)
LYMPHOCYTES # BLD MANUAL: 1.33 X10*3/UL (ref 1.2–4.8)
LYMPHOCYTES NFR BLD MANUAL: 11.3 %
MAGNESIUM SERPL-MCNC: 1.87 MG/DL (ref 1.6–2.4)
MAGNESIUM SERPL-MCNC: 2.18 MG/DL (ref 1.6–2.4)
MCH RBC QN AUTO: 31.9 PG (ref 26–34)
MCH RBC QN AUTO: 32 PG (ref 26–34)
MCHC RBC AUTO-ENTMCNC: 31.9 G/DL (ref 32–36)
MCHC RBC AUTO-ENTMCNC: 34.3 G/DL (ref 32–36)
MCV RBC AUTO: 100 FL (ref 80–100)
MCV RBC AUTO: 93 FL (ref 80–100)
MONOCYTES # BLD MANUAL: 0.2 X10*3/UL (ref 0.1–1)
MONOCYTES NFR BLD MANUAL: 1.7 %
MRSA DNA SPEC QL NAA+PROBE: NOT DETECTED
MYELOCYTES # BLD MANUAL: 0.11 X10*3/UL
MYELOCYTES NFR BLD MANUAL: 0.9 %
NEUTS SEG # BLD MANUAL: 10.05 X10*3/UL (ref 1.2–7)
NEUTS SEG NFR BLD MANUAL: 85.2 %
NRBC BLD-RTO: 3.2 /100 WBCS (ref 0–0)
NRBC BLD-RTO: 6.3 /100 WBCS (ref 0–0)
OXYHGB MFR BLDA: 95.3 % (ref 94–98)
OXYHGB MFR BLDV: 31.5 % (ref 45–75)
OXYHGB MFR BLDV: 45 % (ref 45–75)
PCO2 BLDA: 39 MM HG (ref 38–42)
PCO2 BLDV: 48 MM HG (ref 41–51)
PCO2 BLDV: 53 MM HG (ref 41–51)
PH BLDA: 7.26 PH (ref 7.38–7.42)
PH BLDV: 7.02 PH (ref 7.33–7.43)
PH BLDV: 7.13 PH (ref 7.33–7.43)
PHOSPHATE SERPL-MCNC: 5.9 MG/DL (ref 2.5–4.9)
PHOSPHATE SERPL-MCNC: 6.4 MG/DL (ref 2.5–4.9)
PLATELET # BLD AUTO: 265 X10*3/UL (ref 150–450)
PLATELET # BLD AUTO: 279 X10*3/UL (ref 150–450)
PO2 BLDA: 116 MM HG (ref 85–95)
PO2 BLDV: 27 MM HG (ref 35–45)
PO2 BLDV: 36 MM HG (ref 35–45)
POTASSIUM BLDA-SCNC: 5.3 MMOL/L (ref 3.5–5.3)
POTASSIUM BLDV-SCNC: 5.7 MMOL/L (ref 3.5–5.3)
POTASSIUM BLDV-SCNC: 5.8 MMOL/L (ref 3.5–5.3)
POTASSIUM SERPL-SCNC: 5 MMOL/L (ref 3.5–5.3)
POTASSIUM SERPL-SCNC: 5.7 MMOL/L (ref 3.5–5.3)
POTASSIUM SERPL-SCNC: 5.8 MMOL/L (ref 3.5–5.3)
PROMYELOCYTES # BLD MANUAL: 0.11 X10*3/UL
PROMYELOCYTES NFR BLD MANUAL: 0.9 %
PROT SERPL-MCNC: 4.1 G/DL (ref 6.4–8.2)
PROTHROMBIN TIME: 18.5 SECONDS (ref 9.8–12.8)
RBC # BLD AUTO: 1.94 X10*6/UL (ref 4–5.2)
RBC # BLD AUTO: 2.26 X10*6/UL (ref 4–5.2)
RBC MORPH BLD: ABNORMAL
SAO2 % BLDA: 100 % (ref 94–100)
SAO2 % BLDV: 32 % (ref 45–75)
SAO2 % BLDV: 46 % (ref 45–75)
SODIUM BLDA-SCNC: 132 MMOL/L (ref 136–145)
SODIUM BLDV-SCNC: 130 MMOL/L (ref 136–145)
SODIUM BLDV-SCNC: 131 MMOL/L (ref 136–145)
SODIUM SERPL-SCNC: 134 MMOL/L (ref 136–145)
SODIUM SERPL-SCNC: 135 MMOL/L (ref 136–145)
SODIUM SERPL-SCNC: 137 MMOL/L (ref 136–145)
TOTAL CELLS COUNTED BLD: 115
WBC # BLD AUTO: 10.5 X10*3/UL (ref 4.4–11.3)
WBC # BLD AUTO: 11.8 X10*3/UL (ref 4.4–11.3)

## 2024-12-18 PROCEDURE — 83605 ASSAY OF LACTIC ACID: CPT

## 2024-12-18 PROCEDURE — 2500000001 HC RX 250 WO HCPCS SELF ADMINISTERED DRUGS (ALT 637 FOR MEDICARE OP)

## 2024-12-18 PROCEDURE — 82947 ASSAY GLUCOSE BLOOD QUANT: CPT

## 2024-12-18 PROCEDURE — 84132 ASSAY OF SERUM POTASSIUM: CPT

## 2024-12-18 PROCEDURE — 87040 BLOOD CULTURE FOR BACTERIA: CPT

## 2024-12-18 PROCEDURE — 2500000004 HC RX 250 GENERAL PHARMACY W/ HCPCS (ALT 636 FOR OP/ED)

## 2024-12-18 PROCEDURE — 71045 X-RAY EXAM CHEST 1 VIEW: CPT | Performed by: RADIOLOGY

## 2024-12-18 PROCEDURE — 2500000005 HC RX 250 GENERAL PHARMACY W/O HCPCS

## 2024-12-18 PROCEDURE — 86902 BLOOD TYPE ANTIGEN DONOR EA: CPT

## 2024-12-18 PROCEDURE — 36415 COLL VENOUS BLD VENIPUNCTURE: CPT

## 2024-12-18 PROCEDURE — 85027 COMPLETE CBC AUTOMATED: CPT

## 2024-12-18 PROCEDURE — 87641 MR-STAPH DNA AMP PROBE: CPT

## 2024-12-18 PROCEDURE — 76937 US GUIDE VASCULAR ACCESS: CPT

## 2024-12-18 PROCEDURE — 83735 ASSAY OF MAGNESIUM: CPT

## 2024-12-18 PROCEDURE — 99238 HOSP IP/OBS DSCHRG MGMT 30/<: CPT

## 2024-12-18 PROCEDURE — 99231 SBSQ HOSP IP/OBS SF/LOW 25: CPT | Performed by: NURSE PRACTITIONER

## 2024-12-18 PROCEDURE — 84132 ASSAY OF SERUM POTASSIUM: CPT | Performed by: INTERNAL MEDICINE

## 2024-12-18 PROCEDURE — 85007 BL SMEAR W/DIFF WBC COUNT: CPT

## 2024-12-18 RX ORDER — VANCOMYCIN HYDROCHLORIDE 500 MG/100ML
500 INJECTION, SOLUTION INTRAVENOUS
Status: DISCONTINUED | OUTPATIENT
Start: 2024-12-19 | End: 2024-12-18 | Stop reason: HOSPADM

## 2024-12-18 RX ORDER — NOREPINEPHRINE BITARTRATE/D5W 8 MG/250ML
.01-1 PLASTIC BAG, INJECTION (ML) INTRAVENOUS CONTINUOUS
Status: DISCONTINUED | OUTPATIENT
Start: 2024-12-18 | End: 2024-12-18 | Stop reason: HOSPADM

## 2024-12-18 RX ORDER — SODIUM BICARBONATE 1 MEQ/ML
50 SYRINGE (ML) INTRAVENOUS ONCE
Status: COMPLETED | OUTPATIENT
Start: 2024-12-18 | End: 2024-12-18

## 2024-12-18 RX ORDER — SODIUM BICARBONATE 1 MEQ/ML
SYRINGE (ML) INTRAVENOUS
Status: COMPLETED
Start: 2024-12-18 | End: 2024-12-18

## 2024-12-18 RX ADMIN — FOLIC ACID 1 MG: 1 TABLET ORAL at 08:28

## 2024-12-18 RX ADMIN — THIAMINE HCL TAB 100 MG 100 MG: 100 TAB at 08:28

## 2024-12-18 RX ADMIN — ASPIRIN 81 MG CHEWABLE TABLET 81 MG: 81 TABLET CHEWABLE at 08:28

## 2024-12-18 RX ADMIN — ACETAMINOPHEN 1000 MG: 160 SOLUTION ORAL at 02:57

## 2024-12-18 RX ADMIN — VASOPRESSIN 0.03 UNITS/MIN: 0.2 INJECTION INTRAVENOUS at 10:05

## 2024-12-18 RX ADMIN — LIDOCAINE 4% 1 PATCH: 40 PATCH TOPICAL at 08:28

## 2024-12-18 RX ADMIN — VASOPRESSIN 0.03 UNITS/MIN: 0.2 INJECTION INTRAVENOUS at 02:48

## 2024-12-18 RX ADMIN — SODIUM BICARBONATE 50 MEQ: 84 INJECTION INTRAVENOUS at 09:59

## 2024-12-18 RX ADMIN — VANCOMYCIN HYDROCHLORIDE 1500 MG: 1.5 INJECTION, POWDER, LYOPHILIZED, FOR SOLUTION INTRAVENOUS at 04:26

## 2024-12-18 RX ADMIN — CLOPIDOGREL BISULFATE 75 MG: 75 TABLET ORAL at 08:28

## 2024-12-18 RX ADMIN — PIPERACILLIN SODIUM AND TAZOBACTAM SODIUM 2.25 G: 2; .25 INJECTION, SOLUTION INTRAVENOUS at 01:00

## 2024-12-18 RX ADMIN — PIPERACILLIN SODIUM AND TAZOBACTAM SODIUM 2.25 G: 2; .25 INJECTION, SOLUTION INTRAVENOUS at 08:10

## 2024-12-18 RX ADMIN — SODIUM BICARBONATE 50 MEQ: 84 INJECTION INTRAVENOUS at 05:44

## 2024-12-18 RX ADMIN — Medication 50 MEQ: at 09:59

## 2024-12-18 NOTE — PROGRESS NOTES
"[facility]/ General Surgery Progress Note    Humaira Huang is a 64 y.o. female on day 30 of admission presenting with Dissection of aorta.    Subjective   No issues with PEG tube overnight. Tube feeds infusing @ 10mL/hr without difficulty.     Objective     Physical Exam  Constitutional:       General: She is not in acute distress.  Cardiovascular:      Rate and Rhythm: Normal rate and regular rhythm.   Pulmonary:      Effort: Pulmonary effort is normal. No respiratory distress.      Comments: Tracheostomy   Abdominal:      General: There is no distension.      Palpations: Abdomen is soft.      Tenderness: There is no abdominal tenderness.      Comments: PEG tube in place, bumper 6cm at skin, spins freely, no surrounding erythema or drainage.        Last Recorded Vitals  Blood pressure 98/56, pulse 105, temperature 35.7 °C (96.3 °F), temperature source Temporal, resp. rate (!) 28, height 1.575 m (5' 2\"), weight 57.2 kg (126 lb 1.7 oz), SpO2 (!) 81%.  Intake/Output last 3 Shifts:  I/O last 3 completed shifts:  In: 5375.6 (94 mL/kg) [I.V.:945.6 (16.5 mL/kg); Other:1900; NG/GT:780; IV Piggyback:1750]  Out: 400 (7 mL/kg) [Other:400]  Weight: 57.2 kg     Relevant Results  Scheduled medications  acetaminophen, 1,000 mg, oral, q8h  aspirin, 81 mg, g-tube, Daily  atorvastatin, 80 mg, g-tube, Nightly  clopidogrel, 75 mg, oral, Daily  diatrizoate willian-diatrizoat sod, 60 mL, oral, Once  folic acid, 1 mg, g-tube, Daily  heparin, 5,000 Units, subcutaneous, BID  insulin lispro, 0-10 Units, subcutaneous, q4h  lactated Ringer's, 1,000 mL, intravenous, Once  lidocaine, 1 patch, transdermal, Daily  metoprolol tartrate, 50 mg, oral, q6h  oxygen, , inhalation, Continuous - Inhalation  piperacillin-tazobactam, 2.25 g, intravenous, q8h  QUEtiapine, 25 mg, g-tube, Nightly  sennosides, 2 tablet, g-tube, BID  thiamine, 100 mg, g-tube, Daily  [START ON 12/19/2024] vancomycin, 500 mg, intravenous, Once per day on Tuesday Thursday " Saturday      Continuous medications  norepinephrine, 0.01-1 mcg/kg/min (Dosing Weight), Last Rate: 0.12 mcg/kg/min (12/18/24 0706)  vasopressin, 0.03 Units/min, Last Rate: 0.03 Units/min (12/18/24 0707)      PRN medications  PRN medications: alteplase, dextrose, dextrose, dicyclomine, fentaNYL, glucagon, heparin flush, [Held by provider] HYDROmorphone, hydrOXYzine HCL, ipratropium-albuteroL, ondansetron, [Held by provider] oxyCODONE, [Held by provider] oxyCODONE, oxygen, polyethylene glycol, vancomycin    Assessment/Plan   Assessment & Plan  Dissection of aorta    Ruptured aortic aneurysm (Multi)    Anemia due to acute blood loss    Acute renal failure with tubular necrosis (CMS-HCC)    Chronic mesenteric ischemia (Multi)    Acute tracheostomy management (Multi)    Hx of CABG    Disorientation    Frailty    Severe muscle deconditioning    Cardiac arrest    65 YO F with PMH pancreatitis, DVT/PE, HLD, HTN, CABGx4 1997, T2DM, GERD, PAD, chronic mesenteric ischemia, tobacco use who presented to [facility] on 11/18/2024 as a transfer from [facility] with chest, back, and abdominal pain. 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 Vascular and Cardiac surgery not offering intervention due to patient frailty and high risk nature of the procedure. CICU course c/b PEA arrest 11/20. Given the prolonged hospital course ongoing GOC discussion was completed with preference for trach/ PEG with ultimate goal of LTACH over comfort care. [facility] consulted for PEG tube placement. PEG placed on 12/6. Re-engaged after dislodged tube on 12/16. PEG tube replaced on 12/17.     - Please place abdominal binder and maintain at all times to prevent PEG from being dislodged   - May continuing using PEG for meds/flushes/tube feeds per primary team   - Maintain bumper 1cm from skin- should spin freely  - Check  for bleeding every 4 hours for next few days  - Cleanse daily, apply dry dressing ABOVE bumper only  - If PEG placed to gravity, please secure with drain viramontes   - Please page with questions or concerns.     Ashley LAMB, CNP  [facility]  [id number]       Gastroenterology

## 2024-12-18 NOTE — PROGRESS NOTES
Occupational Therapy    Communication Note    Patient Name: Humaira Huang  MRN: [id number]  Today's Date: 12/18/2024   Room: 14/14-A    Discipline: Occupational Therapy      Missed Visit Reason:  (0839: Pt with elevated lactate and increased pressor needs. Not appropriate for OT tx.)      12/18/24 at 8:41 AM   Michelle Marshall, OT   Rehab Office: [phone]

## 2024-12-18 NOTE — ASSESSMENT & PLAN NOTE
64 y.o. female with history of pancreatitis, DVT/PE, HLD, HTN, CABGx4 1997, T2DM, GERD, PAD, chronic mesenteric ischemia, tobacco use who presented to [facility] on 11/18/2024 as a transfer from [facility] with chest, back, and abdominal pain. Now post PEA arrest, with intubation/sedation. Family agreed for trach/PEG and LTAC. Patient had septic episode 12/6 overnight and underwent trach/PEG. IR-guided TDC line placed 12/13/24.     Updates 12/17/24:  -On trach collar over the past day  -Insurance situation evolving, [id number] initially denied LTAC. Appealing. Family will also explore SNF options  -G-tube replacement today will get a dose of perioperative cefazolin. Restart tube feeds after replaced and verified  -Change to cuffless trach today  -Increase metoprolol to 50 mg Q6H for medical impulse control to target HR in 60s  -Transfer to floor     Neuro:  #Sedation/anxiety  -Quetiapine 25 mg nightly     Cardiac:   #Aflutter/Afib with RVR  #Driven by anxiety vs hypovolemia  ::now sinus tachycardia  -HR in 160-180s on 12/5 am  -s/p one dose of digoxin 250 mcg (digoxin is not dialyzable)  -s/p amio bolus 12/6 for RVR  -Increase to metoprolol 50 mg Q6H     #PEA arrest 11/20  ::likely hypoxia driven: PNA vs aspiration     #Aortic dissection  #Mural Thrombus  #Distal aortic arch saccular aneurysm   ::CTA CAP 11/18- 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   -Vascular surgery discussed case at aortic conference and there are no plans for surgery, palliative measures recommended     #NSTEMI  #CAD s/p CABGx4 1997  #PAD  #HTN #DLD  ::EKG- NSR, rate 71, TWI aVL, V1-V6, 1mm ORQUIDEA in aVR  ::Trop peak 5700  ::Lipid HDL 41.5, , TG 75, Chol 157  -Holding home amlodipine 10mg, imdur 30mg, lisinopril 2.5mg, iso hypotension   -Continue ASA and atorvastatin  -Continue Plavix 75 mg      #HFrecEF (45-50%, 11/19/24)  #Moderate/Severe MR  -TTE on 12/6 with EF of 60-65% with moderate to severe mitral regurgitation  -Not on GDMT     Pulm:  #Acute hypoxic respiratory failure, post-arrest  :s/p Zosyn 11/20 - 11/26 for PNA  :: s/p Vancomycin   -Failed extubation on 11/28  -On sustained trach collar     GI:  #PEG tube  #Nutrition  -Started Senna 8.6 mg BID  -peg tube replaced     Renal:  #TRACY on CKD likely 2/2 arrest  #Anuric  -Sevalemar carbonate 800 mg TID   -Continue TuThrSat dialysis  -Tunneled line placed on 12/16      Endo:   #T2DM  -Hold home metformin   -POC Glucose: 119-162  -Continue SSI2       Infectious  -No acute problems     Hem/Onc  #Hypoproliferative Anemia,stable  #Hemolytic transfusion reaction  -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 7.1 -> 7.8 -> 8.1  -Continue to monitor     F: None  E: K>4, Mag>2  N: Npo for now, restart TF after trach  G: pantoprazole  A: TDC PEG  DVT: heparin 5000 units BID  CODE: DNR/okay to intubate (confirmed with family 11/21)  NOK: Kristopher Richter [phone]

## 2024-12-18 NOTE — DISCHARGE SUMMARY
"Discharge Diagnosis  Dissection of aorta          Test Results Pending At Discharge  Pending Labs       Order Current Status    Blood Gas Lactic Acid, Venous In process    Path Review-Immunohematology In process    Blood Culture Preliminary result            Hospital Course  Humaira Huang is a 64 y.o. female with a PMHx of pancreatitis, DVT/PE, HLD, HTN, CABGx4 1997, T2DM, GERD, PAD, chronic mesenteric ischemia, tobacco use who presented to [facility] on 11/18/2024 as a transfer from [facility] with chest, back, and abdominal pain. Pt presented to OSH with excruciating epigastric abdominal pain that radiated to her back and chest. States sx started 2 days ago. She rated it 10/10 and states she's never had sx like this before. Describes sx as burning but occasionally ripping/tearing. Stated sx were different than her chronic mesenteric ischemia. 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 Patient was given dilaudid 0.2 but became hypotenive on this and labetalol gtt for impulse control. On 11/19 morning patient was writhing in pain similar to her presentation last night. She was yelling and saying that the pain was in her chest, neck, and abdomen. Very difficult to get an appropriate history from patient. She kept saying, \"I am dying\". Vitals were stable and ABG showed chronic acidosis. Trialed on low dose labetalol gtt and was given pain meds with no improvement. On evening of 11/19, patient had worsening hypercapnia and hypoxia with inability to tolerate BiPAP. PEA arrest s/p 3 rounds of CPR. ROSC achieved and pt intubated on levophed. Vascular surgery determined that patient is not a surgical candidate for aortic dissection and due to poor anatomy not a candidate for endovascular therapy. Episode of aflutter 11/20, converted to sinus " with amio gtt. Heparin contiued but needed to be stopped due to bloody OG tube output. TF eventually started once OG output subsided. Following arrest, renal function worsened and pt was oliguric with ~400 ml urine per day with Cr > 4. Nephro started SLED 11/27. Patient had some neuro function (opening eyes and squeezing hand) and was doing well on SBTs. Attempted to extubate 11/28 and pt did very poorly on BiPAP. Was lethargic and unable to support respiratory drive. Hypoxia caused elevated lactate to 10 and pt was re intubated after 3 hours. Lactate returned to normal with re intubation. [facility] meeting with family on 11/30 discussed potential options going forward which included tracheostomy vs comfort care. Family decided to go with tracheostomy and PEG on evening of 12/5. Both tracheostomy and PEG were placed on 12/6.  She developed hypotension during dialysis, and was started on pressors. On 12/7, due to concern for bactermia and sepsis, she was started on empiric antibiotics and pressors due to hypotension.  Her dialysis line was removed as a potential source of infection. Pressor requirements downtrended down to Levo 0.06 on 12/8. She was transfused with several units of blood and later found to have an acute hemolytic transfusion reaction with antibodies against Zarate B antigen.  Blood cultures that were collected ended up being negative. On 12/9, was started on folate and thiamine for anemia, and was weaned completely off pressors. On 12/10, a temporary line was placed and she returned to dialysis. Due to ileus noted on abdominal imaging, she was started on senna 17.2 mg BID. She was initially placed on pressure support on 12/9 which she poorly tolerated due to anxiety. However, from 12/10-12/15, she tolerated her pressure support trials well, and she was eventually weaned to trach mask on 12/16. Empiric antibiotics stopped on 12/11 after a 5 day course due to an improving pressor requirements. Had abdominal  pain on 12/11, g-tube assessed by ACS with normal function. KUB showing no acute changes. On 12/12, TDC line placed. However, insurance denied LTAC. Her Plavix was restarted on 12/14 as all procedures were done. On 12/17, was changed to cuffless trach and PEG tube replaced. Overnight on 12/18 patient with increased pressor requirement, elevated lactate, and acidemia. Abx were restarted and patient with continued pressor requirements into the morning, bradycardia refractory to therapy and subsequent PEA. Patient was DNR. Time of death was called with family at bedside.    Pertinent Physical Exam At Time of Discharge  Physical Exam  no response to verbal, physical, or painful stimuli. Pupils fixed and dilated. Absent heart and breath sounds for greater than 1 minute. Absent peripheral pulses.           Raghav Torres MD

## 2024-12-18 NOTE — CONSULTS
Vancomycin Dosing by Pharmacy- INITIAL    Humaira Huang is a 64 y.o. year old female who Pharmacy has been consulted for vancomycin dosing for pneumonia. Based on the patient's indication and renal status this patient will be dosed based on a goal pre-HD level of 20-25.     Renal function is currently poor. Patient has anuric TRACY and getting dialysis TTS.    Visit Vitals  BP 91/70   Pulse 87   Temp 35.7 °C (96.3 °F) (Temporal)   Resp (!) 28        Lab Results   Component Value Date    CREATININE 2.77 (H) 2024    CREATININE 4.77 (H) 2024    CREATININE 3.89 (H) 2024    CREATININE 2.80 (H) 12/15/2024        Patient weight is as follows:   Vitals:    24 0100   Weight: 57.2 kg (126 lb 1.7 oz)       Cultures:  Susceptibility data for the encounter in last 14 days.  Collected Specimen Info Organism Levofloxacin Minocycline Trimethoprim/Sulfamethoxazole   24 Fluid from Tracheal Aspirate Stenotrophomonas maltophilia group  S  S  S         I/O last 3 completed shifts:  In: 4434.2 (77.7 mL/kg) [I.V.:800 (14 mL/kg); Other:1900; NG/GT:680; IV Piggyback:1054.2]  Out: 400 (7 mL/kg) [Other:400]  Weight: 57.1 kg   I/O during current shift:  I/O this shift:  In: 1295.6 [I.V.:145.6; NG/GT:100; IV Piggyback:1050]  Out: -     Temp (24hrs), Av.9 °C (96.7 °F), Min:35.7 °C (96.3 °F), Max:36.2 °C (97.2 °F)         Assessment/Plan     Patient received 1.5 gm load. Will schedule 500 mg post HD sessions.  Follow-up level Saturday with AM labs.  Will continue to monitor renal function daily while on vancomycin and order serum creatinine at least every 48 hours if not already ordered.  Follow for continued vancomycin needs, clinical response, and signs/symptoms of toxicity.       Stephen Carrillo, PharmD

## 2024-12-18 NOTE — SIGNIFICANT EVENT
Called by bedside RN for patient unresponsiveness. On exam, patient has no response to verbal, physical, or painful stimuli. Pupils fixed and dilated. Absent heart and breath sounds for greater than 1 minute. Absent peripheral pulses. Patient pronounced dead at 10:37 am 12/18/2024.

## 2024-12-18 NOTE — NURSING NOTE
[facility] asked to make nursing note about medications. Pt had levophed at .3, vaso at .03, lactated ringers at 5mL/hr, and zosyn 2.25g/50mL, and sodium bicarb hour within time of death

## 2024-12-19 LAB — PATH REV-IMMUNOHEMATOLOGY-PR30: NORMAL

## 2024-12-19 NOTE — SIGNIFICANT EVENT
Death Within 24 Hours of Soft Wrist Restraint Utilization    Death within 24 hours of soft wrist restraint logged at 12/19/2024 at 9:39 AM.    Molly Johnston RN  Date: 12/19/2024  Time: 9:39 AM

## 2024-12-21 LAB
BLOOD EXPIRATION DATE: NORMAL
DISPENSE STATUS: NORMAL
PRODUCT BLOOD TYPE: 2800
PRODUCT CODE: NORMAL
UNIT ABO: NORMAL
UNIT NUMBER: NORMAL
UNIT RH: NORMAL
UNIT VOLUME: 350
XM INTEP: NORMAL

## 2024-12-22 LAB
ATRIAL RATE: 163 BPM
ATRIAL RATE: 234 BPM
ATRIAL RATE: 84 BPM
ATRIAL RATE: 86 BPM
BACTERIA BLD CULT: NORMAL
P AXIS: 24 DEGREES
P AXIS: 77 DEGREES
P OFFSET: 200 MS
P OFFSET: 203 MS
P ONSET: 139 MS
P ONSET: 141 MS
PR INTERVAL: 146 MS
PR INTERVAL: 162 MS
Q ONSET: 212 MS
Q ONSET: 218 MS
Q ONSET: 220 MS
Q ONSET: 222 MS
QRS COUNT: 14 BEATS
QRS COUNT: 15 BEATS
QRS COUNT: 22 BEATS
QRS COUNT: 27 BEATS
QRS DURATION: 108 MS
QRS DURATION: 116 MS
QRS DURATION: 126 MS
QRS DURATION: 86 MS
QT INTERVAL: 306 MS
QT INTERVAL: 308 MS
QT INTERVAL: 318 MS
QT INTERVAL: 350 MS
QTC CALCULATION(BAZETT): 366 MS
QTC CALCULATION(BAZETT): 413 MS
QTC CALCULATION(BAZETT): 473 MS
QTC CALCULATION(BAZETT): 507 MS
QTC FREDERICIA: 345 MS
QTC FREDERICIA: 391 MS
QTC FREDERICIA: 414 MS
QTC FREDERICIA: 429 MS
R AXIS: 2 DEGREES
R AXIS: 29 DEGREES
R AXIS: 40 DEGREES
R AXIS: 46 DEGREES
T AXIS: 190 DEGREES
T AXIS: 21 DEGREES
T AXIS: 224 DEGREES
T AXIS: 250 DEGREES
T OFFSET: 365 MS
T OFFSET: 372 MS
T OFFSET: 379 MS
T OFFSET: 397 MS
VENTRICULAR RATE: 133 BPM
VENTRICULAR RATE: 163 BPM
VENTRICULAR RATE: 84 BPM
VENTRICULAR RATE: 86 BPM

## 2024-12-23 NOTE — DOCUMENTATION CLARIFICATION NOTE
"    PATIENT:               MARGARET COVINGTON  ACCT #:                  [id number]  MRN:                       [id number]  :                       1960  ADMIT DATE:       2024 10:54 PM  DISCH DATE:        2024 10:37 AM  RESPONDING PROVIDER #:        [id number]          PROVIDER RESPONSE TEXT:    I agree with dietician diagnosis of Severe Malnutrition on 24    CDI QUERY TEXT:    Clarification    Instruction:    Based on your assessment of the patient and the clinical information, please provide the requested documentation by clicking on the appropriate radio button and enter any additional information if prompted.    Question: Please further clarify this patient nutritional status as    When answering this query, please exercise your independent professional judgment. The fact that a question is being asked, does not imply that any particular answer is desired or expected.    The patient's clinical indicators include:  Clinical Information: From discharge summary -\" 64 y.o. female with a PMHx of pancreatitis, DVT/PE, HLD, HTN, CABGx4 , T2DM, GERD, PAD, chronic mesenteric ischemia, tobacco use who presented to [facility] on 2024 as a transfer from [facility] with chest, back, and abdominal pain    Clinical Indicators:    From DPN on 24- \"Malnutrition Diagnosis  Patient has Malnutrition Diagnosis: Yes  Diagnosis Status: Ongoing  Malnutrition Diagnosis: Severe malnutrition related to acute disease or injury  As Evidenced by: lesss than  50% estimated energy needs for greater than or equal to 5 days, mild- moderate subcutaneous fat loss plus muscle wasting  Additional Assessment Information: unable to reach TF goal rate, feeds frequently held\"    Treatment: enteral nutrition, peg tube    Risk Factors: chronic mesenteric ischemia, dissection in desc thoracic aorta  Options provided:  -- I agree with dietician diagnosis of Severe Malnutrition on 24  -- " Other - I will add my own diagnosis  -- Refer to Clinical Documentation Reviewer    Query created by: So Wren on 12/22/2024 11:40 AM      Electronically signed by:  ALEXANDRA HAYES MD 12/23/2024 6:35 AM

## 2024-12-23 NOTE — DOCUMENTATION CLARIFICATION NOTE
"    PATIENT:               MARGARET COVINGTON  ACCT #:                  [id number]  MRN:                       [id number]  :                       1960  ADMIT DATE:       2024 10:54 PM  DISCH DATE:        2024 10:37 AM  RESPONDING PROVIDER #:        [id number]          PROVIDER RESPONSE TEXT:    Septic shock    CDI QUERY TEXT:    Clarification    Instruction:    Based on your assessment of the patient and the clinical information, please provide the requested documentation by clicking on the appropriate radio button and enter any additional information if prompted.    Question: Is there a diagnosis associated with the clinical information    When answering this query, please exercise your independent professional judgment. The fact that a question is being asked, does not imply that any particular answer is desired or expected.    The patient's clinical indicators include:  Clinical Information: From Discharge Summary- \"64F with a PMHx sig for CAD s/p CABG, h/o DVT/PE, and chronic mesenteric ischemia who was admitted for complex aortic pathology including a descending aortic aneurysm, mural hematoma, and 2 areas of dissection with a hospital course complicated by PEA arrest who is s/p trach/PEG.\"    Clinical Indicators:    From DPN on 24-\"Overnight patient had episode of RVR requiring amio bolus. Her pressor requirements increased to levo 0.2 and was started on vanc/zosyn and given 500cc bolus due to concern for sepsis. Lactate 4.5->1.4.\"    From DPN on 24-  \"Shock, likely septic in origin  Continue levophed  currently at 0.06  Stress dose steroids stopped\"    From DPN on 24-  \"Infectious  sepsis (resolving)  fu blood and sputum culutre  Trach aspirates growing steno  Blood cultures NGTD at 3 days\"    From DPN on 24- \"Driven by anxiety vs hypovolemia\"    From DPN on 24- \"Anemia due to acute blood loss\"    From Discharge summary on 24- \"On , due to concern for bactermia " "and sepsis, she was started on empiric antibiotics and pressors due to hypotension. Her dialysis line was removed as a potential source of infection. Pressor requirements downtrended down to Levo 0.06 on 12/8\"    \"Progressive shock overnight with worsening lactate concerning for progressive gut ischemia\"    Treatment: LR Bolus, Vasopressors , Vancomycin, Piperacillin    Risk Factors: hypotension, Elevated Lactate, concern for progressive gut ischemia  Options provided:  -- Hemorrhagic shock  -- Septic shock  -- Hypovolemic shock  -- Other - I will add my own diagnosis  -- Refer to Clinical Documentation Reviewer    Query created by: So Wren on 12/23/2024 6:55 AM      Electronically signed by:  ARLEY PATTON MD 12/23/2024 1:44 PM          "

## 2025-03-13 ENCOUNTER — APPOINTMENT (OUTPATIENT)
Dept: OTOLARYNGOLOGY | Facility: CLINIC | Age: 65
End: 2025-03-13
Payer: MEDICARE
